# Patient Record
Sex: MALE | Race: BLACK OR AFRICAN AMERICAN | NOT HISPANIC OR LATINO | Employment: UNEMPLOYED | URBAN - METROPOLITAN AREA
[De-identification: names, ages, dates, MRNs, and addresses within clinical notes are randomized per-mention and may not be internally consistent; named-entity substitution may affect disease eponyms.]

---

## 2020-01-01 ENCOUNTER — OFFICE VISIT (OUTPATIENT)
Dept: WOUND CARE | Facility: HOSPITAL | Age: 65
End: 2020-01-01

## 2020-01-01 ENCOUNTER — APPOINTMENT (OUTPATIENT)
Dept: GENERAL RADIOLOGY | Facility: HOSPITAL | Age: 65
End: 2020-01-01

## 2020-01-01 ENCOUNTER — APPOINTMENT (OUTPATIENT)
Dept: WOUND CARE | Facility: HOSPITAL | Age: 65
End: 2020-01-01

## 2020-01-01 ENCOUNTER — HOSPITAL ENCOUNTER (INPATIENT)
Facility: HOSPITAL | Age: 65
LOS: 5 days | Discharge: HOME HEALTH | End: 2020-12-23
Attending: EMERGENCY MEDICINE | Admitting: HOSPITALIST

## 2020-01-01 ENCOUNTER — APPOINTMENT (OUTPATIENT)
Dept: CT IMAGING | Facility: HOSPITAL | Age: 65
End: 2020-01-01

## 2020-01-01 ENCOUNTER — READMISSION MANAGEMENT (OUTPATIENT)
Dept: CALL CENTER | Facility: HOSPITAL | Age: 65
End: 2020-01-01

## 2020-01-01 ENCOUNTER — APPOINTMENT (OUTPATIENT)
Dept: CARDIOLOGY | Facility: HOSPITAL | Age: 65
End: 2020-01-01

## 2020-01-01 ENCOUNTER — LAB REQUISITION (OUTPATIENT)
Dept: LAB | Facility: HOSPITAL | Age: 65
End: 2020-01-01

## 2020-01-01 VITALS
SYSTOLIC BLOOD PRESSURE: 141 MMHG | DIASTOLIC BLOOD PRESSURE: 89 MMHG | TEMPERATURE: 97.3 F | HEART RATE: 75 BPM | OXYGEN SATURATION: 100 % | BODY MASS INDEX: 31.27 KG/M2 | WEIGHT: 251.5 LBS | HEIGHT: 75 IN | RESPIRATION RATE: 18 BRPM

## 2020-01-01 DIAGNOSIS — A41.9 SEPSIS SECONDARY TO UTI (HCC): ICD-10-CM

## 2020-01-01 DIAGNOSIS — Z00.00 ENCOUNTER FOR GENERAL ADULT MEDICAL EXAMINATION WITHOUT ABNORMAL FINDINGS: ICD-10-CM

## 2020-01-01 DIAGNOSIS — N39.0 COMPLICATED UTI (URINARY TRACT INFECTION): Primary | ICD-10-CM

## 2020-01-01 DIAGNOSIS — N39.0 SEPSIS SECONDARY TO UTI (HCC): ICD-10-CM

## 2020-01-01 DIAGNOSIS — R41.0 CONFUSION: ICD-10-CM

## 2020-01-01 DIAGNOSIS — E87.20 NORMAL ANION GAP METABOLIC ACIDOSIS: ICD-10-CM

## 2020-01-01 LAB
ALBUMIN SERPL-MCNC: 2.1 G/DL (ref 3.5–5.2)
ALBUMIN SERPL-MCNC: 2.2 G/DL (ref 3.5–5.2)
ALBUMIN SERPL-MCNC: 2.2 G/DL (ref 3.5–5.2)
ALBUMIN SERPL-MCNC: 2.4 G/DL (ref 3.5–5.2)
ALBUMIN/GLOB SERPL: 0.6 G/DL
ALP SERPL-CCNC: 94 U/L (ref 39–117)
ALT SERPL W P-5'-P-CCNC: 11 U/L (ref 1–41)
ANION GAP SERPL CALCULATED.3IONS-SCNC: 10.3 MMOL/L (ref 5–15)
ANION GAP SERPL CALCULATED.3IONS-SCNC: 10.3 MMOL/L (ref 5–15)
ANION GAP SERPL CALCULATED.3IONS-SCNC: 10.4 MMOL/L (ref 5–15)
ANION GAP SERPL CALCULATED.3IONS-SCNC: 7.4 MMOL/L (ref 5–15)
ANION GAP SERPL CALCULATED.3IONS-SCNC: 7.9 MMOL/L (ref 5–15)
ANION GAP SERPL CALCULATED.3IONS-SCNC: 8.7 MMOL/L (ref 5–15)
ANISOCYTOSIS BLD QL: NORMAL
AORTIC DIMENSIONLESS INDEX: 0.8 (DI)
APTT PPP: 42.5 SECONDS (ref 22.7–35.4)
APTT PPP: 47.1 SECONDS (ref 22.7–35.4)
APTT PPP: 50.7 SECONDS (ref 22.7–35.4)
AST SERPL-CCNC: 15 U/L (ref 1–40)
B PARAPERT DNA SPEC QL NAA+PROBE: NOT DETECTED
B PERT DNA SPEC QL NAA+PROBE: NOT DETECTED
BACTERIA SPEC AEROBE CULT: ABNORMAL
BACTERIA SPEC AEROBE CULT: NORMAL
BACTERIA SPEC ANAEROBE CULT: NORMAL
BACTERIA UR QL AUTO: ABNORMAL /HPF
BASOPHILS # BLD AUTO: 0.05 10*3/MM3 (ref 0–0.2)
BASOPHILS # BLD AUTO: 0.08 10*3/MM3 (ref 0–0.2)
BASOPHILS NFR BLD AUTO: 0.5 % (ref 0–1.5)
BASOPHILS NFR BLD AUTO: 1.1 % (ref 0–1.5)
BH CV ECHO MEAS - ACS: 2.6 CM
BH CV ECHO MEAS - AO MAX PG: 3 MMHG
BH CV ECHO MEAS - AO MEAN PG (FULL): 0 MMHG
BH CV ECHO MEAS - AO MEAN PG: 1 MMHG
BH CV ECHO MEAS - AO ROOT AREA (BSA CORRECTED): 1.6
BH CV ECHO MEAS - AO ROOT AREA: 10.8 CM^2
BH CV ECHO MEAS - AO ROOT DIAM: 3.7 CM
BH CV ECHO MEAS - AO V2 MAX: 82 CM/SEC
BH CV ECHO MEAS - AO V2 MEAN: 56.8 CM/SEC
BH CV ECHO MEAS - AO V2 VTI: 14.1 CM
BH CV ECHO MEAS - ASC AORTA: 3.2 CM
BH CV ECHO MEAS - AVA(I,A): 2.8 CM^2
BH CV ECHO MEAS - AVA(I,D): 2.8 CM^2
BH CV ECHO MEAS - BSA(HAYCOCK): 2.4 M^2
BH CV ECHO MEAS - BSA: 2.4 M^2
BH CV ECHO MEAS - BZI_BMI: 30 KILOGRAMS/M^2
BH CV ECHO MEAS - BZI_METRIC_HEIGHT: 190.5 CM
BH CV ECHO MEAS - BZI_METRIC_WEIGHT: 108.9 KG
BH CV ECHO MEAS - EDV(CUBED): 74.1 ML
BH CV ECHO MEAS - EDV(MOD-SP2): 48 ML
BH CV ECHO MEAS - EDV(MOD-SP4): 57 ML
BH CV ECHO MEAS - EDV(TEICH): 78.6 ML
BH CV ECHO MEAS - EF(CUBED): 63.6 %
BH CV ECHO MEAS - EF(MOD-BP): 59 %
BH CV ECHO MEAS - EF(MOD-SP2): 58.3 %
BH CV ECHO MEAS - EF(MOD-SP4): 59.6 %
BH CV ECHO MEAS - EF(TEICH): 55.5 %
BH CV ECHO MEAS - ESV(CUBED): 27 ML
BH CV ECHO MEAS - ESV(MOD-SP2): 20 ML
BH CV ECHO MEAS - ESV(MOD-SP4): 23 ML
BH CV ECHO MEAS - ESV(TEICH): 35 ML
BH CV ECHO MEAS - FS: 28.6 %
BH CV ECHO MEAS - IVS/LVPW: 1.6
BH CV ECHO MEAS - IVSD: 2.3 CM
BH CV ECHO MEAS - LV DIASTOLIC VOL/BSA (35-75): 24 ML/M^2
BH CV ECHO MEAS - LV MASS(C)D: 349.2 GRAMS
BH CV ECHO MEAS - LV MASS(C)DI: 147.3 GRAMS/M^2
BH CV ECHO MEAS - LV MAX PG: 1.4 MMHG
BH CV ECHO MEAS - LV MEAN PG: 1 MMHG
BH CV ECHO MEAS - LV SYSTOLIC VOL/BSA (12-30): 9.7 ML/M^2
BH CV ECHO MEAS - LV V1 MAX: 60 CM/SEC
BH CV ECHO MEAS - LV V1 MEAN: 38.2 CM/SEC
BH CV ECHO MEAS - LV V1 VTI: 11.3 CM
BH CV ECHO MEAS - LVIDD: 4.2 CM
BH CV ECHO MEAS - LVIDS: 3 CM
BH CV ECHO MEAS - LVLD AP2: 6 CM
BH CV ECHO MEAS - LVLD AP4: 6.3 CM
BH CV ECHO MEAS - LVLS AP2: 5.2 CM
BH CV ECHO MEAS - LVLS AP4: 4.6 CM
BH CV ECHO MEAS - LVOT AREA (M): 3.5 CM^2
BH CV ECHO MEAS - LVOT AREA: 3.5 CM^2
BH CV ECHO MEAS - LVOT DIAM: 2.1 CM
BH CV ECHO MEAS - LVPWD: 1.4 CM
BH CV ECHO MEAS - MV A MAX VEL: 91.3 CM/SEC
BH CV ECHO MEAS - MV DEC SLOPE: 384 CM/SEC^2
BH CV ECHO MEAS - MV DEC TIME: 137 SEC
BH CV ECHO MEAS - MV E MAX VEL: 53.5 CM/SEC
BH CV ECHO MEAS - MV E/A: 0.59
BH CV ECHO MEAS - MV MEAN PG: 2 MMHG
BH CV ECHO MEAS - MV P1/2T MAX VEL: 62.5 CM/SEC
BH CV ECHO MEAS - MV P1/2T: 47.7 MSEC
BH CV ECHO MEAS - MV V2 MEAN: 67.2 CM/SEC
BH CV ECHO MEAS - MV V2 VTI: 22.1 CM
BH CV ECHO MEAS - MVA P1/2T LCG: 3.5 CM^2
BH CV ECHO MEAS - MVA(P1/2T): 4.6 CM^2
BH CV ECHO MEAS - MVA(VTI): 1.8 CM^2
BH CV ECHO MEAS - PA MAX PG: 2.2 MMHG
BH CV ECHO MEAS - PA V2 MAX: 74.7 CM/SEC
BH CV ECHO MEAS - QP/QS: 0.68
BH CV ECHO MEAS - RAP SYSTOLE: 8 MMHG
BH CV ECHO MEAS - RV MEAN PG: 0 MMHG
BH CV ECHO MEAS - RV V1 MEAN: 18.6 CM/SEC
BH CV ECHO MEAS - RV V1 VTI: 5 CM
BH CV ECHO MEAS - RVOT AREA: 5.3 CM^2
BH CV ECHO MEAS - RVOT DIAM: 2.6 CM
BH CV ECHO MEAS - SI(AO): 63.9 ML/M^2
BH CV ECHO MEAS - SI(CUBED): 19.9 ML/M^2
BH CV ECHO MEAS - SI(LVOT): 16.5 ML/M^2
BH CV ECHO MEAS - SI(MOD-SP2): 11.8 ML/M^2
BH CV ECHO MEAS - SI(MOD-SP4): 14.3 ML/M^2
BH CV ECHO MEAS - SI(TEICH): 18.4 ML/M^2
BH CV ECHO MEAS - SV(AO): 151.6 ML
BH CV ECHO MEAS - SV(CUBED): 47.1 ML
BH CV ECHO MEAS - SV(LVOT): 39.1 ML
BH CV ECHO MEAS - SV(MOD-SP2): 28 ML
BH CV ECHO MEAS - SV(MOD-SP4): 34 ML
BH CV ECHO MEAS - SV(RVOT): 26.6 ML
BH CV ECHO MEAS - SV(TEICH): 43.6 ML
BH CV ECHO MEAS - TAPSE (>1.6): 2.2 CM
BH CV VAS BP LEFT ARM: NORMAL MMHG
BH CV XLRA - RV BASE: 3.1 CM
BH CV XLRA - RV LENGTH: 7.4 CM
BH CV XLRA - RV MID: 2.1 CM
BH CV XLRA - TDI S': 9.7 CM/SEC
BILIRUB SERPL-MCNC: 0.6 MG/DL (ref 0–1.2)
BILIRUB UR QL STRIP: NEGATIVE
BUN SERPL-MCNC: 41 MG/DL (ref 8–23)
BUN SERPL-MCNC: 43 MG/DL (ref 8–23)
BUN SERPL-MCNC: 47 MG/DL (ref 8–23)
BUN SERPL-MCNC: 48 MG/DL (ref 8–23)
BUN SERPL-MCNC: 53 MG/DL (ref 8–23)
BUN SERPL-MCNC: 55 MG/DL (ref 8–23)
BUN/CREAT SERPL: 11.8 (ref 7–25)
BUN/CREAT SERPL: 12.6 (ref 7–25)
BUN/CREAT SERPL: 13.9 (ref 7–25)
BUN/CREAT SERPL: 14.4 (ref 7–25)
BUN/CREAT SERPL: 14.4 (ref 7–25)
BUN/CREAT SERPL: 14.6 (ref 7–25)
BURR CELLS BLD QL SMEAR: ABNORMAL
C PNEUM DNA NPH QL NAA+NON-PROBE: NOT DETECTED
CALCIUM SPEC-SCNC: 8 MG/DL (ref 8.6–10.5)
CALCIUM SPEC-SCNC: 8.6 MG/DL (ref 8.6–10.5)
CALCIUM SPEC-SCNC: 8.6 MG/DL (ref 8.6–10.5)
CALCIUM SPEC-SCNC: 8.7 MG/DL (ref 8.6–10.5)
CALCIUM SPEC-SCNC: 8.7 MG/DL (ref 8.6–10.5)
CALCIUM SPEC-SCNC: 8.9 MG/DL (ref 8.6–10.5)
CHLORIDE SERPL-SCNC: 106 MMOL/L (ref 98–107)
CHLORIDE SERPL-SCNC: 107 MMOL/L (ref 98–107)
CHLORIDE SERPL-SCNC: 109 MMOL/L (ref 98–107)
CHLORIDE SERPL-SCNC: 109 MMOL/L (ref 98–107)
CHLORIDE SERPL-SCNC: 110 MMOL/L (ref 98–107)
CHLORIDE SERPL-SCNC: 110 MMOL/L (ref 98–107)
CHOLEST SERPL-MCNC: 108 MG/DL (ref 0–200)
CLARITY UR: CLEAR
CO2 SERPL-SCNC: 16.6 MMOL/L (ref 22–29)
CO2 SERPL-SCNC: 16.7 MMOL/L (ref 22–29)
CO2 SERPL-SCNC: 18.7 MMOL/L (ref 22–29)
CO2 SERPL-SCNC: 19.3 MMOL/L (ref 22–29)
CO2 SERPL-SCNC: 20.6 MMOL/L (ref 22–29)
CO2 SERPL-SCNC: 21.1 MMOL/L (ref 22–29)
COLOR UR: YELLOW
CREAT SERPL-MCNC: 3.26 MG/DL (ref 0.76–1.27)
CREAT SERPL-MCNC: 3.34 MG/DL (ref 0.76–1.27)
CREAT SERPL-MCNC: 3.37 MG/DL (ref 0.76–1.27)
CREAT SERPL-MCNC: 3.64 MG/DL (ref 0.76–1.27)
CREAT SERPL-MCNC: 3.65 MG/DL (ref 0.76–1.27)
CREAT SERPL-MCNC: 3.82 MG/DL (ref 0.76–1.27)
D-LACTATE SERPL-SCNC: 1.3 MMOL/L (ref 0.5–2)
DEPRECATED RDW RBC AUTO: 48.5 FL (ref 37–54)
DEPRECATED RDW RBC AUTO: 48.5 FL (ref 37–54)
DEPRECATED RDW RBC AUTO: 49.3 FL (ref 37–54)
DEPRECATED RDW RBC AUTO: 50.6 FL (ref 37–54)
DEPRECATED RDW RBC AUTO: 51.8 FL (ref 37–54)
DEPRECATED RDW RBC AUTO: 53.7 FL (ref 37–54)
EOSINOPHIL # BLD AUTO: 0.33 10*3/MM3 (ref 0–0.4)
EOSINOPHIL # BLD AUTO: 0.36 10*3/MM3 (ref 0–0.4)
EOSINOPHIL # BLD MANUAL: 0.16 10*3/MM3 (ref 0–0.4)
EOSINOPHIL NFR BLD AUTO: 3.2 % (ref 0.3–6.2)
EOSINOPHIL NFR BLD AUTO: 4.9 % (ref 0.3–6.2)
EOSINOPHIL NFR BLD MANUAL: 1.1 % (ref 0.3–6.2)
ERYTHROCYTE [DISTWIDTH] IN BLOOD BY AUTOMATED COUNT: 20 % (ref 12.3–15.4)
ERYTHROCYTE [DISTWIDTH] IN BLOOD BY AUTOMATED COUNT: 20.3 % (ref 12.3–15.4)
ERYTHROCYTE [DISTWIDTH] IN BLOOD BY AUTOMATED COUNT: 20.4 % (ref 12.3–15.4)
ERYTHROCYTE [DISTWIDTH] IN BLOOD BY AUTOMATED COUNT: 20.6 % (ref 12.3–15.4)
ERYTHROCYTE [DISTWIDTH] IN BLOOD BY AUTOMATED COUNT: 20.6 % (ref 12.3–15.4)
ERYTHROCYTE [DISTWIDTH] IN BLOOD BY AUTOMATED COUNT: 21.4 % (ref 12.3–15.4)
FERRITIN SERPL-MCNC: 515 NG/ML (ref 30–400)
FLUAV SUBTYP SPEC NAA+PROBE: NOT DETECTED
FLUBV RNA ISLT QL NAA+PROBE: NOT DETECTED
FOLATE SERPL-MCNC: 5.22 NG/ML (ref 4.78–24.2)
GFR SERPL CREATININE-BSD FRML MDRD: 19 ML/MIN/1.73
GFR SERPL CREATININE-BSD FRML MDRD: 20 ML/MIN/1.73
GFR SERPL CREATININE-BSD FRML MDRD: 20 ML/MIN/1.73
GFR SERPL CREATININE-BSD FRML MDRD: 22 ML/MIN/1.73
GFR SERPL CREATININE-BSD FRML MDRD: 22 ML/MIN/1.73
GFR SERPL CREATININE-BSD FRML MDRD: 23 ML/MIN/1.73
GLOBULIN UR ELPH-MCNC: 3.7 GM/DL
GLUCOSE BLDC GLUCOMTR-MCNC: 106 MG/DL (ref 70–130)
GLUCOSE BLDC GLUCOMTR-MCNC: 109 MG/DL (ref 70–130)
GLUCOSE BLDC GLUCOMTR-MCNC: 110 MG/DL (ref 70–130)
GLUCOSE BLDC GLUCOMTR-MCNC: 111 MG/DL (ref 70–130)
GLUCOSE BLDC GLUCOMTR-MCNC: 120 MG/DL (ref 70–130)
GLUCOSE BLDC GLUCOMTR-MCNC: 121 MG/DL (ref 70–130)
GLUCOSE BLDC GLUCOMTR-MCNC: 126 MG/DL (ref 70–130)
GLUCOSE BLDC GLUCOMTR-MCNC: 131 MG/DL (ref 70–130)
GLUCOSE BLDC GLUCOMTR-MCNC: 134 MG/DL (ref 70–130)
GLUCOSE BLDC GLUCOMTR-MCNC: 138 MG/DL (ref 70–130)
GLUCOSE BLDC GLUCOMTR-MCNC: 139 MG/DL (ref 70–130)
GLUCOSE BLDC GLUCOMTR-MCNC: 142 MG/DL (ref 70–130)
GLUCOSE BLDC GLUCOMTR-MCNC: 146 MG/DL (ref 70–130)
GLUCOSE BLDC GLUCOMTR-MCNC: 148 MG/DL (ref 70–130)
GLUCOSE BLDC GLUCOMTR-MCNC: 150 MG/DL (ref 70–130)
GLUCOSE BLDC GLUCOMTR-MCNC: 157 MG/DL (ref 70–130)
GLUCOSE BLDC GLUCOMTR-MCNC: 158 MG/DL (ref 70–130)
GLUCOSE BLDC GLUCOMTR-MCNC: 160 MG/DL (ref 70–130)
GLUCOSE BLDC GLUCOMTR-MCNC: 164 MG/DL (ref 70–130)
GLUCOSE SERPL-MCNC: 109 MG/DL (ref 65–99)
GLUCOSE SERPL-MCNC: 114 MG/DL (ref 65–99)
GLUCOSE SERPL-MCNC: 116 MG/DL (ref 65–99)
GLUCOSE SERPL-MCNC: 129 MG/DL (ref 65–99)
GLUCOSE SERPL-MCNC: 132 MG/DL (ref 65–99)
GLUCOSE SERPL-MCNC: 140 MG/DL (ref 65–99)
GLUCOSE UR STRIP-MCNC: NEGATIVE MG/DL
GRAM STN SPEC: ABNORMAL
GRAM STN SPEC: ABNORMAL
HADV DNA SPEC NAA+PROBE: NOT DETECTED
HCOV 229E RNA SPEC QL NAA+PROBE: NOT DETECTED
HCOV HKU1 RNA SPEC QL NAA+PROBE: NOT DETECTED
HCOV NL63 RNA SPEC QL NAA+PROBE: NOT DETECTED
HCOV OC43 RNA SPEC QL NAA+PROBE: NOT DETECTED
HCT VFR BLD AUTO: 26.4 % (ref 37.5–51)
HCT VFR BLD AUTO: 26.6 % (ref 37.5–51)
HCT VFR BLD AUTO: 26.8 % (ref 37.5–51)
HCT VFR BLD AUTO: 27.6 % (ref 37.5–51)
HCT VFR BLD AUTO: 28.4 % (ref 37.5–51)
HCT VFR BLD AUTO: 29.1 % (ref 37.5–51)
HDLC SERPL-MCNC: 49 MG/DL (ref 40–60)
HGB BLD-MCNC: 7.9 G/DL (ref 13–17.7)
HGB BLD-MCNC: 8.1 G/DL (ref 13–17.7)
HGB BLD-MCNC: 8.1 G/DL (ref 13–17.7)
HGB BLD-MCNC: 8.2 G/DL (ref 13–17.7)
HGB BLD-MCNC: 8.6 G/DL (ref 13–17.7)
HGB BLD-MCNC: 8.6 G/DL (ref 13–17.7)
HGB UR QL STRIP.AUTO: ABNORMAL
HMPV RNA NPH QL NAA+NON-PROBE: NOT DETECTED
HPIV1 RNA SPEC QL NAA+PROBE: NOT DETECTED
HPIV2 RNA SPEC QL NAA+PROBE: NOT DETECTED
HPIV3 RNA NPH QL NAA+PROBE: NOT DETECTED
HPIV4 P GENE NPH QL NAA+PROBE: NOT DETECTED
HYALINE CASTS UR QL AUTO: ABNORMAL /LPF
HYPOCHROMIA BLD QL: ABNORMAL
HYPOCHROMIA BLD QL: ABNORMAL
HYPOCHROMIA BLD QL: NORMAL
IMM GRANULOCYTES # BLD AUTO: 0.06 10*3/MM3 (ref 0–0.05)
IMM GRANULOCYTES NFR BLD AUTO: 0.6 % (ref 0–0.5)
INR PPP: 1.3 (ref 0.9–1.1)
INR PPP: 1.37 (ref 0.9–1.1)
IRON 24H UR-MRATE: 26 MCG/DL (ref 59–158)
IRON SATN MFR SERPL: 23 % (ref 20–50)
KETONES UR QL STRIP: NEGATIVE
LDLC SERPL CALC-MCNC: 42 MG/DL (ref 0–100)
LDLC/HDLC SERPL: 0.85 {RATIO}
LEFT ATRIUM VOLUME INDEX: 30.4 ML/M2
LEUKOCYTE ESTERASE UR QL STRIP.AUTO: ABNORMAL
LIPASE SERPL-CCNC: 17 U/L (ref 13–60)
LYMPHOCYTES # BLD AUTO: 1.39 10*3/MM3 (ref 0.7–3.1)
LYMPHOCYTES # BLD AUTO: 1.78 10*3/MM3 (ref 0.7–3.1)
LYMPHOCYTES # BLD MANUAL: 0 10*3/MM3 (ref 0.7–3.1)
LYMPHOCYTES # BLD MANUAL: 0.16 10*3/MM3 (ref 0.7–3.1)
LYMPHOCYTES NFR BLD AUTO: 13.3 % (ref 19.6–45.3)
LYMPHOCYTES NFR BLD AUTO: 24.4 % (ref 19.6–45.3)
LYMPHOCYTES NFR BLD MANUAL: 0 % (ref 19.6–45.3)
LYMPHOCYTES NFR BLD MANUAL: 1 % (ref 19.6–45.3)
LYMPHOCYTES NFR BLD MANUAL: 2.1 % (ref 5–12)
M PNEUMO IGG SER IA-ACNC: NOT DETECTED
MAGNESIUM SERPL-MCNC: 1.8 MG/DL (ref 1.6–2.4)
MAGNESIUM SERPL-MCNC: 1.9 MG/DL (ref 1.6–2.4)
MAGNESIUM SERPL-MCNC: 2 MG/DL (ref 1.6–2.4)
MAXIMAL PREDICTED HEART RATE: 153 BPM
MCH RBC QN AUTO: 21.5 PG (ref 26.6–33)
MCH RBC QN AUTO: 21.5 PG (ref 26.6–33)
MCH RBC QN AUTO: 21.7 PG (ref 26.6–33)
MCH RBC QN AUTO: 21.8 PG (ref 26.6–33)
MCHC RBC AUTO-ENTMCNC: 29.6 G/DL (ref 31.5–35.7)
MCHC RBC AUTO-ENTMCNC: 29.7 G/DL (ref 31.5–35.7)
MCHC RBC AUTO-ENTMCNC: 29.7 G/DL (ref 31.5–35.7)
MCHC RBC AUTO-ENTMCNC: 30.2 G/DL (ref 31.5–35.7)
MCHC RBC AUTO-ENTMCNC: 30.3 G/DL (ref 31.5–35.7)
MCHC RBC AUTO-ENTMCNC: 30.7 G/DL (ref 31.5–35.7)
MCV RBC AUTO: 70.8 FL (ref 79–97)
MCV RBC AUTO: 71.7 FL (ref 79–97)
MCV RBC AUTO: 71.8 FL (ref 79–97)
MCV RBC AUTO: 72.3 FL (ref 79–97)
MCV RBC AUTO: 72.3 FL (ref 79–97)
MCV RBC AUTO: 73.7 FL (ref 79–97)
MICROCYTES BLD QL: ABNORMAL
MICROCYTES BLD QL: NORMAL
MONOCYTES # BLD AUTO: 0.3 10*3/MM3 (ref 0.1–0.9)
MONOCYTES # BLD AUTO: 0.57 10*3/MM3 (ref 0.1–0.9)
MONOCYTES # BLD AUTO: 0.59 10*3/MM3 (ref 0.1–0.9)
MONOCYTES NFR BLD AUTO: 5.5 % (ref 5–12)
MONOCYTES NFR BLD AUTO: 8.1 % (ref 5–12)
NEUTROPHILS # BLD AUTO: 13.71 10*3/MM3 (ref 1.7–7)
NEUTROPHILS # BLD AUTO: 15.72 10*3/MM3 (ref 1.7–7)
NEUTROPHILS NFR BLD AUTO: 4.44 10*3/MM3 (ref 1.7–7)
NEUTROPHILS NFR BLD AUTO: 61 % (ref 42.7–76)
NEUTROPHILS NFR BLD AUTO: 76.9 % (ref 42.7–76)
NEUTROPHILS NFR BLD AUTO: 8.02 10*3/MM3 (ref 1.7–7)
NEUTROPHILS NFR BLD MANUAL: 96.8 % (ref 42.7–76)
NEUTROPHILS NFR BLD MANUAL: 99 % (ref 42.7–76)
NITRITE UR QL STRIP: NEGATIVE
NRBC BLD AUTO-RTO: 0 /100 WBC (ref 0–0.2)
OVALOCYTES BLD QL SMEAR: ABNORMAL
PH UR STRIP.AUTO: 8 [PH] (ref 5–8)
PHOSPHATE SERPL-MCNC: 2.6 MG/DL (ref 2.5–4.5)
PHOSPHATE SERPL-MCNC: 3.2 MG/DL (ref 2.5–4.5)
PHOSPHATE SERPL-MCNC: 3.9 MG/DL (ref 2.5–4.5)
PLAT MORPH BLD: NORMAL
PLATELET # BLD AUTO: 196 10*3/MM3 (ref 140–450)
PLATELET # BLD AUTO: 197 10*3/MM3 (ref 140–450)
PLATELET # BLD AUTO: 223 10*3/MM3 (ref 140–450)
PLATELET # BLD AUTO: 233 10*3/MM3 (ref 140–450)
PLATELET # BLD AUTO: 237 10*3/MM3 (ref 140–450)
PLATELET # BLD AUTO: 242 10*3/MM3 (ref 140–450)
PMV BLD AUTO: ABNORMAL FL
POIKILOCYTOSIS BLD QL SMEAR: ABNORMAL
POTASSIUM SERPL-SCNC: 3.3 MMOL/L (ref 3.5–5.2)
POTASSIUM SERPL-SCNC: 3.4 MMOL/L (ref 3.5–5.2)
POTASSIUM SERPL-SCNC: 3.6 MMOL/L (ref 3.5–5.2)
POTASSIUM SERPL-SCNC: 3.7 MMOL/L (ref 3.5–5.2)
POTASSIUM SERPL-SCNC: 3.8 MMOL/L (ref 3.5–5.2)
POTASSIUM SERPL-SCNC: 4.2 MMOL/L (ref 3.5–5.2)
PROT SERPL-MCNC: 6.1 G/DL (ref 6–8.5)
PROT UR QL STRIP: ABNORMAL
PROTHROMBIN TIME: 16 SECONDS (ref 11.7–14.2)
PROTHROMBIN TIME: 16.7 SECONDS (ref 11.7–14.2)
QT INTERVAL: 389 MS
QT INTERVAL: 480 MS
QT INTERVAL: 484 MS
QT INTERVAL: 536 MS
RBC # BLD AUTO: 3.68 10*6/MM3 (ref 4.14–5.8)
RBC # BLD AUTO: 3.73 10*6/MM3 (ref 4.14–5.8)
RBC # BLD AUTO: 3.73 10*6/MM3 (ref 4.14–5.8)
RBC # BLD AUTO: 3.82 10*6/MM3 (ref 4.14–5.8)
RBC # BLD AUTO: 3.95 10*6/MM3 (ref 4.14–5.8)
RBC # BLD AUTO: 3.96 10*6/MM3 (ref 4.14–5.8)
RBC # UR: ABNORMAL /HPF
REF LAB TEST METHOD: ABNORMAL
RHINOVIRUS RNA SPEC NAA+PROBE: NOT DETECTED
RSV RNA NPH QL NAA+NON-PROBE: NOT DETECTED
SARS-COV-2 RNA NPH QL NAA+NON-PROBE: NOT DETECTED
SCHISTOCYTES BLD QL SMEAR: ABNORMAL
SINUS: 3.4 CM
SMALL PLATELETS BLD QL SMEAR: ABNORMAL
SMUDGE CELLS BLD QL SMEAR: ABNORMAL
SODIUM SERPL-SCNC: 135 MMOL/L (ref 136–145)
SODIUM SERPL-SCNC: 136 MMOL/L (ref 136–145)
SODIUM SERPL-SCNC: 136 MMOL/L (ref 136–145)
SODIUM SERPL-SCNC: 137 MMOL/L (ref 136–145)
SODIUM SERPL-SCNC: 137 MMOL/L (ref 136–145)
SODIUM SERPL-SCNC: 138 MMOL/L (ref 136–145)
SP GR UR STRIP: 1.01 (ref 1–1.03)
SQUAMOUS #/AREA URNS HPF: ABNORMAL /HPF
STJ: 2.7 CM
STRESS TARGET HR: 130 BPM
TIBC SERPL-MCNC: 112 MCG/DL (ref 298–536)
TRANSFERRIN SERPL-MCNC: 75 MG/DL (ref 200–360)
TRIGL SERPL-MCNC: 86 MG/DL (ref 0–150)
TROPONIN T SERPL-MCNC: 0.13 NG/ML (ref 0–0.03)
TROPONIN T SERPL-MCNC: 0.14 NG/ML (ref 0–0.03)
TROPONIN T SERPL-MCNC: 0.15 NG/ML (ref 0–0.03)
TROPONIN T SERPL-MCNC: 0.17 NG/ML (ref 0–0.03)
UROBILINOGEN UR QL STRIP: ABNORMAL
VIT B12 BLD-MCNC: 921 PG/ML (ref 211–946)
VLDLC SERPL-MCNC: 17 MG/DL (ref 5–40)
WBC # BLD AUTO: 10.42 10*3/MM3 (ref 3.4–10.8)
WBC # BLD AUTO: 11.7 10*3/MM3 (ref 3.4–10.8)
WBC # BLD AUTO: 14.16 10*3/MM3 (ref 3.4–10.8)
WBC # BLD AUTO: 15.88 10*3/MM3 (ref 3.4–10.8)
WBC # BLD AUTO: 7.29 10*3/MM3 (ref 3.4–10.8)
WBC # BLD AUTO: 8.9 10*3/MM3 (ref 3.4–10.8)
WBC MORPH BLD: NORMAL
WBC UR QL AUTO: ABNORMAL /HPF

## 2020-01-01 PROCEDURE — 80048 BASIC METABOLIC PNL TOTAL CA: CPT | Performed by: NURSE PRACTITIONER

## 2020-01-01 PROCEDURE — 25010000002 PIPERACILLIN SOD-TAZOBACTAM PER 1 G: Performed by: INTERNAL MEDICINE

## 2020-01-01 PROCEDURE — 84484 ASSAY OF TROPONIN QUANT: CPT | Performed by: NURSE PRACTITIONER

## 2020-01-01 PROCEDURE — 87070 CULTURE OTHR SPECIMN AEROBIC: CPT | Performed by: NURSE PRACTITIONER

## 2020-01-01 PROCEDURE — 80048 BASIC METABOLIC PNL TOTAL CA: CPT | Performed by: HOSPITALIST

## 2020-01-01 PROCEDURE — G0463 HOSPITAL OUTPT CLINIC VISIT: HCPCS

## 2020-01-01 PROCEDURE — 82607 VITAMIN B-12: CPT | Performed by: INTERNAL MEDICINE

## 2020-01-01 PROCEDURE — 99232 SBSQ HOSP IP/OBS MODERATE 35: CPT | Performed by: INTERNAL MEDICINE

## 2020-01-01 PROCEDURE — 83735 ASSAY OF MAGNESIUM: CPT | Performed by: INTERNAL MEDICINE

## 2020-01-01 PROCEDURE — 85025 COMPLETE CBC W/AUTO DIFF WBC: CPT | Performed by: NURSE PRACTITIONER

## 2020-01-01 PROCEDURE — 97530 THERAPEUTIC ACTIVITIES: CPT

## 2020-01-01 PROCEDURE — 97110 THERAPEUTIC EXERCISES: CPT | Performed by: PHYSICAL THERAPIST

## 2020-01-01 PROCEDURE — 85610 PROTHROMBIN TIME: CPT | Performed by: NURSE PRACTITIONER

## 2020-01-01 PROCEDURE — 82962 GLUCOSE BLOOD TEST: CPT

## 2020-01-01 PROCEDURE — 93005 ELECTROCARDIOGRAM TRACING: CPT | Performed by: NURSE PRACTITIONER

## 2020-01-01 PROCEDURE — 63710000001 INSULIN LISPRO (HUMAN) PER 5 UNITS: Performed by: NURSE PRACTITIONER

## 2020-01-01 PROCEDURE — 87086 URINE CULTURE/COLONY COUNT: CPT | Performed by: NURSE PRACTITIONER

## 2020-01-01 PROCEDURE — 85007 BL SMEAR W/DIFF WBC COUNT: CPT | Performed by: EMERGENCY MEDICINE

## 2020-01-01 PROCEDURE — 99231 SBSQ HOSP IP/OBS SF/LOW 25: CPT | Performed by: INTERNAL MEDICINE

## 2020-01-01 PROCEDURE — 97162 PT EVAL MOD COMPLEX 30 MIN: CPT | Performed by: PHYSICAL THERAPIST

## 2020-01-01 PROCEDURE — 83540 ASSAY OF IRON: CPT | Performed by: INTERNAL MEDICINE

## 2020-01-01 PROCEDURE — 0202U NFCT DS 22 TRGT SARS-COV-2: CPT | Performed by: EMERGENCY MEDICINE

## 2020-01-01 PROCEDURE — 82728 ASSAY OF FERRITIN: CPT | Performed by: INTERNAL MEDICINE

## 2020-01-01 PROCEDURE — 36415 COLL VENOUS BLD VENIPUNCTURE: CPT | Performed by: NURSE PRACTITIONER

## 2020-01-01 PROCEDURE — 93010 ELECTROCARDIOGRAM REPORT: CPT | Performed by: INTERNAL MEDICINE

## 2020-01-01 PROCEDURE — 25010000002 HEPARIN (PORCINE) PER 1000 UNITS: Performed by: NURSE PRACTITIONER

## 2020-01-01 PROCEDURE — 25010000002 PERFLUTREN (DEFINITY) 8.476 MG IN SODIUM CHLORIDE 0.9 % 10 ML INJECTION: Performed by: HOSPITALIST

## 2020-01-01 PROCEDURE — 85027 COMPLETE CBC AUTOMATED: CPT | Performed by: HOSPITALIST

## 2020-01-01 PROCEDURE — 85730 THROMBOPLASTIN TIME PARTIAL: CPT | Performed by: HOSPITALIST

## 2020-01-01 PROCEDURE — 80061 LIPID PANEL: CPT | Performed by: NURSE PRACTITIONER

## 2020-01-01 PROCEDURE — 87015 SPECIMEN INFECT AGNT CONCNTJ: CPT | Performed by: NURSE PRACTITIONER

## 2020-01-01 PROCEDURE — 80053 COMPREHEN METABOLIC PANEL: CPT | Performed by: EMERGENCY MEDICINE

## 2020-01-01 PROCEDURE — 83735 ASSAY OF MAGNESIUM: CPT | Performed by: NURSE PRACTITIONER

## 2020-01-01 PROCEDURE — 99222 1ST HOSP IP/OBS MODERATE 55: CPT | Performed by: INTERNAL MEDICINE

## 2020-01-01 PROCEDURE — 25010000002 CEFTRIAXONE PER 250 MG: Performed by: EMERGENCY MEDICINE

## 2020-01-01 PROCEDURE — 80069 RENAL FUNCTION PANEL: CPT | Performed by: INTERNAL MEDICINE

## 2020-01-01 PROCEDURE — 84466 ASSAY OF TRANSFERRIN: CPT | Performed by: INTERNAL MEDICINE

## 2020-01-01 PROCEDURE — 87077 CULTURE AEROBIC IDENTIFY: CPT | Performed by: NURSE PRACTITIONER

## 2020-01-01 PROCEDURE — 85007 BL SMEAR W/DIFF WBC COUNT: CPT | Performed by: NURSE PRACTITIONER

## 2020-01-01 PROCEDURE — 87040 BLOOD CULTURE FOR BACTERIA: CPT | Performed by: EMERGENCY MEDICINE

## 2020-01-01 PROCEDURE — 74176 CT ABD & PELVIS W/O CONTRAST: CPT

## 2020-01-01 PROCEDURE — 71045 X-RAY EXAM CHEST 1 VIEW: CPT

## 2020-01-01 PROCEDURE — 83690 ASSAY OF LIPASE: CPT | Performed by: EMERGENCY MEDICINE

## 2020-01-01 PROCEDURE — 85025 COMPLETE CBC W/AUTO DIFF WBC: CPT | Performed by: EMERGENCY MEDICINE

## 2020-01-01 PROCEDURE — 99285 EMERGENCY DEPT VISIT HI MDM: CPT

## 2020-01-01 PROCEDURE — 85730 THROMBOPLASTIN TIME PARTIAL: CPT | Performed by: INTERNAL MEDICINE

## 2020-01-01 PROCEDURE — 87186 SC STD MICRODIL/AGAR DIL: CPT | Performed by: NURSE PRACTITIONER

## 2020-01-01 PROCEDURE — 81001 URINALYSIS AUTO W/SCOPE: CPT | Performed by: EMERGENCY MEDICINE

## 2020-01-01 PROCEDURE — 83605 ASSAY OF LACTIC ACID: CPT | Performed by: EMERGENCY MEDICINE

## 2020-01-01 PROCEDURE — 87205 SMEAR GRAM STAIN: CPT | Performed by: NURSE PRACTITIONER

## 2020-01-01 PROCEDURE — 85730 THROMBOPLASTIN TIME PARTIAL: CPT | Performed by: NURSE PRACTITIONER

## 2020-01-01 PROCEDURE — 93306 TTE W/DOPPLER COMPLETE: CPT

## 2020-01-01 PROCEDURE — 25010000002 CEFTRIAXONE PER 250 MG: Performed by: HOSPITALIST

## 2020-01-01 PROCEDURE — 82746 ASSAY OF FOLIC ACID SERUM: CPT | Performed by: INTERNAL MEDICINE

## 2020-01-01 PROCEDURE — 87075 CULTR BACTERIA EXCEPT BLOOD: CPT | Performed by: NURSE PRACTITIONER

## 2020-01-01 PROCEDURE — 70450 CT HEAD/BRAIN W/O DYE: CPT

## 2020-01-01 PROCEDURE — C1751 CATH, INF, PER/CENT/MIDLINE: HCPCS

## 2020-01-01 RX ORDER — SODIUM CHLORIDE 0.9 % (FLUSH) 0.9 %
10 SYRINGE (ML) INJECTION AS NEEDED
Status: DISCONTINUED | OUTPATIENT
Start: 2020-01-01 | End: 2020-01-01 | Stop reason: HOSPADM

## 2020-01-01 RX ORDER — ACETAMINOPHEN 325 MG/1
650 TABLET ORAL EVERY 4 HOURS PRN
Status: DISCONTINUED | OUTPATIENT
Start: 2020-01-01 | End: 2020-01-01 | Stop reason: HOSPADM

## 2020-01-01 RX ORDER — SODIUM BICARBONATE 650 MG/1
1300 TABLET ORAL 2 TIMES DAILY
Status: DISCONTINUED | OUTPATIENT
Start: 2020-01-01 | End: 2020-01-01

## 2020-01-01 RX ORDER — DORZOLAMIDE HCL 20 MG/ML
1 SOLUTION/ DROPS OPHTHALMIC 2 TIMES DAILY
COMMUNITY

## 2020-01-01 RX ORDER — HEPARIN SODIUM 10000 [USP'U]/100ML
9.17 INJECTION, SOLUTION INTRAVENOUS
Status: DISCONTINUED | OUTPATIENT
Start: 2020-01-01 | End: 2020-01-01

## 2020-01-01 RX ORDER — SODIUM CHLORIDE 0.9 % (FLUSH) 0.9 %
10 SYRINGE (ML) INJECTION EVERY 12 HOURS SCHEDULED
Status: DISCONTINUED | OUTPATIENT
Start: 2020-01-01 | End: 2020-01-01 | Stop reason: HOSPADM

## 2020-01-01 RX ORDER — ACETAMINOPHEN 160 MG/5ML
650 SOLUTION ORAL EVERY 4 HOURS PRN
Status: DISCONTINUED | OUTPATIENT
Start: 2020-01-01 | End: 2020-01-01 | Stop reason: HOSPADM

## 2020-01-01 RX ORDER — SODIUM BICARBONATE 650 MG/1
650 TABLET ORAL 2 TIMES DAILY
Qty: 60 TABLET | Refills: 0 | Status: SHIPPED | OUTPATIENT
Start: 2020-01-01 | End: 2021-01-01 | Stop reason: HOSPADM

## 2020-01-01 RX ORDER — ONDANSETRON 2 MG/ML
4 INJECTION INTRAMUSCULAR; INTRAVENOUS EVERY 6 HOURS PRN
Status: DISCONTINUED | OUTPATIENT
Start: 2020-01-01 | End: 2020-01-01 | Stop reason: HOSPADM

## 2020-01-01 RX ORDER — SODIUM BICARBONATE 650 MG/1
650 TABLET ORAL 2 TIMES DAILY
Status: DISCONTINUED | OUTPATIENT
Start: 2020-01-01 | End: 2020-01-01 | Stop reason: HOSPADM

## 2020-01-01 RX ORDER — FUROSEMIDE 80 MG
80 TABLET ORAL DAILY
Status: DISCONTINUED | OUTPATIENT
Start: 2020-01-01 | End: 2020-01-01 | Stop reason: HOSPADM

## 2020-01-01 RX ORDER — CEFTRIAXONE SODIUM 1 G/50ML
1 INJECTION, SOLUTION INTRAVENOUS ONCE
Status: COMPLETED | OUTPATIENT
Start: 2020-01-01 | End: 2020-01-01

## 2020-01-01 RX ORDER — POTASSIUM CHLORIDE 1.5 G/1.77G
20 POWDER, FOR SOLUTION ORAL ONCE
Status: COMPLETED | OUTPATIENT
Start: 2020-01-01 | End: 2020-01-01

## 2020-01-01 RX ORDER — AMLODIPINE BESYLATE 5 MG/1
5 TABLET ORAL
Status: DISCONTINUED | OUTPATIENT
Start: 2020-01-01 | End: 2020-01-01 | Stop reason: HOSPADM

## 2020-01-01 RX ORDER — ASPIRIN 81 MG/1
81 TABLET ORAL DAILY
Status: DISCONTINUED | OUTPATIENT
Start: 2020-01-01 | End: 2020-01-01 | Stop reason: HOSPADM

## 2020-01-01 RX ORDER — ATORVASTATIN CALCIUM 20 MG/1
20 TABLET, FILM COATED ORAL DAILY
Status: DISCONTINUED | OUTPATIENT
Start: 2020-01-01 | End: 2020-01-01 | Stop reason: HOSPADM

## 2020-01-01 RX ORDER — CEFTRIAXONE SODIUM 1 G/50ML
1 INJECTION, SOLUTION INTRAVENOUS EVERY 24 HOURS
Status: DISCONTINUED | OUTPATIENT
Start: 2020-01-01 | End: 2020-01-01

## 2020-01-01 RX ORDER — DIPHENHYDRAMINE HCL 25 MG
25 CAPSULE ORAL EVERY 6 HOURS PRN
COMMUNITY
End: 2021-01-01 | Stop reason: HOSPADM

## 2020-01-01 RX ORDER — FUROSEMIDE 20 MG/1
20 TABLET ORAL 2 TIMES DAILY
COMMUNITY
End: 2020-01-01 | Stop reason: HOSPADM

## 2020-01-01 RX ORDER — DORZOLAMIDE HCL 20 MG/ML
1 SOLUTION/ DROPS OPHTHALMIC 2 TIMES DAILY
Status: DISCONTINUED | OUTPATIENT
Start: 2020-01-01 | End: 2020-01-01 | Stop reason: HOSPADM

## 2020-01-01 RX ORDER — POTASSIUM CHLORIDE 1.5 G/1.77G
40 POWDER, FOR SOLUTION ORAL ONCE
Status: COMPLETED | OUTPATIENT
Start: 2020-01-01 | End: 2020-01-01

## 2020-01-01 RX ORDER — DEXTROSE MONOHYDRATE 25 G/50ML
25 INJECTION, SOLUTION INTRAVENOUS
Status: DISCONTINUED | OUTPATIENT
Start: 2020-01-01 | End: 2020-01-01 | Stop reason: HOSPADM

## 2020-01-01 RX ORDER — NICOTINE POLACRILEX 4 MG
15 LOZENGE BUCCAL
Status: DISCONTINUED | OUTPATIENT
Start: 2020-01-01 | End: 2020-01-01 | Stop reason: HOSPADM

## 2020-01-01 RX ORDER — INSULIN LISPRO 100 [IU]/ML
0-7 INJECTION, SOLUTION INTRAVENOUS; SUBCUTANEOUS
Status: DISCONTINUED | OUTPATIENT
Start: 2020-01-01 | End: 2020-01-01 | Stop reason: HOSPADM

## 2020-01-01 RX ORDER — SODIUM CHLORIDE 9 MG/ML
50 INJECTION, SOLUTION INTRAVENOUS CONTINUOUS
Status: DISCONTINUED | OUTPATIENT
Start: 2020-01-01 | End: 2020-01-01

## 2020-01-01 RX ORDER — FUROSEMIDE 80 MG
80 TABLET ORAL DAILY
Qty: 30 TABLET | Refills: 0 | Status: SHIPPED | OUTPATIENT
Start: 2020-01-01 | End: 2021-01-01 | Stop reason: HOSPADM

## 2020-01-01 RX ORDER — NITROGLYCERIN 0.4 MG/1
0.4 TABLET SUBLINGUAL
Status: DISCONTINUED | OUTPATIENT
Start: 2020-01-01 | End: 2020-01-01 | Stop reason: HOSPADM

## 2020-01-01 RX ORDER — ATORVASTATIN CALCIUM 20 MG/1
20 TABLET, FILM COATED ORAL DAILY
COMMUNITY

## 2020-01-01 RX ORDER — ACETAMINOPHEN 650 MG/1
650 SUPPOSITORY RECTAL EVERY 4 HOURS PRN
Status: DISCONTINUED | OUTPATIENT
Start: 2020-01-01 | End: 2020-01-01 | Stop reason: HOSPADM

## 2020-01-01 RX ORDER — ASPIRIN 81 MG/1
81 TABLET ORAL DAILY
Qty: 30 TABLET | Refills: 0 | Status: SHIPPED | OUTPATIENT
Start: 2020-01-01 | End: 2021-01-01 | Stop reason: HOSPADM

## 2020-01-01 RX ORDER — AMLODIPINE BESYLATE 5 MG/1
5 TABLET ORAL
Qty: 30 TABLET | Refills: 0 | Status: SHIPPED | OUTPATIENT
Start: 2020-01-01 | End: 2021-01-01 | Stop reason: HOSPADM

## 2020-01-01 RX ADMIN — SODIUM CHLORIDE, PRESERVATIVE FREE 10 ML: 5 INJECTION INTRAVENOUS at 08:54

## 2020-01-01 RX ADMIN — BRIMONIDINE TARTRATE 1 DROP: 1 SOLUTION/ DROPS OPHTHALMIC at 20:03

## 2020-01-01 RX ADMIN — AMLODIPINE BESYLATE 5 MG: 5 TABLET ORAL at 08:12

## 2020-01-01 RX ADMIN — SODIUM CHLORIDE, PRESERVATIVE FREE 10 ML: 5 INJECTION INTRAVENOUS at 01:55

## 2020-01-01 RX ADMIN — TAZOBACTAM SODIUM AND PIPERACILLIN SODIUM 3.38 G: 375; 3 INJECTION, SOLUTION INTRAVENOUS at 17:31

## 2020-01-01 RX ADMIN — ASPIRIN 81 MG: 81 TABLET, COATED ORAL at 08:12

## 2020-01-01 RX ADMIN — AMLODIPINE BESYLATE 5 MG: 5 TABLET ORAL at 17:30

## 2020-01-01 RX ADMIN — SODIUM CHLORIDE, PRESERVATIVE FREE 10 ML: 5 INJECTION INTRAVENOUS at 20:13

## 2020-01-01 RX ADMIN — INSULIN LISPRO 2 UNITS: 100 INJECTION, SOLUTION INTRAVENOUS; SUBCUTANEOUS at 12:59

## 2020-01-01 RX ADMIN — ASPIRIN 81 MG: 81 TABLET, COATED ORAL at 08:31

## 2020-01-01 RX ADMIN — PERFLUTREN 3 ML: 6.52 INJECTION, SUSPENSION INTRAVENOUS at 08:00

## 2020-01-01 RX ADMIN — DORZOLAMIDE HYDROCHLORIDE 1 DROP: 20 SOLUTION/ DROPS OPHTHALMIC at 13:41

## 2020-01-01 RX ADMIN — ASPIRIN 81 MG: 81 TABLET, COATED ORAL at 18:00

## 2020-01-01 RX ADMIN — POTASSIUM CHLORIDE 40 MEQ: 1.5 POWDER, FOR SOLUTION ORAL at 18:13

## 2020-01-01 RX ADMIN — DORZOLAMIDE HYDROCHLORIDE 1 DROP: 20 SOLUTION/ DROPS OPHTHALMIC at 08:12

## 2020-01-01 RX ADMIN — DORZOLAMIDE HYDROCHLORIDE 1 DROP: 20 SOLUTION/ DROPS OPHTHALMIC at 20:03

## 2020-01-01 RX ADMIN — SODIUM BICARBONATE 1300 MG: 650 TABLET ORAL at 09:54

## 2020-01-01 RX ADMIN — INSULIN LISPRO 2 UNITS: 100 INJECTION, SOLUTION INTRAVENOUS; SUBCUTANEOUS at 08:46

## 2020-01-01 RX ADMIN — TAZOBACTAM SODIUM AND PIPERACILLIN SODIUM 3.38 G: 375; 3 INJECTION, SOLUTION INTRAVENOUS at 17:58

## 2020-01-01 RX ADMIN — SODIUM BICARBONATE 650 MG: 650 TABLET ORAL at 20:03

## 2020-01-01 RX ADMIN — SODIUM CHLORIDE, PRESERVATIVE FREE 10 ML: 5 INJECTION INTRAVENOUS at 08:31

## 2020-01-01 RX ADMIN — TAZOBACTAM SODIUM AND PIPERACILLIN SODIUM 3.38 G: 375; 3 INJECTION, SOLUTION INTRAVENOUS at 05:52

## 2020-01-01 RX ADMIN — ATORVASTATIN CALCIUM 20 MG: 20 TABLET, FILM COATED ORAL at 13:56

## 2020-01-01 RX ADMIN — SODIUM CHLORIDE 50 ML/HR: 9 INJECTION, SOLUTION INTRAVENOUS at 00:09

## 2020-01-01 RX ADMIN — BRIMONIDINE TARTRATE 1 DROP: 1 SOLUTION/ DROPS OPHTHALMIC at 08:12

## 2020-01-01 RX ADMIN — CEFTRIAXONE SODIUM 1 G: 1 INJECTION, SOLUTION INTRAVENOUS at 10:59

## 2020-01-01 RX ADMIN — METOPROLOL TARTRATE 12.5 MG: 25 TABLET, FILM COATED ORAL at 20:33

## 2020-01-01 RX ADMIN — SODIUM BICARBONATE 1300 MG: 650 TABLET ORAL at 20:13

## 2020-01-01 RX ADMIN — TAZOBACTAM SODIUM AND PIPERACILLIN SODIUM 3.38 G: 375; 3 INJECTION, SOLUTION INTRAVENOUS at 06:07

## 2020-01-01 RX ADMIN — SODIUM BICARBONATE 1300 MG: 650 TABLET ORAL at 08:31

## 2020-01-01 RX ADMIN — TAZOBACTAM SODIUM AND PIPERACILLIN SODIUM 3.38 G: 375; 3 INJECTION, SOLUTION INTRAVENOUS at 16:01

## 2020-01-01 RX ADMIN — ATORVASTATIN CALCIUM 20 MG: 20 TABLET, FILM COATED ORAL at 08:12

## 2020-01-01 RX ADMIN — BRIMONIDINE TARTRATE 1 DROP: 1 SOLUTION/ DROPS OPHTHALMIC at 13:41

## 2020-01-01 RX ADMIN — TAZOBACTAM SODIUM AND PIPERACILLIN SODIUM 3.38 G: 375; 3 INJECTION, SOLUTION INTRAVENOUS at 12:16

## 2020-01-01 RX ADMIN — TAZOBACTAM SODIUM AND PIPERACILLIN SODIUM 3.38 G: 375; 3 INJECTION, SOLUTION INTRAVENOUS at 05:49

## 2020-01-01 RX ADMIN — POTASSIUM CHLORIDE 20 MEQ: 1.5 POWDER, FOR SOLUTION ORAL at 10:59

## 2020-01-01 RX ADMIN — HEPARIN SODIUM 9.17 UNITS/KG/HR: 10000 INJECTION, SOLUTION INTRAVENOUS at 16:33

## 2020-01-01 RX ADMIN — METOPROLOL TARTRATE 12.5 MG: 25 TABLET, FILM COATED ORAL at 09:54

## 2020-01-01 RX ADMIN — FUROSEMIDE 80 MG: 80 TABLET ORAL at 08:13

## 2020-01-01 RX ADMIN — SODIUM BICARBONATE 650 MG: 650 TABLET ORAL at 08:12

## 2020-01-01 RX ADMIN — SODIUM BICARBONATE 1300 MG: 650 TABLET ORAL at 20:33

## 2020-01-01 RX ADMIN — CEFTRIAXONE SODIUM 1 G: 1 INJECTION, SOLUTION INTRAVENOUS at 18:51

## 2020-01-01 RX ADMIN — FUROSEMIDE 80 MG: 80 TABLET ORAL at 17:30

## 2020-01-01 RX ADMIN — SODIUM CHLORIDE, PRESERVATIVE FREE 10 ML: 5 INJECTION INTRAVENOUS at 09:54

## 2020-01-01 RX ADMIN — SODIUM CHLORIDE, PRESERVATIVE FREE 10 ML: 5 INJECTION INTRAVENOUS at 20:05

## 2020-01-01 RX ADMIN — SODIUM CHLORIDE 50 ML/HR: 9 INJECTION, SOLUTION INTRAVENOUS at 01:59

## 2020-01-01 RX ADMIN — SODIUM CHLORIDE, PRESERVATIVE FREE 10 ML: 5 INJECTION INTRAVENOUS at 08:47

## 2020-01-01 RX ADMIN — ACETAMINOPHEN 650 MG: 325 TABLET, FILM COATED ORAL at 20:13

## 2020-01-01 RX ADMIN — TAZOBACTAM SODIUM AND PIPERACILLIN SODIUM 3.38 G: 375; 3 INJECTION, SOLUTION INTRAVENOUS at 18:59

## 2020-01-01 RX ADMIN — SODIUM CHLORIDE, PRESERVATIVE FREE 10 ML: 5 INJECTION INTRAVENOUS at 00:08

## 2020-08-10 ENCOUNTER — APPOINTMENT (OUTPATIENT)
Dept: WOUND CARE | Facility: HOSPITAL | Age: 65
End: 2020-08-10

## 2020-08-13 ENCOUNTER — LAB REQUISITION (OUTPATIENT)
Dept: LAB | Facility: HOSPITAL | Age: 65
End: 2020-08-13

## 2020-08-13 ENCOUNTER — OFFICE VISIT (OUTPATIENT)
Dept: WOUND CARE | Facility: HOSPITAL | Age: 65
End: 2020-08-13

## 2020-08-13 DIAGNOSIS — Z00.00 ENCOUNTER FOR GENERAL ADULT MEDICAL EXAMINATION WITHOUT ABNORMAL FINDINGS: ICD-10-CM

## 2020-08-13 PROCEDURE — G0463 HOSPITAL OUTPT CLINIC VISIT: HCPCS

## 2020-08-13 PROCEDURE — 87070 CULTURE OTHR SPECIMN AEROBIC: CPT | Performed by: SURGERY

## 2020-08-13 PROCEDURE — 87186 SC STD MICRODIL/AGAR DIL: CPT | Performed by: SURGERY

## 2020-08-13 PROCEDURE — 87205 SMEAR GRAM STAIN: CPT | Performed by: SURGERY

## 2020-08-13 PROCEDURE — 87075 CULTR BACTERIA EXCEPT BLOOD: CPT | Performed by: SURGERY

## 2020-08-16 LAB
BACTERIA SPEC AEROBE CULT: ABNORMAL
BACTERIA SPEC AEROBE CULT: ABNORMAL
GRAM STN SPEC: ABNORMAL

## 2020-08-18 LAB — BACTERIA SPEC ANAEROBE CULT: NORMAL

## 2020-08-20 ENCOUNTER — OFFICE VISIT (OUTPATIENT)
Dept: WOUND CARE | Facility: HOSPITAL | Age: 65
End: 2020-08-20

## 2020-12-18 PROBLEM — N39.0 COMPLICATED UTI (URINARY TRACT INFECTION): Status: ACTIVE | Noted: 2020-01-01

## 2020-12-18 NOTE — ED TRIAGE NOTES
Pt arrives from Wound Care. Pt has bilateral compression wounds and has been getting compression wraps. Wound care nurses states that the pt is normally A/Ox4. States that today he has been restless and confused. Reports that he was trying to roll away in his wheelchair while they were wrapping his legs. Patient masked at arrival and triage staff wore all appropriate PPE during entire encounter with patient.

## 2020-12-19 PROBLEM — N39.0 SEPSIS SECONDARY TO UTI (HCC): Status: ACTIVE | Noted: 2020-01-01

## 2020-12-19 PROBLEM — A41.9 SEPSIS SECONDARY TO UTI (HCC): Status: ACTIVE | Noted: 2020-01-01

## 2020-12-19 PROBLEM — G93.41 METABOLIC ENCEPHALOPATHY: Status: ACTIVE | Noted: 2020-01-01

## 2020-12-19 PROBLEM — D63.8 ANEMIA, CHRONIC DISEASE: Status: ACTIVE | Noted: 2020-01-01

## 2020-12-19 PROBLEM — D75.89 MACROCYTOSIS: Status: ACTIVE | Noted: 2020-01-01

## 2020-12-19 NOTE — ED PROVIDER NOTES
EMERGENCY DEPARTMENT ENCOUNTER    Room Number:  24/24  Date of encounter:  12/18/2020  PCP: Maya Ribeiro APRN  Historian: Patient, facility      HPI:  Chief Complaint: Confusion  A complete HPI/ROS/PMH/PSH/SH/FH are unobtainable due to: Confusion    Context: Gregorio Alejandro is a 65 y.o. male who presents to the ED c/o confusion. Patient comes from wound care.  He has bilateral compression wounds of the been treated.  He is normally and O x4.  They state that he has been restless and confused.  Patient does not know why he is here.  He states that he is wanting to leave to go visit his doctor.      PAST MEDICAL HISTORY  Active Ambulatory Problems     Diagnosis Date Noted   • No Active Ambulatory Problems     Resolved Ambulatory Problems     Diagnosis Date Noted   • No Resolved Ambulatory Problems     Past Medical History:   Diagnosis Date   • Back pain    • CKD (chronic kidney disease)    • DM type 2 (diabetes mellitus, type 2) (CMS/[facility])    • ED (erectile dysfunction)    • Hyperlipidemia    • Hypertension    • SCOTTY (iron deficiency anemia)    • Monoclonal gammopathy    • Osteoarthritis          PAST SURGICAL HISTORY  No past surgical history on file.      FAMILY HISTORY  No family history on file.      SOCIAL HISTORY  Social History     Socioeconomic History   • Marital status:      Spouse name: Not on file   • Number of children: Not on file   • Years of education: Not on file   • Highest education level: Not on file         ALLERGIES  Patient has no known allergies.        REVIEW OF SYSTEMS  Review of Systems     All systems reviewed and negative except for those discussed in HPI.       PHYSICAL EXAM    I have reviewed the triage vital signs and nursing notes.    ED Triage Vitals   Temp Heart Rate Resp BP SpO2   12/18/20 1628 12/18/20 1628 12/18/20 1628 12/18/20 1642 12/18/20 1628   (!) 100.7 °F (38.2 °C) 106 18 113/69 99 %      Temp src Heart Rate Source Patient Position BP Location FiO2 (%)   12/18/20  1628 12/18/20 1628 -- -- --   Tympanic Monitor          Physical Exam  GENERAL: not distressed  HENT: nares patent  EYES: no scleral icterus  CV: regular rhythm, regular rate  RESPIRATORY: normal effort, clear to auscultation bilaterally  ABDOMEN: soft, nontender  MUSCULOSKELETAL: no deformity  NEURO: alert, oriented to name.  States that the year is 1920.  He is unsure who the president is.  He states that he is going to his doctor's appointment but does ultimately state that he is at [facility].  Symmetric smile, EOMI, PERRLA, moves all extremities with 5/5 strength, follows simple commands  SKIN: warm, dry        LAB RESULTS  Recent Results (from the past 24 hour(s))   Comprehensive Metabolic Panel    Collection Time: 12/18/20  4:53 PM    Specimen: Blood   Result Value Ref Range    Glucose 129 (H) 65 - 99 mg/dL    BUN 48 (H) 8 - 23 mg/dL    Creatinine 3.34 (H) 0.76 - 1.27 mg/dL    Sodium 136 136 - 145 mmol/L    Potassium 3.3 (L) 3.5 - 5.2 mmol/L    Chloride 109 (H) 98 - 107 mmol/L    CO2 16.7 (L) 22.0 - 29.0 mmol/L    Calcium 8.0 (L) 8.6 - 10.5 mg/dL    Total Protein 6.1 6.0 - 8.5 g/dL    Albumin 2.40 (L) 3.50 - 5.20 g/dL    ALT (SGPT) 11 1 - 41 U/L    AST (SGOT) 15 1 - 40 U/L    Alkaline Phosphatase 94 39 - 117 U/L    Total Bilirubin 0.6 0.0 - 1.2 mg/dL    eGFR  African Amer 22 (L) >60 mL/min/1.73    Globulin 3.7 gm/dL    A/G Ratio 0.6 g/dL    BUN/Creatinine Ratio 14.4 7.0 - 25.0    Anion Gap 10.3 5.0 - 15.0 mmol/L   Lipase    Collection Time: 12/18/20  4:53 PM    Specimen: Blood   Result Value Ref Range    Lipase 17 13 - 60 U/L   Respiratory Panel PCR w/COVID-19(SARS-CoV-2) CRISTÓBAL/RACHAEL/YAAKOV/PAD/COR/MAD/KANU In-House, NP Swab in UTM/VTM, 3-4 HR TAT - Swab, Nasopharynx    Collection Time: 12/18/20  4:55 PM    Specimen: Nasopharynx; Swab   Result Value Ref Range    ADENOVIRUS, PCR Not Detected Not Detected    Coronavirus 229E Not Detected Not Detected    Coronavirus HKU1 Not Detected Not Detected    Coronavirus  NL63 Not Detected Not Detected    Coronavirus OC43 Not Detected Not Detected    COVID19 Not Detected Not Detected - Ref. Range    Human Metapneumovirus Not Detected Not Detected    Human Rhinovirus/Enterovirus Not Detected Not Detected    Influenza A PCR Not Detected Not Detected    Influenza B PCR Not Detected Not Detected    Parainfluenza Virus 1 Not Detected Not Detected    Parainfluenza Virus 2 Not Detected Not Detected    Parainfluenza Virus 3 Not Detected Not Detected    Parainfluenza Virus 4 Not Detected Not Detected    RSV, PCR Not Detected Not Detected    Bordetella pertussis pcr Not Detected Not Detected    Bordetella parapertussis PCR Not Detected Not Detected    Chlamydophila pneumoniae PCR Not Detected Not Detected    Mycoplasma pneumo by PCR Not Detected Not Detected   Urinalysis With Microscopic If Indicated (No Culture) - Urine, Clean Catch    Collection Time: 12/18/20  5:07 PM    Specimen: Urine, Clean Catch   Result Value Ref Range    Color, UA Yellow Yellow, Straw    Appearance, UA Clear Clear    pH, UA 8.0 5.0 - 8.0    Specific Gravity, UA 1.011 1.005 - 1.030    Glucose, UA Negative Negative    Ketones, UA Negative Negative    Bilirubin, UA Negative Negative    Blood, UA Moderate (2+) (A) Negative    Protein, UA 30 mg/dL (1+) (A) Negative    Leuk Esterase, UA Large (3+) (A) Negative    Nitrite, UA Negative Negative    Urobilinogen, UA 0.2 E.U./dL 0.2 - 1.0 E.U./dL   Urinalysis, Microscopic Only - Urine, Clean Catch    Collection Time: 12/18/20  5:07 PM    Specimen: Urine, Clean Catch   Result Value Ref Range    RBC, UA Too Numerous to Count (A) None Seen, 0-2 /HPF    WBC, UA 21-30 (A) None Seen, 0-2 /HPF    Bacteria, UA None Seen None Seen /HPF    Squamous Epithelial Cells, UA 0-2 None Seen, 0-2 /HPF    Hyaline Casts, UA 0-2 None Seen /LPF    Methodology Automated Microscopy        Ordered the above labs and independently reviewed the results.        RADIOLOGY  Xr Chest 1 View    Result Date:  12/18/2020  XR CHEST 1 VW-  HISTORY:  Fever, altered mental status.  COMPARISON:  None.  FINDINGS:  A single portable view of the chest was obtained. There is an implanted cardiac loop recording device. The cardiac silhouette is mildly enlarged. Mediastinal contours are within normal limits. There is calcific aortic atherosclerosis. There is central pulmonary venous congestion. There are low lung volumes. There is mild hazy opacity in both lungs, which could be due to atelectasis in the setting of low lung volumes or edema, however, multifocal pneumonia is not excluded. Recommend follow-up PA and lateral chest radiographs. Pleural spaces are clear. There is multilevel degenerative disc disease.  This report was finalized on 12/18/2020 5:38 PM by Dr. Lindsey Medel M.D.        I ordered the above noted radiological studies. Reviewed by me and discussed with radiologist.  See dictation for official radiology interpretation.      PROCEDURES    Procedures      MEDICATIONS GIVEN IN ER    Medications   sodium chloride 0.9 % flush 10 mL (has no administration in time range)   cefTRIAXone (ROCEPHIN) IVPB 1 g (1 g Intravenous New Bag 12/18/20 7499)         PROGRESS, DATA ANALYSIS, CONSULTS, AND MEDICAL DECISION MAKING    All labs have been independently reviewed by me.  All radiology studies have been reviewed by me and discussed with radiologist dictating the report.   EKG's independently viewed and interpreted by me.  Discussion below represents my analysis of pertinent findings related to patient's condition, differential diagnosis, treatment plan and final disposition.    Differential diagnosis for altered mental status includes:  - vital sign abnormalities such as HTN encephalopathy, hypotension, hypoxemia, hypercarbia, heat stroke  - toxic/metabolic pathology such as hypoglycemia, DKA, hypo/hyper-natremia, thyroid storm, myxedema coma, medication side effect (either intentional or accidental)  - infectious etiology  -  intracranial pathology such as stroke, seizure, intracranial mass, intracranial hemorrhage  - psychiatric pathology    ED Course as of Dec 18 1911   Fri Dec 18, 2020   1739 Leukocytes, UA(!): Large (3+) [TD]   1739 WBC, UA(!): 21-30 [TD]   1739 RBC, UA(!): Too Numerous to Count [TD]   1756 Potassium(!): 3.3 [TD]   1905 COVID19: Not Detected [TD]      ED Course User Index  [TD] Terrance Mast II, MD       Patient presents with confusion.  At this point, seems most likely due to urinary infection.  He is febrile and warm to touch.  He has no meningismus on exam.  Covid negative.  Given his numerous red blood cells, many get a CT scan of his abdomen to evaluate for any renal pathology.  Additionally, getting a CT scan of his head today for intracranial pathology.  Patient given empiric Rocephin.    PPE: Both the patient and I wore a surgical mask throughout the entire patient encounter. I wore protective goggles.     AS OF 19:11 EST VITALS:    BP - 113/69  HR - 106  TEMP - (!) 100.7 °F (38.2 °C) (Tympanic)  O2 SATS - 99%        DIAGNOSIS  Final diagnoses:   Complicated UTI (urinary tract infection)   Confusion   Normal anion gap metabolic acidosis         DISPOSITION  Admit           Terrance Mast II, MD  12/18/20 1911

## 2020-12-19 NOTE — CONSULTS
[facility]  Initial Inpatient Consult Note    Referring Provider: Derek    Reason for Consultation: Abnormal imaging    Subjective     History of present illness:    65 y.o. male who is confused, cannot give me adequate history, evidently transfer from wound clinic with new onset delirium/confusion.  Today he denies abdominal pain, nausea, vomiting, change in bowel habits.  Denies bright red blood per rectum, melena or hematemesis.  He tells me he is never had an EGD or colonoscopy in his lifetime.  Records from [place] 2017 shows a preprocedure evaluation by Dr. Poe, for anemia, EGD and colonoscopy were scheduled but I do not see that they were performed.  Of course they may have been done at their outpatient center on [address] I do not have access to those records.    Patient underwent CAT scan of the abdomen results below.    Past Medical History:  Past Medical History:   Diagnosis Date   • Back pain    • CKD (chronic kidney disease)    • DM type 2 (diabetes mellitus, type 2) (CMS/[facility])    • ED (erectile dysfunction)    • Hyperlipidemia    • Hypertension    • SCOTTY (iron deficiency anemia)    • Monoclonal gammopathy    • Osteoarthritis      Past Surgical History:  No past surgical history on file.   Social History:   Social History     Tobacco Use   • Smoking status: Not on file   Substance Use Topics   • Alcohol use: Not on file      Family History:  No family history on file.    Home Meds:  Medications Prior to Admission   Medication Sig Dispense Refill Last Dose   • atorvastatin (LIPITOR) 20 MG tablet Take 20 mg by mouth Daily.   12/17/2020 at Unknown time   • brimonidine (ALPHAGAN P) 0.1 % solution ophthalmic solution 1 drop 2 (two) times a day.   12/17/2020 at Unknown time   • diphenhydrAMINE (BENADRYL) 25 mg capsule Take 25 mg by mouth Every 6 (Six) Hours As Needed for Itching or Allergies.   12/17/2020 at Unknown time   • dorzolamide (TRUSOPT) 2 % ophthalmic solution  Administer 1 drop to the right eye 2 (two) times a day.   12/17/2020 at Unknown time   • furosemide (LASIX) 20 MG tablet Take 20 mg by mouth 2 (Two) Times a Day.   12/17/2020 at Unknown time   • gabapentin (NEURONTIN) 300 MG capsule TAKE 1 CAPSULE BY MOUTH TWICE DAILY (Patient taking differently: 3 (Three) Times a Day.) 60 capsule 0 12/17/2020 at Unknown time   • HUMALOG KWIKPEN 100 UNIT/ML solution pen-injector INJECT 20 UNITS INTO THE SKIN TWICE DAILY 15 mL 0 12/17/2020 at Unknown time   • HYDROcodone-acetaminophen (NORCO)  MG per tablet Take 1 tablet po every 4 hours PRN for moderate pain, take two tablets po every 4 hours PRN for severe pain, valid if faxed to [facility]   tablet 0 12/17/2020 at Unknown time   • WAL-DRYL ALLERGY 25 MG Take 1 capsule by mouth Every 6 (Six) Hours As Needed for itching. 30 capsule 0      Current Meds:   insulin lispro, 0-7 Units, Subcutaneous, TID AC  sodium chloride, 10 mL, Intravenous, Q12H      Allergies:  No Known Allergies  Review of Systems  Review of systems could not be obtained due to   patient confusion.     Objective     Vital Signs  Temp:  [98.1 °F (36.7 °C)-100.7 °F (38.2 °C)] 98.1 °F (36.7 °C)  Heart Rate:  [] 69  Resp:  [18] 18  BP: (112-142)/(63-88) 112/63  Physical Exam:  General Appearance:    Alert, cooperative, in no acute distress   Head:    Normocephalic, without obvious abnormality, atraumatic   Eyes:          conjunctivae and sclerae normal, no   icterus   Throat:   no thrush, oral mucosa moist   Neck:   Supple, no adenopathy   Lungs:     Clear to auscultation bilaterally    Heart:    Regular rhythm and normal rate    Chest Wall:    No abnormalities observed   Abdomen:     Soft, nondistended, nontender; normal bowel sounds   Extremities:   no edema, no redness   Skin:   No bruising or rash       Results Review:   I reviewed the patient's new clinical results.    Results from last 7 days   Lab Units 12/19/20  0439 12/18/20 2024   WBC 10*3/mm3  14.16* 15.88*   HEMOGLOBIN g/dL 8.1* 7.9*   HEMATOCRIT % 26.8* 26.6*   PLATELETS 10*3/mm3 233 197     Results from last 7 days   Lab Units 12/19/20  0439 12/18/20  1653   SODIUM mmol/L 135* 136   POTASSIUM mmol/L 3.4* 3.3*   CHLORIDE mmol/L 106 109*   CO2 mmol/L 18.7* 16.7*   BUN mg/dL 55* 48*   CREATININE mg/dL 3.82* 3.34*   CALCIUM mg/dL 8.6 8.0*   BILIRUBIN mg/dL  --  0.6   ALK PHOS U/L  --  94   ALT (SGPT) U/L  --  11   AST (SGOT) U/L  --  15   GLUCOSE mg/dL 140* 129*     Results from last 7 days   Lab Units 12/19/20  0439   INR  1.37*     Lab Results   Lab Value Date/Time    LIPASE 17 12/18/2020 1653    LIPASE 914 (H) 10/27/2020 0330    LIPASE 1,087 (H) 10/26/2020 0410    LIPASE 1,653 (H) 10/25/2020 0323       Radiology:  CT Head Without Contrast   Final Result       No acute intracranial hemorrhage or hydrocephalus. If there is further   clinical concern, MRI could be considered for further evaluation.       This report was finalized on 12/18/2020 7:22 PM by Dr. Jluis Meza M.D.          CT Abdomen Pelvis Without Contrast   Final Result           1. Diffuse wall thickening the urinary bladder with small surrounding   fat stranding suggests cystitis. Bilateral perinephric fat stranding is   also present. Nonobstructive right nephrolithiasis.   2. Conspicuous abnormal wall thickening is apparent in the rectum,   circumferentially, that may reflect rectal wall lesion/neoplasm or   proctitis, direct visualization advised.   3. Borderline, nonspecific lymph nodes, as described.       Discussed by telephone with Dr. Wang for Dr. Mast at 1930, 12/18/2020.       This report was finalized on 12/18/2020 7:34 PM by Dr. Jluis Meza M.D.          XR Chest 1 View   Final Result          Assessment/Plan   Patient Active Problem List   Diagnosis   • Complicated UTI (urinary tract infection)       Assessment:  1. Abnormal imaging with cystitis/urinary tract infection  2. Confusion/delirium likely secondary  to urosepsis  3. Abnormal imaging of the rectum  4. Microcytic anemia    Plan:  · He will need EGD and colonoscopy for microcytic anemia and abnormal findings of the rectum when his confusion clears, when he is adequately been treated for cystitis/urinary tract infection when he has been cleared by the primary team  · Follow hemoglobin  · Check iron indices  · We will follow       I discussed the patients findings and my recommendations with patient and nursing staff.    Kip Wiley MD

## 2020-12-19 NOTE — NURSING NOTE
Pt came to the floor w/ A-fib on monitor which is not what ER nurse gave in report (SR). Pt denied having hx of a-fib. Kaylynn Bennett from [facility] was notified. Will monitor for now and check EKG in the morning.

## 2020-12-19 NOTE — H&P
Patient Name:  Gregorio Alejandro  YOB: 1955  MRN:  [id number]  Admit Date:  12/18/2020  Patient Care Team:  Maya Ribeiro APRN as PCP - General (Family Medicine)      Subjective   History Present Illness     Chief Complaint   Patient presents with   • Altered Mental Status   • Fever       Mr. Alejandro is a 65 y.o. with a history of ESRD that presented from [facility] for fever and confusion. His temperature upon arrival to the ER was 100.7 Fahrenheit.  He is wheelchair bound, with history of DM2, anemia, CKDIV, OFELIA, HLD. He is  oriented to person, place, and year but a very poor historian.  I have attempted to call both numbers in EMR for family but no answer.  He denies any complaints at this time.  He denies shortness of breath, cough, fever, chills, myalgias, LUTS, change in bowel habits, weight loss, chest pain, or palpitations.    However, there is a note [facility] vascular surgery May 2020 that notes ESRD on HD that presented for malfunctioning AVF.  Per EMR 5/2020 he had a fistulogram of left AV the fistula demonstrating stenosis of the fistula and distal cephalic vein treated successfully with balloon angioplasty.  He confirms that he is wheelchair-bound and lives with his wife who is also in a wheelchair according to the nurse. Patient states that he does not have a nephrologist and is not on dialysis. He does not know if he has a fistula.     History of Present Illness  Review of Systems   Unable to perform ROS: Mental status change   Constitutional: Positive for fatigue and fever. Negative for appetite change.   HENT: Negative for trouble swallowing.    Respiratory: Negative for choking.    Cardiovascular: Negative for chest pain.   Gastrointestinal: Negative for abdominal distention, abdominal pain, blood in stool, constipation, diarrhea, nausea and vomiting.   Genitourinary: Negative for dysuria.   Musculoskeletal: Negative for back pain.   Skin: Negative for pallor.    Neurological: Negative for dizziness.   Psychiatric/Behavioral: Positive for confusion.        Personal History     Past Medical History:   Diagnosis Date   • Back pain    • CKD (chronic kidney disease)    • DM type 2 (diabetes mellitus, type 2) (CMS/[facility])    • ED (erectile dysfunction)    • Hyperlipidemia    • Hypertension    • SCOTTY (iron deficiency anemia)    • Monoclonal gammopathy    • Osteoarthritis      No past surgical history on file.  No family history on file.  Social History     Tobacco Use   • Smoking status: Not on file   Substance Use Topics   • Alcohol use: Not on file   • Drug use: Not on file     No current facility-administered medications on file prior to encounter.      Current Outpatient Medications on File Prior to Encounter   Medication Sig Dispense Refill   • atorvastatin (LIPITOR) 20 MG tablet Take 20 mg by mouth Daily.     • brimonidine (ALPHAGAN P) 0.1 % solution ophthalmic solution 1 drop 2 (two) times a day.     • diphenhydrAMINE (BENADRYL) 25 mg capsule Take 25 mg by mouth Every 6 (Six) Hours As Needed for Itching or Allergies.     • dorzolamide (TRUSOPT) 2 % ophthalmic solution Administer 1 drop to the right eye 2 (two) times a day.     • furosemide (LASIX) 20 MG tablet Take 20 mg by mouth 2 (Two) Times a Day.     • gabapentin (NEURONTIN) 300 MG capsule TAKE 1 CAPSULE BY MOUTH TWICE DAILY (Patient taking differently: 3 (Three) Times a Day.) 60 capsule 0   • HUMALOG KWIKPEN 100 UNIT/ML solution pen-injector INJECT 20 UNITS INTO THE SKIN TWICE DAILY 15 mL 0   • HYDROcodone-acetaminophen (NORCO)  MG per tablet Take 1 tablet po every 4 hours PRN for moderate pain, take two tablets po every 4 hours PRN for severe pain, valid if faxed to [facility]   tablet 0   • WAL-DRYL ALLERGY 25 MG Take 1 capsule by mouth Every 6 (Six) Hours As Needed for itching. 30 capsule 0     No Known Allergies    Objective    Objective     Vital Signs  Temp:  [98.1 °F (36.7 °C)-100.7 °F (38.2 °C)] 98.1 °F  (36.7 °C)  Heart Rate:  [] 69  Resp:  [18] 18  BP: (112-142)/(63-88) 112/63  SpO2:  [92 %-99 %] 95 %  on  Flow (L/min):  [2] 2;   Device (Oxygen Therapy): room air  Body mass index is 30 kg/m².    Physical Exam  Vitals signs and nursing note reviewed.   Constitutional:       General: He is not in acute distress.     Appearance: He is well-developed. He is obese. He is ill-appearing. He is not toxic-appearing or diaphoretic.      Comments: Obese, chronically ill-appearing male.  He appears much older than his stated age.   HENT:      Head: Normocephalic and atraumatic.   Eyes:      Conjunctiva/sclera: Conjunctivae normal.   Neck:      Musculoskeletal: Normal range of motion.      Trachea: No tracheal deviation.   Cardiovascular:      Rate and Rhythm: Normal rate and regular rhythm.   Pulmonary:      Effort: Pulmonary effort is normal. No respiratory distress.      Breath sounds: Normal breath sounds.   Abdominal:      General: Bowel sounds are normal. There is no distension.      Palpations: Abdomen is soft. There is no mass.      Tenderness: There is no abdominal tenderness. There is no guarding or rebound.   Musculoskeletal: Normal range of motion.      Right lower leg: Edema present.      Left lower leg: Edema present.      Comments: No AV fistula present in the left upper extremity.  Bilateral lower extremity edema with Ace wraps in place   Skin:     General: Skin is warm and dry.   Neurological:      Mental Status: He is alert.      Comments: Oriented to person and place and year but seems confused to situation and history   Psychiatric:         Judgment: Judgment normal.         Results Review:  I reviewed the patient's new clinical results.  I reviewed the patient's new imaging results and agree with the interpretation.  I reviewed the patient's other test results and agree with the interpretation  I personally viewed and interpreted the patient's EKG/Telemetry data  Discussed with ED provider.    Lab  Results (last 24 hours)     Procedure Component Value Units Date/Time    Comprehensive Metabolic Panel [[id number]]  (Abnormal) Collected: 12/18/20 1653    Specimen: Blood Updated: 12/18/20 1747     Glucose 129 mg/dL      BUN 48 mg/dL      Creatinine 3.34 mg/dL      Sodium 136 mmol/L      Potassium 3.3 mmol/L      Chloride 109 mmol/L      CO2 16.7 mmol/L      Calcium 8.0 mg/dL      Total Protein 6.1 g/dL      Albumin 2.40 g/dL      ALT (SGPT) 11 U/L      AST (SGOT) 15 U/L      Alkaline Phosphatase 94 U/L      Total Bilirubin 0.6 mg/dL      eGFR  African Amer 22 mL/min/1.73      Globulin 3.7 gm/dL      A/G Ratio 0.6 g/dL      BUN/Creatinine Ratio 14.4     Anion Gap 10.3 mmol/L     Narrative:      GFR Normal >60  Chronic Kidney Disease <60  Kidney Failure <15      Lipase [[id number]]  (Normal) Collected: 12/18/20 1653    Specimen: Blood Updated: 12/18/20 1747     Lipase 17 U/L     Respiratory Panel PCR w/COVID-19(SARS-CoV-2) CRISTÓBAL/RACHAEL/YAAKOV/PAD/COR/MAD/KANU In-House, NP Swab in UTM/VTM, 3-4 HR TAT - Swab, Nasopharynx [[id number]]  (Normal) Collected: 12/18/20 1655    Specimen: Swab from Nasopharynx Updated: 12/18/20 1819     ADENOVIRUS, PCR Not Detected     Coronavirus 229E Not Detected     Coronavirus HKU1 Not Detected     Coronavirus NL63 Not Detected     Coronavirus OC43 Not Detected     COVID19 Not Detected     Human Metapneumovirus Not Detected     Human Rhinovirus/Enterovirus Not Detected     Influenza A PCR Not Detected     Influenza B PCR Not Detected     Parainfluenza Virus 1 Not Detected     Parainfluenza Virus 2 Not Detected     Parainfluenza Virus 3 Not Detected     Parainfluenza Virus 4 Not Detected     RSV, PCR Not Detected     Bordetella pertussis pcr Not Detected     Bordetella parapertussis PCR Not Detected     Chlamydophila pneumoniae PCR Not Detected     Mycoplasma pneumo by PCR Not Detected    Narrative:      Fact sheet for providers:  https://docs.[employer]/wp-content/uploads/SIQ7381-0710-QA4.1-EUA-Provider-Fact-Sheet-3.pdf    Fact sheet for patients: https://docs.[employer]/wp-content/uploads/CMM5244-8817-AV4.1-EUA-Patient-Fact-Sheet-1.pdf    Test performed by PCR.    Urinalysis With Microscopic If Indicated (No Culture) - Urine, Clean Catch [[id number]]  (Abnormal) Collected: 12/18/20 1707    Specimen: Urine, Clean Catch Updated: 12/18/20 1728     Color, UA Yellow     Appearance, UA Clear     pH, UA 8.0     Specific Gravity, UA 1.011     Glucose, UA Negative     Ketones, UA Negative     Bilirubin, UA Negative     Blood, UA Moderate (2+)     Protein, UA 30 mg/dL (1+)     Leuk Esterase, UA Large (3+)     Nitrite, UA Negative     Urobilinogen, UA 0.2 E.U./dL    Urinalysis, Microscopic Only - Urine, Clean Catch [[id number]]  (Abnormal) Collected: 12/18/20 1707    Specimen: Urine, Clean Catch Updated: 12/18/20 1728     RBC, UA Too Numerous to Count /HPF      WBC, UA 21-30 /HPF      Bacteria, UA None Seen /HPF      Squamous Epithelial Cells, UA 0-2 /HPF      Hyaline Casts, UA 0-2 /LPF      Methodology Automated Microscopy    Urine Culture - Urine, Urine, Clean Catch [[id number]] Collected: 12/18/20 1707    Specimen: Urine, Clean Catch Updated: 12/19/20 0901    Blood Culture - Blood, Arm, Left [[id number]] Collected: 12/18/20 1814    Specimen: Blood from Arm, Left Updated: 12/18/20 1817    Blood Culture - Blood, Arm, Left [[id number]] Collected: 12/18/20 1851    Specimen: Blood from Arm, Left Updated: 12/18/20 1855    CBC & Differential [[id number]]  (Abnormal) Collected: 12/18/20 2024    Specimen: Blood Updated: 12/18/20 2045    Narrative:      The following orders were created for panel order CBC & Differential.  Procedure                               Abnormality         Status                     ---------                               -----------         ------                     CBC Auto Differential[[id number]]        Abnormal            Final  result                 Please view results for these tests on the individual orders.    CBC Auto Differential [[id number]]  (Abnormal) Collected: 12/18/20 2024    Specimen: Blood Updated: 12/18/20 2045     WBC 15.88 10*3/mm3      RBC 3.68 10*6/mm3      Hemoglobin 7.9 g/dL      Hematocrit 26.6 %      MCV 72.3 fL      MCH 21.5 pg      MCHC 29.7 g/dL      RDW 20.0 %      RDW-SD 48.5 fl      Platelets 197 10*3/mm3     Manual Differential [[id number]]  (Abnormal) Collected: 12/18/20 2024    Specimen: Blood Updated: 12/18/20 2143     Neutrophil % 99.0 %      Lymphocyte % 1.0 %      Neutrophils Absolute 15.72 10*3/mm3      Lymphocytes Absolute 0.16 10*3/mm3      Hypochromia Slight/1+     Microcytes Slight/1+     WBC Morphology Normal     Platelet Estimate Decreased    Lactic Acid, Plasma [[id number]]  (Normal) Collected: 12/18/20 2039    Specimen: Blood Updated: 12/18/20 2103     Lactate 1.3 mmol/L     POC Glucose Once [[id number]]  (Abnormal) Collected: 12/19/20 0011    Specimen: Blood Updated: 12/19/20 0011     Glucose 158 mg/dL     Basic Metabolic Panel [[id number]]  (Abnormal) Collected: 12/19/20 0439    Specimen: Blood Updated: 12/19/20 0629     Glucose 140 mg/dL      BUN 55 mg/dL      Creatinine 3.82 mg/dL      Sodium 135 mmol/L      Potassium 3.4 mmol/L      Chloride 106 mmol/L      CO2 18.7 mmol/L      Calcium 8.6 mg/dL      eGFR  African Amer 19 mL/min/1.73      BUN/Creatinine Ratio 14.4     Anion Gap 10.3 mmol/L     Narrative:      GFR Normal >60  Chronic Kidney Disease <60  Kidney Failure <15      CBC Auto Differential [[id number]]  (Abnormal) Collected: 12/19/20 0439    Specimen: Blood Updated: 12/19/20 0606     WBC 14.16 10*3/mm3      RBC 3.73 10*6/mm3      Hemoglobin 8.1 g/dL      Hematocrit 26.8 %      MCV 71.8 fL      MCH 21.7 pg      MCHC 30.2 g/dL      RDW 20.6 %      RDW-SD 50.6 fl      Platelets 233 10*3/mm3     Protime-INR [[id number]]  (Abnormal) Collected: 12/19/20 0439    Specimen: Blood Updated: 12/19/20  0617     Protime 16.7 Seconds      INR 1.37    Magnesium [[id number]]  (Normal) Collected: 12/19/20 0439    Specimen: Blood Updated: 12/19/20 0629     Magnesium 1.8 mg/dL     Manual Differential [[id number]]  (Abnormal) Collected: 12/19/20 0439    Specimen: Blood Updated: 12/19/20 0824     Neutrophil % 96.8 %      Lymphocyte % 0.0 %      Monocyte % 2.1 %      Eosinophil % 1.1 %      Neutrophils Absolute 13.71 10*3/mm3      Lymphocytes Absolute 0.00 10*3/mm3      Monocytes Absolute 0.30 10*3/mm3      Eosinophils Absolute 0.16 10*3/mm3      Liz Cells Mod/2+     Hypochromia Mod/2+     Ovalocytes Slight/1+     Poikilocytes Large/3+     Schistocytes Mod/2+     Smudge Cells Slight/1+     Platelet Morphology Normal    POC Glucose Once [[id number]]  (Abnormal) Collected: 12/19/20 0617    Specimen: Blood Updated: 12/19/20 0618     Glucose 150 mg/dL           Imaging Results (Last 24 Hours)     Procedure Component Value Units Date/Time    CT Abdomen Pelvis Without Contrast [[id number]] Collected: 12/18/20 1923     Updated: 12/18/20 1937    Narrative:      CT ABDOMEN PELVIS WO CONTRAST-     INDICATIONS: Fever, hematuria     TECHNIQUE: Radiation dose reduction techniques were utilized, including  automated exposure control and exposure modulation based on body size.  Unenhanced ABDOMEN AND PELVIS CT     COMPARISON: None available     FINDINGS:     Diffuse wall thickening the urinary bladder with small surrounding fat  stranding suggests cystitis. Bilateral perinephric stranding may be  chronic in nature or may be evidence of infection. A couple 3 mm  nonobstructive stones are seen in the right kidney. Assessment of the  distal ureters and urinary bladder is limited by attenuation artifact  from bilateral hip arthroplasty hardware.     Assessment for liver lesions is significantly limited without  intravenous contrast material.     Otherwise unremarkable unenhanced appearance of the liver, gallbladder,  spleen, adrenal glands,  pancreas, kidneys, bladder.     No bowel obstruction. Conspicuous abnormal wall thickening is apparent  in the rectum, circumferentially, for example 2 cm, axial image 163,  that may reflect rectal wall lesion proctitis, direct visualization  advised.     No free intraperitoneal gas or free fluid.     Left para-aortic lymph node measuring 8 mm short axis, axial image 86 is  borderline prominent, nonspecific, could be reactive in nature or  potentially evidence of neoplasm. Borderline prominent subcutaneous  lymph nodes are seen in the bilateral groin, for example on the right,  1.2 cm short axis, axial image 188. Clinical correlation and follow-up  recommended; if further imaging evaluation is indicated, positron  emission tomography could be considered.     Abdominal aorta is not aneurysmal. Aortic and other arterial  calcifications are present.     The lung bases show mild atelectasis. Minimal bilateral pleural  effusions are seen. The heart is enlarged.     Degenerative changes are seen in the spine. No acute fracture is  identified.             Impression:            1. Diffuse wall thickening the urinary bladder with small surrounding  fat stranding suggests cystitis. Bilateral perinephric fat stranding is  also present. Nonobstructive right nephrolithiasis.  2. Conspicuous abnormal wall thickening is apparent in the rectum,  circumferentially, that may reflect rectal wall lesion/neoplasm or  proctitis, direct visualization advised.  3. Borderline, nonspecific lymph nodes, as described.     Discussed by telephone with Dr. Wang for Dr. Mast at 1930, 12/18/2020.     This report was finalized on 12/18/2020 7:34 PM by Dr. Jluis Meza M.D.       CT Head Without Contrast [[id number]] Collected: 12/18/20 1918     Updated: 12/18/20 1925    Narrative:      CT HEAD WO CONTRAST-     INDICATIONS: Altered mental status     TECHNIQUE: Radiation dose reduction techniques were utilized, including  automated exposure  control and exposure modulation based on body size.  Noncontrast head CT     COMPARISON: None available     FINDINGS:           No acute intracranial hemorrhage, midline shift or mass effect. No acute  territorial infarct is identified.     Mild periventricular hypodensities suggest chronic small vessel ischemic  change in a patient this age.     Arterial calcifications are seen at the base of the brain.     Ventricles, cisterns, cerebral sulci are unremarkable for patient age.     Procedural change of the left globe is apparent. The visualized  paranasal sinuses, orbits, mastoid air cells are otherwise unremarkable.                   Impression:         No acute intracranial hemorrhage or hydrocephalus. If there is further  clinical concern, MRI could be considered for further evaluation.     This report was finalized on 12/18/2020 7:22 PM by Dr. Jluis Meza M.D.       XR Chest 1 View [[id number]] Collected: 12/18/20 1736     Updated: 12/18/20 1741    Narrative:      XR CHEST 1 VW-     HISTORY:  Fever, altered mental status.     COMPARISON:  None.     FINDINGS:    A single portable view of the chest was obtained. There is an implanted  cardiac loop recording device. The cardiac silhouette is mildly  enlarged. Mediastinal contours are within normal limits. There is  calcific aortic atherosclerosis. There is central pulmonary venous  congestion. There are low lung volumes. There is mild hazy opacity in  both lungs, which could be due to atelectasis in the setting of low lung  volumes or edema, however, multifocal pneumonia is not excluded.  Recommend follow-up PA and lateral chest radiographs. Pleural spaces are  clear. There is multilevel degenerative disc disease.     This report was finalized on 12/18/2020 5:38 PM by Dr. Lindsey Medel M.D.                  ECG 12 Lead   Preliminary Result   HEART RATE= 81  bpm   RR Interval= 716  ms   AK Interval=   ms   P Horizontal Axis=   deg   P Front Axis=   deg    QRSD Interval= 112  ms   QT Interval= 389  ms   QRS Axis= -11  deg   T Wave Axis= 157  deg   - ABNORMAL ECG -   Accelerated junctional rhythm   Multiple ventricular premature complexes   Abnormal T, consider ischemia, lateral leads   Electronically Signed By:    Date and Time of Study: 2020-12-19 06:12:07           Assessment/Plan     Active Hospital Problems    Diagnosis  POA   • **Sepsis secondary to UTI (CMS/[facility]) [A41.9, N39.0]  Yes   • Macrocytosis [D75.89]  Yes   • Metabolic encephalopathy [G93.41]  Yes   • Anemia, chronic disease [D63.8]  Unknown   • Hyperlipidemia [E78.5]  Yes   • Hypertension [I10]  Yes   • Monoclonal gammopathy [D47.2]  Yes   • ESRD (end stage renal disease) (CMS/[facility]) [N18.6]  Yes   • DM2 (diabetes mellitus, type 2) (CMS/[facility]) [E11.9]  Yes   • Abnormal CT of the abdomen [R93.5]  Yes   • Normal anion gap metabolic acidosis [E87.2]  Yes   • Complicated UTI (urinary tract infection) [N39.0]  Yes      Resolved Hospital Problems   No resolved problems to display.       Mr. Alejandro is a 65 y.o. with ESRD that presents with sepsis secondary to UTI, normal anion gap metabolic acidosis, and metabolic encephalopathy.  I am unable to reach family.  He is oriented to some degree although a poor historian.  However, claims he does not have dialysis or renal disease but present in EMR documentation.  CT abdomen pelvis demonstrates cystitis as well as thickening in the rectum.  CT head with out acute intracranial findings.  X-ray suggestive of pneumonia but he is asymptomatic and stable on room air.  Patient given Rocephin in the ER.  Consult nephrology for assistance   Continue Rocephin IV.  Await urine culture  Gentle IV fluids given volume overload    DM2  Continue correctional lispro.   Insulin pump noted in EMR but patient states he does not have one.    Rectal thickening on CT/anemia (acute on chronic)  Anemia of chronic disease-anemia studies pending.  Hemoglobin 7.9 yesterday compared to  10.31-month ago.  GI following and he will he will need EGD and colonoscopy to evaluate anemia with rectal thickening.      I discussed the patient's findings and my recommendations with patient and nursing staff.  We will continue to try to contact family but have now called daughter's number which is wrong and called wife's 3 times with no answer and no ability to leave a message    VTE Prophylaxis - SCDs.  Code Status - Full code.       ANTWAN Diaz  [facility]  12/19/20  11:47 EST

## 2020-12-19 NOTE — ED PROVIDER NOTES
Brief history of present illness: 65-year-old male who presents in referral from wound clinic for increased confusion with fever noted at triage.    Physical exam:     ED Triage Vitals   Temp Heart Rate Resp BP SpO2   12/18/20 1628 12/18/20 1628 12/18/20 1628 12/18/20 1642 12/18/20 1628   (!) 100.7 °F (38.2 °C) 106 18 113/69 99 %      Temp src Heart Rate Source Patient Position BP Location FiO2 (%)   12/18/20 1628 12/18/20 1628 -- -- --   Tympanic Monitor        Alert and talkative.  Somewhat confused.  No acute distress.  Not overtly toxic.  No respiratory distress.  Pink warm well perfused.    MDM:    Results for orders placed or performed during the hospital encounter of 12/18/20   Respiratory Panel PCR w/COVID-19(SARS-CoV-2) CRISTÓBAL/RACHAEL/YAAKOV/PAD/COR/MAD/KANU In-House, NP Swab in UTM/VTM, 3-4 HR TAT - Swab, Nasopharynx    Specimen: Nasopharynx; Swab   Result Value Ref Range    ADENOVIRUS, PCR Not Detected Not Detected    Coronavirus 229E Not Detected Not Detected    Coronavirus HKU1 Not Detected Not Detected    Coronavirus NL63 Not Detected Not Detected    Coronavirus OC43 Not Detected Not Detected    COVID19 Not Detected Not Detected - Ref. Range    Human Metapneumovirus Not Detected Not Detected    Human Rhinovirus/Enterovirus Not Detected Not Detected    Influenza A PCR Not Detected Not Detected    Influenza B PCR Not Detected Not Detected    Parainfluenza Virus 1 Not Detected Not Detected    Parainfluenza Virus 2 Not Detected Not Detected    Parainfluenza Virus 3 Not Detected Not Detected    Parainfluenza Virus 4 Not Detected Not Detected    RSV, PCR Not Detected Not Detected    Bordetella pertussis pcr Not Detected Not Detected    Bordetella parapertussis PCR Not Detected Not Detected    Chlamydophila pneumoniae PCR Not Detected Not Detected    Mycoplasma pneumo by PCR Not Detected Not Detected   Comprehensive Metabolic Panel    Specimen: Blood   Result Value Ref Range    Glucose 129 (H) 65 - 99 mg/dL    BUN 48  (H) 8 - 23 mg/dL    Creatinine 3.34 (H) 0.76 - 1.27 mg/dL    Sodium 136 136 - 145 mmol/L    Potassium 3.3 (L) 3.5 - 5.2 mmol/L    Chloride 109 (H) 98 - 107 mmol/L    CO2 16.7 (L) 22.0 - 29.0 mmol/L    Calcium 8.0 (L) 8.6 - 10.5 mg/dL    Total Protein 6.1 6.0 - 8.5 g/dL    Albumin 2.40 (L) 3.50 - 5.20 g/dL    ALT (SGPT) 11 1 - 41 U/L    AST (SGOT) 15 1 - 40 U/L    Alkaline Phosphatase 94 39 - 117 U/L    Total Bilirubin 0.6 0.0 - 1.2 mg/dL    eGFR  African Amer 22 (L) >60 mL/min/1.73    Globulin 3.7 gm/dL    A/G Ratio 0.6 g/dL    BUN/Creatinine Ratio 14.4 7.0 - 25.0    Anion Gap 10.3 5.0 - 15.0 mmol/L   Lipase    Specimen: Blood   Result Value Ref Range    Lipase 17 13 - 60 U/L   Urinalysis With Microscopic If Indicated (No Culture) - Urine, Clean Catch    Specimen: Urine, Clean Catch   Result Value Ref Range    Color, UA Yellow Yellow, Straw    Appearance, UA Clear Clear    pH, UA 8.0 5.0 - 8.0    Specific Gravity, UA 1.011 1.005 - 1.030    Glucose, UA Negative Negative    Ketones, UA Negative Negative    Bilirubin, UA Negative Negative    Blood, UA Moderate (2+) (A) Negative    Protein, UA 30 mg/dL (1+) (A) Negative    Leuk Esterase, UA Large (3+) (A) Negative    Nitrite, UA Negative Negative    Urobilinogen, UA 0.2 E.U./dL 0.2 - 1.0 E.U./dL   CBC Auto Differential    Specimen: Blood   Result Value Ref Range    WBC 15.88 (H) 3.40 - 10.80 10*3/mm3    RBC 3.68 (L) 4.14 - 5.80 10*6/mm3    Hemoglobin 7.9 (L) 13.0 - 17.7 g/dL    Hematocrit 26.6 (L) 37.5 - 51.0 %    MCV 72.3 (L) 79.0 - 97.0 fL    MCH 21.5 (L) 26.6 - 33.0 pg    MCHC 29.7 (L) 31.5 - 35.7 g/dL    RDW 20.0 (H) 12.3 - 15.4 %    RDW-SD 48.5 37.0 - 54.0 fl    Platelets 197 140 - 450 10*3/mm3   Urinalysis, Microscopic Only - Urine, Clean Catch    Specimen: Urine, Clean Catch   Result Value Ref Range    RBC, UA Too Numerous to Count (A) None Seen, 0-2 /HPF    WBC, UA 21-30 (A) None Seen, 0-2 /HPF    Bacteria, UA None Seen None Seen /HPF    Squamous Epithelial  Cells, UA 0-2 None Seen, 0-2 /HPF    Hyaline Casts, UA 0-2 None Seen /LPF    Methodology Automated Microscopy      Ct Abdomen Pelvis Without Contrast    Result Date: 12/18/2020  Narrative: CT ABDOMEN PELVIS WO CONTRAST-  INDICATIONS: Fever, hematuria  TECHNIQUE: Radiation dose reduction techniques were utilized, including automated exposure control and exposure modulation based on body size. Unenhanced ABDOMEN AND PELVIS CT  COMPARISON: None available  FINDINGS:  Diffuse wall thickening the urinary bladder with small surrounding fat stranding suggests cystitis. Bilateral perinephric stranding may be chronic in nature or may be evidence of infection. A couple 3 mm nonobstructive stones are seen in the right kidney. Assessment of the distal ureters and urinary bladder is limited by attenuation artifact from bilateral hip arthroplasty hardware.  Assessment for liver lesions is significantly limited without intravenous contrast material.  Otherwise unremarkable unenhanced appearance of the liver, gallbladder, spleen, adrenal glands, pancreas, kidneys, bladder.  No bowel obstruction. Conspicuous abnormal wall thickening is apparent in the rectum, circumferentially, for example 2 cm, axial image 163, that may reflect rectal wall lesion proctitis, direct visualization advised.  No free intraperitoneal gas or free fluid.  Left para-aortic lymph node measuring 8 mm short axis, axial image 86 is borderline prominent, nonspecific, could be reactive in nature or potentially evidence of neoplasm. Borderline prominent subcutaneous lymph nodes are seen in the bilateral groin, for example on the right, 1.2 cm short axis, axial image 188. Clinical correlation and follow-up recommended; if further imaging evaluation is indicated, positron emission tomography could be considered.  Abdominal aorta is not aneurysmal. Aortic and other arterial calcifications are present.  The lung bases show mild atelectasis. Minimal bilateral pleural  effusions are seen. The heart is enlarged.  Degenerative changes are seen in the spine. No acute fracture is identified.        Impression:   1. Diffuse wall thickening the urinary bladder with small surrounding fat stranding suggests cystitis. Bilateral perinephric fat stranding is also present. Nonobstructive right nephrolithiasis. 2. Conspicuous abnormal wall thickening is apparent in the rectum, circumferentially, that may reflect rectal wall lesion/neoplasm or proctitis, direct visualization advised. 3. Borderline, nonspecific lymph nodes, as described.  Discussed by telephone with Dr. Wang for Dr. Mast at 1930, 12/18/2020.  This report was finalized on 12/18/2020 7:34 PM by Dr. Jluis Meza M.D.      Ct Head Without Contrast    Result Date: 12/18/2020  Narrative: CT HEAD WO CONTRAST-  INDICATIONS: Altered mental status  TECHNIQUE: Radiation dose reduction techniques were utilized, including automated exposure control and exposure modulation based on body size. Noncontrast head CT  COMPARISON: None available  FINDINGS:    No acute intracranial hemorrhage, midline shift or mass effect. No acute territorial infarct is identified.  Mild periventricular hypodensities suggest chronic small vessel ischemic change in a patient this age.  Arterial calcifications are seen at the base of the brain.  Ventricles, cisterns, cerebral sulci are unremarkable for patient age.  Procedural change of the left globe is apparent. The visualized paranasal sinuses, orbits, mastoid air cells are otherwise unremarkable.          Impression:  No acute intracranial hemorrhage or hydrocephalus. If there is further clinical concern, MRI could be considered for further evaluation.  This report was finalized on 12/18/2020 7:22 PM by Dr. Jluis Meza M.D.      Xr Chest 1 View    Result Date: 12/18/2020  Narrative: XR CHEST 1 VW-  HISTORY:  Fever, altered mental status.  COMPARISON:  None.  FINDINGS:  A single portable view of the  chest was obtained. There is an implanted cardiac loop recording device. The cardiac silhouette is mildly enlarged. Mediastinal contours are within normal limits. There is calcific aortic atherosclerosis. There is central pulmonary venous congestion. There are low lung volumes. There is mild hazy opacity in both lungs, which could be due to atelectasis in the setting of low lung volumes or edema, however, multifocal pneumonia is not excluded. Recommend follow-up PA and lateral chest radiographs. Pleural spaces are clear. There is multilevel degenerative disc disease.  This report was finalized on 12/18/2020 5:38 PM by Dr. Lindsey Medel M.D.          I have seen and personally evaluated this patient, discussed the case with the treating advanced practice provider, and reviewed their note. I was involved in the medical decision making during the evaluation, testing and disposition planning for this patient.    Progress:  1915: Received transfer of care from Dr. Mast.  Pending CBC redraw, lactic acid, head CT and CT abdomen pelvis.    1930: See if call from radiologist, Dr. Meza about CT abdomen pelvis.  Thickening of the bladder wall could be consistent with cystitis with minimal perinephric stranding bilaterally which could be chronic; also concerned about thickening of the rectal wall which may represent mass versus proctitis.  Head CT no acute.  Continue to await CBC and lactic acid.    2050: Case reviewed with JESS Duggan who is agreeable to accept the patient for full admission with telemetry on behalf of Dr. Jackson.    Final diagnoses:   Complicated UTI (urinary tract infection)   Confusion   Normal anion gap metabolic acidosis       Disposition:  ADMISSION    Discussed treatment plan and reason for admission with pt/family and admitting physician.  Pt/family voiced understanding of the plan for admission for further testing/treatment as needed.          Garett Wang MD  12/18/20 3855

## 2020-12-19 NOTE — PLAN OF CARE
Goal Outcome Evaluation:  Plan of Care Reviewed With: patient  Progress: no change  Outcome Summary: Pt came to the floor from ER; no c/o pain; IV fluid started; GI and nephro consulted; continue to monitor.

## 2020-12-19 NOTE — CONSULTS
"  Referring Provider: ANTWAN Sierra  Reason for Consultation: CKD4    Subjective     Chief complaint   Chief Complaint   Patient presents with   • Altered Mental Status   • Fever       History of present illness:  64 yo AAM with CKD4 (baseline SCR had been in 5-range, tho here in 3-4 range), followed by Dr. Coleen Moreno with [facility], admitted yesterday for further evaluation of lethargy, confusion, and fever.  He reports ongoing problems with bilateral lower extremity wounds followed by Home Health, with inability to walk for the past 6 months and increasing pain in both lower legs for the last few days.  Imaging in the ER included abdomen/pelvis CT revealing bladder wall thickening concerning for cystitis as well as thickening of the rectum concerning for proctitis.  Covid-19 testing is negative.  SCR 3.3 on arrival and 3.8 today.  Full PMH outlined below; pertinent is longstanding DM2, obesity, and hypertension.  · Though he denies ever having had any surgery on either arm, \"Care Everywhere\" Epic records indicate creation of AV fistula in the left arm earlier this year.  No obvious surgical incision in either arm, nor is any bruit or thrill appreciated   · Has been unable to walk for easily 6 months  · Denies any urinary symptoms; denies diarrhea  · Breathing is comfortable; no chest pain; reports stable leg swelling.  Home health nurse wraps his leg regularly and attends to his leg wounds    Past Medical History:   Diagnosis Date   • Back pain    • CKD (chronic kidney disease)    • DM type 2 (diabetes mellitus, type 2) (CMS/[facility])    • ED (erectile dysfunction)    • Hyperlipidemia    • Hypertension    • SCOTTY (iron deficiency anemia)    • Monoclonal gammopathy    • Osteoarthritis      No past surgical history on file.  No family history on file.  Social History     Tobacco Use   • Smoking status: Not on file   Substance Use Topics   • Alcohol use: Not on file   • Drug use: Not on file     Medications " "Prior to Admission   Medication Sig Dispense Refill Last Dose   • atorvastatin (LIPITOR) 20 MG tablet Take 20 mg by mouth Daily.   12/17/2020 at Unknown time   • brimonidine (ALPHAGAN P) 0.1 % solution ophthalmic solution 1 drop 2 (two) times a day.   12/17/2020 at Unknown time   • diphenhydrAMINE (BENADRYL) 25 mg capsule Take 25 mg by mouth Every 6 (Six) Hours As Needed for Itching or Allergies.   12/17/2020 at Unknown time   • dorzolamide (TRUSOPT) 2 % ophthalmic solution Administer 1 drop to the right eye 2 (two) times a day.   12/17/2020 at Unknown time   • furosemide (LASIX) 20 MG tablet Take 20 mg by mouth 2 (Two) Times a Day.   12/17/2020 at Unknown time   • gabapentin (NEURONTIN) 300 MG capsule TAKE 1 CAPSULE BY MOUTH TWICE DAILY (Patient taking differently: 3 (Three) Times a Day.) 60 capsule 0 12/17/2020 at Unknown time   • HUMALOG KWIKPEN 100 UNIT/ML solution pen-injector INJECT 20 UNITS INTO THE SKIN TWICE DAILY 15 mL 0 12/17/2020 at Unknown time   • HYDROcodone-acetaminophen (NORCO)  MG per tablet Take 1 tablet po every 4 hours PRN for moderate pain, take two tablets po every 4 hours PRN for severe pain, valid if faxed to [facility]   tablet 0 12/17/2020 at Unknown time   • WAL-DRYL ALLERGY 25 MG Take 1 capsule by mouth Every 6 (Six) Hours As Needed for itching. 30 capsule 0      Allergies:  Patient has no known allergies.    Review of Systems  14-point ROS performed and all negative except for pertinent +/-'s detailed in HPI.     Objective     Vital Signs  Temp:  [97.8 °F (36.6 °C)-100.7 °F (38.2 °C)] 97.8 °F (36.6 °C)  Heart Rate:  [] 86  Resp:  [18] 18  BP: (105-142)/(63-88) 105/67    Flowsheet Rows      First Filed Value   Admission Height  190.5 cm (75\") Documented at 12/18/2020 1642   Admission Weight  109 kg (240 lb) Documented at 12/18/2020 1642           No intake/output data recorded.  I/O last 3 completed shifts:  In: 50 [IV Piggyback:50]  Out: -     Intake/Output Summary (Last " 24 hours) at 12/19/2020 1624  Last data filed at 12/19/2020 0300  Gross per 24 hour   Intake 50 ml   Output --   Net 50 ml       Physical Exam:  NAD; pleasant; oriented fully; looks older than stated age  Obese; chronically ill-appearing; blunted sensorium; slightly hard of hearing  MMM; AT/NC   No eye discharge; no scleral icterus  No JVD; no carotid bruits  Decreased breath sounds in bases bilat; no wheezes; not labored  RRR, no rub  Soft, NT, +D, BS+  Woody edema both lower legs and feet; legs are wrapped  +clubbing  +asterixis  Moves all extremities   Speech is a bit halting  Flat affect and depressed mood    Results Review:  Results from last 7 days   Lab Units 12/19/20 0439 12/18/20  1653   SODIUM mmol/L 135* 136   POTASSIUM mmol/L 3.4* 3.3*   CHLORIDE mmol/L 106 109*   CO2 mmol/L 18.7* 16.7*   BUN mg/dL 55* 48*   CREATININE mg/dL 3.82* 3.34*   CALCIUM mg/dL 8.6 8.0*   BILIRUBIN mg/dL  --  0.6   ALK PHOS U/L  --  94   ALT (SGPT) U/L  --  11   AST (SGOT) U/L  --  15   GLUCOSE mg/dL 140* 129*       Estimated Creatinine Clearance: 25 mL/min (A) (by C-G formula based on SCr of 3.82 mg/dL (H)).    Results from last 7 days   Lab Units 12/19/20 0439   MAGNESIUM mg/dL 1.8       Results from last 7 days   Lab Units 12/19/20 0439 12/18/20 2024   WBC 10*3/mm3 14.16* 15.88*   HEMOGLOBIN g/dL 8.1* 7.9*   PLATELETS 10*3/mm3 233 197       Results from last 7 days   Lab Units 12/19/20 0439   INR  1.37*       Active Medications  cefTRIAXone, 1 g, Intravenous, Q24H  insulin lispro, 0-7 Units, Subcutaneous, TID AC  sodium chloride, 10 mL, Intravenous, Q12H      sodium chloride, 50 mL/hr, Last Rate: 50 mL/hr (12/19/20 0159)        Assessment/Plan   Assessment  1.  CKD4: stable though poor function.  Has biopsy-proven diabetic nephropathy.  Central volume stable by exam, tho chest x-ray does suggest some central pulmonary congestion. Hypervolemic hyponatremia; hypokalemia and likely NAGMA.  Urinalysis with only 30 mg/dL  protein, 21-30 WBCs/hpf, and TNTC RBCs.  Reportedly has seen a vascular surgeon and had an AV fistula created in the left arm, although patient denies having had this done.  No obvious surgical scars left arm, tho I think I can trace out small AVG just prox and medial to elbow, tho might just be knotty vein with calcification as other areas of arm with similar findings  2.  Fever and confusion, with the latter clearing  3.  Bilateral lower extremity wounds  4.  Cystitis and proctitis by CT  5.  Anemia, profound, with history of MGUS  6.  Immobility syndrome: Has not walked for 6 months  7.  Longstanding DM2  8.  Hypertension: hypotensive now      Sepsis secondary to UTI (CMS/HCC)    Complicated UTI (urinary tract infection)    Macrocytosis    Metabolic encephalopathy    Hyperlipidemia    Hypertension    Monoclonal gammopathy    ESRD (end stage renal disease) (CMS/HCC)    DM2 (diabetes mellitus, type 2) (CMS/HCC)    Abnormal CT of the abdomen    Normal anion gap metabolic acidosis    Anemia, chronic disease      Plan  1.  Continue IVF while room air saturations fine, blood pressure low, and oral intake mediocre  2.  Replace potassium  3.  Empiric antibiotics; follow-up cultures  4.  Check iron stores  5.  Bladder scan daily    I discussed the patient's findings and my recommendations with the patient and with Dr. Coleen Moreno ([facility])    Nixon Robertson MD  12/19/20  16:24 EST

## 2020-12-20 NOTE — PROGRESS NOTES
[facility]  Inpatient Progress Note    Reason for Follow Up: Abnormal imaging and iron deficiency anemia    Subjective     Interval History:   He tells me he slept well, no complaints this morning    Current Facility-Administered Medications:   •  acetaminophen (TYLENOL) tablet 650 mg, 650 mg, Oral, Q4H PRN **OR** acetaminophen (TYLENOL) 160 MG/5ML solution 650 mg, 650 mg, Oral, Q4H PRN **OR** acetaminophen (TYLENOL) suppository 650 mg, 650 mg, Rectal, Q4H PRN, Olga Lidia Reed APRN  •  cefTRIAXone (ROCEPHIN) IVPB 1 g, 1 g, Intravenous, Q24H, Howie Anrdade MD, Last Rate: 100 mL/hr at 12/19/20 1059, 1 g at 12/19/20 1059  •  dextrose (D50W) 25 g/ 50mL Intravenous Solution 25 g, 25 g, Intravenous, Q15 Min PRN, Olga Lidia Reed APRN  •  dextrose (GLUTOSE) oral gel 15 g, 15 g, Oral, Q15 Min PRN, Olga Lidia Reed APRN  •  glucagon (human recombinant) (GLUCAGEN DIAGNOSTIC) injection 1 mg, 1 mg, Subcutaneous, Q15 Min PRN, Olga Lidia Reed APRN  •  insulin lispro (ADMELOG) injection 0-7 Units, 0-7 Units, Subcutaneous, TID AC, Olga Lidia Reed APRN, Stopped at 12/19/20 1749  •  nitroglycerin (NITROSTAT) SL tablet 0.4 mg, 0.4 mg, Sublingual, Q5 Min PRN, Olga Lidia Reed APRN  •  ondansetron (ZOFRAN) injection 4 mg, 4 mg, Intravenous, Q6H PRN, Olga Lidia Reed APRN  •  [COMPLETED] Insert peripheral IV, , , Once **AND** sodium chloride 0.9 % flush 10 mL, 10 mL, Intravenous, PRN, Terrance Mast II, MD  •  sodium chloride 0.9 % flush 10 mL, 10 mL, Intravenous, Q12H, Olga Lidia Reed APRN, 10 mL at 12/20/20 0008  •  sodium chloride 0.9 % flush 10 mL, 10 mL, Intravenous, PRN, Olga Lidia Reed, APRN  •  sodium chloride 0.9 % infusion, 50 mL/hr, Intravenous, Continuous, Olga Lidia Reed APRN, Last Rate: 50 mL/hr at 12/20/20 0009, 50 mL/hr at 12/20/20 0009  Review of Systems:    A bit of weakness all other systems reviewed and negative    Objective      Vital Signs  Temp:  [97.8 °F (36.6 °C)-99.3 °F (37.4 °C)] 99.3 °F (37.4 °C)  Heart Rate:  [76-88] 78  Resp:  [18] 18  BP: (105-161)/(67-97) 131/86  Body mass index is 30 kg/m².    Intake/Output Summary (Last 24 hours) at 12/20/2020 0805  Last data filed at 12/20/2020 0648  Gross per 24 hour   Intake 600 ml   Output --   Net 600 ml     No intake/output data recorded.     Physical Exam:   General: patient awake, alert and cooperative   Eyes: Normal lids and lashes, no scleral icterus   Neck: supple, normal ROM   Skin: warm and dry, not jaundiced   Cardiovascular: regular rhythm and rate, no murmurs auscultated   Pulm: clear to auscultation bilaterally, regular and unlabored   Abdomen: soft, nontender, nondistended; normal bowel sounds   Extremities: no rash or edema   Psychiatric: Normal mood and behavior; memory intact     Results Review:     I reviewed the patient's new clinical results.    Results from last 7 days   Lab Units 12/20/20  0631 12/19/20 0439 12/18/20 2024   WBC 10*3/mm3 11.70* 14.16* 15.88*   HEMOGLOBIN g/dL 8.6* 8.1* 7.9*   HEMATOCRIT % 28.4* 26.8* 26.6*   PLATELETS 10*3/mm3 196 233 197     Results from last 7 days   Lab Units 12/19/20  0439 12/18/20  1653   SODIUM mmol/L 135* 136   POTASSIUM mmol/L 3.4* 3.3*   CHLORIDE mmol/L 106 109*   CO2 mmol/L 18.7* 16.7*   BUN mg/dL 55* 48*   CREATININE mg/dL 3.82* 3.34*   CALCIUM mg/dL 8.6 8.0*   BILIRUBIN mg/dL  --  0.6   ALK PHOS U/L  --  94   ALT (SGPT) U/L  --  11   AST (SGOT) U/L  --  15   GLUCOSE mg/dL 140* 129*     Results from last 7 days   Lab Units 12/19/20  0439   INR  1.37*     Lab Results   Lab Value Date/Time    LIPASE 17 12/18/2020 1653    LIPASE 914 (H) 10/27/2020 0330    LIPASE 1,087 (H) 10/26/2020 0410    LIPASE 1,653 (H) 10/25/2020 0323       Radiology:  CT Head Without Contrast   Final Result       No acute intracranial hemorrhage or hydrocephalus. If there is further   clinical concern, MRI could be considered for further evaluation.        This report was finalized on 12/18/2020 7:22 PM by Dr. Jluis Meza M.D.          CT Abdomen Pelvis Without Contrast   Final Result           1. Diffuse wall thickening the urinary bladder with small surrounding   fat stranding suggests cystitis. Bilateral perinephric fat stranding is   also present. Nonobstructive right nephrolithiasis.   2. Conspicuous abnormal wall thickening is apparent in the rectum,   circumferentially, that may reflect rectal wall lesion/neoplasm or   proctitis, direct visualization advised.   3. Borderline, nonspecific lymph nodes, as described.       Discussed by telephone with Dr. Wang for Dr. Mast at 1930, 12/18/2020.       This report was finalized on 12/18/2020 7:34 PM by Dr. Jluis Meza M.D.          XR Chest 1 View   Final Result          Assessment/Plan     Patient Active Problem List   Diagnosis   • Complicated UTI (urinary tract infection)   • Macrocytosis   • Sepsis secondary to UTI (CMS/HCC)   • Metabolic encephalopathy   • Hyperlipidemia   • Hypertension   • Monoclonal gammopathy   • ESRD (end stage renal disease) (CMS/HCC)   • DM2 (diabetes mellitus, type 2) (CMS/HCC)   • Abnormal CT of the abdomen   • Normal anion gap metabolic acidosis   • Anemia, chronic disease       Assessment:  1. Abnormal imaging with cystitis/urinary tract infection  2. Confusion/delirium likely secondary to urosepsis  3. Abnormal imaging of the rectum  4. Microcytic anemia     Plan:  · He will need EGD and colonoscopy for microcytic anemia and abnormal findings of the rectum when his confusion clears, when he is adequately been treated for cystitis/urinary tract infection when he has been cleared by the primary team  · Follow hemoglobin  · Check iron indices, drawn and pending this morning  · We will follow   I discussed the patients findings and my recommendations with patient and nursing staff.    Kip Wiley MD

## 2020-12-20 NOTE — PROGRESS NOTES
NEPHROLOGY PROGRESS NOTE    PATIENT IDENTIFICATION:   Name:  Gregorio Alejandro      MRN:  [id number]     65 y.o.  male             Reason for visit: CKD4    SUBJECTIVE:   Feels fine; no SOB on RA; appetite fine; he has no recollection of left arm AVF creation earlier this year    OBJECTIVE:  Vitals:    12/19/20 1931 12/19/20 2332 12/20/20 0746 12/20/20 1331   BP: 133/79 161/97 131/86 142/94   BP Location: Right arm Right arm Right arm Right arm   Patient Position: Lying Lying Lying Lying   Pulse: 76 88 78 77   Resp: 18 18 18 18   Temp: 98.7 °F (37.1 °C) 98.2 °F (36.8 °C) 99.3 °F (37.4 °C) 98.2 °F (36.8 °C)   TempSrc: Oral Oral Oral Oral   SpO2: 99% 94% 98% 96%   Weight:       Height:               Body mass index is 30 kg/m².    Intake/Output Summary (Last 24 hours) at 12/20/2020 1420  Last data filed at 12/20/2020 0648  Gross per 24 hour   Intake 600 ml   Output --   Net 600 ml     Wt Readings from Last 1 Encounters:   12/18/20 1642 109 kg (240 lb)     Wt Readings from Last 3 Encounters:   12/18/20 109 kg (240 lb)         Exam:  NAD; pleasant; oriented fully; looks older than stated age  Obese; chronically ill-appearing  MMM; AT/NC   No eye discharge; no scleral icterus  No JVD; no carotid bruits  Decreased breath sounds in bases bilat; no wheezes; not labored on RA  RRR, no rub  Soft, NT, +D, BS+  Woody edema both lower legs and feet; legs are wrapped  No bruit or thrill left arm; no obvious incision from prior vascular surgery that I can see  +clubbing  +asterixis  Moves all extremities   Speech is fluent  Flat affect; mood is normal    Scheduled meds:    insulin lispro, 0-7 Units, Subcutaneous, TID AC  piperacillin-tazobactam, 3.375 g, Intravenous, Q12H  sodium chloride, 10 mL, Intravenous, Q12H      IV meds:                        sodium chloride, 50 mL/hr, Last Rate: 50 mL/hr (12/20/20 0009)        Data Review:    Results from last 7 days   Lab Units 12/20/20  0631 12/19/20  0439 12/18/20  1653   SODIUM  mmol/L 137 135* 136   POTASSIUM mmol/L 4.2 3.4* 3.3*   CHLORIDE mmol/L 110* 106 109*   CO2 mmol/L 16.6* 18.7* 16.7*   BUN mg/dL 53* 55* 48*   CREATININE mg/dL 3.64* 3.82* 3.34*   CALCIUM mg/dL 8.9 8.6 8.0*   BILIRUBIN mg/dL  --   --  0.6   ALK PHOS U/L  --   --  94   ALT (SGPT) U/L  --   --  11   AST (SGOT) U/L  --   --  15   GLUCOSE mg/dL 132* 140* 129*     Estimated Creatinine Clearance: 26.3 mL/min (A) (by C-G formula based on SCr of 3.64 mg/dL (H)).      Results from last 7 days   Lab Units 12/20/20  0631 12/19/20  0439   MAGNESIUM mg/dL 1.9 1.8   PHOSPHORUS mg/dL 3.9  --        Results from last 7 days   Lab Units 12/20/20  0631 12/19/20  0439 12/18/20  2024   WBC 10*3/mm3 11.70* 14.16* 15.88*   HEMOGLOBIN g/dL 8.6* 8.1* 7.9*   PLATELETS 10*3/mm3 196 233 197       Results from last 7 days   Lab Units 12/19/20  0439   INR  1.37*             ASSESSMENT:     Sepsis secondary to UTI (CMS/[facility])    Complicated UTI (urinary tract infection)    Macrocytosis    Metabolic encephalopathy    Hyperlipidemia    Hypertension    Monoclonal gammopathy    ESRD (end stage renal disease) (CMS/[facility])    DM2 (diabetes mellitus, type 2) (CMS/[facility])    Abnormal CT of the abdomen    Normal anion gap metabolic acidosis    Anemia, chronic disease    1.  CKD4: stable though poor function.  Has biopsy-proven diabetic nephropathy.  Central volume stable by exam, tho chest x-ray does suggest some central pulmonary congestion. Hypervolemic hyponatremia, resolved; likely NAGMA.  Urinalysis with only 30 mg/dL protein, 21-30 WBCs/hpf, and TNTC RBCs.  Reportedly has seen a vascular surgeon and had an AV fistula created in the left arm, although patient denies having had this done.  No bruit or thrill left arm  2.  Fever and confusion, with the latter resolved  3.  Bilateral lower extremity wounds  4.  Cystitis and proctitis by CT  5.  Anemia, profound, with history of MGUS; iron stores are adequate  6.  Immobility syndrome: Has not walked for 6  "months  7.  Longstanding DM2  8.  Hypertension: hypotensive now        PLAN:  1.  Stop IVF  2.  Place \"warning\" band around left wrist to protect arm from future sticks  3.  I notified his primary nephrologist, Dr. Moreno at [facility], that the patient's vascular access has clotted  4.  Add Na bicarb 1300 mg BID  5.  Barring any surprises in exam or labs tomorrow, no objection to discharge from renal view    Nixon Robertson MD  12/20/2020    14:20 EST   "

## 2020-12-20 NOTE — PLAN OF CARE
Goal Outcome Evaluation:  Plan of Care Reviewed With: patient  Progress: no change   Awake a bit more today, ate lunch and dinner. Hopes to DC tomorrow. ID Consulted today.

## 2020-12-20 NOTE — CONSULTS
CONSULT NOTE    Infectious Diseases - Tonja Cruz MD  [facility]       Patient Identification:  Name: Gregorio Alejandro  Age: 65 y.o.  Sex: male  :  1955  MRN: [id number]             Date of Consultation: 2020      Primary Care Physician: Maya Ribeiro APRN                               Requesting Physician: Dr. Rashad Denny  Reason for Consultation: Antibiotic treatment recommendation      Impression: Patient is a 65-year-old male complicated past medical history admitted from [facility] wound care clinic with fever and confusion.  Patient has history of immobility and essentially wheelchair-bound for his day-to-day activity, type 2 diabetes, anemia, stage IV kidney disease, obstructive sleep apnea as well as hyperlipidemia.  Evaluation revealed ill-appearing male with WBC count of 15,000 and abnormal urinalysis.  Patient has history of bilateral lower extremity lymphedema with venous stasis and chronic wound for which he follows up at wound care clinic and currently has both legs are wrapped.  There is culture taken from his right foot from an area unclear to me and it is showing multiple pathogens with Gram stain revealing no white blood cells.  Patient claims that he is admitted to the hospital from wound care clinic because of issues with his legs and feet.  He denies any other symptoms of nausea vomiting diarrhea burning in urination frequency urgency but does admit to have fever.  He is overall improved.  Patient understand that his history of chronic kidney disease but he has never been told that he is in the process of being established for hemodialysis based on declining renal function and he does not recall having any AV fistula anywhere.  This presentation of febrile illness this complicated patient with objective findings revealing elevated white blood cell count and abnormal urinalysis with chronic dry noncellulitic changes in bilateral feet with bilateral  lower extremity lymphedema/venous stasis ulcers currently wrapped is concerning for:  1-toxic metabolic encephalopathy overall improved and likely because of it at the time of presentation  2-urinary tract infection  3-probably venous stasis ulcer and secondary cellulitis of the bilateral lower extremities currently wrapped  4-other diagnosis per internal medicine service      Recommendations/Discussions:  At this juncture I agree with your concerns regarding treatment need for abnormal urinalysis given his presentation with confusion abnormal urinalysis and leukocytosis.  Given the appearance of his lower extremities and the fact that his leg from knee to the ankle is wrapped in Unna boot type dressing his feet currently do not show any obvious wound cultures taken from it showing with polymicrobial jamil likely contaminant or colonizer of the skin is there is no associated inflammatory cells seen in the Gram stain.    His urine culture so far showing mixed jamil.  Patient is overall improved.  Adjust his antibiotic regimen to treat recently identified urinary tract infection with Zosyn which would also provide coverage for bilateral lower extremity wound and cellulitis in the setting of venous insufficiency and lymphedema.  Patient will require at least 10 to 14 days of antibiotic treatment.  Further management as his condition evolves.      History of Present Illness:         Past Medical History:  Past Medical History:   Diagnosis Date   • Back pain    • CKD (chronic kidney disease)    • DM type 2 (diabetes mellitus, type 2) (CMS/[facility])    • ED (erectile dysfunction)    • Hyperlipidemia    • Hypertension    • SCOTTY (iron deficiency anemia)    • Monoclonal gammopathy    • Osteoarthritis      Past Surgical History:  No past surgical history on file.   Home Meds:  Medications Prior to Admission   Medication Sig Dispense Refill Last Dose   • atorvastatin (LIPITOR) 20 MG tablet Take 20 mg by mouth Daily.   12/17/2020 at  Unknown time   • brimonidine (ALPHAGAN P) 0.1 % solution ophthalmic solution 1 drop 2 (two) times a day.   12/17/2020 at Unknown time   • diphenhydrAMINE (BENADRYL) 25 mg capsule Take 25 mg by mouth Every 6 (Six) Hours As Needed for Itching or Allergies.   12/17/2020 at Unknown time   • dorzolamide (TRUSOPT) 2 % ophthalmic solution Administer 1 drop to the right eye 2 (two) times a day.   12/17/2020 at Unknown time   • furosemide (LASIX) 20 MG tablet Take 20 mg by mouth 2 (Two) Times a Day.   12/17/2020 at Unknown time   • gabapentin (NEURONTIN) 300 MG capsule TAKE 1 CAPSULE BY MOUTH TWICE DAILY (Patient taking differently: 3 (Three) Times a Day.) 60 capsule 0 12/17/2020 at Unknown time   • HUMALOG KWIKPEN 100 UNIT/ML solution pen-injector INJECT 20 UNITS INTO THE SKIN TWICE DAILY 15 mL 0 12/17/2020 at Unknown time   • HYDROcodone-acetaminophen (NORCO)  MG per tablet Take 1 tablet po every 4 hours PRN for moderate pain, take two tablets po every 4 hours PRN for severe pain, valid if faxed to [facility]   tablet 0 12/17/2020 at Unknown time   • WAL-DRYL ALLERGY 25 MG Take 1 capsule by mouth Every 6 (Six) Hours As Needed for itching. 30 capsule 0      Current Meds:     Current Facility-Administered Medications:   •  acetaminophen (TYLENOL) tablet 650 mg, 650 mg, Oral, Q4H PRN **OR** acetaminophen (TYLENOL) 160 MG/5ML solution 650 mg, 650 mg, Oral, Q4H PRN **OR** acetaminophen (TYLENOL) suppository 650 mg, 650 mg, Rectal, Q4H PRN, Olga Lidia Reed APRN  •  cefTRIAXone (ROCEPHIN) IVPB 1 g, 1 g, Intravenous, Q24H, Howie Andrade MD, Last Rate: 100 mL/hr at 12/19/20 1059, 1 g at 12/19/20 1059  •  dextrose (D50W) 25 g/ 50mL Intravenous Solution 25 g, 25 g, Intravenous, Q15 Min PRN, Olga Lidia Reed APRN  •  dextrose (GLUTOSE) oral gel 15 g, 15 g, Oral, Q15 Min PRN, Olga Lidia Reed APRN  •  glucagon (human recombinant) (GLUCAGEN DIAGNOSTIC) injection 1 mg, 1 mg, Subcutaneous, Q15 Min PRN,  Olga Lidia Reed APRN  •  insulin lispro (ADMELOG) injection 0-7 Units, 0-7 Units, Subcutaneous, TID AC, Olga Lidia Reed APRN, Stopped at 12/19/20 1749  •  nitroglycerin (NITROSTAT) SL tablet 0.4 mg, 0.4 mg, Sublingual, Q5 Min PRN, Olga Lidia Reed APRNANCY  •  ondansetron (ZOFRAN) injection 4 mg, 4 mg, Intravenous, Q6H PRN, Olga Lidia Reed APRN  •  [COMPLETED] Insert peripheral IV, , , Once **AND** sodium chloride 0.9 % flush 10 mL, 10 mL, Intravenous, PRN, Terrance Mast II, MD  •  sodium chloride 0.9 % flush 10 mL, 10 mL, Intravenous, Q12H, Olga Lidia Reed APRN, 10 mL at 12/20/20 0854  •  sodium chloride 0.9 % flush 10 mL, 10 mL, Intravenous, PRN, Olga Lidia Reed APRN  •  sodium chloride 0.9 % infusion, 50 mL/hr, Intravenous, Continuous, Olga Lidia Reed APRN, Last Rate: 50 mL/hr at 12/20/20 0009, 50 mL/hr at 12/20/20 0009  Allergies:  No Known Allergies  Social History:   Social History     Tobacco Use   • Smoking status: Not on file   Substance Use Topics   • Alcohol use: Not on file      Family History:  No family history on file.       Review of Systems  See history of present illness and past medical history.  Patient denies headache, dizziness, syncope, falls, trauma, change in vision, change in hearing, change in taste, changes in weight, changes in appetite, focal weakness, numbness, or paresthesia.  Patient denies chest pain, palpitations, dyspnea, orthopnea, PND, cough, sinus pressure, rhinorrhea, epistaxis, hemoptysis, nausea, vomiting,hematemesis, diarrhea, constipation or hematchezia.  Denies cold or heat intolerance, polydipsia, polyuria, polyphagia. Denies hematuria, pyuria, dysuria, hesitancy, frequency or urgency. Denies consumption of raw and under cooked meats foods or change in water source.  Denies fever, chills, sweats, night sweats.  Denies missing any routine medications. Remainder of ROS is negative.      Vitals:   /86 (BP Location: Right arm,  "Patient Position: Lying)   Pulse 78   Temp 99.3 °F (37.4 °C) (Oral)   Resp 18   Ht 190.5 cm (75\")   Wt 109 kg (240 lb)   SpO2 98%   BMI 30.00 kg/m²   I/O:     Intake/Output Summary (Last 24 hours) at 12/20/2020 1100  Last data filed at 12/20/2020 0648  Gross per 24 hour   Intake 600 ml   Output --   Net 600 ml     Exam:  Patient is examined using the personal protective equipment as per guidelines from infection control for this particular patient as enacted.  Hand washing was performed before and after patient interaction.  General Appearance:    Alert, cooperative, no distress, appears stated age   Head:    Normocephalic, without obvious abnormality, atraumatic   Eyes:    PERRL, conjunctivae/corneas clear, EOM's intact, both eyes   Ears:    Normal external ear canals, both ears   Nose:   Nares normal, septum midline, mucosa normal, no drainage    or sinus tenderness   Throat:   Lips, tongue, gums normal; oral mucosa pink and moist   Neck:   Supple, symmetrical, trachea midline, no adenopathy;     thyroid:  no enlargement/tenderness/nodules; no carotid    bruit or JVD   Back:     Symmetric, no curvature, ROM normal, no CVA tenderness   Lungs:     Clear to auscultation bilaterally, respirations unlabored   Chest Wall:    No tenderness or deformity    Heart:    Regular rate and rhythm, S1 and S2 normal, no murmur, rub   or gallop   Abdomen:     Soft, non-tender, bowel sounds active all four quadrants,     no masses, no hepatomegaly, no splenomegaly   Extremities:  Bilateral lower extremities wrapped   Pulses:   Pulses palpable in all extremities; symmetric all extremities   Skin:  Chronic changes in the feet noted   Neurologic:  More awake and interactive and appropriate       Data Review:    I reviewed the patient's new clinical results.  Results from last 7 days   Lab Units 12/20/20  0631 12/19/20  0439 12/18/20 2024   WBC 10*3/mm3 11.70* 14.16* 15.88*   HEMOGLOBIN g/dL 8.6* 8.1* 7.9*   PLATELETS 10*3/mm3 " 196 233 197     Results from last 7 days   Lab Units 12/20/20  0631 12/19/20  0439 12/18/20  1653   SODIUM mmol/L 137 135* 136   POTASSIUM mmol/L 4.2 3.4* 3.3*   CHLORIDE mmol/L 110* 106 109*   CO2 mmol/L 16.6* 18.7* 16.7*   BUN mg/dL 53* 55* 48*   CREATININE mg/dL 3.64* 3.82* 3.34*   CALCIUM mg/dL 8.9 8.6 8.0*   GLUCOSE mg/dL 132* 140* 129*     Microbiology Results (last 10 days)     Procedure Component Value - Date/Time    Blood Culture - Blood, Arm, Left [[id number]] Collected: 12/18/20 1851    Lab Status: Preliminary result Specimen: Blood from Arm, Left Updated: 12/19/20 1900     Blood Culture No growth at 24 hours    Blood Culture - Blood, Arm, Left [[id number]] Collected: 12/18/20 1814    Lab Status: Preliminary result Specimen: Blood from Arm, Left Updated: 12/19/20 1830     Blood Culture No growth at 24 hours    Urine Culture - Urine, Urine, Clean Catch [[id number]] Collected: 12/18/20 1707    Lab Status: Final result Specimen: Urine, Clean Catch Updated: 12/20/20 1043     Urine Culture 50,000 CFU/mL Mixed Jamil Isolated    Narrative:      Specimen contains mixed organisms of questionable pathogenicity which indicates contamination with commensal jamil.  Further identification is unlikely to provide clinically useful information.  Suggest recollection.    Respiratory Panel PCR w/COVID-19(SARS-CoV-2) CRISTÓBAL/RACHAEL/YAAKOV/PAD/COR/MAD/KANU In-House, NP Swab in UTM/VTM, 3-4 HR TAT - Swab, Nasopharynx [[id number]]  (Normal) Collected: 12/18/20 1655    Lab Status: Final result Specimen: Swab from Nasopharynx Updated: 12/18/20 1819     ADENOVIRUS, PCR Not Detected     Coronavirus 229E Not Detected     Coronavirus HKU1 Not Detected     Coronavirus NL63 Not Detected     Coronavirus OC43 Not Detected     COVID19 Not Detected     Human Metapneumovirus Not Detected     Human Rhinovirus/Enterovirus Not Detected     Influenza A PCR Not Detected     Influenza B PCR Not Detected     Parainfluenza Virus 1 Not Detected     Parainfluenza  Virus 2 Not Detected     Parainfluenza Virus 3 Not Detected     Parainfluenza Virus 4 Not Detected     RSV, PCR Not Detected     Bordetella pertussis pcr Not Detected     Bordetella parapertussis PCR Not Detected     Chlamydophila pneumoniae PCR Not Detected     Mycoplasma pneumo by PCR Not Detected    Narrative:      Fact sheet for providers: https://docs.i.TV/wp-content/uploads/LAB3992-1067-PO5.1-EUA-Provider-Fact-Sheet-3.pdf    Fact sheet for patients: https://docs.i.TV/wp-content/uploads/ZPG9315-8379-JL6.1-EUA-Patient-Fact-Sheet-1.pdf    Test performed by PCR.    Anaerobic Culture - Swab, Foot, Right [[id number]] Collected: 12/10/20 1520    Lab Status: Final result Specimen: Swab from Foot, Right Updated: 12/15/20 0737     Anaerobic Culture No anaerobes isolated at 5 days    Wound Culture - Wound, Foot, Right [[id number]]  (Abnormal)  (Susceptibility) Collected: 12/10/20 1520    Lab Status: Final result Specimen: Wound from Foot, Right Updated: 12/15/20 0631     Wound Culture Scant growth (1+) Pseudomonas aeruginosa      Scant growth (1+) Providencia rettgeri      Scant growth (1+) Enterococcus faecalis     Gram Stain No organisms seen      No WBCs per low power field    Susceptibility      Pseudomonas aeruginosa     ISAC     Cefepime Susceptible     Ceftazidime Susceptible     Ciprofloxacin Susceptible     Gentamicin Susceptible     Levofloxacin Susceptible     Piperacillin + Tazobactam Susceptible                Susceptibility      Providencia rettgeri     ISAC     Ampicillin Resistant     Ampicillin + Sulbactam Intermediate     Cefepime Susceptible     Ceftazidime Susceptible     Ceftriaxone Susceptible     Gentamicin Susceptible     Levofloxacin Resistant     Piperacillin + Tazobactam Susceptible     Tetracycline Resistant     Trimethoprim + Sulfamethoxazole Susceptible                Susceptibility      Enterococcus faecalis     ISAC     Ampicillin Susceptible     Vancomycin Susceptible                 Susceptibility Comments     Providencia rettgeri    Cefazolin sensitivity will not be reported for Enterobacteriaceae in non-urine isolates. If cefazolin is preferred, please call the microbiology lab to request an E-test.  With the exception of urinary-sourced infections, aminoglycosides should not be used as monotherapy.                     Assessment:  Active Hospital Problems    Diagnosis  POA   • **Sepsis secondary to UTI (CMS/[facility]) [A41.9, N39.0]  Yes   • Macrocytosis [D75.89]  Yes   • Metabolic encephalopathy [G93.41]  Yes   • Anemia, chronic disease [D63.8]  Unknown   • Hyperlipidemia [E78.5]  Yes   • Hypertension [I10]  Yes   • Monoclonal gammopathy [D47.2]  Yes   • ESRD (end stage renal disease) (CMS/[facility]) [N18.6]  Yes   • DM2 (diabetes mellitus, type 2) (CMS/[facility]) [E11.9]  Yes   • Abnormal CT of the abdomen [R93.5]  Yes   • Normal anion gap metabolic acidosis [E87.2]  Yes   • Complicated UTI (urinary tract infection) [N39.0]  Yes      Resolved Hospital Problems   No resolved problems to display.     Ct Abdomen Pelvis Without Contrast    Result Date: 12/18/2020    1. Diffuse wall thickening the urinary bladder with small surrounding fat stranding suggests cystitis. Bilateral perinephric fat stranding is also present. Nonobstructive right nephrolithiasis. 2. Conspicuous abnormal wall thickening is apparent in the rectum, circumferentially, that may reflect rectal wall lesion/neoplasm or proctitis, direct visualization advised. 3. Borderline, nonspecific lymph nodes, as described.  Discussed by telephone with Dr. Wang for Dr. Mast at 1930, 12/18/2020.  This report was finalized on 12/18/2020 7:34 PM by Dr. Jluis Meza M.D.      Ct Head Without Contrast    Result Date: 12/18/2020   No acute intracranial hemorrhage or hydrocephalus. If there is further clinical concern, MRI could be considered for further evaluation.  This report was finalized on 12/18/2020 7:22 PM by Dr. Jluis Meza M.D.           Plan:  See above  Tonja Azevedo MD   12/20/2020  11:00 EST    Much of this encounter note is an electronic transcription/translation of spoken language to printed text. The electronic translation of spoken language may permit erroneous, or at times, nonsensical words or phrases to be inadvertently transcribed; Although I have reviewed the note for such errors, some may still exist

## 2020-12-20 NOTE — PROGRESS NOTES
[phone]   LOS: 2 days     Name: Gregorio Alejandro  Age/Sex: 65 y.o. male  :  1955        PCP: Maya Ribeiro APRN  Chief Complaint   Patient presents with   • Altered Mental Status   • Fever      Subjective   His only complaint today is being tired.  He is alert and oriented x3 denies any other new issues or complaints.  General: No Fever or Chills, Cardiac: No Chest Pain or Palpitations, Resp: No Cough or SOA, GI: No Nausea, Vomiting, or Diarrhea and Other: No bleeding    cefTRIAXone, 1 g, Intravenous, Q24H  insulin lispro, 0-7 Units, Subcutaneous, TID AC  sodium chloride, 10 mL, Intravenous, Q12H      sodium chloride, 50 mL/hr, Last Rate: 50 mL/hr (20 0009)        Objective   Vital Signs  Temp:  [97.8 °F (36.6 °C)-99.3 °F (37.4 °C)] 99.3 °F (37.4 °C)  Heart Rate:  [76-88] 78  Resp:  [18] 18  BP: (105-161)/(67-97) 131/86  Body mass index is 30 kg/m².    Intake/Output Summary (Last 24 hours) at 2020 0845  Last data filed at 2020 0648  Gross per 24 hour   Intake 600 ml   Output --   Net 600 ml       Physical Exam  Vitals signs and nursing note reviewed.   Constitutional:       Appearance: He is obese. He is ill-appearing.   HENT:      Head: Normocephalic.   Eyes:      Extraocular Movements: Extraocular movements intact.   Cardiovascular:      Rate and Rhythm: Normal rate and regular rhythm.   Pulmonary:      Effort: Pulmonary effort is normal. No respiratory distress.      Breath sounds: Normal breath sounds.   Abdominal:      General: Abdomen is flat.      Palpations: Abdomen is soft.   Skin:     General: Skin is warm and dry.      Capillary Refill: Capillary refill takes less than 2 seconds.   Neurological:      General: No focal deficit present.      Mental Status: He is alert and oriented to person, place, and time.   Psychiatric:      Comments: Flat affect mood is depressed           Results Review:       I reviewed the patient's new clinical results.  Results from last 7 days    Lab Units 12/20/20  0631 12/19/20  0439 12/18/20  2024   WBC 10*3/mm3 11.70* 14.16* 15.88*   HEMOGLOBIN g/dL 8.6* 8.1* 7.9*   PLATELETS 10*3/mm3 196 233 197     Results from last 7 days   Lab Units 12/20/20  0631 12/19/20  0439 12/18/20  1653   SODIUM mmol/L 137 135* 136   POTASSIUM mmol/L 4.2 3.4* 3.3*   CHLORIDE mmol/L 110* 106 109*   CO2 mmol/L 16.6* 18.7* 16.7*   BUN mg/dL 53* 55* 48*   CREATININE mg/dL 3.64* 3.82* 3.34*   CALCIUM mg/dL 8.9 8.6 8.0*   MAGNESIUM mg/dL 1.9 1.8  --    PHOSPHORUS mg/dL 3.9  --   --    Estimated Creatinine Clearance: 26.3 mL/min (A) (by C-G formula based on SCr of 3.64 mg/dL (H)).      Assessment/Plan   Active Hospital Problems    Diagnosis  POA   • **Sepsis secondary to UTI (CMS/[facility]) [A41.9, N39.0]  Yes   • Macrocytosis [D75.89]  Yes   • Metabolic encephalopathy [G93.41]  Yes   • Anemia, chronic disease [D63.8]  Unknown   • Hyperlipidemia [E78.5]  Yes   • Hypertension [I10]  Yes   • Monoclonal gammopathy [D47.2]  Yes   • ESRD (end stage renal disease) (CMS/[facility]) [N18.6]  Yes   • DM2 (diabetes mellitus, type 2) (CMS/[facility]) [E11.9]  Yes   • Abnormal CT of the abdomen [R93.5]  Yes   • Normal anion gap metabolic acidosis [E87.2]  Yes   • Complicated UTI (urinary tract infection) [N39.0]  Yes      Resolved Hospital Problems   No resolved problems to display.       PLAN  This is a 65-year-old gentleman with a history of chronic kidney disease stage IV chronic wounds hypertension diabetes and other medical comorbidities that presents to the hospital with altered mental status and metabolic encephalopathy  -It is possible that this metabolic encephalopathy is related to the underlying infectious process.  His urine cultures pending he seems to be responding to the IV Rocephin.  Complicating the factor is his polymicrobial wound culture.  I have asked infectious disease to weigh in regarding antibiotic input.  -Mental status is improving.  He is alert and oriented x3 his affect and mood are a  little off.  I touch base with his wife later today to see what his baseline normally is.  -Renal function remained stable no emergent indications for dialysis at this time.  -Potassium is normalized  -Continue local wound care and supportive care      Disposition  Potentially home Monday or Tuesday depending on progress      Rashad Frank MD  [facility]  12/20/20  08:45 EST

## 2020-12-20 NOTE — PLAN OF CARE
Goal Outcome Evaluation:  Plan of Care Reviewed With: patient  Progress: no change  Outcome Summary: VSS; no c/o pain; IV fluid ongoing; pt's wife called and said pt had a shunt placed in this year for dialysis, but has not used it yet; continue to monitor.

## 2020-12-21 PROBLEM — I47.29 VENTRICULAR TACHYCARDIA (PAROXYSMAL) (HCC): Status: ACTIVE | Noted: 2020-01-01

## 2020-12-21 NOTE — PLAN OF CARE
Goal Outcome Evaluation:  Plan of Care Reviewed With: patient  Progress: no change    Patient in stable condition, RN will continue to monitor patient.

## 2020-12-21 NOTE — CONSULTS
Gregorio Alejandro   65 y.o.  male    LOS: 3 days   Patient Care Team:  Maya Ribeiro APRN as PCP - General (Family Medicine)      Subjective   currently getting leg dressings changed, c/o pain in legs    History of Present Illness:   This is a 65-year-old male who follows Dr. Low in our office.  He was admitted from wound care due to confusion and fever.  He is a very poor historian and I attempted to call phone number listed for home phone in his chart with no answer.  He has since been diagnosed with a UTI.  He has known end-stage renal disease and nephrology is following.  GI is also following for abnormal imaging of rectum and anemia.  Cardiology was asked to see today for nonsustained ventricular tachycardia overnight.  He had a 8 beat run of nonsustained ventricular tachycardia.  His EKG on admission showed questionable junctional rhythm.  He also had what appeared to be an aberrancy last night.  Denies any chest pain chest pressure palpitations or shortness of breath.  Denies any dizziness or syncope.    PMHx:  Hypertension  Diastolic dysfunction  ESRD: Was declined for transplant due to BMI  Nonsustained ventricular tachycardia during dobutamine stress echo  Bradycardia with syncope status post LINQ loop recorder 4/23/2019  AV block 1st degree  Diabetes  Obesity  OFELIA, insurance denied CPAP  Right hip replacement  Lifelong non-smoker     2D echo 2/28/2020: TDS.  Moderate to severe concentric LVH.  EF 55 to 60%.  Restrictive physiology (grade 3 diastolic dysfunction).  Normal function of all valves.    Dobutamine stress echo 3/26/2019: Prolonged CO at low-dose dobutamine.  Technically difficult examination with suboptimal epicardial and valvular definition from parasternal and apical views.  Evenity given during procedure.  Hypertrophic physiology with gradient 54 mmHg at peak stress.  Small ventricle.  2 nonsustained ventricular tachycardia episodes during study, 8 and 12 beat.  No symptoms or change in  blood pressure.  Clinically not significant.  No change in therapy based on the study.  No clear new abnormalities in the study.  Recommend adequate hydration at all times.    Review of Systems:   All systems were reviewed and negative except for:  Musculoskeletal: positive for  BL leg pain     Medication Review:   Prior to Admission medications    Medication Sig Start Date End Date Taking? Authorizing Provider   atorvastatin (LIPITOR) 20 MG tablet Take 20 mg by mouth Daily.   Yes Richi Rodriguez MD   brimonidine (ALPHAGAN P) 0.1 % solution ophthalmic solution 1 drop 2 (two) times a day.   Yes Richi Rodriguez MD   diphenhydrAMINE (BENADRYL) 25 mg capsule Take 25 mg by mouth Every 6 (Six) Hours As Needed for Itching or Allergies.   Yes Richi Rodriguez MD   dorzolamide (TRUSOPT) 2 % ophthalmic solution Administer 1 drop to the right eye 2 (two) times a day.   Yes Richi Rodriguez MD   furosemide (LASIX) 20 MG tablet Take 20 mg by mouth 2 (Two) Times a Day.   Yes Richi Rodriguez MD   gabapentin (NEURONTIN) 300 MG capsule TAKE 1 CAPSULE BY MOUTH TWICE DAILY  Patient taking differently: 3 (Three) Times a Day. 10/31/16  Yes Kevan Toledo MD   HUMALOG KWIKPEN 100 UNIT/ML solution pen-injector INJECT 20 UNITS INTO THE SKIN TWICE DAILY 10/26/16  Yes Kevan Toledo MD   HYDROcodone-acetaminophen (NORCO)  MG per tablet Take 1 tablet po every 4 hours PRN for moderate pain, take two tablets po every 4 hours PRN for severe pain, valid if faxed to [facility] 7/20/16  Yes Kevan Toledo MD   WAL-DRYL ALLERGY 25 MG Take 1 capsule by mouth Every 6 (Six) Hours As Needed for itching. 10/14/16   Kevan Toledo MD         No family history on file.  Social History     Socioeconomic History   • Marital status:      Spouse name: Not on file   • Number of children: Not on file   • Years of education: Not on file   • Highest education level: Not on file     Objective   No past  surgical history on file.  Past Medical History:   Diagnosis Date   • Back pain    • CKD (chronic kidney disease)    • DM type 2 (diabetes mellitus, type 2) (CMS/HCC)    • ED (erectile dysfunction)    • Hyperlipidemia    • Hypertension    • SCOTTY (iron deficiency anemia)    • Monoclonal gammopathy    • Osteoarthritis        Vital Sign Min/Max for last 24 hours  Temp  Min: 97.9 °F (36.6 °C)  Max: 98.2 °F (36.8 °C)   BP  Min: 131/80  Max: 142/94    Pulse  Min: 70  Max: 81         12/18/20  1642 12/21/20  0819   Weight: 109 kg (240 lb) 109 kg (240 lb)        Physical Exam:      General Appearance:    Alert, cooperative, in no acute distress   Head:    Normocephalic, without obvious abnormality, atraumatic   Eyes:            Conjunctivae normal, no   icterus   Neck:   Supple, no carotid bruit, no JVD   Lungs:     Clear to auscultation,respirations regular, even and         unlabored    Heart:    Irregular rhythm and normal rate, normal S1 and S2,         No murmur, no gallop, no rub, no click   Chest Wall:    No abnormalities observed   Abdomen:     Normal bowel sounds,  soft non-tender, non-distended   Extremities:   No edema. Moves all extremities well, no cyanosis, no erythema. BLE leg wounds   Pulses:   Pulses palpable and equal bilaterally   Skin:   No bleeding, bruising or rash   Neurologic:   Alert and oriented x 3 with no acute deficits noted         Results Review:     I reviewed the patient's new clinical results.  Potassium   Date Value Ref Range Status   12/21/2020 3.6 3.5 - 5.2 mmol/L Final     Creatinine   Date Value Ref Range Status   12/21/2020 3.37 (H) 0.76 - 1.27 mg/dL Final        Assessment/Plan   1. Sepsis secondary to UTI  2. NSVT and junctional rhythm         -History of nonsustained V. tach during dobutamine stress  3. Metabolic encephalopathy  4. ESRD   5. Microcytic anemia  6. Bradycardia with syncope status post LINQ loop recorder 4/23/2019  7. 2D echo 2/28/2020: TDS.  Moderate to severe  concentric LVH.  EF 55 to 60%.  Restrictive physiology (grade 3 diastolic dysfunction).  Normal function of all valves.  8. OFELIA, insurance denied CPAP  9. Immobility   10. Monoclonal gammopathy     Plan:  He had a 8 beat run of nonsustained ventricular tachycardia around 12:17 am 12/21/20.  His EKG on admission showed quesitonable junctional rhythm although he has know 1st degree AV block. He also had what appeared to be an aberrancy last night.  Repeat EKG this morning showed SB with 1st degree AVB with T wave inversion in anterolateral leads and nonspecific T wave changes in the inferior leads.  He was started on a low-dose beta-blocker, will stop.  Ask [employer] to interrogate weeks device.  Check troponin.  2D echo is pending.    Discussed with Dr. Low: the patient has seen Dr. John Edmondson for Monoclonal gammopathy he misses a lot of his appointments with hematology, patient would benefit from further work up for possible cardiac amyloid.       Mame Connor, ANTWAN  12/21/20  08:48 EST    Dictated portions using Dragon dictation software.     During the entire encounter, I was wearing recommended PPE including face mask and eye protection. Hand sanitization was performed prior to entering room and upon exit.    Addendum: Troponin 0.128. Discussed with Dr. Low, trend trop, start heparin drip, check lipid panel, start ASA, no BB due to alaina and keep NPO in am. Of note patient was at [facility] in Oct  with c/o chest pain and he refused stress test 3 times.     Paddy Low M.D.   Reviewed our cardiology group Nurse Practitioner's note.    Pt interviewed and examined.   Findings verified.   Reviewed and agree with corrections or modifications of history, physical, and plans as indicated:     Echo showed: Biatrial enlargement, with asymmetric LVH.  Speckled type pattern.  Consider amyloid.  No significant valve disease. EF 60%.     With recent lower extremity skin breakdown, and LVH, with grade 1  diastolic dysfunction, consider acute on chronic diastolic congestive heart failure.    Likely the troponin is related to the renal failure.  We will trend the troponin, and start a heparin just to be sure.  ECG not acutel  y different, but T wave inversion.  Check ECG in the morning.    Reviewed prior protein electrophoresis.  IgG spike, with lower IgM.  Consider hematology review regarding possible amyloid.    Regarding sleep apnea, hypertension, and LVH, would likely benefit from treatment of sleep apnea, to assist in blood pressure control.  Consider getting care manager involved to obtain CPAP.    Thank you for the courtesy of the consult and for allowing us to participate in this patient's care.     EMR Dragon/Transcription disclaimer:   Much of this encounter note is an electronic transcription/translation of spoken language to printed text. The electronic translation of spoken language may permit erroneous, or at times, nonsensical words or phrases to be inadvertently transcribed.  Although I have reviewed the note for such errors, some may still exist. Please contact me if needed for clarification or correction.  Paddy Low M.D.

## 2020-12-21 NOTE — PROGRESS NOTES
Discharge Planning Assessment  [facility]     Patient Name: Gregorio Alejandro  MRN: [id number]  Today's Date: 12/21/2020    Admit Date: 12/18/2020    Discharge Needs Assessment     Row Name 12/21/20 1327       Living Environment    Lives With  spouse    Name(s) of Who Lives With Patient  Patricia Alonzo, spouse, [phone]    Current Living Arrangements  home/apartment/condo    Primary Care Provided by  spouse/significant other    Provides Primary Care For  no one, unable/limited ability to care for self    Family Caregiver if Needed  spouse    Family Caregiver Names  Patricia Alonzo, spouse, [phone]    Quality of Family Relationships  unable to assess    Able to Return to Prior Arrangements  yes       Resource/Environmental Concerns    Resource/Environmental Concerns  none       Transition Planning    Patient/Family Anticipates Transition to  home with family    Patient/Family Anticipated Services at Transition  home health care    Transportation Anticipated  other (see comments)       Discharge Needs Assessment    Readmission Within the Last 30 Days  no previous admission in last 30 days    Current Outpatient/Agency/Support Group  other (see comments) [facility]    Equipment Currently Used at Home  walker, standard;wheelchair, motorized;wheelchair    Concerns to be Addressed  care coordination/care conferences;decision-making;discharge planning    Anticipated Changes Related to Illness  none    Equipment Needed After Discharge  none    Discharge Facility/Level of Care Needs  home with home health    Provided Post Acute Provider List?  Refused    Current Discharge Risk  dependent with mobility/activities of daily living        Discharge Plan     Row Name 12/21/20 9397       Plan    Plan  Home with spouse and [facility]. Continue services with [facility] [place].  Will need to follow for possible Home O2 need at D/C.    Patient/Family in Agreement with Plan  yes    Plan Comments   Met with pt. at bedside. Explained roll of . Face sheet and pharmacy verified. Pt lives with Patricia Alonzo, spouse, [phone]. There are no steps to enter home.  DME equipment includes a motorized WC and walker. Pt states he has not walked for the past 6 months. Pt is requires assist with ADLs. Pt has never been to Rehab.  He is current with [facility] [place] and requests referral for them to see at D/C. Pt is current with [facility]. Pt’s PCP is Maya MONCADA. Uses [facility] on [address]. Pt does not drive. States he uses [employer] for transportation to appointments. Pt currently on O2, denies any history of having home O2. Will need to follow for possible home O2 need. Explained that CCP would follow to assess for discharge needs.  Jero Abarca RN-BC        Continued Care and Services - Admitted Since 12/18/2020     Home Medical Care Coordination complete    Service Provider Request Status Selected Services Address Phone Fax Patient Preferred    [facility] [address] [address] [phone] --          Therapy     Service Provider Request Status Selected Services Address Phone Fax Patient Preferred    [facility]  Pending - No Request Sent N/A [address] [id number] [phone] -- --       Internal Comment last updated by Jero Abarca RN 12/21/2020 1339    Current pt                          Expected Discharge Date and Time     Expected Discharge Date Expected Discharge Time    Dec 22, 2020         Demographic Summary     Row Name 12/21/20 1554       General Information    Admission Type  inpatient    Row Name 12/21/20 1326       General Information    Admission Type  inpatient    Arrived From  emergency department    Required Notices Provided  Important Message from Medicare    Reason for Consult  discharge planning;care  coordination/care conference    Preferred Language  English     Used During This Interaction  no        Functional Status     Row Name 12/21/20 1326       Functional Status    Usual Activity Tolerance  fair    Current Activity Tolerance  fair    Functional Status Comments  Pt has not walked for 6 months. Uses a WC for mobilioty       Functional Status, IADL    Medications  assistive equipment and person    Meal Preparation  assistive equipment and person    Housekeeping  assistive equipment and person    Laundry  assistive equipment and person    Shopping  assistive equipment and person       Mental Status    General Appearance WDL  WDL       Mental Status Summary    Recent Changes in Mental Status/Cognitive Functioning  no changes                Jero Abarca, RN

## 2020-12-21 NOTE — PROGRESS NOTES
[facility]  Inpatient Progress Note    Reason for Follow Up: Abnormal imaging and iron deficiency anemia    Subjective     Interval History:   No GI complaints.  Is being evaluated by cardiology for elevated troponin    Current Facility-Administered Medications:   •  acetaminophen (TYLENOL) tablet 650 mg, 650 mg, Oral, Q4H PRN **OR** acetaminophen (TYLENOL) 160 MG/5ML solution 650 mg, 650 mg, Oral, Q4H PRN **OR** acetaminophen (TYLENOL) suppository 650 mg, 650 mg, Rectal, Q4H PRN, Olga Lidia Reed APRN  •  aspirin EC tablet 81 mg, 81 mg, Oral, Daily, Mame Connor APRN  •  dextrose (D50W) 25 g/ 50mL Intravenous Solution 25 g, 25 g, Intravenous, Q15 Min PRN, Olga Lidia Reed APRN  •  dextrose (GLUTOSE) oral gel 15 g, 15 g, Oral, Q15 Min PRN, Olga Lidia Reed APRN  •  glucagon (human recombinant) (GLUCAGEN DIAGNOSTIC) injection 1 mg, 1 mg, Subcutaneous, Q15 Min PRN, Olga Lidia Reed APRN  •  heparin 51340 units/250 mL (100 units/mL) in 0.45 % NaCl infusion, 9.17 Units/kg/hr, Intravenous, Titrated, Mame Connor APRN  •  [START ON 12/22/2020] influenza vac split quad (FLUZONE,FLUARIX,AFLURIA,FLULAVAL) injection 0.5 mL, 0.5 mL, Intramuscular, Once, Rashad Frank MD  •  insulin lispro (ADMELOG) injection 0-7 Units, 0-7 Units, Subcutaneous, TID AC, Olga Lidia Reed APRN, Stopped at 12/19/20 5435  •  nitroglycerin (NITROSTAT) SL tablet 0.4 mg, 0.4 mg, Sublingual, Q5 Min PRN, Olga Lidia Reed APRN  •  ondansetron (ZOFRAN) injection 4 mg, 4 mg, Intravenous, Q6H PRN, Olga Lidia Reed APRN  •  piperacillin-tazobactam (ZOSYN) 3.375 g in iso-osmotic dextrose 50 ml (premix), 3.375 g, Intravenous, Q12H, Tonja Azevedo MD, 3.375 g at 12/21/20 0549  •  sodium bicarbonate tablet 1,300 mg, 1,300 mg, Oral, BID, Nixon Robertson MD, 1,300 mg at 12/21/20 0954  •  [COMPLETED] Insert peripheral IV, , , Once **AND** sodium chloride 0.9 % flush 10 mL, 10 mL, Intravenous,  Pro SOLOMON Thomas Edward II, MD  •  sodium chloride 0.9 % flush 10 mL, 10 mL, Intravenous, Q12H, Olga Lidia Reed APRN, 10 mL at 12/21/20 0954  •  sodium chloride 0.9 % flush 10 mL, 10 mL, Intravenous, PRN, Olga Lidia Reed APRN  Review of Systems:   GI: negative    Objective     Vital Signs  Temp:  [97.4 °F (36.3 °C)-98.1 °F (36.7 °C)] 97.6 °F (36.4 °C)  Heart Rate:  [70-81] 79  Resp:  [18] 18  BP: (131-151)/() 142/85  Body mass index is 30 kg/m².    Intake/Output Summary (Last 24 hours) at 12/21/2020 1338  Last data filed at 12/21/2020 0954  Gross per 24 hour   Intake 360 ml   Output --   Net 360 ml     I/O this shift:  In: 240 [P.O.:240]  Out: -      Physical Exam:   General: patient awake, alert and cooperative   Abdomen: soft, nontender, nondistended; normal bowel sounds   Extremities: wrapped with kerlex   Psychiatric: Normal mood and behavior; memory intact     Results Review:     I reviewed the patient's new clinical results.    Results from last 7 days   Lab Units 12/21/20 0447 12/20/20  0631 12/19/20  0439   WBC 10*3/mm3 8.90 11.70* 14.16*   HEMOGLOBIN g/dL 8.1* 8.6* 8.1*   HEMATOCRIT % 26.4* 28.4* 26.8*   PLATELETS 10*3/mm3 223 196 233     Results from last 7 days   Lab Units 12/21/20  0447 12/20/20  0631 12/19/20  0439 12/18/20  1653   SODIUM mmol/L 137 137 135* 136   POTASSIUM mmol/L 3.6 4.2 3.4* 3.3*   CHLORIDE mmol/L 109* 110* 106 109*   CO2 mmol/L 19.3* 16.6* 18.7* 16.7*   BUN mg/dL 47* 53* 55* 48*   CREATININE mg/dL 3.37* 3.64* 3.82* 3.34*   CALCIUM mg/dL 8.6 8.9 8.6 8.0*   BILIRUBIN mg/dL  --   --   --  0.6   ALK PHOS U/L  --   --   --  94   ALT (SGPT) U/L  --   --   --  11   AST (SGOT) U/L  --   --   --  15   GLUCOSE mg/dL 116* 132* 140* 129*     Results from last 7 days   Lab Units 12/19/20  0439   INR  1.37*     Lab Results   Lab Value Date/Time    LIPASE 17 12/18/2020 1653    LIPASE 914 (H) 10/27/2020 0330    LIPASE 1,087 (H) 10/26/2020 0410    LIPASE 1,653 (H) 10/25/2020 0323        Radiology:  CT Head Without Contrast   Final Result       No acute intracranial hemorrhage or hydrocephalus. If there is further   clinical concern, MRI could be considered for further evaluation.       This report was finalized on 12/18/2020 7:22 PM by Dr. Jluis Meza M.D.          CT Abdomen Pelvis Without Contrast   Final Result           1. Diffuse wall thickening the urinary bladder with small surrounding   fat stranding suggests cystitis. Bilateral perinephric fat stranding is   also present. Nonobstructive right nephrolithiasis.   2. Conspicuous abnormal wall thickening is apparent in the rectum,   circumferentially, that may reflect rectal wall lesion/neoplasm or   proctitis, direct visualization advised.   3. Borderline, nonspecific lymph nodes, as described.       Discussed by telephone with Dr. Wang for Dr. Mast at 1930, 12/18/2020.       This report was finalized on 12/18/2020 7:34 PM by Dr. Jluis Meza M.D.          XR Chest 1 View   Final Result          Assessment/Plan     Patient Active Problem List   Diagnosis   • Complicated UTI (urinary tract infection)   • Macrocytosis   • Sepsis secondary to UTI (CMS/HCC)   • Metabolic encephalopathy   • Hyperlipidemia   • Hypertension   • Monoclonal gammopathy   • ESRD (end stage renal disease) (CMS/HCC)   • DM2 (diabetes mellitus, type 2) (CMS/HCC)   • Abnormal CT of the abdomen   • Normal anion gap metabolic acidosis   • Anemia, chronic disease   • Ventricular tachycardia (paroxysmal) (CMS/HCC)       Assessment:  1. Abnormal imaging with cystitis/urinary tract infection  2. Confusion/delirium likely secondary to urosepsis  3. Abnormal imaging of the rectum  4. Microcytic anemia  5. Elevated troponin     Plan:  · He will eventually benefit from EGD and colonoscopy for microcytic anemia and abnormal findings of the rectum when his confusion clears, when he is adequately been treated for cystitis/urinary tract infection, and when his  elevated troponin has been worked up and he has been cleared by cardiology.  · Follow hemoglobin    · We will follow   I discussed the patients findings and my recommendations with patient and nursing staff.          Kip Dumas M.D.  [facility]  [address] [place] [address]  Office: [phone]

## 2020-12-21 NOTE — SIGNIFICANT NOTE
Pt declined any part of PT evaluation including bed activity due to fatigue and lack of sleep. He requested PT return tomorrow. Pt reported that he transfers bed to w/c and does not ambulate at home.

## 2020-12-21 NOTE — PROGRESS NOTES
[phone]   LOS: 3 days     Name: Gregorio Alejandro  Age/Sex: 65 y.o. male  :  1955        PCP: Maya Ribeiro APRN  Chief Complaint   Patient presents with   • Altered Mental Status   • Fever      Subjective   His only complaint today is being tired.  He is alert and oriented x3 denies any other new issues or complaints.  General: No Fever or Chills, Cardiac: No Chest Pain or Palpitations, Resp: No Cough or SOA, GI: No Nausea, Vomiting, or Diarrhea and Other: No bleeding    insulin lispro, 0-7 Units, Subcutaneous, TID AC  metoprolol tartrate, 12.5 mg, Oral, Q12H  piperacillin-tazobactam, 3.375 g, Intravenous, Q12H  sodium bicarbonate, 1,300 mg, Oral, BID  sodium chloride, 10 mL, Intravenous, Q12H           Objective   Vital Signs  Temp:  [97.9 °F (36.6 °C)-99.3 °F (37.4 °C)] 98.1 °F (36.7 °C)  Heart Rate:  [70-81] 81  Resp:  [18] 18  BP: (131-142)/(80-94) 131/80  Body mass index is 30 kg/m².    Intake/Output Summary (Last 24 hours) at 2020 0615  Last data filed at 2020 0054  Gross per 24 hour   Intake 720 ml   Output --   Net 720 ml       Physical Exam  Vitals signs and nursing note reviewed.   Constitutional:       Appearance: He is obese. He is ill-appearing.   HENT:      Head: Normocephalic.   Eyes:      Extraocular Movements: Extraocular movements intact.   Cardiovascular:      Rate and Rhythm: Normal rate and regular rhythm.   Pulmonary:      Effort: Pulmonary effort is normal. No respiratory distress.      Breath sounds: Normal breath sounds.   Abdominal:      General: Abdomen is flat.      Palpations: Abdomen is soft.   Skin:     General: Skin is warm and dry.      Capillary Refill: Capillary refill takes less than 2 seconds.   Neurological:      General: No focal deficit present.      Mental Status: He is alert and oriented to person, place, and time.   Psychiatric:      Comments: Flat affect mood is depressed           Results Review:       I reviewed the patient's new clinical  results.  Results from last 7 days   Lab Units 12/21/20  0447 12/20/20  0631 12/19/20  0439 12/18/20  2024   WBC 10*3/mm3 8.90 11.70* 14.16* 15.88*   HEMOGLOBIN g/dL 8.1* 8.6* 8.1* 7.9*   PLATELETS 10*3/mm3 223 196 233 197     Results from last 7 days   Lab Units 12/21/20 0447 12/20/20  0631 12/19/20  0439 12/18/20  1653   SODIUM mmol/L 137 137 135* 136   POTASSIUM mmol/L 3.6 4.2 3.4* 3.3*   CHLORIDE mmol/L 109* 110* 106 109*   CO2 mmol/L 19.3* 16.6* 18.7* 16.7*   BUN mg/dL 47* 53* 55* 48*   CREATININE mg/dL 3.37* 3.64* 3.82* 3.34*   CALCIUM mg/dL 8.6 8.9 8.6 8.0*   MAGNESIUM mg/dL  --  1.9 1.8  --    PHOSPHORUS mg/dL 3.2 3.9  --   --    Estimated Creatinine Clearance: 28.4 mL/min (A) (by C-G formula based on SCr of 3.37 mg/dL (H)).      Assessment/Plan   Active Hospital Problems    Diagnosis  POA   • **Sepsis secondary to UTI (CMS/HCC) [A41.9, N39.0]  Yes   • Ventricular tachycardia (paroxysmal) (CMS/HCC) [I47.2]  Unknown   • Macrocytosis [D75.89]  Yes   • Metabolic encephalopathy [G93.41]  Yes   • Anemia, chronic disease [D63.8]  Unknown   • Hyperlipidemia [E78.5]  Yes   • Hypertension [I10]  Yes   • Monoclonal gammopathy [D47.2]  Yes   • ESRD (end stage renal disease) (CMS/HCC) [N18.6]  Yes   • DM2 (diabetes mellitus, type 2) (CMS/HCC) [E11.9]  Yes   • Abnormal CT of the abdomen [R93.5]  Yes   • Normal anion gap metabolic acidosis [E87.2]  Yes   • Complicated UTI (urinary tract infection) [N39.0]  Yes      Resolved Hospital Problems   No resolved problems to display.       PLAN  This is a 65-year-old gentleman with a history of chronic kidney disease stage IV chronic wounds hypertension diabetes and other medical comorbidities that presents to the hospital with altered mental status and metabolic encephalopathy  -It is possible that this metabolic encephalopathy is related to the underlying infectious process.  His urine culture is poly microbial.  Complicating the factor is his polymicrobial wound culture.    -Appreciate infectious disease input and now on Zosyn with plans for 10-14 days of abx therapy  -Mental status is improving.  He is alert and oriented x3 his affect and mood are a little off.  I touch base with his wife later today to see what his baseline normally is.  -Renal function remained stable no emergent indications for dialysis at this time.  -Potassium is normalized, Mag ok  -paroxysmal run of VT, asymptomatic, added BB last night and asked cardiology to weigh in.  Echocardiogram also ordered  -Continue local wound care and supportive care      Disposition  Potentially home Monday or Tuesday depending on progress      Rashad Frank MD  [facility]  12/21/20  06:15 EST

## 2020-12-21 NOTE — PLAN OF CARE
Goal Outcome Evaluation:  Plan of Care Reviewed With: patient  Progress: no change   Vss.had 2D echo this am. To start on heparin gtt, awaiting lab results.Wound RN wrapped pt's legs with new dsgs. No c/o pain or n/v.

## 2020-12-21 NOTE — PROGRESS NOTES
Patient is Current with [facility]. We will follow for DC plans and HH needs.

## 2020-12-21 NOTE — PROGRESS NOTES
Continued Stay Note  [facility]     Patient Name: Gregorio Alejandro  MRN: [id number]  Today's Date: 12/21/2020    Admit Date: 12/18/2020    Discharge Plan     Row Name 12/21/20 1556       Plan    Plan  Home with spouse and [facility]. Continue services with [facility].  Will need to follow for possible Home O2 need at D/C.    Patient/Family in Agreement with Plan  yes    Plan Comments  Met with pt. at bedside. Explained roll of . Face sheet and pharmacy verified. Pt lives with Patricia Alonzo, spouse, [phone]. There are no steps to enter home.  DME equipment includes a motorized WC and walker. Pt states he has not walked for the past 6 months. Pt is requires assist with ADLs. Pt has never been to Rehab.  He is current with [facility] and requests referral for them to see at D/C. Pt is current with [facility]. Pt’s PCP is Maya MONCADA. Uses [facility] on [address]. Pt does not drive. States he uses [employer] for transportation to appointments. Pt currently on O2, denies any history of having home O2. Will need to follow for possible home O2 need. Explained that CCP would follow to assess for discharge needs.  Jero Abarca RN-BC        Discharge Codes    No documentation.       Expected Discharge Date and Time     Expected Discharge Date Expected Discharge Time    Dec 22, 2020             Jero Abarca RN

## 2020-12-21 NOTE — PROGRESS NOTES
"    NEPHROLOGY PROGRESS NOTE    PATIENT IDENTIFICATION:   Name:  Gregorio Alejandro      MRN:  [id number]     65 y.o.  male             Reason for visit: CKD4    SUBJECTIVE:   Breathing is comfortable on 2 L/min; appetite is fine; NSVT noted late last night, though patient was asymptomatic    OBJECTIVE:  Vitals:    12/21/20 0819 12/21/20 0937 12/21/20 0954 12/21/20 1317   BP: 134/86 (!) 151/104 148/98 142/85   BP Location:  Left arm Right arm Right arm   Patient Position:  Lying Lying Lying   Pulse:  81 80 79   Resp:  18  18   Temp:  97.4 °F (36.3 °C)  97.6 °F (36.4 °C)   TempSrc:  Oral  Oral   SpO2:    99%   Weight: 109 kg (240 lb)      Height: 190.5 cm (75\")              Body mass index is 30 kg/m².    Intake/Output Summary (Last 24 hours) at 12/21/2020 1644  Last data filed at 12/21/2020 0954  Gross per 24 hour   Intake 360 ml   Output --   Net 360 ml     Wt Readings from Last 1 Encounters:   12/21/20 0819 109 kg (240 lb)   12/18/20 1642 109 kg (240 lb)     Wt Readings from Last 3 Encounters:   12/21/20 109 kg (240 lb)         Exam:  NAD; pleasant; oriented fully; looks older than stated age  Obese; chronically ill-appearing  MMM; AT/NC   No eye discharge; no scleral icterus  No JVD; no carotid bruits  Decreased breath sounds in bases bilat; no wheezes; not labored on RA  RRR, no rub  Soft, NT, +D, BS+  Woody edema both lower legs and feet; legs are wrapped  No bruit or thrill left arm; no obvious incision from prior vascular surgery that I can see  +clubbing  No asterixis  Moves all extremities   Speech is fluent  Flat affect; mood is normal    Scheduled meds:    aspirin, 81 mg, Oral, Daily  [START ON 12/22/2020] influenza vaccine, 0.5 mL, Intramuscular, Once  insulin lispro, 0-7 Units, Subcutaneous, TID AC  piperacillin-tazobactam, 3.375 g, Intravenous, Q12H  sodium bicarbonate, 1,300 mg, Oral, BID  sodium chloride, 10 mL, Intravenous, Q12H      IV meds:                        heparin (porcine), 9.17 Units/kg/hr, " Last Rate: 9.17 Units/kg/hr (12/21/20 1633)        Data Review:    Results from last 7 days   Lab Units 12/21/20  0447 12/20/20  0631 12/19/20  0439 12/18/20  1653   SODIUM mmol/L 137 137 135* 136   POTASSIUM mmol/L 3.6 4.2 3.4* 3.3*   CHLORIDE mmol/L 109* 110* 106 109*   CO2 mmol/L 19.3* 16.6* 18.7* 16.7*   BUN mg/dL 47* 53* 55* 48*   CREATININE mg/dL 3.37* 3.64* 3.82* 3.34*   CALCIUM mg/dL 8.6 8.9 8.6 8.0*   BILIRUBIN mg/dL  --   --   --  0.6   ALK PHOS U/L  --   --   --  94   ALT (SGPT) U/L  --   --   --  11   AST (SGOT) U/L  --   --   --  15   GLUCOSE mg/dL 116* 132* 140* 129*     Estimated Creatinine Clearance: 28.4 mL/min (A) (by C-G formula based on SCr of 3.37 mg/dL (H)).      Results from last 7 days   Lab Units 12/21/20 0447 12/20/20  0631 12/19/20 0439   MAGNESIUM mg/dL 2.0 1.9 1.8   PHOSPHORUS mg/dL 3.2 3.9  --        Results from last 7 days   Lab Units 12/21/20  1450 12/21/20  0447 12/20/20  0631 12/19/20 0439 12/18/20 2024   WBC 10*3/mm3 10.42 8.90 11.70* 14.16* 15.88*   HEMOGLOBIN g/dL 8.6* 8.1* 8.6* 8.1* 7.9*   PLATELETS 10*3/mm3 242 223 196 233 197       Results from last 7 days   Lab Units 12/21/20  1450 12/19/20  0439   INR  1.30* 1.37*             ASSESSMENT:     Sepsis secondary to UTI (CMS/[facility])    Complicated UTI (urinary tract infection)    Macrocytosis    Metabolic encephalopathy    Hyperlipidemia    Hypertension    Monoclonal gammopathy    ESRD (end stage renal disease) (CMS/[facility])    DM2 (diabetes mellitus, type 2) (CMS/HCC)    Abnormal CT of the abdomen    Normal anion gap metabolic acidosis    Anemia, chronic disease    Ventricular tachycardia (paroxysmal) (CMS/HCC)    1.  CKD4: stable though poor function.  Has biopsy-proven diabetic nephropathy.  Central volume stable by exam, tho chest x-ray on arrival did suggest some central pulmonary congestion. Hypervolemic hyponatremia, resolved; likely NAGMA.  Urinalysis with only 30 mg/dL protein, 21-30 WBCs/hpf, and TNTC RBCs.  Reportedly  has seen a vascular surgeon and had an AV fistula created in the left arm, although patient denies having had this done.  No bruit or thrill left arm  2.  Fever and confusion, with the latter resolved  3.  Bilateral lower extremity wounds  4.  Cystitis and proctitis by CT  5.  Anemia, profound, with history of MGUS; iron stores are adequate  6.  Immobility syndrome: Has not walked for 6 months  7.  Longstanding DM2  8.  Hypertension: hypotensive now  9.  NSVT early AM 12/21      PLAN:  1.  Lack of significant proteinuria argues against--but obviously does not rule out--amyloidosis.  Renal biopsy did not demonstrate amyloidosis, tho this biopsy was done in 2012  2.  Cards eval ongoing  3.  Will have pt's Vasc Surg address his clotted access as an outpatient  4.  Continue Na bicarb 1300 mg BID      Nixon Robertson MD  12/21/2020    16:44 EST

## 2020-12-21 NOTE — NURSING NOTE
12/21/20 1138   Wound 12/21/20 1135 Right circumferential leg Venous Ulcer   Placement Date/Time: 12/21/20 1135   Present on Hospital Admission: Yes  Side: Right  Orientation: circumferential  Location: leg  Primary Wound Type: Venous Ulcer   Dressing Appearance intact   Base clean;moist;red   Periwound dry   Edges irregular;open   Drainage Characteristics/Odor bleeding controlled   Drainage Amount small   Care, Wound cleansed with;sterile normal saline   Dressing Care dressing applied;other (see comments)  (versatel, hydrofera blue, optilock, 2 layer wrap)   Wound 12/21/20 1135 Left lower leg Venous Ulcer   Placement Date/Time: 12/21/20 1135   Side: Left  Orientation: lower  Location: leg  Primary Wound Type: Venous Ulcer  Additional Comments: anterior and posterior leg wounds   Dressing Appearance intact   Base clean;moist;red   Periwound dry   Edges irregular;open   Drainage Characteristics/Odor bleeding controlled   Drainage Amount scant   Care, Wound cleansed with;sterile normal saline   Dressing Care dressing applied;other (see comments)  (versatel, hydrofera blue, optilock, 2 layer wrap)   CWOCN consult for compression wraps.  Patient is seen at wound care center and orders reviewed.  States wounds present for several months.  Right leg with circumferential wound to calf with red moist wound bed.  Left leg with anterior and posterior wounds with red moist wound beds. Bleeding caused with dressing removal d/t adherence of old dressings.  Wounds cleaned with saline; intact skin cleaned with soap and water.  Versatel silicone layer applied to wound beds to reduce adhesion.  Hydrofera blue layers applied, then optilock layer.  Legs then wrapped with 2 layer compression system.  Will plan to change bi-weekly while in hospital.

## 2020-12-21 NOTE — DISCHARGE PLACEMENT REQUEST
"Javon Mor SHIPLEY (65 y.o. Male)     Date of Birth Social Security Number Address Home Phone MRN    1955  [id number] [facility]  [address] [place] [address] [phone] [id number]    [employer] Marital Status          Alevism        Admission Date Admission Type Admitting Provider Attending Provider Department, Room/Bed    12/18/20 Emergency Howie Andrade MD Richards, Stephen J, MD [address], [address]/1    Discharge Date Discharge Disposition Discharge Destination                       Attending Provider: Rashad Frank MD    Allergies: No Known Allergies    Isolation: None   Infection: None   Code Status: CPR    Ht: 190.5 cm (75\")   Wt: 109 kg (240 lb)    Admission Cmt: None   Principal Problem: Sepsis secondary to UTI (CMS/[facility]) [A41.9,N39.0]                 Active Insurance as of 12/18/2020     Primary Coverage     Payor Plan Insurance Group Employer/Plan Group    [employer] [place] MEDICARE REPLACEMENT WELL[employer]ARE MEDICARE REPLACEMENT [id number]     Payor Plan Address Payor Plan Phone Number Payor Plan Fax Number Effective Dates    [address] [address] [phone]  8/13/2020 - None Entered    [facility] [id number]       Subscriber Name Subscriber Birth Date Member ID       Mor Alejandro 1955 [id number]           Secondary Coverage     Payor Plan Insurance Group Employer/Plan Group    [place]      Payor Plan Address Payor Plan Phone Number Payor Plan Fax Number Effective Dates    [address] [phone]  8/13/2020 - None Entered    [facility] [id number]       Subscriber Name Subscriber Birth Date Member ID       MOR ALEJANDRO 1955 [id number]                 Emergency Contacts      (Rel.) Home Phone Work Phone Mobile Phone    CheriPatricia (Spouse) [phone] -- --    Celia Eastman (Daughter) -- -- --              "

## 2020-12-21 NOTE — PROGRESS NOTES
"  Infectious Diseases Progress Note    Tonja Azevedo MD     [facility]  [place]: 3 days  Patient Identification:  Name: Gregorio Alejandro  Age: 65 y.o.  Sex: male  :  1955  MRN: [id number]         Primary Care Physician: Maya Ribeiro APRN            Subjective: Feeling better denies any specific complaints.  More with it.  Interval History: See consultation    Objective:    Scheduled Meds:[START ON 2020] influenza vaccine, 0.5 mL, Intramuscular, Once  insulin lispro, 0-7 Units, Subcutaneous, TID AC  piperacillin-tazobactam, 3.375 g, Intravenous, Q12H  sodium bicarbonate, 1,300 mg, Oral, BID  sodium chloride, 10 mL, Intravenous, Q12H      Continuous Infusions:     Vital signs in last 24 hours:  Temp:  [97.4 °F (36.3 °C)-98.2 °F (36.8 °C)] 97.4 °F (36.3 °C)  Heart Rate:  [70-81] 80  Resp:  [18] 18  BP: (131-151)/() 148/98    Intake/Output:    Intake/Output Summary (Last 24 hours) at 2020 1155  Last data filed at 2020 0954  Gross per 24 hour   Intake 360 ml   Output --   Net 360 ml       Exam:  /98 (BP Location: Right arm, Patient Position: Lying)   Pulse 80   Temp 97.4 °F (36.3 °C) (Oral)   Resp 18   Ht 190.5 cm (75\")   Wt 109 kg (240 lb)   SpO2 91%   BMI 30.00 kg/m²   Patient is examined using the personal protective equipment as per guidelines from infection control for this particular patient as enacted.  Hand washing was performed before and after patient interaction.  General Appearance:    Alert, cooperative, no distress, AAOx3                          Head:    Normocephalic, without obvious abnormality, atraumatic                           Eyes:    PERRL, conjunctivae/corneas clear, EOM's intact, both eyes                         Throat:   Lips, tongue, gums normal; oral mucosa pink and moist                           Neck:   Supple, symmetrical, trachea midline, no JVD                         Lungs:    Clear to auscultation bilaterally, respirations " unlabored                 Chest Wall:    No tenderness or deformity                          Heart:    Regular rate and rhythm, S1 and S2 normal, no murmur, no rub                                         or gallop                  Abdomen:   Soft nontender                 extremities: Lateral lower extremity wrapped, no obvious wound on the feet noted                        Pulses:   Pulses palpable in all extremities                            Skin:   Skin is warm and dry,  no rashes or palpable lesions                  Neurologic: Grossly nonfocal       Data Review:    I reviewed the patient's new clinical results.  Results from last 7 days   Lab Units 12/21/20 0447 12/20/20 0631 12/19/20 0439 12/18/20 2024   WBC 10*3/mm3 8.90 11.70* 14.16* 15.88*   HEMOGLOBIN g/dL 8.1* 8.6* 8.1* 7.9*   PLATELETS 10*3/mm3 223 196 233 197     Results from last 7 days   Lab Units 12/21/20 0447 12/20/20 0631 12/19/20 0439 12/18/20  1653   SODIUM mmol/L 137 137 135* 136   POTASSIUM mmol/L 3.6 4.2 3.4* 3.3*   CHLORIDE mmol/L 109* 110* 106 109*   CO2 mmol/L 19.3* 16.6* 18.7* 16.7*   BUN mg/dL 47* 53* 55* 48*   CREATININE mg/dL 3.37* 3.64* 3.82* 3.34*   CALCIUM mg/dL 8.6 8.9 8.6 8.0*   GLUCOSE mg/dL 116* 132* 140* 129*       Microbiology Results (last 10 days)     Procedure Component Value - Date/Time    Blood Culture - Blood, Arm, Left [[id number]] Collected: 12/18/20 1851    Lab Status: Preliminary result Specimen: Blood from Arm, Left Updated: 12/21/20 1900     Blood Culture No growth at 3 days    Blood Culture - Blood, Arm, Left [[id number]] Collected: 12/18/20 1814    Lab Status: Preliminary result Specimen: Blood from Arm, Left Updated: 12/21/20 1830     Blood Culture No growth at 3 days    Urine Culture - Urine, Urine, Clean Catch [[id number]] Collected: 12/18/20 1707    Lab Status: Final result Specimen: Urine, Clean Catch Updated: 12/20/20 1043     Urine Culture 50,000 CFU/mL Mixed Abbi Isolated    Narrative:      Specimen  contains mixed organisms of questionable pathogenicity which indicates contamination with commensal jamil.  Further identification is unlikely to provide clinically useful information.  Suggest recollection.    Respiratory Panel PCR w/COVID-19(SARS-CoV-2) [facility]/RACHAEL/YAAKOV/PAD/COR/MAD/KANU In-House, NP Swab in UTM/VTM, 3-4 HR TAT - Swab, Nasopharynx [[id number]]  (Normal) Collected: 12/18/20 165    Lab Status: Final result Specimen: Swab from Nasopharynx Updated: 12/18/20 8364     ADENOVIRUS, PCR Not Detected     Coronavirus 229E Not Detected     Coronavirus HKU1 Not Detected     Coronavirus NL63 Not Detected     Coronavirus OC43 Not Detected     COVID19 Not Detected     Human Metapneumovirus Not Detected     Human Rhinovirus/Enterovirus Not Detected     Influenza A PCR Not Detected     Influenza B PCR Not Detected     Parainfluenza Virus 1 Not Detected     Parainfluenza Virus 2 Not Detected     Parainfluenza Virus 3 Not Detected     Parainfluenza Virus 4 Not Detected     RSV, PCR Not Detected     Bordetella pertussis pcr Not Detected     Bordetella parapertussis PCR Not Detected     Chlamydophila pneumoniae PCR Not Detected     Mycoplasma pneumo by PCR Not Detected    Narrative:      Fact sheet for providers: https://docs.[employer]/wp-content/uploads/CTT9672-1229-KU3.1-EUA-Provider-Fact-Sheet-3.pdf    Fact sheet for patients: https://docs.[employer]/wp-content/uploads/XMR1401-7601-HF4.1-EUA-Patient-Fact-Sheet-1.pdf    Test performed by PCR.          Assessment:    Sepsis secondary to UTI (CMS/HCC)    Complicated UTI (urinary tract infection)    Macrocytosis    Metabolic encephalopathy    Hyperlipidemia    Hypertension    Monoclonal gammopathy    ESRD (end stage renal disease) (CMS/HCC)    DM2 (diabetes mellitus, type 2) (CMS/HCC)    Abnormal CT of the abdomen    Normal anion gap metabolic acidosis    Anemia, chronic disease    Ventricular tachycardia (paroxysmal) (CMS/HCC)  1-toxic metabolic encephalopathy overall  improved and likely because of it at the time of presentation  2-urinary tract infection  3-probably venous stasis ulcer and secondary cellulitis of the bilateral lower extremities currently wrapped  4-other diagnosis per internal medicine service        Recommendations/Discussions:  At this juncture I agree with your concerns regarding treatment need for abnormal urinalysis given his presentation with confusion abnormal urinalysis and leukocytosis.  Given the appearance of his lower extremities and the fact that his leg from knee to the ankle is wrapped in Unna boot type dressing his feet currently do not show any obvious wound cultures taken from it showing with polymicrobial jamil likely contaminant or colonizer of the skin is there is no associated inflammatory cells seen in the Gram stain.     His urine culture so far showing mixed jamil.  Patient is overall improved.  Adjust his antibiotic regimen to treat recently identified urinary tract infection with Zosyn which would also provide coverage for bilateral lower extremity wound and cellulitis in the setting of venous insufficiency and lymphedema.  Patient will require at least 10 to 14 days of antibiotic treatment.    Tonja Azevedo MD  12/21/2020  11:55 EST    Much of this encounter note is an electronic transcription/translation of spoken language to printed text. The electronic translation of spoken language may permit erroneous, or at times, nonsensical words or phrases to be inadvertently transcribed; Although I have reviewed the note for such errors, some may still exist

## 2020-12-21 NOTE — PAYOR COMM NOTE
"Mor Alejandro (65 y.o. Male)     IP REQUEST FOR MEMBER [id number]    CONTACT SAMEERA WHATLEY LPN  P# [phone]  F# [phone]        Date of Birth Social Security Number Address Home Phone MRN    1955  [address] [address] [phone] [id number]    Alevism Marital Status          Oriental orthodox        Admission Date Admission Type Admitting Provider Attending Provider Department, Room/Bed    12/18/20 Emergency Howie Andrade MD Richards, Stephen J, MD [address], E451/1    Discharge Date Discharge Disposition Discharge Destination                       Attending Provider: Rashad Frank MD    Allergies: No Known Allergies    Isolation: None   Infection: None   Code Status: CPR    Ht: 190.5 cm (75\")   Wt: 109 kg (240 lb)    Admission Cmt: None   Principal Problem: Sepsis secondary to UTI (CMS/[facility]) [A41.9,N39.0]                 Active Insurance as of 12/18/2020     Primary Coverage     Payor Plan Insurance Group Employer/Plan Group    [facility] WELL[employer]ARE MEDICARE REPLACEMENT [id number]     Payor Plan Address Payor Plan Phone Number Payor Plan Fax Number Effective Dates    [address] [address] [phone]  8/13/2020 - None Entered    [facility] [id number]       Subscriber Name Subscriber Birth Date Member ID       Mor Alejandro 1955 [id number]           Secondary Coverage     Payor Plan Insurance Group Employer/Plan Group    [place]      Payor Plan Address Payor Plan Phone Number Payor Plan Fax Number Effective Dates    [address] [phone]  8/13/2020 - None Entered    [facility] [id number]       Subscriber Name Subscriber Birth Date Member ID       MOR ALEJANDRO 1955 [id number]                 Emergency Contacts      (Rel.) Home Phone Work Phone Mobile Phone    Patricia Alonzo (Spouse) [phone] -- --    Celia Eastman (Daughter) -- -- --             "   History & Physical      Randee Gasca APRN at 20 [phone]     Attestation signed by Howie Andrade MD at 20 [phone]    I have reviewed the history and plan as obtained by ANTWAN Dominguez. I have independently examined the patient. My exam confirms her physical findings and I agree with the plan as listed, with the addition to the followin-year-old gentleman who presented to ER overnight complaining of fevers and confusion.  Apparently he was sent in from the [facility].  He is a very poor historian and really does not know why he was brought in.  He has a history of diabetes, CKD, OFELIA among other issues.  We have been trying to reach family to get additional information but have been unsuccessful so far.  It looks like he has an AV fistula which has been worked on in the past per the [facility] system we cannot confirm if he is on dialysis now or not.  He does not really have any records in our system.  His work-up in ER included CT of the abdomen pelvis which showed thickening of the bladder wall consistent with cystitis as well as some thickening in the rectal wall concerning for mass versus proctitis.  He was started on antibiotics and admitted.  On exam now, he is awake but a little drowsy, very poor historian in no apparent distress.  His oral mucosa appears little dry.  Lungs are clear bilaterally and unlabored though with decreased breath sounds.  Heart is regular in rate and rhythm.  Abdomen soft and nontender with positive bowel sounds.  Extremities are wrapped bilaterally so could not be examined.  He does not have much of edema above the wraps.    Plan:  Continue empiric antibiotics with Rocephin, follow-up cultures.  Nephrology to see in consultation.  Again we need more information in our database to know about his history and what to do in regards to his renal function.  We will go ahead and replace his potassium gently.  His white blood cell count has come  down somewhat we will follow that and his hemoglobin is low which will also need to be followed.  We will add some anemia studies.  GI has been consulted and will be following in regards to the rectal thickening.  Check Hemoccults.                        Patient Name:  Gregorio Alejandro  YOB: 1955  MRN:  [id number]  Admit Date:  12/18/2020  Patient Care Team:  Maya Ribeiro APRN as PCP - General (Family Medicine)      Subjective   History Present Illness     Chief Complaint   Patient presents with   • Altered Mental Status   • Fever       Mr. Alejandro is a 65 y.o. with a history of ESRD that presented from [facility] for fever and confusion. His temperature upon arrival to the ER was 100.7 Fahrenheit.  He is wheelchair bound, with history of DM2, anemia, CKDIV, OFELIA, HLD. He is  oriented to person, place, and year but a very poor historian.  I have attempted to call both numbers in EMR for family but no answer.  He denies any complaints at this time.  He denies shortness of breath, cough, fever, chills, myalgias, LUTS, change in bowel habits, weight loss, chest pain, or palpitations.    However, there is a note [place] vascular surgery May 2020 that notes ESRD on HD that presented for malfunctioning AVF.  Per EMR 5/2020 he had a fistulogram of left AV the fistula demonstrating stenosis of the fistula and distal cephalic vein treated successfully with balloon angioplasty.  He confirms that he is wheelchair-bound and lives with his wife who is also in a wheelchair according to the nurse. Patient states that he does not have a nephrologist and is not on dialysis. He does not know if he has a fistula.     History of Present Illness  Review of Systems   Unable to perform ROS: Mental status change   Constitutional: Positive for fatigue and fever. Negative for appetite change.   HENT: Negative for trouble swallowing.    Respiratory: Negative for choking.    Cardiovascular: Negative for chest  pain.   Gastrointestinal: Negative for abdominal distention, abdominal pain, blood in stool, constipation, diarrhea, nausea and vomiting.   Genitourinary: Negative for dysuria.   Musculoskeletal: Negative for back pain.   Skin: Negative for pallor.   Neurological: Negative for dizziness.   Psychiatric/Behavioral: Positive for confusion.        Personal History     Past Medical History:   Diagnosis Date   • Back pain    • CKD (chronic kidney disease)    • DM type 2 (diabetes mellitus, type 2) (CMS/[facility])    • ED (erectile dysfunction)    • Hyperlipidemia    • Hypertension    • SCOTTY (iron deficiency anemia)    • Monoclonal gammopathy    • Osteoarthritis      No past surgical history on file.  No family history on file.  Social History     Tobacco Use   • Smoking status: Not on file   Substance Use Topics   • Alcohol use: Not on file   • Drug use: Not on file     No current facility-administered medications on file prior to encounter.      Current Outpatient Medications on File Prior to Encounter   Medication Sig Dispense Refill   • atorvastatin (LIPITOR) 20 MG tablet Take 20 mg by mouth Daily.     • brimonidine (ALPHAGAN P) 0.1 % solution ophthalmic solution 1 drop 2 (two) times a day.     • diphenhydrAMINE (BENADRYL) 25 mg capsule Take 25 mg by mouth Every 6 (Six) Hours As Needed for Itching or Allergies.     • dorzolamide (TRUSOPT) 2 % ophthalmic solution Administer 1 drop to the right eye 2 (two) times a day.     • furosemide (LASIX) 20 MG tablet Take 20 mg by mouth 2 (Two) Times a Day.     • gabapentin (NEURONTIN) 300 MG capsule TAKE 1 CAPSULE BY MOUTH TWICE DAILY (Patient taking differently: 3 (Three) Times a Day.) 60 capsule 0   • HUMALOG KWIKPEN 100 UNIT/ML solution pen-injector INJECT 20 UNITS INTO THE SKIN TWICE DAILY 15 mL 0   • HYDROcodone-acetaminophen (NORCO)  MG per tablet Take 1 tablet po every 4 hours PRN for moderate pain, take two tablets po every 4 hours PRN for severe pain, valid if faxed to  [facility]   tablet 0   • WAL-DRYL ALLERGY 25 MG Take 1 capsule by mouth Every 6 (Six) Hours As Needed for itching. 30 capsule 0     No Known Allergies    Objective    Objective     Vital Signs  Temp:  [98.1 °F (36.7 °C)-100.7 °F (38.2 °C)] 98.1 °F (36.7 °C)  Heart Rate:  [] 69  Resp:  [18] 18  BP: (112-142)/(63-88) 112/63  SpO2:  [92 %-99 %] 95 %  on  Flow (L/min):  [2] 2;   Device (Oxygen Therapy): room air  Body mass index is 30 kg/m².    Physical Exam  Vitals signs and nursing note reviewed.   Constitutional:       General: He is not in acute distress.     Appearance: He is well-developed. He is obese. He is ill-appearing. He is not toxic-appearing or diaphoretic.      Comments: Obese, chronically ill-appearing male.  He appears much older than his stated age.   HENT:      Head: Normocephalic and atraumatic.   Eyes:      Conjunctiva/sclera: Conjunctivae normal.   Neck:      Musculoskeletal: Normal range of motion.      Trachea: No tracheal deviation.   Cardiovascular:      Rate and Rhythm: Normal rate and regular rhythm.   Pulmonary:      Effort: Pulmonary effort is normal. No respiratory distress.      Breath sounds: Normal breath sounds.   Abdominal:      General: Bowel sounds are normal. There is no distension.      Palpations: Abdomen is soft. There is no mass.      Tenderness: There is no abdominal tenderness. There is no guarding or rebound.   Musculoskeletal: Normal range of motion.      Right lower leg: Edema present.      Left lower leg: Edema present.      Comments: No AV fistula present in the left upper extremity.  Bilateral lower extremity edema with Ace wraps in place   Skin:     General: Skin is warm and dry.   Neurological:      Mental Status: He is alert.      Comments: Oriented to person and place and year but seems confused to situation and history   Psychiatric:         Judgment: Judgment normal.         Results Review:  I reviewed the patient's new clinical results.  I reviewed the  patient's new imaging results and agree with the interpretation.  I reviewed the patient's other test results and agree with the interpretation  I personally viewed and interpreted the patient's EKG/Telemetry data  Discussed with ED provider.    Lab Results (last 24 hours)     Procedure Component Value Units Date/Time    Comprehensive Metabolic Panel [[id number]]  (Abnormal) Collected: 12/18/20 1653    Specimen: Blood Updated: 12/18/20 1747     Glucose 129 mg/dL      BUN 48 mg/dL      Creatinine 3.34 mg/dL      Sodium 136 mmol/L      Potassium 3.3 mmol/L      Chloride 109 mmol/L      CO2 16.7 mmol/L      Calcium 8.0 mg/dL      Total Protein 6.1 g/dL      Albumin 2.40 g/dL      ALT (SGPT) 11 U/L      AST (SGOT) 15 U/L      Alkaline Phosphatase 94 U/L      Total Bilirubin 0.6 mg/dL      eGFR  African Amer 22 mL/min/1.73      Globulin 3.7 gm/dL      A/G Ratio 0.6 g/dL      BUN/Creatinine Ratio 14.4     Anion Gap 10.3 mmol/L     Narrative:      GFR Normal >60  Chronic Kidney Disease <60  Kidney Failure <15      Lipase [[id number]]  (Normal) Collected: 12/18/20 1653    Specimen: Blood Updated: 12/18/20 1747     Lipase 17 U/L     Respiratory Panel PCR w/COVID-19(SARS-CoV-2) CRISTÓBAL/RACHAEL/YAAKOV/PAD/COR/MAD/KANU In-House, NP Swab in UTM/VTM, 3-4 HR TAT - Swab, Nasopharynx [[id number]]  (Normal) Collected: 12/18/20 1655    Specimen: Swab from Nasopharynx Updated: 12/18/20 1819     ADENOVIRUS, PCR Not Detected     Coronavirus 229E Not Detected     Coronavirus HKU1 Not Detected     Coronavirus NL63 Not Detected     Coronavirus OC43 Not Detected     COVID19 Not Detected     Human Metapneumovirus Not Detected     Human Rhinovirus/Enterovirus Not Detected     Influenza A PCR Not Detected     Influenza B PCR Not Detected     Parainfluenza Virus 1 Not Detected     Parainfluenza Virus 2 Not Detected     Parainfluenza Virus 3 Not Detected     Parainfluenza Virus 4 Not Detected     RSV, PCR Not Detected     Bordetella pertussis pcr Not Detected      Bordetella parapertussis PCR Not Detected     Chlamydophila pneumoniae PCR Not Detected     Mycoplasma pneumo by PCR Not Detected    Narrative:      Fact sheet for providers: https://docs.[employer]/wp-content/uploads/KCB6225-0336-AQ3.1-EUA-Provider-Fact-Sheet-3.pdf    Fact sheet for patients: https://docs.[employer]/wp-content/uploads/THM0610-9602-LM4.1-EUA-Patient-Fact-Sheet-1.pdf    Test performed by PCR.    Urinalysis With Microscopic If Indicated (No Culture) - Urine, Clean Catch [[id number]]  (Abnormal) Collected: 12/18/20 1707    Specimen: Urine, Clean Catch Updated: 12/18/20 1728     Color, UA Yellow     Appearance, UA Clear     pH, UA 8.0     Specific Gravity, UA 1.011     Glucose, UA Negative     Ketones, UA Negative     Bilirubin, UA Negative     Blood, UA Moderate (2+)     Protein, UA 30 mg/dL (1+)     Leuk Esterase, UA Large (3+)     Nitrite, UA Negative     Urobilinogen, UA 0.2 E.U./dL    Urinalysis, Microscopic Only - Urine, Clean Catch [[id number]]  (Abnormal) Collected: 12/18/20 1707    Specimen: Urine, Clean Catch Updated: 12/18/20 1728     RBC, UA Too Numerous to Count /HPF      WBC, UA 21-30 /HPF      Bacteria, UA None Seen /HPF      Squamous Epithelial Cells, UA 0-2 /HPF      Hyaline Casts, UA 0-2 /LPF      Methodology Automated Microscopy    Urine Culture - Urine, Urine, Clean Catch [[id number]] Collected: 12/18/20 1707    Specimen: Urine, Clean Catch Updated: 12/19/20 0901    Blood Culture - Blood, Arm, Left [[id number]] Collected: 12/18/20 1814    Specimen: Blood from Arm, Left Updated: 12/18/20 1817    Blood Culture - Blood, Arm, Left [[id number]] Collected: 12/18/20 1851    Specimen: Blood from Arm, Left Updated: 12/18/20 1855    CBC & Differential [[id number]]  (Abnormal) Collected: 12/18/20 2024    Specimen: Blood Updated: 12/18/20 2045    Narrative:      The following orders were created for panel order CBC & Differential.  Procedure                               Abnormality          Status                     ---------                               -----------         ------                     CBC Auto Differential[[id number]]        Abnormal            Final result                 Please view results for these tests on the individual orders.    CBC Auto Differential [[id number]]  (Abnormal) Collected: 12/18/20 2024    Specimen: Blood Updated: 12/18/20 2045     WBC 15.88 10*3/mm3      RBC 3.68 10*6/mm3      Hemoglobin 7.9 g/dL      Hematocrit 26.6 %      MCV 72.3 fL      MCH 21.5 pg      MCHC 29.7 g/dL      RDW 20.0 %      RDW-SD 48.5 fl      Platelets 197 10*3/mm3     Manual Differential [[id number]]  (Abnormal) Collected: 12/18/20 2024    Specimen: Blood Updated: 12/18/20 2143     Neutrophil % 99.0 %      Lymphocyte % 1.0 %      Neutrophils Absolute 15.72 10*3/mm3      Lymphocytes Absolute 0.16 10*3/mm3      Hypochromia Slight/1+     Microcytes Slight/1+     WBC Morphology Normal     Platelet Estimate Decreased    Lactic Acid, Plasma [[id number]]  (Normal) Collected: 12/18/20 2039    Specimen: Blood Updated: 12/18/20 2103     Lactate 1.3 mmol/L     POC Glucose Once [[id number]]  (Abnormal) Collected: 12/19/20 0011    Specimen: Blood Updated: 12/19/20 0011     Glucose 158 mg/dL     Basic Metabolic Panel [[id number]]  (Abnormal) Collected: 12/19/20 0439    Specimen: Blood Updated: 12/19/20 0629     Glucose 140 mg/dL      BUN 55 mg/dL      Creatinine 3.82 mg/dL      Sodium 135 mmol/L      Potassium 3.4 mmol/L      Chloride 106 mmol/L      CO2 18.7 mmol/L      Calcium 8.6 mg/dL      eGFR  African Amer 19 mL/min/1.73      BUN/Creatinine Ratio 14.4     Anion Gap 10.3 mmol/L     Narrative:      GFR Normal >60  Chronic Kidney Disease <60  Kidney Failure <15      CBC Auto Differential [[id number]]  (Abnormal) Collected: 12/19/20 0439    Specimen: Blood Updated: 12/19/20 0606     WBC 14.16 10*3/mm3      RBC 3.73 10*6/mm3      Hemoglobin 8.1 g/dL      Hematocrit 26.8 %      MCV 71.8 fL      MCH 21.7 pg       MCHC 30.2 g/dL      RDW 20.6 %      RDW-SD 50.6 fl      Platelets 233 10*3/mm3     Protime-INR [[id number]]  (Abnormal) Collected: 12/19/20 0439    Specimen: Blood Updated: 12/19/20 0617     Protime 16.7 Seconds      INR 1.37    Magnesium [[id number]]  (Normal) Collected: 12/19/20 0439    Specimen: Blood Updated: 12/19/20 0629     Magnesium 1.8 mg/dL     Manual Differential [[id number]]  (Abnormal) Collected: 12/19/20 0439    Specimen: Blood Updated: 12/19/20 0824     Neutrophil % 96.8 %      Lymphocyte % 0.0 %      Monocyte % 2.1 %      Eosinophil % 1.1 %      Neutrophils Absolute 13.71 10*3/mm3      Lymphocytes Absolute 0.00 10*3/mm3      Monocytes Absolute 0.30 10*3/mm3      Eosinophils Absolute 0.16 10*3/mm3      [place] Cells Mod/2+     Hypochromia Mod/2+     Ovalocytes Slight/1+     Poikilocytes Large/3+     Schistocytes Mod/2+     [place] Cells Slight/1+     Platelet Morphology Normal    POC Glucose Once [[id number]]  (Abnormal) Collected: 12/19/20 0617    Specimen: Blood Updated: 12/19/20 0618     Glucose 150 mg/dL           Imaging Results (Last 24 Hours)     Procedure Component Value Units Date/Time    CT Abdomen Pelvis Without Contrast [[id number]] Collected: 12/18/20 1923     Updated: 12/18/20 1937    Narrative:      CT ABDOMEN PELVIS WO CONTRAST-     INDICATIONS: Fever, hematuria     TECHNIQUE: Radiation dose reduction techniques were utilized, including  automated exposure control and exposure modulation based on body size.  Unenhanced ABDOMEN AND PELVIS CT     COMPARISON: None available     FINDINGS:     Diffuse wall thickening the urinary bladder with small surrounding fat  stranding suggests cystitis. Bilateral perinephric stranding may be  chronic in nature or may be evidence of infection. A couple 3 mm  nonobstructive stones are seen in the right kidney. Assessment of the  distal ureters and urinary bladder is limited by attenuation artifact  from bilateral hip arthroplasty hardware.     Assessment for  liver lesions is significantly limited without  intravenous contrast material.     Otherwise unremarkable unenhanced appearance of the liver, gallbladder,  spleen, adrenal glands, pancreas, kidneys, bladder.     No bowel obstruction. Conspicuous abnormal wall thickening is apparent  in the rectum, circumferentially, for example 2 cm, axial image 163,  that may reflect rectal wall lesion proctitis, direct visualization  advised.     No free intraperitoneal gas or free fluid.     Left para-aortic lymph node measuring 8 mm short axis, axial image 86 is  borderline prominent, nonspecific, could be reactive in nature or  potentially evidence of neoplasm. Borderline prominent subcutaneous  lymph nodes are seen in the bilateral groin, for example on the right,  1.2 cm short axis, axial image 188. Clinical correlation and follow-up  recommended; if further imaging evaluation is indicated, positron  emission tomography could be considered.     Abdominal aorta is not aneurysmal. Aortic and other arterial  calcifications are present.     The lung bases show mild atelectasis. Minimal bilateral pleural  effusions are seen. The heart is enlarged.     Degenerative changes are seen in the spine. No acute fracture is  identified.             Impression:            1. Diffuse wall thickening the urinary bladder with small surrounding  fat stranding suggests cystitis. Bilateral perinephric fat stranding is  also present. Nonobstructive right nephrolithiasis.  2. Conspicuous abnormal wall thickening is apparent in the rectum,  circumferentially, that may reflect rectal wall lesion/neoplasm or  proctitis, direct visualization advised.  3. Borderline, nonspecific lymph nodes, as described.     Discussed by telephone with Dr. Wang for Dr. Mast at 1930, 12/18/2020.     This report was finalized on 12/18/2020 7:34 PM by Dr. Jluis Meza M.D.       CT Head Without Contrast [[id number]] Collected: 12/18/20 1918     Updated: 12/18/20  1925    Narrative:      CT HEAD WO CONTRAST-     INDICATIONS: Altered mental status     TECHNIQUE: Radiation dose reduction techniques were utilized, including  automated exposure control and exposure modulation based on body size.  Noncontrast head CT     COMPARISON: None available     FINDINGS:           No acute intracranial hemorrhage, midline shift or mass effect. No acute  territorial infarct is identified.     Mild periventricular hypodensities suggest chronic small vessel ischemic  change in a patient this age.     Arterial calcifications are seen at the base of the brain.     Ventricles, cisterns, cerebral sulci are unremarkable for patient age.     Procedural change of the left globe is apparent. The visualized  paranasal sinuses, orbits, mastoid air cells are otherwise unremarkable.                   Impression:         No acute intracranial hemorrhage or hydrocephalus. If there is further  clinical concern, MRI could be considered for further evaluation.     This report was finalized on 12/18/2020 7:22 PM by Dr. Jluis Meza M.D.       XR Chest 1 View [[id number]] Collected: 12/18/20 1736     Updated: 12/18/20 1741    Narrative:      XR CHEST 1 VW-     HISTORY:  Fever, altered mental status.     COMPARISON:  None.     FINDINGS:    A single portable view of the chest was obtained. There is an implanted  cardiac loop recording device. The cardiac silhouette is mildly  enlarged. Mediastinal contours are within normal limits. There is  calcific aortic atherosclerosis. There is central pulmonary venous  congestion. There are low lung volumes. There is mild hazy opacity in  both lungs, which could be due to atelectasis in the setting of low lung  volumes or edema, however, multifocal pneumonia is not excluded.  Recommend follow-up PA and lateral chest radiographs. Pleural spaces are  clear. There is multilevel degenerative disc disease.     This report was finalized on 12/18/2020 5:38 PM by Dr. Portillo  Gianfranco MONAE                  ECG 12 Lead   Preliminary Result   HEART RATE= 81  bpm   RR Interval= 716  ms   MS Interval=   ms   P Horizontal Axis=   deg   P Front Axis=   deg   QRSD Interval= 112  ms   QT Interval= 389  ms   QRS Axis= -11  deg   T Wave Axis= 157  deg   - ABNORMAL ECG -   Accelerated junctional rhythm   Multiple ventricular premature complexes   Abnormal T, consider ischemia, lateral leads   Electronically Signed By:    Date and Time of Study: 2020-12-19 06:12:07           Assessment/Plan     Active Hospital Problems    Diagnosis  POA   • **Sepsis secondary to UTI (CMS/HCC) [A41.9, N39.0]  Yes   • Macrocytosis [D75.89]  Yes   • Metabolic encephalopathy [G93.41]  Yes   • Anemia, chronic disease [D63.8]  Unknown   • Hyperlipidemia [E78.5]  Yes   • Hypertension [I10]  Yes   • Monoclonal gammopathy [D47.2]  Yes   • ESRD (end stage renal disease) (CMS/[facility]) [N18.6]  Yes   • DM2 (diabetes mellitus, type 2) (CMS/[facility]) [E11.9]  Yes   • Abnormal CT of the abdomen [R93.5]  Yes   • Normal anion gap metabolic acidosis [E87.2]  Yes   • Complicated UTI (urinary tract infection) [N39.0]  Yes      Resolved Hospital Problems   No resolved problems to display.       Mr. Alejandro is a 65 y.o. with ESRD that presents with sepsis secondary to UTI, normal anion gap metabolic acidosis, and metabolic encephalopathy.  I am unable to reach family.  He is oriented to some degree although a poor historian.  However, claims he does not have dialysis or renal disease but present in EMR documentation.  CT abdomen pelvis demonstrates cystitis as well as thickening in the rectum.  CT head with out acute intracranial findings.  X-ray suggestive of pneumonia but he is asymptomatic and stable on room air.  Patient given Rocephin in the ER.  · Consult nephrology for assistance  ·  Continue Rocephin IV.  Await urine culture  · Gentle IV fluids given volume overload    DM2  · Continue correctional lispro.   · Insulin pump noted in EMR but  patient states he does not have one.    Rectal thickening on CT/anemia (acute on chronic)  · Anemia of chronic disease-anemia studies pending.  Hemoglobin 7.9 yesterday compared to 10.31-month ago.  GI following and he will he will need EGD and colonoscopy to evaluate anemia with rectal thickening.      · I discussed the patient's findings and my recommendations with patient and nursing staff.  We will continue to try to contact family but have now called daughter's number which is wrong and called wife's 3 times with no answer and no ability to leave a message    VTE Prophylaxis - SCDs.  Code Status - Full code.       ANTWAN Diaz  [facility]  12/19/20  11:47 EST    Electronically signed by Howie Andrade MD at 12/19/20 1423          Emergency Department Notes      Olga Lidia Sanches, RN at 12/18/20 1627        Pt arrives from Wound Care. Pt has bilateral compression wounds and has been getting compression wraps. Wound care nurses states that the pt is normally A/Ox4. States that today he has been restless and confused. Reports that he was trying to roll away in his wheelchair while they were wrapping his legs. Patient masked at arrival and triage staff wore all appropriate PPE during entire encounter with patient.       Electronically signed by Olga Lidia Sanches RN at 12/18/20 1628     Farida Caraballo, RN at 12/18/20 1639        Rn in all appropriate ppe      Farida Caraballo RN  12/18/20 1639      Electronically signed by Farida Caraballo RN at 12/18/20 1639     Terrance Mast II, MD at 12/18/20 1904           EMERGENCY DEPARTMENT ENCOUNTER    Room Number:  24/24  Date of encounter:  12/18/2020  PCP: Maya Ribeiro APRN  Historian: Patient, facility      HPI:  Chief Complaint: Confusion  A complete HPI/ROS/PMH/PSH/SH/FH are unobtainable due to: Confusion    Context: Gregorio Alejandro is a 65 y.o. male who presents to the ED c/o confusion. Patient comes  from wound care.  He has bilateral compression wounds of the been treated.  He is normally and O x4.  They state that he has been restless and confused.  Patient does not know why he is here.  He states that he is wanting to leave to go visit his doctor.      PAST MEDICAL HISTORY  Active Ambulatory Problems     Diagnosis Date Noted   • No Active Ambulatory Problems     Resolved Ambulatory Problems     Diagnosis Date Noted   • No Resolved Ambulatory Problems     Past Medical History:   Diagnosis Date   • Back pain    • CKD (chronic kidney disease)    • DM type 2 (diabetes mellitus, type 2) (CMS/[facility])    • ED (erectile dysfunction)    • Hyperlipidemia    • Hypertension    • SCOTTY (iron deficiency anemia)    • Monoclonal gammopathy    • Osteoarthritis          PAST SURGICAL HISTORY  No past surgical history on file.      FAMILY HISTORY  No family history on file.      SOCIAL HISTORY  Social History     Socioeconomic History   • Marital status:      Spouse name: Not on file   • Number of children: Not on file   • Years of education: Not on file   • Highest education level: Not on file         ALLERGIES  Patient has no known allergies.        REVIEW OF SYSTEMS  Review of Systems     All systems reviewed and negative except for those discussed in HPI.       PHYSICAL EXAM    I have reviewed the triage vital signs and nursing notes.    ED Triage Vitals   Temp Heart Rate Resp BP SpO2   12/18/20 1628 12/18/20 1628 12/18/20 1628 12/18/20 1642 12/18/20 1628   (!) 100.7 °F (38.2 °C) 106 18 113/69 99 %      Temp src Heart Rate Source Patient Position BP Location FiO2 (%)   12/18/20 1628 12/18/20 1628 -- -- --   Tympanic Monitor          Physical Exam  GENERAL: not distressed  HENT: nares patent  EYES: no scleral icterus  CV: regular rhythm, regular rate  RESPIRATORY: normal effort, clear to auscultation bilaterally  ABDOMEN: soft, nontender  MUSCULOSKELETAL: no deformity  NEURO: alert, oriented to name.  States that the year  is 1920.  He is unsure who the president is.  He states that he is going to his doctor's appointment but does ultimately state that he is at [facility].  Symmetric smile, EOMI, PERRLA, moves all extremities with 5/5 strength, follows simple commands  SKIN: warm, dry        LAB RESULTS  Recent Results (from the past 24 hour(s))   Comprehensive Metabolic Panel    Collection Time: 12/18/20  4:53 PM    Specimen: Blood   Result Value Ref Range    Glucose 129 (H) 65 - 99 mg/dL    BUN 48 (H) 8 - 23 mg/dL    Creatinine 3.34 (H) 0.76 - 1.27 mg/dL    Sodium 136 136 - 145 mmol/L    Potassium 3.3 (L) 3.5 - 5.2 mmol/L    Chloride 109 (H) 98 - 107 mmol/L    CO2 16.7 (L) 22.0 - 29.0 mmol/L    Calcium 8.0 (L) 8.6 - 10.5 mg/dL    Total Protein 6.1 6.0 - 8.5 g/dL    Albumin 2.40 (L) 3.50 - 5.20 g/dL    ALT (SGPT) 11 1 - 41 U/L    AST (SGOT) 15 1 - 40 U/L    Alkaline Phosphatase 94 39 - 117 U/L    Total Bilirubin 0.6 0.0 - 1.2 mg/dL    eGFR  African Amer 22 (L) >60 mL/min/1.73    Globulin 3.7 gm/dL    A/G Ratio 0.6 g/dL    BUN/Creatinine Ratio 14.4 7.0 - 25.0    Anion Gap 10.3 5.0 - 15.0 mmol/L   Lipase    Collection Time: 12/18/20  4:53 PM    Specimen: Blood   Result Value Ref Range    Lipase 17 13 - 60 U/L   Respiratory Panel PCR w/COVID-19(SARS-CoV-2) CRISTÓBAL/RACHAEL/YAAKOV/PAD/COR/MAD/KANU In-House, NP Swab in UTM/[facility], 3-4 HR TAT - Swab, Nasopharynx    Collection Time: 12/18/20  4:55 PM    Specimen: Nasopharynx; Swab   Result Value Ref Range    ADENOVIRUS, PCR Not Detected Not Detected    Coronavirus 229E Not Detected Not Detected    Coronavirus HKU1 Not Detected Not Detected    Coronavirus NL63 Not Detected Not Detected    Coronavirus OC43 Not Detected Not Detected    COVID19 Not Detected Not Detected - Ref. Range    Human Metapneumovirus Not Detected Not Detected    Human Rhinovirus/Enterovirus Not Detected Not Detected    Influenza A PCR Not Detected Not Detected    Influenza B PCR Not Detected Not Detected    Parainfluenza Virus 1  Not Detected Not Detected    Parainfluenza Virus 2 Not Detected Not Detected    Parainfluenza Virus 3 Not Detected Not Detected    Parainfluenza Virus 4 Not Detected Not Detected    RSV, PCR Not Detected Not Detected    Bordetella pertussis pcr Not Detected Not Detected    Bordetella parapertussis PCR Not Detected Not Detected    Chlamydophila pneumoniae PCR Not Detected Not Detected    Mycoplasma pneumo by PCR Not Detected Not Detected   Urinalysis With Microscopic If Indicated (No Culture) - Urine, Clean Catch    Collection Time: 12/18/20  5:07 PM    Specimen: Urine, Clean Catch   Result Value Ref Range    Color, UA Yellow Yellow, Straw    Appearance, UA Clear Clear    pH, UA 8.0 5.0 - 8.0    Specific Gravity, UA 1.011 1.005 - 1.030    Glucose, UA Negative Negative    Ketones, UA Negative Negative    Bilirubin, UA Negative Negative    Blood, UA Moderate (2+) (A) Negative    Protein, UA 30 mg/dL (1+) (A) Negative    Leuk Esterase, UA Large (3+) (A) Negative    Nitrite, UA Negative Negative    Urobilinogen, UA 0.2 E.U./dL 0.2 - 1.0 E.U./dL   Urinalysis, Microscopic Only - Urine, Clean Catch    Collection Time: 12/18/20  5:07 PM    Specimen: Urine, Clean Catch   Result Value Ref Range    RBC, UA Too Numerous to Count (A) None Seen, 0-2 /HPF    WBC, UA 21-30 (A) None Seen, 0-2 /HPF    Bacteria, UA None Seen None Seen /HPF    Squamous Epithelial Cells, UA 0-2 None Seen, 0-2 /HPF    Hyaline Casts, UA 0-2 None Seen /LPF    Methodology Automated Microscopy        Ordered the above labs and independently reviewed the results.        RADIOLOGY  Xr Chest 1 View    Result Date: 12/18/2020  XR CHEST 1 VW-  HISTORY:  Fever, altered mental status.  COMPARISON:  None.  FINDINGS:  A single portable view of the chest was obtained. There is an implanted cardiac loop recording device. The cardiac silhouette is mildly enlarged. Mediastinal contours are within normal limits. There is calcific aortic atherosclerosis. There is central  pulmonary venous congestion. There are low lung volumes. There is mild hazy opacity in both lungs, which could be due to atelectasis in the setting of low lung volumes or edema, however, multifocal pneumonia is not excluded. Recommend follow-up PA and lateral chest radiographs. Pleural spaces are clear. There is multilevel degenerative disc disease.  This report was finalized on 12/18/2020 5:38 PM by Dr. Lindsey Medel M.D.        I ordered the above noted radiological studies. Reviewed by me and discussed with radiologist.  See dictation for official radiology interpretation.      PROCEDURES    Procedures      MEDICATIONS GIVEN IN ER    Medications   sodium chloride 0.9 % flush 10 mL (has no administration in time range)   cefTRIAXone (ROCEPHIN) IVPB 1 g (1 g Intravenous New Bag 12/18/20 7611)         PROGRESS, DATA ANALYSIS, CONSULTS, AND MEDICAL DECISION MAKING    All labs have been independently reviewed by me.  All radiology studies have been reviewed by me and discussed with radiologist dictating the report.   EKG's independently viewed and interpreted by me.  Discussion below represents my analysis of pertinent findings related to patient's condition, differential diagnosis, treatment plan and final disposition.    Differential diagnosis for altered mental status includes:  - vital sign abnormalities such as HTN encephalopathy, hypotension, hypoxemia, hypercarbia, heat stroke  - toxic/metabolic pathology such as hypoglycemia, DKA, hypo/hyper-natremia, thyroid storm, myxedema coma, medication side effect (either intentional or accidental)  - infectious etiology  - intracranial pathology such as stroke, seizure, intracranial mass, intracranial hemorrhage  - psychiatric pathology    ED Course as of Dec 18 1911   Fri Dec 18, 2020   1739 Leukocytes, UA(!): Large (3+) [TD]   1739 WBC, UA(!): 21-30 [TD]   1739 RBC, UA(!): Too Numerous to Count [TD]   1756 Potassium(!): 3.3 [TD]   1905 COVID19: Not Detected [TD]       ED Course User Index  [TD] Terrance Mast II, MD       Patient presents with confusion.  At this point, seems most likely due to urinary infection.  He is febrile and warm to touch.  He has no meningismus on exam.  Covid negative.  Given his numerous red blood cells, many get a CT scan of his abdomen to evaluate for any renal pathology.  Additionally, getting a CT scan of his head today for intracranial pathology.  Patient given empiric Rocephin.    PPE: Both the patient and I wore a surgical mask throughout the entire patient encounter. I wore protective goggles.     AS OF 19:11 EST VITALS:    BP - 113/69  HR - 106  TEMP - (!) 100.7 °F (38.2 °C) (Tympanic)  O2 SATS - 99%        DIAGNOSIS  Final diagnoses:   Complicated UTI (urinary tract infection)   Confusion   Normal anion gap metabolic acidosis         DISPOSITION  Admit           Terrance Mast II, MD  12/18/20 1911      Electronically signed by Terrance Mast II, MD at 12/18/20 1911     Garett Wang MD at 12/18/20 1931            Brief history of present illness: 65-year-old male who presents in referral from wound clinic for increased confusion with fever noted at triage.    Physical exam:     ED Triage Vitals   Temp Heart Rate Resp BP SpO2   12/18/20 1628 12/18/20 1628 12/18/20 1628 12/18/20 1642 12/18/20 1628   (!) 100.7 °F (38.2 °C) 106 18 113/69 99 %      Temp src Heart Rate Source Patient Position BP Location FiO2 (%)   12/18/20 1628 12/18/20 1628 -- -- --   Tympanic Monitor        Alert and talkative.  Somewhat confused.  No acute distress.  Not overtly toxic.  No respiratory distress.  Pink warm well perfused.    MDM:    Results for orders placed or performed during the hospital encounter of 12/18/20   Respiratory Panel PCR w/COVID-19(SARS-CoV-2) RCISTÓBAL/RACHAEL/YAAKOV/PAD/COR/MAD/KANU In-House, NP Swab in UTM/VTM, 3-4 HR TAT - Swab, Nasopharynx    Specimen: Nasopharynx; Swab   Result Value Ref Range    ADENOVIRUS, PCR Not Detected Not  Detected    Coronavirus 229E Not Detected Not Detected    Coronavirus HKU1 Not Detected Not Detected    Coronavirus NL63 Not Detected Not Detected    Coronavirus OC43 Not Detected Not Detected    COVID19 Not Detected Not Detected - Ref. Range    Human Metapneumovirus Not Detected Not Detected    Human Rhinovirus/Enterovirus Not Detected Not Detected    Influenza A PCR Not Detected Not Detected    Influenza B PCR Not Detected Not Detected    Parainfluenza Virus 1 Not Detected Not Detected    Parainfluenza Virus 2 Not Detected Not Detected    Parainfluenza Virus 3 Not Detected Not Detected    Parainfluenza Virus 4 Not Detected Not Detected    RSV, PCR Not Detected Not Detected    Bordetella pertussis pcr Not Detected Not Detected    Bordetella parapertussis PCR Not Detected Not Detected    Chlamydophila pneumoniae PCR Not Detected Not Detected    Mycoplasma pneumo by PCR Not Detected Not Detected   Comprehensive Metabolic Panel    Specimen: Blood   Result Value Ref Range    Glucose 129 (H) 65 - 99 mg/dL    BUN 48 (H) 8 - 23 mg/dL    Creatinine 3.34 (H) 0.76 - 1.27 mg/dL    Sodium 136 136 - 145 mmol/L    Potassium 3.3 (L) 3.5 - 5.2 mmol/L    Chloride 109 (H) 98 - 107 mmol/L    CO2 16.7 (L) 22.0 - 29.0 mmol/L    Calcium 8.0 (L) 8.6 - 10.5 mg/dL    Total Protein 6.1 6.0 - 8.5 g/dL    Albumin 2.40 (L) 3.50 - 5.20 g/dL    ALT (SGPT) 11 1 - 41 U/L    AST (SGOT) 15 1 - 40 U/L    Alkaline Phosphatase 94 39 - 117 U/L    Total Bilirubin 0.6 0.0 - 1.2 mg/dL    eGFR  African Amer 22 (L) >60 mL/min/1.73    Globulin 3.7 gm/dL    A/G Ratio 0.6 g/dL    BUN/Creatinine Ratio 14.4 7.0 - 25.0    Anion Gap 10.3 5.0 - 15.0 mmol/L   Lipase    Specimen: Blood   Result Value Ref Range    Lipase 17 13 - 60 U/L   Urinalysis With Microscopic If Indicated (No Culture) - Urine, Clean Catch    Specimen: Urine, Clean Catch   Result Value Ref Range    Color, UA Yellow Yellow, Straw    Appearance, UA Clear Clear    pH, UA 8.0 5.0 - 8.0    Specific  Gravity, UA 1.011 1.005 - 1.030    Glucose, UA Negative Negative    Ketones, UA Negative Negative    Bilirubin, UA Negative Negative    Blood, UA Moderate (2+) (A) Negative    Protein, UA 30 mg/dL (1+) (A) Negative    Leuk Esterase, UA Large (3+) (A) Negative    Nitrite, UA Negative Negative    Urobilinogen, UA 0.2 E.U./dL 0.2 - 1.0 E.U./dL   CBC Auto Differential    Specimen: Blood   Result Value Ref Range    WBC 15.88 (H) 3.40 - 10.80 10*3/mm3    RBC 3.68 (L) 4.14 - 5.80 10*6/mm3    Hemoglobin 7.9 (L) 13.0 - 17.7 g/dL    Hematocrit 26.6 (L) 37.5 - 51.0 %    MCV 72.3 (L) 79.0 - 97.0 fL    MCH 21.5 (L) 26.6 - 33.0 pg    MCHC 29.7 (L) 31.5 - 35.7 g/dL    RDW 20.0 (H) 12.3 - 15.4 %    RDW-SD 48.5 37.0 - 54.0 fl    Platelets 197 140 - 450 10*3/mm3   Urinalysis, Microscopic Only - Urine, Clean Catch    Specimen: Urine, Clean Catch   Result Value Ref Range    RBC, UA Too Numerous to Count (A) None Seen, 0-2 /HPF    WBC, UA 21-30 (A) None Seen, 0-2 /HPF    Bacteria, UA None Seen None Seen /HPF    Squamous Epithelial Cells, UA 0-2 None Seen, 0-2 /HPF    Hyaline Casts, UA 0-2 None Seen /LPF    Methodology Automated Microscopy      Ct Abdomen Pelvis Without Contrast    Result Date: 12/18/2020  Narrative: CT ABDOMEN PELVIS WO CONTRAST-  INDICATIONS: Fever, hematuria  TECHNIQUE: Radiation dose reduction techniques were utilized, including automated exposure control and exposure modulation based on body size. Unenhanced ABDOMEN AND PELVIS CT  COMPARISON: None available  FINDINGS:  Diffuse wall thickening the urinary bladder with small surrounding fat stranding suggests cystitis. Bilateral perinephric stranding may be chronic in nature or may be evidence of infection. A couple 3 mm nonobstructive stones are seen in the right kidney. Assessment of the distal ureters and urinary bladder is limited by attenuation artifact from bilateral hip arthroplasty hardware.  Assessment for liver lesions is significantly limited without  intravenous contrast material.  Otherwise unremarkable unenhanced appearance of the liver, gallbladder, spleen, adrenal glands, pancreas, kidneys, bladder.  No bowel obstruction. Conspicuous abnormal wall thickening is apparent in the rectum, circumferentially, for example 2 cm, axial image 163, that may reflect rectal wall lesion proctitis, direct visualization advised.  No free intraperitoneal gas or free fluid.  Left para-aortic lymph node measuring 8 mm short axis, axial image 86 is borderline prominent, nonspecific, could be reactive in nature or potentially evidence of neoplasm. Borderline prominent subcutaneous lymph nodes are seen in the bilateral groin, for example on the right, 1.2 cm short axis, axial image 188. Clinical correlation and follow-up recommended; if further imaging evaluation is indicated, positron emission tomography could be considered.  Abdominal aorta is not aneurysmal. Aortic and other arterial calcifications are present.  The lung bases show mild atelectasis. Minimal bilateral pleural effusions are seen. The heart is enlarged.  Degenerative changes are seen in the spine. No acute fracture is identified.        Impression:   1. Diffuse wall thickening the urinary bladder with small surrounding fat stranding suggests cystitis. Bilateral perinephric fat stranding is also present. Nonobstructive right nephrolithiasis. 2. Conspicuous abnormal wall thickening is apparent in the rectum, circumferentially, that may reflect rectal wall lesion/neoplasm or proctitis, direct visualization advised. 3. Borderline, nonspecific lymph nodes, as described.  Discussed by telephone with Dr. Wang for Dr. Mast at 1930, 12/18/2020.  This report was finalized on 12/18/2020 7:34 PM by Dr. Jluis Meza M.D.      Ct Head Without Contrast    Result Date: 12/18/2020  Narrative: CT HEAD WO CONTRAST-  INDICATIONS: Altered mental status  TECHNIQUE: Radiation dose reduction techniques were utilized, including  automated exposure control and exposure modulation based on body size. Noncontrast head CT  COMPARISON: None available  FINDINGS:    No acute intracranial hemorrhage, midline shift or mass effect. No acute territorial infarct is identified.  Mild periventricular hypodensities suggest chronic small vessel ischemic change in a patient this age.  Arterial calcifications are seen at the base of the brain.  Ventricles, cisterns, cerebral sulci are unremarkable for patient age.  Procedural change of the left globe is apparent. The visualized paranasal sinuses, orbits, mastoid air cells are otherwise unremarkable.          Impression:  No acute intracranial hemorrhage or hydrocephalus. If there is further clinical concern, MRI could be considered for further evaluation.  This report was finalized on 12/18/2020 7:22 PM by Dr. Jluis Meza M.D.      Xr Chest 1 View    Result Date: 12/18/2020  Narrative: XR CHEST 1 VW-  HISTORY:  Fever, altered mental status.  COMPARISON:  None.  FINDINGS:  A single portable view of the chest was obtained. There is an implanted cardiac loop recording device. The cardiac silhouette is mildly enlarged. Mediastinal contours are within normal limits. There is calcific aortic atherosclerosis. There is central pulmonary venous congestion. There are low lung volumes. There is mild hazy opacity in both lungs, which could be due to atelectasis in the setting of low lung volumes or edema, however, multifocal pneumonia is not excluded. Recommend follow-up PA and lateral chest radiographs. Pleural spaces are clear. There is multilevel degenerative disc disease.  This report was finalized on 12/18/2020 5:38 PM by Dr. Lindsey Medel M.D.          I have seen and personally evaluated this patient, discussed the case with the treating advanced practice provider, and reviewed their note. I was involved in the medical decision making during the evaluation, testing and disposition planning for this  patient.    Progress:  1915: Received transfer of care from Dr. Mast.  Pending CBC redraw, lactic acid, head CT and CT abdomen pelvis.    1930: See if call from radiologist, Dr. Meza about CT abdomen pelvis.  Thickening of the bladder wall could be consistent with cystitis with minimal perinephric stranding bilaterally which could be chronic; also concerned about thickening of the rectal wall which may represent mass versus proctitis.  Head CT no acute.  Continue to await CBC and lactic acid.    2050: Case reviewed with JESS Duggna who is agreeable to accept the patient for full admission with telemetry on behalf of Dr. Jackson.    Final diagnoses:   Complicated UTI (urinary tract infection)   Confusion   Normal anion gap metabolic acidosis       Disposition:  ADMISSION    Discussed treatment plan and reason for admission with pt/family and admitting physician.  Pt/family voiced understanding of the plan for admission for further testing/treatment as needed.          Garett Wang MD  12/18/20 2102      Electronically signed by Garett Wang MD at 12/18/20 2102          Physician Progress Notes (last 72 hours) (Notes from 12/18/20 0708 through 12/21/20 0708)      Nixon Robertson MD at 12/20/20 1420              NEPHROLOGY PROGRESS NOTE    PATIENT IDENTIFICATION:   Name:  Gregorio Alejandro      MRN:  [id number]     65 y.o.  male             Reason for visit: CKD4    SUBJECTIVE:   Feels fine; no SOB on RA; appetite fine; he has no recollection of left arm AVF creation earlier this year    OBJECTIVE:  Vitals:    12/19/20 1931 12/19/20 2332 12/20/20 0746 12/20/20 1331   BP: 133/79 161/97 131/86 142/94   BP Location: Right arm Right arm Right arm Right arm   Patient Position: Lying Lying Lying Lying   Pulse: 76 88 78 77   Resp: 18 18 18 18   Temp: 98.7 °F (37.1 °C) 98.2 °F (36.8 °C) 99.3 °F (37.4 °C) 98.2 °F (36.8 °C)   TempSrc: Oral Oral Oral Oral   SpO2: 99% 94% 98% 96%   Weight:       Height:                Body mass index is 30 kg/m².    Intake/Output Summary (Last 24 hours) at 12/20/2020 1420  Last data filed at 12/20/2020 0648  Gross per 24 hour   Intake 600 ml   Output --   Net 600 ml     Wt Readings from Last 1 Encounters:   12/18/20 1642 109 kg (240 lb)     Wt Readings from Last 3 Encounters:   12/18/20 109 kg (240 lb)         Exam:  NAD; pleasant; oriented fully; looks older than stated age  Obese; chronically ill-appearing  MMM; AT/NC   No eye discharge; no scleral icterus  No JVD; no carotid bruits  Decreased breath sounds in bases bilat; no wheezes; not labored on RA  RRR, no rub  Soft, NT, +D, BS+  Woody edema both lower legs and feet; legs are wrapped  No bruit or thrill left arm; no obvious incision from prior vascular surgery that I can see  +clubbing  +asterixis  Moves all extremities   Speech is fluent  Flat affect; mood is normal    Scheduled meds:    insulin lispro, 0-7 Units, Subcutaneous, TID AC  piperacillin-tazobactam, 3.375 g, Intravenous, Q12H  sodium chloride, 10 mL, Intravenous, Q12H      IV meds:                        sodium chloride, 50 mL/hr, Last Rate: 50 mL/hr (12/20/20 0009)        Data Review:    Results from last 7 days   Lab Units 12/20/20  0631 12/19/20  0439 12/18/20  1653   SODIUM mmol/L 137 135* 136   POTASSIUM mmol/L 4.2 3.4* 3.3*   CHLORIDE mmol/L 110* 106 109*   CO2 mmol/L 16.6* 18.7* 16.7*   BUN mg/dL 53* 55* 48*   CREATININE mg/dL 3.64* 3.82* 3.34*   CALCIUM mg/dL 8.9 8.6 8.0*   BILIRUBIN mg/dL  --   --  0.6   ALK PHOS U/L  --   --  94   ALT (SGPT) U/L  --   --  11   AST (SGOT) U/L  --   --  15   GLUCOSE mg/dL 132* 140* 129*     Estimated Creatinine Clearance: 26.3 mL/min (A) (by C-G formula based on SCr of 3.64 mg/dL (H)).      Results from last 7 days   Lab Units 12/20/20  0631 12/19/20  0439   MAGNESIUM mg/dL 1.9 1.8   PHOSPHORUS mg/dL 3.9  --        Results from last 7 days   Lab Units 12/20/20  0631 12/19/20  0439 12/18/20 2024   WBC 10*3/mm3 11.70* 14.16* 15.88*  "  HEMOGLOBIN g/dL 8.6* 8.1* 7.9*   PLATELETS 10*3/mm3 196 233 197       Results from last 7 days   Lab Units 20  0439   INR  1.37*             ASSESSMENT:     Sepsis secondary to UTI (CMS/[facility])    Complicated UTI (urinary tract infection)    Macrocytosis    Metabolic encephalopathy    Hyperlipidemia    Hypertension    Monoclonal gammopathy    ESRD (end stage renal disease) (CMS/[facility])    DM2 (diabetes mellitus, type 2) (CMS/[facility])    Abnormal CT of the abdomen    Normal anion gap metabolic acidosis    Anemia, chronic disease    1.  CKD4: stable though poor function.  Has biopsy-proven diabetic nephropathy.  Central volume stable by exam, tho chest x-ray does suggest some central pulmonary congestion. Hypervolemic hyponatremia, resolved; likely NAGMA.  Urinalysis with only 30 mg/dL protein, 21-30 WBCs/hpf, and TNTC RBCs.  Reportedly has seen a vascular surgeon and had an AV fistula created in the left arm, although patient denies having had this done.  No bruit or thrill left arm  2.  Fever and confusion, with the latter resolved  3.  Bilateral lower extremity wounds  4.  Cystitis and proctitis by CT  5.  Anemia, profound, with history of MGUS; iron stores are adequate  6.  Immobility syndrome: Has not walked for 6 months  7.  Longstanding DM2  8.  Hypertension: hypotensive now        PLAN:  1.  Stop IVF  2.  Place \"warning\" band around left wrist to protect arm from future sticks  3.  I notified his primary nephrologist, Dr. Moreno at [facility], that the patient's vascular access has clotted  4.  Add Na bicarb 1300 mg BID  5.  Barring any surprises in exam or labs tomorrow, no objection to discharge from renal view    Nixon Robertson MD  2020    14:20 EST     Electronically signed by Nixon Robertson MD at 20 3950     Rashad Frank MD at 20 0845            [phone]   LOS: 2 days     Name: Gregorio Alejandro  Age/Sex: 65 y.o. male  :  1955        PCP: Maya Ribeiro " C, APRN  Chief Complaint   Patient presents with   • Altered Mental Status   • Fever      Subjective   His only complaint today is being tired.  He is alert and oriented x3 denies any other new issues or complaints.  General: No Fever or Chills, Cardiac: No Chest Pain or Palpitations, Resp: No Cough or SOA, GI: No Nausea, Vomiting, or Diarrhea and Other: No bleeding    cefTRIAXone, 1 g, Intravenous, Q24H  insulin lispro, 0-7 Units, Subcutaneous, TID AC  sodium chloride, 10 mL, Intravenous, Q12H      sodium chloride, 50 mL/hr, Last Rate: 50 mL/hr (12/20/20 0009)        Objective   Vital Signs  Temp:  [97.8 °F (36.6 °C)-99.3 °F (37.4 °C)] 99.3 °F (37.4 °C)  Heart Rate:  [76-88] 78  Resp:  [18] 18  BP: (105-161)/(67-97) 131/86  Body mass index is 30 kg/m².    Intake/Output Summary (Last 24 hours) at 12/20/2020 0845  Last data filed at 12/20/2020 0648  Gross per 24 hour   Intake 600 ml   Output --   Net 600 ml       Physical Exam  Vitals signs and nursing note reviewed.   Constitutional:       Appearance: He is obese. He is ill-appearing.   HENT:      Head: Normocephalic.   Eyes:      Extraocular Movements: Extraocular movements intact.   Cardiovascular:      Rate and Rhythm: Normal rate and regular rhythm.   Pulmonary:      Effort: Pulmonary effort is normal. No respiratory distress.      Breath sounds: Normal breath sounds.   Abdominal:      General: Abdomen is flat.      Palpations: Abdomen is soft.   Skin:     General: Skin is warm and dry.      Capillary Refill: Capillary refill takes less than 2 seconds.   Neurological:      General: No focal deficit present.      Mental Status: He is alert and oriented to person, place, and time.   Psychiatric:      Comments: Flat affect mood is depressed           Results Review:       I reviewed the patient's new clinical results.  Results from last 7 days   Lab Units 12/20/20  0631 12/19/20  0439 12/18/20 2024   WBC 10*3/mm3 11.70* 14.16* 15.88*   HEMOGLOBIN g/dL 8.6* 8.1*  7.9*   PLATELETS 10*3/mm3 196 233 197     Results from last 7 days   Lab Units 12/20/20  0631 12/19/20  0439 12/18/20  1653   SODIUM mmol/L 137 135* 136   POTASSIUM mmol/L 4.2 3.4* 3.3*   CHLORIDE mmol/L 110* 106 109*   CO2 mmol/L 16.6* 18.7* 16.7*   BUN mg/dL 53* 55* 48*   CREATININE mg/dL 3.64* 3.82* 3.34*   CALCIUM mg/dL 8.9 8.6 8.0*   MAGNESIUM mg/dL 1.9 1.8  --    PHOSPHORUS mg/dL 3.9  --   --    Estimated Creatinine Clearance: 26.3 mL/min (A) (by C-G formula based on SCr of 3.64 mg/dL (H)).      Assessment/Plan   Active Hospital Problems    Diagnosis  POA   • **Sepsis secondary to UTI (CMS/[facility]) [A41.9, N39.0]  Yes   • Macrocytosis [D75.89]  Yes   • Metabolic encephalopathy [G93.41]  Yes   • Anemia, chronic disease [D63.8]  Unknown   • Hyperlipidemia [E78.5]  Yes   • Hypertension [I10]  Yes   • Monoclonal gammopathy [D47.2]  Yes   • ESRD (end stage renal disease) (CMS/[facility]) [N18.6]  Yes   • DM2 (diabetes mellitus, type 2) (CMS/[facility]) [E11.9]  Yes   • Abnormal CT of the abdomen [R93.5]  Yes   • Normal anion gap metabolic acidosis [E87.2]  Yes   • Complicated UTI (urinary tract infection) [N39.0]  Yes      Resolved Hospital Problems   No resolved problems to display.       PLAN  This is a 65-year-old gentleman with a history of chronic kidney disease stage IV chronic wounds hypertension diabetes and other medical comorbidities that presents to the hospital with altered mental status and metabolic encephalopathy  -It is possible that this metabolic encephalopathy is related to the underlying infectious process.  His urine cultures pending he seems to be responding to the IV Rocephin.  Complicating the factor is his polymicrobial wound culture.  I have asked infectious disease to weigh in regarding antibiotic input.  -Mental status is improving.  He is alert and oriented x3 his affect and mood are a little off.  I touch base with his wife later today to see what his baseline normally is.  -Renal function remained  stable no emergent indications for dialysis at this time.  -Potassium is normalized  -Continue local wound care and supportive care      Disposition  Potentially home Monday or Tuesday depending on progress      Rashad Frank MD  [facility]  12/20/20  08:45 EST            Electronically signed by Rashad Frank MD at 12/20/20 1232     Kip Wiley MD at 12/20/20 0805          [facility]  Inpatient Progress Note    Reason for Follow Up: Abnormal imaging and iron deficiency anemia    Subjective     Interval History:   He tells me he slept well, no complaints this morning    Current Facility-Administered Medications:   •  acetaminophen (TYLENOL) tablet 650 mg, 650 mg, Oral, Q4H PRN **OR** acetaminophen (TYLENOL) 160 MG/5ML solution 650 mg, 650 mg, Oral, Q4H PRN **OR** acetaminophen (TYLENOL) suppository 650 mg, 650 mg, Rectal, Q4H PRN, Olga Lidia Reed APRN  •  cefTRIAXone (ROCEPHIN) IVPB 1 g, 1 g, Intravenous, Q24H, Howie Andrade MD, Last Rate: 100 mL/hr at 12/19/20 1059, 1 g at 12/19/20 1059  •  dextrose (D50W) 25 g/ 50mL Intravenous Solution 25 g, 25 g, Intravenous, Q15 Min PRN, Olga Lidia Reed APRN  •  dextrose (GLUTOSE) oral gel 15 g, 15 g, Oral, Q15 Min PRN, Olga Lidia Reed APRN  •  glucagon (human recombinant) (GLUCAGEN DIAGNOSTIC) injection 1 mg, 1 mg, Subcutaneous, Q15 Min PRN, Olga Lidia Reed APRNANCY  •  insulin lispro (ADMELOG) injection 0-7 Units, 0-7 Units, Subcutaneous, TID AC, Olga Lidia Reed APRN, Stopped at 12/19/20 1749  •  nitroglycerin (NITROSTAT) SL tablet 0.4 mg, 0.4 mg, Sublingual, Q5 Min PRN, Olga Lidia Reed APRNANCY  •  ondansetron (ZOFRAN) injection 4 mg, 4 mg, Intravenous, Q6H PRN, Olga Lidia Reed APRN  •  [COMPLETED] Insert peripheral IV, , , Once **AND** sodium chloride 0.9 % flush 10 mL, 10 mL, Intravenous, PRN, Terrance Mast II, MD  •  sodium chloride 0.9 % flush 10 mL, 10 mL,  Intravenous, Q12H, Olga Lidia Reed APRN, 10 mL at 12/20/20 0008  •  sodium chloride 0.9 % flush 10 mL, 10 mL, Intravenous, PRN, Olga Lidia Reed APRN  •  sodium chloride 0.9 % infusion, 50 mL/hr, Intravenous, Continuous, Olga Lidia Reed APRN, Last Rate: 50 mL/hr at 12/20/20 0009, 50 mL/hr at 12/20/20 0009  Review of Systems:    A bit of weakness all other systems reviewed and negative    Objective     Vital Signs  Temp:  [97.8 °F (36.6 °C)-99.3 °F (37.4 °C)] 99.3 °F (37.4 °C)  Heart Rate:  [76-88] 78  Resp:  [18] 18  BP: (105-161)/(67-97) 131/86  Body mass index is 30 kg/m².    Intake/Output Summary (Last 24 hours) at 12/20/2020 0805  Last data filed at 12/20/2020 0648  Gross per 24 hour   Intake 600 ml   Output --   Net 600 ml     No intake/output data recorded.     Physical Exam:   General: patient awake, alert and cooperative   Eyes: Normal lids and lashes, no scleral icterus   Neck: supple, normal ROM   Skin: warm and dry, not jaundiced   Cardiovascular: regular rhythm and rate, no murmurs auscultated   Pulm: clear to auscultation bilaterally, regular and unlabored   Abdomen: soft, nontender, nondistended; normal bowel sounds   Extremities: no rash or edema   Psychiatric: Normal mood and behavior; memory intact     Results Review:     I reviewed the patient's new clinical results.    Results from last 7 days   Lab Units 12/20/20  0631 12/19/20 0439 12/18/20 2024   WBC 10*3/mm3 11.70* 14.16* 15.88*   HEMOGLOBIN g/dL 8.6* 8.1* 7.9*   HEMATOCRIT % 28.4* 26.8* 26.6*   PLATELETS 10*3/mm3 196 233 197     Results from last 7 days   Lab Units 12/19/20  0439 12/18/20  1653   SODIUM mmol/L 135* 136   POTASSIUM mmol/L 3.4* 3.3*   CHLORIDE mmol/L 106 109*   CO2 mmol/L 18.7* 16.7*   BUN mg/dL 55* 48*   CREATININE mg/dL 3.82* 3.34*   CALCIUM mg/dL 8.6 8.0*   BILIRUBIN mg/dL  --  0.6   ALK PHOS U/L  --  94   ALT (SGPT) U/L  --  11   AST (SGOT) U/L  --  15   GLUCOSE mg/dL 140* 129*     Results from last 7 days    Lab Units 12/19/20  0439   INR  1.37*     Lab Results   Lab Value Date/Time    LIPASE 17 12/18/2020 1653    LIPASE 914 (H) 10/27/2020 0330    LIPASE 1,087 (H) 10/26/2020 0410    LIPASE 1,653 (H) 10/25/2020 0323       Radiology:  CT Head Without Contrast   Final Result       No acute intracranial hemorrhage or hydrocephalus. If there is further   clinical concern, MRI could be considered for further evaluation.       This report was finalized on 12/18/2020 7:22 PM by Dr. Jluis Meza M.D.          CT Abdomen Pelvis Without Contrast   Final Result           1. Diffuse wall thickening the urinary bladder with small surrounding   fat stranding suggests cystitis. Bilateral perinephric fat stranding is   also present. Nonobstructive right nephrolithiasis.   2. Conspicuous abnormal wall thickening is apparent in the rectum,   circumferentially, that may reflect rectal wall lesion/neoplasm or   proctitis, direct visualization advised.   3. Borderline, nonspecific lymph nodes, as described.       Discussed by telephone with Dr. Wang for Dr. Mast at 1930, 12/18/2020.       This report was finalized on 12/18/2020 7:34 PM by Dr. Jluis Meza M.D.          XR Chest 1 View   Final Result          Assessment/Plan     Patient Active Problem List   Diagnosis   • Complicated UTI (urinary tract infection)   • Macrocytosis   • Sepsis secondary to UTI (CMS/HCC)   • Metabolic encephalopathy   • Hyperlipidemia   • Hypertension   • Monoclonal gammopathy   • ESRD (end stage renal disease) (CMS/HCC)   • DM2 (diabetes mellitus, type 2) (CMS/HCC)   • Abnormal CT of the abdomen   • Normal anion gap metabolic acidosis   • Anemia, chronic disease       Assessment:  1. Abnormal imaging with cystitis/urinary tract infection  2. Confusion/delirium likely secondary to urosepsis  3. Abnormal imaging of the rectum  4. Microcytic anemia     Plan:  · He will need EGD and colonoscopy for microcytic anemia and abnormal findings of the  rectum when his confusion clears, when he is adequately been treated for cystitis/urinary tract infection when he has been cleared by the primary team  · Follow hemoglobin  · Check iron indices, drawn and pending this morning  · We will follow   I discussed the patients findings and my recommendations with patient and nursing staff.    Kip Wiley MD                Electronically signed by Kip Wiley MD at 20 0806          Consult Notes (last 72 hours) (Notes from 20 0708 through 20 0708)      Tonja Azevedo MD at 20 1100      Consult Orders    1. Inpatient Infectious Diseases Consult [[id number]] ordered by Rashad Frank MD at 20 0843               CONSULT NOTE    Infectious Diseases - Tonja Azevedo. MD  [facility]       Patient Identification:  Name: Gregorio Alejandro  Age: 65 y.o.  Sex: male  :  1955  MRN: [id number]             Date of Consultation: 2020      Primary Care Physician: Maya Ribeiro APRN                               Requesting Physician: Dr. Rashad Denny  Reason for Consultation: Antibiotic treatment recommendation      Impression: Patient is a 65-year-old male complicated past medical history admitted from [facility] wound care clinic with fever and confusion.  Patient has history of immobility and essentially wheelchair-bound for his day-to-day activity, type 2 diabetes, anemia, stage IV kidney disease, obstructive sleep apnea as well as hyperlipidemia.  Evaluation revealed ill-appearing male with WBC count of 15,000 and abnormal urinalysis.  Patient has history of bilateral lower extremity lymphedema with venous stasis and chronic wound for which he follows up at wound care clinic and currently has both legs are wrapped.  There is culture taken from his right foot from an area unclear to me and it is showing multiple pathogens with Gram stain revealing no white blood cells.  Patient claims that he is admitted  to the hospital from wound care clinic because of issues with his legs and feet.  He denies any other symptoms of nausea vomiting diarrhea burning in urination frequency urgency but does admit to have fever.  He is overall improved.  Patient understand that his history of chronic kidney disease but he has never been told that he is in the process of being established for hemodialysis based on declining renal function and he does not recall having any AV fistula anywhere.  This presentation of febrile illness this complicated patient with objective findings revealing elevated white blood cell count and abnormal urinalysis with chronic dry noncellulitic changes in bilateral feet with bilateral lower extremity lymphedema/venous stasis ulcers currently wrapped is concerning for:  1-toxic metabolic encephalopathy overall improved and likely because of it at the time of presentation  2-urinary tract infection  3-probably venous stasis ulcer and secondary cellulitis of the bilateral lower extremities currently wrapped  4-other diagnosis per internal medicine service      Recommendations/Discussions:  At this juncture I agree with your concerns regarding treatment need for abnormal urinalysis given his presentation with confusion abnormal urinalysis and leukocytosis.  Given the appearance of his lower extremities and the fact that his leg from knee to the ankle is wrapped in Unna boot type dressing his feet currently do not show any obvious wound cultures taken from it showing with polymicrobial jamil likely contaminant or colonizer of the skin is there is no associated inflammatory cells seen in the Gram stain.    His urine culture so far showing mixed jamil.  Patient is overall improved.  Adjust his antibiotic regimen to treat recently identified urinary tract infection with Zosyn which would also provide coverage for bilateral lower extremity wound and cellulitis in the setting of venous insufficiency and  lymphedema.  Patient will require at least 10 to 14 days of antibiotic treatment.  Further management as his condition evolves.      History of Present Illness:         Past Medical History:  Past Medical History:   Diagnosis Date   • Back pain    • CKD (chronic kidney disease)    • DM type 2 (diabetes mellitus, type 2) (CMS/[facility])    • ED (erectile dysfunction)    • Hyperlipidemia    • Hypertension    • SCOTTY (iron deficiency anemia)    • Monoclonal gammopathy    • Osteoarthritis      Past Surgical History:  No past surgical history on file.   Home Meds:  Medications Prior to Admission   Medication Sig Dispense Refill Last Dose   • atorvastatin (LIPITOR) 20 MG tablet Take 20 mg by mouth Daily.   12/17/2020 at Unknown time   • brimonidine (ALPHAGAN P) 0.1 % solution ophthalmic solution 1 drop 2 (two) times a day.   12/17/2020 at Unknown time   • diphenhydrAMINE (BENADRYL) 25 mg capsule Take 25 mg by mouth Every 6 (Six) Hours As Needed for Itching or Allergies.   12/17/2020 at Unknown time   • dorzolamide (TRUSOPT) 2 % ophthalmic solution Administer 1 drop to the right eye 2 (two) times a day.   12/17/2020 at Unknown time   • furosemide (LASIX) 20 MG tablet Take 20 mg by mouth 2 (Two) Times a Day.   12/17/2020 at Unknown time   • gabapentin (NEURONTIN) 300 MG capsule TAKE 1 CAPSULE BY MOUTH TWICE DAILY (Patient taking differently: 3 (Three) Times a Day.) 60 capsule 0 12/17/2020 at Unknown time   • HUMALOG KWIKPEN 100 UNIT/ML solution pen-injector INJECT 20 UNITS INTO THE SKIN TWICE DAILY 15 mL 0 12/17/2020 at Unknown time   • HYDROcodone-acetaminophen (NORCO)  MG per tablet Take 1 tablet po every 4 hours PRN for moderate pain, take two tablets po every 4 hours PRN for severe pain, valid if faxed to [facility]   tablet 0 12/17/2020 at Unknown time   • WAL-DRYL ALLERGY 25 MG Take 1 capsule by mouth Every 6 (Six) Hours As Needed for itching. 30 capsule 0      Current Meds:     Current Facility-Administered  Medications:   •  acetaminophen (TYLENOL) tablet 650 mg, 650 mg, Oral, Q4H PRN **OR** acetaminophen (TYLENOL) 160 MG/5ML solution 650 mg, 650 mg, Oral, Q4H PRN **OR** acetaminophen (TYLENOL) suppository 650 mg, 650 mg, Rectal, Q4H PRN, Olga Lidia Reed APRN  •  cefTRIAXone (ROCEPHIN) IVPB 1 g, 1 g, Intravenous, Q24H, Howie Andrade MD, Last Rate: 100 mL/hr at 12/19/20 1059, 1 g at 12/19/20 1059  •  dextrose (D50W) 25 g/ 50mL Intravenous Solution 25 g, 25 g, Intravenous, Q15 Min PRN, Olga Lidia Reed APRN  •  dextrose (GLUTOSE) oral gel 15 g, 15 g, Oral, Q15 Min PRN, Olga Lidia Reed APRNANCY  •  glucagon (human recombinant) (GLUCAGEN DIAGNOSTIC) injection 1 mg, 1 mg, Subcutaneous, Q15 Min PRN, Olga Lidia Reed APRN  •  insulin lispro (ADMELOG) injection 0-7 Units, 0-7 Units, Subcutaneous, TID AC, Olga Lidia Reed APRN, Stopped at 12/19/20 1749  •  nitroglycerin (NITROSTAT) SL tablet 0.4 mg, 0.4 mg, Sublingual, Q5 Min PRN, Olga Lidia Reed APRNANCY  •  ondansetron (ZOFRAN) injection 4 mg, 4 mg, Intravenous, Q6H PRN, Olga Lidia Reed APRN  •  [COMPLETED] Insert peripheral IV, , , Once **AND** sodium chloride 0.9 % flush 10 mL, 10 mL, Intravenous, PRN, Terrance Mast II, MD  •  sodium chloride 0.9 % flush 10 mL, 10 mL, Intravenous, Q12H, Olga Lidia Reed APRN, 10 mL at 12/20/20 0854  •  sodium chloride 0.9 % flush 10 mL, 10 mL, Intravenous, PRN, Olga Lidia Reed APRN  •  sodium chloride 0.9 % infusion, 50 mL/hr, Intravenous, Continuous, Olga Lidia Reed, APRN, Last Rate: 50 mL/hr at 12/20/20 0009, 50 mL/hr at 12/20/20 0009  Allergies:  No Known Allergies  Social History:   Social History     Tobacco Use   • Smoking status: Not on file   Substance Use Topics   • Alcohol use: Not on file      Family History:  No family history on file.       Review of Systems  See history of present illness and past medical history.  Patient denies headache, dizziness, syncope, falls,  "trauma, change in vision, change in hearing, change in taste, changes in weight, changes in appetite, focal weakness, numbness, or paresthesia.  Patient denies chest pain, palpitations, dyspnea, orthopnea, PND, cough, sinus pressure, rhinorrhea, epistaxis, hemoptysis, nausea, vomiting,hematemesis, diarrhea, constipation or hematchezia.  Denies cold or heat intolerance, polydipsia, polyuria, polyphagia. Denies hematuria, pyuria, dysuria, hesitancy, frequency or urgency. Denies consumption of raw and under cooked meats foods or change in water source.  Denies fever, chills, sweats, night sweats.  Denies missing any routine medications. Remainder of ROS is negative.      Vitals:   /86 (BP Location: Right arm, Patient Position: Lying)   Pulse 78   Temp 99.3 °F (37.4 °C) (Oral)   Resp 18   Ht 190.5 cm (75\")   Wt 109 kg (240 lb)   SpO2 98%   BMI 30.00 kg/m²   I/O:     Intake/Output Summary (Last 24 hours) at 12/20/2020 1100  Last data filed at 12/20/2020 0648  Gross per 24 hour   Intake 600 ml   Output --   Net 600 ml     Exam:  Patient is examined using the personal protective equipment as per guidelines from infection control for this particular patient as enacted.  Hand washing was performed before and after patient interaction.  General Appearance:    Alert, cooperative, no distress, appears stated age   Head:    Normocephalic, without obvious abnormality, atraumatic   Eyes:    PERRL, conjunctivae/corneas clear, EOM's intact, both eyes   Ears:    Normal external ear canals, both ears   Nose:   Nares normal, septum midline, mucosa normal, no drainage    or sinus tenderness   Throat:   Lips, tongue, gums normal; oral mucosa pink and moist   Neck:   Supple, symmetrical, trachea midline, no adenopathy;     thyroid:  no enlargement/tenderness/nodules; no carotid    bruit or JVD   Back:     Symmetric, no curvature, ROM normal, no CVA tenderness   Lungs:     Clear to auscultation bilaterally, respirations " unlabored   Chest Wall:    No tenderness or deformity    Heart:    Regular rate and rhythm, S1 and S2 normal, no murmur, rub   or gallop   Abdomen:     Soft, non-tender, bowel sounds active all four quadrants,     no masses, no hepatomegaly, no splenomegaly   Extremities:   Extremities normal, atraumatic, no cyanosis or edema   Pulses:   Pulses palpable in all extremities; symmetric all extremities   Skin:   Skin color normal, Skin is warm and dry,  no rashes or palpable lesions   Neurologic:   CNII-XII intact, motor strength grossly intact, sensation grossly intact to light touch, no focal deficits noted       Data Review:    I reviewed the patient's new clinical results.  Results from last 7 days   Lab Units 12/20/20  0631 12/19/20  0439 12/18/20 2024   WBC 10*3/mm3 11.70* 14.16* 15.88*   HEMOGLOBIN g/dL 8.6* 8.1* 7.9*   PLATELETS 10*3/mm3 196 233 197     Results from last 7 days   Lab Units 12/20/20  0631 12/19/20  0439 12/18/20  1653   SODIUM mmol/L 137 135* 136   POTASSIUM mmol/L 4.2 3.4* 3.3*   CHLORIDE mmol/L 110* 106 109*   CO2 mmol/L 16.6* 18.7* 16.7*   BUN mg/dL 53* 55* 48*   CREATININE mg/dL 3.64* 3.82* 3.34*   CALCIUM mg/dL 8.9 8.6 8.0*   GLUCOSE mg/dL 132* 140* 129*     Microbiology Results (last 10 days)     Procedure Component Value - Date/Time    Blood Culture - Blood, Arm, Left [[id number]] Collected: 12/18/20 1851    Lab Status: Preliminary result Specimen: Blood from Arm, Left Updated: 12/19/20 1900     Blood Culture No growth at 24 hours    Blood Culture - Blood, Arm, Left [[id number]] Collected: 12/18/20 1814    Lab Status: Preliminary result Specimen: Blood from Arm, Left Updated: 12/19/20 1830     Blood Culture No growth at 24 hours    Urine Culture - Urine, Urine, Clean Catch [[id number]] Collected: 12/18/20 1707    Lab Status: Final result Specimen: Urine, Clean Catch Updated: 12/20/20 1043     Urine Culture 50,000 CFU/mL Mixed Abbi Isolated    Narrative:      Specimen contains mixed  organisms of questionable pathogenicity which indicates contamination with commensal jamil.  Further identification is unlikely to provide clinically useful information.  Suggest recollection.    Respiratory Panel PCR w/COVID-19(SARS-CoV-2) CRISTÓBAL/RACHAEL/YAAKOV/PAD/COR/MAD/KANU In-House, NP Swab in UTM/VTM, 3-4 HR TAT - Swab, Nasopharynx [[id number]]  (Normal) Collected: 12/18/20 1655    Lab Status: Final result Specimen: Swab from Nasopharynx Updated: 12/18/20 1819     ADENOVIRUS, PCR Not Detected     Coronavirus 229E Not Detected     Coronavirus HKU1 Not Detected     Coronavirus NL63 Not Detected     Coronavirus OC43 Not Detected     COVID19 Not Detected     Human Metapneumovirus Not Detected     Human Rhinovirus/Enterovirus Not Detected     Influenza A PCR Not Detected     Influenza B PCR Not Detected     Parainfluenza Virus 1 Not Detected     Parainfluenza Virus 2 Not Detected     Parainfluenza Virus 3 Not Detected     Parainfluenza Virus 4 Not Detected     RSV, PCR Not Detected     Bordetella pertussis pcr Not Detected     Bordetella parapertussis PCR Not Detected     Chlamydophila pneumoniae PCR Not Detected     Mycoplasma pneumo by PCR Not Detected    Narrative:      Fact sheet for providers: https://docs.[employer]/wp-content/uploads/VUN9186-8722-YQ3.1-EUA-Provider-Fact-Sheet-3.pdf    Fact sheet for patients: https://docs.[employer]/wp-content/uploads/LRO3521-9395-YS8.1-EUA-Patient-Fact-Sheet-1.pdf    Test performed by PCR.    Anaerobic Culture - Swab, Foot, Right [[id number]] Collected: 12/10/20 1520    Lab Status: Final result Specimen: Swab from Foot, Right Updated: 12/15/20 0737     Anaerobic Culture No anaerobes isolated at 5 days    Wound Culture - Wound, Foot, Right [[id number]]  (Abnormal)  (Susceptibility) Collected: 12/10/20 1520    Lab Status: Final result Specimen: Wound from Foot, Right Updated: 12/15/20 0631     Wound Culture Scant growth (1+) Pseudomonas aeruginosa      Scant growth (1+) Providencia  rettgeri      Scant growth (1+) Enterococcus faecalis     Gram Stain No organisms seen      No WBCs per low power field    Susceptibility      Pseudomonas aeruginosa     ISAC     Cefepime Susceptible     Ceftazidime Susceptible     Ciprofloxacin Susceptible     Gentamicin Susceptible     Levofloxacin Susceptible     Piperacillin + Tazobactam Susceptible                Susceptibility      Providencia rettgeri     ISAC     Ampicillin Resistant     Ampicillin + Sulbactam Intermediate     Cefepime Susceptible     Ceftazidime Susceptible     Ceftriaxone Susceptible     Gentamicin Susceptible     Levofloxacin Resistant     Piperacillin + Tazobactam Susceptible     Tetracycline Resistant     Trimethoprim + Sulfamethoxazole Susceptible                Susceptibility      Enterococcus faecalis     ISAC     Ampicillin Susceptible     Vancomycin Susceptible                Susceptibility Comments     Providencia rettgeri    Cefazolin sensitivity will not be reported for Enterobacteriaceae in non-urine isolates. If cefazolin is preferred, please call the microbiology lab to request an E-test.  With the exception of urinary-sourced infections, aminoglycosides should not be used as monotherapy.                     Assessment:  Active Hospital Problems    Diagnosis  POA   • **Sepsis secondary to UTI (CMS/HCC) [A41.9, N39.0]  Yes   • Macrocytosis [D75.89]  Yes   • Metabolic encephalopathy [G93.41]  Yes   • Anemia, chronic disease [D63.8]  Unknown   • Hyperlipidemia [E78.5]  Yes   • Hypertension [I10]  Yes   • Monoclonal gammopathy [D47.2]  Yes   • ESRD (end stage renal disease) (CMS/[facility]) [N18.6]  Yes   • DM2 (diabetes mellitus, type 2) (CMS/[facility]) [E11.9]  Yes   • Abnormal CT of the abdomen [R93.5]  Yes   • Normal anion gap metabolic acidosis [E87.2]  Yes   • Complicated UTI (urinary tract infection) [N39.0]  Yes      Resolved Hospital Problems   No resolved problems to display.     Ct Abdomen Pelvis Without Contrast    Result Date:  12/18/2020    1. Diffuse wall thickening the urinary bladder with small surrounding fat stranding suggests cystitis. Bilateral perinephric fat stranding is also present. Nonobstructive right nephrolithiasis. 2. Conspicuous abnormal wall thickening is apparent in the rectum, circumferentially, that may reflect rectal wall lesion/neoplasm or proctitis, direct visualization advised. 3. Borderline, nonspecific lymph nodes, as described.  Discussed by telephone with Dr. Wang for Dr. Mast at 1930, 12/18/2020.  This report was finalized on 12/18/2020 7:34 PM by Dr. Jluis Meza M.D.      Ct Head Without Contrast    Result Date: 12/18/2020   No acute intracranial hemorrhage or hydrocephalus. If there is further clinical concern, MRI could be considered for further evaluation.  This report was finalized on 12/18/2020 7:22 PM by Dr. Jluis Meza M.D.          Plan:  See above  Tonja Azevedo MD   12/20/2020  11:00 EST    Much of this encounter note is an electronic transcription/translation of spoken language to printed text. The electronic translation of spoken language may permit erroneous, or at times, nonsensical words or phrases to be inadvertently transcribed; Although I have reviewed the note for such errors, some may still exist      Electronically signed by Tonja Azevedo MD at 12/20/20 7440     Nixon oRbertson MD at 12/19/20 1624      Consult Orders    1. Inpatient Nephrology Consult [[id number]] ordered by Olga Lidia Reed APRN at 12/18/20 2234                 Referring Provider: ANTWAN Sierra  Reason for Consultation: CKD4    Subjective     Chief complaint   Chief Complaint   Patient presents with   • Altered Mental Status   • Fever       History of present illness:  66 yo AAM with CKD4 (baseline SCR had been in 5-range, tho here in 3-4 range), followed by Dr. Coleen Moreno with [facility], admitted yesterday for further evaluation of lethargy, confusion, and fever.  He reports  "ongoing problems with bilateral lower extremity wounds followed by Home Health, with inability to walk for the past 6 months and increasing pain in both lower legs for the last few days.  Imaging in the ER included abdomen/pelvis CT revealing bladder wall thickening concerning for cystitis as well as thickening of the rectum concerning for proctitis.  Covid-19 testing is negative.  SCR 3.3 on arrival and 3.8 today.  Full PMH outlined below; pertinent is longstanding DM2, obesity, and hypertension.  · Though he denies ever having had any surgery on either arm, \"Care Everywhere\" Epic records indicate creation of AV fistula in the left arm earlier this year.  No obvious surgical incision in either arm, nor is any bruit or thrill appreciated   · Has been unable to walk for easily 6 months  · Denies any urinary symptoms; denies diarrhea  · Breathing is comfortable; no chest pain; reports stable leg swelling.  Home health nurse wraps his leg regularly and attends to his leg wounds    Past Medical History:   Diagnosis Date   • Back pain    • CKD (chronic kidney disease)    • DM type 2 (diabetes mellitus, type 2) (CMS/[facility])    • ED (erectile dysfunction)    • Hyperlipidemia    • Hypertension    • SCOTTY (iron deficiency anemia)    • Monoclonal gammopathy    • Osteoarthritis      No past surgical history on file.  No family history on file.  Social History     Tobacco Use   • Smoking status: Not on file   Substance Use Topics   • Alcohol use: Not on file   • Drug use: Not on file     Medications Prior to Admission   Medication Sig Dispense Refill Last Dose   • atorvastatin (LIPITOR) 20 MG tablet Take 20 mg by mouth Daily.   12/17/2020 at Unknown time   • brimonidine (ALPHAGAN P) 0.1 % solution ophthalmic solution 1 drop 2 (two) times a day.   12/17/2020 at Unknown time   • diphenhydrAMINE (BENADRYL) 25 mg capsule Take 25 mg by mouth Every 6 (Six) Hours As Needed for Itching or Allergies.   12/17/2020 at Unknown time   • " "dorzolamide (TRUSOPT) 2 % ophthalmic solution Administer 1 drop to the right eye 2 (two) times a day.   12/17/2020 at Unknown time   • furosemide (LASIX) 20 MG tablet Take 20 mg by mouth 2 (Two) Times a Day.   12/17/2020 at Unknown time   • gabapentin (NEURONTIN) 300 MG capsule TAKE 1 CAPSULE BY MOUTH TWICE DAILY (Patient taking differently: 3 (Three) Times a Day.) 60 capsule 0 12/17/2020 at Unknown time   • HUMALOG KWIKPEN 100 UNIT/ML solution pen-injector INJECT 20 UNITS INTO THE SKIN TWICE DAILY 15 mL 0 12/17/2020 at Unknown time   • HYDROcodone-acetaminophen (NORCO)  MG per tablet Take 1 tablet po every 4 hours PRN for moderate pain, take two tablets po every 4 hours PRN for severe pain, valid if faxed to [facility]   tablet 0 12/17/2020 at Unknown time   • WAL-DRYL ALLERGY 25 MG Take 1 capsule by mouth Every 6 (Six) Hours As Needed for itching. 30 capsule 0      Allergies:  Patient has no known allergies.    Review of Systems  14-point ROS performed and all negative except for pertinent +/-'s detailed in HPI.     Objective     Vital Signs  Temp:  [97.8 °F (36.6 °C)-100.7 °F (38.2 °C)] 97.8 °F (36.6 °C)  Heart Rate:  [] 86  Resp:  [18] 18  BP: (105-142)/(63-88) 105/67    Flowsheet Rows      First Filed Value   Admission Height  190.5 cm (75\") Documented at 12/18/2020 1642   Admission Weight  109 kg (240 lb) Documented at 12/18/2020 1642           No intake/output data recorded.  I/O last 3 completed shifts:  In: 50 [IV Piggyback:50]  Out: -     Intake/Output Summary (Last 24 hours) at 12/19/2020 1624  Last data filed at 12/19/2020 0300  Gross per 24 hour   Intake 50 ml   Output --   Net 50 ml       Physical Exam:  NAD; pleasant; oriented fully; looks older than stated age  Obese; chronically ill-appearing; blunted sensorium; slightly hard of hearing  MMM; AT/NC   No eye discharge; no scleral icterus  No JVD; no carotid bruits  Decreased breath sounds in bases bilat; no wheezes; not labored  RRR, " no rub  Soft, NT, +D, BS+  Woody edema both lower legs and feet; legs are wrapped  +clubbing  +asterixis  Moves all extremities   Speech is a bit halting  Flat affect and depressed mood    Results Review:  Results from last 7 days   Lab Units 12/19/20 0439 12/18/20  1653   SODIUM mmol/L 135* 136   POTASSIUM mmol/L 3.4* 3.3*   CHLORIDE mmol/L 106 109*   CO2 mmol/L 18.7* 16.7*   BUN mg/dL 55* 48*   CREATININE mg/dL 3.82* 3.34*   CALCIUM mg/dL 8.6 8.0*   BILIRUBIN mg/dL  --  0.6   ALK PHOS U/L  --  94   ALT (SGPT) U/L  --  11   AST (SGOT) U/L  --  15   GLUCOSE mg/dL 140* 129*       Estimated Creatinine Clearance: 25 mL/min (A) (by C-G formula based on SCr of 3.82 mg/dL (H)).    Results from last 7 days   Lab Units 12/19/20  0439   MAGNESIUM mg/dL 1.8       Results from last 7 days   Lab Units 12/19/20  0439 12/18/20  2024   WBC 10*3/mm3 14.16* 15.88*   HEMOGLOBIN g/dL 8.1* 7.9*   PLATELETS 10*3/mm3 233 197       Results from last 7 days   Lab Units 12/19/20 0439   INR  1.37*       Active Medications  cefTRIAXone, 1 g, Intravenous, Q24H  insulin lispro, 0-7 Units, Subcutaneous, TID AC  sodium chloride, 10 mL, Intravenous, Q12H      sodium chloride, 50 mL/hr, Last Rate: 50 mL/hr (12/19/20 0159)        Assessment/Plan   Assessment  1.  CKD4: stable though poor function.  Has biopsy-proven diabetic nephropathy.  Central volume stable by exam, tho chest x-ray does suggest some central pulmonary congestion. Hypervolemic hyponatremia; hypokalemia and likely NAGMA.  Urinalysis with only 30 mg/dL protein, 21-30 WBCs/hpf, and TNTC RBCs.  Reportedly has seen a vascular surgeon and had an AV fistula created in the left arm, although patient denies having had this done.  No obvious surgical scars left arm, tho I think I can trace out small AVG just prox and medial to elbow, tho might just be knotty vein with calcification as other areas of arm with similar findings  2.  Fever and confusion, with the latter clearing  3.  Bilateral  lower extremity wounds  4.  Cystitis and proctitis by CT  5.  Anemia, profound, with history of MGUS  6.  Immobility syndrome: Has not walked for 6 months  7.  Longstanding DM2  8.  Hypertension: hypotensive now      Sepsis secondary to UTI (CMS/HCC)    Complicated UTI (urinary tract infection)    Macrocytosis    Metabolic encephalopathy    Hyperlipidemia    Hypertension    Monoclonal gammopathy    ESRD (end stage renal disease) (CMS/HCC)    DM2 (diabetes mellitus, type 2) (CMS/[facility])    Abnormal CT of the abdomen    Normal anion gap metabolic acidosis    Anemia, chronic disease      Plan  1.  Continue IVF while room air saturations fine, blood pressure low, and oral intake mediocre  2.  Replace potassium  3.  Empiric antibiotics; follow-up cultures  4.  Check iron stores  5.  Bladder scan daily    I discussed the patient's findings and my recommendations with the patient and with Dr. Coleen Moreno ([facility])    Nixon Robertson MD  12/19/20  16:24 EST              Electronically signed by Nixon Robertson MD at 12/19/20 7648     Kip Wiley MD at 12/19/20 0812      Consult Orders    1. Inpatient Gastroenterology Consult [[id number]] ordered by Olga Lidia Reed APRN at 12/18/20 7174               [facility]  Initial Inpatient Consult Note    Referring Provider: Derek    Reason for Consultation: Abnormal imaging    Subjective     History of present illness:    65 y.o. male who is confused, cannot give me adequate history, evidently transfer from wound clinic with new onset delirium/confusion.  Today he denies abdominal pain, nausea, vomiting, change in bowel habits.  Denies bright red blood per rectum, melena or hematemesis.  He tells me he is never had an EGD or colonoscopy in his lifetime.  Records from [place] 2017 shows a preprocedure evaluation by Dr. Poe, for anemia, EGD and colonoscopy were scheduled but I do not see that they were performed.  Of course they may  have been done at their outpatient center on [address] I do not have access to those records.    Patient underwent CAT scan of the abdomen results below.    Past Medical History:  Past Medical History:   Diagnosis Date   • Back pain    • CKD (chronic kidney disease)    • DM type 2 (diabetes mellitus, type 2) (CMS/HCC)    • ED (erectile dysfunction)    • Hyperlipidemia    • Hypertension    • SCOTTY (iron deficiency anemia)    • Monoclonal gammopathy    • Osteoarthritis      Past Surgical History:  No past surgical history on file.   Social History:   Social History     Tobacco Use   • Smoking status: Not on file   Substance Use Topics   • Alcohol use: Not on file      Family History:  No family history on file.    Home Meds:  Medications Prior to Admission   Medication Sig Dispense Refill Last Dose   • atorvastatin (LIPITOR) 20 MG tablet Take 20 mg by mouth Daily.   12/17/2020 at Unknown time   • brimonidine (ALPHAGAN P) 0.1 % solution ophthalmic solution 1 drop 2 (two) times a day.   12/17/2020 at Unknown time   • diphenhydrAMINE (BENADRYL) 25 mg capsule Take 25 mg by mouth Every 6 (Six) Hours As Needed for Itching or Allergies.   12/17/2020 at Unknown time   • dorzolamide (TRUSOPT) 2 % ophthalmic solution Administer 1 drop to the right eye 2 (two) times a day.   12/17/2020 at Unknown time   • furosemide (LASIX) 20 MG tablet Take 20 mg by mouth 2 (Two) Times a Day.   12/17/2020 at Unknown time   • gabapentin (NEURONTIN) 300 MG capsule TAKE 1 CAPSULE BY MOUTH TWICE DAILY (Patient taking differently: 3 (Three) Times a Day.) 60 capsule 0 12/17/2020 at Unknown time   • HUMALOG KWIKPEN 100 UNIT/ML solution pen-injector INJECT 20 UNITS INTO THE SKIN TWICE DAILY 15 mL 0 12/17/2020 at Unknown time   • HYDROcodone-acetaminophen (NORCO)  MG per tablet Take 1 tablet po every 4 hours PRN for moderate pain, take two tablets po every 4 hours PRN for severe pain, valid if faxed to [facility]   tablet 0 12/17/2020 at  Unknown time   • WAL-DRYL ALLERGY 25 MG Take 1 capsule by mouth Every 6 (Six) Hours As Needed for itching. 30 capsule 0      Current Meds:   insulin lispro, 0-7 Units, Subcutaneous, TID AC  sodium chloride, 10 mL, Intravenous, Q12H      Allergies:  No Known Allergies  Review of Systems  Review of systems could not be obtained due to   patient confusion.     Objective     Vital Signs  Temp:  [98.1 °F (36.7 °C)-100.7 °F (38.2 °C)] 98.1 °F (36.7 °C)  Heart Rate:  [] 69  Resp:  [18] 18  BP: (112-142)/(63-88) 112/63  Physical Exam:  General Appearance:    Alert, cooperative, in no acute distress   Head:    Normocephalic, without obvious abnormality, atraumatic   Eyes:          conjunctivae and sclerae normal, no   icterus   Throat:   no thrush, oral mucosa moist   Neck:   Supple, no adenopathy   Lungs:     Clear to auscultation bilaterally    Heart:    Regular rhythm and normal rate    Chest Wall:    No abnormalities observed   Abdomen:     Soft, nondistended, nontender; normal bowel sounds   Extremities:   no edema, no redness   Skin:   No bruising or rash       Results Review:   I reviewed the patient's new clinical results.    Results from last 7 days   Lab Units 12/19/20  0439 12/18/20 2024   WBC 10*3/mm3 14.16* 15.88*   HEMOGLOBIN g/dL 8.1* 7.9*   HEMATOCRIT % 26.8* 26.6*   PLATELETS 10*3/mm3 233 197     Results from last 7 days   Lab Units 12/19/20  0439 12/18/20  1653   SODIUM mmol/L 135* 136   POTASSIUM mmol/L 3.4* 3.3*   CHLORIDE mmol/L 106 109*   CO2 mmol/L 18.7* 16.7*   BUN mg/dL 55* 48*   CREATININE mg/dL 3.82* 3.34*   CALCIUM mg/dL 8.6 8.0*   BILIRUBIN mg/dL  --  0.6   ALK PHOS U/L  --  94   ALT (SGPT) U/L  --  11   AST (SGOT) U/L  --  15   GLUCOSE mg/dL 140* 129*     Results from last 7 days   Lab Units 12/19/20  0439   INR  1.37*     Lab Results   Lab Value Date/Time    LIPASE 17 12/18/2020 1651    LIPASE 914 (H) 10/27/2020 0330    LIPASE 1,087 (H) 10/26/2020 0410    LIPASE 1,653 (H) 10/25/2020 0323        Radiology:  CT Head Without Contrast   Final Result       No acute intracranial hemorrhage or hydrocephalus. If there is further   clinical concern, MRI could be considered for further evaluation.       This report was finalized on 12/18/2020 7:22 PM by Dr. Jluis Meza M.D.          CT Abdomen Pelvis Without Contrast   Final Result           1. Diffuse wall thickening the urinary bladder with small surrounding   fat stranding suggests cystitis. Bilateral perinephric fat stranding is   also present. Nonobstructive right nephrolithiasis.   2. Conspicuous abnormal wall thickening is apparent in the rectum,   circumferentially, that may reflect rectal wall lesion/neoplasm or   proctitis, direct visualization advised.   3. Borderline, nonspecific lymph nodes, as described.       Discussed by telephone with Dr. Wang for Dr. Mast at 1930, 12/18/2020.       This report was finalized on 12/18/2020 7:34 PM by Dr. Jluis Meza M.D.          XR Chest 1 View   Final Result          Assessment/Plan   Patient Active Problem List   Diagnosis   • Complicated UTI (urinary tract infection)       Assessment:  6. Abnormal imaging with cystitis/urinary tract infection  7. Confusion/delirium likely secondary to urosepsis  8. Abnormal imaging of the rectum  9. Microcytic anemia    Plan:  · He will need EGD and colonoscopy for microcytic anemia and abnormal findings of the rectum when his confusion clears, when he is adequately been treated for cystitis/urinary tract infection when he has been cleared by the primary team  · Follow hemoglobin  · Check iron indices  · We will follow       I discussed the patients findings and my recommendations with patient and nursing staff.    Kip Wiley MD            Electronically signed by Kip Wiley MD at 12/19/20 0816       All medication doses during the admission are shown, including meds that are no longer on order.   Scheduled Meds Sorted by Name  for Javon  Gregorio SHIPLEY as of 12/19/20 through 12/21/20    1 Day 3 Days 7 Days 10 Days <  Today >     Legend:                          Inactive     Active     Other Encounter     Linked                 Medications 12/19/20 12/20/20 12/21/20   cefTRIAXone (ROCEPHIN) IVPB 1 g   Dose: 1 g  Freq: Every 24 Hours Route: IV  Indications of Use: URINARY TRACT INFECTION  Last Dose: 1 g (12/19/20 1059)  Start: 12/19/20 1030 End: 12/20/20 1101    Admin Instructions:   Caution: Look alike/sound alike drug alert. Refrigerate    1059          1101-D/C'd  (1114)             cefTRIAXone (ROCEPHIN) IVPB 1 g   Dose: 1 g  Freq: Once Route: IV  Indications of Use: URINARY TRACT INFECTION  Last Dose: Stopped (12/18/20 1921)  Start: 12/18/20 1744 End: 12/18/20 1921    Admin Instructions:   Caution: Look alike/sound alike drug alert. Refrigerate         insulin lispro (ADMELOG) injection 0-7 Units   Dose: 0-7 Units  Freq: 3 Times Daily Before Meals Route: SC  Start: 12/19/20 0730    Admin Instructions:   Correction - Low Dose.  Less than 40 units/day total insulin dose or lean, elderly, renal patients    Blood glucose 150-199 mg/dL - 2 units  Blood glucose 200-249 mg/dL - 3 units  Blood glucose 250-299 mg/dL - 4 units  Blood glucose 300-349 mg/dL - 5 units  Blood glucose 350-400 mg/dL - 6 units  Blood glucose greater than 400 mg/dL - 7 units and call provider   Caution: Look alike/sound alike drug alert    0846   1259   1749      0719   1149   1700      0730   1130   1730        metoprolol tartrate (LOPRESSOR) tablet 12.5 mg   Dose: 12.5 mg  Freq: Every 12 Hours Scheduled Route: PO  Start: 12/20/20 2100     2033          0900   2100          piperacillin-tazobactam (ZOSYN) 3.375 g in iso-osmotic dextrose 50 ml (premix)   Dose: 3.375 g  Freq: Every 12 Hours Route: IV  Indications of Use: SKIN AND SOFT TISSUE INFECTION  Start: 12/20/20 1800 End: 12/27/20 1759    Admin Instructions:   Refrigerate     [id number]           piperacillin-tazobactam (ZOSYN) 3.375 g in iso-osmotic dextrose 50 ml (premix)   Dose: 3.375 g  Freq: Once Route: IV  Start: 12/20/20 1200 End: 12/20/20 1246    Admin Instructions:   Refrigerate     1216             potassium chloride (KLOR-CON) packet 20 mEq   Dose: 20 mEq  Freq: Once Route: PO  Start: 12/19/20 1030 End: 12/19/20 1059    Admin Instructions:   For use with a feeding tube. Mix in at least 4 oz. of liquid.    1059              potassium chloride (KLOR-CON) packet 40 mEq   Dose: 40 mEq  Freq: Once Route: PO  Start: 12/19/20 1800 End: 12/19/20 1813    Admin Instructions:   For use with a feeding tube. Mix in at least 4 oz. of liquid.    1813              sodium bicarbonate tablet 1,300 mg   Dose: 1,300 mg  Freq: 2 Times Daily Route: PO  Start: 12/20/20 2100     2033          0900   2100          sodium chloride 0.9 % flush 10 mL   Dose: 10 mL  Freq: Every 12 Hours Scheduled Route: IV  Start: 12/18/20 2236    0155   0847        0008   0854   2100      0900   2100          Medications 12/19/20 12/20/20 12/21/20       Continuous Meds Sorted by Name  for Gregorio Alejandro as of 12/19/20 through 12/21/20   Legend:                          Inactive     Active     Other Encounter     Linked                 Medications 12/19/20 12/20/20 12/21/20   sodium chloride 0.9 % infusion   Rate: 50 mL/hr Dose: 50 mL/hr  Freq: Continuous Route: IV  Last Dose: Stopped (12/20/20 1320)  Start: 12/18/20 2236 End: 12/20/20 1439    0159          0009   1320   1439-D/C'd           PRN Meds Sorted by Name  for Gregorio Alejandro as of 12/19/20 through 12/21/20   Legend:                          Inactive     Active     Other Encounter     Linked                 Medications 12/19/20 12/20/20 12/21/20   acetaminophen (TYLENOL) tablet 650 mg   Dose: 650 mg  Freq: Every 4 Hours PRN Route: PO  PRN Reason: Mild Pain   Start: 12/18/20 2233    Admin Instructions:   Do not exceed 4 grams of acetaminophen in a 24 hr period. Max dose of 2gm for  AST/ALT greater than 120 units/L      If given for pain, use the following pain scale:   Mild Pain = Pain Score of 1-3, CPOT 1-2  Moderate Pain = Pain Score of 4-6, CPOT 3-4  Severe Pain = Pain Score of 7-10, CPOT 5-8         Or  acetaminophen (TYLENOL) 160 MG/5ML solution 650 mg   Dose: 650 mg  Freq: Every 4 Hours PRN Route: PO  PRN Reason: Mild Pain   Start: 12/18/20 2233    Admin Instructions:   Do not exceed 4 grams of acetaminophen in a 24 hr period. Max dose of 2gm for AST/ALT greater than 120 units/L      If given for pain, use the following pain scale:   Mild Pain = Pain Score of 1-3, CPOT 1-2  Moderate Pain = Pain Score of 4-6, CPOT 3-4  Severe Pain = Pain Score of 7-10, CPOT 5-8         Or  acetaminophen (TYLENOL) suppository 650 mg   Dose: 650 mg  Freq: Every 4 Hours PRN Route: RE  PRN Reason: Mild Pain   Start: 12/18/20 2233    Admin Instructions:   Do not exceed 4 grams of acetaminophen in a 24 hr period. Max dose of 2gm for AST/ALT greater than 120 units/L      If given for pain, use the following pain scale:   Mild Pain = Pain Score of 1-3, CPOT 1-2  Moderate Pain = Pain Score of 4-6, CPOT 3-4  Severe Pain = Pain Score of 7-10, CPOT 5-8         dextrose (D50W) 25 g/ 50mL Intravenous Solution 25 g   Dose: 25 g  Freq: Every 15 Minutes PRN Route: IV  PRN Reason: Low Blood Sugar  PRN Comment: Blood Sugar Less Than 70  Start: 12/18/20 2228    Admin Instructions:   Blood sugar less than 70; patient has IV access - Unresponsive, NPO or Unable To Safely Swallow         dextrose (GLUTOSE) oral gel 15 g   Dose: 15 g  Freq: Every 15 Minutes PRN Route: PO  PRN Reason: Low Blood Sugar  PRN Comment: Blood sugar less than 70  Start: 12/18/20 2228    Admin Instructions:   BS<70, Patient Alert, Is not NPO, Can safely swallow.         glucagon (human recombinant) (GLUCAGEN DIAGNOSTIC) injection 1 mg   Dose: 1 mg  Freq: Every 15 Minutes PRN Route: SC  PRN Reason: Low Blood Sugar  PRN Comment: Blood Glucose Less Than  70  Start: 12/18/20 2228    Admin Instructions:   Blood Glucose Less Than 70 - Patient Without IV Access - Unresponsive, NPO or Unable To Safely Swallow         nitroglycerin (NITROSTAT) SL tablet 0.4 mg   Dose: 0.4 mg  Freq: Every 5 Minutes PRN Route: SL  PRN Reason: Chest Pain  PRN Comment: Only if SBP Greater Than 100  Start: 12/18/20 2230    Admin Instructions:   If Pain Unrelieved After 3 Doses Notify MD         ondansetron (ZOFRAN) injection 4 mg   Dose: 4 mg  Freq: Every 6 Hours PRN Route: IV  PRN Reasons: Nausea,Vomiting  Start: 12/18/20 2233    Admin Instructions:   If BOTH ondansetron (ZOFRAN) and promethazine (PHENERGAN) are ordered use ondansetron first and THEN promethazine IF ondansetron is ineffective.         sodium chloride 0.9 % flush 10 mL   Dose: 10 mL  Freq: As Needed Route: IV  PRN Reason: Line Care  Start: 12/18/20 2228         sodium chloride 0.9 % flush 10 mL   Dose: 10 mL  Freq: As Needed Route: IV  PRN Reason: Line Care  Start: 12/18/20 1637         Medications 12/19/20 12/20/20 12/21/20

## 2020-12-22 NOTE — NURSING NOTE
Dr. Frank here, aware pt states is blind in left eye but now can't see out of his right eye. Aware pt states he normally can't see much out of the right eye except figures/shadows and as I was talking to pt he stated it's getting better.

## 2020-12-22 NOTE — PROGRESS NOTES
"  Infectious Diseases Progress Note    Tonja Azevedo MD     [facility]  [place]: 4 days  Patient Identification:  Name: Gregorio Alejandro  Age: 65 y.o.  Sex: male  :  1955  MRN: [id number]         Primary Care Physician: Maya Ribeiro APRN            Subjective: Feeling better denies any fever or chills.  Concerned about decreased vision in his right eye which was his better eye.  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  Interval History: See consultation    Objective:    Scheduled Meds:aspirin, 81 mg, Oral, Daily  influenza vaccine, 0.5 mL, Intramuscular, Once  insulin lispro, 0-7 Units, Subcutaneous, TID AC  piperacillin-tazobactam, 3.375 g, Intravenous, Q12H  sodium bicarbonate, 1,300 mg, Oral, BID  sodium chloride, 10 mL, Intravenous, Q12H      Continuous Infusions:     Vital signs in last 24 hours:  Temp:  [97.6 °F (36.4 °C)-98.1 °F (36.7 °C)] 97.7 °F (36.5 °C)  Heart Rate:  [67-81] 81  Resp:  [18] 18  BP: (130-165)/() 138/90    Intake/Output:    Intake/Output Summary (Last 24 hours) at 2020 1111  Last data filed at 2020 0656  Gross per 24 hour   Intake 1260 ml   Output --   Net 1260 ml       Exam:  /90 (BP Location: Right arm, Patient Position: Lying)   Pulse 81   Temp 97.7 °F (36.5 °C) (Oral)   Resp 18   Ht 190.5 cm (75\")   Wt 114 kg (251 lb 8 oz)   SpO2 95%   BMI 31.44 kg/m²   Patient is examined using the personal protective equipment as per guidelines from infection control for this particular patient as enacted.  Hand washing was performed before and after patient interaction.  General Appearance:    Alert, cooperative, no distress, AAOx3                          Head:    Normocephalic, without obvious abnormality, atraumatic                           Eyes:    PERRL, conjunctivae/corneas clear, EOM's intact, both eyes                         Throat:   Lips, tongue, gums normal; oral mucosa pink and moist                           Neck:   Supple, " symmetrical, trachea midline, no JVD                         Lungs:    Clear to auscultation bilaterally, respirations unlabored                 Chest Wall:    No tenderness or deformity                          Heart:    Regular rate and rhythm, S1 and S2 normal, no murmur, no rub                                         or gallop                  Abdomen:   Soft nontender                 extremities: Lateral lower extremity wrapped, no obvious wound on the feet noted                        Pulses:   Pulses palpable in all extremities                            Skin:   Skin is warm and dry,  no rashes or palpable lesions                  Neurologic: Grossly nonfocal       Data Review:    I reviewed the patient's new clinical results.  Results from last 7 days   Lab Units 12/22/20  0549 12/21/20  1450 12/21/20  0447 12/20/20  0631 12/19/20  0439 12/18/20 2024   WBC 10*3/mm3 7.29 10.42 8.90 11.70* 14.16* 15.88*   HEMOGLOBIN g/dL 8.2* 8.6* 8.1* 8.6* 8.1* 7.9*   PLATELETS 10*3/mm3 237 242 223 196 233 197     Results from last 7 days   Lab Units 12/22/20  0549 12/21/20  0447 12/20/20  0631 12/19/20  0439 12/18/20  1653   SODIUM mmol/L 138 137 137 135* 136   POTASSIUM mmol/L 3.7 3.6 4.2 3.4* 3.3*   CHLORIDE mmol/L 110* 109* 110* 106 109*   CO2 mmol/L 20.6* 19.3* 16.6* 18.7* 16.7*   BUN mg/dL 43* 47* 53* 55* 48*   CREATININE mg/dL 3.65* 3.37* 3.64* 3.82* 3.34*   CALCIUM mg/dL 8.7 8.6 8.9 8.6 8.0*   GLUCOSE mg/dL 109* 116* 132* 140* 129*       Microbiology Results (last 10 days)     Procedure Component Value - Date/Time    Blood Culture - Blood, Arm, Left [[id number]] Collected: 12/18/20 1851    Lab Status: Preliminary result Specimen: Blood from Arm, Left Updated: 12/21/20 1900     Blood Culture No growth at 3 days    Blood Culture - Blood, Arm, Left [[id number]] Collected: 12/18/20 1814    Lab Status: Preliminary result Specimen: Blood from Arm, Left Updated: 12/21/20 1780     Blood Culture No growth at 3 days    Urine  Culture - Urine, Urine, Clean Catch [[id number]] Collected: 12/18/20 1707    Lab Status: Final result Specimen: Urine, Clean Catch Updated: 12/20/20 1043     Urine Culture 50,000 CFU/mL Mixed Jamil Isolated    Narrative:      Specimen contains mixed organisms of questionable pathogenicity which indicates contamination with commensal jamil.  Further identification is unlikely to provide clinically useful information.  Suggest recollection.    Respiratory Panel PCR w/COVID-19(SARS-CoV-2) [facility]/RACHAEL/YAAKOV/PAD/COR/MAD/KANU In-House, NP Swab in UTM/VTM, 3-4 HR TAT - Swab, Nasopharynx [[id number]]  (Normal) Collected: 12/18/20 1655    Lab Status: Final result Specimen: Swab from Nasopharynx Updated: 12/18/20 1819     ADENOVIRUS, PCR Not Detected     Coronavirus 229E Not Detected     Coronavirus HKU1 Not Detected     Coronavirus NL63 Not Detected     Coronavirus OC43 Not Detected     COVID19 Not Detected     Human Metapneumovirus Not Detected     Human Rhinovirus/Enterovirus Not Detected     Influenza A PCR Not Detected     Influenza B PCR Not Detected     Parainfluenza Virus 1 Not Detected     Parainfluenza Virus 2 Not Detected     Parainfluenza Virus 3 Not Detected     Parainfluenza Virus 4 Not Detected     RSV, PCR Not Detected     Bordetella pertussis pcr Not Detected     Bordetella parapertussis PCR Not Detected     Chlamydophila pneumoniae PCR Not Detected     Mycoplasma pneumo by PCR Not Detected    Narrative:      Fact sheet for providers: https://docs.[employer]/wp-content/uploads/KBO7482-8310-VT9.1-EUA-Provider-Fact-Sheet-3.pdf    Fact sheet for patients: https://docs.[employer]/wp-content/uploads/HKJ3575-3415-VK2.1-EUA-Patient-Fact-Sheet-1.pdf    Test performed by PCR.          Assessment:    Sepsis secondary to UTI (CMS/HCC)    Complicated UTI (urinary tract infection)    Macrocytosis    Metabolic encephalopathy    Hyperlipidemia    Hypertension    Monoclonal gammopathy    ESRD (end stage renal disease)  (CMS/HCC)    DM2 (diabetes mellitus, type 2) (CMS/HCC)    Abnormal CT of the abdomen    Normal anion gap metabolic acidosis    Anemia, chronic disease    Ventricular tachycardia (paroxysmal) (CMS/HCC)  1-toxic metabolic encephalopathy overall improved and likely because of it at the time of presentation  2-urinary tract infection  3-probably venous stasis ulcer and secondary cellulitis of the bilateral lower extremities currently wrapped  4-other diagnosis per internal medicine service        Recommendations/Discussions:  See my discussion on 12/21/2020  Given the information we have in the response to treatment on the current antibiotics it is very difficult to come up with the oral antibiotic regimen that would address his polymicrobial urinary tract infection along with was going on in his lower extremities.  Ideal treatment would be Zosyn for total of 10 to 14 days.  But if above treatment is not practical because of issues with IV access and logistics involved in delivering this medication out of the hospital then based on the culture data less than optimal but reasonable alternative would be combination of Levaquin plus Augmentin for similar duration with understanding that if patient shows worsening of sepsis in terms of increasing leukocytosis or development of fever and chills that he needs to be reevaluated with understanding of restarting the treatment with Zosyn.  Discussed with Dr. Frank.    Tonja Azevedo MD  12/22/2020  11:11 EST    Much of this encounter note is an electronic transcription/translation of spoken language to printed text. The electronic translation of spoken language may permit erroneous, or at times, nonsensical words or phrases to be inadvertently transcribed; Although I have reviewed the note for such errors, some may still exist

## 2020-12-22 NOTE — PLAN OF CARE
Goal Outcome Evaluation:  Plan of Care Reviewed With: patient  Progress: no change   Vss. Pt up in locked w/c throughout the afternoon.home eye gtts restarted.Heparin gtt d/c'd. No c/o pain or n/v.

## 2020-12-22 NOTE — PROGRESS NOTES
[phone]   LOS: 4 days     Name: Gregorio Alejandro  Age/Sex: 65 y.o. male  :  1955        PCP: Maya Ribeiro APRN  Chief Complaint   Patient presents with   • Altered Mental Status   • Fever      Subjective   He is complaining of vision issues today.  Describes receiving avastin injections and also endorses diabetic retinopathy.  Sympotms are simplar.  He is unable to read at baseline so difficutl to assess for fine vision.  He is tracking me around the room and follows fingers.  Describes it as hazy  General: No Fever or Chills, Cardiac: No Chest Pain or Palpitations, Resp: No Cough or SOA, GI: No Nausea, Vomiting, or Diarrhea and Other: No bleeding    aspirin, 81 mg, Oral, Daily  influenza vaccine, 0.5 mL, Intramuscular, Once  insulin lispro, 0-7 Units, Subcutaneous, TID AC  piperacillin-tazobactam, 3.375 g, Intravenous, Q12H  sodium bicarbonate, 1,300 mg, Oral, BID  sodium chloride, 10 mL, Intravenous, Q12H      heparin (porcine), 9.17 Units/kg/hr, Last Rate: 15.17 Units/kg/hr (20 0711)        Objective   Vital Signs  Temp:  [97.4 °F (36.3 °C)-98.1 °F (36.7 °C)] 98.1 °F (36.7 °C)  Heart Rate:  [67-81] 67  Resp:  [18] 18  BP: (130-151)/() 130/83  Body mass index is 30 kg/m².    Intake/Output Summary (Last 24 hours) at 2020 0729  Last data filed at 2020 0656  Gross per 24 hour   Intake 1500 ml   Output --   Net 1500 ml       Physical Exam  Vitals signs and nursing note reviewed.   Constitutional:       Appearance: He is obese. He is ill-appearing.   HENT:      Head: Normocephalic.   Eyes:      Extraocular Movements: Extraocular movements intact.   Cardiovascular:      Rate and Rhythm: Normal rate and regular rhythm.   Pulmonary:      Effort: Pulmonary effort is normal. No respiratory distress.      Breath sounds: Normal breath sounds.   Abdominal:      General: Abdomen is flat.      Palpations: Abdomen is soft.   Skin:     General: Skin is warm and dry.      Capillary Refill:  Capillary refill takes less than 2 seconds.   Neurological:      General: No focal deficit present.      Mental Status: He is alert and oriented to person, place, and time.   Psychiatric:      Comments: Flat affect mood is depressed           Results Review:       I reviewed the patient's new clinical results.  Results from last 7 days   Lab Units 12/21/20  1450 12/21/20  0447 12/20/20  0631 12/19/20  0439 12/18/20  2024   WBC 10*3/mm3 10.42 8.90 11.70* 14.16* 15.88*   HEMOGLOBIN g/dL 8.6* 8.1* 8.6* 8.1* 7.9*   PLATELETS 10*3/mm3 242 223 196 233 197     Results from last 7 days   Lab Units 12/22/20  0549 12/21/20  0447 12/20/20  0631 12/19/20  0439 12/18/20  1653   SODIUM mmol/L 138 137 137 135* 136   POTASSIUM mmol/L 3.7 3.6 4.2 3.4* 3.3*   CHLORIDE mmol/L 110* 109* 110* 106 109*   CO2 mmol/L 20.6* 19.3* 16.6* 18.7* 16.7*   BUN mg/dL 43* 47* 53* 55* 48*   CREATININE mg/dL 3.65* 3.37* 3.64* 3.82* 3.34*   CALCIUM mg/dL 8.7 8.6 8.9 8.6 8.0*   MAGNESIUM mg/dL  --  2.0 1.9 1.8  --    PHOSPHORUS mg/dL  --  3.2 3.9  --   --    Estimated Creatinine Clearance: 26.2 mL/min (A) (by C-G formula based on SCr of 3.65 mg/dL (H)).      Assessment/Plan   Active Hospital Problems    Diagnosis  POA   • **Sepsis secondary to UTI (CMS/HCC) [A41.9, N39.0]  Yes   • Ventricular tachycardia (paroxysmal) (CMS/HCC) [I47.2]  Unknown   • Macrocytosis [D75.89]  Yes   • Metabolic encephalopathy [G93.41]  Yes   • Anemia, chronic disease [D63.8]  Unknown   • Hyperlipidemia [E78.5]  Yes   • Hypertension [I10]  Yes   • Monoclonal gammopathy [D47.2]  Yes   • ESRD (end stage renal disease) (CMS/HCC) [N18.6]  Yes   • DM2 (diabetes mellitus, type 2) (CMS/HCC) [E11.9]  Yes   • Abnormal CT of the abdomen [R93.5]  Yes   • Normal anion gap metabolic acidosis [E87.2]  Yes   • Complicated UTI (urinary tract infection) [N39.0]  Yes      Resolved Hospital Problems   No resolved problems to display.       PLAN  This is a 65-year-old gentleman with a history of  chronic kidney disease stage IV chronic wounds hypertension diabetes and other medical comorbidities that presents to the hospital with altered mental status and metabolic encephalopathy  -It is possible that this metabolic encephalopathy is related to the underlying infectious process.  His urine culture is poly microbial.  Complicating the factor is his polymicrobial wound culture.   -Appreciate infectious disease input and now on Zosyn with plans for 10-14 days of abx therapy, likely will need to choose oral antibiotics at DC given CKD4 and difficulty with IV access  -Mental status is improving.  He is alert and oriented x3 his affect and mood are a little off.  likely near his baseline  -Renal function remained stable no emergent indications for dialysis at this time.  Nephrology following  -Potassium is normalized, Mag ok  -paroxysmal run of VT, asymptomatic, added BB last night and appeciate cardiology input dicussed with APRN.  Echocardiogram reviewed.  Recommendation for possible stress and cath but patient not interested in this at this time.    -Continue local wound care and supportive care  -eye issues dont sound like a cva, he has diabetic retinopathy and may be due for his avastic injections.  Will also restart his drops    Disposition  Potentially home tomorrow      Rashad Frank MD  [facility]  12/22/20  07:29 EST

## 2020-12-22 NOTE — PROGRESS NOTES
Gregorio Alejandro   65 y.o.  male    LOS: 4 days   Patient Care Team:  Maya Ribeiro APRN as PCP - General (Family Medicine)      Subjective   C/o not getting to eat this AM   Review of Systems:   NO SOA or CP    Medication Review:   Current Facility-Administered Medications:   •  acetaminophen (TYLENOL) tablet 650 mg, 650 mg, Oral, Q4H PRN, 650 mg at 12/21/20 2013 **OR** acetaminophen (TYLENOL) 160 MG/5ML solution 650 mg, 650 mg, Oral, Q4H PRN **OR** acetaminophen (TYLENOL) suppository 650 mg, 650 mg, Rectal, Q4H PRN, Olga Lidia Reed APRN  •  aspirin EC tablet 81 mg, 81 mg, Oral, Daily, Mame Connor APRN, 81 mg at 12/22/20 0831  •  dextrose (D50W) 25 g/ 50mL Intravenous Solution 25 g, 25 g, Intravenous, Q15 Min PRN, Olga Lidia Reed APRN  •  dextrose (GLUTOSE) oral gel 15 g, 15 g, Oral, Q15 Min PRN, Olga Lidia Reed APRN  •  glucagon (human recombinant) (GLUCAGEN DIAGNOSTIC) injection 1 mg, 1 mg, Subcutaneous, Q15 Min PRN, Olga Lidia Reed APRN  •  heparin 90712 units/250 mL (100 units/mL) in 0.45 % NaCl infusion, 9.17 Units/kg/hr, Intravenous, Titrated, Mame Connor APRN, Last Rate: 16.53 mL/hr at 12/22/20 0711, 15.17 Units/kg/hr at 12/22/20 0711  •  influenza vac split quad (FLUZONE,FLUARIX,AFLURIA,FLULAVAL) injection 0.5 mL, 0.5 mL, Intramuscular, Once, Rashad Frank MD  •  insulin lispro (ADMELOG) injection 0-7 Units, 0-7 Units, Subcutaneous, TID AC, Olga Lidia Reed APRN, Stopped at 12/19/20 5351  •  nitroglycerin (NITROSTAT) SL tablet 0.4 mg, 0.4 mg, Sublingual, Q5 Min PRN, Olga Lidia Reed APRN  •  ondansetron (ZOFRAN) injection 4 mg, 4 mg, Intravenous, Q6H PRN, Olga Lidia Reed APRN  •  piperacillin-tazobactam (ZOSYN) 3.375 g in iso-osmotic dextrose 50 ml (premix), 3.375 g, Intravenous, Q12H, Tonja Azevedo MD, 3.375 g at 12/22/20 0592  •  sodium bicarbonate tablet 1,300 mg, 1,300 mg, Oral, BID, Nixon Robertson MD, 1,300 mg at 12/22/20 0831  •  [COMPLETED]  Insert peripheral IV, , , Once **AND** sodium chloride 0.9 % flush 10 mL, 10 mL, Intravenous, PRN, Terrance Mast II, MD  •  sodium chloride 0.9 % flush 10 mL, 10 mL, Intravenous, Q12H, Olga Lidia Reed APRN, 10 mL at 12/22/20 0831  •  sodium chloride 0.9 % flush 10 mL, 10 mL, Intravenous, PRN, Olga Lidia Reed APRN      Objective     Vital Sign Min/Max for last 24 hours  Temp  Min: 97.6 °F (36.4 °C)  Max: 98.1 °F (36.7 °C)   BP  Min: 130/83  Max: 165/103    Pulse  Min: 67  Max: 81         12/18/20  1642 12/21/20  0819 12/22/20  0825   Weight: 109 kg (240 lb) 109 kg (240 lb) 114 kg (251 lb 8 oz)        Intake/Output Summary (Last 24 hours) at 12/22/2020 1005  Last data filed at 12/22/2020 0656  Gross per 24 hour   Intake 1260 ml   Output --   Net 1260 ml     Physical Exam:      General Appearance:    Well developed and well nourished in no acute distress   Neck:    no JVD   Lungs:     Clear to auscultation bilaterally. No wheezes, rhonchi or rales. No accessory muscle use.     Heart:    Regular rate and rhythm.  Normal S1 and S2. No murmur, no gallop, rub or lift.    Abdomen:     Normal bowel sounds, soft non-tender, non-distended   Extremities:  BLE wrapped in bandage    Skin:   Warm and dry   Neurologic:   awake alert and oriented x3, speech clear and approp      Monitor:  SR  Results Review:     I reviewed the patient's new clinical results.    Sodium Sodium   Date Value Ref Range Status   12/22/2020 138 136 - 145 mmol/L Final   12/21/2020 137 136 - 145 mmol/L Final   12/20/2020 137 136 - 145 mmol/L Final      Potassium Potassium   Date Value Ref Range Status   12/22/2020 3.7 3.5 - 5.2 mmol/L Final   12/21/2020 3.6 3.5 - 5.2 mmol/L Final   12/20/2020 4.2 3.5 - 5.2 mmol/L Final     Comment:     Slight hemolysis detected by analyzer. Results may be affected.      Chloride Chloride   Date Value Ref Range Status   12/22/2020 110 (H) 98 - 107 mmol/L Final   12/21/2020 109 (H) 98 - 107 mmol/L Final    12/20/2020 110 (H) 98 - 107 mmol/L Final      Bicarbonate No results found for: PLASMABICARB   BUN BUN   Date Value Ref Range Status   12/22/2020 43 (H) 8 - 23 mg/dL Final   12/21/2020 47 (H) 8 - 23 mg/dL Final   12/20/2020 53 (H) 8 - 23 mg/dL Final      Creatinine Creatinine   Date Value Ref Range Status   12/22/2020 3.65 (H) 0.76 - 1.27 mg/dL Final   12/21/2020 3.37 (H) 0.76 - 1.27 mg/dL Final   12/20/2020 3.64 (H) 0.76 - 1.27 mg/dL Final      Calcium Calcium   Date Value Ref Range Status   12/22/2020 8.7 8.6 - 10.5 mg/dL Final   12/21/2020 8.6 8.6 - 10.5 mg/dL Final   12/20/2020 8.9 8.6 - 10.5 mg/dL Final      Magnesium Magnesium   Date Value Ref Range Status   12/21/2020 2.0 1.6 - 2.4 mg/dL Final   12/20/2020 1.9 1.6 - 2.4 mg/dL Final        Results from last 7 days   Lab Units 12/22/20  0549   WBC 10*3/mm3 7.29   HEMOGLOBIN g/dL 8.2*   HEMATOCRIT % 27.6*   PLATELETS 10*3/mm3 237     Results from last 7 days   Lab Units 12/22/20  0549 12/21/20  1450 12/21/20  1223   TROPONIN T ng/mL 0.141* 0.147* 0.128*     Lab Results   Component Value Date    CHOL 108 12/22/2020     Lab Results   Component Value Date    HDL 49 12/22/2020     Lab Results   Component Value Date    LDL 42 12/22/2020     Lab Results   Component Value Date    TRIG 86 12/22/2020     Results from last 7 days   Lab Units 12/21/20  1450 12/19/20  0439   INR  1.30* 1.37*       Echo 12/21/20  Left Ventricle Calculated left ventricular EF = 59% Estimated left ventricular EF was in agreement with the calculated left ventricular EF.   Normal left ventricular cavity size noted. Left ventricular wall thickness is consistent with moderate septal asymmetric hypertrophy. Left ventricular diastolic function is consistent with (grade I) impaired relaxation. Normal left atrial pressure.   Right Ventricle Normal right ventricular cavity size, wall thickness, systolic function and septal motion noted. Electronic lead present in the ventricle.   Left Atrium The left  atrial cavity is moderately dilated.   Right Atrium The right atrial cavity is mild to moderately dilated. An electronic lead is present in the right atrium.   Aortic Valve No aortic valve regurgitation or stenosis is present. The aortic valve is abnormal in structure. The aortic valve exhibits sclerosis. The aortic valve appears trileaflet.   Mitral Valve Mild mitral annular calcification is present. No significant mitral valve regurgitation is present. No significant mitral valve stenosis is present.   Tricuspid Valve The tricuspid valve is structurally normal with no significant stenosis present. Trace tricuspid valve regurgitation is present. Estimated right ventricular systolic pressure from tricuspid regurgitation is normal (<35 mmHg).   Pulmonic Valve The pulmonic valve is not well visualized. The pulmonic valve is structurally normal with no regurgitation or significant stenosis present.   Greater Vessels No dilation of the aortic root is present. No dilation of the sinuses of Valsalva is present.   Pericardium There is a trivial pericardial effusion. The effusion is fluid filled. There is no evidence of cardiac tamponade.     Assessment/Plan   Assessment/ Plan  1. Sepsis secondary to UTI  2. NSVT and junctional rhythm         -History of nonsustained V. tach during dobutamine stress  3. Metabolic encephalopathy  4. ESRD   5. Microcytic anemia  6. Bradycardia with syncope status post LINQ loop recorder 4/23/2019          -NO BETA BLOCKER  7. Troponin elevation likely Type 2 in the setting of sepsis.         -Peak trop 0.147         -EKG T wave inversion in anterolateral leads and nonspecific T wave changes in the inferior              Leads         -No WMA on echo   8. 2D echo 12/21/20: EF 59% Mod septal asymmetric hypertrophy. Grade 1 DD. LA cavity mod dilated. RA ,ild-mod dilated   2D echo 2/28/2020: TDS.  Moderate to severe concentric LVH.  EF 55 to 60%.  Restrictive physiology (grade 3 diastolic  dysfunction).  Normal function of all valves.  9. OFELIA, insurance denied CPAP  10. Immobility   11. Monoclonal gammopathy     Plan:  Trop trend flat, did offer stress testing to patient given elevated troponin and abnormal EKG. He is refusing. Explained why we would be doing stress testing on his heart also explained the if test is abnormal would need cardiac cath and is at high risk for HD after cardiac cath, patient again refusd testing. Lipid panel WNL. Stop heparin drip.     LINQ interrogated and showed 50 mins of a fib on 11/11/20 at 2046, there are also 3 episodes qauestionsable of pauses recorded 8/11/20, 4/8/20 and 2/18/20, most episodes occurring at night, making the case more evident for insurance needing to approved CPAP. Consider getting care manager involved to obtain CPAP. Dr Low to review interrogation. Patient may need PPM.     Plans for GI workup as outpatient.    Primary aware of right eye issue     ANTWAN Bess  12/22/20  10:05 EST      Time: 20 mins    Dictated portions using Dragon dictation software.     During the entire encounter, I was wearing recommended PPE including face mask and eye protection. Hand sanitization was performed prior to entering room and upon exit.    Paddy Low M.D.   Reviewed our cardiology group Nurse Practitioner's note.    Pt interviewed and examined.   Findings verified.   Reviewed and agree with corrections or modifications of history, physical, and plans as indicated:   Profound neglect of self-care issues.  ECG reviewed.  Asked for care manager to review and help obtain CPAP when I spoke with the nurse and the patient this morning.  There is significant first-degree AV block.  Appears to be sinus rhythm with very small P waves.  ECG would be consistent with amyloid.  Appreciate Dr. Robertson's note yesterday.  I plan to obtain PYP scan as outpatient.    Many cardiac issues would be improved by use of CPAP.  Still would benefit from diuresis.       Reviewed loop recorder tracings stored under media.  Agree with my nurse's note above.    No further cardiac work-up per patient refusal.      EMR Dragon/Transcription disclaimer:   Much of this encounter note is an electronic transcription/translation of spoken language to printed text. The electronic translation of spoken language may permit erroneous, or at times, nonsensical words or phrases to be inadvertently transcribed.  Although I have reviewed the note for such errors, some may still exist. Please contact me if needed for clarification or correction.  Paddy Low M.D.

## 2020-12-22 NOTE — PLAN OF CARE
Goal Outcome Evaluation:  Plan of Care Reviewed With: patient  Progress: no change  Outcome Summary: Pt admitted with sepsis secondary to UTI. Pt reports he is nonambulatory, but could transfer to w/c independently PTA. Pt initially refused PT stating he couldn't see. Pt is blind in L eye and apparently had new foggy vision in R eye when he awoke this morning. Pt returned later in the morning and pt was agreeable to Eval. [place] were pulled back to reveal that an old IV sight was bleeding through the guaaze. RN was notified and came to hold pressure to stop the bleeding. PT returned a third time and was able to finally see the pt. Pt required modA to transfer to EOB. He coplaine dof dizziness upon sitting up, but recvered quickly. He was unable to come to a full standing position even with max A, but completed a squat pivot transfer to the w/c with mod A. Pt wants to discharge home with assist. This may be possible, but pt would benefit form PT to address strengthening and functional mobility training to return to baseline level.  Patient was intermittently wearing a face mask during this therapy encounter. Therapist used appropriate personal protective equipment including eye protection, mask, and gloves.  Mask used was standard procedure mask. Appropriate PPE was worn during the entire therapy session. Hand hygiene was completed before and after therapy session. Patient is not in enhanced droplet precautions.

## 2020-12-22 NOTE — THERAPY EVALUATION
Patient Name: Gregorio Alejandro  : 1955    MRN: [id number]                              Today's Date: 2020       Admit Date: 2020    Visit Dx:     ICD-10-CM ICD-9-CM   1. Complicated UTI (urinary tract infection)  N39.0 599.0   2. Confusion  R41.0 298.9   3. Normal anion gap metabolic acidosis  E87.2 276.2     Patient Active Problem List   Diagnosis   • Complicated UTI (urinary tract infection)   • Macrocytosis   • Sepsis secondary to UTI (CMS/[facility])   • Metabolic encephalopathy   • Hyperlipidemia   • Hypertension   • Monoclonal gammopathy   • ESRD (end stage renal disease) (CMS/[facility])   • DM2 (diabetes mellitus, type 2) (CMS/[facility])   • Abnormal CT of the abdomen   • Normal anion gap metabolic acidosis   • Anemia, chronic disease   • Ventricular tachycardia (paroxysmal) (CMS/[facility])     Past Medical History:   Diagnosis Date   • Back pain    • CKD (chronic kidney disease)    • DM type 2 (diabetes mellitus, type 2) (CMS/[facility])    • ED (erectile dysfunction)    • Hyperlipidemia    • Hypertension    • SCOTTY (iron deficiency anemia)    • Monoclonal gammopathy    • Osteoarthritis      No past surgical history on file.  General Information     Row Name 20 151          Physical Therapy Time and Intention    Document Type  evaluation  -     Mode of Treatment  individual therapy;physical therapy  -     Row Name 20 151          General Information    Prior Level of Function  independent:;w/c or scooter;transfer;bed mobility  -     Existing Precautions/Restrictions  fall  -     Barriers to Rehab  previous functional deficit  -     Row Name 20 151          Living Environment    Lives With  spouse  -     Row Name 20 151          Cognition    Orientation Status (Cognition)  oriented x 3  -       User Key  (r) = Recorded By, (t) = Taken By, (c) = Cosigned By    Initials Name Provider Type     Daniela Rich, PT Physical Therapist        Mobility     Row Name 20 151           Bed Mobility    Bed Mobility  supine-sit  -     Supine-Sit [place] (Bed Mobility)  moderate assist (50% patient effort)  -     Row Name 12/22/20 1512          Transfers    Comment (Transfers)  squat pivot to w/c  -[facility] Name 12/22/20 1512          Bed-Chair Transfer    Bed-Chair [place] (Transfers)  moderate assist (50% patient effort)  -     Row Name 12/22/20 1512          Sit-Stand Transfer    Sit-Stand [place] (Transfers)  maximum assist (25% patient effort) unable to stand full erect  -       User Key  (r) = Recorded By, (t) = Taken By, (c) = Cosigned By    Initials Name Provider Type    DORIS Daniela Rich, PT Physical Therapist        Obj/Interventions     [facility] Name 12/22/20 1513          Range of Motion Comprehensive    Comment, General Range of Motion  BLE limited 50%  -[facility] Name 12/22/20 1513          Strength Comprehensive (MMT)    Comment, General Manual Muscle Testing (MMT) Assessment  BLE 3-/5  -[facility] Name 12/22/20 1513          Motor Skills    Therapeutic Exercise  -- BLE AP, LAQ x 5 reps  -[facility] Name 12/22/20 1513          Balance    Balance Assessment  sitting static balance;sitting dynamic balance;standing static balance  -     Static Sitting Balance  WFL  -     Dynamic Sitting Balance  mild impairment  -     Static Standing Balance  severe impairment  -     Dynamic Standing Balance  not tested  -       User Key  (r) = Recorded By, (t) = Taken By, (c) = Cosigned By    Initials Name Provider Type    Daniela Arnold, PT Physical Therapist        Goals/Plan     [facility] Name 12/22/20 1529          Bed Mobility Goal 1 (PT)    Activity/Assistive Device (Bed Mobility Goal 1, PT)  bed mobility activities, all  -     [place] Level/Cues Needed (Bed Mobility Goal 1, PT)  minimum assist (75% or more patient effort)  -     Time Frame (Bed Mobility Goal 1, PT)  1 week  -[facility] Name 12/22/20 1529          Transfer Goal 1 (PT)     Activity/Assistive Device (Transfer Goal 1, PT)  bed-to-chair/chair-to-bed  -     [place] Level/Cues Needed (Transfer Goal 1, PT)  minimum assist (75% or more patient effort)  -     Time Frame (Transfer Goal 1, PT)  1 week  -     Row Name 12/22/20 1529          Patient Education Goal (PT)    Activity (Patient Education Goal, PT)  HEP  -     [place]/Cues/Accuracy (Memory Goal 2, PT)  demonstrates adequately  -     Time Frame (Patient Education Goal, PT)  1 week  -       User Key  (r) = Recorded By, (t) = Taken By, (c) = Cosigned By    Initials Name Provider Type    Daniela Arnold, PT Physical Therapist        Clinical Impression     Row Name 12/22/20 9311          Plan of Care Review    Plan of Care Reviewed With  patient  -     Outcome Summary  Pt admitted with sepsis secondary to UTI. Pt reports he is nonambulatory, but could transfer to w/c independently PTA. Pt initially refused PT stating he couldn't see. Pt is blind in L eye and apparently had new foggy vision in R eye when he awoke this morning. Pt returned later in the morning and pt was agreeable to Eval. [place] were pulled back to reveal that an old IV sight was bleeding through the guaaze. RN was notified and came to hold pressure to stop the bleeding. PT returned a third time and was able to finally see the pt. Pt required modA to transfer to EOB. He coplaine dof dizziness upon sitting up, but recvered quickly. He was unable to come to a full standing position even with max A, but completed a squat pivot transfer to the w/c with mod A. Pt wants to discharge home with assist. This may be possible, but pt would benefit form PT to address strengthening and functional mobility training to return to baseline level.  -     Row Name 12/22/20 5124          Therapy Assessment/Plan (PT)    Patient/Family Therapy Goals Statement (PT)  return home to PLOF  -     Rehab Potential (PT)  good, to achieve stated therapy goals   -     Criteria for Skilled Interventions Met (PT)  meets criteria  -     Row Name 12/22/20 1514          Positioning and Restraints    Pre-Treatment Position  in bed  -KH     Post Treatment Position  wheelchair  -KH     In Wheelchair  sitting;call light within reach;encouraged to call for assist;exit alarm on;notified nsg  -       User Key  (r) = Recorded By, (t) = Taken By, (c) = Cosigned By    Initials Name Provider Type    Daniela Arnold, PT Physical Therapist        Outcome Measures     Row Name 12/22/20 1530          How much help from another person do you currently need...    Turning from your back to your side while in flat bed without using bedrails?  2  -KH     Moving from lying on back to sitting on the side of a flat bed without bedrails?  2  -KH     Moving to and from a bed to a chair (including a wheelchair)?  2  -KH     Standing up from a chair using your arms (e.g., wheelchair, bedside chair)?  2  -KH     Climbing 3-5 steps with a railing?  1  -KH     To walk in hospital room?  1  -KH     AM-PAC 6 Clicks Score (PT)  10  -     Row Name 12/22/20 1530          Functional Assessment    Outcome Measure Options  AM-PAC 6 Clicks Basic Mobility (PT)  -       User Key  (r) = Recorded By, (t) = Taken By, (c) = Cosigned By    Initials Name Provider Type    Daniela Arnold, PT Physical Therapist        Physical Therapy Education                 Title: PT OT SLP Therapies (Done)     Topic: Physical Therapy (Done)     Point: Mobility training (Done)     Learning Progress Summary           Patient Acceptance, E,D, VU,NR by DORIS at 12/22/2020 1531                   Point: Home exercise program (Done)     Learning Progress Summary           Patient Acceptance, E,D, VU,NR by DORIS at 12/22/2020 1531                   Point: Body mechanics (Done)     Learning Progress Summary           Patient Acceptance, E,D, VU,NR by DORIS at 12/22/2020 1531                   Point: Precautions (Done)      Learning Progress Summary           Patient Acceptance, E,D, VU,NR by DORIS at 12/22/2020 1531                               User Key     Initials Effective Dates Name Provider Type Discipline     02/05/19 -  Daniela Rich PT Physical Therapist PT              PT Recommendation and Plan     Plan of Care Reviewed With: patient  Outcome Summary: Pt admitted with sepsis secondary to UTI. Pt reports he is nonambulatory, but could transfer to w/c independently PTA. Pt initially refused PT stating he couldn't see. Pt is blind in L eye and apparently had new foggy vision in R eye when he awoke this morning. Pt returned later in the morning and pt was agreeable to Eval. [place] were pulled back to reveal that an old IV sight was bleeding through the guaaze. RN was notified and came to hold pressure to stop the bleeding. PT returned a third time and was able to finally see the pt. Pt required modA to transfer to EOB. He coplaine dof dizziness upon sitting up, but recvered quickly. He was unable to come to a full standing position even with max A, but completed a squat pivot transfer to the w/c with mod A. Pt wants to discharge home with assist. This may be possible, but pt would benefit form PT to address strengthening and functional mobility training to return to baseline level.     Time Calculation:   PT Charges     Row Name 12/22/20 1200 12/22/20 1159 12/22/20 1158       Time Calculation    Start Time  1122  -  1051  -KH  --    Stop Time  1142  -KH  1109  -KH  --    Time Calculation (min)  20 min  -  18 min  -  --    PT Received On  --  --  12/22/20  -DORIS    PT - Next Appointment  --  --  12/23/20  -DORIS    PT Goal Re-Cert Due Date  --  --  12/29/20  -      User Key  (r) = Recorded By, (t) = Taken By, (c) = Cosigned By    Initials Name Provider Type    Daniela Arnold, PT Physical Therapist        Therapy Charges for Today     Code Description Service Date Service Provider Modifiers Qty     51200972940 HC PT EVAL MOD COMPLEXITY 2 12/22/2020 Daniela Rich, PT GP 1    52482124514 HC PT THER PROC EA 15 MIN 12/22/2020 Daniela Rich, PT GP 1          PT G-Codes  Outcome Measure Options: AM-PAC 6 Clicks Basic Mobility (PT)  AM-PAC 6 Clicks Score (PT): 10    Daniela Rich, PT  12/22/2020

## 2020-12-22 NOTE — PROGRESS NOTES
NEPHROLOGY PROGRESS NOTE    PATIENT IDENTIFICATION:   Name:  Gregorio Alejandro      MRN:  [id number]     65 y.o.  male             Reason for visit: CKD4    SUBJECTIVE:   Sitting in a chair; feels fine and eager to go home; no shortness of breath or chest pain; no palpitations    OBJECTIVE:  Vitals:    12/22/20 0729 12/22/20 0825 12/22/20 1346 12/22/20 1352   BP: (!) 165/103 138/90 130/90    BP Location: Right arm Right arm Right arm    Patient Position: Lying Lying Sitting    Pulse: 81  66    Resp: 18  18    Temp: 97.7 °F (36.5 °C)   97.3 °F (36.3 °C)   TempSrc: Oral   Oral   SpO2: 95%  100%    Weight:  114 kg (251 lb 8 oz)     Height:               Body mass index is 31.44 kg/m².    Intake/Output Summary (Last 24 hours) at 12/22/2020 1536  Last data filed at 12/22/2020 0656  Gross per 24 hour   Intake 1260 ml   Output --   Net 1260 ml     Wt Readings from Last 1 Encounters:   12/22/20 0825 114 kg (251 lb 8 oz)   12/21/20 0819 109 kg (240 lb)   12/18/20 1642 109 kg (240 lb)     Wt Readings from Last 3 Encounters:   12/22/20 114 kg (251 lb 8 oz)         Exam:  NAD; pleasant; oriented fully; looks older than stated age  Obese; chronically ill-appearing  MMM; AT/NC   No eye discharge; no scleral icterus  No JVD; no carotid bruits  Fairly clear; no wheezes; not labored on RA  RRR, no rub  Soft, NT, +D, BS+  Woody edema both lower legs and feet; legs are wrapped  No bruit or thrill left arm; no obvious incision from prior vascular surgery that I can see  +clubbing  No asterixis  Moves all extremities   Speech is fluent  Flat affect; mood is normal    Scheduled meds:    aspirin, 81 mg, Oral, Daily  atorvastatin, 20 mg, Oral, Daily  brimonidine, 1 drop, Both Eyes, BID  dorzolamide, 1 drop, Right Eye, BID  influenza vaccine, 0.5 mL, Intramuscular, Once  insulin lispro, 0-7 Units, Subcutaneous, TID AC  piperacillin-tazobactam, 3.375 g, Intravenous, Q12H  sodium bicarbonate, 1,300 mg, Oral, BID  sodium chloride, 10 mL,  Intravenous, Q12H      IV meds:                             Data Review:    Results from last 7 days   Lab Units 12/22/20  0549 12/21/20  0447 12/20/20  0631  12/18/20  1653   SODIUM mmol/L 138 137 137   < > 136   POTASSIUM mmol/L 3.7 3.6 4.2   < > 3.3*   CHLORIDE mmol/L 110* 109* 110*   < > 109*   CO2 mmol/L 20.6* 19.3* 16.6*   < > 16.7*   BUN mg/dL 43* 47* 53*   < > 48*   CREATININE mg/dL 3.65* 3.37* 3.64*   < > 3.34*   CALCIUM mg/dL 8.7 8.6 8.9   < > 8.0*   BILIRUBIN mg/dL  --   --   --   --  0.6   ALK PHOS U/L  --   --   --   --  94   ALT (SGPT) U/L  --   --   --   --  11   AST (SGOT) U/L  --   --   --   --  15   GLUCOSE mg/dL 109* 116* 132*   < > 129*    < > = values in this interval not displayed.     Estimated Creatinine Clearance: 26.8 mL/min (A) (by C-G formula based on SCr of 3.65 mg/dL (H)).      Results from last 7 days   Lab Units 12/21/20  0447 12/20/20  0631 12/19/20  0439   MAGNESIUM mg/dL 2.0 1.9 1.8   PHOSPHORUS mg/dL 3.2 3.9  --        Results from last 7 days   Lab Units 12/22/20  0549 12/21/20  1450 12/21/20  0447 12/20/20  0631 12/19/20  0439   WBC 10*3/mm3 7.29 10.42 8.90 11.70* 14.16*   HEMOGLOBIN g/dL 8.2* 8.6* 8.1* 8.6* 8.1*   PLATELETS 10*3/mm3 237 242 223 196 233       Results from last 7 days   Lab Units 12/21/20  1450 12/19/20  0439   INR  1.30* 1.37*             ASSESSMENT:     Sepsis secondary to UTI (CMS/[facility])    Complicated UTI (urinary tract infection)    Macrocytosis    Metabolic encephalopathy    Hyperlipidemia    Hypertension    Monoclonal gammopathy    ESRD (end stage renal disease) (CMS/HCC)    DM2 (diabetes mellitus, type 2) (CMS/HCC)    Abnormal CT of the abdomen    Normal anion gap metabolic acidosis    Anemia, chronic disease    Ventricular tachycardia (paroxysmal) (CMS/HCC)    1.  CKD4: all in all, stable though poor function.  Has biopsy-proven diabetic nephropathy. Central volume stable by exam, tho chest x-ray on arrival did suggest some central pulmonary congestion.  Hypervolemic hyponatremia, resolved; likely NAGMA, though improving on sodium bicarbonate.  Urinalysis with only 30 mg/dL protein, 21-30 WBCs/hpf, and TNTC RBCs.  Saw vascular surgeon earlier this year and had an AV fistula created in the left arm, although patient denies having had this done.  No bruit or thrill left arm  2.  Fever and confusion, with the latter resolved  3.  Bilateral lower extremity wounds  4.  Cystitis and proctitis by CT  5.  Anemia, profound, with history of MGUS; iron stores are adequate  6.  Immobility syndrome: Has not walked for 6 months  7.  Longstanding DM2  8.  Hypertension, not controlled  9.  NSVT early AM 12/21      PLAN:  1.  Lack of significant proteinuria argues against--but obviously does not rule out--amyloidosis.  Renal biopsy did not demonstrate amyloidosis, tho this biopsy was done in 2012  2.  Add amlodipine 5 mg daily, and resume furosemide at 80 mg once daily  3.  Will have pt's Vasc Surg address his clotted access as an outpatient  4.  Continue Na bicarb, tho at lower dose of 650 mg BID      Nixon Robertson MD  12/22/2020    15:36 EST

## 2020-12-22 NOTE — PROGRESS NOTES
[facility]  Inpatient Progress Note    Reason for Follow Up: Iron deficiency anemia, abnormal imaging     Subjective     Interval History:   Nonsustained V. tach yesterday, cardiology following, they have signed off and are okay with EGD and colonoscopy    Current Facility-Administered Medications:   •  acetaminophen (TYLENOL) tablet 650 mg, 650 mg, Oral, Q4H PRN, 650 mg at 12/21/20 2013 **OR** acetaminophen (TYLENOL) 160 MG/5ML solution 650 mg, 650 mg, Oral, Q4H PRN **OR** acetaminophen (TYLENOL) suppository 650 mg, 650 mg, Rectal, Q4H PRN, Olga Lidia Reed APRN  •  aspirin EC tablet 81 mg, 81 mg, Oral, Daily, Mame Connor APRN, 81 mg at 12/22/20 0831  •  dextrose (D50W) 25 g/ 50mL Intravenous Solution 25 g, 25 g, Intravenous, Q15 Min PRN, Olga Lidia Reed APRN  •  dextrose (GLUTOSE) oral gel 15 g, 15 g, Oral, Q15 Min PRN, Olga Lidia Reed APRN  •  glucagon (human recombinant) (GLUCAGEN DIAGNOSTIC) injection 1 mg, 1 mg, Subcutaneous, Q15 Min PRN, Olga Lidia Reed APRN  •  heparin 83967 units/250 mL (100 units/mL) in 0.45 % NaCl infusion, 9.17 Units/kg/hr, Intravenous, Titrated, Mame Connor APRN, Last Rate: 16.53 mL/hr at 12/22/20 0711, 15.17 Units/kg/hr at 12/22/20 0711  •  influenza vac split quad (FLUZONE,FLUARIX,AFLURIA,FLULAVAL) injection 0.5 mL, 0.5 mL, Intramuscular, Once, Rashad Frank MD  •  insulin lispro (ADMELOG) injection 0-7 Units, 0-7 Units, Subcutaneous, TID AC, Olga Lidia Reed APRN, Stopped at 12/19/20 1749  •  nitroglycerin (NITROSTAT) SL tablet 0.4 mg, 0.4 mg, Sublingual, Q5 Min PRN, Derek, Olga Lidia M, APRN  •  ondansetron (ZOFRAN) injection 4 mg, 4 mg, Intravenous, Q6H PRN, Olga Lidia Reed, APRN  •  piperacillin-tazobactam (ZOSYN) 3.375 g in iso-osmotic dextrose 50 ml (premix), 3.375 g, Intravenous, Q12H, Tonja Azevedo MD, 3.375 g at 12/22/20 0552  •  sodium bicarbonate tablet 1,300 mg, 1,300 mg, Oral, BID, Nixon Robertson  MD Reji, 1,300 mg at 12/22/20 0831  •  [COMPLETED] Insert peripheral IV, , , Once **AND** sodium chloride 0.9 % flush 10 mL, 10 mL, Intravenous, PRN, Terrance Mast II, MD  •  sodium chloride 0.9 % flush 10 mL, 10 mL, Intravenous, Q12H, Olga Lidia Reed APRN, 10 mL at 12/22/20 0831  •  sodium chloride 0.9 % flush 10 mL, 10 mL, Intravenous, PRN, Olga Lidia Reed APRN  Review of Systems:    He has weakness of fatigue all other systems reviewed and negative    Objective     Vital Signs  Temp:  [97.6 °F (36.4 °C)-98.1 °F (36.7 °C)] 97.7 °F (36.5 °C)  Heart Rate:  [67-81] 81  Resp:  [18] 18  BP: (130-165)/() 138/90  Body mass index is 31.44 kg/m².    Intake/Output Summary (Last 24 hours) at 12/22/2020 1015  Last data filed at 12/22/2020 0656  Gross per 24 hour   Intake 1260 ml   Output --   Net 1260 ml     No intake/output data recorded.     Physical Exam:   General: patient awake, alert and cooperative   Eyes: Normal lids and lashes, no scleral icterus   Neck: supple, normal ROM   Skin: warm and dry, not jaundiced   Cardiovascular: regular rhythm and rate, no murmurs auscultated   Pulm: clear to auscultation bilaterally, regular and unlabored   Abdomen: soft, nontender, nondistended; normal bowel sounds   Extremities: no rash or edema   Psychiatric: Normal mood and behavior; memory intact     Results Review:     I reviewed the patient's new clinical results.    Results from last 7 days   Lab Units 12/22/20  0549 12/21/20  1450 12/21/20  0447   WBC 10*3/mm3 7.29 10.42 8.90   HEMOGLOBIN g/dL 8.2* 8.6* 8.1*   HEMATOCRIT % 27.6* 29.1* 26.4*   PLATELETS 10*3/mm3 237 242 223     Results from last 7 days   Lab Units 12/22/20  0549 12/21/20  0447 12/20/20  0631  12/18/20  1653   SODIUM mmol/L 138 137 137   < > 136   POTASSIUM mmol/L 3.7 3.6 4.2   < > 3.3*   CHLORIDE mmol/L 110* 109* 110*   < > 109*   CO2 mmol/L 20.6* 19.3* 16.6*   < > 16.7*   BUN mg/dL 43* 47* 53*   < > 48*   CREATININE mg/dL 3.65* 3.37*  3.64*   < > 3.34*   CALCIUM mg/dL 8.7 8.6 8.9   < > 8.0*   BILIRUBIN mg/dL  --   --   --   --  0.6   ALK PHOS U/L  --   --   --   --  94   ALT (SGPT) U/L  --   --   --   --  11   AST (SGOT) U/L  --   --   --   --  15   GLUCOSE mg/dL 109* 116* 132*   < > 129*    < > = values in this interval not displayed.     Results from last 7 days   Lab Units 12/21/20  1450 12/19/20  0439   INR  1.30* 1.37*     Lab Results   Lab Value Date/Time    LIPASE 17 12/18/2020 1653    LIPASE 914 (H) 10/27/2020 0330    LIPASE 1,087 (H) 10/26/2020 0410    LIPASE 1,653 (H) 10/25/2020 0323       Radiology:  CT Head Without Contrast   Final Result       No acute intracranial hemorrhage or hydrocephalus. If there is further   clinical concern, MRI could be considered for further evaluation.       This report was finalized on 12/18/2020 7:22 PM by Dr. Jluis Meza M.D.          CT Abdomen Pelvis Without Contrast   Final Result           1. Diffuse wall thickening the urinary bladder with small surrounding   fat stranding suggests cystitis. Bilateral perinephric fat stranding is   also present. Nonobstructive right nephrolithiasis.   2. Conspicuous abnormal wall thickening is apparent in the rectum,   circumferentially, that may reflect rectal wall lesion/neoplasm or   proctitis, direct visualization advised.   3. Borderline, nonspecific lymph nodes, as described.       Discussed by telephone with Dr. Wang for Dr. Mast at 1930, 12/18/2020.       This report was finalized on 12/18/2020 7:34 PM by Dr. Jluis Meza M.D.          XR Chest 1 View   Final Result          Assessment/Plan     Patient Active Problem List   Diagnosis   • Complicated UTI (urinary tract infection)   • Macrocytosis   • Sepsis secondary to UTI (CMS/HCC)   • Metabolic encephalopathy   • Hyperlipidemia   • Hypertension   • Monoclonal gammopathy   • ESRD (end stage renal disease) (CMS/HCC)   • DM2 (diabetes mellitus, type 2) (CMS/HCC)   • Abnormal CT of the  abdomen   • Normal anion gap metabolic acidosis   • Anemia, chronic disease   • Ventricular tachycardia (paroxysmal) (CMS/HCC)          Assessment:  1. Abnormal imaging with cystitis/urinary tract infection  2. Confusion/delirium likely secondary to urosepsis  3. Abnormal imaging of the rectum  4. Microcytic anemia  5. Elevated troponin     Plan:  · He will eventually benefit from EGD and colonoscopy for microcytic anemia and abnormal findings of the rectum when his confusion clears, when he is adequately been treated for cystitis/urinary tract infection, and when his elevated troponin has been worked up and he has been cleared by cardiology.  · Cardiology has cleared him for the procedures but after discussion with him today, he has the prep at home, instructions and a date for EGD and colonoscopy with Dr. Castellon ([facility]), I think it would be safest to wait a couple weeks for the scopes due to the fact that he had nonsustained V. tach just yesterday.  He is agreeable to doing the scopes as an outpatient  · Follow hemoglobin  I discussed the patients findings and my recommendations with patient and nursing staff.    Kip Wiley MD

## 2020-12-23 NOTE — THERAPY TREATMENT NOTE
Patient Name: Gregorio Alejandro  : 1955    MRN: [id number]                              Today's Date: 2020       Admit Date: 2020    Visit Dx:     ICD-10-CM ICD-9-CM   1. Complicated UTI (urinary tract infection)  N39.0 599.0   2. Confusion  R41.0 298.9   3. Normal anion gap metabolic acidosis  E87.2 276.2   4. Sepsis secondary to UTI (CMS/HCC)  A41.9 038.9    N39.0 995.91     599.0     Patient Active Problem List   Diagnosis   • Complicated UTI (urinary tract infection)   • Macrocytosis   • Sepsis secondary to UTI (CMS/[facility])   • Metabolic encephalopathy   • Hyperlipidemia   • Hypertension   • Monoclonal gammopathy   • ESRD (end stage renal disease) (CMS/[facility])   • DM2 (diabetes mellitus, type 2) (CMS/[facility])   • Abnormal CT of the abdomen   • Normal anion gap metabolic acidosis   • Anemia, chronic disease   • Ventricular tachycardia (paroxysmal) (CMS/[facility])     Past Medical History:   Diagnosis Date   • Back pain    • CKD (chronic kidney disease)    • DM type 2 (diabetes mellitus, type 2) (CMS/[facility])    • ED (erectile dysfunction)    • Hyperlipidemia    • Hypertension    • SCOTTY (iron deficiency anemia)    • Monoclonal gammopathy    • Osteoarthritis      No past surgical history on file.  General Information     Row Name 20 1325          Physical Therapy Time and Intention    Document Type  therapy note (daily note)  -     Mode of Treatment  individual therapy;physical therapy  -     Row Name 20 1325          General Information    Existing Precautions/Restrictions  fall  -     Row Name 20 1325          Cognition    Orientation Status (Cognition)  oriented x 3  -     Row Name 20 1325          Safety Issues, Functional Mobility    Safety Issues Affecting Function (Mobility)  ability to follow commands;awareness of need for assistance;insight into deficits/self-awareness;impulsivity;safety precaution awareness;judgment;problem-solving  -     Impairments Affecting Function  (Mobility)  balance;endurance/activity tolerance;strength;postural/trunk control  -       User Key  (r) = Recorded By, (t) = Taken By, (c) = Cosigned By    Initials Name Provider Type     Zaira Raymond PTA Physical Therapy Assistant        Mobility     Row Name 12/23/20 1325          Bed Mobility    Supine-Sit [place] (Bed Mobility)  moderate assist (50% patient effort)  -     Row Name 12/23/20 1325          Transfers    Comment (Transfers)  Pt this morning performed squat pivot to w/c.  This afternoon nsg called to assist with pt btb, however pt did not want to go back to bed and insisted standing while getting cleaned up.  -     Row Name 12/23/20 1325          Bed-Chair Transfer    Bed-Chair [place] (Transfers)  moderate assist (50% patient effort);2 person assist  -     Row Name 12/23/20 1325          Sit-Stand Transfer    Sit-Stand [place] (Transfers)  maximum assist (25% patient effort);2 person assist Attempted 3Xs, pt able to stand long enough to get brief changed. Pt with ffp  -     Assistive Device (Sit-Stand Transfers)  walker, standard  -       User Key  (r) = Recorded By, (t) = Taken By, (c) = Cosigned By    Initials Name Provider Type     Zaira Raymond PTA Physical Therapy Assistant        Obj/Interventions     Row Name 12/23/20 1327          Balance    Static Standing Balance  severe impairment;supported ModAX2  -       User Key  (r) = Recorded By, (t) = Taken By, (c) = Cosigned By    Initials Name Provider Type     Zaira Raymond PTA Physical Therapy Assistant        Goals/Plan    No documentation.       Clinical Impression     Row Name 12/23/20 1328          Plan of Care Review    Plan of Care Reviewed With  patient  -     Progress  improving  -     Outcome Summary  Pt required Max encouragement to participate with PT this morning. Pt finally agreed. Pt is ModA with bed mobility. Pt performed a squat pivot from bed to w/c with ModAX2. Saw pt again this  afternoon to assist nsg staff with getting pt cleaned up. Pt refused to get back into bed and insisted on standing. Pt performed 3 sit<->stand transfers with Max assist of 2. Pt unable to barely clear bottom off chair. Pt able to clear bottom off chair enough for nsg aid to clean pt. Will continue to progress pt as tolerated.  -     Row Name 12/23/20 1328          Positioning and Restraints    Pre-Treatment Position  in bed  -EH     Post Treatment Position  wheelchair  -EH     In Wheelchair  sitting;call light within reach;encouraged to call for assist;exit alarm on;notified nsg  -       User Key  (r) = Recorded By, (t) = Taken By, (c) = Cosigned By    Initials Name Provider Type     Zaira Raymond PTA Physical Therapy Assistant        Outcome Measures     Row Name 12/23/20 1328          How much help from another person do you currently need...    Turning from your back to your side while in flat bed without using bedrails?  2  -EH     Moving from lying on back to sitting on the side of a flat bed without bedrails?  2  -EH     Moving to and from a bed to a chair (including a wheelchair)?  2  -EH     Standing up from a chair using your arms (e.g., wheelchair, bedside chair)?  2  -EH     Climbing 3-5 steps with a railing?  1  -EH     To walk in hospital room?  1  -EH     AM-PAC 6 Clicks Score (PT)  10  -       User Key  (r) = Recorded By, (t) = Taken By, (c) = Cosigned By    Initials Name Provider Type     Zaira Raymond PTA Physical Therapy Assistant        Physical Therapy Education                 Title: PT OT SLP Therapies (In Progress)     Topic: Physical Therapy (In Progress)     Point: Mobility training (In Progress)     Learning Progress Summary           Patient Acceptance, E,D, NR by  at 12/23/2020 1328    Acceptance, E,D, VU,NR by  at 12/22/2020 1531                   Point: Home exercise program (In Progress)     Learning Progress Summary           Patient Acceptance, E,D, NR by  at  12/23/2020 1328    Acceptance, E,D, VU,NR by  at 12/22/2020 1531                   Point: Body mechanics (In Progress)     Learning Progress Summary           Patient Acceptance, E,D, NR by  at 12/23/2020 1328    Acceptance, E,D, VU,NR by  at 12/22/2020 1531                   Point: Precautions (In Progress)     Learning Progress Summary           Patient Acceptance, E,D, NR by  at 12/23/2020 1328    Acceptance, E,D, VU,NR by  at 12/22/2020 1531                               User Key     Initials Effective Dates Name Provider Type Discipline     02/05/19 -  Daniela Rich, PT Physical Therapist PT     08/19/18 -  Zaira Raymond PTA Physical Therapy Assistant PT              PT Recommendation and Plan     Plan of Care Reviewed With: patient  Progress: improving  Outcome Summary: Pt required Max encouragement to participate with PT this morning. Pt finally agreed. Pt is ModA with bed mobility. Pt performed a squat pivot from bed to w/c with ModAX2. Saw pt again this afternoon to assist nsg staff with getting pt cleaned up. Pt refused to get back into bed and insisted on standing. Pt performed 3 sit<->stand transfers with Max assist of 2. Pt unable to barely clear bottom off chair. Pt able to clear bottom off chair enough for nsg aid to clean pt. Will continue to progress pt as tolerated.     Time Calculation:   PT Charges     Row Name 12/23/20 1324 12/23/20 1323          Time Calculation    Start Time  1300  -  1057  -     Stop Time  1315  -EH  1118  -     Time Calculation (min)  15 min  -  21 min  -     PT Received On  12/23/20  -  12/23/20  -     PT - Next Appointment  12/26/20  -  12/26/20  -        Time Calculation- PT    Total Timed Code Minutes- PT  15 minute(s)  -  --       User Key  (r) = Recorded By, (t) = Taken By, (c) = Cosigned By    Initials Name Provider Type     Zaira Raymond PTA Physical Therapy Assistant        Therapy Charges for Today     Code  Description Service Date Service Provider Modifiers Qty    31785713630 HC PT THERAPEUTIC ACT EA 15 MIN 12/23/2020 Zaira Raymond PTA GP 3    73435795505 HC PT THER SUPP EA 15 MIN 12/23/2020 Zaira Raymond PTA GP 3          PT G-Codes  Outcome Measure Options: AM-PAC 6 Clicks Basic Mobility (PT)  AM-PAC 6 Clicks Score (PT): 10    Zaira Raymond PTA  12/23/2020

## 2020-12-23 NOTE — PROGRESS NOTES
"  Infectious Diseases Progress Note    Tonja Azevedo MD     [facility]  [place]: 5 days  Patient Identification:  Name: Gregorio Alejandro  Age: 65 y.o.  Sex: male  :  1955  MRN: [id number]         Primary Care Physician: Maya Ribeiro APRN            Subjective: Denies any specific complaints.  Feeling overall better.  Interval History: See consultation    Objective:    Scheduled Meds:amLODIPine, 5 mg, Oral, Q24H  aspirin, 81 mg, Oral, Daily  atorvastatin, 20 mg, Oral, Daily  brimonidine, 1 drop, Both Eyes, BID  dorzolamide, 1 drop, Right Eye, BID  furosemide, 80 mg, Oral, Daily  influenza vaccine, 0.5 mL, Intramuscular, Once  insulin lispro, 0-7 Units, Subcutaneous, TID AC  piperacillin-tazobactam, 3.375 g, Intravenous, Q12H  sodium bicarbonate, 650 mg, Oral, BID  sodium chloride, 10 mL, Intravenous, Q12H      Continuous Infusions:     Vital signs in last 24 hours:  Temp:  [97.3 °F (36.3 °C)-98.1 °F (36.7 °C)] 97.7 °F (36.5 °C)  Heart Rate:  [66-89] 75  Resp:  [16-18] 18  BP: (130-158)/() 138/84    Intake/Output:    Intake/Output Summary (Last 24 hours) at 2020 1154  Last data filed at 2020 0812  Gross per 24 hour   Intake 380 ml   Output --   Net 380 ml       Exam:  /84 (BP Location: Right arm, Patient Position: Lying)   Pulse 75   Temp 97.7 °F (36.5 °C) (Oral)   Resp 18   Ht 190.5 cm (75\")   Wt 114 kg (251 lb 8 oz)   SpO2 100%   BMI 31.44 kg/m²   Patient is examined using the personal protective equipment as per guidelines from infection control for this particular patient as enacted.  Hand washing was performed before and after patient interaction.  General Appearance:    Alert, cooperative, no distress, AAOx3                          Head:    Normocephalic, without obvious abnormality, atraumatic                           Eyes:    PERRL, conjunctivae/corneas clear, EOM's intact, both eyes                         Throat:   Lips, tongue, gums normal; oral mucosa " pink and moist                           Neck:   Supple, symmetrical, trachea midline, no JVD                         Lungs:    Clear to auscultation bilaterally, respirations unlabored                 Chest Wall:    No tenderness or deformity                          Heart:    Regular rate and rhythm, S1 and S2 normal, no murmur, no rub                                         or gallop                  Abdomen:   Soft nontender                 extremities: Lateral lower extremity wrapped, no obvious wound on the feet noted                        Pulses:   Pulses palpable in all extremities                            Skin:   Skin is warm and dry,  no rashes or palpable lesions                  Neurologic: Grossly nonfocal       Data Review:    I reviewed the patient's new clinical results.  Results from last 7 days   Lab Units 12/22/20  0549 12/21/20  1450 12/21/20  0447 12/20/20  0631 12/19/20  0439 12/18/20  2024   WBC 10*3/mm3 7.29 10.42 8.90 11.70* 14.16* 15.88*   HEMOGLOBIN g/dL 8.2* 8.6* 8.1* 8.6* 8.1* 7.9*   PLATELETS 10*3/mm3 237 242 223 196 233 197     Results from last 7 days   Lab Units 12/23/20  0357 12/22/20  0549 12/21/20  0447 12/20/20  0631 12/19/20  0439 12/18/20  1653   SODIUM mmol/L 136 138 137 137 135* 136   POTASSIUM mmol/L 3.8 3.7 3.6 4.2 3.4* 3.3*   CHLORIDE mmol/L 107 110* 109* 110* 106 109*   CO2 mmol/L 21.1* 20.6* 19.3* 16.6* 18.7* 16.7*   BUN mg/dL 41* 43* 47* 53* 55* 48*   CREATININE mg/dL 3.26* 3.65* 3.37* 3.64* 3.82* 3.34*   CALCIUM mg/dL 8.7 8.7 8.6 8.9 8.6 8.0*   GLUCOSE mg/dL 114* 109* 116* 132* 140* 129*       Microbiology Results (last 10 days)     Procedure Component Value - Date/Time    Blood Culture - Blood, Arm, Left [[id number]] Collected: 12/18/20 1851    Lab Status: Preliminary result Specimen: Blood from Arm, Left Updated: 12/22/20 1900     Blood Culture No growth at 4 days    Blood Culture - Blood, Arm, Left [[id number]] Collected: 12/18/20 4023    Lab Status: Preliminary  result Specimen: Blood from Arm, Left Updated: 12/22/20 1830     Blood Culture No growth at 4 days    Urine Culture - Urine, Urine, Clean Catch [[id number]] Collected: 12/18/20 1707    Lab Status: Final result Specimen: Urine, Clean Catch Updated: 12/20/20 1043     Urine Culture 50,000 CFU/mL Mixed Jamil Isolated    Narrative:      Specimen contains mixed organisms of questionable pathogenicity which indicates contamination with commensal jamil.  Further identification is unlikely to provide clinically useful information.  Suggest recollection.    Respiratory Panel PCR w/COVID-19(SARS-CoV-2) CRIST[facility]BAL/RACHAEL/YAAKOV/PAD/COR/MAD/KANU In-House, NP Swab in UTM/VTM, 3-4 HR TAT - Swab, Nasopharynx [[id number]]  (Normal) Collected: 12/18/20 1655    Lab Status: Final result Specimen: Swab from Nasopharynx Updated: 12/18/20 1819     ADENOVIRUS, PCR Not Detected     Coronavirus 229E Not Detected     Coronavirus HKU1 Not Detected     Coronavirus NL63 Not Detected     Coronavirus OC43 Not Detected     COVID19 Not Detected     Human Metapneumovirus Not Detected     Human Rhinovirus/Enterovirus Not Detected     Influenza A PCR Not Detected     Influenza B PCR Not Detected     Parainfluenza Virus 1 Not Detected     Parainfluenza Virus 2 Not Detected     Parainfluenza Virus 3 Not Detected     Parainfluenza Virus 4 Not Detected     RSV, PCR Not Detected     Bordetella pertussis pcr Not Detected     Bordetella parapertussis PCR Not Detected     Chlamydophila pneumoniae PCR Not Detected     Mycoplasma pneumo by PCR Not Detected    Narrative:      Fact sheet for providers: https://docs.[employer]/wp-content/uploads/PGD2793-1717-PK6.1-EUA-Provider-Fact-Sheet-3.pdf    Fact sheet for patients: https://docs.[employer]/wp-content/uploads/CUW5280-6009-FG1.1-EUA-Patient-Fact-Sheet-1.pdf    Test performed by PCR.          Assessment:    Sepsis secondary to UTI (CMS/HCC)    Complicated UTI (urinary tract infection)    Macrocytosis    Metabolic  encephalopathy    Hyperlipidemia    Hypertension    Monoclonal gammopathy    ESRD (end stage renal disease) (CMS/HCC)    DM2 (diabetes mellitus, type 2) (CMS/HCC)    Abnormal CT of the abdomen    Normal anion gap metabolic acidosis    Anemia, chronic disease    Ventricular tachycardia (paroxysmal) (CMS/HCC)  1-toxic metabolic encephalopathy overall improved and likely because of it at the time of presentation  2-urinary tract infection  3-probably venous stasis ulcer and secondary cellulitis of the bilateral lower extremities currently wrapped  4-other diagnosis per internal medicine service        Recommendations/Discussions:  See my discussion on 12/21/2020  Given the information we have in the response to treatment on the current antibiotics it is very difficult to come up with the oral antibiotic regimen that would address his polymicrobial urinary tract infection along with was going on in his lower extremities.  Ideal treatment would be Zosyn for total of 10 days.  But if above treatment is not practical because of issues with IV access and logistics involved in delivering this medication out of the hospital then based on the culture data less than optimal but reasonable alternative would be combination of Levaquin plus Augmentin for similar duration with understanding that if patient shows worsening of sepsis in terms of increasing leukocytosis or development of fever and chills that he needs to be reevaluated with understanding of restarting the treatment with Zosyn.  Safety of Levaquin in the setting of history of nonsustained V. tach is questionable and will discuss with Dr. Frank.      Tonja Azevedo MD  12/23/2020  11:54 EST    Much of this encounter note is an electronic transcription/translation of spoken language to printed text. The electronic translation of spoken language may permit erroneous, or at times, nonsensical words or phrases to be inadvertently transcribed; Although I have reviewed the  note for such errors, some may still exist

## 2020-12-23 NOTE — PROGRESS NOTES
[facility]  Inpatient Progress Note    Reason for Follow Up: Iron deficiency anemia, abnormal imaging     Subjective     Interval History:   No GI complaints    Current Facility-Administered Medications:   •  acetaminophen (TYLENOL) tablet 650 mg, 650 mg, Oral, Q4H PRN, 650 mg at 12/21/20 2013 **OR** acetaminophen (TYLENOL) 160 MG/5ML solution 650 mg, 650 mg, Oral, Q4H PRN **OR** acetaminophen (TYLENOL) suppository 650 mg, 650 mg, Rectal, Q4H PRN, Olga Lidia Reed APRN  •  amLODIPine (NORVASC) tablet 5 mg, 5 mg, Oral, Q24H, Nixon Robertson MD, 5 mg at 12/23/20 0812  •  aspirin EC tablet 81 mg, 81 mg, Oral, Daily, Mame Connor APRN, 81 mg at 12/23/20 0812  •  atorvastatin (LIPITOR) tablet 20 mg, 20 mg, Oral, Daily, Rashad Frank MD, 20 mg at 12/23/20 0812  •  brimonidine (ALPHAGAN P) 0.1 % ophthalmic solution 1 drop, 1 drop, Both Eyes, BID, Rashad Frank MD, 1 drop at 12/23/20 0812  •  dextrose (D50W) 25 g/ 50mL Intravenous Solution 25 g, 25 g, Intravenous, Q15 Min PRN, Olga Lidia Reed APRN  •  dextrose (GLUTOSE) oral gel 15 g, 15 g, Oral, Q15 Min PRN, Olga Lidia Reed APRN  •  dorzolamide (TRUSOPT) 2 % ophthalmic solution 1 drop, 1 drop, Right Eye, BID, Rashad Frank MD, 1 drop at 12/23/20 0812  •  furosemide (LASIX) tablet 80 mg, 80 mg, Oral, Daily, Nixon Robertson MD, 80 mg at 12/23/20 0813  •  glucagon (human recombinant) (GLUCAGEN DIAGNOSTIC) injection 1 mg, 1 mg, Subcutaneous, Q15 Min PRN, Olga Lidia Reed APRN  •  influenza vac split quad (FLUZONE,FLUARIX,AFLURIA,FLULAVAL) injection 0.5 mL, 0.5 mL, Intramuscular, Once, Rashad Frank MD  •  insulin lispro (ADMELOG) injection 0-7 Units, 0-7 Units, Subcutaneous, TID AC, Olga Lidia Reed, APRN, Stopped at 12/19/20 1744  •  nitroglycerin (NITROSTAT) SL tablet 0.4 mg, 0.4 mg, Sublingual, Q5 Min PRN, Olga Lidia Reed, APRN  •  ondansetron (ZOFRAN) injection 4 mg, 4 mg,  Intravenous, Q6H PRN, Olga Lidia Reed APRN  •  piperacillin-tazobactam (ZOSYN) 3.375 g in iso-osmotic dextrose 50 ml (premix), 3.375 g, Intravenous, Q12H, Tonja Azevedo MD, 3.375 g at 12/23/20 0607  •  sodium bicarbonate tablet 650 mg, 650 mg, Oral, BID, Nixon Robertson MD, 650 mg at 12/23/20 0812  •  [COMPLETED] Insert peripheral IV, , , Once **AND** sodium chloride 0.9 % flush 10 mL, 10 mL, Intravenous, PRN, Terrance Mast II, MD  •  sodium chloride 0.9 % flush 10 mL, 10 mL, Intravenous, Q12H, Olga Lidia Reed APRN, 10 mL at 12/22/20 2005  •  sodium chloride 0.9 % flush 10 mL, 10 mL, Intravenous, PRN, Olga Lidia Reed APRN  Review of Systems:   GI: negative    Objective     Vital Signs  Temp:  [97.3 °F (36.3 °C)-98.1 °F (36.7 °C)] 97.7 °F (36.5 °C)  Heart Rate:  [66-88] 88  Resp:  [16-18] 18  BP: (130-158)/() 157/105  Body mass index is 31.44 kg/m².    Intake/Output Summary (Last 24 hours) at 12/23/2020 0922  Last data filed at 12/23/2020 0100  Gross per 24 hour   Intake 170 ml   Output --   Net 170 ml     No intake/output data recorded.     Physical Exam:   General: patient awake, alert and cooperative   Abdomen: soft, nontender, nondistended; normal bowel sounds   Extremities: no rash or edema   Psychiatric: Normal mood and behavior; memory intact     Results Review:     I reviewed the patient's new clinical results.    Results from last 7 days   Lab Units 12/22/20  0549 12/21/20  1450 12/21/20  0447   WBC 10*3/mm3 7.29 10.42 8.90   HEMOGLOBIN g/dL 8.2* 8.6* 8.1*   HEMATOCRIT % 27.6* 29.1* 26.4*   PLATELETS 10*3/mm3 237 242 223     Results from last 7 days   Lab Units 12/23/20  0357 12/22/20  0549 12/21/20  0447  12/18/20  1653   SODIUM mmol/L 136 138 137   < > 136   POTASSIUM mmol/L 3.8 3.7 3.6   < > 3.3*   CHLORIDE mmol/L 107 110* 109*   < > 109*   CO2 mmol/L 21.1* 20.6* 19.3*   < > 16.7*   BUN mg/dL 41* 43* 47*   < > 48*   CREATININE mg/dL 3.26* 3.65* 3.37*   < > 3.34*    CALCIUM mg/dL 8.7 8.7 8.6   < > 8.0*   BILIRUBIN mg/dL  --   --   --   --  0.6   ALK PHOS U/L  --   --   --   --  94   ALT (SGPT) U/L  --   --   --   --  11   AST (SGOT) U/L  --   --   --   --  15   GLUCOSE mg/dL 114* 109* 116*   < > 129*    < > = values in this interval not displayed.     Results from last 7 days   Lab Units 12/21/20  1450 12/19/20  0439   INR  1.30* 1.37*     Lab Results   Lab Value Date/Time    LIPASE 17 12/18/2020 1653    LIPASE 914 (H) 10/27/2020 0330    LIPASE 1,087 (H) 10/26/2020 0410    LIPASE 1,653 (H) 10/25/2020 0323       Radiology:  CT Head Without Contrast   Final Result       No acute intracranial hemorrhage or hydrocephalus. If there is further   clinical concern, MRI could be considered for further evaluation.       This report was finalized on 12/18/2020 7:22 PM by Dr. Jluis Meza M.D.          CT Abdomen Pelvis Without Contrast   Final Result           1. Diffuse wall thickening the urinary bladder with small surrounding   fat stranding suggests cystitis. Bilateral perinephric fat stranding is   also present. Nonobstructive right nephrolithiasis.   2. Conspicuous abnormal wall thickening is apparent in the rectum,   circumferentially, that may reflect rectal wall lesion/neoplasm or   proctitis, direct visualization advised.   3. Borderline, nonspecific lymph nodes, as described.       Discussed by telephone with Dr. Wang for Dr. Mast at 1930, 12/18/2020.       This report was finalized on 12/18/2020 7:34 PM by Dr. Jluis Meza M.D.          XR Chest 1 View   Final Result          Assessment/Plan     Patient Active Problem List   Diagnosis   • Complicated UTI (urinary tract infection)   • Macrocytosis   • Sepsis secondary to UTI (CMS/HCC)   • Metabolic encephalopathy   • Hyperlipidemia   • Hypertension   • Monoclonal gammopathy   • ESRD (end stage renal disease) (CMS/HCC)   • DM2 (diabetes mellitus, type 2) (CMS/HCC)   • Abnormal CT of the abdomen   • Normal  anion gap metabolic acidosis   • Anemia, chronic disease   • Ventricular tachycardia (paroxysmal) (CMS/HCC)          Assessment:  1. Abnormal imaging with cystitis/urinary tract infection  2. Confusion/delirium likely secondary to urosepsis  3. Abnormal imaging of the rectum  4. Microcytic anemia  5. Elevated troponin     Plan:  · He will eventually benefit from EGD and colonoscopy for microcytic anemia and abnormal findings of the rectum when his confusion clears, when he is adequately been treated for cystitis/urinary tract infection, and when his elevated troponin has been worked up and he has been cleared by cardiology.  · Cardiology has cleared him for the procedures but after Dr Wiley's discussion with him yesterday, he has the prep at home, instructions and a date for EGD and colonoscopy with Dr. Castellon ([facility]), I think it would be safest to wait a couple weeks for the scopes due to the fact that he had nonsustained V. tach just yesterday.  He is agreeable to doing the scopes as an outpatient    OK for AL home from GI standpoint.          Kip Dumas M.D.  [facility]  [address] [place] [address]  Office: [phone]

## 2020-12-23 NOTE — PLAN OF CARE
Goal Outcome Evaluation:  Plan of Care Reviewed With: patient  Progress: improving  Outcome Summary: Vitals stable. pt discharged home with  and [facility] for IV antibiotics.

## 2020-12-23 NOTE — PROGRESS NOTES
Continued Stay Note  [facility]     Patient Name: Gregorio Alejandro  MRN: [id number]  Today's Date: 12/23/2020    Admit Date: 12/18/2020    Discharge Plan     Row Name 12/23/20 1328       Plan    Plan  Home with spouse and [facility]. Pt to receive IV Zosyn through 12/30/20. [facility] infusion to provide Zosyn.    Patient/Family in Agreement with Plan  yes    Plan Comments  Per Dr Frank, pt needs IV Zosyn through 12/30. IV to place Midline. Called and notified Alexa/[facility], she will get price check of medication. Call placed to wife to notify of D/C plan with no answer.  left with return number. Jero Abarca RN-BC        Discharge Codes    No documentation.       Expected Discharge Date and Time     Expected Discharge Date Expected Discharge Time    Dec 23, 2020             Jero Abarca, RN

## 2020-12-23 NOTE — PROGRESS NOTES
NEPHROLOGY PROGRESS NOTE    PATIENT IDENTIFICATION:   Name:  Gregorio Alejandro      MRN:  [id number]     65 y.o.  male             Reason for visit: CKD4    SUBJECTIVE:   Breathing is comfortable on room air; no chest pain; no palpitations; appetite is fair    OBJECTIVE:  Vitals:    12/23/20 0812 12/23/20 1048 12/23/20 1052 12/23/20 1417   BP: (!) 157/105 139/95 138/84 141/89   BP Location: Right arm Right arm Right arm Right arm   Patient Position: Lying Lying Lying Sitting   Pulse: 89  75    Resp:   18 18   Temp:    97.3 °F (36.3 °C)   TempSrc:    Oral   SpO2:   100%    Weight:       Height:               Body mass index is 31.44 kg/m².    Intake/Output Summary (Last 24 hours) at 12/23/2020 1448  Last data filed at 12/23/2020 1417  Gross per 24 hour   Intake 600 ml   Output --   Net 600 ml     Wt Readings from Last 1 Encounters:   12/22/20 0825 114 kg (251 lb 8 oz)   12/21/20 0819 109 kg (240 lb)   12/18/20 1642 109 kg (240 lb)     Wt Readings from Last 3 Encounters:   12/22/20 114 kg (251 lb 8 oz)         Exam:  NAD; pleasant; oriented fully; looks older than stated age  Obese; chronically ill-appearing  MMM; AT/NC   No eye discharge; no scleral icterus  No JVD; no carotid bruits  Fairly clear; no wheezes; not labored on RA  RRR, no rub  Soft, NT, +D, BS+  Woody edema both lower legs and feet; legs are wrapped  No bruit or thrill left arm; no obvious incision from prior vascular surgery that I can see  +clubbing  No asterixis  Moves all extremities   Speech is fluent  Flat affect; mood is normal    Scheduled meds:    amLODIPine, 5 mg, Oral, Q24H  aspirin, 81 mg, Oral, Daily  atorvastatin, 20 mg, Oral, Daily  brimonidine, 1 drop, Both Eyes, BID  dorzolamide, 1 drop, Right Eye, BID  furosemide, 80 mg, Oral, Daily  influenza vaccine, 0.5 mL, Intramuscular, Once  insulin lispro, 0-7 Units, Subcutaneous, TID AC  piperacillin-tazobactam, 3.375 g, Intravenous, Q12H  sodium bicarbonate, 650 mg, Oral, BID  sodium  chloride, 10 mL, Intravenous, Q12H      IV meds:                             Data Review:    Results from last 7 days   Lab Units 12/23/20  0357 12/22/20  0549 12/21/20 0447  12/18/20  1653   SODIUM mmol/L 136 138 137   < > 136   POTASSIUM mmol/L 3.8 3.7 3.6   < > 3.3*   CHLORIDE mmol/L 107 110* 109*   < > 109*   CO2 mmol/L 21.1* 20.6* 19.3*   < > 16.7*   BUN mg/dL 41* 43* 47*   < > 48*   CREATININE mg/dL 3.26* 3.65* 3.37*   < > 3.34*   CALCIUM mg/dL 8.7 8.7 8.6   < > 8.0*   BILIRUBIN mg/dL  --   --   --   --  0.6   ALK PHOS U/L  --   --   --   --  94   ALT (SGPT) U/L  --   --   --   --  11   AST (SGOT) U/L  --   --   --   --  15   GLUCOSE mg/dL 114* 109* 116*   < > 129*    < > = values in this interval not displayed.     Estimated Creatinine Clearance: 30 mL/min (A) (by C-G formula based on SCr of 3.26 mg/dL (H)).      Results from last 7 days   Lab Units 12/23/20  0357 12/21/20  0447 12/20/20  0631 12/19/20  0439   MAGNESIUM mg/dL  --  2.0 1.9 1.8   PHOSPHORUS mg/dL 2.6 3.2 3.9  --        Results from last 7 days   Lab Units 12/22/20  0549 12/21/20  1450 12/21/20  0447 12/20/20  0631 12/19/20  0439   WBC 10*3/mm3 7.29 10.42 8.90 11.70* 14.16*   HEMOGLOBIN g/dL 8.2* 8.6* 8.1* 8.6* 8.1*   PLATELETS 10*3/mm3 237 242 223 196 233       Results from last 7 days   Lab Units 12/21/20  1450 12/19/20  0439   INR  1.30* 1.37*             ASSESSMENT:     Sepsis secondary to UTI (CMS/HCC)    Complicated UTI (urinary tract infection)    Macrocytosis    Metabolic encephalopathy    Hyperlipidemia    Hypertension    Monoclonal gammopathy    ESRD (end stage renal disease) (CMS/HCC)    DM2 (diabetes mellitus, type 2) (CMS/HCC)    Abnormal CT of the abdomen    Normal anion gap metabolic acidosis    Anemia, chronic disease    Ventricular tachycardia (paroxysmal) (CMS/HCC)    1.  CKD4: stable though poor function.  Has biopsy-proven diabetic nephropathy. Central volume stable by exam, tho chest x-ray on arrival did suggest some  central pulmonary congestion. Hypervolemic hyponatremia, resolved; likely NAGMA, much improved on sodium bicarbonate.  Urinalysis with only 30 mg/dL protein, 21-30 WBCs/hpf, and TNTC RBCs.  Saw vascular surgeon earlier this year and had an AV fistula created in the left arm, although patient denies having had this done.  No bruit or thrill left arm  2.  Fever and confusion, with the latter resolved  3.  Bilateral lower extremity wounds  4.  Cystitis and proctitis by CT  5.  Anemia, profound, with history of MGUS; iron stores are adequate  6.  Immobility syndrome: Has not walked for 6 months  7.  Longstanding DM2  8.  Hypertension, not controlled  9.  NSVT early AM 12/21      PLAN:  1.  Lack of significant proteinuria argues against--but does not rule out--amyloidosis.  Renal biopsy did not demonstrate amyloidosis, tho this biopsy was done in 2012  2.  Continue amlodipine 5 mg daily and furosemide 80 mg once daily  3.  Will have pt's vascular surgeon address his clotted access as an outpatient  4.  Continue Na bicarb at low dose of 650 mg BID  5.  Home anytime from renal view  6.  Patient will continue follow-up with his primary nephrologist, Dr. Coleen Robertson MD  12/23/2020    14:48 EST

## 2020-12-23 NOTE — PLAN OF CARE
Goal Outcome Evaluation:  Plan of Care Reviewed With: patient  Progress: no change  Outcome Summary: VSS, no c/o pain, pt up to wheelchair, slept in bed most of night, IV abx, will continue to monitor

## 2020-12-23 NOTE — DISCHARGE SUMMARY
Patient Name: Gregorio Alejandro  : 1955  MRN: [id number]    Date of Admission: 2020  Date of Discharge:  2020  Primary Care Physician: Maya Ribeiro APRN      Chief Complaint:   Altered Mental Status and Fever      Discharge Diagnoses     Active Hospital Problems    Diagnosis  POA   • **Sepsis secondary to UTI (CMS/HCC) [A41.9, N39.0]  Yes   • Ventricular tachycardia (paroxysmal) (CMS/HCC) [I47.2]  Unknown   • Macrocytosis [D75.89]  Yes   • Metabolic encephalopathy [G93.41]  Yes   • Anemia, chronic disease [D63.8]  Unknown   • Hyperlipidemia [E78.5]  Yes   • Hypertension [I10]  Yes   • Monoclonal gammopathy [D47.2]  Yes   •  Chronic kidney disease stage V  Yes   • DM2 (diabetes mellitus, type 2) (CMS/HCC) [E11.9]  Yes   • Abnormal CT of the abdomen [R93.5]  Yes   • Normal anion gap metabolic acidosis [E87.2]  Yes   • Complicated UTI (urinary tract infection) [N39.0]  Yes      Resolved Hospital Problems   No resolved problems to display.        Hospital Course     Mr. Alejandro is a 65 y.o. male with a history of chronic kidney disease stage V and chronic lower extremity wounds who presented to [facility] initially complaining of fevers chills and altered mental status.  Please see the admitting history and physical for further details.  He was found to have toxic metabolic encephalopathy and possible urinary tract infection and was admitted to the hospital for further evaluation and treatment.  He was admitted to the hospital and started on IV antibiotics.  Infectious disease and nephrology were both consulted for assistance.  Local wound care was performed on his lower extremities.  Culture results were reviewed and recommendations from infectious disease were to place him on Zosyn here in the hospital.  Initially we had discussed placing him on oral antibiotics at discharge however this was not a possibility given his cardiac issues.  Over the course the hospitalization he did  develop some nonsustained ventricular tachycardia.  He was seen and evaluated by cardiology who did an echocardiogram and recommended a stress test and possible heart cath.  The patient was unwilling to proceed in this at this time and wanted to go home.  He declined any further work-up or evaluation from a cardiac perspective.  He developed a small heart block and beta-blocker was discontinued.  Blood pressure medications were titrated with a calcium channel blocker.  His renal function stabilized and he did not require dialysis.  His dialysis fistula was evaluated and does have clotting to it and he is recommended to follow-up with his vascular surgeon in the outpatient setting.  Given the heart block and cardiac issues it was recommended that he not take a fluoroquinolone at discharge and since there was not an oral alternative IV antibiotics were required.  A midline catheter was placed and he was prescribed IV antibiotics to complete those in 7 days.  The plan was discussed with the patient and his wife and they knowledge understanding and the plan is to discharge home in stable condition today.  He does need close follow-up with his nephrologist cardiologist and primary care physician following discharge.  At discharge he can resume his wound care plans prior to admission.    Day of Discharge     Subjective:  He is feeling okay his visual disturbances described prior to discharge are improving.  He is to follow-up with his eye doctor in the outpatient setting.  He denies new issues and wants to go home    Review of Systems   Constitutional: Negative for chills, fatigue and fever.   Eyes: Positive for visual disturbance.   Respiratory: Negative for cough, shortness of breath and wheezing.    Cardiovascular: Negative for chest pain, palpitations and leg swelling.   Gastrointestinal: Negative for constipation, diarrhea, nausea and vomiting.       Physical Exam:  Temp:  [97.3 °F (36.3 °C)-98.1 °F (36.7 °C)] 97.7  °F (36.5 °C)  Heart Rate:  [66-89] 75  Resp:  [16-18] 18  BP: (130-158)/() 138/84  Body mass index is 31.44 kg/m².  Physical Exam  Vitals signs and nursing note reviewed.   Constitutional:       Appearance: Normal appearance.   HENT:      Head: Normocephalic and atraumatic.   Cardiovascular:      Rate and Rhythm: Normal rate and regular rhythm.   Pulmonary:      Effort: Pulmonary effort is normal.      Breath sounds: Normal breath sounds.   Abdominal:      General: Bowel sounds are normal.      Palpations: Abdomen is soft.   Neurological:      Mental Status: He is alert.         Consultants     Consult Orders (all) (From admission, onward)     Start     Ordered    12/23/20 1236  IV TEAM - Consult for Midline Placement (IV Team to Determine Number of Lumens)  Once     Provider:  (Not yet assigned)    12/23/20 1235    12/21/20 0702  Inpatient Cardiology Consult  IN AM     Specialty:  Cardiology  Provider:  Paddy Low MD    12/20/20 1817    12/20/20 0844  Inpatient Infectious Diseases Consult  Once     Specialty:  Infectious Diseases  Provider:  Tonja Azevedo MD    12/20/20 0843    12/19/20 0004  Inpatient Case Management  Consult  Once     Provider:  (Not yet assigned)    12/19/20 0003    12/18/20 2233  Inpatient Gastroenterology Consult  Once     Specialty:  Gastroenterology  Provider:  Alessandro Welsh MD    12/18/20 2234    12/18/20 2232  Inpatient Nephrology Consult  Once     Specialty:  Nephrology  Provider:  Nixon Robertson MD    12/18/20 2234    12/18/20 2033  [facility] (on-call MD unless specified) Details  Once     Specialty:  Hospitalist  Provider:  (Not yet assigned)    12/18/20 2032              Procedures     Imaging Results (All)     Procedure Component Value Units Date/Time    CT Abdomen Pelvis Without Contrast [[id number]] Collected: 12/18/20 1923     Updated: 12/18/20 1937    Narrative:      CT ABDOMEN PELVIS WO CONTRAST-     INDICATIONS: Fever, hematuria     TECHNIQUE:  Radiation dose reduction techniques were utilized, including  automated exposure control and exposure modulation based on body size.  Unenhanced ABDOMEN AND PELVIS CT     COMPARISON: None available     FINDINGS:     Diffuse wall thickening the urinary bladder with small surrounding fat  stranding suggests cystitis. Bilateral perinephric stranding may be  chronic in nature or may be evidence of infection. A couple 3 mm  nonobstructive stones are seen in the right kidney. Assessment of the  distal ureters and urinary bladder is limited by attenuation artifact  from bilateral hip arthroplasty hardware.     Assessment for liver lesions is significantly limited without  intravenous contrast material.     Otherwise unremarkable unenhanced appearance of the liver, gallbladder,  spleen, adrenal glands, pancreas, kidneys, bladder.     No bowel obstruction. Conspicuous abnormal wall thickening is apparent  in the rectum, circumferentially, for example 2 cm, axial image 163,  that may reflect rectal wall lesion proctitis, direct visualization  advised.     No free intraperitoneal gas or free fluid.     Left para-aortic lymph node measuring 8 mm short axis, axial image 86 is  borderline prominent, nonspecific, could be reactive in nature or  potentially evidence of neoplasm. Borderline prominent subcutaneous  lymph nodes are seen in the bilateral groin, for example on the right,  1.2 cm short axis, axial image 188. Clinical correlation and follow-up  recommended; if further imaging evaluation is indicated, positron  emission tomography could be considered.     Abdominal aorta is not aneurysmal. Aortic and other arterial  calcifications are present.     The lung bases show mild atelectasis. Minimal bilateral pleural  effusions are seen. The heart is enlarged.     Degenerative changes are seen in the spine. No acute fracture is  identified.             Impression:            1. Diffuse wall thickening the urinary bladder with  small surrounding  fat stranding suggests cystitis. Bilateral perinephric fat stranding is  also present. Nonobstructive right nephrolithiasis.  2. Conspicuous abnormal wall thickening is apparent in the rectum,  circumferentially, that may reflect rectal wall lesion/neoplasm or  proctitis, direct visualization advised.  3. Borderline, nonspecific lymph nodes, as described.     Discussed by telephone with Dr. Wang for Dr. Mast at 1930, 12/18/2020.     This report was finalized on 12/18/2020 7:34 PM by Dr. Jluis Meza M.D.       CT Head Without Contrast [[id number]] Collected: 12/18/20 1918     Updated: 12/18/20 1925    Narrative:      CT HEAD WO CONTRAST-     INDICATIONS: Altered mental status     TECHNIQUE: Radiation dose reduction techniques were utilized, including  automated exposure control and exposure modulation based on body size.  Noncontrast head CT     COMPARISON: None available     FINDINGS:           No acute intracranial hemorrhage, midline shift or mass effect. No acute  territorial infarct is identified.     Mild periventricular hypodensities suggest chronic small vessel ischemic  change in a patient this age.     Arterial calcifications are seen at the base of the brain.     Ventricles, cisterns, cerebral sulci are unremarkable for patient age.     Procedural change of the left globe is apparent. The visualized  paranasal sinuses, orbits, mastoid air cells are otherwise unremarkable.                   Impression:         No acute intracranial hemorrhage or hydrocephalus. If there is further  clinical concern, MRI could be considered for further evaluation.     This report was finalized on 12/18/2020 7:22 PM by Dr. Jluis Meza M.D.       XR Chest 1 View [[id number]] Collected: 12/18/20 1736     Updated: 12/18/20 1741    Narrative:      XR CHEST 1 VW-     HISTORY:  Fever, altered mental status.     COMPARISON:  None.     FINDINGS:    A single portable view of the chest was obtained.  There is an implanted  cardiac loop recording device. The cardiac silhouette is mildly  enlarged. Mediastinal contours are within normal limits. There is  calcific aortic atherosclerosis. There is central pulmonary venous  congestion. There are low lung volumes. There is mild hazy opacity in  both lungs, which could be due to atelectasis in the setting of low lung  volumes or edema, however, multifocal pneumonia is not excluded.  Recommend follow-up PA and lateral chest radiographs. Pleural spaces are  clear. There is multilevel degenerative disc disease.     This report was finalized on 12/18/2020 5:38 PM by Dr. Lindsey Medel M.D.             Pertinent Labs     Results from last 7 days   Lab Units 12/22/20  0549 12/21/20  1450 12/21/20  0447 12/20/20  0631   WBC 10*3/mm3 7.29 10.42 8.90 11.70*   HEMOGLOBIN g/dL 8.2* 8.6* 8.1* 8.6*   PLATELETS 10*3/mm3 237 242 223 196     Results from last 7 days   Lab Units 12/23/20  0357 12/22/20  0549 12/21/20 0447 12/20/20  0631   SODIUM mmol/L 136 138 137 137   POTASSIUM mmol/L 3.8 3.7 3.6 4.2   CHLORIDE mmol/L 107 110* 109* 110*   CO2 mmol/L 21.1* 20.6* 19.3* 16.6*   BUN mg/dL 41* 43* 47* 53*   CREATININE mg/dL 3.26* 3.65* 3.37* 3.64*   GLUCOSE mg/dL 114* 109* 116* 132*   Estimated Creatinine Clearance: 30 mL/min (A) (by C-G formula based on SCr of 3.26 mg/dL (H)).  Results from last 7 days   Lab Units 12/23/20  0357 12/21/20  0447 12/20/20  0631 12/18/20  1653   ALBUMIN g/dL 2.20* 2.20* 2.10* 2.40*   BILIRUBIN mg/dL  --   --   --  0.6   ALK PHOS U/L  --   --   --  94   AST (SGOT) U/L  --   --   --  15   ALT (SGPT) U/L  --   --   --  11     Results from last 7 days   Lab Units 12/23/20  0357 12/22/20  0549 12/21/20  0447 12/20/20  0631 12/19/20  0439 12/18/20  1653   CALCIUM mg/dL 8.7 8.7 8.6 8.9 8.6 8.0*   ALBUMIN g/dL 2.20*  --  2.20* 2.10*  --  2.40*   MAGNESIUM mg/dL  --   --  2.0 1.9 1.8  --    PHOSPHORUS mg/dL 2.6  --  3.2 3.9  --   --      Results from last 7 days    Lab Units 12/18/20  1653   LIPASE U/L 17     Results from last 7 days   Lab Units 12/23/20  0357 12/22/20  0549 12/21/20  1450 12/21/20  1223   TROPONIN T ng/mL 0.171* 0.141* 0.147* 0.128*       Results from last 7 days   Lab Units 12/22/20  0549   CHOLESTEROL mg/dL 108   TRIGLYCERIDES mg/dL 86   HDL CHOL mg/dL 49   LDL CHOL mg/dL 42     Results from last 7 days   Lab Units 12/18/20  1851 12/18/20  1814 12/18/20  1707   BLOODCX  No growth at 4 days No growth at 4 days  --    URINECX   --   --  50,000 CFU/mL Mixed Abbi Isolated       Test Results Pending at Discharge     Pending Labs     Order Current Status    Blood Culture - Blood, Arm, Left Preliminary result    Blood Culture - Blood, Arm, Left Preliminary result          Discharge Details        Discharge Medications      New Medications      Instructions Start Date   amLODIPine 5 MG tablet  Commonly known as: NORVASC   5 mg, Oral, Every 24 Hours Scheduled   Start Date: December 24, 2020     aspirin 81 MG EC tablet   81 mg, Oral, Daily   Start Date: December 24, 2020     piperacillin-tazobactam 3-0.375 GM/50ML IVPB  Commonly known as: ZOSYN   3.375 g, Intravenous, Every 12 Hours      sodium bicarbonate 650 MG tablet   650 mg, Oral, 2 Times Daily         Changes to Medications      Instructions Start Date   furosemide 80 MG tablet  Commonly known as: LASIX  What changed:   medication strength  how much to take  when to take this   80 mg, Oral, Daily   Start Date: December 24, 2020        Continue These Medications      Instructions Start Date   atorvastatin 20 MG tablet  Commonly known as: LIPITOR   20 mg, Oral, Daily      brimonidine 0.1 % solution ophthalmic solution  Commonly known as: ALPHAGAN P   1 drop, 2 times daily      dorzolamide 2 % ophthalmic solution  Commonly known as: TRUSOPT   1 drop, Right Eye, 2 times daily      HumaLOG KwikPen 100 UNIT/ML solution pen-injector  Generic drug: Insulin Lispro (1 Unit Dial)   INJECT 20 UNITS INTO THE SKIN TWICE  DAILY      HYDROcodone-acetaminophen  MG per tablet  Commonly known as: NORCO   Take 1 tablet po every 4 hours PRN for moderate pain, take two tablets po every 4 hours PRN for severe pain, valid if faxed to [facility]      diphenhydrAMINE 25 mg capsule  Commonly known as: BENADRYL   25 mg, Oral, Every 6 Hours PRN      [employer] Allergy 25 mg capsule  Generic drug: diphenhydrAMINE   25 mg, Oral, Every 6 Hours PRN         Stop These Medications    gabapentin 300 MG capsule  Commonly known as: NEURONTIN            No Known Allergies      Discharge Disposition:  Home or Self Care    Discharge Diet:  Diet Order   Procedures   • Diet Regular; Cardiac, Renal       Discharge Activity:   Activity Instructions     Activity as Tolerated            CODE STATUS:    Code Status and Medical Interventions:   Ordered at: 12/18/20 2234     Code Status:    CPR     Medical Interventions (Level of Support Prior to Arrest):    Full       No future appointments.  Additional Instructions for the Follow-ups that You Need to Schedule     Ambulatory Referral to Home Health   As directed      Face to Face Visit Date: 12/23/2020    Follow-up provider for Plan of Care?: I treated the patient in an acute care facility and will not continue treatment after discharge.    Follow-up provider: MAYA RAY [[id number]]    Reason/Clinical Findings: infection and renal failure    Describe mobility limitations that make leaving home difficult: doesnt drive    Nursing/Therapeutic Services Requested: Skilled Nursing Physical Therapy    Skilled nursing orders: Infusion therapy (midline care)    Frequency: 1 Week 1         Discharge Follow-up with PCP   As directed       Currently Documented PCP:    Maya Ray APRN    PCP Phone Number:    [phone]     Follow Up Details: 1-2 weekx         Discharge Follow-up with Specified Provider: cardiology; 2 Weeks   As directed      To: cardiology    Follow Up: 2 Weeks         Discharge Follow-up with  Specified Provider: nephrology; 2 Weeks   As directed      To: nephrology    Follow Up: 2 Weeks         Discharge Follow-up with Specified Provider: wound care as directed   As directed      To: wound care as directed            Contact information for follow-up providers     Paddy Low MD Follow up on 1/14/2021.    Specialty: Cardiology  Why: at 3:30pm   Contact information:  [address]    [facility] [id number] [phone]             Maya Ribeiro APRN .    Specialty: Family Medicine  Why: 1-2 weekx  Contact information:  [address] [facility] [id number] [phone]                   Contact information for after-discharge care     Home Medical Care     [facility] .    Service: Home Health Services  Contact information:  [address] [facility] [address]  [place] [address] [phone]                             Additional Instructions for the Follow-ups that You Need to Schedule     Ambulatory Referral to Home Health   As directed      Face to Face Visit Date: 12/23/2020    Follow-up provider for Plan of Care?: I treated the patient in an acute care facility and will not continue treatment after discharge.    Follow-up provider: MAYA RIBEIRO [[id number]]    Reason/Clinical Findings: infection and renal failure    Describe mobility limitations that make leaving home difficult: doesnt drive    Nursing/Therapeutic Services Requested: Skilled Nursing Physical Therapy    Skilled nursing orders: Infusion therapy (midline care)    Frequency: 1 Week 1         Discharge Follow-up with PCP   As directed       Currently Documented PCP:    Maya Ribeiro APRN    PCP Phone Number:    [phone]     Follow Up Details: 1-2 weekx         Discharge Follow-up with Specified Provider: cardiology; 2 Weeks   As directed      To: cardiology    Follow Up: 2 Weeks         Discharge Follow-up with Specified Provider: nephrology; 2 Weeks   As directed      To:  nephrology    Follow Up: 2 Weeks         Discharge Follow-up with Specified Provider: wound care as directed   As directed      To: wound care as directed           Time Spent on Discharge:  Greater than 30 minutes      Rashad Frank MD  [facility]  12/23/20  12:39 EST

## 2020-12-23 NOTE — PROGRESS NOTES
Gregorio Alejandro   65 y.o.  male    LOS: 5 days   Patient Care Team:  Maya Ribeiro APRN as PCP - General (Family Medicine)      Subjective   Feeling better would like to go home   Review of Systems:   NO SOA NO CP    Medication Review:   Current Facility-Administered Medications:   •  acetaminophen (TYLENOL) tablet 650 mg, 650 mg, Oral, Q4H PRN, 650 mg at 12/21/20 2013 **OR** acetaminophen (TYLENOL) 160 MG/5ML solution 650 mg, 650 mg, Oral, Q4H PRN **OR** acetaminophen (TYLENOL) suppository 650 mg, 650 mg, Rectal, Q4H PRN, Olga Lidia Reed APRN  •  amLODIPine (NORVASC) tablet 5 mg, 5 mg, Oral, Q24H, Nixon Robertson MD, 5 mg at 12/23/20 0812  •  aspirin EC tablet 81 mg, 81 mg, Oral, Daily, Mame Connor APRN, 81 mg at 12/23/20 0812  •  atorvastatin (LIPITOR) tablet 20 mg, 20 mg, Oral, Daily, Rashad Frank MD, 20 mg at 12/23/20 0812  •  brimonidine (ALPHAGAN P) 0.1 % ophthalmic solution 1 drop, 1 drop, Both Eyes, BID, Rashad Frank MD, 1 drop at 12/23/20 0812  •  dextrose (D50W) 25 g/ 50mL Intravenous Solution 25 g, 25 g, Intravenous, Q15 Min PRN, Olga Lidia Reed APRN  •  dextrose (GLUTOSE) oral gel 15 g, 15 g, Oral, Q15 Min PRN, Olga Lidia Reed APRN  •  dorzolamide (TRUSOPT) 2 % ophthalmic solution 1 drop, 1 drop, Right Eye, BID, Rashad Frank MD, 1 drop at 12/23/20 0812  •  furosemide (LASIX) tablet 80 mg, 80 mg, Oral, Daily, Nixon Robertson MD, 80 mg at 12/23/20 0813  •  glucagon (human recombinant) (GLUCAGEN DIAGNOSTIC) injection 1 mg, 1 mg, Subcutaneous, Q15 Min PRN, Derek, Olga Lidia M, APRN  •  influenza vac split quad (FLUZONE,FLUARIX,AFLURIA,FLULAVAL) injection 0.5 mL, 0.5 mL, Intramuscular, Once, Rashad Frank MD  •  insulin lispro (ADMELOG) injection 0-7 Units, 0-7 Units, Subcutaneous, TID AC, Olga Lidia Reed, APRN, Stopped at 12/19/20 5360  •  nitroglycerin (NITROSTAT) SL tablet 0.4 mg, 0.4 mg, Sublingual, Q5 Min PRN, Olga Lidia Reed,  APRN  •  ondansetron (ZOFRAN) injection 4 mg, 4 mg, Intravenous, Q6H PRN, Olga Lidia Reed APRN  •  piperacillin-tazobactam (ZOSYN) 3.375 g in iso-osmotic dextrose 50 ml (premix), 3.375 g, Intravenous, Q12H, Tonja Azevedo MD, 3.375 g at 12/23/20 0607  •  sodium bicarbonate tablet 650 mg, 650 mg, Oral, BID, Nixon Robertson MD, 650 mg at 12/23/20 0812  •  [COMPLETED] Insert peripheral IV, , , Once **AND** sodium chloride 0.9 % flush 10 mL, 10 mL, Intravenous, PRN, Terrance Mast II, MD  •  sodium chloride 0.9 % flush 10 mL, 10 mL, Intravenous, Q12H, Olga Lidia Reed APRN, 10 mL at 12/22/20 2005  •  sodium chloride 0.9 % flush 10 mL, 10 mL, Intravenous, PRN, Olga Lidia Reed APRN      Objective     Vital Sign Min/Max for last 24 hours  Temp  Min: 97.3 °F (36.3 °C)  Max: 98.1 °F (36.7 °C)   BP  Min: 130/89  Max: 158/88    Pulse  Min: 66  Max: 88         12/18/20  1642 12/21/20  0819 12/22/20  0825   Weight: 109 kg (240 lb) 109 kg (240 lb) 114 kg (251 lb 8 oz)        Intake/Output Summary (Last 24 hours) at 12/23/2020 0836  Last data filed at 12/23/2020 0100  Gross per 24 hour   Intake 170 ml   Output --   Net 170 ml     Physical Exam:      General Appearance:    Well developed and well nourished in no acute distress   Neck:    no JVD   Lungs:     Clear to auscultation bilaterally. No wheezes, rhonchi or rales. No accessory muscle use.     Heart:    Regular rate and rhythm.  Normal S1 and S2. No murmur, no gallop, rub or lift.    Abdomen:     Normal bowel sounds, soft non-tender, non-distended   Extremities:   BLE wrapped in bandage. BLE warm with no cyanosis   Skin:   Warm and dry   Neurologic:   awake alert and oriented x3, speech clear and approp      Monitor:  SR with 1st degree avb  Results Review:     I reviewed the patient's new clinical results.    Sodium Sodium   Date Value Ref Range Status   12/23/2020 136 136 - 145 mmol/L Final   12/22/2020 138 136 - 145 mmol/L Final   12/21/2020 137  136 - 145 mmol/L Final      Potassium Potassium   Date Value Ref Range Status   12/23/2020 3.8 3.5 - 5.2 mmol/L Final   12/22/2020 3.7 3.5 - 5.2 mmol/L Final   12/21/2020 3.6 3.5 - 5.2 mmol/L Final      Chloride Chloride   Date Value Ref Range Status   12/23/2020 107 98 - 107 mmol/L Final   12/22/2020 110 (H) 98 - 107 mmol/L Final   12/21/2020 109 (H) 98 - 107 mmol/L Final      Bicarbonate No results found for: PLASMABICARB   BUN BUN   Date Value Ref Range Status   12/23/2020 41 (H) 8 - 23 mg/dL Final   12/22/2020 43 (H) 8 - 23 mg/dL Final   12/21/2020 47 (H) 8 - 23 mg/dL Final      Creatinine Creatinine   Date Value Ref Range Status   12/23/2020 3.26 (H) 0.76 - 1.27 mg/dL Final   12/22/2020 3.65 (H) 0.76 - 1.27 mg/dL Final   12/21/2020 3.37 (H) 0.76 - 1.27 mg/dL Final      Calcium Calcium   Date Value Ref Range Status   12/23/2020 8.7 8.6 - 10.5 mg/dL Final   12/22/2020 8.7 8.6 - 10.5 mg/dL Final   12/21/2020 8.6 8.6 - 10.5 mg/dL Final      Magnesium Magnesium   Date Value Ref Range Status   12/21/2020 2.0 1.6 - 2.4 mg/dL Final        Results from last 7 days   Lab Units 12/22/20  0549   WBC 10*3/mm3 7.29   HEMOGLOBIN g/dL 8.2*   HEMATOCRIT % 27.6*   PLATELETS 10*3/mm3 237     Results from last 7 days   Lab Units 12/23/20  0357 12/22/20  0549 12/21/20  1450 12/21/20  1223   TROPONIN T ng/mL 0.171* 0.141* 0.147* 0.128*     Lab Results   Component Value Date    CHOL 108 12/22/2020     Lab Results   Component Value Date    HDL 49 12/22/2020     Lab Results   Component Value Date    LDL 42 12/22/2020     Lab Results   Component Value Date    TRIG 86 12/22/2020     Results from last 7 days   Lab Units 12/21/20  1450 12/19/20  0439   INR  1.30* 1.37*     Assessment/Plan   Assessment/ Plan  1. Sepsis secondary to UTI  2. NSVT and junctional rhythm         -History of nonsustained V. tach during dobutamine stress  3. Metabolic encephalopathy  4. ESRD   5. Microcytic anemia  6. Bradycardia with syncope status post LINQ loop  recorder 4/23/2019/1st degree AVB          -NO BETA BLOCKER          -LINQ interrogated 12/21/20: showed 50 mins of a fib on 11/11/20 at 2046, there are also 3 episodes qauestionsable of pauses recorded 8/11/20, 4/8/20 and 2/18/20, most episodes occurring at night  7. Troponin elevation likely Type 2 in the setting of sepsis         -Peak trop 0.147         -EKG T wave inversion in anterolateral leads and nonspecific T wave changes in the inferior              Leads         -No WMA on echo   8. HFpEF       -2D echo 12/21/20: EF 59% Mod septal asymmetric hypertrophy. Grade 1 DD. LA cavity mod dilated. RA ,ild-mod dilated         -2D echo 2/28/2020: TDS.  Moderate to severe concentric LVH.  EF 55 to 60%.  Restrictive physiology (grade 3 diastolic dysfunction).  Normal function of all valves.  9. OFELIA, insurance denied CPAP  10. Immobility   11. Monoclonal gammopathy   12. Prolonged QT/QTc          -QTc using Bazett is 487 on EKG 12/23/20     Plan:  PO lasix 80mg resumed yesterday per david. Cr improved today. Norvasc started yesterday. NO BB given questionable pauses and bradycardia. No ACE or ARB given CKD. Telemetry showing SB with 1st degree AVB . Trop slightly more elevated that yesterday although showing a flat trend, patient still refusing stress test.     Discussed patient in detail and the plan is as follows:   Dr Low plans to work up as outpatient for possible PPM, although hoping CPAP would resolve pauses. No pauses seen here. Plan to obtain PYP scan as outpatient. Patient has QT prolongation now and discussion about possibility of starting Levaquin, cardiology would favor in avoiding medication that could worsen QT prolongation.     Follow up made with Dr. Low on 1/14/21 at 3:30.     ANTWAN Bess  12/23/20  08:36 EST      Time: 18 mins    Dictated portions using Dragon dictation software.     During the entire encounter, I was wearing recommended PPE including face mask and eye protection. Hand  "sanitization was performed prior to entering room and upon exit.    Paddy Low M.D.   Reviewed our cardiology group Nurse Practitioner's note.    Pt interviewed and examined.   Findings verified.   Reviewed and agree with corrections or modifications of history, physical, and plans as indicated:     Long talk with pt re risk factor modification.   Legs under wraps seem softer. No rales. No ascites.   Agree re ABs.   Creat and trop basically stable.   Probable PPM as OP, although have seen OFELIA treatment eliminate the \"pause\" issues. -24 min    EMR Dragon/Transcription disclaimer:   Much of this encounter note is an electronic transcription/translation of spoken language to printed text. The electronic translation of spoken language may permit erroneous, or at times, nonsensical words or phrases to be inadvertently transcribed.  Although I have reviewed the note for such errors, some may still exist. Please contact me if needed for clarification or correction.  Paddy Low M.D.    "

## 2020-12-23 NOTE — PROGRESS NOTES
Case Management Discharge Note      Final Note: Home with spouse and [facility]. Pt to receive IV Zosyn through 12/30/20. [facility] infusion to provide Zosyn. Transported by family    Provided Post Acute Provider List?: Refused    Selected Continued Care - Admitted Since 12/18/2020     Destination    No services have been selected for the patient.              Durable Medical Equipment    No services have been selected for the patient.              Dialysis/Infusion Coordination complete    Service Provider Selected Services Address Phone Fax Patient Preferred    [facility]  Infusion [facility] IV Therapy [address], [facility] [address] [phone] --          Home Medical Care Coordination complete    Service Provider Selected Services Address Phone Fax Patient Preferred    [facility]  [facility] [address] [address] [phone] --          Therapy    No services have been selected for the patient.              Community Resources    No services have been selected for the patient.                  Transportation Services  Private: Car    Final Discharge Disposition Code: 06 - home with home health care

## 2020-12-24 NOTE — PAYOR COMM NOTE
"Javon Mor SHIPLEY (65 y.o. Male)     DC SUMMARY FOR [id number]        Date of Birth Social Security Number Address Home Phone MRN    1955  [id number] [address] [phone] [id number]    [employer] Marital Status          Episcopalian        Admission Date Admission Type Admitting Provider Attending Provider Department, Room/Bed    12/18/20 Emergency Howie Andrade MD  [address], E[address]/1    Discharge Date Discharge Disposition Discharge Destination        12/23/2020 Home or Self Care              Attending Provider: (none)   Allergies: No Known Allergies    Isolation: None   Infection: None   Code Status: Prior    Ht: 190.5 cm (75\")   Wt: 114 kg (251 lb 8 oz)    Admission Cmt: None   Principal Problem: Sepsis secondary to UTI (CMS/[facility]) [A41.9,N39.0]                 Active Insurance as of 12/18/2020     Primary Coverage     Payor Plan Insurance Group Employer/Plan Group    [employer] [place] MEDICARE REPLACEMENT WELLCARE MEDICARE REPLACEMENT [id number]     Payor Plan Address Payor Plan Phone Number Payor Plan Fax Number Effective Dates    [address] [phone]  8/13/2020 - None Entered    [facility] [address]       Subscriber Name Subscriber Birth Date Member ID       Mor Alejandro 1955 [id number]           Secondary Coverage     Payor Plan Insurance Group Employer/Plan Group    [place]      Payor Plan Address Payor Plan Phone Number Payor Plan Fax Number Effective Dates    [address] [phone]  8/13/2020 - None Entered    [facility] [id number]       Subscriber Name Subscriber Birth Date Member ID       MOR ALEJANDRO 1955 [id number]                 Emergency Contacts      (Rel.) Home Phone Work Phone Mobile Phone    Patricia Alonzo (Spouse) [phone] -- [phone]    Celia Eastman (Daughter) -- -- --               Discharge Summary      Rashad Frank MD at 12/23/20 1239  "             Patient Name: Gregorio Alejandro  : 1955  MRN: [id number]    Date of Admission: 2020  Date of Discharge:  2020  Primary Care Physician: Maya Ribeiro APRN      Chief Complaint:   Altered Mental Status and Fever      Discharge Diagnoses     Active Hospital Problems    Diagnosis  POA   • **Sepsis secondary to UTI (CMS/HCC) [A41.9, N39.0]  Yes   • Ventricular tachycardia (paroxysmal) (CMS/HCC) [I47.2]  Unknown   • Macrocytosis [D75.89]  Yes   • Metabolic encephalopathy [G93.41]  Yes   • Anemia, chronic disease [D63.8]  Unknown   • Hyperlipidemia [E78.5]  Yes   • Hypertension [I10]  Yes   • Monoclonal gammopathy [D47.2]  Yes   •  Chronic kidney disease stage V  Yes   • DM2 (diabetes mellitus, type 2) (CMS/HCC) [E11.9]  Yes   • Abnormal CT of the abdomen [R93.5]  Yes   • Normal anion gap metabolic acidosis [E87.2]  Yes   • Complicated UTI (urinary tract infection) [N39.0]  Yes      Resolved Hospital Problems   No resolved problems to display.        Hospital Course     Mr. Alejandro is a 65 y.o. male with a history of chronic kidney disease stage V and chronic lower extremity wounds who presented to [facility] initially complaining of fevers chills and altered mental status.  Please see the admitting history and physical for further details.  He was found to have toxic metabolic encephalopathy and possible urinary tract infection and was admitted to the hospital for further evaluation and treatment.  He was admitted to the hospital and started on IV antibiotics.  Infectious disease and nephrology were both consulted for assistance.  Local wound care was performed on his lower extremities.  Culture results were reviewed and recommendations from infectious disease were to place him on Zosyn here in the hospital.  Initially we had discussed placing him on oral antibiotics at discharge however this was not a possibility given his cardiac issues.  Over the course the  hospitalization he did develop some nonsustained ventricular tachycardia.  He was seen and evaluated by cardiology who did an echocardiogram and recommended a stress test and possible heart cath.  The patient was unwilling to proceed in this at this time and wanted to go home.  He declined any further work-up or evaluation from a cardiac perspective.  He developed a small heart block and beta-blocker was discontinued.  Blood pressure medications were titrated with a calcium channel blocker.  His renal function stabilized and he did not require dialysis.  His dialysis fistula was evaluated and does have clotting to it and he is recommended to follow-up with his vascular surgeon in the outpatient setting.  Given the heart block and cardiac issues it was recommended that he not take a fluoroquinolone at discharge and since there was not an oral alternative IV antibiotics were required.  A midline catheter was placed and he was prescribed IV antibiotics to complete those in 7 days.  The plan was discussed with the patient and his wife and they knowledge understanding and the plan is to discharge home in stable condition today.  He does need close follow-up with his nephrologist cardiologist and primary care physician following discharge.  At discharge he can resume his wound care plans prior to admission.    Day of Discharge     Subjective:  He is feeling okay his visual disturbances described prior to discharge are improving.  He is to follow-up with his eye doctor in the outpatient setting.  He denies new issues and wants to go home    Review of Systems   Constitutional: Negative for chills, fatigue and fever.   Eyes: Positive for visual disturbance.   Respiratory: Negative for cough, shortness of breath and wheezing.    Cardiovascular: Negative for chest pain, palpitations and leg swelling.   Gastrointestinal: Negative for constipation, diarrhea, nausea and vomiting.       Physical Exam:  Temp:  [97.3 °F (36.3  °C)-98.1 °F (36.7 °C)] 97.7 °F (36.5 °C)  Heart Rate:  [66-89] 75  Resp:  [16-18] 18  BP: (130-158)/() 138/84  Body mass index is 31.44 kg/m².  Physical Exam  Vitals signs and nursing note reviewed.   Constitutional:       Appearance: Normal appearance.   HENT:      Head: Normocephalic and atraumatic.   Cardiovascular:      Rate and Rhythm: Normal rate and regular rhythm.   Pulmonary:      Effort: Pulmonary effort is normal.      Breath sounds: Normal breath sounds.   Abdominal:      General: Bowel sounds are normal.      Palpations: Abdomen is soft.   Neurological:      Mental Status: He is alert.         Consultants     Consult Orders (all) (From admission, onward)     Start     Ordered    12/23/20 1236  IV TEAM - Consult for Midline Placement (IV Team to Determine Number of Lumens)  Once     Provider:  (Not yet assigned)    12/23/20 1235    12/21/20 0702  Inpatient Cardiology Consult  IN AM     Specialty:  Cardiology  Provider:  Paddy Low MD    12/20/20 1817    12/20/20 0844  Inpatient Infectious Diseases Consult  Once     Specialty:  Infectious Diseases  Provider:  Tonja Azevedo MD    12/20/20 0843    12/19/20 0004  Inpatient Case Management  Consult  Once     Provider:  (Not yet assigned)    12/19/20 0003    12/18/20 2233  Inpatient Gastroenterology Consult  Once     Specialty:  Gastroenterology  Provider:  Alessandro Welsh MD    12/18/20 2234    12/18/20 2232  Inpatient Nephrology Consult  Once     Specialty:  Nephrology  Provider:  Nixon Robertson MD    12/18/20 2234    12/18/20 2033  A (on-call MD unless specified) Details  Once     Specialty:  Hospitalist  Provider:  (Not yet assigned)    12/18/20 2032              Procedures     Imaging Results (All)     Procedure Component Value Units Date/Time    CT Abdomen Pelvis Without Contrast [[id number]] Collected: 12/18/20 1923     Updated: 12/18/20 1937    Narrative:      CT ABDOMEN PELVIS WO CONTRAST-     INDICATIONS: Fever,  hematuria     TECHNIQUE: Radiation dose reduction techniques were utilized, including  automated exposure control and exposure modulation based on body size.  Unenhanced ABDOMEN AND PELVIS CT     COMPARISON: None available     FINDINGS:     Diffuse wall thickening the urinary bladder with small surrounding fat  stranding suggests cystitis. Bilateral perinephric stranding may be  chronic in nature or may be evidence of infection. A couple 3 mm  nonobstructive stones are seen in the right kidney. Assessment of the  distal ureters and urinary bladder is limited by attenuation artifact  from bilateral hip arthroplasty hardware.     Assessment for liver lesions is significantly limited without  intravenous contrast material.     Otherwise unremarkable unenhanced appearance of the liver, gallbladder,  spleen, adrenal glands, pancreas, kidneys, bladder.     No bowel obstruction. Conspicuous abnormal wall thickening is apparent  in the rectum, circumferentially, for example 2 cm, axial image 163,  that may reflect rectal wall lesion proctitis, direct visualization  advised.     No free intraperitoneal gas or free fluid.     Left para-aortic lymph node measuring 8 mm short axis, axial image 86 is  borderline prominent, nonspecific, could be reactive in nature or  potentially evidence of neoplasm. Borderline prominent subcutaneous  lymph nodes are seen in the bilateral groin, for example on the right,  1.2 cm short axis, axial image 188. Clinical correlation and follow-up  recommended; if further imaging evaluation is indicated, positron  emission tomography could be considered.     Abdominal aorta is not aneurysmal. Aortic and other arterial  calcifications are present.     The lung bases show mild atelectasis. Minimal bilateral pleural  effusions are seen. The heart is enlarged.     Degenerative changes are seen in the spine. No acute fracture is  identified.             Impression:            1. Diffuse wall thickening the  urinary bladder with small surrounding  fat stranding suggests cystitis. Bilateral perinephric fat stranding is  also present. Nonobstructive right nephrolithiasis.  2. Conspicuous abnormal wall thickening is apparent in the rectum,  circumferentially, that may reflect rectal wall lesion/neoplasm or  proctitis, direct visualization advised.  3. Borderline, nonspecific lymph nodes, as described.     Discussed by telephone with Dr. Wang for Dr. Mast at 1930, 12/18/2020.     This report was finalized on 12/18/2020 7:34 PM by Dr. Jluis Meza M.D.       CT Head Without Contrast [[id number]] Collected: 12/18/20 1918     Updated: 12/18/20 1925    Narrative:      CT HEAD WO CONTRAST-     INDICATIONS: Altered mental status     TECHNIQUE: Radiation dose reduction techniques were utilized, including  automated exposure control and exposure modulation based on body size.  Noncontrast head CT     COMPARISON: None available     FINDINGS:           No acute intracranial hemorrhage, midline shift or mass effect. No acute  territorial infarct is identified.     Mild periventricular hypodensities suggest chronic small vessel ischemic  change in a patient this age.     Arterial calcifications are seen at the base of the brain.     Ventricles, cisterns, cerebral sulci are unremarkable for patient age.     Procedural change of the left globe is apparent. The visualized  paranasal sinuses, orbits, mastoid air cells are otherwise unremarkable.                   Impression:         No acute intracranial hemorrhage or hydrocephalus. If there is further  clinical concern, MRI could be considered for further evaluation.     This report was finalized on 12/18/2020 7:22 PM by Dr. Jluis Meza M.D.       XR Chest 1 View [[id number]] Collected: 12/18/20 1736     Updated: 12/18/20 1741    Narrative:      XR CHEST 1 VW-     HISTORY:  Fever, altered mental status.     COMPARISON:  None.     FINDINGS:    A single portable view of the  chest was obtained. There is an implanted  cardiac loop recording device. The cardiac silhouette is mildly  enlarged. Mediastinal contours are within normal limits. There is  calcific aortic atherosclerosis. There is central pulmonary venous  congestion. There are low lung volumes. There is mild hazy opacity in  both lungs, which could be due to atelectasis in the setting of low lung  volumes or edema, however, multifocal pneumonia is not excluded.  Recommend follow-up PA and lateral chest radiographs. Pleural spaces are  clear. There is multilevel degenerative disc disease.     This report was finalized on 12/18/2020 5:38 PM by Dr. Lindsey Medel M.D.             Pertinent Labs     Results from last 7 days   Lab Units 12/22/20  0549 12/21/20  1450 12/21/20  0447 12/20/20  0631   WBC 10*3/mm3 7.29 10.42 8.90 11.70*   HEMOGLOBIN g/dL 8.2* 8.6* 8.1* 8.6*   PLATELETS 10*3/mm3 237 242 223 196     Results from last 7 days   Lab Units 12/23/20  0357 12/22/20  0549 12/21/20 0447 12/20/20  0631   SODIUM mmol/L 136 138 137 137   POTASSIUM mmol/L 3.8 3.7 3.6 4.2   CHLORIDE mmol/L 107 110* 109* 110*   CO2 mmol/L 21.1* 20.6* 19.3* 16.6*   BUN mg/dL 41* 43* 47* 53*   CREATININE mg/dL 3.26* 3.65* 3.37* 3.64*   GLUCOSE mg/dL 114* 109* 116* 132*   Estimated Creatinine Clearance: 30 mL/min (A) (by C-G formula based on SCr of 3.26 mg/dL (H)).  Results from last 7 days   Lab Units 12/23/20  0357 12/21/20  0447 12/20/20  0631 12/18/20  1653   ALBUMIN g/dL 2.20* 2.20* 2.10* 2.40*   BILIRUBIN mg/dL  --   --   --  0.6   ALK PHOS U/L  --   --   --  94   AST (SGOT) U/L  --   --   --  15   ALT (SGPT) U/L  --   --   --  11     Results from last 7 days   Lab Units 12/23/20  0357 12/22/20  0549 12/21/20  0447 12/20/20  0631 12/19/20  0439 12/18/20  1653   CALCIUM mg/dL 8.7 8.7 8.6 8.9 8.6 8.0*   ALBUMIN g/dL 2.20*  --  2.20* 2.10*  --  2.40*   MAGNESIUM mg/dL  --   --  2.0 1.9 1.8  --    PHOSPHORUS mg/dL 2.6  --  3.2 3.9  --   --      Results  from last 7 days   Lab Units 12/18/20  1653   LIPASE U/L 17     Results from last 7 days   Lab Units 12/23/20  0357 12/22/20  0549 12/21/20  1450 12/21/20  1223   TROPONIN T ng/mL 0.171* 0.141* 0.147* 0.128*       Results from last 7 days   Lab Units 12/22/20  0549   CHOLESTEROL mg/dL 108   TRIGLYCERIDES mg/dL 86   HDL CHOL mg/dL 49   LDL CHOL mg/dL 42     Results from last 7 days   Lab Units 12/18/20  1851 12/18/20  1814 12/18/20  1707   BLOODCX  No growth at 4 days No growth at 4 days  --    URINECX   --   --  50,000 CFU/mL Mixed Abbi Isolated       Test Results Pending at Discharge     Pending Labs     Order Current Status    Blood Culture - Blood, Arm, Left Preliminary result    Blood Culture - Blood, Arm, Left Preliminary result          Discharge Details        Discharge Medications      New Medications      Instructions Start Date   amLODIPine 5 MG tablet  Commonly known as: NORVASC   5 mg, Oral, Every 24 Hours Scheduled   Start Date: December 24, 2020     aspirin 81 MG EC tablet   81 mg, Oral, Daily   Start Date: December 24, 2020     piperacillin-tazobactam 3-0.375 GM/50ML IVPB  Commonly known as: ZOSYN   3.375 g, Intravenous, Every 12 Hours      sodium bicarbonate 650 MG tablet   650 mg, Oral, 2 Times Daily         Changes to Medications      Instructions Start Date   furosemide 80 MG tablet  Commonly known as: LASIX  What changed:   · medication strength  · how much to take  · when to take this   80 mg, Oral, Daily   Start Date: December 24, 2020        Continue These Medications      Instructions Start Date   atorvastatin 20 MG tablet  Commonly known as: LIPITOR   20 mg, Oral, Daily      brimonidine 0.1 % solution ophthalmic solution  Commonly known as: ALPHAGAN P   1 drop, 2 times daily      dorzolamide 2 % ophthalmic solution  Commonly known as: TRUSOPT   1 drop, Right Eye, 2 times daily      HumaLOG KwikPen 100 UNIT/ML solution pen-injector  Generic drug: Insulin Lispro (1 Unit Dial)   INJECT 20  UNITS INTO THE SKIN TWICE DAILY      HYDROcodone-acetaminophen  MG per tablet  Commonly known as: NORCO   Take 1 tablet po every 4 hours PRN for moderate pain, take two tablets po every 4 hours PRN for severe pain, valid if faxed to [facility]        diphenhydrAMINE 25 mg capsule  Commonly known as: BENADRYL   25 mg, Oral, Every 6 Hours PRN      [employer] Allergy 25 mg capsule  Generic drug: diphenhydrAMINE   25 mg, Oral, Every 6 Hours PRN         Stop These Medications    gabapentin 300 MG capsule  Commonly known as: NEURONTIN            No Known Allergies      Discharge Disposition:  Home or Self Care    Discharge Diet:  Diet Order   Procedures   • Diet Regular; Cardiac, Renal       Discharge Activity:   Activity Instructions     Activity as Tolerated            CODE STATUS:    Code Status and Medical Interventions:   Ordered at: 12/18/20 2234     Code Status:    CPR     Medical Interventions (Level of Support Prior to Arrest):    Full       No future appointments.  Additional Instructions for the Follow-ups that You Need to Schedule     Ambulatory Referral to Home Health   As directed      Face to Face Visit Date: 12/23/2020    Follow-up provider for Plan of Care?: I treated the patient in an acute care facility and will not continue treatment after discharge.    Follow-up provider: MAYA RAY [[id number]]    Reason/Clinical Findings: infection and renal failure    Describe mobility limitations that make leaving home difficult: doesnt drive    Nursing/Therapeutic Services Requested: Skilled Nursing Physical Therapy    Skilled nursing orders: Infusion therapy (midline care)    Frequency: 1 Week 1         Discharge Follow-up with PCP   As directed       Currently Documented PCP:    Maya Ray APRN    PCP Phone Number:    [phone]     Follow Up Details: 1-2 weekx         Discharge Follow-up with Specified Provider: cardiology; 2 Weeks   As directed      To: cardiology    Follow Up: 2 Weeks          Discharge Follow-up with Specified Provider: nephrology; 2 Weeks   As directed      To: nephrology    Follow Up: 2 Weeks         Discharge Follow-up with Specified Provider: wound care as directed   As directed      To: wound care as directed            Contact information for follow-up providers     Paddy Low MD Follow up on 1/14/2021.    Specialty: Cardiology  Why: at 3:30pm   Contact information:  [address]  [facility] [address]  [facility] [id number] [phone]             Maya Ribeiro APRN .    Specialty: Family Medicine  Why: 1-2 weekx  Contact information:  [address] [facility] [id number] [phone]                   Contact information for after-discharge care     Home Medical Care     [facility] .    Service: Home Health Services  Contact information:  [address] [facility] [address]  [facility] [address] [phone]                             Additional Instructions for the Follow-ups that You Need to Schedule     Ambulatory Referral to Home Health   As directed      Face to Face Visit Date: 12/23/2020    Follow-up provider for Plan of Care?: I treated the patient in an acute care facility and will not continue treatment after discharge.    Follow-up provider: MAYA RIBEIRO [[id number]]    Reason/Clinical Findings: infection and renal failure    Describe mobility limitations that make leaving home difficult: doesnt drive    Nursing/Therapeutic Services Requested: Skilled Nursing Physical Therapy    Skilled nursing orders: Infusion therapy (midline care)    Frequency: 1 Week 1         Discharge Follow-up with PCP   As directed       Currently Documented PCP:    Maya Ribeiro APRN    PCP Phone Number:    [phone]     Follow Up Details: 1-2 weekx         Discharge Follow-up with Specified Provider: cardiology; 2 Weeks   As directed      To: cardiology    Follow Up: 2 Weeks         Discharge Follow-up with Specified Provider: nephrology; 2 Weeks    As directed      To: nephrology    Follow Up: 2 Weeks         Discharge Follow-up with Specified Provider: wound care as directed   As directed      To: wound care as directed           Time Spent on Discharge:  Greater than 30 minutes      Rashad Frank MD  [facility]  12/23/20  12:39 EST                Electronically signed by Rashad Frank MD at 12/23/20 8463

## 2020-12-24 NOTE — OUTREACH NOTE
Prep Survey      Responses   [facility] facility patient discharged from?  [place]   Is LACE score < 7 ?  No   Emergency Room discharge w/ pulse ox?  No   Eligibility  Readm Mgmt   Discharge diagnosis  Sepsis secondary to UTI, toxic metabolic encephalopathy, ESRD   Does the patient have one of the following disease processes/diagnoses(primary or secondary)?  Sepsis   Does the patient have Home health ordered?  Yes   What is the Home health agency?   [facility] [place]   Is there a DME ordered?  No   Comments regarding appointments  Needs f/u scheduled   Prep survey completed?  Yes          Gabby Minaya RN

## 2020-12-28 NOTE — OUTREACH NOTE
Sepsis Week 1 Survey      Responses   [facility] patient discharged from?  [place]   Does the patient have one of the following disease processes/diagnoses(primary or secondary)?  Sepsis   Week 1 attempt successful?  No   Unsuccessful attempts  Attempt 1          Ciera Beard RN

## 2020-12-29 NOTE — OUTREACH NOTE
Sepsis Week 1 Survey      Responses   [facility] patient discharged from?  [place]   Does the patient have one of the following disease processes/diagnoses(primary or secondary)?  Sepsis   Week 1 attempt successful?  No   Unsuccessful attempts  Attempt 2          Liane Monge RN

## 2020-12-30 NOTE — OUTREACH NOTE
Sepsis Week 1 Survey      Responses   [facility] patient discharged from?  [place]   Does the patient have one of the following disease processes/diagnoses(primary or secondary)?  Sepsis   Week 1 attempt successful?  Yes   Call start time  1337   Rescheduled  Rescheduled-pt requested   Discharge diagnosis  Sepsis secondary to UTI, toxic metabolic encephalopathy, ESRD   Is patient permission given to speak with other caregiver?  Yes   Person spoke with today (if not patient) and relationship  Patricia Hodges RN

## 2021-01-01 ENCOUNTER — APPOINTMENT (OUTPATIENT)
Dept: GENERAL RADIOLOGY | Facility: HOSPITAL | Age: 66
End: 2021-01-01

## 2021-01-01 ENCOUNTER — ANESTHESIA (OUTPATIENT)
Dept: GASTROENTEROLOGY | Facility: HOSPITAL | Age: 66
End: 2021-01-01

## 2021-01-01 ENCOUNTER — APPOINTMENT (OUTPATIENT)
Dept: WOUND CARE | Facility: HOSPITAL | Age: 66
End: 2021-01-01

## 2021-01-01 ENCOUNTER — READMISSION MANAGEMENT (OUTPATIENT)
Dept: CALL CENTER | Facility: HOSPITAL | Age: 66
End: 2021-01-01

## 2021-01-01 ENCOUNTER — OFFICE VISIT (OUTPATIENT)
Dept: WOUND CARE | Facility: HOSPITAL | Age: 66
End: 2021-01-01

## 2021-01-01 ENCOUNTER — HOSPITAL ENCOUNTER (INPATIENT)
Facility: HOSPITAL | Age: 66
LOS: 15 days | Discharge: NURSING, REHAB OR LONG-TERM CARE | End: 2021-02-19
Attending: EMERGENCY MEDICINE | Admitting: INTERNAL MEDICINE

## 2021-01-01 ENCOUNTER — HOSPITAL ENCOUNTER (INPATIENT)
Facility: HOSPITAL | Age: 66
LOS: 6 days | End: 2021-09-06
Attending: EMERGENCY MEDICINE | Admitting: INTERNAL MEDICINE

## 2021-01-01 ENCOUNTER — APPOINTMENT (OUTPATIENT)
Dept: MRI IMAGING | Facility: HOSPITAL | Age: 66
End: 2021-01-01

## 2021-01-01 ENCOUNTER — APPOINTMENT (OUTPATIENT)
Dept: CT IMAGING | Facility: HOSPITAL | Age: 66
End: 2021-01-01

## 2021-01-01 ENCOUNTER — HOSPITAL ENCOUNTER (OUTPATIENT)
Facility: HOSPITAL | Age: 66
Discharge: NURSING, REHAB OR LONG-TERM CARE | End: 2021-06-09
Attending: EMERGENCY MEDICINE | Admitting: HOSPITALIST

## 2021-01-01 ENCOUNTER — APPOINTMENT (OUTPATIENT)
Dept: NEUROLOGY | Facility: HOSPITAL | Age: 66
End: 2021-01-01

## 2021-01-01 ENCOUNTER — ANESTHESIA EVENT (OUTPATIENT)
Dept: GASTROENTEROLOGY | Facility: HOSPITAL | Age: 66
End: 2021-01-01

## 2021-01-01 VITALS
OXYGEN SATURATION: 100 % | HEIGHT: 75 IN | HEART RATE: 50 BPM | BODY MASS INDEX: 28.97 KG/M2 | TEMPERATURE: 97 F | DIASTOLIC BLOOD PRESSURE: 92 MMHG | WEIGHT: 233 LBS | RESPIRATION RATE: 16 BRPM | SYSTOLIC BLOOD PRESSURE: 173 MMHG

## 2021-01-01 VITALS
HEIGHT: 75 IN | WEIGHT: 164.24 LBS | OXYGEN SATURATION: 90 % | DIASTOLIC BLOOD PRESSURE: 53 MMHG | BODY MASS INDEX: 20.42 KG/M2 | SYSTOLIC BLOOD PRESSURE: 92 MMHG | TEMPERATURE: 97.4 F

## 2021-01-01 VITALS
TEMPERATURE: 98.1 F | BODY MASS INDEX: 28.32 KG/M2 | HEIGHT: 75 IN | DIASTOLIC BLOOD PRESSURE: 93 MMHG | OXYGEN SATURATION: 97 % | WEIGHT: 227.8 LBS | SYSTOLIC BLOOD PRESSURE: 143 MMHG | RESPIRATION RATE: 16 BRPM | HEART RATE: 80 BPM

## 2021-01-01 DIAGNOSIS — N17.9 AKI (ACUTE KIDNEY INJURY) (HCC): ICD-10-CM

## 2021-01-01 DIAGNOSIS — R41.82 ALTERED MENTAL STATUS, UNSPECIFIED ALTERED MENTAL STATUS TYPE: Primary | ICD-10-CM

## 2021-01-01 DIAGNOSIS — N39.0 ACUTE UTI: ICD-10-CM

## 2021-01-01 DIAGNOSIS — K92.2 GASTROINTESTINAL HEMORRHAGE, UNSPECIFIED GASTROINTESTINAL HEMORRHAGE TYPE: Primary | ICD-10-CM

## 2021-01-01 DIAGNOSIS — G93.41 ACUTE METABOLIC ENCEPHALOPATHY: Primary | ICD-10-CM

## 2021-01-01 DIAGNOSIS — N28.9 ACUTE RENAL INSUFFICIENCY: ICD-10-CM

## 2021-01-01 DIAGNOSIS — I10 ACCELERATED HYPERTENSION: ICD-10-CM

## 2021-01-01 DIAGNOSIS — J18.9 PNEUMONIA OF RIGHT LOWER LOBE DUE TO INFECTIOUS ORGANISM: ICD-10-CM

## 2021-01-01 DIAGNOSIS — R77.8 ELEVATED TROPONIN: ICD-10-CM

## 2021-01-01 DIAGNOSIS — L98.9 SORE ON LEG: ICD-10-CM

## 2021-01-01 DIAGNOSIS — N39.0 URINARY TRACT INFECTION, ACUTE: ICD-10-CM

## 2021-01-01 DIAGNOSIS — E87.6 HYPOKALEMIA: ICD-10-CM

## 2021-01-01 LAB
ABO GROUP BLD: NORMAL
ABO GROUP BLD: NORMAL
ACANTHOCYTES BLD QL SMEAR: ABNORMAL
ADV 40+41 DNA STL QL NAA+NON-PROBE: NOT DETECTED
ADV 40+41 DNA STL QL NAA+NON-PROBE: NOT DETECTED
ALBUMIN SERPL-MCNC: 1.9 G/DL (ref 3.5–5.2)
ALBUMIN SERPL-MCNC: 2 G/DL (ref 3.5–5.2)
ALBUMIN SERPL-MCNC: 2.1 G/DL (ref 3.5–5.2)
ALBUMIN SERPL-MCNC: 2.2 G/DL (ref 3.5–5.2)
ALBUMIN SERPL-MCNC: 2.2 G/DL (ref 3.5–5.2)
ALBUMIN SERPL-MCNC: 2.3 G/DL (ref 3.5–5.2)
ALBUMIN SERPL-MCNC: 2.3 G/DL (ref 3.5–5.2)
ALBUMIN SERPL-MCNC: 2.4 G/DL (ref 3.5–5.2)
ALBUMIN SERPL-MCNC: 2.4 G/DL (ref 3.5–5.2)
ALBUMIN SERPL-MCNC: 2.5 G/DL (ref 3.5–5.2)
ALBUMIN SERPL-MCNC: 2.6 G/DL (ref 3.5–5.2)
ALBUMIN SERPL-MCNC: 2.6 G/DL (ref 3.5–5.2)
ALBUMIN SERPL-MCNC: 2.7 G/DL (ref 3.5–5.2)
ALBUMIN SERPL-MCNC: 2.8 G/DL (ref 3.5–5.2)
ALBUMIN SERPL-MCNC: 2.9 G/DL (ref 3.5–5.2)
ALBUMIN SERPL-MCNC: 3.1 G/DL (ref 3.5–5.2)
ALBUMIN SERPL-MCNC: 3.1 G/DL (ref 3.5–5.2)
ALBUMIN SERPL-MCNC: 3.2 G/DL (ref 3.5–5.2)
ALBUMIN/GLOB SERPL: 0.5 G/DL
ALBUMIN/GLOB SERPL: 0.6 G/DL
ALBUMIN/GLOB SERPL: 0.7 G/DL
ALBUMIN/GLOB SERPL: 0.8 G/DL
ALBUMIN/GLOB SERPL: 0.9 G/DL
ALP SERPL-CCNC: 103 U/L (ref 39–117)
ALP SERPL-CCNC: 104 U/L (ref 39–117)
ALP SERPL-CCNC: 127 U/L (ref 39–117)
ALP SERPL-CCNC: 181 U/L (ref 39–117)
ALP SERPL-CCNC: 91 U/L (ref 39–117)
ALT SERPL W P-5'-P-CCNC: 11 U/L (ref 1–41)
ALT SERPL W P-5'-P-CCNC: 13 U/L (ref 1–41)
ALT SERPL W P-5'-P-CCNC: 7 U/L (ref 1–41)
AMMONIA BLD-SCNC: 21 UMOL/L (ref 16–60)
AMMONIA BLD-SCNC: 22 UMOL/L (ref 16–60)
AMMONIA BLD-SCNC: 25 UMOL/L (ref 16–60)
ANION GAP SERPL CALCULATED.3IONS-SCNC: 10 MMOL/L (ref 5–15)
ANION GAP SERPL CALCULATED.3IONS-SCNC: 10 MMOL/L (ref 5–15)
ANION GAP SERPL CALCULATED.3IONS-SCNC: 10.7 MMOL/L (ref 5–15)
ANION GAP SERPL CALCULATED.3IONS-SCNC: 11 MMOL/L (ref 5–15)
ANION GAP SERPL CALCULATED.3IONS-SCNC: 11.1 MMOL/L (ref 5–15)
ANION GAP SERPL CALCULATED.3IONS-SCNC: 11.6 MMOL/L (ref 5–15)
ANION GAP SERPL CALCULATED.3IONS-SCNC: 11.6 MMOL/L (ref 5–15)
ANION GAP SERPL CALCULATED.3IONS-SCNC: 11.7 MMOL/L (ref 5–15)
ANION GAP SERPL CALCULATED.3IONS-SCNC: 11.8 MMOL/L (ref 5–15)
ANION GAP SERPL CALCULATED.3IONS-SCNC: 12.1 MMOL/L (ref 5–15)
ANION GAP SERPL CALCULATED.3IONS-SCNC: 12.2 MMOL/L (ref 5–15)
ANION GAP SERPL CALCULATED.3IONS-SCNC: 12.7 MMOL/L (ref 5–15)
ANION GAP SERPL CALCULATED.3IONS-SCNC: 13.3 MMOL/L (ref 5–15)
ANION GAP SERPL CALCULATED.3IONS-SCNC: 13.4 MMOL/L (ref 5–15)
ANION GAP SERPL CALCULATED.3IONS-SCNC: 13.9 MMOL/L (ref 5–15)
ANION GAP SERPL CALCULATED.3IONS-SCNC: 14.3 MMOL/L (ref 5–15)
ANION GAP SERPL CALCULATED.3IONS-SCNC: 15.3 MMOL/L (ref 5–15)
ANION GAP SERPL CALCULATED.3IONS-SCNC: 17 MMOL/L (ref 5–15)
ANION GAP SERPL CALCULATED.3IONS-SCNC: 17.6 MMOL/L (ref 5–15)
ANION GAP SERPL CALCULATED.3IONS-SCNC: 18.5 MMOL/L (ref 5–15)
ANION GAP SERPL CALCULATED.3IONS-SCNC: 19.4 MMOL/L (ref 5–15)
ANION GAP SERPL CALCULATED.3IONS-SCNC: 20.1 MMOL/L (ref 5–15)
ANION GAP SERPL CALCULATED.3IONS-SCNC: 20.5 MMOL/L (ref 5–15)
ANION GAP SERPL CALCULATED.3IONS-SCNC: 20.6 MMOL/L (ref 5–15)
ANION GAP SERPL CALCULATED.3IONS-SCNC: 8.1 MMOL/L (ref 5–15)
ANION GAP SERPL CALCULATED.3IONS-SCNC: 8.7 MMOL/L (ref 5–15)
ANION GAP SERPL CALCULATED.3IONS-SCNC: 8.7 MMOL/L (ref 5–15)
ANION GAP SERPL CALCULATED.3IONS-SCNC: 8.8 MMOL/L (ref 5–15)
ANION GAP SERPL CALCULATED.3IONS-SCNC: 9.5 MMOL/L (ref 5–15)
ANION GAP SERPL CALCULATED.3IONS-SCNC: 9.8 MMOL/L (ref 5–15)
ANION GAP SERPL CALCULATED.3IONS-SCNC: 9.9 MMOL/L (ref 5–15)
ANISOCYTOSIS BLD QL: ABNORMAL
ARTERIAL PATENCY WRIST A: ABNORMAL
AST SERPL-CCNC: 10 U/L (ref 1–40)
AST SERPL-CCNC: 14 U/L (ref 1–40)
AST SERPL-CCNC: 18 U/L (ref 1–40)
AST SERPL-CCNC: 21 U/L (ref 1–40)
AST SERPL-CCNC: 7 U/L (ref 1–40)
ASTRO TYP 1-8 RNA STL QL NAA+NON-PROBE: NOT DETECTED
ASTRO TYP 1-8 RNA STL QL NAA+NON-PROBE: NOT DETECTED
ATMOSPHERIC PRESS: 745.9 MMHG
ATMOSPHERIC PRESS: 747.3 MMHG
B PARAPERT DNA SPEC QL NAA+PROBE: NOT DETECTED
B PERT DNA SPEC QL NAA+PROBE: NOT DETECTED
BACTERIA BLD CULT: ABNORMAL
BACTERIA SPEC AEROBE CULT: ABNORMAL
BACTERIA SPEC AEROBE CULT: NORMAL
BACTERIA UR QL AUTO: ABNORMAL /HPF
BASE EXCESS BLDA CALC-SCNC: -7.7 MMOL/L (ref 0–2)
BASE EXCESS BLDV CALC-SCNC: -9.2 MMOL/L (ref -2–2)
BASO STIPL COARSE BLD QL SMEAR: ABNORMAL
BASOPHILS # BLD AUTO: 0.03 10*3/MM3 (ref 0–0.2)
BASOPHILS # BLD AUTO: 0.03 10*3/MM3 (ref 0–0.2)
BASOPHILS # BLD AUTO: 0.04 10*3/MM3 (ref 0–0.2)
BASOPHILS # BLD AUTO: 0.05 10*3/MM3 (ref 0–0.2)
BASOPHILS # BLD MANUAL: 0.07 10*3/MM3 (ref 0–0.2)
BASOPHILS # BLD MANUAL: 0.08 10*3/MM3 (ref 0–0.2)
BASOPHILS # BLD MANUAL: 0.1 10*3/MM3 (ref 0–0.2)
BASOPHILS NFR BLD AUTO: 0.4 % (ref 0–1.5)
BASOPHILS NFR BLD AUTO: 0.5 % (ref 0–1.5)
BASOPHILS NFR BLD AUTO: 0.6 % (ref 0–1.5)
BASOPHILS NFR BLD AUTO: 0.7 % (ref 0–1.5)
BASOPHILS NFR BLD AUTO: 1 % (ref 0–1.5)
BDY SITE: ABNORMAL
BDY SITE: ABNORMAL
BILIRUB SERPL-MCNC: 0.3 MG/DL (ref 0–1.2)
BILIRUB SERPL-MCNC: 0.6 MG/DL (ref 0–1.2)
BILIRUB SERPL-MCNC: 0.6 MG/DL (ref 0–1.2)
BILIRUB SERPL-MCNC: 0.9 MG/DL (ref 0–1.2)
BILIRUB SERPL-MCNC: 1.2 MG/DL (ref 0–1.2)
BILIRUB UR QL STRIP: NEGATIVE
BLD GP AB SCN SERPL QL: NEGATIVE
BLD GP AB SCN SERPL QL: NEGATIVE
BUN SERPL-MCNC: 19 MG/DL (ref 8–23)
BUN SERPL-MCNC: 19 MG/DL (ref 8–23)
BUN SERPL-MCNC: 20 MG/DL (ref 8–23)
BUN SERPL-MCNC: 21 MG/DL (ref 8–23)
BUN SERPL-MCNC: 22 MG/DL (ref 8–23)
BUN SERPL-MCNC: 22 MG/DL (ref 8–23)
BUN SERPL-MCNC: 23 MG/DL (ref 8–23)
BUN SERPL-MCNC: 23 MG/DL (ref 8–23)
BUN SERPL-MCNC: 24 MG/DL (ref 8–23)
BUN SERPL-MCNC: 24 MG/DL (ref 8–23)
BUN SERPL-MCNC: 25 MG/DL (ref 8–23)
BUN SERPL-MCNC: 25 MG/DL (ref 8–23)
BUN SERPL-MCNC: 31 MG/DL (ref 8–23)
BUN SERPL-MCNC: 36 MG/DL (ref 8–23)
BUN SERPL-MCNC: 40 MG/DL (ref 8–23)
BUN SERPL-MCNC: 48 MG/DL (ref 8–23)
BUN SERPL-MCNC: 54 MG/DL (ref 8–23)
BUN SERPL-MCNC: 56 MG/DL (ref 8–23)
BUN SERPL-MCNC: 63 MG/DL (ref 8–23)
BUN SERPL-MCNC: 68 MG/DL (ref 8–23)
BUN SERPL-MCNC: 70 MG/DL (ref 8–23)
BUN SERPL-MCNC: 71 MG/DL (ref 8–23)
BUN SERPL-MCNC: 72 MG/DL (ref 8–23)
BUN SERPL-MCNC: 73 MG/DL (ref 8–23)
BUN SERPL-MCNC: 75 MG/DL (ref 8–23)
BUN SERPL-MCNC: 76 MG/DL (ref 8–23)
BUN SERPL-MCNC: 78 MG/DL (ref 8–23)
BUN SERPL-MCNC: 85 MG/DL (ref 8–23)
BUN/CREAT SERPL: 14.8 (ref 7–25)
BUN/CREAT SERPL: 15.4 (ref 7–25)
BUN/CREAT SERPL: 16.4 (ref 7–25)
BUN/CREAT SERPL: 17.1 (ref 7–25)
BUN/CREAT SERPL: 17.3 (ref 7–25)
BUN/CREAT SERPL: 18.2 (ref 7–25)
BUN/CREAT SERPL: 18.4 (ref 7–25)
BUN/CREAT SERPL: 18.9 (ref 7–25)
BUN/CREAT SERPL: 19.1 (ref 7–25)
BUN/CREAT SERPL: 19.5 (ref 7–25)
BUN/CREAT SERPL: 19.5 (ref 7–25)
BUN/CREAT SERPL: 19.9 (ref 7–25)
BUN/CREAT SERPL: 20.3 (ref 7–25)
BUN/CREAT SERPL: 20.9 (ref 7–25)
BUN/CREAT SERPL: 21.3 (ref 7–25)
BUN/CREAT SERPL: 23.9 (ref 7–25)
BUN/CREAT SERPL: 8.2 (ref 7–25)
BUN/CREAT SERPL: 8.2 (ref 7–25)
BUN/CREAT SERPL: 8.4 (ref 7–25)
BUN/CREAT SERPL: 8.4 (ref 7–25)
BUN/CREAT SERPL: 8.7 (ref 7–25)
BUN/CREAT SERPL: 8.8 (ref 7–25)
BUN/CREAT SERPL: 9 (ref 7–25)
BUN/CREAT SERPL: 9 (ref 7–25)
BUN/CREAT SERPL: 9.2 (ref 7–25)
BUN/CREAT SERPL: 9.3 (ref 7–25)
BUN/CREAT SERPL: 9.4 (ref 7–25)
BUN/CREAT SERPL: 9.4 (ref 7–25)
BUN/CREAT SERPL: 9.5 (ref 7–25)
BUN/CREAT SERPL: 9.6 (ref 7–25)
BUN/CREAT SERPL: 9.7 (ref 7–25)
BURR CELLS BLD QL SMEAR: ABNORMAL
C CAYETANENSIS DNA STL QL NAA+NON-PROBE: NOT DETECTED
C CAYETANENSIS DNA STL QL NAA+NON-PROBE: NOT DETECTED
C COLI+JEJ+UPSA DNA STL QL NAA+NON-PROBE: NOT DETECTED
C DIFF TOX GENS STL QL NAA+PROBE: NEGATIVE
C PNEUM DNA NPH QL NAA+NON-PROBE: NOT DETECTED
C3 FRG RBC-MCNC: ABNORMAL
CALCIUM SPEC-SCNC: 8 MG/DL (ref 8.6–10.5)
CALCIUM SPEC-SCNC: 8.2 MG/DL (ref 8.6–10.5)
CALCIUM SPEC-SCNC: 8.3 MG/DL (ref 8.6–10.5)
CALCIUM SPEC-SCNC: 8.3 MG/DL (ref 8.6–10.5)
CALCIUM SPEC-SCNC: 8.5 MG/DL (ref 8.6–10.5)
CALCIUM SPEC-SCNC: 8.5 MG/DL (ref 8.6–10.5)
CALCIUM SPEC-SCNC: 8.6 MG/DL (ref 8.6–10.5)
CALCIUM SPEC-SCNC: 8.7 MG/DL (ref 8.6–10.5)
CALCIUM SPEC-SCNC: 8.8 MG/DL (ref 8.6–10.5)
CALCIUM SPEC-SCNC: 9 MG/DL (ref 8.6–10.5)
CALCIUM SPEC-SCNC: 9.1 MG/DL (ref 8.6–10.5)
CALCIUM SPEC-SCNC: 9.1 MG/DL (ref 8.6–10.5)
CALCIUM SPEC-SCNC: 9.2 MG/DL (ref 8.6–10.5)
CALCIUM SPEC-SCNC: 9.3 MG/DL (ref 8.6–10.5)
CALCIUM SPEC-SCNC: 9.3 MG/DL (ref 8.6–10.5)
CAMPY SP DNA.DIARRHEA STL QL NAA+PROBE: NOT DETECTED
CHLORIDE SERPL-SCNC: 106 MMOL/L (ref 98–107)
CHLORIDE SERPL-SCNC: 108 MMOL/L (ref 98–107)
CHLORIDE SERPL-SCNC: 109 MMOL/L (ref 98–107)
CHLORIDE SERPL-SCNC: 110 MMOL/L (ref 98–107)
CHLORIDE SERPL-SCNC: 111 MMOL/L (ref 98–107)
CHLORIDE SERPL-SCNC: 111 MMOL/L (ref 98–107)
CHLORIDE SERPL-SCNC: 112 MMOL/L (ref 98–107)
CHLORIDE SERPL-SCNC: 113 MMOL/L (ref 98–107)
CHLORIDE SERPL-SCNC: 114 MMOL/L (ref 98–107)
CHLORIDE SERPL-SCNC: 115 MMOL/L (ref 98–107)
CHLORIDE SERPL-SCNC: 116 MMOL/L (ref 98–107)
CHLORIDE SERPL-SCNC: 116 MMOL/L (ref 98–107)
CHLORIDE SERPL-SCNC: 117 MMOL/L (ref 98–107)
CHLORIDE UR-SCNC: 47 MMOL/L
CLARITY UR: ABNORMAL
CLARITY UR: ABNORMAL
CLARITY UR: CLEAR
CO2 SERPL-SCNC: 11.3 MMOL/L (ref 22–29)
CO2 SERPL-SCNC: 12 MMOL/L (ref 22–29)
CO2 SERPL-SCNC: 12.4 MMOL/L (ref 22–29)
CO2 SERPL-SCNC: 12.7 MMOL/L (ref 22–29)
CO2 SERPL-SCNC: 12.9 MMOL/L (ref 22–29)
CO2 SERPL-SCNC: 13.4 MMOL/L (ref 22–29)
CO2 SERPL-SCNC: 13.5 MMOL/L (ref 22–29)
CO2 SERPL-SCNC: 13.6 MMOL/L (ref 22–29)
CO2 SERPL-SCNC: 13.9 MMOL/L (ref 22–29)
CO2 SERPL-SCNC: 14 MMOL/L (ref 22–29)
CO2 SERPL-SCNC: 14.5 MMOL/L (ref 22–29)
CO2 SERPL-SCNC: 14.6 MMOL/L (ref 22–29)
CO2 SERPL-SCNC: 14.7 MMOL/L (ref 22–29)
CO2 SERPL-SCNC: 15.2 MMOL/L (ref 22–29)
CO2 SERPL-SCNC: 16 MMOL/L (ref 22–29)
CO2 SERPL-SCNC: 16 MMOL/L (ref 22–29)
CO2 SERPL-SCNC: 16.1 MMOL/L (ref 22–29)
CO2 SERPL-SCNC: 16.2 MMOL/L (ref 22–29)
CO2 SERPL-SCNC: 16.4 MMOL/L (ref 22–29)
CO2 SERPL-SCNC: 16.5 MMOL/L (ref 22–29)
CO2 SERPL-SCNC: 16.7 MMOL/L (ref 22–29)
CO2 SERPL-SCNC: 16.9 MMOL/L (ref 22–29)
CO2 SERPL-SCNC: 17.1 MMOL/L (ref 22–29)
CO2 SERPL-SCNC: 17.3 MMOL/L (ref 22–29)
CO2 SERPL-SCNC: 17.8 MMOL/L (ref 22–29)
CO2 SERPL-SCNC: 18.2 MMOL/L (ref 22–29)
CO2 SERPL-SCNC: 18.9 MMOL/L (ref 22–29)
CO2 SERPL-SCNC: 19.3 MMOL/L (ref 22–29)
CO2 SERPL-SCNC: 21.4 MMOL/L (ref 22–29)
COLOR UR: ABNORMAL
COLOR UR: YELLOW
COLOR UR: YELLOW
CREAT SERPL-MCNC: 2.02 MG/DL (ref 0.76–1.27)
CREAT SERPL-MCNC: 2.06 MG/DL (ref 0.76–1.27)
CREAT SERPL-MCNC: 2.1 MG/DL (ref 0.76–1.27)
CREAT SERPL-MCNC: 2.2 MG/DL (ref 0.76–1.27)
CREAT SERPL-MCNC: 2.21 MG/DL (ref 0.76–1.27)
CREAT SERPL-MCNC: 2.23 MG/DL (ref 0.76–1.27)
CREAT SERPL-MCNC: 2.32 MG/DL (ref 0.76–1.27)
CREAT SERPL-MCNC: 2.37 MG/DL (ref 0.76–1.27)
CREAT SERPL-MCNC: 2.37 MG/DL (ref 0.76–1.27)
CREAT SERPL-MCNC: 2.39 MG/DL (ref 0.76–1.27)
CREAT SERPL-MCNC: 2.46 MG/DL (ref 0.76–1.27)
CREAT SERPL-MCNC: 2.49 MG/DL (ref 0.76–1.27)
CREAT SERPL-MCNC: 2.54 MG/DL (ref 0.76–1.27)
CREAT SERPL-MCNC: 2.55 MG/DL (ref 0.76–1.27)
CREAT SERPL-MCNC: 2.55 MG/DL (ref 0.76–1.27)
CREAT SERPL-MCNC: 2.6 MG/DL (ref 0.76–1.27)
CREAT SERPL-MCNC: 2.61 MG/DL (ref 0.76–1.27)
CREAT SERPL-MCNC: 2.64 MG/DL (ref 0.76–1.27)
CREAT SERPL-MCNC: 2.69 MG/DL (ref 0.76–1.27)
CREAT SERPL-MCNC: 2.72 MG/DL (ref 0.76–1.27)
CREAT SERPL-MCNC: 2.76 MG/DL (ref 0.76–1.27)
CREAT SERPL-MCNC: 2.81 MG/DL (ref 0.76–1.27)
CREAT SERPL-MCNC: 3.25 MG/DL (ref 0.76–1.27)
CREAT SERPL-MCNC: 3.75 MG/DL (ref 0.76–1.27)
CREAT SERPL-MCNC: 3.81 MG/DL (ref 0.76–1.27)
CREAT SERPL-MCNC: 3.97 MG/DL (ref 0.76–1.27)
CREAT SERPL-MCNC: 4.11 MG/DL (ref 0.76–1.27)
CREAT SERPL-MCNC: 4.12 MG/DL (ref 0.76–1.27)
CREAT SERPL-MCNC: 4.13 MG/DL (ref 0.76–1.27)
CREAT SERPL-MCNC: 4.22 MG/DL (ref 0.76–1.27)
CREAT SERPL-MCNC: 4.36 MG/DL (ref 0.76–1.27)
CREAT UR-MCNC: 39.4 MG/DL
CRYPTOSP DNA STL QL NAA+NON-PROBE: NOT DETECTED
CRYPTOSP STL CULT: NOT DETECTED
CYTO UR: NORMAL
D-LACTATE SERPL-SCNC: 1.3 MMOL/L (ref 0.5–2)
D-LACTATE SERPL-SCNC: 1.5 MMOL/L (ref 0.5–2)
D-LACTATE SERPL-SCNC: 2.4 MMOL/L (ref 0.5–2)
D-LACTATE SERPL-SCNC: 2.8 MMOL/L (ref 0.5–2)
D-LACTATE SERPL-SCNC: 3.5 MMOL/L (ref 0.5–2)
DEPRECATED RDW RBC AUTO: 49.8 FL (ref 37–54)
DEPRECATED RDW RBC AUTO: 49.8 FL (ref 37–54)
DEPRECATED RDW RBC AUTO: 50.6 FL (ref 37–54)
DEPRECATED RDW RBC AUTO: 50.7 FL (ref 37–54)
DEPRECATED RDW RBC AUTO: 51.5 FL (ref 37–54)
DEPRECATED RDW RBC AUTO: 52.1 FL (ref 37–54)
DEPRECATED RDW RBC AUTO: 52.3 FL (ref 37–54)
DEPRECATED RDW RBC AUTO: 53.8 FL (ref 37–54)
DEPRECATED RDW RBC AUTO: 54 FL (ref 37–54)
DEPRECATED RDW RBC AUTO: 54.7 FL (ref 37–54)
DEPRECATED RDW RBC AUTO: 54.9 FL (ref 37–54)
DEPRECATED RDW RBC AUTO: 55.4 FL (ref 37–54)
DEPRECATED RDW RBC AUTO: 55.6 FL (ref 37–54)
DEPRECATED RDW RBC AUTO: 56 FL (ref 37–54)
DEPRECATED RDW RBC AUTO: 56.1 FL (ref 37–54)
DEPRECATED RDW RBC AUTO: 56.5 FL (ref 37–54)
DEPRECATED RDW RBC AUTO: 56.6 FL (ref 37–54)
DEPRECATED RDW RBC AUTO: 57.3 FL (ref 37–54)
DEPRECATED RDW RBC AUTO: 57.6 FL (ref 37–54)
DEPRECATED RDW RBC AUTO: 57.9 FL (ref 37–54)
DEPRECATED RDW RBC AUTO: 58.1 FL (ref 37–54)
DEPRECATED RDW RBC AUTO: 58.2 FL (ref 37–54)
DEPRECATED RDW RBC AUTO: 58.3 FL (ref 37–54)
DEPRECATED RDW RBC AUTO: 58.9 FL (ref 37–54)
DEPRECATED RDW RBC AUTO: 59.1 FL (ref 37–54)
DEPRECATED RDW RBC AUTO: 59.7 FL (ref 37–54)
DEPRECATED RDW RBC AUTO: 61.3 FL (ref 37–54)
E COLI DNA SPEC QL NAA+PROBE: NOT DETECTED
E HISTOLYT AG STL-ACNC: NOT DETECTED
E HISTOLYT DNA STL QL NAA+NON-PROBE: NOT DETECTED
EAEC PAA PLAS AGGR+AATA ST NAA+NON-PRB: NOT DETECTED
EAEC PAA PLAS AGGR+AATA ST NAA+NON-PRB: NOT DETECTED
EC STX1 + STX2 GENES STL NAA+PROBE: NOT DETECTED
EC STX1+STX2 GENES STL QL NAA+NON-PROBE: NOT DETECTED
ELLIPTOCYTES BLD QL SMEAR: ABNORMAL
EOSINOPHIL # BLD AUTO: 0.06 10*3/MM3 (ref 0–0.4)
EOSINOPHIL # BLD AUTO: 0.1 10*3/MM3 (ref 0–0.4)
EOSINOPHIL # BLD AUTO: 0.16 10*3/MM3 (ref 0–0.4)
EOSINOPHIL # BLD AUTO: 0.24 10*3/MM3 (ref 0–0.4)
EOSINOPHIL # BLD MANUAL: 0.08 10*3/MM3 (ref 0–0.4)
EOSINOPHIL # BLD MANUAL: 0.1 10*3/MM3 (ref 0–0.4)
EOSINOPHIL # BLD MANUAL: 0.19 10*3/MM3 (ref 0–0.4)
EOSINOPHIL # BLD MANUAL: 0.22 10*3/MM3 (ref 0–0.4)
EOSINOPHIL # BLD MANUAL: 0.24 10*3/MM3 (ref 0–0.4)
EOSINOPHIL NFR BLD AUTO: 0.8 % (ref 0.3–6.2)
EOSINOPHIL NFR BLD AUTO: 1.5 % (ref 0.3–6.2)
EOSINOPHIL NFR BLD AUTO: 2.7 % (ref 0.3–6.2)
EOSINOPHIL NFR BLD AUTO: 3.6 % (ref 0.3–6.2)
EOSINOPHIL NFR BLD MANUAL: 1 % (ref 0.3–6.2)
EOSINOPHIL NFR BLD MANUAL: 1 % (ref 0.3–6.2)
EOSINOPHIL NFR BLD MANUAL: 3 % (ref 0.3–6.2)
EOSINOPHIL NFR BLD MANUAL: 3.1 % (ref 0.3–6.2)
EOSINOPHIL NFR BLD MANUAL: 3.2 % (ref 0.3–6.2)
EPEC EAE GENE STL QL NAA+NON-PROBE: NOT DETECTED
EPEC EAE GENE STL QL NAA+NON-PROBE: NOT DETECTED
ERYTHROCYTE [DISTWIDTH] IN BLOOD BY AUTOMATED COUNT: 20.6 % (ref 12.3–15.4)
ERYTHROCYTE [DISTWIDTH] IN BLOOD BY AUTOMATED COUNT: 20.7 % (ref 12.3–15.4)
ERYTHROCYTE [DISTWIDTH] IN BLOOD BY AUTOMATED COUNT: 21.1 % (ref 12.3–15.4)
ERYTHROCYTE [DISTWIDTH] IN BLOOD BY AUTOMATED COUNT: 21.6 % (ref 12.3–15.4)
ERYTHROCYTE [DISTWIDTH] IN BLOOD BY AUTOMATED COUNT: 21.7 % (ref 12.3–15.4)
ERYTHROCYTE [DISTWIDTH] IN BLOOD BY AUTOMATED COUNT: 21.8 % (ref 12.3–15.4)
ERYTHROCYTE [DISTWIDTH] IN BLOOD BY AUTOMATED COUNT: 21.9 % (ref 12.3–15.4)
ERYTHROCYTE [DISTWIDTH] IN BLOOD BY AUTOMATED COUNT: 22.4 % (ref 12.3–15.4)
ERYTHROCYTE [DISTWIDTH] IN BLOOD BY AUTOMATED COUNT: 22.8 % (ref 12.3–15.4)
ERYTHROCYTE [DISTWIDTH] IN BLOOD BY AUTOMATED COUNT: 23.5 % (ref 12.3–15.4)
ERYTHROCYTE [DISTWIDTH] IN BLOOD BY AUTOMATED COUNT: 23.5 % (ref 12.3–15.4)
ERYTHROCYTE [DISTWIDTH] IN BLOOD BY AUTOMATED COUNT: 23.6 % (ref 12.3–15.4)
ERYTHROCYTE [DISTWIDTH] IN BLOOD BY AUTOMATED COUNT: 23.7 % (ref 12.3–15.4)
ERYTHROCYTE [DISTWIDTH] IN BLOOD BY AUTOMATED COUNT: 23.8 % (ref 12.3–15.4)
ERYTHROCYTE [DISTWIDTH] IN BLOOD BY AUTOMATED COUNT: 23.9 % (ref 12.3–15.4)
ERYTHROCYTE [DISTWIDTH] IN BLOOD BY AUTOMATED COUNT: 24 % (ref 12.3–15.4)
ERYTHROCYTE [DISTWIDTH] IN BLOOD BY AUTOMATED COUNT: 24.1 % (ref 12.3–15.4)
ERYTHROCYTE [DISTWIDTH] IN BLOOD BY AUTOMATED COUNT: 24.2 % (ref 12.3–15.4)
ERYTHROCYTE [DISTWIDTH] IN BLOOD BY AUTOMATED COUNT: 24.5 % (ref 12.3–15.4)
ETEC LTA+ST1A+ST1B TOX ST NAA+NON-PROBE: NOT DETECTED
ETEC LTA+ST1A+ST1B TOX ST NAA+NON-PROBE: NOT DETECTED
EXPIRATION DATE: ABNORMAL
FECAL OCCULT BLOOD SCREEN, POC: POSITIVE
FLUAV SUBTYP SPEC NAA+PROBE: NOT DETECTED
FLUBV RNA ISLT QL NAA+PROBE: NOT DETECTED
FOLATE SERPL-MCNC: 7.39 NG/ML (ref 4.78–24.2)
FUNGUS WND CULT: NORMAL
G LAMBLIA DNA SPEC QL NAA+PROBE: NOT DETECTED
G LAMBLIA DNA STL QL NAA+NON-PROBE: NOT DETECTED
GFR SERPL CREATININE-BSD FRML MDRD: 17 ML/MIN/1.73
GFR SERPL CREATININE-BSD FRML MDRD: 17 ML/MIN/1.73
GFR SERPL CREATININE-BSD FRML MDRD: 18 ML/MIN/1.73
GFR SERPL CREATININE-BSD FRML MDRD: 19 ML/MIN/1.73
GFR SERPL CREATININE-BSD FRML MDRD: 20 ML/MIN/1.73
GFR SERPL CREATININE-BSD FRML MDRD: 23 ML/MIN/1.73
GFR SERPL CREATININE-BSD FRML MDRD: 27 ML/MIN/1.73
GFR SERPL CREATININE-BSD FRML MDRD: 28 ML/MIN/1.73
GFR SERPL CREATININE-BSD FRML MDRD: 28 ML/MIN/1.73
GFR SERPL CREATININE-BSD FRML MDRD: 29 ML/MIN/1.73
GFR SERPL CREATININE-BSD FRML MDRD: 29 ML/MIN/1.73
GFR SERPL CREATININE-BSD FRML MDRD: 30 ML/MIN/1.73
GFR SERPL CREATININE-BSD FRML MDRD: 30 ML/MIN/1.73
GFR SERPL CREATININE-BSD FRML MDRD: 31 ML/MIN/1.73
GFR SERPL CREATININE-BSD FRML MDRD: 32 ML/MIN/1.73
GFR SERPL CREATININE-BSD FRML MDRD: 32 ML/MIN/1.73
GFR SERPL CREATININE-BSD FRML MDRD: 33 ML/MIN/1.73
GFR SERPL CREATININE-BSD FRML MDRD: 34 ML/MIN/1.73
GFR SERPL CREATININE-BSD FRML MDRD: 36 ML/MIN/1.73
GFR SERPL CREATININE-BSD FRML MDRD: 38 ML/MIN/1.73
GFR SERPL CREATININE-BSD FRML MDRD: 39 ML/MIN/1.73
GFR SERPL CREATININE-BSD FRML MDRD: 40 ML/MIN/1.73
GIE STN SPEC: NORMAL
GLOBULIN UR ELPH-MCNC: 3.5 GM/DL
GLOBULIN UR ELPH-MCNC: 3.7 GM/DL
GLOBULIN UR ELPH-MCNC: 3.8 GM/DL
GLOBULIN UR ELPH-MCNC: 3.9 GM/DL
GLOBULIN UR ELPH-MCNC: 3.9 GM/DL
GLUCOSE BLDC GLUCOMTR-MCNC: 101 MG/DL (ref 70–130)
GLUCOSE BLDC GLUCOMTR-MCNC: 102 MG/DL (ref 70–130)
GLUCOSE BLDC GLUCOMTR-MCNC: 104 MG/DL (ref 70–130)
GLUCOSE BLDC GLUCOMTR-MCNC: 105 MG/DL (ref 70–130)
GLUCOSE BLDC GLUCOMTR-MCNC: 109 MG/DL (ref 70–130)
GLUCOSE BLDC GLUCOMTR-MCNC: 110 MG/DL (ref 70–130)
GLUCOSE BLDC GLUCOMTR-MCNC: 111 MG/DL (ref 70–130)
GLUCOSE BLDC GLUCOMTR-MCNC: 112 MG/DL (ref 70–130)
GLUCOSE BLDC GLUCOMTR-MCNC: 113 MG/DL (ref 70–130)
GLUCOSE BLDC GLUCOMTR-MCNC: 113 MG/DL (ref 70–130)
GLUCOSE BLDC GLUCOMTR-MCNC: 114 MG/DL (ref 70–130)
GLUCOSE BLDC GLUCOMTR-MCNC: 116 MG/DL (ref 70–130)
GLUCOSE BLDC GLUCOMTR-MCNC: 117 MG/DL (ref 70–130)
GLUCOSE BLDC GLUCOMTR-MCNC: 118 MG/DL (ref 70–130)
GLUCOSE BLDC GLUCOMTR-MCNC: 120 MG/DL (ref 70–130)
GLUCOSE BLDC GLUCOMTR-MCNC: 120 MG/DL (ref 70–130)
GLUCOSE BLDC GLUCOMTR-MCNC: 121 MG/DL (ref 70–130)
GLUCOSE BLDC GLUCOMTR-MCNC: 121 MG/DL (ref 70–130)
GLUCOSE BLDC GLUCOMTR-MCNC: 122 MG/DL (ref 70–130)
GLUCOSE BLDC GLUCOMTR-MCNC: 122 MG/DL (ref 70–130)
GLUCOSE BLDC GLUCOMTR-MCNC: 125 MG/DL (ref 70–130)
GLUCOSE BLDC GLUCOMTR-MCNC: 126 MG/DL (ref 70–130)
GLUCOSE BLDC GLUCOMTR-MCNC: 126 MG/DL (ref 70–130)
GLUCOSE BLDC GLUCOMTR-MCNC: 127 MG/DL (ref 70–130)
GLUCOSE BLDC GLUCOMTR-MCNC: 127 MG/DL (ref 70–130)
GLUCOSE BLDC GLUCOMTR-MCNC: 128 MG/DL (ref 70–130)
GLUCOSE BLDC GLUCOMTR-MCNC: 128 MG/DL (ref 70–130)
GLUCOSE BLDC GLUCOMTR-MCNC: 129 MG/DL (ref 70–130)
GLUCOSE BLDC GLUCOMTR-MCNC: 133 MG/DL (ref 70–130)
GLUCOSE BLDC GLUCOMTR-MCNC: 134 MG/DL (ref 70–130)
GLUCOSE BLDC GLUCOMTR-MCNC: 135 MG/DL (ref 70–130)
GLUCOSE BLDC GLUCOMTR-MCNC: 136 MG/DL (ref 70–130)
GLUCOSE BLDC GLUCOMTR-MCNC: 136 MG/DL (ref 70–130)
GLUCOSE BLDC GLUCOMTR-MCNC: 137 MG/DL (ref 70–130)
GLUCOSE BLDC GLUCOMTR-MCNC: 137 MG/DL (ref 70–130)
GLUCOSE BLDC GLUCOMTR-MCNC: 138 MG/DL (ref 70–130)
GLUCOSE BLDC GLUCOMTR-MCNC: 140 MG/DL (ref 70–130)
GLUCOSE BLDC GLUCOMTR-MCNC: 141 MG/DL (ref 70–130)
GLUCOSE BLDC GLUCOMTR-MCNC: 142 MG/DL (ref 70–130)
GLUCOSE BLDC GLUCOMTR-MCNC: 143 MG/DL (ref 70–130)
GLUCOSE BLDC GLUCOMTR-MCNC: 144 MG/DL (ref 70–130)
GLUCOSE BLDC GLUCOMTR-MCNC: 145 MG/DL (ref 70–130)
GLUCOSE BLDC GLUCOMTR-MCNC: 146 MG/DL (ref 70–130)
GLUCOSE BLDC GLUCOMTR-MCNC: 146 MG/DL (ref 70–130)
GLUCOSE BLDC GLUCOMTR-MCNC: 147 MG/DL (ref 70–130)
GLUCOSE BLDC GLUCOMTR-MCNC: 147 MG/DL (ref 70–130)
GLUCOSE BLDC GLUCOMTR-MCNC: 148 MG/DL (ref 70–130)
GLUCOSE BLDC GLUCOMTR-MCNC: 149 MG/DL (ref 70–130)
GLUCOSE BLDC GLUCOMTR-MCNC: 149 MG/DL (ref 70–130)
GLUCOSE BLDC GLUCOMTR-MCNC: 150 MG/DL (ref 70–130)
GLUCOSE BLDC GLUCOMTR-MCNC: 151 MG/DL (ref 70–130)
GLUCOSE BLDC GLUCOMTR-MCNC: 154 MG/DL (ref 70–130)
GLUCOSE BLDC GLUCOMTR-MCNC: 157 MG/DL (ref 70–130)
GLUCOSE BLDC GLUCOMTR-MCNC: 158 MG/DL (ref 70–130)
GLUCOSE BLDC GLUCOMTR-MCNC: 160 MG/DL (ref 70–130)
GLUCOSE BLDC GLUCOMTR-MCNC: 161 MG/DL (ref 70–130)
GLUCOSE BLDC GLUCOMTR-MCNC: 162 MG/DL (ref 70–130)
GLUCOSE BLDC GLUCOMTR-MCNC: 162 MG/DL (ref 70–130)
GLUCOSE BLDC GLUCOMTR-MCNC: 166 MG/DL (ref 70–130)
GLUCOSE BLDC GLUCOMTR-MCNC: 167 MG/DL (ref 70–130)
GLUCOSE BLDC GLUCOMTR-MCNC: 167 MG/DL (ref 70–130)
GLUCOSE BLDC GLUCOMTR-MCNC: 169 MG/DL (ref 70–130)
GLUCOSE BLDC GLUCOMTR-MCNC: 173 MG/DL (ref 70–130)
GLUCOSE BLDC GLUCOMTR-MCNC: 174 MG/DL (ref 70–130)
GLUCOSE BLDC GLUCOMTR-MCNC: 176 MG/DL (ref 70–130)
GLUCOSE BLDC GLUCOMTR-MCNC: 180 MG/DL (ref 70–130)
GLUCOSE BLDC GLUCOMTR-MCNC: 182 MG/DL (ref 70–130)
GLUCOSE BLDC GLUCOMTR-MCNC: 183 MG/DL (ref 70–130)
GLUCOSE BLDC GLUCOMTR-MCNC: 193 MG/DL (ref 70–130)
GLUCOSE BLDC GLUCOMTR-MCNC: 196 MG/DL (ref 70–130)
GLUCOSE BLDC GLUCOMTR-MCNC: 237 MG/DL (ref 70–130)
GLUCOSE BLDC GLUCOMTR-MCNC: 73 MG/DL (ref 70–130)
GLUCOSE BLDC GLUCOMTR-MCNC: 74 MG/DL (ref 70–130)
GLUCOSE BLDC GLUCOMTR-MCNC: 92 MG/DL (ref 70–130)
GLUCOSE BLDC GLUCOMTR-MCNC: 96 MG/DL (ref 70–130)
GLUCOSE BLDC GLUCOMTR-MCNC: 99 MG/DL (ref 70–130)
GLUCOSE SERPL-MCNC: 101 MG/DL (ref 65–99)
GLUCOSE SERPL-MCNC: 109 MG/DL (ref 65–99)
GLUCOSE SERPL-MCNC: 112 MG/DL (ref 65–99)
GLUCOSE SERPL-MCNC: 113 MG/DL (ref 65–99)
GLUCOSE SERPL-MCNC: 117 MG/DL (ref 65–99)
GLUCOSE SERPL-MCNC: 117 MG/DL (ref 65–99)
GLUCOSE SERPL-MCNC: 120 MG/DL (ref 65–99)
GLUCOSE SERPL-MCNC: 123 MG/DL (ref 65–99)
GLUCOSE SERPL-MCNC: 129 MG/DL (ref 65–99)
GLUCOSE SERPL-MCNC: 132 MG/DL (ref 65–99)
GLUCOSE SERPL-MCNC: 141 MG/DL (ref 65–99)
GLUCOSE SERPL-MCNC: 144 MG/DL (ref 65–99)
GLUCOSE SERPL-MCNC: 147 MG/DL (ref 65–99)
GLUCOSE SERPL-MCNC: 147 MG/DL (ref 65–99)
GLUCOSE SERPL-MCNC: 149 MG/DL (ref 65–99)
GLUCOSE SERPL-MCNC: 152 MG/DL (ref 65–99)
GLUCOSE SERPL-MCNC: 153 MG/DL (ref 65–99)
GLUCOSE SERPL-MCNC: 156 MG/DL (ref 65–99)
GLUCOSE SERPL-MCNC: 159 MG/DL (ref 65–99)
GLUCOSE SERPL-MCNC: 161 MG/DL (ref 65–99)
GLUCOSE SERPL-MCNC: 164 MG/DL (ref 65–99)
GLUCOSE SERPL-MCNC: 167 MG/DL (ref 65–99)
GLUCOSE SERPL-MCNC: 169 MG/DL (ref 65–99)
GLUCOSE SERPL-MCNC: 179 MG/DL (ref 65–99)
GLUCOSE SERPL-MCNC: 188 MG/DL (ref 65–99)
GLUCOSE SERPL-MCNC: 233 MG/DL (ref 65–99)
GLUCOSE SERPL-MCNC: 74 MG/DL (ref 65–99)
GLUCOSE SERPL-MCNC: 98 MG/DL (ref 65–99)
GLUCOSE SERPL-MCNC: 98 MG/DL (ref 65–99)
GLUCOSE UR STRIP-MCNC: ABNORMAL MG/DL
GLUCOSE UR STRIP-MCNC: NEGATIVE MG/DL
GLUCOSE UR STRIP-MCNC: NEGATIVE MG/DL
GRAM STN SPEC: ABNORMAL
HADV DNA SPEC NAA+PROBE: NOT DETECTED
HBA1C MFR BLD: 7.18 % (ref 4.8–5.6)
HCO3 BLDA-SCNC: 14.1 MMOL/L (ref 22–28)
HCO3 BLDV-SCNC: 14.2 MMOL/L (ref 22–28)
HCOV 229E RNA SPEC QL NAA+PROBE: NOT DETECTED
HCOV HKU1 RNA SPEC QL NAA+PROBE: NOT DETECTED
HCOV NL63 RNA SPEC QL NAA+PROBE: NOT DETECTED
HCOV OC43 RNA SPEC QL NAA+PROBE: NOT DETECTED
HCT VFR BLD AUTO: 25.9 % (ref 37.5–51)
HCT VFR BLD AUTO: 26 % (ref 37.5–51)
HCT VFR BLD AUTO: 27.2 % (ref 37.5–51)
HCT VFR BLD AUTO: 27.2 % (ref 37.5–51)
HCT VFR BLD AUTO: 27.5 % (ref 37.5–51)
HCT VFR BLD AUTO: 28.6 % (ref 37.5–51)
HCT VFR BLD AUTO: 29.2 % (ref 37.5–51)
HCT VFR BLD AUTO: 29.3 % (ref 37.5–51)
HCT VFR BLD AUTO: 29.4 % (ref 37.5–51)
HCT VFR BLD AUTO: 29.5 % (ref 37.5–51)
HCT VFR BLD AUTO: 29.6 % (ref 37.5–51)
HCT VFR BLD AUTO: 29.7 % (ref 37.5–51)
HCT VFR BLD AUTO: 30.4 % (ref 37.5–51)
HCT VFR BLD AUTO: 30.5 % (ref 37.5–51)
HCT VFR BLD AUTO: 30.7 % (ref 37.5–51)
HCT VFR BLD AUTO: 30.8 % (ref 37.5–51)
HCT VFR BLD AUTO: 31 % (ref 37.5–51)
HCT VFR BLD AUTO: 31 % (ref 37.5–51)
HCT VFR BLD AUTO: 31.1 % (ref 37.5–51)
HCT VFR BLD AUTO: 31.2 % (ref 37.5–51)
HCT VFR BLD AUTO: 31.2 % (ref 37.5–51)
HCT VFR BLD AUTO: 31.3 % (ref 37.5–51)
HCT VFR BLD AUTO: 31.5 % (ref 37.5–51)
HCT VFR BLD AUTO: 32 % (ref 37.5–51)
HCT VFR BLD AUTO: 32.1 % (ref 37.5–51)
HCT VFR BLD AUTO: 33.1 % (ref 37.5–51)
HCT VFR BLD AUTO: 33.2 % (ref 37.5–51)
HCT VFR BLD AUTO: 33.6 % (ref 37.5–51)
HCT VFR BLD AUTO: 33.9 % (ref 37.5–51)
HCT VFR BLD AUTO: 33.9 % (ref 37.5–51)
HCT VFR BLD AUTO: 34.5 % (ref 37.5–51)
HCT VFR BLD AUTO: 34.6 % (ref 37.5–51)
HCT VFR BLD AUTO: 34.7 % (ref 37.5–51)
HELMET CELLS: ABNORMAL
HELMET CELLS: ABNORMAL
HGB BLD-MCNC: 10 G/DL (ref 13–17.7)
HGB BLD-MCNC: 10.1 G/DL (ref 13–17.7)
HGB BLD-MCNC: 10.2 G/DL (ref 13–17.7)
HGB BLD-MCNC: 10.3 G/DL (ref 13–17.7)
HGB BLD-MCNC: 10.4 G/DL (ref 13–17.7)
HGB BLD-MCNC: 10.5 G/DL (ref 13–17.7)
HGB BLD-MCNC: 10.6 G/DL (ref 13–17.7)
HGB BLD-MCNC: 10.7 G/DL (ref 13–17.7)
HGB BLD-MCNC: 10.7 G/DL (ref 13–17.7)
HGB BLD-MCNC: 10.8 G/DL (ref 13–17.7)
HGB BLD-MCNC: 10.9 G/DL (ref 13–17.7)
HGB BLD-MCNC: 10.9 G/DL (ref 13–17.7)
HGB BLD-MCNC: 11 G/DL (ref 13–17.7)
HGB BLD-MCNC: 11.3 G/DL (ref 13–17.7)
HGB BLD-MCNC: 11.4 G/DL (ref 13–17.7)
HGB BLD-MCNC: 8.2 G/DL (ref 13–17.7)
HGB BLD-MCNC: 8.4 G/DL (ref 13–17.7)
HGB BLD-MCNC: 8.6 G/DL (ref 13–17.7)
HGB BLD-MCNC: 9.2 G/DL (ref 13–17.7)
HGB BLD-MCNC: 9.3 G/DL (ref 13–17.7)
HGB BLD-MCNC: 9.3 G/DL (ref 13–17.7)
HGB BLD-MCNC: 9.4 G/DL (ref 13–17.7)
HGB BLD-MCNC: 9.5 G/DL (ref 13–17.7)
HGB BLD-MCNC: 9.5 G/DL (ref 13–17.7)
HGB BLD-MCNC: 9.6 G/DL (ref 13–17.7)
HGB BLD-MCNC: 9.7 G/DL (ref 13–17.7)
HGB BLD-MCNC: 9.7 G/DL (ref 13–17.7)
HGB BLD-MCNC: 9.8 G/DL (ref 13–17.7)
HGB BLD-MCNC: 9.8 G/DL (ref 13–17.7)
HGB BLD-MCNC: 9.9 G/DL (ref 13–17.7)
HGB BLD-MCNC: 9.9 G/DL (ref 13–17.7)
HGB UR QL STRIP.AUTO: ABNORMAL
HMPV RNA NPH QL NAA+NON-PROBE: NOT DETECTED
HOLD SPECIMEN: NORMAL
HPIV1 RNA SPEC QL NAA+PROBE: NOT DETECTED
HPIV2 RNA SPEC QL NAA+PROBE: NOT DETECTED
HPIV3 RNA NPH QL NAA+PROBE: NOT DETECTED
HPIV4 P GENE NPH QL NAA+PROBE: NOT DETECTED
HYALINE CASTS UR QL AUTO: ABNORMAL /LPF
HYALINE CASTS UR QL AUTO: ABNORMAL /LPF
HYPOCHROMIA BLD QL: ABNORMAL
HYPOCHROMIA BLD QL: ABNORMAL
IMM GRANULOCYTES # BLD AUTO: 0.02 10*3/MM3 (ref 0–0.05)
IMM GRANULOCYTES NFR BLD AUTO: 0.3 % (ref 0–0.5)
INHALED O2 CONCENTRATION: 30 %
ISOLATED FROM: ABNORMAL
KETONES UR QL STRIP: ABNORMAL
KETONES UR QL STRIP: NEGATIVE
KETONES UR QL STRIP: NEGATIVE
LAB AP CASE REPORT: NORMAL
LEUKOCYTE ESTERASE UR QL STRIP.AUTO: ABNORMAL
LYMPHOCYTES # BLD AUTO: 1.05 10*3/MM3 (ref 0.7–3.1)
LYMPHOCYTES # BLD AUTO: 1.11 10*3/MM3 (ref 0.7–3.1)
LYMPHOCYTES # BLD AUTO: 1.12 10*3/MM3 (ref 0.7–3.1)
LYMPHOCYTES # BLD AUTO: 1.61 10*3/MM3 (ref 0.7–3.1)
LYMPHOCYTES # BLD MANUAL: 0 10*3/MM3 (ref 0.7–3.1)
LYMPHOCYTES # BLD MANUAL: 0.29 10*3/MM3 (ref 0.7–3.1)
LYMPHOCYTES # BLD MANUAL: 0.37 10*3/MM3 (ref 0.7–3.1)
LYMPHOCYTES # BLD MANUAL: 0.38 10*3/MM3 (ref 0.7–3.1)
LYMPHOCYTES # BLD MANUAL: 0.42 10*3/MM3 (ref 0.7–3.1)
LYMPHOCYTES # BLD MANUAL: 0.62 10*3/MM3 (ref 0.7–3.1)
LYMPHOCYTES # BLD MANUAL: 0.65 10*3/MM3 (ref 0.7–3.1)
LYMPHOCYTES # BLD MANUAL: 0.69 10*3/MM3 (ref 0.7–3.1)
LYMPHOCYTES # BLD MANUAL: 0.93 10*3/MM3 (ref 0.7–3.1)
LYMPHOCYTES # BLD MANUAL: 0.94 10*3/MM3 (ref 0.7–3.1)
LYMPHOCYTES # BLD MANUAL: 1.19 10*3/MM3 (ref 0.7–3.1)
LYMPHOCYTES NFR BLD AUTO: 14.4 % (ref 19.6–45.3)
LYMPHOCYTES NFR BLD AUTO: 17 % (ref 19.6–45.3)
LYMPHOCYTES NFR BLD AUTO: 18.7 % (ref 19.6–45.3)
LYMPHOCYTES NFR BLD AUTO: 24.1 % (ref 19.6–45.3)
LYMPHOCYTES NFR BLD MANUAL: 0 % (ref 19.6–45.3)
LYMPHOCYTES NFR BLD MANUAL: 1 % (ref 5–12)
LYMPHOCYTES NFR BLD MANUAL: 1 % (ref 5–12)
LYMPHOCYTES NFR BLD MANUAL: 10.3 % (ref 19.6–45.3)
LYMPHOCYTES NFR BLD MANUAL: 11 % (ref 19.6–45.3)
LYMPHOCYTES NFR BLD MANUAL: 11.3 % (ref 19.6–45.3)
LYMPHOCYTES NFR BLD MANUAL: 15 % (ref 19.6–45.3)
LYMPHOCYTES NFR BLD MANUAL: 2 % (ref 5–12)
LYMPHOCYTES NFR BLD MANUAL: 2.1 % (ref 5–12)
LYMPHOCYTES NFR BLD MANUAL: 4 % (ref 19.6–45.3)
LYMPHOCYTES NFR BLD MANUAL: 4.1 % (ref 19.6–45.3)
LYMPHOCYTES NFR BLD MANUAL: 4.2 % (ref 19.6–45.3)
LYMPHOCYTES NFR BLD MANUAL: 4.2 % (ref 5–12)
LYMPHOCYTES NFR BLD MANUAL: 6 % (ref 19.6–45.3)
LYMPHOCYTES NFR BLD MANUAL: 6.1 % (ref 19.6–45.3)
LYMPHOCYTES NFR BLD MANUAL: 7.2 % (ref 5–12)
LYMPHOCYTES NFR BLD MANUAL: 9 % (ref 19.6–45.3)
Lab: 164
M PNEUMO IGG SER IA-ACNC: NOT DETECTED
MACROCYTES BLD QL SMEAR: ABNORMAL
MAGNESIUM SERPL-MCNC: 1.7 MG/DL (ref 1.6–2.4)
MAGNESIUM SERPL-MCNC: 1.8 MG/DL (ref 1.6–2.4)
MAGNESIUM SERPL-MCNC: 1.9 MG/DL (ref 1.6–2.4)
MAGNESIUM SERPL-MCNC: 1.9 MG/DL (ref 1.6–2.4)
MAGNESIUM SERPL-MCNC: 2 MG/DL (ref 1.6–2.4)
MCH RBC QN AUTO: 22.2 PG (ref 26.6–33)
MCH RBC QN AUTO: 22.3 PG (ref 26.6–33)
MCH RBC QN AUTO: 22.4 PG (ref 26.6–33)
MCH RBC QN AUTO: 22.5 PG (ref 26.6–33)
MCH RBC QN AUTO: 22.6 PG (ref 26.6–33)
MCH RBC QN AUTO: 22.7 PG (ref 26.6–33)
MCH RBC QN AUTO: 22.8 PG (ref 26.6–33)
MCH RBC QN AUTO: 22.9 PG (ref 26.6–33)
MCH RBC QN AUTO: 23 PG (ref 26.6–33)
MCH RBC QN AUTO: 23 PG (ref 26.6–33)
MCH RBC QN AUTO: 23.1 PG (ref 26.6–33)
MCH RBC QN AUTO: 23.2 PG (ref 26.6–33)
MCH RBC QN AUTO: 23.3 PG (ref 26.6–33)
MCH RBC QN AUTO: 23.4 PG (ref 26.6–33)
MCHC RBC AUTO-ENTMCNC: 30.8 G/DL (ref 31.5–35.7)
MCHC RBC AUTO-ENTMCNC: 31.1 G/DL (ref 31.5–35.7)
MCHC RBC AUTO-ENTMCNC: 31.1 G/DL (ref 31.5–35.7)
MCHC RBC AUTO-ENTMCNC: 31.3 G/DL (ref 31.5–35.7)
MCHC RBC AUTO-ENTMCNC: 31.3 G/DL (ref 31.5–35.7)
MCHC RBC AUTO-ENTMCNC: 31.5 G/DL (ref 31.5–35.7)
MCHC RBC AUTO-ENTMCNC: 31.5 G/DL (ref 31.5–35.7)
MCHC RBC AUTO-ENTMCNC: 31.6 G/DL (ref 31.5–35.7)
MCHC RBC AUTO-ENTMCNC: 31.9 G/DL (ref 31.5–35.7)
MCHC RBC AUTO-ENTMCNC: 32.2 G/DL (ref 31.5–35.7)
MCHC RBC AUTO-ENTMCNC: 32.3 G/DL (ref 31.5–35.7)
MCHC RBC AUTO-ENTMCNC: 32.3 G/DL (ref 31.5–35.7)
MCHC RBC AUTO-ENTMCNC: 32.4 G/DL (ref 31.5–35.7)
MCHC RBC AUTO-ENTMCNC: 32.5 G/DL (ref 31.5–35.7)
MCHC RBC AUTO-ENTMCNC: 32.6 G/DL (ref 31.5–35.7)
MCHC RBC AUTO-ENTMCNC: 32.7 G/DL (ref 31.5–35.7)
MCHC RBC AUTO-ENTMCNC: 32.7 G/DL (ref 31.5–35.7)
MCHC RBC AUTO-ENTMCNC: 32.9 G/DL (ref 31.5–35.7)
MCHC RBC AUTO-ENTMCNC: 33 G/DL (ref 31.5–35.7)
MCHC RBC AUTO-ENTMCNC: 33.3 G/DL (ref 31.5–35.7)
MCHC RBC AUTO-ENTMCNC: 33.3 G/DL (ref 31.5–35.7)
MCHC RBC AUTO-ENTMCNC: 33.4 G/DL (ref 31.5–35.7)
MCHC RBC AUTO-ENTMCNC: 34 G/DL (ref 31.5–35.7)
MCHC RBC AUTO-ENTMCNC: 34.6 G/DL (ref 31.5–35.7)
MCHC RBC AUTO-ENTMCNC: 34.7 G/DL (ref 31.5–35.7)
MCV RBC AUTO: 67.1 FL (ref 79–97)
MCV RBC AUTO: 67.5 FL (ref 79–97)
MCV RBC AUTO: 68 FL (ref 79–97)
MCV RBC AUTO: 68.1 FL (ref 79–97)
MCV RBC AUTO: 68.4 FL (ref 79–97)
MCV RBC AUTO: 68.7 FL (ref 79–97)
MCV RBC AUTO: 68.9 FL (ref 79–97)
MCV RBC AUTO: 69 FL (ref 79–97)
MCV RBC AUTO: 69 FL (ref 79–97)
MCV RBC AUTO: 69.3 FL (ref 79–97)
MCV RBC AUTO: 69.7 FL (ref 79–97)
MCV RBC AUTO: 69.7 FL (ref 79–97)
MCV RBC AUTO: 69.8 FL (ref 79–97)
MCV RBC AUTO: 70 FL (ref 79–97)
MCV RBC AUTO: 70.1 FL (ref 79–97)
MCV RBC AUTO: 71.8 FL (ref 79–97)
MCV RBC AUTO: 71.9 FL (ref 79–97)
MCV RBC AUTO: 72 FL (ref 79–97)
MCV RBC AUTO: 72.1 FL (ref 79–97)
MCV RBC AUTO: 72.3 FL (ref 79–97)
MCV RBC AUTO: 72.6 FL (ref 79–97)
MCV RBC AUTO: 73.3 FL (ref 79–97)
MCV RBC AUTO: 73.4 FL (ref 79–97)
MCV RBC AUTO: 73.6 FL (ref 79–97)
MCV RBC AUTO: 73.7 FL (ref 79–97)
MCV RBC AUTO: 74.8 FL (ref 79–97)
MCV RBC AUTO: 75 FL (ref 79–97)
METAMYELOCYTES NFR BLD MANUAL: 1 % (ref 0–0)
MICROCYTES BLD QL: ABNORMAL
MODALITY: ABNORMAL
MODALITY: ABNORMAL
MONOCYTES # BLD AUTO: 0.09 10*3/MM3 (ref 0.1–0.9)
MONOCYTES # BLD AUTO: 0.1 10*3/MM3 (ref 0.1–0.9)
MONOCYTES # BLD AUTO: 0.13 10*3/MM3 (ref 0.1–0.9)
MONOCYTES # BLD AUTO: 0.15 10*3/MM3 (ref 0.1–0.9)
MONOCYTES # BLD AUTO: 0.15 10*3/MM3 (ref 0.1–0.9)
MONOCYTES # BLD AUTO: 0.16 10*3/MM3 (ref 0.1–0.9)
MONOCYTES # BLD AUTO: 0.17 10*3/MM3 (ref 0.1–0.9)
MONOCYTES # BLD AUTO: 0.29 10*3/MM3 (ref 0.1–0.9)
MONOCYTES # BLD AUTO: 0.3 10*3/MM3 (ref 0.1–0.9)
MONOCYTES # BLD AUTO: 0.38 10*3/MM3 (ref 0.1–0.9)
MONOCYTES # BLD AUTO: 0.38 10*3/MM3 (ref 0.1–0.9)
MONOCYTES # BLD AUTO: 0.46 10*3/MM3 (ref 0.1–0.9)
MONOCYTES # BLD AUTO: 0.6 10*3/MM3 (ref 0.1–0.9)
MONOCYTES NFR BLD AUTO: 4.1 % (ref 5–12)
MONOCYTES NFR BLD AUTO: 5.8 % (ref 5–12)
MONOCYTES NFR BLD AUTO: 6.4 % (ref 5–12)
MONOCYTES NFR BLD AUTO: 6.9 % (ref 5–12)
MRSA DNA SPEC QL NAA+PROBE: NORMAL
NEGATIVE CONTROL: NEGATIVE
NEUTROPHILS # BLD AUTO: 5.32 10*3/MM3 (ref 1.7–7)
NEUTROPHILS # BLD AUTO: 6.01 10*3/MM3 (ref 1.7–7)
NEUTROPHILS # BLD AUTO: 6.24 10*3/MM3 (ref 1.7–7)
NEUTROPHILS # BLD AUTO: 6.39 10*3/MM3 (ref 1.7–7)
NEUTROPHILS # BLD AUTO: 6.71 10*3/MM3 (ref 1.7–7)
NEUTROPHILS # BLD AUTO: 6.77 10*3/MM3 (ref 1.7–7)
NEUTROPHILS # BLD AUTO: 7.11 10*3/MM3 (ref 1.7–7)
NEUTROPHILS # BLD AUTO: 7.34 10*3/MM3 (ref 1.7–7)
NEUTROPHILS # BLD AUTO: 8.62 10*3/MM3 (ref 1.7–7)
NEUTROPHILS # BLD AUTO: 8.85 10*3/MM3 (ref 1.7–7)
NEUTROPHILS # BLD AUTO: 9.64 10*3/MM3 (ref 1.7–7)
NEUTROPHILS NFR BLD AUTO: 4.25 10*3/MM3 (ref 1.7–7)
NEUTROPHILS NFR BLD AUTO: 4.26 10*3/MM3 (ref 1.7–7)
NEUTROPHILS NFR BLD AUTO: 4.91 10*3/MM3 (ref 1.7–7)
NEUTROPHILS NFR BLD AUTO: 5.82 10*3/MM3 (ref 1.7–7)
NEUTROPHILS NFR BLD AUTO: 64 % (ref 42.7–76)
NEUTROPHILS NFR BLD AUTO: 71.4 % (ref 42.7–76)
NEUTROPHILS NFR BLD AUTO: 74.8 % (ref 42.7–76)
NEUTROPHILS NFR BLD AUTO: 79.9 % (ref 42.7–76)
NEUTROPHILS NFR BLD MANUAL: 79 % (ref 42.7–76)
NEUTROPHILS NFR BLD MANUAL: 80.4 % (ref 42.7–76)
NEUTROPHILS NFR BLD MANUAL: 84.5 % (ref 42.7–76)
NEUTROPHILS NFR BLD MANUAL: 87 % (ref 42.7–76)
NEUTROPHILS NFR BLD MANUAL: 88 % (ref 42.7–76)
NEUTROPHILS NFR BLD MANUAL: 88.4 % (ref 42.7–76)
NEUTROPHILS NFR BLD MANUAL: 93 % (ref 42.7–76)
NEUTROPHILS NFR BLD MANUAL: 93.9 % (ref 42.7–76)
NEUTROPHILS NFR BLD MANUAL: 94 % (ref 42.7–76)
NEUTROPHILS NFR BLD MANUAL: 94.8 % (ref 42.7–76)
NEUTROPHILS NFR BLD MANUAL: 96.9 % (ref 42.7–76)
NITRITE UR QL STRIP: NEGATIVE
NOROVIRUS GI+II RNA STL QL NAA+NON-PROBE: NOT DETECTED
NOROVIRUS GI+II RNA STL QL NAA+NON-PROBE: NOT DETECTED
NRBC BLD AUTO-RTO: 0.2 /100 WBC (ref 0–0.2)
NRBC BLD AUTO-RTO: 0.6 /100 WBC (ref 0–0.2)
NRBC SPEC MANUAL: 1 /100 WBC (ref 0–0.2)
NRBC SPEC MANUAL: 2.1 /100 WBC (ref 0–0.2)
NRBC SPEC MANUAL: 5.1 /100 WBC (ref 0–0.2)
NT-PROBNP SERPL-MCNC: ABNORMAL PG/ML (ref 0–900)
OVALOCYTES BLD QL SMEAR: ABNORMAL
P SHIGELLOIDES DNA STL QL NAA+NON-PROBE: NOT DETECTED
P SHIGELLOIDES DNA STL QL NAA+PROBE: NOT DETECTED
PATH REPORT.FINAL DX SPEC: NORMAL
PATH REPORT.GROSS SPEC: NORMAL
PCO2 BLDA: 20.3 MM HG (ref 35–45)
PCO2 BLDV: 22.9 MM HG (ref 41–51)
PEEP RESPIRATORY: 5 CM[H2O]
PH BLDA: 7.45 PH UNITS (ref 7.35–7.45)
PH BLDV: 7.4 PH UNITS (ref 7.31–7.41)
PH UR STRIP.AUTO: 6.5 [PH] (ref 5–8)
PH UR STRIP.AUTO: 6.5 [PH] (ref 5–8)
PH UR STRIP.AUTO: 8 [PH] (ref 5–8)
PHOSPHATE SERPL-MCNC: 1.4 MG/DL (ref 2.5–4.5)
PHOSPHATE SERPL-MCNC: 1.8 MG/DL (ref 2.5–4.5)
PHOSPHATE SERPL-MCNC: 2.1 MG/DL (ref 2.5–4.5)
PHOSPHATE SERPL-MCNC: 2.1 MG/DL (ref 2.5–4.5)
PHOSPHATE SERPL-MCNC: 2.3 MG/DL (ref 2.5–4.5)
PHOSPHATE SERPL-MCNC: 2.3 MG/DL (ref 2.5–4.5)
PHOSPHATE SERPL-MCNC: 2.4 MG/DL (ref 2.5–4.5)
PHOSPHATE SERPL-MCNC: 2.4 MG/DL (ref 2.5–4.5)
PHOSPHATE SERPL-MCNC: 2.5 MG/DL (ref 2.5–4.5)
PHOSPHATE SERPL-MCNC: 2.6 MG/DL (ref 2.5–4.5)
PHOSPHATE SERPL-MCNC: 2.7 MG/DL (ref 2.5–4.5)
PHOSPHATE SERPL-MCNC: 2.8 MG/DL (ref 2.5–4.5)
PHOSPHATE SERPL-MCNC: 2.9 MG/DL (ref 2.5–4.5)
PHOSPHATE SERPL-MCNC: 3 MG/DL (ref 2.5–4.5)
PHOSPHATE SERPL-MCNC: 3.1 MG/DL (ref 2.5–4.5)
PHOSPHATE SERPL-MCNC: 3.4 MG/DL (ref 2.5–4.5)
PHOSPHATE SERPL-MCNC: 3.6 MG/DL (ref 2.5–4.5)
PHOSPHATE SERPL-MCNC: 3.6 MG/DL (ref 2.5–4.5)
PLAT MORPH BLD: NORMAL
PLATELET # BLD AUTO: 161 10*3/MM3 (ref 140–450)
PLATELET # BLD AUTO: 168 10*3/MM3 (ref 140–450)
PLATELET # BLD AUTO: 170 10*3/MM3 (ref 140–450)
PLATELET # BLD AUTO: 182 10*3/MM3 (ref 140–450)
PLATELET # BLD AUTO: 184 10*3/MM3 (ref 140–450)
PLATELET # BLD AUTO: 185 10*3/MM3 (ref 140–450)
PLATELET # BLD AUTO: 185 10*3/MM3 (ref 140–450)
PLATELET # BLD AUTO: 195 10*3/MM3 (ref 140–450)
PLATELET # BLD AUTO: 198 10*3/MM3 (ref 140–450)
PLATELET # BLD AUTO: 201 10*3/MM3 (ref 140–450)
PLATELET # BLD AUTO: 206 10*3/MM3 (ref 140–450)
PLATELET # BLD AUTO: 209 10*3/MM3 (ref 140–450)
PLATELET # BLD AUTO: 214 10*3/MM3 (ref 140–450)
PLATELET # BLD AUTO: 234 10*3/MM3 (ref 140–450)
PLATELET # BLD AUTO: 244 10*3/MM3 (ref 140–450)
PLATELET # BLD AUTO: 245 10*3/MM3 (ref 140–450)
PLATELET # BLD AUTO: 246 10*3/MM3 (ref 140–450)
PLATELET # BLD AUTO: 246 10*3/MM3 (ref 140–450)
PLATELET # BLD AUTO: 247 10*3/MM3 (ref 140–450)
PLATELET # BLD AUTO: 247 10*3/MM3 (ref 140–450)
PLATELET # BLD AUTO: 253 10*3/MM3 (ref 140–450)
PLATELET # BLD AUTO: 280 10*3/MM3 (ref 140–450)
PLATELET # BLD AUTO: 287 10*3/MM3 (ref 140–450)
PLATELET # BLD AUTO: 296 10*3/MM3 (ref 140–450)
PLATELET # BLD AUTO: 300 10*3/MM3 (ref 140–450)
PLATELET # BLD AUTO: 306 10*3/MM3 (ref 140–450)
PLATELET # BLD AUTO: 85 10*3/MM3 (ref 140–450)
PMV BLD AUTO: 8.5 FL (ref 6–12)
PMV BLD AUTO: 8.8 FL (ref 6–12)
PMV BLD AUTO: ABNORMAL FL
PO2 BLDA: 76.9 MM HG (ref 80–100)
PO2 BLDV: 25.6 MM HG (ref 35–45)
POIKILOCYTOSIS BLD QL SMEAR: ABNORMAL
POLYCHROMASIA BLD QL SMEAR: ABNORMAL
POLYCHROMASIA BLD QL SMEAR: ABNORMAL
POSITIVE CONTROL: POSITIVE
POTASSIUM SERPL-SCNC: 2.2 MMOL/L (ref 3.5–5.2)
POTASSIUM SERPL-SCNC: 2.4 MMOL/L (ref 3.5–5.2)
POTASSIUM SERPL-SCNC: 2.6 MMOL/L (ref 3.5–5.2)
POTASSIUM SERPL-SCNC: 2.7 MMOL/L (ref 3.5–5.2)
POTASSIUM SERPL-SCNC: 2.8 MMOL/L (ref 3.5–5.2)
POTASSIUM SERPL-SCNC: 2.9 MMOL/L (ref 3.5–5.2)
POTASSIUM SERPL-SCNC: 2.9 MMOL/L (ref 3.5–5.2)
POTASSIUM SERPL-SCNC: 3 MMOL/L (ref 3.5–5.2)
POTASSIUM SERPL-SCNC: 3.1 MMOL/L (ref 3.5–5.2)
POTASSIUM SERPL-SCNC: 3.1 MMOL/L (ref 3.5–5.2)
POTASSIUM SERPL-SCNC: 3.2 MMOL/L (ref 3.5–5.2)
POTASSIUM SERPL-SCNC: 3.2 MMOL/L (ref 3.5–5.2)
POTASSIUM SERPL-SCNC: 3.3 MMOL/L (ref 3.5–5.2)
POTASSIUM SERPL-SCNC: 3.4 MMOL/L (ref 3.5–5.2)
POTASSIUM SERPL-SCNC: 3.5 MMOL/L (ref 3.5–5.2)
POTASSIUM SERPL-SCNC: 3.5 MMOL/L (ref 3.5–5.2)
POTASSIUM SERPL-SCNC: 3.6 MMOL/L (ref 3.5–5.2)
POTASSIUM SERPL-SCNC: 3.7 MMOL/L (ref 3.5–5.2)
POTASSIUM SERPL-SCNC: 3.7 MMOL/L (ref 3.5–5.2)
POTASSIUM SERPL-SCNC: 3.8 MMOL/L (ref 3.5–5.2)
POTASSIUM SERPL-SCNC: 3.9 MMOL/L (ref 3.5–5.2)
POTASSIUM SERPL-SCNC: 4 MMOL/L (ref 3.5–5.2)
POTASSIUM SERPL-SCNC: 4 MMOL/L (ref 3.5–5.2)
POTASSIUM SERPL-SCNC: 4.1 MMOL/L (ref 3.5–5.2)
POTASSIUM SERPL-SCNC: 4.1 MMOL/L (ref 3.5–5.2)
POTASSIUM SERPL-SCNC: 4.7 MMOL/L (ref 3.5–5.2)
POTASSIUM SERPL-SCNC: 5 MMOL/L (ref 3.5–5.2)
POTASSIUM UR-SCNC: <3 MMOL/L
PROCALCITONIN SERPL-MCNC: 0.15 NG/ML (ref 0–0.25)
PROCALCITONIN SERPL-MCNC: 0.66 NG/ML (ref 0–0.25)
PROCALCITONIN SERPL-MCNC: 3.96 NG/ML (ref 0–0.25)
PROT SERPL-MCNC: 5.8 G/DL (ref 6–8.5)
PROT SERPL-MCNC: 6.2 G/DL (ref 6–8.5)
PROT SERPL-MCNC: 6.4 G/DL (ref 6–8.5)
PROT SERPL-MCNC: 6.6 G/DL (ref 6–8.5)
PROT SERPL-MCNC: 7.1 G/DL (ref 6–8.5)
PROT UR QL STRIP: ABNORMAL
PROT UR-MCNC: 199.5 MG/DL
PROT/CREAT UR: 5063.5 MG/G CREA (ref 0–200)
PSV: 10 CMH2O
QT INTERVAL: 338 MS
QT INTERVAL: 381 MS
QT INTERVAL: 382 MS
QT INTERVAL: 445 MS
QT INTERVAL: 454 MS
QT INTERVAL: 482 MS
QT INTERVAL: 528 MS
RBC # BLD AUTO: 3.71 10*6/MM3 (ref 4.14–5.8)
RBC # BLD AUTO: 3.71 10*6/MM3 (ref 4.14–5.8)
RBC # BLD AUTO: 3.73 10*6/MM3 (ref 4.14–5.8)
RBC # BLD AUTO: 4.01 10*6/MM3 (ref 4.14–5.8)
RBC # BLD AUTO: 4.02 10*6/MM3 (ref 4.14–5.8)
RBC # BLD AUTO: 4.14 10*6/MM3 (ref 4.14–5.8)
RBC # BLD AUTO: 4.16 10*6/MM3 (ref 4.14–5.8)
RBC # BLD AUTO: 4.26 10*6/MM3 (ref 4.14–5.8)
RBC # BLD AUTO: 4.26 10*6/MM3 (ref 4.14–5.8)
RBC # BLD AUTO: 4.32 10*6/MM3 (ref 4.14–5.8)
RBC # BLD AUTO: 4.32 10*6/MM3 (ref 4.14–5.8)
RBC # BLD AUTO: 4.33 10*6/MM3 (ref 4.14–5.8)
RBC # BLD AUTO: 4.37 10*6/MM3 (ref 4.14–5.8)
RBC # BLD AUTO: 4.38 10*6/MM3 (ref 4.14–5.8)
RBC # BLD AUTO: 4.47 10*6/MM3 (ref 4.14–5.8)
RBC # BLD AUTO: 4.52 10*6/MM3 (ref 4.14–5.8)
RBC # BLD AUTO: 4.58 10*6/MM3 (ref 4.14–5.8)
RBC # BLD AUTO: 4.61 10*6/MM3 (ref 4.14–5.8)
RBC # BLD AUTO: 4.61 10*6/MM3 (ref 4.14–5.8)
RBC # BLD AUTO: 4.64 10*6/MM3 (ref 4.14–5.8)
RBC # BLD AUTO: 4.66 10*6/MM3 (ref 4.14–5.8)
RBC # BLD AUTO: 4.75 10*6/MM3 (ref 4.14–5.8)
RBC # BLD AUTO: 4.77 10*6/MM3 (ref 4.14–5.8)
RBC # BLD AUTO: 4.82 10*6/MM3 (ref 4.14–5.8)
RBC # BLD AUTO: 4.91 10*6/MM3 (ref 4.14–5.8)
RBC # BLD AUTO: 4.92 10*6/MM3 (ref 4.14–5.8)
RBC # BLD AUTO: 4.99 10*6/MM3 (ref 4.14–5.8)
RBC # UR: ABNORMAL /HPF
REF LAB TEST METHOD: ABNORMAL
RH BLD: POSITIVE
RH BLD: POSITIVE
RHINOVIRUS RNA SPEC NAA+PROBE: NOT DETECTED
RSV RNA NPH QL NAA+NON-PROBE: NOT DETECTED
RV RNA STL NAA+PROBE: NOT DETECTED
RVA RNA STL QL NAA+NON-PROBE: NOT DETECTED
S ENT+BONG DNA STL QL NAA+NON-PROBE: NOT DETECTED
SALMONELLA DNA SPEC QL NAA+PROBE: NOT DETECTED
SAO2 % BLDCOA: 49 % (ref 92–99)
SAO2 % BLDCOA: 96.3 % (ref 92–99)
SAPO I+II+IV+V RNA STL QL NAA+NON-PROBE: NOT DETECTED
SAPO I+II+IV+V RNA STL QL NAA+NON-PROBE: NOT DETECTED
SARS-COV-2 ORF1AB RESP QL NAA+PROBE: NOT DETECTED
SARS-COV-2 ORF1AB RESP QL NAA+PROBE: NOT DETECTED
SARS-COV-2 RNA NPH QL NAA+NON-PROBE: NOT DETECTED
SARS-COV-2 RNA RESP QL NAA+PROBE: NOT DETECTED
SCHISTOCYTES BLD QL SMEAR: ABNORMAL
SET MECH RESP RATE: 16
SHIGELLA SP+EIEC IPAH ST NAA+NON-PROBE: NOT DETECTED
SHIGELLA SP+EIEC IPAH STL QL NAA+PROBE: NOT DETECTED
SMUDGE CELLS BLD QL SMEAR: ABNORMAL
SMUDGE CELLS BLD QL SMEAR: ABNORMAL
SODIUM SERPL-SCNC: 134 MMOL/L (ref 136–145)
SODIUM SERPL-SCNC: 136 MMOL/L (ref 136–145)
SODIUM SERPL-SCNC: 137 MMOL/L (ref 136–145)
SODIUM SERPL-SCNC: 137 MMOL/L (ref 136–145)
SODIUM SERPL-SCNC: 138 MMOL/L (ref 136–145)
SODIUM SERPL-SCNC: 138 MMOL/L (ref 136–145)
SODIUM SERPL-SCNC: 139 MMOL/L (ref 136–145)
SODIUM SERPL-SCNC: 140 MMOL/L (ref 136–145)
SODIUM SERPL-SCNC: 140 MMOL/L (ref 136–145)
SODIUM SERPL-SCNC: 141 MMOL/L (ref 136–145)
SODIUM SERPL-SCNC: 142 MMOL/L (ref 136–145)
SODIUM SERPL-SCNC: 143 MMOL/L (ref 136–145)
SODIUM SERPL-SCNC: 145 MMOL/L (ref 136–145)
SODIUM SERPL-SCNC: 146 MMOL/L (ref 136–145)
SODIUM SERPL-SCNC: 147 MMOL/L (ref 136–145)
SODIUM UR-SCNC: 53 MMOL/L
SODIUM UR-SCNC: <20 MMOL/L
SP GR UR STRIP: 1.01 (ref 1–1.03)
SP GR UR STRIP: 1.01 (ref 1–1.03)
SP GR UR STRIP: 1.02 (ref 1–1.03)
SQUAMOUS #/AREA URNS HPF: ABNORMAL /HPF
T&S EXPIRATION DATE: NORMAL
T&S EXPIRATION DATE: NORMAL
TARGETS BLD QL SMEAR: ABNORMAL
TOTAL RATE: 24 BREATHS/MINUTE
TOTAL RATE: 31 BREATHS/MINUTE
TROPONIN T SERPL-MCNC: 0.11 NG/ML (ref 0–0.03)
TROPONIN T SERPL-MCNC: 0.2 NG/ML (ref 0–0.03)
TSH SERPL DL<=0.05 MIU/L-ACNC: 1.75 UIU/ML (ref 0.27–4.2)
URATE SERPL-MCNC: 7.2 MG/DL (ref 3.4–7)
URATE SERPL-MCNC: 7.8 MG/DL (ref 3.4–7)
URATE SERPL-MCNC: 8.3 MG/DL (ref 3.4–7)
URATE SERPL-MCNC: 8.5 MG/DL (ref 3.4–7)
URATE SERPL-MCNC: 8.8 MG/DL (ref 3.4–7)
URATE SERPL-MCNC: 8.8 MG/DL (ref 3.4–7)
UROBILINOGEN UR QL STRIP: ABNORMAL
V CHOL+PARA+VUL DNA STL QL NAA+NON-PROBE: NOT DETECTED
V CHOLERAE DNA SPEC QL NAA+PROBE: NOT DETECTED
V CHOLERAE DNA STL QL NAA+NON-PROBE: NOT DETECTED
VANCOMYCIN SERPL-MCNC: 15.1 MCG/ML (ref 5–40)
VANCOMYCIN SERPL-MCNC: 15.8 MCG/ML (ref 5–40)
VANCOMYCIN SERPL-MCNC: 16.5 MCG/ML (ref 5–40)
VANCOMYCIN SERPL-MCNC: 16.6 MCG/ML (ref 5–40)
VANCOMYCIN SERPL-MCNC: 16.7 MCG/ML (ref 5–40)
VANCOMYCIN SERPL-MCNC: 17.2 MCG/ML (ref 5–40)
VANCOMYCIN SERPL-MCNC: 17.4 MCG/ML (ref 5–40)
VANCOMYCIN SERPL-MCNC: 19.3 MCG/ML (ref 5–40)
VENTILATOR MODE: ABNORMAL
VIBRIO DNA SPEC NAA+PROBE: NOT DETECTED
VIT B12 BLD-MCNC: >2000 PG/ML (ref 211–946)
VT ON VENT VENT: 500 ML
WBC # BLD AUTO: 10.26 10*3/MM3 (ref 3.4–10.8)
WBC # BLD AUTO: 11.58 10*3/MM3 (ref 3.4–10.8)
WBC # BLD AUTO: 12.09 10*3/MM3 (ref 3.4–10.8)
WBC # BLD AUTO: 13.64 10*3/MM3 (ref 3.4–10.8)
WBC # BLD AUTO: 4.83 10*3/MM3 (ref 3.4–10.8)
WBC # BLD AUTO: 5.66 10*3/MM3 (ref 3.4–10.8)
WBC # BLD AUTO: 5.9 10*3/MM3 (ref 3.4–10.8)
WBC # BLD AUTO: 5.95 10*3/MM3 (ref 3.4–10.8)
WBC # BLD AUTO: 5.96 10*3/MM3 (ref 3.4–10.8)
WBC # BLD AUTO: 6.29 10*3/MM3 (ref 3.4–10.8)
WBC # BLD AUTO: 6.5 10*3/MM3 (ref 3.4–10.8)
WBC # BLD AUTO: 6.57 10*3/MM3 (ref 3.4–10.8)
WBC # BLD AUTO: 6.67 10*3/MM3 (ref 3.4–10.8)
WBC # BLD AUTO: 6.8 10*3/MM3 (ref 3.4–10.8)
WBC # BLD AUTO: 6.81 10*3/MM3 (ref 3.4–10.8)
WBC # BLD AUTO: 7.29 10*3/MM3 (ref 3.4–10.8)
WBC # BLD AUTO: 7.34 10*3/MM3 (ref 3.4–10.8)
WBC # BLD AUTO: 7.35 10*3/MM3 (ref 3.4–10.8)
WBC # BLD AUTO: 7.48 10*3/MM3 (ref 3.4–10.8)
WBC # BLD AUTO: 7.69 10*3/MM3 (ref 3.4–10.8)
WBC # BLD AUTO: 7.9 10*3/MM3 (ref 3.4–10.8)
WBC # BLD AUTO: 8.44 10*3/MM3 (ref 3.4–10.8)
WBC # BLD AUTO: 8.5 10*3/MM3 (ref 3.4–10.8)
WBC # BLD AUTO: 8.52 10*3/MM3 (ref 3.4–10.8)
WBC # BLD AUTO: 8.61 10*3/MM3 (ref 3.4–10.8)
WBC # BLD AUTO: 9.09 10*3/MM3 (ref 3.4–10.8)
WBC # BLD AUTO: 9.52 10*3/MM3 (ref 3.4–10.8)
WBC MORPH BLD: NORMAL
WBC UR QL AUTO: ABNORMAL /HPF
WHOLE BLOOD HOLD SPECIMEN: NORMAL
Y ENTEROCOL DNA STL QL NAA+NON-PROBE: NOT DETECTED
YERSINIA STL CULT: NOT DETECTED

## 2021-01-01 PROCEDURE — 25010000002 MORPHINE PER 10 MG: Performed by: INTERNAL MEDICINE

## 2021-01-01 PROCEDURE — 25010000002 VANCOMYCIN 10 G RECONSTITUTED SOLUTION: Performed by: EMERGENCY MEDICINE

## 2021-01-01 PROCEDURE — 80069 RENAL FUNCTION PANEL: CPT | Performed by: INTERNAL MEDICINE

## 2021-01-01 PROCEDURE — 93010 ELECTROCARDIOGRAM REPORT: CPT | Performed by: INTERNAL MEDICINE

## 2021-01-01 PROCEDURE — 25010000002 VANCOMYCIN PER 500 MG: Performed by: HOSPITALIST

## 2021-01-01 PROCEDURE — 84156 ASSAY OF PROTEIN URINE: CPT | Performed by: INTERNAL MEDICINE

## 2021-01-01 PROCEDURE — 82140 ASSAY OF AMMONIA: CPT | Performed by: INTERNAL MEDICINE

## 2021-01-01 PROCEDURE — 87086 URINE CULTURE/COLONY COUNT: CPT | Performed by: PHYSICIAN ASSISTANT

## 2021-01-01 PROCEDURE — 99232 SBSQ HOSP IP/OBS MODERATE 35: CPT | Performed by: INTERNAL MEDICINE

## 2021-01-01 PROCEDURE — 36415 COLL VENOUS BLD VENIPUNCTURE: CPT | Performed by: NURSE PRACTITIONER

## 2021-01-01 PROCEDURE — 82270 OCCULT BLOOD FECES: CPT | Performed by: NURSE PRACTITIONER

## 2021-01-01 PROCEDURE — 99232 SBSQ HOSP IP/OBS MODERATE 35: CPT | Performed by: PSYCHIATRY & NEUROLOGY

## 2021-01-01 PROCEDURE — 84550 ASSAY OF BLOOD/URIC ACID: CPT | Performed by: INTERNAL MEDICINE

## 2021-01-01 PROCEDURE — 83605 ASSAY OF LACTIC ACID: CPT | Performed by: INTERNAL MEDICINE

## 2021-01-01 PROCEDURE — 84132 ASSAY OF SERUM POTASSIUM: CPT | Performed by: HOSPITALIST

## 2021-01-01 PROCEDURE — 63710000001 INSULIN LISPRO (HUMAN) PER 5 UNITS: Performed by: INTERNAL MEDICINE

## 2021-01-01 PROCEDURE — 80202 ASSAY OF VANCOMYCIN: CPT | Performed by: INTERNAL MEDICINE

## 2021-01-01 PROCEDURE — 94799 UNLISTED PULMONARY SVC/PX: CPT

## 2021-01-01 PROCEDURE — 94660 CPAP INITIATION&MGMT: CPT

## 2021-01-01 PROCEDURE — 51702 INSERT TEMP BLADDER CATH: CPT

## 2021-01-01 PROCEDURE — 85007 BL SMEAR W/DIFF WBC COUNT: CPT | Performed by: HOSPITALIST

## 2021-01-01 PROCEDURE — 83735 ASSAY OF MAGNESIUM: CPT | Performed by: INTERNAL MEDICINE

## 2021-01-01 PROCEDURE — 85025 COMPLETE CBC W/AUTO DIFF WBC: CPT | Performed by: EMERGENCY MEDICINE

## 2021-01-01 PROCEDURE — 74018 RADEX ABDOMEN 1 VIEW: CPT

## 2021-01-01 PROCEDURE — 36415 COLL VENOUS BLD VENIPUNCTURE: CPT | Performed by: PHYSICIAN ASSISTANT

## 2021-01-01 PROCEDURE — 25010000003 LEVETIRACETAM IN NACL 0.75% 1000 MG/100ML SOLUTION: Performed by: PSYCHIATRY & NEUROLOGY

## 2021-01-01 PROCEDURE — 87102 FUNGUS ISOLATION CULTURE: CPT | Performed by: INTERNAL MEDICINE

## 2021-01-01 PROCEDURE — 82746 ASSAY OF FOLIC ACID SERUM: CPT | Performed by: INTERNAL MEDICINE

## 2021-01-01 PROCEDURE — 81001 URINALYSIS AUTO W/SCOPE: CPT | Performed by: NURSE PRACTITIONER

## 2021-01-01 PROCEDURE — 99231 SBSQ HOSP IP/OBS SF/LOW 25: CPT | Performed by: INTERNAL MEDICINE

## 2021-01-01 PROCEDURE — 85027 COMPLETE CBC AUTOMATED: CPT | Performed by: INTERNAL MEDICINE

## 2021-01-01 PROCEDURE — 82962 GLUCOSE BLOOD TEST: CPT

## 2021-01-01 PROCEDURE — 25010000003 POTASSIUM CHLORIDE 10 MEQ/100ML SOLUTION: Performed by: HOSPITALIST

## 2021-01-01 PROCEDURE — 0202U NFCT DS 22 TRGT SARS-COV-2: CPT | Performed by: EMERGENCY MEDICINE

## 2021-01-01 PROCEDURE — 25010000002 CEFTRIAXONE PER 250 MG: Performed by: HOSPITALIST

## 2021-01-01 PROCEDURE — 25010000002 HYDRALAZINE PER 20 MG: Performed by: INTERNAL MEDICINE

## 2021-01-01 PROCEDURE — P9612 CATHETERIZE FOR URINE SPEC: HCPCS

## 2021-01-01 PROCEDURE — 36415 COLL VENOUS BLD VENIPUNCTURE: CPT | Performed by: HOSPITALIST

## 2021-01-01 PROCEDURE — 99225 PR SBSQ OBSERVATION CARE/DAY 25 MINUTES: CPT | Performed by: NURSE PRACTITIONER

## 2021-01-01 PROCEDURE — 25010000002 CEFTRIAXONE PER 250 MG: Performed by: NURSE PRACTITIONER

## 2021-01-01 PROCEDURE — 80053 COMPREHEN METABOLIC PANEL: CPT | Performed by: NURSE PRACTITIONER

## 2021-01-01 PROCEDURE — 43239 EGD BIOPSY SINGLE/MULTIPLE: CPT | Performed by: INTERNAL MEDICINE

## 2021-01-01 PROCEDURE — 85007 BL SMEAR W/DIFF WBC COUNT: CPT | Performed by: INTERNAL MEDICINE

## 2021-01-01 PROCEDURE — 83880 ASSAY OF NATRIURETIC PEPTIDE: CPT | Performed by: EMERGENCY MEDICINE

## 2021-01-01 PROCEDURE — 25010000002 METOCLOPRAMIDE PER 10 MG: Performed by: INTERNAL MEDICINE

## 2021-01-01 PROCEDURE — 85025 COMPLETE CBC W/AUTO DIFF WBC: CPT | Performed by: INTERNAL MEDICINE

## 2021-01-01 PROCEDURE — 93005 ELECTROCARDIOGRAM TRACING: CPT | Performed by: INTERNAL MEDICINE

## 2021-01-01 PROCEDURE — G0378 HOSPITAL OBSERVATION PER HR: HCPCS

## 2021-01-01 PROCEDURE — 25010000002 LORAZEPAM PER 2 MG: Performed by: INTERNAL MEDICINE

## 2021-01-01 PROCEDURE — 80048 BASIC METABOLIC PNL TOTAL CA: CPT | Performed by: INTERNAL MEDICINE

## 2021-01-01 PROCEDURE — 25010000002 HEPARIN (PORCINE) PER 1000 UNITS: Performed by: INTERNAL MEDICINE

## 2021-01-01 PROCEDURE — 99231 SBSQ HOSP IP/OBS SF/LOW 25: CPT | Performed by: PSYCHIATRY & NEUROLOGY

## 2021-01-01 PROCEDURE — 71045 X-RAY EXAM CHEST 1 VIEW: CPT

## 2021-01-01 PROCEDURE — 25010000002 PHENYLEPHRINE PER 1 ML: Performed by: ANESTHESIOLOGY

## 2021-01-01 PROCEDURE — 80053 COMPREHEN METABOLIC PANEL: CPT | Performed by: INTERNAL MEDICINE

## 2021-01-01 PROCEDURE — 25010000002 PIPERACILLIN SOD-TAZOBACTAM PER 1 G: Performed by: INTERNAL MEDICINE

## 2021-01-01 PROCEDURE — 86900 BLOOD TYPING SEROLOGIC ABO: CPT | Performed by: NURSE PRACTITIONER

## 2021-01-01 PROCEDURE — 97110 THERAPEUTIC EXERCISES: CPT

## 2021-01-01 PROCEDURE — 87186 SC STD MICRODIL/AGAR DIL: CPT | Performed by: PHYSICIAN ASSISTANT

## 2021-01-01 PROCEDURE — 83605 ASSAY OF LACTIC ACID: CPT | Performed by: PHYSICIAN ASSISTANT

## 2021-01-01 PROCEDURE — 82803 BLOOD GASES ANY COMBINATION: CPT

## 2021-01-01 PROCEDURE — 99232 SBSQ HOSP IP/OBS MODERATE 35: CPT | Performed by: NURSE PRACTITIONER

## 2021-01-01 PROCEDURE — 25010000002 ALBUMIN HUMAN 5% PER 50 ML: Performed by: INTERNAL MEDICINE

## 2021-01-01 PROCEDURE — 25010000002 HYDRALAZINE PER 20 MG: Performed by: HOSPITALIST

## 2021-01-01 PROCEDURE — 80053 COMPREHEN METABOLIC PANEL: CPT | Performed by: EMERGENCY MEDICINE

## 2021-01-01 PROCEDURE — 85007 BL SMEAR W/DIFF WBC COUNT: CPT | Performed by: EMERGENCY MEDICINE

## 2021-01-01 PROCEDURE — 51798 US URINE CAPACITY MEASURE: CPT

## 2021-01-01 PROCEDURE — 83605 ASSAY OF LACTIC ACID: CPT | Performed by: EMERGENCY MEDICINE

## 2021-01-01 PROCEDURE — 83735 ASSAY OF MAGNESIUM: CPT | Performed by: EMERGENCY MEDICINE

## 2021-01-01 PROCEDURE — 87086 URINE CULTURE/COLONY COUNT: CPT | Performed by: NURSE PRACTITIONER

## 2021-01-01 PROCEDURE — 95816 EEG AWAKE AND DROWSY: CPT

## 2021-01-01 PROCEDURE — 25010000002 CEFTRIAXONE PER 250 MG: Performed by: INTERNAL MEDICINE

## 2021-01-01 PROCEDURE — U0004 COV-19 TEST NON-CDC HGH THRU: HCPCS | Performed by: PHYSICIAN ASSISTANT

## 2021-01-01 PROCEDURE — 84100 ASSAY OF PHOSPHORUS: CPT | Performed by: INTERNAL MEDICINE

## 2021-01-01 PROCEDURE — 87150 DNA/RNA AMPLIFIED PROBE: CPT | Performed by: PHYSICIAN ASSISTANT

## 2021-01-01 PROCEDURE — 87077 CULTURE AEROBIC IDENTIFY: CPT | Performed by: NURSE PRACTITIONER

## 2021-01-01 PROCEDURE — 86901 BLOOD TYPING SEROLOGIC RH(D): CPT | Performed by: EMERGENCY MEDICINE

## 2021-01-01 PROCEDURE — 84443 ASSAY THYROID STIM HORMONE: CPT | Performed by: INTERNAL MEDICINE

## 2021-01-01 PROCEDURE — 85025 COMPLETE CBC W/AUTO DIFF WBC: CPT | Performed by: HOSPITALIST

## 2021-01-01 PROCEDURE — 96376 TX/PRO/DX INJ SAME DRUG ADON: CPT

## 2021-01-01 PROCEDURE — 84132 ASSAY OF SERUM POTASSIUM: CPT | Performed by: INTERNAL MEDICINE

## 2021-01-01 PROCEDURE — 85014 HEMATOCRIT: CPT | Performed by: NURSE PRACTITIONER

## 2021-01-01 PROCEDURE — 81001 URINALYSIS AUTO W/SCOPE: CPT | Performed by: EMERGENCY MEDICINE

## 2021-01-01 PROCEDURE — 25010000002 VANCOMYCIN 750 MG RECONSTITUTED SOLUTION: Performed by: INTERNAL MEDICINE

## 2021-01-01 PROCEDURE — 70450 CT HEAD/BRAIN W/O DYE: CPT

## 2021-01-01 PROCEDURE — 0097U HC BIOFIRE FILMARRAY GI PANEL: CPT | Performed by: INTERNAL MEDICINE

## 2021-01-01 PROCEDURE — 97162 PT EVAL MOD COMPLEX 30 MIN: CPT

## 2021-01-01 PROCEDURE — 87181 SC STD AGAR DILUTION PER AGT: CPT | Performed by: PHYSICIAN ASSISTANT

## 2021-01-01 PROCEDURE — 25010000002 PIPERACILLIN SOD-TAZOBACTAM PER 1 G: Performed by: EMERGENCY MEDICINE

## 2021-01-01 PROCEDURE — 25010000002 MAGNESIUM SULFATE IN D5W 1G/100ML (PREMIX) 1-5 GM/100ML-% SOLUTION: Performed by: INTERNAL MEDICINE

## 2021-01-01 PROCEDURE — 74176 CT ABD & PELVIS W/O CONTRAST: CPT

## 2021-01-01 PROCEDURE — 88305 TISSUE EXAM BY PATHOLOGIST: CPT | Performed by: INTERNAL MEDICINE

## 2021-01-01 PROCEDURE — 25010000002 PHENYLEPHRINE 10 MG/ML SOLUTION: Performed by: INTERNAL MEDICINE

## 2021-01-01 PROCEDURE — 99222 1ST HOSP IP/OBS MODERATE 55: CPT | Performed by: PSYCHIATRY & NEUROLOGY

## 2021-01-01 PROCEDURE — 63710000001 INSULIN LISPRO (HUMAN) PER 5 UNITS: Performed by: NURSE PRACTITIONER

## 2021-01-01 PROCEDURE — 96375 TX/PRO/DX INJ NEW DRUG ADDON: CPT

## 2021-01-01 PROCEDURE — 99222 1ST HOSP IP/OBS MODERATE 55: CPT | Performed by: INTERNAL MEDICINE

## 2021-01-01 PROCEDURE — 84145 PROCALCITONIN (PCT): CPT | Performed by: PHYSICIAN ASSISTANT

## 2021-01-01 PROCEDURE — 82962 GLUCOSE BLOOD TEST: CPT | Performed by: NURSE PRACTITIONER

## 2021-01-01 PROCEDURE — P9041 ALBUMIN (HUMAN),5%, 50ML: HCPCS | Performed by: INTERNAL MEDICINE

## 2021-01-01 PROCEDURE — 80202 ASSAY OF VANCOMYCIN: CPT | Performed by: HOSPITALIST

## 2021-01-01 PROCEDURE — 84133 ASSAY OF URINE POTASSIUM: CPT | Performed by: INTERNAL MEDICINE

## 2021-01-01 PROCEDURE — 83036 HEMOGLOBIN GLYCOSYLATED A1C: CPT | Performed by: NURSE PRACTITIONER

## 2021-01-01 PROCEDURE — 25010000002 VANCOMYCIN 10 G RECONSTITUTED SOLUTION: Performed by: INTERNAL MEDICINE

## 2021-01-01 PROCEDURE — 82607 VITAMIN B-12: CPT | Performed by: INTERNAL MEDICINE

## 2021-01-01 PROCEDURE — 36415 COLL VENOUS BLD VENIPUNCTURE: CPT | Performed by: EMERGENCY MEDICINE

## 2021-01-01 PROCEDURE — C9803 HOPD COVID-19 SPEC COLLECT: HCPCS

## 2021-01-01 PROCEDURE — 87086 URINE CULTURE/COLONY COUNT: CPT | Performed by: INTERNAL MEDICINE

## 2021-01-01 PROCEDURE — 87040 BLOOD CULTURE FOR BACTERIA: CPT | Performed by: INTERNAL MEDICINE

## 2021-01-01 PROCEDURE — 25010000002 LORAZEPAM PER 2 MG: Performed by: HOSPITALIST

## 2021-01-01 PROCEDURE — 92610 EVALUATE SWALLOWING FUNCTION: CPT

## 2021-01-01 PROCEDURE — U0003 INFECTIOUS AGENT DETECTION BY NUCLEIC ACID (DNA OR RNA); SEVERE ACUTE RESPIRATORY SYNDROME CORONAVIRUS 2 (SARS-COV-2) (CORONAVIRUS DISEASE [COVID-19]), AMPLIFIED PROBE TECHNIQUE, MAKING USE OF HIGH THROUGHPUT TECHNOLOGIES AS DESCRIBED BY CMS-2020-01-R: HCPCS | Performed by: INTERNAL MEDICINE

## 2021-01-01 PROCEDURE — 25010000002 LORAZEPAM PER 2 MG: Performed by: PSYCHIATRY & NEUROLOGY

## 2021-01-01 PROCEDURE — 84484 ASSAY OF TROPONIN QUANT: CPT | Performed by: EMERGENCY MEDICINE

## 2021-01-01 PROCEDURE — 97162 PT EVAL MOD COMPLEX 30 MIN: CPT | Performed by: PHYSICAL THERAPIST

## 2021-01-01 PROCEDURE — 99285 EMERGENCY DEPT VISIT HI MDM: CPT

## 2021-01-01 PROCEDURE — 85018 HEMOGLOBIN: CPT | Performed by: NURSE PRACTITIONER

## 2021-01-01 PROCEDURE — 25010000002 DIGOXIN PER 500 MCG: Performed by: INTERNAL MEDICINE

## 2021-01-01 PROCEDURE — 25010000002 VANCOMYCIN PER 500 MG: Performed by: INTERNAL MEDICINE

## 2021-01-01 PROCEDURE — 25010000002 ENOXAPARIN PER 10 MG: Performed by: INTERNAL MEDICINE

## 2021-01-01 PROCEDURE — 94760 N-INVAS EAR/PLS OXIMETRY 1: CPT

## 2021-01-01 PROCEDURE — 96365 THER/PROPH/DIAG IV INF INIT: CPT

## 2021-01-01 PROCEDURE — 87641 MR-STAPH DNA AMP PROBE: CPT | Performed by: INTERNAL MEDICINE

## 2021-01-01 PROCEDURE — 25010000003 LIDOCAINE 1 % SOLUTION: Performed by: RADIOLOGY

## 2021-01-01 PROCEDURE — 83605 ASSAY OF LACTIC ACID: CPT | Performed by: NURSE PRACTITIONER

## 2021-01-01 PROCEDURE — 86900 BLOOD TYPING SEROLOGIC ABO: CPT | Performed by: EMERGENCY MEDICINE

## 2021-01-01 PROCEDURE — 82436 ASSAY OF URINE CHLORIDE: CPT | Performed by: INTERNAL MEDICINE

## 2021-01-01 PROCEDURE — 86850 RBC ANTIBODY SCREEN: CPT | Performed by: EMERGENCY MEDICINE

## 2021-01-01 PROCEDURE — 25010000002 MORPHINE PER 10 MG: Performed by: NURSE PRACTITIONER

## 2021-01-01 PROCEDURE — 25010000003 POTASSIUM CHLORIDE 10 MEQ/100ML SOLUTION: Performed by: PHYSICIAN ASSISTANT

## 2021-01-01 PROCEDURE — 82140 ASSAY OF AMMONIA: CPT | Performed by: EMERGENCY MEDICINE

## 2021-01-01 PROCEDURE — 36600 WITHDRAWAL OF ARTERIAL BLOOD: CPT

## 2021-01-01 PROCEDURE — 86901 BLOOD TYPING SEROLOGIC RH(D): CPT | Performed by: NURSE PRACTITIONER

## 2021-01-01 PROCEDURE — 87040 BLOOD CULTURE FOR BACTERIA: CPT | Performed by: PHYSICIAN ASSISTANT

## 2021-01-01 PROCEDURE — U0004 COV-19 TEST NON-CDC HGH THRU: HCPCS | Performed by: NURSE PRACTITIONER

## 2021-01-01 PROCEDURE — 85025 COMPLETE CBC W/AUTO DIFF WBC: CPT | Performed by: NURSE PRACTITIONER

## 2021-01-01 PROCEDURE — 93005 ELECTROCARDIOGRAM TRACING: CPT | Performed by: EMERGENCY MEDICINE

## 2021-01-01 PROCEDURE — 86850 RBC ANTIBODY SCREEN: CPT | Performed by: NURSE PRACTITIONER

## 2021-01-01 PROCEDURE — 99284 EMERGENCY DEPT VISIT MOD MDM: CPT

## 2021-01-01 PROCEDURE — 84145 PROCALCITONIN (PCT): CPT | Performed by: NURSE PRACTITIONER

## 2021-01-01 PROCEDURE — 25010000002 PROPOFOL 10 MG/ML EMULSION: Performed by: ANESTHESIOLOGY

## 2021-01-01 PROCEDURE — 87040 BLOOD CULTURE FOR BACTERIA: CPT | Performed by: EMERGENCY MEDICINE

## 2021-01-01 PROCEDURE — 45330 DIAGNOSTIC SIGMOIDOSCOPY: CPT | Performed by: INTERNAL MEDICINE

## 2021-01-01 PROCEDURE — 84300 ASSAY OF URINE SODIUM: CPT | Performed by: INTERNAL MEDICINE

## 2021-01-01 PROCEDURE — 93005 ELECTROCARDIOGRAM TRACING: CPT | Performed by: HOSPITALIST

## 2021-01-01 PROCEDURE — 84145 PROCALCITONIN (PCT): CPT | Performed by: EMERGENCY MEDICINE

## 2021-01-01 PROCEDURE — 87186 SC STD MICRODIL/AGAR DIL: CPT | Performed by: NURSE PRACTITIONER

## 2021-01-01 PROCEDURE — 82570 ASSAY OF URINE CREATININE: CPT | Performed by: INTERNAL MEDICINE

## 2021-01-01 PROCEDURE — 25010000002 CEFTRIAXONE PER 250 MG: Performed by: PHYSICIAN ASSISTANT

## 2021-01-01 PROCEDURE — 87206 SMEAR FLUORESCENT/ACID STAI: CPT | Performed by: INTERNAL MEDICINE

## 2021-01-01 PROCEDURE — 92526 ORAL FUNCTION THERAPY: CPT

## 2021-01-01 PROCEDURE — 87493 C DIFF AMPLIFIED PROBE: CPT | Performed by: INTERNAL MEDICINE

## 2021-01-01 PROCEDURE — 25010000002 ONDANSETRON PER 1 MG: Performed by: NURSE PRACTITIONER

## 2021-01-01 PROCEDURE — 009U3ZX DRAINAGE OF SPINAL CANAL, PERCUTANEOUS APPROACH, DIAGNOSTIC: ICD-10-PCS | Performed by: RADIOLOGY

## 2021-01-01 PROCEDURE — 70551 MRI BRAIN STEM W/O DYE: CPT

## 2021-01-01 RX ORDER — DORZOLAMIDE HCL 20 MG/ML
1 SOLUTION/ DROPS OPHTHALMIC 3 TIMES DAILY
Status: DISCONTINUED | OUTPATIENT
Start: 2021-01-01 | End: 2021-01-01 | Stop reason: HOSPADM

## 2021-01-01 RX ORDER — POTASSIUM CHLORIDE 1.5 G/1.77G
40 POWDER, FOR SOLUTION ORAL AS NEEDED
Status: DISCONTINUED | OUTPATIENT
Start: 2021-01-01 | End: 2021-01-01

## 2021-01-01 RX ORDER — HALOPERIDOL 1 MG/1
2 TABLET ORAL EVERY 4 HOURS PRN
Status: DISCONTINUED | OUTPATIENT
Start: 2021-01-01 | End: 2021-01-01 | Stop reason: HOSPADM

## 2021-01-01 RX ORDER — NICOTINE POLACRILEX 4 MG
15 LOZENGE BUCCAL
Status: DISCONTINUED | OUTPATIENT
Start: 2021-01-01 | End: 2021-01-01 | Stop reason: HOSPADM

## 2021-01-01 RX ORDER — TORSEMIDE 20 MG/1
40 TABLET ORAL
Status: DISCONTINUED | OUTPATIENT
Start: 2021-01-01 | End: 2021-01-01

## 2021-01-01 RX ORDER — ACETAMINOPHEN 160 MG/5ML
650 SOLUTION ORAL EVERY 4 HOURS PRN
Status: DISCONTINUED | OUTPATIENT
Start: 2021-01-01 | End: 2021-01-01 | Stop reason: HOSPADM

## 2021-01-01 RX ORDER — DIGOXIN 0.25 MG/ML
125 INJECTION INTRAMUSCULAR; INTRAVENOUS EVERY 4 HOURS
Status: COMPLETED | OUTPATIENT
Start: 2021-01-01 | End: 2021-01-01

## 2021-01-01 RX ORDER — ACETAMINOPHEN 650 MG/1
650 SUPPOSITORY RECTAL EVERY 4 HOURS PRN
Status: DISCONTINUED | OUTPATIENT
Start: 2021-01-01 | End: 2021-01-01 | Stop reason: HOSPADM

## 2021-01-01 RX ORDER — PHENYLEPHRINE HCL IN 0.9% NACL 0.5 MG/5ML
.5-3 SYRINGE (ML) INTRAVENOUS
Status: DISCONTINUED | OUTPATIENT
Start: 2021-01-01 | End: 2021-01-01

## 2021-01-01 RX ORDER — HYDROMORPHONE HCL 110MG/55ML
1.5 PATIENT CONTROLLED ANALGESIA SYRINGE INTRAVENOUS
Status: DISCONTINUED | OUTPATIENT
Start: 2021-01-01 | End: 2021-01-01 | Stop reason: HOSPADM

## 2021-01-01 RX ORDER — AMMONIUM LACTATE 120 MG/G
CREAM TOPICAL 2 TIMES DAILY
Status: ON HOLD
Start: 2021-01-01 | End: 2021-01-01

## 2021-01-01 RX ORDER — INSULIN LISPRO 100 [IU]/ML
0-9 INJECTION, SOLUTION INTRAVENOUS; SUBCUTANEOUS
Status: DISCONTINUED | OUTPATIENT
Start: 2021-01-01 | End: 2021-01-01 | Stop reason: HOSPADM

## 2021-01-01 RX ORDER — CYCLOBENZAPRINE HCL 10 MG
10 TABLET ORAL EVERY 8 HOURS PRN
COMMUNITY

## 2021-01-01 RX ORDER — ATORVASTATIN CALCIUM 20 MG/1
20 TABLET, FILM COATED ORAL DAILY
Status: DISCONTINUED | OUTPATIENT
Start: 2021-01-01 | End: 2021-01-01 | Stop reason: HOSPADM

## 2021-01-01 RX ORDER — SODIUM CHLORIDE 0.9 % (FLUSH) 0.9 %
10 SYRINGE (ML) INJECTION EVERY 12 HOURS SCHEDULED
Status: DISCONTINUED | OUTPATIENT
Start: 2021-01-01 | End: 2021-01-01 | Stop reason: HOSPADM

## 2021-01-01 RX ORDER — HYDROMORPHONE HYDROCHLORIDE 1 MG/ML
0.5 INJECTION, SOLUTION INTRAMUSCULAR; INTRAVENOUS; SUBCUTANEOUS
Status: DISCONTINUED | OUTPATIENT
Start: 2021-01-01 | End: 2021-01-01

## 2021-01-01 RX ORDER — POTASSIUM CHLORIDE 750 MG/1
40 TABLET, FILM COATED, EXTENDED RELEASE ORAL AS NEEDED
Status: DISCONTINUED | OUTPATIENT
Start: 2021-01-01 | End: 2021-01-01

## 2021-01-01 RX ORDER — DEXTROSE MONOHYDRATE 25 G/50ML
25 INJECTION, SOLUTION INTRAVENOUS
Status: DISCONTINUED | OUTPATIENT
Start: 2021-01-01 | End: 2021-01-01 | Stop reason: HOSPADM

## 2021-01-01 RX ORDER — ACETAMINOPHEN 325 MG/1
650 TABLET ORAL EVERY 4 HOURS PRN
Status: DISCONTINUED | OUTPATIENT
Start: 2021-01-01 | End: 2021-01-01 | Stop reason: HOSPADM

## 2021-01-01 RX ORDER — DIPHENHYDRAMINE HYDROCHLORIDE 50 MG/ML
25 INJECTION INTRAMUSCULAR; INTRAVENOUS EVERY 6 HOURS PRN
Status: DISCONTINUED | OUTPATIENT
Start: 2021-01-01 | End: 2021-01-01 | Stop reason: HOSPADM

## 2021-01-01 RX ORDER — GLYCOPYRROLATE 0.2 MG/ML
0.4 INJECTION INTRAMUSCULAR; INTRAVENOUS
Status: DISCONTINUED | OUTPATIENT
Start: 2021-01-01 | End: 2021-01-01 | Stop reason: HOSPADM

## 2021-01-01 RX ORDER — PROCHLORPERAZINE 25 MG
25 SUPPOSITORY, RECTAL RECTAL EVERY 12 HOURS PRN
Status: DISCONTINUED | OUTPATIENT
Start: 2021-01-01 | End: 2021-01-01 | Stop reason: HOSPADM

## 2021-01-01 RX ORDER — FAMOTIDINE 20 MG/1
20 TABLET, FILM COATED ORAL DAILY
Status: DISCONTINUED | OUTPATIENT
Start: 2021-01-01 | End: 2021-01-01 | Stop reason: HOSPADM

## 2021-01-01 RX ORDER — POTASSIUM CHLORIDE 7.45 MG/ML
10 INJECTION INTRAVENOUS ONCE
Status: DISCONTINUED | OUTPATIENT
Start: 2021-01-01 | End: 2021-01-01

## 2021-01-01 RX ORDER — TAMSULOSIN HYDROCHLORIDE 0.4 MG/1
1 CAPSULE ORAL DAILY
COMMUNITY

## 2021-01-01 RX ORDER — AMLODIPINE BESYLATE 5 MG/1
5 TABLET ORAL
Status: DISCONTINUED | OUTPATIENT
Start: 2021-01-01 | End: 2021-01-01

## 2021-01-01 RX ORDER — POTASSIUM CHLORIDE 750 MG/1
40 TABLET, FILM COATED, EXTENDED RELEASE ORAL ONCE
Status: COMPLETED | OUTPATIENT
Start: 2021-01-01 | End: 2021-01-01

## 2021-01-01 RX ORDER — CEFTRIAXONE SODIUM 1 G/50ML
1 INJECTION, SOLUTION INTRAVENOUS EVERY 24 HOURS
Status: DISCONTINUED | OUTPATIENT
Start: 2021-01-01 | End: 2021-01-01 | Stop reason: HOSPADM

## 2021-01-01 RX ORDER — FAMOTIDINE 10 MG/ML
20 INJECTION, SOLUTION INTRAVENOUS DAILY
Status: DISCONTINUED | OUTPATIENT
Start: 2021-01-01 | End: 2021-01-01

## 2021-01-01 RX ORDER — PROPOFOL 10 MG/ML
VIAL (ML) INTRAVENOUS AS NEEDED
Status: DISCONTINUED | OUTPATIENT
Start: 2021-01-01 | End: 2021-01-01 | Stop reason: SURG

## 2021-01-01 RX ORDER — HALOPERIDOL 1 MG/1
1 TABLET ORAL EVERY 4 HOURS PRN
Status: DISCONTINUED | OUTPATIENT
Start: 2021-01-01 | End: 2021-01-01 | Stop reason: HOSPADM

## 2021-01-01 RX ORDER — KETOROLAC TROMETHAMINE 15 MG/ML
15 INJECTION, SOLUTION INTRAMUSCULAR; INTRAVENOUS EVERY 6 HOURS PRN
Status: DISCONTINUED | OUTPATIENT
Start: 2021-01-01 | End: 2021-01-01 | Stop reason: HOSPADM

## 2021-01-01 RX ORDER — LORAZEPAM 2 MG/ML
2 INJECTION INTRAMUSCULAR
Status: DISCONTINUED | OUTPATIENT
Start: 2021-01-01 | End: 2021-01-01

## 2021-01-01 RX ORDER — GABAPENTIN 300 MG/1
300 CAPSULE ORAL 3 TIMES DAILY
COMMUNITY
End: 2021-01-01 | Stop reason: HOSPADM

## 2021-01-01 RX ORDER — MORPHINE SULFATE 2 MG/ML
4 INJECTION, SOLUTION INTRAMUSCULAR; INTRAVENOUS
Status: DISCONTINUED | OUTPATIENT
Start: 2021-01-01 | End: 2021-01-01 | Stop reason: HOSPADM

## 2021-01-01 RX ORDER — SODIUM CHLORIDE 9 MG/ML
75 INJECTION, SOLUTION INTRAVENOUS CONTINUOUS
Status: DISCONTINUED | OUTPATIENT
Start: 2021-01-01 | End: 2021-01-01

## 2021-01-01 RX ORDER — HALOPERIDOL 5 MG/ML
1 INJECTION INTRAMUSCULAR EVERY 4 HOURS PRN
Status: DISCONTINUED | OUTPATIENT
Start: 2021-01-01 | End: 2021-01-01 | Stop reason: HOSPADM

## 2021-01-01 RX ORDER — SODIUM CHLORIDE 0.9 % (FLUSH) 0.9 %
10 SYRINGE (ML) INJECTION AS NEEDED
Status: DISCONTINUED | OUTPATIENT
Start: 2021-01-01 | End: 2021-01-01 | Stop reason: HOSPADM

## 2021-01-01 RX ORDER — INSULIN LISPRO 100 [IU]/ML
0-7 INJECTION, SOLUTION INTRAVENOUS; SUBCUTANEOUS EVERY 6 HOURS
Status: DISCONTINUED | OUTPATIENT
Start: 2021-01-01 | End: 2021-01-01

## 2021-01-01 RX ORDER — DIPHENHYDRAMINE HYDROCHLORIDE 50 MG/ML
6.25 INJECTION INTRAMUSCULAR; INTRAVENOUS
Status: DISCONTINUED | OUTPATIENT
Start: 2021-01-01 | End: 2021-01-01 | Stop reason: HOSPADM

## 2021-01-01 RX ORDER — HYDRALAZINE HYDROCHLORIDE 50 MG/1
100 TABLET, FILM COATED ORAL EVERY 8 HOURS SCHEDULED
Status: DISCONTINUED | OUTPATIENT
Start: 2021-01-01 | End: 2021-01-01

## 2021-01-01 RX ORDER — LORAZEPAM 2 MG/ML
0.5 CONCENTRATE ORAL
Status: DISCONTINUED | OUTPATIENT
Start: 2021-01-01 | End: 2021-01-01 | Stop reason: HOSPADM

## 2021-01-01 RX ORDER — ACETAMINOPHEN 325 MG/1
650 TABLET ORAL EVERY 6 HOURS PRN
COMMUNITY

## 2021-01-01 RX ORDER — LORAZEPAM 2 MG/ML
2 INJECTION INTRAMUSCULAR
Status: DISCONTINUED | OUTPATIENT
Start: 2021-01-01 | End: 2021-01-01 | Stop reason: HOSPADM

## 2021-01-01 RX ORDER — MORPHINE SULFATE 2 MG/ML
6 INJECTION, SOLUTION INTRAMUSCULAR; INTRAVENOUS
Status: DISCONTINUED | OUTPATIENT
Start: 2021-01-01 | End: 2021-01-01 | Stop reason: HOSPADM

## 2021-01-01 RX ORDER — LORAZEPAM 2 MG/ML
0.5 CONCENTRATE ORAL
Status: DISCONTINUED | OUTPATIENT
Start: 2021-01-01 | End: 2021-01-01

## 2021-01-01 RX ORDER — DIPHENOXYLATE HYDROCHLORIDE AND ATROPINE SULFATE 2.5; .025 MG/1; MG/1
1 TABLET ORAL
Status: DISCONTINUED | OUTPATIENT
Start: 2021-01-01 | End: 2021-01-01 | Stop reason: HOSPADM

## 2021-01-01 RX ORDER — AMOXICILLIN 250 MG
1 CAPSULE ORAL NIGHTLY
Start: 2021-01-01

## 2021-01-01 RX ORDER — ONDANSETRON 4 MG/1
4 TABLET, FILM COATED ORAL EVERY 6 HOURS PRN
Status: DISCONTINUED | OUTPATIENT
Start: 2021-01-01 | End: 2021-01-01 | Stop reason: HOSPADM

## 2021-01-01 RX ORDER — LEVETIRACETAM 10 MG/ML
1000 INJECTION INTRAVASCULAR EVERY 12 HOURS SCHEDULED
Status: DISCONTINUED | OUTPATIENT
Start: 2021-01-01 | End: 2021-01-01

## 2021-01-01 RX ORDER — DEXTROSE MONOHYDRATE 25 G/50ML
25 INJECTION, SOLUTION INTRAVENOUS
Status: DISCONTINUED | OUTPATIENT
Start: 2021-01-01 | End: 2021-01-01

## 2021-01-01 RX ORDER — PROCHLORPERAZINE MALEATE 10 MG
5 TABLET ORAL EVERY 6 HOURS PRN
Status: DISCONTINUED | OUTPATIENT
Start: 2021-01-01 | End: 2021-01-01 | Stop reason: HOSPADM

## 2021-01-01 RX ORDER — MORPHINE SULFATE 20 MG/ML
20 SOLUTION ORAL
Status: DISCONTINUED | OUTPATIENT
Start: 2021-01-01 | End: 2021-01-01 | Stop reason: HOSPADM

## 2021-01-01 RX ORDER — IPRATROPIUM BROMIDE AND ALBUTEROL SULFATE 2.5; .5 MG/3ML; MG/3ML
3 SOLUTION RESPIRATORY (INHALATION) EVERY 4 HOURS PRN
Status: DISCONTINUED | OUTPATIENT
Start: 2021-01-01 | End: 2021-01-01 | Stop reason: HOSPADM

## 2021-01-01 RX ORDER — LORAZEPAM 2 MG/ML
1 CONCENTRATE ORAL
Status: DISCONTINUED | OUTPATIENT
Start: 2021-01-01 | End: 2021-01-01

## 2021-01-01 RX ORDER — ACETAMINOPHEN 650 MG/1
650 SUPPOSITORY RECTAL EVERY 4 HOURS PRN
Status: DISCONTINUED | OUTPATIENT
Start: 2021-01-01 | End: 2021-01-01

## 2021-01-01 RX ORDER — ONDANSETRON 2 MG/ML
4 INJECTION INTRAMUSCULAR; INTRAVENOUS ONCE AS NEEDED
Status: DISCONTINUED | OUTPATIENT
Start: 2021-01-01 | End: 2021-01-01 | Stop reason: HOSPADM

## 2021-01-01 RX ORDER — LIDOCAINE HYDROCHLORIDE 20 MG/ML
INJECTION, SOLUTION INFILTRATION; PERINEURAL AS NEEDED
Status: DISCONTINUED | OUTPATIENT
Start: 2021-01-01 | End: 2021-01-01 | Stop reason: SURG

## 2021-01-01 RX ORDER — FAMOTIDINE 20 MG/1
20 TABLET, FILM COATED ORAL DAILY
Start: 2021-01-01

## 2021-01-01 RX ORDER — CEFTRIAXONE SODIUM 1 G/50ML
1 INJECTION, SOLUTION INTRAVENOUS EVERY 24 HOURS
Status: COMPLETED | OUTPATIENT
Start: 2021-01-01 | End: 2021-01-01

## 2021-01-01 RX ORDER — POTASSIUM CHLORIDE 1.5 G/1.77G
40 POWDER, FOR SOLUTION ORAL
Status: ACTIVE | OUTPATIENT
Start: 2021-01-01 | End: 2021-01-01

## 2021-01-01 RX ORDER — LORAZEPAM 2 MG/ML
1 INJECTION INTRAMUSCULAR EVERY 4 HOURS PRN
Status: DISCONTINUED | OUTPATIENT
Start: 2021-01-01 | End: 2021-01-01

## 2021-01-01 RX ORDER — GLYCOPYRROLATE 0.2 MG/ML
0.4 INJECTION INTRAMUSCULAR; INTRAVENOUS
Status: DISCONTINUED | OUTPATIENT
Start: 2021-01-01 | End: 2021-01-01

## 2021-01-01 RX ORDER — LORAZEPAM 2 MG/ML
0.5 INJECTION INTRAMUSCULAR
Status: DISCONTINUED | OUTPATIENT
Start: 2021-01-01 | End: 2021-01-01 | Stop reason: HOSPADM

## 2021-01-01 RX ORDER — BUSPIRONE HYDROCHLORIDE 15 MG/1
7.5 TABLET ORAL 2 TIMES DAILY
COMMUNITY

## 2021-01-01 RX ORDER — PROCHLORPERAZINE EDISYLATE 5 MG/ML
5 INJECTION INTRAMUSCULAR; INTRAVENOUS EVERY 6 HOURS PRN
Status: DISCONTINUED | OUTPATIENT
Start: 2021-01-01 | End: 2021-01-01 | Stop reason: HOSPADM

## 2021-01-01 RX ORDER — ONDANSETRON 4 MG/1
4 TABLET, FILM COATED ORAL EVERY 6 HOURS PRN
Status: DISCONTINUED | OUTPATIENT
Start: 2021-01-01 | End: 2021-01-01

## 2021-01-01 RX ORDER — HYDRALAZINE HYDROCHLORIDE 25 MG/1
25 TABLET, FILM COATED ORAL EVERY 6 HOURS PRN
Status: DISCONTINUED | OUTPATIENT
Start: 2021-01-01 | End: 2021-01-01

## 2021-01-01 RX ORDER — FERROUS SULFATE 325(65) MG
325 TABLET ORAL
COMMUNITY

## 2021-01-01 RX ORDER — HYDRALAZINE HYDROCHLORIDE 50 MG/1
50 TABLET, FILM COATED ORAL 2 TIMES DAILY
COMMUNITY
End: 2021-01-01 | Stop reason: HOSPADM

## 2021-01-01 RX ORDER — MORPHINE SULFATE 20 MG/ML
5 SOLUTION ORAL
Status: DISCONTINUED | OUTPATIENT
Start: 2021-01-01 | End: 2021-01-01 | Stop reason: HOSPADM

## 2021-01-01 RX ORDER — LOSARTAN POTASSIUM 25 MG/1
25 TABLET ORAL 2 TIMES DAILY
COMMUNITY
End: 2021-01-01 | Stop reason: HOSPADM

## 2021-01-01 RX ORDER — ATORVASTATIN CALCIUM 20 MG/1
20 TABLET, FILM COATED ORAL NIGHTLY
Status: DISCONTINUED | OUTPATIENT
Start: 2021-01-01 | End: 2021-01-01 | Stop reason: HOSPADM

## 2021-01-01 RX ORDER — POTASSIUM CHLORIDE 750 MG/1
40 TABLET, FILM COATED, EXTENDED RELEASE ORAL
Status: COMPLETED | OUTPATIENT
Start: 2021-01-01 | End: 2021-01-01

## 2021-01-01 RX ORDER — LORAZEPAM 2 MG/ML
1 CONCENTRATE ORAL
Status: DISCONTINUED | OUTPATIENT
Start: 2021-01-01 | End: 2021-01-01 | Stop reason: HOSPADM

## 2021-01-01 RX ORDER — GLYCOPYRROLATE 0.2 MG/ML
0.2 INJECTION INTRAMUSCULAR; INTRAVENOUS
Status: DISCONTINUED | OUTPATIENT
Start: 2021-01-01 | End: 2021-01-01 | Stop reason: HOSPADM

## 2021-01-01 RX ORDER — HALOPERIDOL 5 MG/ML
2 INJECTION INTRAMUSCULAR EVERY 4 HOURS PRN
Status: DISCONTINUED | OUTPATIENT
Start: 2021-01-01 | End: 2021-01-01 | Stop reason: HOSPADM

## 2021-01-01 RX ORDER — MORPHINE SULFATE 2 MG/ML
6 INJECTION, SOLUTION INTRAMUSCULAR; INTRAVENOUS
Status: DISCONTINUED | OUTPATIENT
Start: 2021-01-01 | End: 2021-01-01

## 2021-01-01 RX ORDER — FLUCONAZOLE 100 MG/1
100 TABLET ORAL EVERY 24 HOURS
Qty: 6 TABLET | Refills: 0
Start: 2021-01-01 | End: 2021-01-01

## 2021-01-01 RX ORDER — ASPIRIN 81 MG/1
81 TABLET ORAL DAILY
Status: DISCONTINUED | OUTPATIENT
Start: 2021-01-01 | End: 2021-01-01

## 2021-01-01 RX ORDER — CEFTRIAXONE SODIUM 1 G/50ML
1 INJECTION, SOLUTION INTRAVENOUS ONCE
Status: COMPLETED | OUTPATIENT
Start: 2021-01-01 | End: 2021-01-01

## 2021-01-01 RX ORDER — SODIUM BICARBONATE 650 MG/1
1300 TABLET ORAL 3 TIMES DAILY
Status: DISCONTINUED | OUTPATIENT
Start: 2021-01-01 | End: 2021-01-01

## 2021-01-01 RX ORDER — SODIUM BICARBONATE IN D5W 150/1000ML
150 PLASTIC BAG, INJECTION (ML) INTRAVENOUS CONTINUOUS
Status: DISPENSED | OUTPATIENT
Start: 2021-01-01 | End: 2021-01-01

## 2021-01-01 RX ORDER — MAGNESIUM SULFATE 1 G/100ML
1 INJECTION INTRAVENOUS ONCE
Status: COMPLETED | OUTPATIENT
Start: 2021-01-01 | End: 2021-01-01

## 2021-01-01 RX ORDER — ONDANSETRON 2 MG/ML
4 INJECTION INTRAMUSCULAR; INTRAVENOUS EVERY 6 HOURS PRN
Status: DISCONTINUED | OUTPATIENT
Start: 2021-01-01 | End: 2021-01-01 | Stop reason: HOSPADM

## 2021-01-01 RX ORDER — LOPERAMIDE HYDROCHLORIDE 2 MG/1
2 CAPSULE ORAL 2 TIMES DAILY PRN
COMMUNITY

## 2021-01-01 RX ORDER — HYDRALAZINE HYDROCHLORIDE 25 MG/1
25 TABLET, FILM COATED ORAL EVERY 8 HOURS SCHEDULED
Status: DISCONTINUED | OUTPATIENT
Start: 2021-01-01 | End: 2021-01-01 | Stop reason: HOSPADM

## 2021-01-01 RX ORDER — SODIUM CHLORIDE 0.9 % (FLUSH) 0.9 %
20 SYRINGE (ML) INJECTION AS NEEDED
Status: DISCONTINUED | OUTPATIENT
Start: 2021-01-01 | End: 2021-01-01

## 2021-01-01 RX ORDER — HYDRALAZINE HYDROCHLORIDE 20 MG/ML
10 INJECTION INTRAMUSCULAR; INTRAVENOUS EVERY 6 HOURS PRN
Status: DISCONTINUED | OUTPATIENT
Start: 2021-01-01 | End: 2021-01-01

## 2021-01-01 RX ORDER — POTASSIUM CHLORIDE 750 MG/1
20 TABLET, FILM COATED, EXTENDED RELEASE ORAL
Status: DISCONTINUED | OUTPATIENT
Start: 2021-01-01 | End: 2021-01-01

## 2021-01-01 RX ORDER — AMLODIPINE BESYLATE 5 MG/1
5 TABLET ORAL
Status: DISCONTINUED | OUTPATIENT
Start: 2021-01-01 | End: 2021-01-01 | Stop reason: HOSPADM

## 2021-01-01 RX ORDER — AMLODIPINE BESYLATE 2.5 MG/1
2.5 TABLET ORAL
Start: 2021-01-01

## 2021-01-01 RX ORDER — MORPHINE SULFATE 20 MG/ML
10 SOLUTION ORAL
Status: DISCONTINUED | OUTPATIENT
Start: 2021-01-01 | End: 2021-01-01 | Stop reason: HOSPADM

## 2021-01-01 RX ORDER — AMOXICILLIN 250 MG
1 CAPSULE ORAL NIGHTLY
Status: DISCONTINUED | OUTPATIENT
Start: 2021-01-01 | End: 2021-01-01 | Stop reason: HOSPADM

## 2021-01-01 RX ORDER — LORAZEPAM 2 MG/ML
1 INJECTION INTRAMUSCULAR ONCE
Status: COMPLETED | OUTPATIENT
Start: 2021-01-01 | End: 2021-01-01

## 2021-01-01 RX ORDER — LORAZEPAM 2 MG/ML
0.5 INJECTION INTRAMUSCULAR
Status: DISCONTINUED | OUTPATIENT
Start: 2021-01-01 | End: 2021-01-01

## 2021-01-01 RX ORDER — ACETAMINOPHEN 325 MG/1
650 TABLET ORAL EVERY 4 HOURS PRN
Status: DISCONTINUED | OUTPATIENT
Start: 2021-01-01 | End: 2021-01-01

## 2021-01-01 RX ORDER — ECHINACEA PURPUREA EXTRACT 125 MG
2 TABLET ORAL AS NEEDED
Status: DISCONTINUED | OUTPATIENT
Start: 2021-01-01 | End: 2021-01-01 | Stop reason: HOSPADM

## 2021-01-01 RX ORDER — SODIUM BICARBONATE 650 MG/1
650 TABLET ORAL 2 TIMES DAILY
Status: DISCONTINUED | OUTPATIENT
Start: 2021-01-01 | End: 2021-01-01

## 2021-01-01 RX ORDER — SODIUM BICARBONATE 650 MG/1
650 TABLET ORAL 3 TIMES DAILY
Status: DISCONTINUED | OUTPATIENT
Start: 2021-01-01 | End: 2021-01-01

## 2021-01-01 RX ORDER — SODIUM BICARBONATE 650 MG/1
1300 TABLET ORAL 2 TIMES DAILY
Status: DISCONTINUED | OUTPATIENT
Start: 2021-01-01 | End: 2021-01-01

## 2021-01-01 RX ORDER — SODIUM CHLORIDE 0.9 % (FLUSH) 0.9 %
10 SYRINGE (ML) INJECTION AS NEEDED
Status: DISCONTINUED | OUTPATIENT
Start: 2021-01-01 | End: 2021-01-01

## 2021-01-01 RX ORDER — MORPHINE SULFATE 2 MG/ML
1 INJECTION, SOLUTION INTRAMUSCULAR; INTRAVENOUS EVERY 4 HOURS PRN
Status: DISCONTINUED | OUTPATIENT
Start: 2021-01-01 | End: 2021-01-01

## 2021-01-01 RX ORDER — BISACODYL 10 MG
10 SUPPOSITORY, RECTAL RECTAL DAILY PRN
Status: DISCONTINUED | OUTPATIENT
Start: 2021-01-01 | End: 2021-01-01 | Stop reason: HOSPADM

## 2021-01-01 RX ORDER — HYDROMORPHONE HYDROCHLORIDE 1 MG/ML
0.5 INJECTION, SOLUTION INTRAMUSCULAR; INTRAVENOUS; SUBCUTANEOUS
Status: DISCONTINUED | OUTPATIENT
Start: 2021-01-01 | End: 2021-01-01 | Stop reason: HOSPADM

## 2021-01-01 RX ORDER — MIRTAZAPINE 7.5 MG/1
7.5 TABLET, FILM COATED ORAL NIGHTLY
COMMUNITY

## 2021-01-01 RX ORDER — SODIUM CHLORIDE 450 MG/100ML
125 INJECTION, SOLUTION INTRAVENOUS CONTINUOUS
Status: DISCONTINUED | OUTPATIENT
Start: 2021-01-01 | End: 2021-01-01

## 2021-01-01 RX ORDER — POLYETHYLENE GLYCOL 3350 17 G/17G
17 POWDER, FOR SOLUTION ORAL DAILY PRN
Status: DISCONTINUED | OUTPATIENT
Start: 2021-01-01 | End: 2021-01-01 | Stop reason: HOSPADM

## 2021-01-01 RX ORDER — SODIUM CHLORIDE 9 MG/ML
1000 INJECTION, SOLUTION INTRAVENOUS CONTINUOUS
Status: DISCONTINUED | OUTPATIENT
Start: 2021-01-01 | End: 2021-01-01

## 2021-01-01 RX ORDER — NETARSUDIL AND LATANOPROST OPHTHALMIC SOLUTION, 0.02%/0.005% .2; .05 MG/ML; MG/ML
1 SOLUTION/ DROPS OPHTHALMIC; TOPICAL NIGHTLY
Status: ON HOLD | COMMUNITY
End: 2021-01-01

## 2021-01-01 RX ORDER — OLANZAPINE 10 MG/1
5 INJECTION, POWDER, LYOPHILIZED, FOR SOLUTION INTRAMUSCULAR ONCE
Status: COMPLETED | OUTPATIENT
Start: 2021-01-01 | End: 2021-01-01

## 2021-01-01 RX ORDER — LIDOCAINE HYDROCHLORIDE 10 MG/ML
10 INJECTION, SOLUTION INFILTRATION; PERINEURAL ONCE
Status: COMPLETED | OUTPATIENT
Start: 2021-01-01 | End: 2021-01-01

## 2021-01-01 RX ORDER — FLUCONAZOLE 100 MG/1
100 TABLET ORAL EVERY 24 HOURS
Status: DISCONTINUED | OUTPATIENT
Start: 2021-01-01 | End: 2021-01-01 | Stop reason: HOSPADM

## 2021-01-01 RX ORDER — NICOTINE POLACRILEX 4 MG
15 LOZENGE BUCCAL
Status: DISCONTINUED | OUTPATIENT
Start: 2021-01-01 | End: 2021-01-01

## 2021-01-01 RX ORDER — DIPHENHYDRAMINE HCL 12.5MG/5ML
25 LIQUID (ML) ORAL EVERY 6 HOURS PRN
Status: DISCONTINUED | OUTPATIENT
Start: 2021-01-01 | End: 2021-01-01

## 2021-01-01 RX ORDER — SODIUM CHLORIDE 0.9 % (FLUSH) 0.9 %
10 SYRINGE (ML) INJECTION EVERY 12 HOURS SCHEDULED
Status: DISCONTINUED | OUTPATIENT
Start: 2021-01-01 | End: 2021-01-01

## 2021-01-01 RX ORDER — INSULIN LISPRO 100 [IU]/ML
0-7 INJECTION, SOLUTION INTRAVENOUS; SUBCUTANEOUS
Status: DISCONTINUED | OUTPATIENT
Start: 2021-01-01 | End: 2021-01-01

## 2021-01-01 RX ORDER — ALBUMIN, HUMAN INJ 5% 5 %
500 SOLUTION INTRAVENOUS ONCE
Status: COMPLETED | OUTPATIENT
Start: 2021-01-01 | End: 2021-01-01

## 2021-01-01 RX ORDER — POTASSIUM CHLORIDE 750 MG/1
20 TABLET, FILM COATED, EXTENDED RELEASE ORAL DAILY
COMMUNITY
End: 2021-01-01 | Stop reason: HOSPADM

## 2021-01-01 RX ORDER — LABETALOL HYDROCHLORIDE 5 MG/ML
20 INJECTION, SOLUTION INTRAVENOUS EVERY 6 HOURS PRN
Status: DISCONTINUED | OUTPATIENT
Start: 2021-01-01 | End: 2021-01-01

## 2021-01-01 RX ORDER — MORPHINE SULFATE 20 MG/ML
20 SOLUTION ORAL
Status: DISCONTINUED | OUTPATIENT
Start: 2021-01-01 | End: 2021-01-01

## 2021-01-01 RX ORDER — CHLORHEXIDINE GLUCONATE 0.12 MG/ML
15 RINSE ORAL EVERY 12 HOURS SCHEDULED
Status: DISCONTINUED | OUTPATIENT
Start: 2021-01-01 | End: 2021-01-01

## 2021-01-01 RX ORDER — MORPHINE SULFATE 2 MG/ML
4 INJECTION, SOLUTION INTRAMUSCULAR; INTRAVENOUS ONCE
Status: COMPLETED | OUTPATIENT
Start: 2021-01-01 | End: 2021-01-01

## 2021-01-01 RX ORDER — ASPIRIN 81 MG/1
81 TABLET ORAL DAILY
COMMUNITY
End: 2021-01-01 | Stop reason: HOSPADM

## 2021-01-01 RX ORDER — DIPHENHYDRAMINE HYDROCHLORIDE AND LIDOCAINE HYDROCHLORIDE AND ALUMINUM HYDROXIDE AND MAGNESIUM HYDRO
5 KIT EVERY 4 HOURS PRN
Status: DISCONTINUED | OUTPATIENT
Start: 2021-01-01 | End: 2021-01-01 | Stop reason: HOSPADM

## 2021-01-01 RX ORDER — BRIMONIDINE TARTRATE 2 MG/ML
1 SOLUTION/ DROPS OPHTHALMIC EVERY 12 HOURS
COMMUNITY

## 2021-01-01 RX ORDER — GLYCOPYRROLATE 0.2 MG/ML
0.2 INJECTION INTRAMUSCULAR; INTRAVENOUS
Status: DISCONTINUED | OUTPATIENT
Start: 2021-01-01 | End: 2021-01-01

## 2021-01-01 RX ORDER — AMMONIUM LACTATE 120 MG/G
CREAM TOPICAL 2 TIMES DAILY
Status: DISCONTINUED | OUTPATIENT
Start: 2021-01-01 | End: 2021-01-01 | Stop reason: HOSPADM

## 2021-01-01 RX ORDER — LORAZEPAM 2 MG/ML
2 CONCENTRATE ORAL
Status: DISCONTINUED | OUTPATIENT
Start: 2021-01-01 | End: 2021-01-01

## 2021-01-01 RX ORDER — AMMONIUM LACTATE 120 MG/G
CREAM TOPICAL EVERY 12 HOURS PRN
Status: DISCONTINUED | OUTPATIENT
Start: 2021-01-01 | End: 2021-01-01 | Stop reason: HOSPADM

## 2021-01-01 RX ORDER — CEFTRIAXONE SODIUM 1 G/50ML
1 INJECTION, SOLUTION INTRAVENOUS EVERY 24 HOURS
Status: DISCONTINUED | OUTPATIENT
Start: 2021-01-01 | End: 2021-01-01

## 2021-01-01 RX ORDER — FAMOTIDINE 10 MG/ML
20 INJECTION, SOLUTION INTRAVENOUS EVERY 12 HOURS SCHEDULED
Status: DISCONTINUED | OUTPATIENT
Start: 2021-01-01 | End: 2021-01-01

## 2021-01-01 RX ORDER — KETOROLAC TROMETHAMINE 30 MG/ML
15 INJECTION, SOLUTION INTRAMUSCULAR; INTRAVENOUS EVERY 6 HOURS PRN
Status: DISCONTINUED | OUTPATIENT
Start: 2021-01-01 | End: 2021-01-01

## 2021-01-01 RX ORDER — POTASSIUM CHLORIDE 7.45 MG/ML
10 INJECTION INTRAVENOUS
Status: DISCONTINUED | OUTPATIENT
Start: 2021-01-01 | End: 2021-01-01

## 2021-01-01 RX ORDER — LORAZEPAM 2 MG/ML
1 INJECTION INTRAMUSCULAR
Status: DISCONTINUED | OUTPATIENT
Start: 2021-01-01 | End: 2021-01-01

## 2021-01-01 RX ORDER — MULTIVIT WITH MINERALS/LUTEIN
500 TABLET ORAL 2 TIMES DAILY
COMMUNITY

## 2021-01-01 RX ORDER — ALBUMIN, HUMAN INJ 5% 5 %
500 SOLUTION INTRAVENOUS ONCE
Status: DISCONTINUED | OUTPATIENT
Start: 2021-01-01 | End: 2021-01-01

## 2021-01-01 RX ORDER — HALOPERIDOL 2 MG/ML
2 SOLUTION ORAL EVERY 4 HOURS PRN
Status: DISCONTINUED | OUTPATIENT
Start: 2021-01-01 | End: 2021-01-01 | Stop reason: HOSPADM

## 2021-01-01 RX ORDER — SODIUM BICARBONATE 650 MG/1
1300 TABLET ORAL 3 TIMES DAILY
Start: 2021-01-01

## 2021-01-01 RX ORDER — HYDRALAZINE HYDROCHLORIDE 50 MG/1
50 TABLET, FILM COATED ORAL EVERY 8 HOURS SCHEDULED
Status: DISCONTINUED | OUTPATIENT
Start: 2021-01-01 | End: 2021-01-01

## 2021-01-01 RX ORDER — OLOPATADINE HYDROCHLORIDE 2 MG/ML
1 SOLUTION/ DROPS OPHTHALMIC DAILY
COMMUNITY

## 2021-01-01 RX ORDER — DIPHENHYDRAMINE HCL 25 MG
25 CAPSULE ORAL EVERY 6 HOURS PRN
Status: DISCONTINUED | OUTPATIENT
Start: 2021-01-01 | End: 2021-01-01

## 2021-01-01 RX ORDER — HYDROCODONE BITARTRATE AND ACETAMINOPHEN 10; 325 MG/1; MG/1
1 TABLET ORAL EVERY 6 HOURS PRN
Status: DISCONTINUED | OUTPATIENT
Start: 2021-01-01 | End: 2021-01-01

## 2021-01-01 RX ORDER — INSULIN LISPRO 100 [IU]/ML
0-9 INJECTION, SOLUTION INTRAVENOUS; SUBCUTANEOUS
Status: DISCONTINUED | OUTPATIENT
Start: 2021-01-01 | End: 2021-01-01

## 2021-01-01 RX ORDER — LABETALOL HYDROCHLORIDE 5 MG/ML
5 INJECTION, SOLUTION INTRAVENOUS
Status: DISCONTINUED | OUTPATIENT
Start: 2021-01-01 | End: 2021-01-01 | Stop reason: HOSPADM

## 2021-01-01 RX ORDER — HYDRALAZINE HYDROCHLORIDE 20 MG/ML
10 INJECTION INTRAMUSCULAR; INTRAVENOUS
Status: DISCONTINUED | OUTPATIENT
Start: 2021-01-01 | End: 2021-01-01 | Stop reason: HOSPADM

## 2021-01-01 RX ORDER — SODIUM CHLORIDE, SODIUM LACTATE, POTASSIUM CHLORIDE, CALCIUM CHLORIDE 600; 310; 30; 20 MG/100ML; MG/100ML; MG/100ML; MG/100ML
125 INJECTION, SOLUTION INTRAVENOUS CONTINUOUS
Status: DISCONTINUED | OUTPATIENT
Start: 2021-01-01 | End: 2021-01-01

## 2021-01-01 RX ORDER — CEFTRIAXONE SODIUM 2 G/50ML
2 INJECTION, SOLUTION INTRAVENOUS ONCE
Status: DISCONTINUED | OUTPATIENT
Start: 2021-01-01 | End: 2021-01-01

## 2021-01-01 RX ORDER — DEXTROSE MONOHYDRATE 50 MG/ML
100 INJECTION, SOLUTION INTRAVENOUS CONTINUOUS
Status: ACTIVE | OUTPATIENT
Start: 2021-01-01 | End: 2021-01-01

## 2021-01-01 RX ORDER — LORAZEPAM 2 MG/ML
2 CONCENTRATE ORAL
Status: DISCONTINUED | OUTPATIENT
Start: 2021-01-01 | End: 2021-01-01 | Stop reason: HOSPADM

## 2021-01-01 RX ORDER — NOREPINEPHRINE BIT/0.9 % NACL 8 MG/250ML
.02-.3 INFUSION BOTTLE (ML) INTRAVENOUS
Status: DISCONTINUED | OUTPATIENT
Start: 2021-01-01 | End: 2021-01-01

## 2021-01-01 RX ORDER — POTASSIUM CHLORIDE 7.45 MG/ML
10 INJECTION INTRAVENOUS ONCE
Status: COMPLETED | OUTPATIENT
Start: 2021-01-01 | End: 2021-01-01

## 2021-01-01 RX ORDER — POTASSIUM CHLORIDE 1.5 G/1.77G
40 POWDER, FOR SOLUTION ORAL
Status: COMPLETED | OUTPATIENT
Start: 2021-01-01 | End: 2021-01-01

## 2021-01-01 RX ORDER — NALOXONE HCL 0.4 MG/ML
0.4 VIAL (ML) INJECTION AS NEEDED
Status: DISCONTINUED | OUTPATIENT
Start: 2021-01-01 | End: 2021-01-01 | Stop reason: HOSPADM

## 2021-01-01 RX ORDER — HYDRALAZINE HYDROCHLORIDE 20 MG/ML
10 INJECTION INTRAMUSCULAR; INTRAVENOUS EVERY 4 HOURS PRN
Status: DISCONTINUED | OUTPATIENT
Start: 2021-01-01 | End: 2021-01-01

## 2021-01-01 RX ORDER — POTASSIUM CHLORIDE 20 MEQ/1
20 TABLET, EXTENDED RELEASE ORAL DAILY
Start: 2021-01-01

## 2021-01-01 RX ORDER — FENTANYL CITRATE 50 UG/ML
25 INJECTION, SOLUTION INTRAMUSCULAR; INTRAVENOUS
Status: DISCONTINUED | OUTPATIENT
Start: 2021-01-01 | End: 2021-01-01 | Stop reason: HOSPADM

## 2021-01-01 RX ORDER — MORPHINE SULFATE 2 MG/ML
2 INJECTION, SOLUTION INTRAMUSCULAR; INTRAVENOUS
Status: DISCONTINUED | OUTPATIENT
Start: 2021-01-01 | End: 2021-01-01 | Stop reason: HOSPADM

## 2021-01-01 RX ORDER — MORPHINE SULFATE 2 MG/ML
2 INJECTION, SOLUTION INTRAMUSCULAR; INTRAVENOUS
Status: DISCONTINUED | OUTPATIENT
Start: 2021-01-01 | End: 2021-01-01

## 2021-01-01 RX ORDER — AMLODIPINE BESYLATE 10 MG/1
10 TABLET ORAL
Status: DISCONTINUED | OUTPATIENT
Start: 2021-01-01 | End: 2021-01-01

## 2021-01-01 RX ORDER — EPHEDRINE SULFATE 50 MG/ML
5 INJECTION, SOLUTION INTRAVENOUS ONCE AS NEEDED
Status: DISCONTINUED | OUTPATIENT
Start: 2021-01-01 | End: 2021-01-01 | Stop reason: HOSPADM

## 2021-01-01 RX ORDER — AMMONIUM LACTATE 120 MG/G
CREAM TOPICAL 2 TIMES DAILY
Start: 2021-01-01 | End: 2021-01-01

## 2021-01-01 RX ORDER — TAMSULOSIN HYDROCHLORIDE 0.4 MG/1
0.4 CAPSULE ORAL DAILY
Status: DISCONTINUED | OUTPATIENT
Start: 2021-01-01 | End: 2021-01-01 | Stop reason: HOSPADM

## 2021-01-01 RX ORDER — MORPHINE SULFATE 20 MG/ML
10 SOLUTION ORAL
Status: DISCONTINUED | OUTPATIENT
Start: 2021-01-01 | End: 2021-01-01

## 2021-01-01 RX ORDER — LABETALOL HYDROCHLORIDE 5 MG/ML
20 INJECTION, SOLUTION INTRAVENOUS ONCE
Status: COMPLETED | OUTPATIENT
Start: 2021-01-01 | End: 2021-01-01

## 2021-01-01 RX ORDER — HYDROCODONE BITARTRATE AND ACETAMINOPHEN 10; 325 MG/1; MG/1
1 TABLET ORAL EVERY 4 HOURS
COMMUNITY
End: 2021-01-01 | Stop reason: HOSPADM

## 2021-01-01 RX ORDER — ONDANSETRON 2 MG/ML
4 INJECTION INTRAMUSCULAR; INTRAVENOUS ONCE
Status: COMPLETED | OUTPATIENT
Start: 2021-01-01 | End: 2021-01-01

## 2021-01-01 RX ORDER — LIDOCAINE HYDROCHLORIDE 20 MG/ML
5 SOLUTION OROPHARYNGEAL EVERY 4 HOURS PRN
Status: DISCONTINUED | OUTPATIENT
Start: 2021-01-01 | End: 2021-01-01

## 2021-01-01 RX ORDER — MULTIPLE VITAMINS W/ MINERALS TAB 9MG-400MCG
1 TAB ORAL DAILY
COMMUNITY

## 2021-01-01 RX ORDER — MORPHINE SULFATE 20 MG/ML
5 SOLUTION ORAL
Status: DISCONTINUED | OUTPATIENT
Start: 2021-01-01 | End: 2021-01-01

## 2021-01-01 RX ORDER — LATANOPROST 50 UG/ML
1 SOLUTION/ DROPS OPHTHALMIC NIGHTLY
COMMUNITY

## 2021-01-01 RX ORDER — CALCIUM CARBONATE 200(500)MG
2 TABLET,CHEWABLE ORAL 2 TIMES DAILY PRN
Status: DISCONTINUED | OUTPATIENT
Start: 2021-01-01 | End: 2021-01-01 | Stop reason: HOSPADM

## 2021-01-01 RX ORDER — POTASSIUM CHLORIDE 750 MG/1
20 TABLET, FILM COATED, EXTENDED RELEASE ORAL DAILY
Status: DISCONTINUED | OUTPATIENT
Start: 2021-01-01 | End: 2021-01-01 | Stop reason: HOSPADM

## 2021-01-01 RX ORDER — LORAZEPAM 2 MG/ML
1 INJECTION INTRAMUSCULAR
Status: DISCONTINUED | OUTPATIENT
Start: 2021-01-01 | End: 2021-01-01 | Stop reason: HOSPADM

## 2021-01-01 RX ORDER — AMLODIPINE BESYLATE 2.5 MG/1
2.5 TABLET ORAL
Status: DISCONTINUED | OUTPATIENT
Start: 2021-01-01 | End: 2021-01-01 | Stop reason: HOSPADM

## 2021-01-01 RX ORDER — FAMOTIDINE 20 MG/1
20 TABLET, FILM COATED ORAL
Status: DISCONTINUED | OUTPATIENT
Start: 2021-01-01 | End: 2021-01-01

## 2021-01-01 RX ORDER — POTASSIUM CHLORIDE 1.5 G/1.77G
40 POWDER, FOR SOLUTION ORAL ONCE
Status: COMPLETED | OUTPATIENT
Start: 2021-01-01 | End: 2021-01-01

## 2021-01-01 RX ORDER — ASPIRIN 81 MG/1
81 TABLET, CHEWABLE ORAL DAILY
Status: DISCONTINUED | OUTPATIENT
Start: 2021-01-01 | End: 2021-01-01

## 2021-01-01 RX ORDER — LORAZEPAM 2 MG/ML
0.5 INJECTION INTRAMUSCULAR EVERY 6 HOURS PRN
Status: DISCONTINUED | OUTPATIENT
Start: 2021-01-01 | End: 2021-01-01

## 2021-01-01 RX ORDER — POTASSIUM CHLORIDE 750 MG/1
40 TABLET, FILM COATED, EXTENDED RELEASE ORAL DAILY
Status: DISCONTINUED | OUTPATIENT
Start: 2021-01-01 | End: 2021-01-01

## 2021-01-01 RX ORDER — LORAZEPAM 2 MG/ML
0.5 INJECTION INTRAMUSCULAR EVERY 6 HOURS
Status: DISCONTINUED | OUTPATIENT
Start: 2021-01-01 | End: 2021-01-01

## 2021-01-01 RX ORDER — AMLODIPINE BESYLATE 5 MG/1
5 TABLET ORAL
Status: COMPLETED | OUTPATIENT
Start: 2021-01-01 | End: 2021-01-01

## 2021-01-01 RX ORDER — MORPHINE SULFATE 2 MG/ML
4 INJECTION, SOLUTION INTRAMUSCULAR; INTRAVENOUS
Status: DISCONTINUED | OUTPATIENT
Start: 2021-01-01 | End: 2021-01-01

## 2021-01-01 RX ORDER — HEPARIN SODIUM 5000 [USP'U]/ML
5000 INJECTION, SOLUTION INTRAVENOUS; SUBCUTANEOUS EVERY 8 HOURS SCHEDULED
Status: DISCONTINUED | OUTPATIENT
Start: 2021-01-01 | End: 2021-01-01

## 2021-01-01 RX ORDER — ONDANSETRON 2 MG/ML
4 INJECTION INTRAMUSCULAR; INTRAVENOUS EVERY 6 HOURS PRN
Status: DISCONTINUED | OUTPATIENT
Start: 2021-01-01 | End: 2021-01-01

## 2021-01-01 RX ORDER — ACETAMINOPHEN 160 MG/5ML
650 SOLUTION ORAL EVERY 4 HOURS PRN
Status: DISCONTINUED | OUTPATIENT
Start: 2021-01-01 | End: 2021-01-01

## 2021-01-01 RX ORDER — HYDRALAZINE HYDROCHLORIDE 25 MG/1
25 TABLET, FILM COATED ORAL EVERY 8 HOURS SCHEDULED
Start: 2021-01-01 | End: 2021-01-01

## 2021-01-01 RX ORDER — DIPHENHYDRAMINE HCL 25 MG
25 CAPSULE ORAL EVERY 6 HOURS PRN
Status: DISCONTINUED | OUTPATIENT
Start: 2021-01-01 | End: 2021-01-01 | Stop reason: HOSPADM

## 2021-01-01 RX ORDER — LABETALOL HYDROCHLORIDE 5 MG/ML
20 INJECTION, SOLUTION INTRAVENOUS ONCE AS NEEDED
Status: COMPLETED | OUTPATIENT
Start: 2021-01-01 | End: 2021-01-01

## 2021-01-01 RX ORDER — METOCLOPRAMIDE HYDROCHLORIDE 5 MG/ML
10 INJECTION INTRAMUSCULAR; INTRAVENOUS EVERY 6 HOURS
Status: COMPLETED | OUTPATIENT
Start: 2021-01-01 | End: 2021-01-01

## 2021-01-01 RX ORDER — HALOPERIDOL 2 MG/ML
1 SOLUTION ORAL EVERY 4 HOURS PRN
Status: DISCONTINUED | OUTPATIENT
Start: 2021-01-01 | End: 2021-01-01 | Stop reason: HOSPADM

## 2021-01-01 RX ORDER — ATORVASTATIN CALCIUM 20 MG/1
20 TABLET, FILM COATED ORAL DAILY
Status: DISCONTINUED | OUTPATIENT
Start: 2021-01-01 | End: 2021-01-01

## 2021-01-01 RX ORDER — BISACODYL 5 MG/1
5 TABLET, DELAYED RELEASE ORAL DAILY PRN
Status: DISCONTINUED | OUTPATIENT
Start: 2021-01-01 | End: 2021-01-01 | Stop reason: HOSPADM

## 2021-01-01 RX ORDER — AMOXICILLIN 250 MG
2 CAPSULE ORAL 2 TIMES DAILY
Status: DISCONTINUED | OUTPATIENT
Start: 2021-01-01 | End: 2021-01-01 | Stop reason: HOSPADM

## 2021-01-01 RX ORDER — SODIUM BICARBONATE 650 MG/1
1300 TABLET ORAL 3 TIMES DAILY
Status: DISCONTINUED | OUTPATIENT
Start: 2021-01-01 | End: 2021-01-01 | Stop reason: HOSPADM

## 2021-01-01 RX ORDER — NITROGLYCERIN 0.4 MG/1
0.4 TABLET SUBLINGUAL
Status: DISCONTINUED | OUTPATIENT
Start: 2021-01-01 | End: 2021-01-01 | Stop reason: HOSPADM

## 2021-01-01 RX ADMIN — ASPIRIN 81 MG: 81 TABLET, COATED ORAL at 08:20

## 2021-01-01 RX ADMIN — CEFTRIAXONE SODIUM 1 G: 1 INJECTION, SOLUTION INTRAVENOUS at 01:06

## 2021-01-01 RX ADMIN — CEFTRIAXONE SODIUM 1 G: 1 INJECTION, SOLUTION INTRAVENOUS at 18:11

## 2021-01-01 RX ADMIN — SODIUM BICARBONATE 650 MG: 650 TABLET ORAL at 20:53

## 2021-01-01 RX ADMIN — TAMSULOSIN HYDROCHLORIDE 0.4 MG: 0.4 CAPSULE ORAL at 09:29

## 2021-01-01 RX ADMIN — DIGOXIN 125 MCG: 250 INJECTION, SOLUTION INTRAMUSCULAR; INTRAVENOUS; PARENTERAL at 12:48

## 2021-01-01 RX ADMIN — DORZOLAMIDE HYDROCHLORIDE 1 DROP: 20 SOLUTION/ DROPS OPHTHALMIC at 15:00

## 2021-01-01 RX ADMIN — LIDOCAINE HYDROCHLORIDE 60 MG: 20 INJECTION, SOLUTION INFILTRATION; PERINEURAL at 09:27

## 2021-01-01 RX ADMIN — SODIUM CHLORIDE, PRESERVATIVE FREE 10 ML: 5 INJECTION INTRAVENOUS at 09:21

## 2021-01-01 RX ADMIN — METOPROLOL TARTRATE 12.5 MG: 25 TABLET, FILM COATED ORAL at 08:58

## 2021-01-01 RX ADMIN — SODIUM BICARBONATE 1300 MG: 650 TABLET ORAL at 20:29

## 2021-01-01 RX ADMIN — ATORVASTATIN CALCIUM 20 MG: 20 TABLET, FILM COATED ORAL at 10:36

## 2021-01-01 RX ADMIN — Medication: at 10:02

## 2021-01-01 RX ADMIN — BRIMONIDINE TARTRATE 1 DROP: 1 SOLUTION/ DROPS OPHTHALMIC at 20:53

## 2021-01-01 RX ADMIN — SODIUM BICARBONATE 1300 MG: 650 TABLET ORAL at 20:53

## 2021-01-01 RX ADMIN — HYDROCODONE BITARTRATE AND ACETAMINOPHEN 1 TABLET: 10; 325 TABLET ORAL at 20:53

## 2021-01-01 RX ADMIN — SODIUM BICARBONATE 1300 MG: 650 TABLET ORAL at 21:12

## 2021-01-01 RX ADMIN — BRIMONIDINE TARTRATE 1 DROP: 1 SOLUTION/ DROPS OPHTHALMIC at 08:16

## 2021-01-01 RX ADMIN — ASPIRIN 81 MG: 81 TABLET, CHEWABLE ORAL at 10:54

## 2021-01-01 RX ADMIN — FLUCONAZOLE 100 MG: 100 TABLET ORAL at 14:16

## 2021-01-01 RX ADMIN — SODIUM CHLORIDE 125 ML/HR: 4.5 INJECTION, SOLUTION INTRAVENOUS at 06:43

## 2021-01-01 RX ADMIN — POTASSIUM CHLORIDE 40 MEQ: 1.5 POWDER, FOR SOLUTION ORAL at 21:01

## 2021-01-01 RX ADMIN — INSULIN LISPRO 2 UNITS: 100 INJECTION, SOLUTION INTRAVENOUS; SUBCUTANEOUS at 18:21

## 2021-01-01 RX ADMIN — SODIUM BICARBONATE 1300 MG: 650 TABLET ORAL at 17:03

## 2021-01-01 RX ADMIN — SODIUM BICARBONATE 1300 MG: 650 TABLET ORAL at 09:29

## 2021-01-01 RX ADMIN — HEPARIN SODIUM 5000 UNITS: 5000 INJECTION INTRAVENOUS; SUBCUTANEOUS at 21:16

## 2021-01-01 RX ADMIN — AMLODIPINE BESYLATE 10 MG: 10 TABLET ORAL at 10:21

## 2021-01-01 RX ADMIN — CEFTRIAXONE SODIUM 1 G: 1 INJECTION, SOLUTION INTRAVENOUS at 00:34

## 2021-01-01 RX ADMIN — SODIUM CHLORIDE, PRESERVATIVE FREE 10 ML: 5 INJECTION INTRAVENOUS at 21:22

## 2021-01-01 RX ADMIN — Medication: at 09:08

## 2021-01-01 RX ADMIN — CEFTRIAXONE SODIUM 1 G: 1 INJECTION, SOLUTION INTRAVENOUS at 18:35

## 2021-01-01 RX ADMIN — MORPHINE SULFATE 1 MG: 2 INJECTION, SOLUTION INTRAMUSCULAR; INTRAVENOUS at 22:14

## 2021-01-01 RX ADMIN — DORZOLAMIDE HYDROCHLORIDE 1 DROP: 20 SOLUTION/ DROPS OPHTHALMIC at 08:27

## 2021-01-01 RX ADMIN — METRONIDAZOLE 500 MG: 500 INJECTION, SOLUTION INTRAVENOUS at 12:24

## 2021-01-01 RX ADMIN — AMLODIPINE BESYLATE 5 MG: 5 TABLET ORAL at 10:54

## 2021-01-01 RX ADMIN — FAMOTIDINE 20 MG: 20 TABLET, FILM COATED ORAL at 17:46

## 2021-01-01 RX ADMIN — METOCLOPRAMIDE HYDROCHLORIDE 10 MG: 5 INJECTION INTRAMUSCULAR; INTRAVENOUS at 20:16

## 2021-01-01 RX ADMIN — SODIUM CHLORIDE, PRESERVATIVE FREE 10 ML: 5 INJECTION INTRAVENOUS at 08:58

## 2021-01-01 RX ADMIN — DILTIAZEM HYDROCHLORIDE 30 MG: 30 TABLET, FILM COATED ORAL at 21:19

## 2021-01-01 RX ADMIN — SODIUM BICARBONATE 650 MG: 650 TABLET ORAL at 08:20

## 2021-01-01 RX ADMIN — ATORVASTATIN CALCIUM 20 MG: 20 TABLET, FILM COATED ORAL at 08:18

## 2021-01-01 RX ADMIN — TAZOBACTAM SODIUM AND PIPERACILLIN SODIUM 3.38 G: 375; 3 INJECTION, SOLUTION INTRAVENOUS at 12:09

## 2021-01-01 RX ADMIN — HYDRALAZINE HYDROCHLORIDE 100 MG: 50 TABLET, FILM COATED ORAL at 13:31

## 2021-01-01 RX ADMIN — METRONIDAZOLE 500 MG: 500 INJECTION, SOLUTION INTRAVENOUS at 12:19

## 2021-01-01 RX ADMIN — POTASSIUM CHLORIDE 40 MEQ: 750 TABLET, EXTENDED RELEASE ORAL at 22:26

## 2021-01-01 RX ADMIN — MORPHINE SULFATE 4 MG: 2 INJECTION, SOLUTION INTRAMUSCULAR; INTRAVENOUS at 23:57

## 2021-01-01 RX ADMIN — SODIUM CHLORIDE, POTASSIUM CHLORIDE, SODIUM LACTATE AND CALCIUM CHLORIDE 125 ML/HR: 600; 310; 30; 20 INJECTION, SOLUTION INTRAVENOUS at 07:49

## 2021-01-01 RX ADMIN — SODIUM BICARBONATE 1300 MG: 650 TABLET ORAL at 20:51

## 2021-01-01 RX ADMIN — CEFTRIAXONE SODIUM 1 G: 1 INJECTION, SOLUTION INTRAVENOUS at 17:36

## 2021-01-01 RX ADMIN — ASPIRIN 81 MG: 81 TABLET, COATED ORAL at 08:32

## 2021-01-01 RX ADMIN — DILTIAZEM HYDROCHLORIDE 30 MG: 30 TABLET, FILM COATED ORAL at 14:30

## 2021-01-01 RX ADMIN — HYDRALAZINE HYDROCHLORIDE 100 MG: 50 TABLET, FILM COATED ORAL at 21:30

## 2021-01-01 RX ADMIN — MAGNESIUM SULFATE HEPTAHYDRATE 1 G: 1 INJECTION, SOLUTION INTRAVENOUS at 08:40

## 2021-01-01 RX ADMIN — CEFTRIAXONE SODIUM 1 G: 1 INJECTION, SOLUTION INTRAVENOUS at 19:13

## 2021-01-01 RX ADMIN — METOCLOPRAMIDE HYDROCHLORIDE 10 MG: 5 INJECTION INTRAMUSCULAR; INTRAVENOUS at 17:43

## 2021-01-01 RX ADMIN — Medication: at 09:48

## 2021-01-01 RX ADMIN — SODIUM CHLORIDE, PRESERVATIVE FREE 10 ML: 5 INJECTION INTRAVENOUS at 20:54

## 2021-01-01 RX ADMIN — HYDRALAZINE HYDROCHLORIDE 100 MG: 50 TABLET, FILM COATED ORAL at 05:39

## 2021-01-01 RX ADMIN — LABETALOL HYDROCHLORIDE 20 MG: 5 INJECTION, SOLUTION INTRAVENOUS at 19:09

## 2021-01-01 RX ADMIN — SODIUM CHLORIDE, PRESERVATIVE FREE 10 ML: 5 INJECTION INTRAVENOUS at 08:01

## 2021-01-01 RX ADMIN — ACETAMINOPHEN 650 MG: 325 TABLET, FILM COATED ORAL at 03:20

## 2021-01-01 RX ADMIN — DILTIAZEM HYDROCHLORIDE 30 MG: 30 TABLET, FILM COATED ORAL at 06:53

## 2021-01-01 RX ADMIN — SODIUM CHLORIDE 125 ML/HR: 4.5 INJECTION, SOLUTION INTRAVENOUS at 16:02

## 2021-01-01 RX ADMIN — POTASSIUM CHLORIDE 40 MEQ: 1.5 POWDER, FOR SOLUTION ORAL at 18:36

## 2021-01-01 RX ADMIN — AMLODIPINE BESYLATE 5 MG: 5 TABLET ORAL at 08:01

## 2021-01-01 RX ADMIN — ASPIRIN 81 MG: 81 TABLET, CHEWABLE ORAL at 09:21

## 2021-01-01 RX ADMIN — SODIUM CHLORIDE, PRESERVATIVE FREE 10 ML: 5 INJECTION INTRAVENOUS at 09:30

## 2021-01-01 RX ADMIN — Medication: at 21:24

## 2021-01-01 RX ADMIN — ATORVASTATIN CALCIUM 20 MG: 20 TABLET, FILM COATED ORAL at 08:20

## 2021-01-01 RX ADMIN — CEFTRIAXONE SODIUM 1 G: 1 INJECTION, SOLUTION INTRAVENOUS at 18:08

## 2021-01-01 RX ADMIN — APIXABAN 2.5 MG: 2.5 TABLET, FILM COATED ORAL at 20:17

## 2021-01-01 RX ADMIN — HYDRALAZINE HYDROCHLORIDE 25 MG: 25 TABLET, FILM COATED ORAL at 20:53

## 2021-01-01 RX ADMIN — ATORVASTATIN CALCIUM 20 MG: 20 TABLET, FILM COATED ORAL at 09:58

## 2021-01-01 RX ADMIN — TAZOBACTAM SODIUM AND PIPERACILLIN SODIUM 3.38 G: 375; 3 INJECTION, SOLUTION INTRAVENOUS at 23:21

## 2021-01-01 RX ADMIN — Medication: at 21:21

## 2021-01-01 RX ADMIN — AMLODIPINE BESYLATE 10 MG: 10 TABLET ORAL at 10:30

## 2021-01-01 RX ADMIN — DILTIAZEM HYDROCHLORIDE 30 MG: 30 TABLET, FILM COATED ORAL at 21:05

## 2021-01-01 RX ADMIN — METRONIDAZOLE 500 MG: 500 INJECTION, SOLUTION INTRAVENOUS at 21:45

## 2021-01-01 RX ADMIN — INSULIN LISPRO 2 UNITS: 100 INJECTION, SOLUTION INTRAVENOUS; SUBCUTANEOUS at 14:21

## 2021-01-01 RX ADMIN — POTASSIUM CHLORIDE 40 MEQ: 750 TABLET, EXTENDED RELEASE ORAL at 17:29

## 2021-01-01 RX ADMIN — POTASSIUM CHLORIDE 10 MEQ: 7.46 INJECTION, SOLUTION INTRAVENOUS at 12:25

## 2021-01-01 RX ADMIN — APIXABAN 2.5 MG: 2.5 TABLET, FILM COATED ORAL at 19:40

## 2021-01-01 RX ADMIN — DEXMEDETOMIDINE HYDROCHLORIDE 0.2 MCG/KG/HR: 100 INJECTION, SOLUTION, CONCENTRATE INTRAVENOUS at 12:16

## 2021-01-01 RX ADMIN — TAMSULOSIN HYDROCHLORIDE 0.4 MG: 0.4 CAPSULE ORAL at 08:15

## 2021-01-01 RX ADMIN — DOCUSATE SODIUM 50MG AND SENNOSIDES 8.6MG 1 TABLET: 8.6; 5 TABLET, FILM COATED ORAL at 20:16

## 2021-01-01 RX ADMIN — POTASSIUM CHLORIDE 10 MEQ: 7.46 INJECTION, SOLUTION INTRAVENOUS at 11:36

## 2021-01-01 RX ADMIN — SODIUM CHLORIDE, POTASSIUM CHLORIDE, SODIUM LACTATE AND CALCIUM CHLORIDE 125 ML/HR: 600; 310; 30; 20 INJECTION, SOLUTION INTRAVENOUS at 15:50

## 2021-01-01 RX ADMIN — DORZOLAMIDE HYDROCHLORIDE 1 DROP: 20 SOLUTION/ DROPS OPHTHALMIC at 21:45

## 2021-01-01 RX ADMIN — HYDRALAZINE HYDROCHLORIDE 100 MG: 50 TABLET, FILM COATED ORAL at 21:02

## 2021-01-01 RX ADMIN — CEFTRIAXONE SODIUM 1 G: 1 INJECTION, SOLUTION INTRAVENOUS at 12:58

## 2021-01-01 RX ADMIN — Medication: at 08:20

## 2021-01-01 RX ADMIN — HYDRALAZINE HYDROCHLORIDE 10 MG: 20 INJECTION, SOLUTION INTRAMUSCULAR; INTRAVENOUS at 16:52

## 2021-01-01 RX ADMIN — SODIUM CHLORIDE, PRESERVATIVE FREE 10 ML: 5 INJECTION INTRAVENOUS at 21:12

## 2021-01-01 RX ADMIN — SODIUM CHLORIDE, PRESERVATIVE FREE 10 ML: 5 INJECTION INTRAVENOUS at 08:36

## 2021-01-01 RX ADMIN — HYDRALAZINE HYDROCHLORIDE 100 MG: 50 TABLET, FILM COATED ORAL at 21:18

## 2021-01-01 RX ADMIN — VANCOMYCIN HYDROCHLORIDE 2250 MG: 10 INJECTION, POWDER, LYOPHILIZED, FOR SOLUTION INTRAVENOUS at 15:45

## 2021-01-01 RX ADMIN — METRONIDAZOLE 500 MG: 500 INJECTION, SOLUTION INTRAVENOUS at 14:43

## 2021-01-01 RX ADMIN — Medication: at 20:00

## 2021-01-01 RX ADMIN — SERTRALINE HYDROCHLORIDE 50 MG: 50 TABLET, FILM COATED ORAL at 13:46

## 2021-01-01 RX ADMIN — BRIMONIDINE TARTRATE 1 DROP: 1 SOLUTION/ DROPS OPHTHALMIC at 09:29

## 2021-01-01 RX ADMIN — MORPHINE SULFATE 1 MG: 2 INJECTION, SOLUTION INTRAMUSCULAR; INTRAVENOUS at 05:15

## 2021-01-01 RX ADMIN — POTASSIUM CHLORIDE 40 MEQ: 1.5 POWDER, FOR SOLUTION ORAL at 17:01

## 2021-01-01 RX ADMIN — HYDRALAZINE HYDROCHLORIDE 50 MG: 50 TABLET ORAL at 04:31

## 2021-01-01 RX ADMIN — LEVETIRACETAM 1000 MG: 10 INJECTION INTRAVENOUS at 20:01

## 2021-01-01 RX ADMIN — SODIUM BICARBONATE 650 MG: 650 TABLET ORAL at 17:15

## 2021-01-01 RX ADMIN — INSULIN LISPRO 2 UNITS: 100 INJECTION, SOLUTION INTRAVENOUS; SUBCUTANEOUS at 08:39

## 2021-01-01 RX ADMIN — AMLODIPINE BESYLATE 2.5 MG: 2.5 TABLET ORAL at 09:29

## 2021-01-01 RX ADMIN — METRONIDAZOLE 500 MG: 500 INJECTION, SOLUTION INTRAVENOUS at 20:53

## 2021-01-01 RX ADMIN — SERTRALINE HYDROCHLORIDE 50 MG: 50 TABLET, FILM COATED ORAL at 08:22

## 2021-01-01 RX ADMIN — SODIUM CHLORIDE, PRESERVATIVE FREE 10 ML: 5 INJECTION INTRAVENOUS at 10:30

## 2021-01-01 RX ADMIN — Medication 150 MEQ: at 20:52

## 2021-01-01 RX ADMIN — Medication: at 21:26

## 2021-01-01 RX ADMIN — BRIMONIDINE TARTRATE 1 DROP: 1 SOLUTION/ DROPS OPHTHALMIC at 08:29

## 2021-01-01 RX ADMIN — DORZOLAMIDE HYDROCHLORIDE 1 DROP: 20 SOLUTION/ DROPS OPHTHALMIC at 12:03

## 2021-01-01 RX ADMIN — ASPIRIN 81 MG: 81 TABLET, COATED ORAL at 08:58

## 2021-01-01 RX ADMIN — SODIUM CHLORIDE 750 MG: 900 INJECTION, SOLUTION INTRAVENOUS at 15:46

## 2021-01-01 RX ADMIN — AMLODIPINE BESYLATE 10 MG: 10 TABLET ORAL at 08:20

## 2021-01-01 RX ADMIN — TORSEMIDE 40 MG: 20 TABLET ORAL at 14:18

## 2021-01-01 RX ADMIN — VANCOMYCIN HYDROCHLORIDE 500 MG: 500 INJECTION, POWDER, LYOPHILIZED, FOR SOLUTION INTRAVENOUS at 14:31

## 2021-01-01 RX ADMIN — INSULIN LISPRO 2 UNITS: 100 INJECTION, SOLUTION INTRAVENOUS; SUBCUTANEOUS at 11:46

## 2021-01-01 RX ADMIN — INSULIN LISPRO 2 UNITS: 100 INJECTION, SOLUTION INTRAVENOUS; SUBCUTANEOUS at 17:38

## 2021-01-01 RX ADMIN — AMLODIPINE BESYLATE 5 MG: 5 TABLET ORAL at 10:36

## 2021-01-01 RX ADMIN — DORZOLAMIDE HYDROCHLORIDE 1 DROP: 20 SOLUTION/ DROPS OPHTHALMIC at 20:53

## 2021-01-01 RX ADMIN — INSULIN LISPRO 2 UNITS: 100 INJECTION, SOLUTION INTRAVENOUS; SUBCUTANEOUS at 07:49

## 2021-01-01 RX ADMIN — ATORVASTATIN CALCIUM 20 MG: 20 TABLET, FILM COATED ORAL at 08:15

## 2021-01-01 RX ADMIN — HYDRALAZINE HYDROCHLORIDE 100 MG: 50 TABLET, FILM COATED ORAL at 14:19

## 2021-01-01 RX ADMIN — SODIUM CHLORIDE, PRESERVATIVE FREE 10 ML: 5 INJECTION INTRAVENOUS at 21:44

## 2021-01-01 RX ADMIN — POTASSIUM CHLORIDE 10 MEQ: 7.46 INJECTION, SOLUTION INTRAVENOUS at 20:22

## 2021-01-01 RX ADMIN — DOCUSATE SODIUM 50MG AND SENNOSIDES 8.6MG 1 TABLET: 8.6; 5 TABLET, FILM COATED ORAL at 20:53

## 2021-01-01 RX ADMIN — HYDRALAZINE HYDROCHLORIDE 50 MG: 50 TABLET ORAL at 15:34

## 2021-01-01 RX ADMIN — CHLORHEXIDINE GLUCONATE 15 ML: 1.2 RINSE ORAL at 08:22

## 2021-01-01 RX ADMIN — SODIUM CHLORIDE 100 ML/HR: 9 INJECTION, SOLUTION INTRAVENOUS at 03:46

## 2021-01-01 RX ADMIN — POTASSIUM CHLORIDE 10 MEQ: 7.46 INJECTION, SOLUTION INTRAVENOUS at 21:33

## 2021-01-01 RX ADMIN — AMLODIPINE BESYLATE 2.5 MG: 2.5 TABLET ORAL at 08:16

## 2021-01-01 RX ADMIN — METOROPROLOL TARTRATE 5 MG: 5 INJECTION, SOLUTION INTRAVENOUS at 22:52

## 2021-01-01 RX ADMIN — TAZOBACTAM SODIUM AND PIPERACILLIN SODIUM 3.38 G: 375; 3 INJECTION, SOLUTION INTRAVENOUS at 10:13

## 2021-01-01 RX ADMIN — AMLODIPINE BESYLATE 5 MG: 5 TABLET ORAL at 10:01

## 2021-01-01 RX ADMIN — CEFTRIAXONE SODIUM 1 G: 1 INJECTION, SOLUTION INTRAVENOUS at 17:27

## 2021-01-01 RX ADMIN — AMLODIPINE BESYLATE 2.5 MG: 2.5 TABLET ORAL at 08:15

## 2021-01-01 RX ADMIN — ACETAMINOPHEN 650 MG: 325 TABLET, FILM COATED ORAL at 21:45

## 2021-01-01 RX ADMIN — SODIUM CHLORIDE 500 ML: 9 INJECTION, SOLUTION INTRAVENOUS at 18:23

## 2021-01-01 RX ADMIN — HEPARIN SODIUM 5000 UNITS: 5000 INJECTION INTRAVENOUS; SUBCUTANEOUS at 21:45

## 2021-01-01 RX ADMIN — CHLORHEXIDINE GLUCONATE 15 ML: 1.2 RINSE ORAL at 09:32

## 2021-01-01 RX ADMIN — BRIMONIDINE TARTRATE 1 DROP: 1 SOLUTION/ DROPS OPHTHALMIC at 21:23

## 2021-01-01 RX ADMIN — SODIUM CHLORIDE, PRESERVATIVE FREE 10 ML: 5 INJECTION INTRAVENOUS at 21:25

## 2021-01-01 RX ADMIN — Medication: at 20:22

## 2021-01-01 RX ADMIN — FAMOTIDINE 20 MG: 20 TABLET, FILM COATED ORAL at 08:15

## 2021-01-01 RX ADMIN — Medication: at 21:03

## 2021-01-01 RX ADMIN — BRIMONIDINE TARTRATE 1 DROP: 1 SOLUTION/ DROPS OPHTHALMIC at 20:29

## 2021-01-01 RX ADMIN — METRONIDAZOLE 500 MG: 500 INJECTION, SOLUTION INTRAVENOUS at 04:02

## 2021-01-01 RX ADMIN — Medication 1 PACKET: at 17:29

## 2021-01-01 RX ADMIN — ATORVASTATIN CALCIUM 20 MG: 20 TABLET, FILM COATED ORAL at 19:41

## 2021-01-01 RX ADMIN — HYDRALAZINE HYDROCHLORIDE 100 MG: 50 TABLET, FILM COATED ORAL at 20:51

## 2021-01-01 RX ADMIN — SODIUM CHLORIDE, POTASSIUM CHLORIDE, SODIUM LACTATE AND CALCIUM CHLORIDE 125 ML/HR: 600; 310; 30; 20 INJECTION, SOLUTION INTRAVENOUS at 18:25

## 2021-01-01 RX ADMIN — INSULIN LISPRO 4 UNITS: 100 INJECTION, SOLUTION INTRAVENOUS; SUBCUTANEOUS at 12:02

## 2021-01-01 RX ADMIN — DIGOXIN 125 MCG: 250 INJECTION, SOLUTION INTRAMUSCULAR; INTRAVENOUS; PARENTERAL at 08:57

## 2021-01-01 RX ADMIN — ATORVASTATIN CALCIUM 20 MG: 20 TABLET, FILM COATED ORAL at 08:32

## 2021-01-01 RX ADMIN — SODIUM CHLORIDE, PRESERVATIVE FREE 10 ML: 5 INJECTION INTRAVENOUS at 10:35

## 2021-01-01 RX ADMIN — CEFTRIAXONE SODIUM 1 G: 1 INJECTION, SOLUTION INTRAVENOUS at 01:44

## 2021-01-01 RX ADMIN — VANCOMYCIN HYDROCHLORIDE 500 MG: 500 INJECTION, POWDER, LYOPHILIZED, FOR SOLUTION INTRAVENOUS at 16:43

## 2021-01-01 RX ADMIN — LORAZEPAM 1 MG: 2 INJECTION INTRAMUSCULAR; INTRAVENOUS at 01:51

## 2021-01-01 RX ADMIN — POTASSIUM CHLORIDE 10 MEQ: 7.46 INJECTION, SOLUTION INTRAVENOUS at 20:00

## 2021-01-01 RX ADMIN — Medication: at 11:26

## 2021-01-01 RX ADMIN — SODIUM CHLORIDE, PRESERVATIVE FREE 10 ML: 5 INJECTION INTRAVENOUS at 20:53

## 2021-01-01 RX ADMIN — LABETALOL HYDROCHLORIDE 20 MG: 5 INJECTION, SOLUTION INTRAVENOUS at 23:32

## 2021-01-01 RX ADMIN — TAZOBACTAM SODIUM AND PIPERACILLIN SODIUM 3.38 G: 375; 3 INJECTION, SOLUTION INTRAVENOUS at 21:44

## 2021-01-01 RX ADMIN — HYDRALAZINE HYDROCHLORIDE 25 MG: 25 TABLET, FILM COATED ORAL at 14:11

## 2021-01-01 RX ADMIN — TAMSULOSIN HYDROCHLORIDE 0.4 MG: 0.4 CAPSULE ORAL at 10:47

## 2021-01-01 RX ADMIN — CEFTRIAXONE SODIUM 1 G: 1 INJECTION, SOLUTION INTRAVENOUS at 11:35

## 2021-01-01 RX ADMIN — AMLODIPINE BESYLATE 10 MG: 10 TABLET ORAL at 08:32

## 2021-01-01 RX ADMIN — SODIUM CHLORIDE, PRESERVATIVE FREE 10 ML: 5 INJECTION INTRAVENOUS at 20:00

## 2021-01-01 RX ADMIN — SODIUM CHLORIDE, PRESERVATIVE FREE 10 ML: 5 INJECTION INTRAVENOUS at 01:55

## 2021-01-01 RX ADMIN — POTASSIUM CHLORIDE 10 MEQ: 7.46 INJECTION, SOLUTION INTRAVENOUS at 14:43

## 2021-01-01 RX ADMIN — LORAZEPAM 0.5 MG: 2 INJECTION INTRAMUSCULAR; INTRAVENOUS at 16:42

## 2021-01-01 RX ADMIN — CEFTRIAXONE SODIUM 1 G: 1 INJECTION, SOLUTION INTRAVENOUS at 11:02

## 2021-01-01 RX ADMIN — SODIUM CHLORIDE 1000 ML: 9 INJECTION, SOLUTION INTRAVENOUS at 08:50

## 2021-01-01 RX ADMIN — APIXABAN 2.5 MG: 2.5 TABLET, FILM COATED ORAL at 08:01

## 2021-01-01 RX ADMIN — SODIUM BICARBONATE 650 MG: 650 TABLET ORAL at 12:41

## 2021-01-01 RX ADMIN — FAMOTIDINE 20 MG: 10 INJECTION INTRAVENOUS at 12:59

## 2021-01-01 RX ADMIN — ATORVASTATIN CALCIUM 20 MG: 20 TABLET, FILM COATED ORAL at 10:29

## 2021-01-01 RX ADMIN — ALBUMIN HUMAN 500 ML: 0.05 INJECTION, SOLUTION INTRAVENOUS at 20:13

## 2021-01-01 RX ADMIN — ASPIRIN 81 MG: 81 TABLET, COATED ORAL at 10:01

## 2021-01-01 RX ADMIN — Medication: at 21:30

## 2021-01-01 RX ADMIN — POTASSIUM CHLORIDE 40 MEQ: 750 TABLET, EXTENDED RELEASE ORAL at 08:19

## 2021-01-01 RX ADMIN — APIXABAN 2.5 MG: 2.5 TABLET, FILM COATED ORAL at 20:51

## 2021-01-01 RX ADMIN — SODIUM BICARBONATE 650 MG: 650 TABLET ORAL at 08:19

## 2021-01-01 RX ADMIN — GLYCOPYRROLATE 0.4 MG: 0.2 INJECTION INTRAMUSCULAR; INTRAVENOUS at 01:51

## 2021-01-01 RX ADMIN — SODIUM CHLORIDE, PRESERVATIVE FREE 10 ML: 5 INJECTION INTRAVENOUS at 21:45

## 2021-01-01 RX ADMIN — METRONIDAZOLE 500 MG: 500 INJECTION, SOLUTION INTRAVENOUS at 04:30

## 2021-01-01 RX ADMIN — ATORVASTATIN CALCIUM 20 MG: 20 TABLET, FILM COATED ORAL at 21:26

## 2021-01-01 RX ADMIN — SODIUM BICARBONATE 650 MG: 650 TABLET ORAL at 17:32

## 2021-01-01 RX ADMIN — SODIUM CHLORIDE, PRESERVATIVE FREE 10 ML: 5 INJECTION INTRAVENOUS at 10:02

## 2021-01-01 RX ADMIN — POTASSIUM CHLORIDE 40 MEQ: 750 TABLET, EXTENDED RELEASE ORAL at 12:06

## 2021-01-01 RX ADMIN — APIXABAN 2.5 MG: 2.5 TABLET, FILM COATED ORAL at 08:54

## 2021-01-01 RX ADMIN — ATORVASTATIN CALCIUM 20 MG: 20 TABLET, FILM COATED ORAL at 08:58

## 2021-01-01 RX ADMIN — TAZOBACTAM SODIUM AND PIPERACILLIN SODIUM 3.38 G: 375; 3 INJECTION, SOLUTION INTRAVENOUS at 09:33

## 2021-01-01 RX ADMIN — Medication: at 20:53

## 2021-01-01 RX ADMIN — SODIUM CHLORIDE, PRESERVATIVE FREE 10 ML: 5 INJECTION INTRAVENOUS at 10:48

## 2021-01-01 RX ADMIN — HYDRALAZINE HYDROCHLORIDE 100 MG: 50 TABLET, FILM COATED ORAL at 06:11

## 2021-01-01 RX ADMIN — TAZOBACTAM SODIUM AND PIPERACILLIN SODIUM 3.38 G: 375; 3 INJECTION, SOLUTION INTRAVENOUS at 23:03

## 2021-01-01 RX ADMIN — TAMSULOSIN HYDROCHLORIDE 0.4 MG: 0.4 CAPSULE ORAL at 08:32

## 2021-01-01 RX ADMIN — SODIUM BICARBONATE 1300 MG: 650 TABLET ORAL at 21:45

## 2021-01-01 RX ADMIN — DORZOLAMIDE HYDROCHLORIDE 1 DROP: 20 SOLUTION/ DROPS OPHTHALMIC at 08:33

## 2021-01-01 RX ADMIN — SODIUM CHLORIDE 500 ML: 9 INJECTION, SOLUTION INTRAVENOUS at 10:13

## 2021-01-01 RX ADMIN — HYDRALAZINE HYDROCHLORIDE 100 MG: 50 TABLET, FILM COATED ORAL at 05:33

## 2021-01-01 RX ADMIN — INSULIN LISPRO 2 UNITS: 100 INJECTION, SOLUTION INTRAVENOUS; SUBCUTANEOUS at 13:30

## 2021-01-01 RX ADMIN — POTASSIUM CHLORIDE 40 MEQ: 750 TABLET, EXTENDED RELEASE ORAL at 17:16

## 2021-01-01 RX ADMIN — SODIUM BICARBONATE 650 MG: 650 TABLET ORAL at 08:26

## 2021-01-01 RX ADMIN — Medication 1 PACKET: at 06:54

## 2021-01-01 RX ADMIN — DORZOLAMIDE HYDROCHLORIDE 1 DROP: 20 SOLUTION/ DROPS OPHTHALMIC at 09:29

## 2021-01-01 RX ADMIN — METOPROLOL TARTRATE 12.5 MG: 25 TABLET, FILM COATED ORAL at 10:36

## 2021-01-01 RX ADMIN — HYDROCODONE BITARTRATE AND ACETAMINOPHEN 1 TABLET: 10; 325 TABLET ORAL at 21:04

## 2021-01-01 RX ADMIN — DORZOLAMIDE HYDROCHLORIDE 1 DROP: 20 SOLUTION/ DROPS OPHTHALMIC at 20:29

## 2021-01-01 RX ADMIN — SODIUM CHLORIDE 125 ML/HR: 4.5 INJECTION, SOLUTION INTRAVENOUS at 05:12

## 2021-01-01 RX ADMIN — FAMOTIDINE 20 MG: 10 INJECTION INTRAVENOUS at 20:27

## 2021-01-01 RX ADMIN — METRONIDAZOLE 500 MG: 500 INJECTION, SOLUTION INTRAVENOUS at 20:29

## 2021-01-01 RX ADMIN — LORAZEPAM 1 MG: 2 INJECTION INTRAMUSCULAR; INTRAVENOUS at 15:29

## 2021-01-01 RX ADMIN — SODIUM BICARBONATE 1300 MG: 650 TABLET ORAL at 08:58

## 2021-01-01 RX ADMIN — TAZOBACTAM SODIUM AND PIPERACILLIN SODIUM 3.38 G: 375; 3 INJECTION, SOLUTION INTRAVENOUS at 10:42

## 2021-01-01 RX ADMIN — DORZOLAMIDE HYDROCHLORIDE 1 DROP: 20 SOLUTION/ DROPS OPHTHALMIC at 13:32

## 2021-01-01 RX ADMIN — ATORVASTATIN CALCIUM 20 MG: 20 TABLET, FILM COATED ORAL at 20:17

## 2021-01-01 RX ADMIN — LEVETIRACETAM 1000 MG: 10 INJECTION INTRAVENOUS at 10:54

## 2021-01-01 RX ADMIN — SODIUM CHLORIDE, PRESERVATIVE FREE 10 ML: 5 INJECTION INTRAVENOUS at 21:04

## 2021-01-01 RX ADMIN — DEXTROSE MONOHYDRATE 100 ML/HR: 50 INJECTION, SOLUTION INTRAVENOUS at 20:22

## 2021-01-01 RX ADMIN — FLUCONAZOLE 100 MG: 100 TABLET ORAL at 15:20

## 2021-01-01 RX ADMIN — HYDRALAZINE HYDROCHLORIDE 25 MG: 25 TABLET, FILM COATED ORAL at 05:26

## 2021-01-01 RX ADMIN — CHLORHEXIDINE GLUCONATE 15 ML: 1.2 RINSE ORAL at 10:53

## 2021-01-01 RX ADMIN — SODIUM CHLORIDE 500 ML: 9 INJECTION, SOLUTION INTRAVENOUS at 18:15

## 2021-01-01 RX ADMIN — HYDRALAZINE HYDROCHLORIDE 100 MG: 50 TABLET, FILM COATED ORAL at 14:48

## 2021-01-01 RX ADMIN — POTASSIUM CHLORIDE 40 MEQ: 1.5 POWDER, FOR SOLUTION ORAL at 14:23

## 2021-01-01 RX ADMIN — POTASSIUM CHLORIDE 10 MEQ: 7.46 INJECTION, SOLUTION INTRAVENOUS at 13:36

## 2021-01-01 RX ADMIN — TORSEMIDE 40 MG: 20 TABLET ORAL at 10:30

## 2021-01-01 RX ADMIN — METRONIDAZOLE 500 MG: 500 INJECTION, SOLUTION INTRAVENOUS at 14:10

## 2021-01-01 RX ADMIN — DILTIAZEM HYDROCHLORIDE 30 MG: 30 TABLET, FILM COATED ORAL at 21:30

## 2021-01-01 RX ADMIN — LEVETIRACETAM 1000 MG: 10 INJECTION INTRAVENOUS at 09:21

## 2021-01-01 RX ADMIN — POTASSIUM CHLORIDE 10 MEQ: 7.46 INJECTION, SOLUTION INTRAVENOUS at 17:00

## 2021-01-01 RX ADMIN — SODIUM CHLORIDE, PRESERVATIVE FREE 10 ML: 5 INJECTION INTRAVENOUS at 08:27

## 2021-01-01 RX ADMIN — TAZOBACTAM SODIUM AND PIPERACILLIN SODIUM 3.38 G: 375; 3 INJECTION, SOLUTION INTRAVENOUS at 21:45

## 2021-01-01 RX ADMIN — DORZOLAMIDE HYDROCHLORIDE 1 DROP: 20 SOLUTION/ DROPS OPHTHALMIC at 08:16

## 2021-01-01 RX ADMIN — Medication: at 10:01

## 2021-01-01 RX ADMIN — SODIUM BICARBONATE 650 MG: 650 TABLET ORAL at 10:30

## 2021-01-01 RX ADMIN — SODIUM BICARBONATE 1300 MG: 650 TABLET ORAL at 15:38

## 2021-01-01 RX ADMIN — HEPARIN SODIUM 5000 UNITS: 5000 INJECTION INTRAVENOUS; SUBCUTANEOUS at 06:42

## 2021-01-01 RX ADMIN — CHLORHEXIDINE GLUCONATE 15 ML: 1.2 RINSE ORAL at 21:16

## 2021-01-01 RX ADMIN — INSULIN LISPRO 2 UNITS: 100 INJECTION, SOLUTION INTRAVENOUS; SUBCUTANEOUS at 12:44

## 2021-01-01 RX ADMIN — ASPIRIN 81 MG: 81 TABLET, COATED ORAL at 10:29

## 2021-01-01 RX ADMIN — Medication: at 08:33

## 2021-01-01 RX ADMIN — METRONIDAZOLE 500 MG: 500 INJECTION, SOLUTION INTRAVENOUS at 06:00

## 2021-01-01 RX ADMIN — TORSEMIDE 40 MG: 20 TABLET ORAL at 20:53

## 2021-01-01 RX ADMIN — METRONIDAZOLE 500 MG: 500 INJECTION, SOLUTION INTRAVENOUS at 05:26

## 2021-01-01 RX ADMIN — AMLODIPINE BESYLATE 5 MG: 5 TABLET ORAL at 09:21

## 2021-01-01 RX ADMIN — POTASSIUM CHLORIDE 10 MEQ: 7.46 INJECTION, SOLUTION INTRAVENOUS at 18:06

## 2021-01-01 RX ADMIN — CEFTRIAXONE SODIUM 1 G: 1 INJECTION, SOLUTION INTRAVENOUS at 12:08

## 2021-01-01 RX ADMIN — SODIUM CHLORIDE 1000 ML: 9 INJECTION, SOLUTION INTRAVENOUS at 23:53

## 2021-01-01 RX ADMIN — GLYCERIN, HYPROMELLOSE, POLYETHYLENE GLYCOL 1 DROP: .2; .2; 1 LIQUID OPHTHALMIC at 21:26

## 2021-01-01 RX ADMIN — SODIUM CHLORIDE, PRESERVATIVE FREE 10 ML: 5 INJECTION INTRAVENOUS at 10:19

## 2021-01-01 RX ADMIN — CEFTRIAXONE SODIUM 1 G: 1 INJECTION, SOLUTION INTRAVENOUS at 08:16

## 2021-01-01 RX ADMIN — POTASSIUM CHLORIDE 10 MEQ: 7.46 INJECTION, SOLUTION INTRAVENOUS at 04:27

## 2021-01-01 RX ADMIN — SODIUM CHLORIDE, PRESERVATIVE FREE 10 ML: 5 INJECTION INTRAVENOUS at 20:16

## 2021-01-01 RX ADMIN — TAMSULOSIN HYDROCHLORIDE 0.4 MG: 0.4 CAPSULE ORAL at 14:18

## 2021-01-01 RX ADMIN — POTASSIUM CHLORIDE 40 MEQ: 750 TABLET, EXTENDED RELEASE ORAL at 12:08

## 2021-01-01 RX ADMIN — Medication 1 APPLICATION: at 08:18

## 2021-01-01 RX ADMIN — POTASSIUM CHLORIDE 40 MEQ: 750 TABLET, EXTENDED RELEASE ORAL at 10:19

## 2021-01-01 RX ADMIN — SODIUM BICARBONATE 1300 MG: 650 TABLET ORAL at 10:47

## 2021-01-01 RX ADMIN — ENOXAPARIN SODIUM 30 MG: 30 INJECTION SUBCUTANEOUS at 20:27

## 2021-01-01 RX ADMIN — HYDRALAZINE HYDROCHLORIDE 50 MG: 50 TABLET ORAL at 15:13

## 2021-01-01 RX ADMIN — INSULIN LISPRO 2 UNITS: 100 INJECTION, SOLUTION INTRAVENOUS; SUBCUTANEOUS at 08:57

## 2021-01-01 RX ADMIN — VANCOMYCIN HYDROCHLORIDE 500 MG: 500 INJECTION, POWDER, LYOPHILIZED, FOR SOLUTION INTRAVENOUS at 18:18

## 2021-01-01 RX ADMIN — POTASSIUM PHOSPHATE, MONOBASIC AND POTASSIUM PHOSPHATE, DIBASIC 20 MMOL: 224; 236 INJECTION, SOLUTION, CONCENTRATE INTRAVENOUS at 09:31

## 2021-01-01 RX ADMIN — INSULIN LISPRO 2 UNITS: 100 INJECTION, SOLUTION INTRAVENOUS; SUBCUTANEOUS at 17:07

## 2021-01-01 RX ADMIN — SODIUM BICARBONATE 1300 MG: 650 TABLET ORAL at 08:32

## 2021-01-01 RX ADMIN — DOCUSATE SODIUM 50MG AND SENNOSIDES 8.6MG 1 TABLET: 8.6; 5 TABLET, FILM COATED ORAL at 20:29

## 2021-01-01 RX ADMIN — CHLORHEXIDINE GLUCONATE 15 ML: 1.2 RINSE ORAL at 20:17

## 2021-01-01 RX ADMIN — DORZOLAMIDE HYDROCHLORIDE 1 DROP: 20 SOLUTION/ DROPS OPHTHALMIC at 21:23

## 2021-01-01 RX ADMIN — LIDOCAINE HYDROCHLORIDE 13 ML: 10 INJECTION, SOLUTION INFILTRATION; PERINEURAL at 13:44

## 2021-01-01 RX ADMIN — SODIUM CHLORIDE, PRESERVATIVE FREE 10 ML: 5 INJECTION INTRAVENOUS at 20:29

## 2021-01-01 RX ADMIN — DORZOLAMIDE HYDROCHLORIDE 1 DROP: 20 SOLUTION/ DROPS OPHTHALMIC at 15:38

## 2021-01-01 RX ADMIN — LORAZEPAM 0.5 MG: 2 INJECTION INTRAMUSCULAR; INTRAVENOUS at 23:17

## 2021-01-01 RX ADMIN — POTASSIUM CHLORIDE 40 MEQ: 750 TABLET, EXTENDED RELEASE ORAL at 22:48

## 2021-01-01 RX ADMIN — CHLORHEXIDINE GLUCONATE 15 ML: 1.2 RINSE ORAL at 08:54

## 2021-01-01 RX ADMIN — METOPROLOL TARTRATE 12.5 MG: 25 TABLET, FILM COATED ORAL at 21:19

## 2021-01-01 RX ADMIN — SODIUM CHLORIDE, PRESERVATIVE FREE 10 ML: 5 INJECTION INTRAVENOUS at 19:50

## 2021-01-01 RX ADMIN — SODIUM CHLORIDE, POTASSIUM CHLORIDE, SODIUM LACTATE AND CALCIUM CHLORIDE 125 ML/HR: 600; 310; 30; 20 INJECTION, SOLUTION INTRAVENOUS at 12:59

## 2021-01-01 RX ADMIN — SODIUM CHLORIDE, PRESERVATIVE FREE 10 ML: 5 INJECTION INTRAVENOUS at 08:54

## 2021-01-01 RX ADMIN — ATORVASTATIN CALCIUM 20 MG: 20 TABLET, FILM COATED ORAL at 10:54

## 2021-01-01 RX ADMIN — VANCOMYCIN HYDROCHLORIDE 500 MG: 500 INJECTION, POWDER, LYOPHILIZED, FOR SOLUTION INTRAVENOUS at 16:52

## 2021-01-01 RX ADMIN — LEVETIRACETAM 1000 MG: 10 INJECTION INTRAVENOUS at 20:22

## 2021-01-01 RX ADMIN — Medication: at 10:30

## 2021-01-01 RX ADMIN — HYDROCODONE BITARTRATE AND ACETAMINOPHEN 1 TABLET: 10; 325 TABLET ORAL at 03:39

## 2021-01-01 RX ADMIN — SODIUM CHLORIDE 1000 ML: 9 INJECTION, SOLUTION INTRAVENOUS at 08:26

## 2021-01-01 RX ADMIN — HYDRALAZINE HYDROCHLORIDE 100 MG: 50 TABLET, FILM COATED ORAL at 06:39

## 2021-01-01 RX ADMIN — BRIMONIDINE TARTRATE 1 DROP: 1 SOLUTION/ DROPS OPHTHALMIC at 12:03

## 2021-01-01 RX ADMIN — FAMOTIDINE 20 MG: 20 TABLET, FILM COATED ORAL at 17:02

## 2021-01-01 RX ADMIN — POTASSIUM CHLORIDE 10 MEQ: 7.46 INJECTION, SOLUTION INTRAVENOUS at 06:33

## 2021-01-01 RX ADMIN — OLANZAPINE 5 MG: 10 INJECTION, POWDER, FOR SOLUTION INTRAMUSCULAR at 09:24

## 2021-01-01 RX ADMIN — SODIUM CHLORIDE 500 ML: 9 INJECTION, SOLUTION INTRAVENOUS at 12:44

## 2021-01-01 RX ADMIN — METOCLOPRAMIDE HYDROCHLORIDE 10 MG: 5 INJECTION INTRAMUSCULAR; INTRAVENOUS at 03:30

## 2021-01-01 RX ADMIN — SODIUM BICARBONATE 1300 MG: 650 TABLET ORAL at 08:15

## 2021-01-01 RX ADMIN — SODIUM BICARBONATE 1300 MG: 650 TABLET ORAL at 17:35

## 2021-01-01 RX ADMIN — DORZOLAMIDE HYDROCHLORIDE 1 DROP: 20 SOLUTION/ DROPS OPHTHALMIC at 17:43

## 2021-01-01 RX ADMIN — SODIUM BICARBONATE 1300 MG: 650 TABLET ORAL at 21:05

## 2021-01-01 RX ADMIN — ATORVASTATIN CALCIUM 20 MG: 20 TABLET, FILM COATED ORAL at 10:47

## 2021-01-01 RX ADMIN — HYDRALAZINE HYDROCHLORIDE 50 MG: 50 TABLET ORAL at 21:21

## 2021-01-01 RX ADMIN — Medication 1 APPLICATION: at 20:55

## 2021-01-01 RX ADMIN — HEPARIN SODIUM 5000 UNITS: 5000 INJECTION INTRAVENOUS; SUBCUTANEOUS at 06:27

## 2021-01-01 RX ADMIN — SODIUM CHLORIDE, PRESERVATIVE FREE 10 ML: 5 INJECTION INTRAVENOUS at 21:07

## 2021-01-01 RX ADMIN — TAZOBACTAM SODIUM AND PIPERACILLIN SODIUM 3.38 G: 375; 3 INJECTION, SOLUTION INTRAVENOUS at 10:19

## 2021-01-01 RX ADMIN — Medication: at 20:17

## 2021-01-01 RX ADMIN — METRONIDAZOLE 500 MG: 500 INJECTION, SOLUTION INTRAVENOUS at 05:32

## 2021-01-01 RX ADMIN — APIXABAN 2.5 MG: 2.5 TABLET, FILM COATED ORAL at 21:44

## 2021-01-01 RX ADMIN — ASPIRIN 81 MG: 81 TABLET, COATED ORAL at 08:18

## 2021-01-01 RX ADMIN — AMLODIPINE BESYLATE 5 MG: 5 TABLET ORAL at 15:12

## 2021-01-01 RX ADMIN — METRONIDAZOLE 500 MG: 500 INJECTION, SOLUTION INTRAVENOUS at 21:23

## 2021-01-01 RX ADMIN — HYDRALAZINE HYDROCHLORIDE 10 MG: 20 INJECTION, SOLUTION INTRAMUSCULAR; INTRAVENOUS at 14:14

## 2021-01-01 RX ADMIN — AMLODIPINE BESYLATE 2.5 MG: 2.5 TABLET ORAL at 08:31

## 2021-01-01 RX ADMIN — SODIUM CHLORIDE, PRESERVATIVE FREE 10 ML: 5 INJECTION INTRAVENOUS at 08:18

## 2021-01-01 RX ADMIN — INSULIN LISPRO 2 UNITS: 100 INJECTION, SOLUTION INTRAVENOUS; SUBCUTANEOUS at 06:00

## 2021-01-01 RX ADMIN — SODIUM CHLORIDE, PRESERVATIVE FREE 10 ML: 5 INJECTION INTRAVENOUS at 08:21

## 2021-01-01 RX ADMIN — DORZOLAMIDE HYDROCHLORIDE 1 DROP: 20 SOLUTION/ DROPS OPHTHALMIC at 17:03

## 2021-01-01 RX ADMIN — ATORVASTATIN CALCIUM 20 MG: 20 TABLET, FILM COATED ORAL at 10:02

## 2021-01-01 RX ADMIN — METOPROLOL TARTRATE 12.5 MG: 25 TABLET, FILM COATED ORAL at 21:05

## 2021-01-01 RX ADMIN — ACETAMINOPHEN 650 MG: 325 TABLET, FILM COATED ORAL at 06:53

## 2021-01-01 RX ADMIN — SODIUM CHLORIDE, PRESERVATIVE FREE 10 ML: 5 INJECTION INTRAVENOUS at 08:19

## 2021-01-01 RX ADMIN — POTASSIUM CHLORIDE 10 MEQ: 7.46 INJECTION, SOLUTION INTRAVENOUS at 13:06

## 2021-01-01 RX ADMIN — HYDRALAZINE HYDROCHLORIDE 50 MG: 50 TABLET ORAL at 05:29

## 2021-01-01 RX ADMIN — Medication: at 10:37

## 2021-01-01 RX ADMIN — PROPOFOL 140 MCG/KG/MIN: 10 INJECTION, EMULSION INTRAVENOUS at 09:27

## 2021-01-01 RX ADMIN — SODIUM BICARBONATE 650 MG: 650 TABLET ORAL at 21:18

## 2021-01-01 RX ADMIN — DOCUSATE SODIUM 50MG AND SENNOSIDES 8.6MG 1 TABLET: 8.6; 5 TABLET, FILM COATED ORAL at 21:45

## 2021-01-01 RX ADMIN — Medication: at 08:01

## 2021-01-01 RX ADMIN — FAMOTIDINE 20 MG: 20 TABLET, FILM COATED ORAL at 05:26

## 2021-01-01 RX ADMIN — DORZOLAMIDE HYDROCHLORIDE 1 DROP: 20 SOLUTION/ DROPS OPHTHALMIC at 10:48

## 2021-01-01 RX ADMIN — CHLORHEXIDINE GLUCONATE 15 ML: 1.2 RINSE ORAL at 21:45

## 2021-01-01 RX ADMIN — APIXABAN 2.5 MG: 2.5 TABLET, FILM COATED ORAL at 08:22

## 2021-01-01 RX ADMIN — HEPARIN SODIUM 5000 UNITS: 5000 INJECTION INTRAVENOUS; SUBCUTANEOUS at 15:47

## 2021-01-01 RX ADMIN — HEPARIN SODIUM 5000 UNITS: 5000 INJECTION INTRAVENOUS; SUBCUTANEOUS at 08:26

## 2021-01-01 RX ADMIN — SODIUM BICARBONATE 1300 MG: 650 TABLET ORAL at 21:23

## 2021-01-01 RX ADMIN — DILTIAZEM HYDROCHLORIDE 30 MG: 30 TABLET, FILM COATED ORAL at 17:29

## 2021-01-01 RX ADMIN — BRIMONIDINE TARTRATE 1 DROP: 1 SOLUTION/ DROPS OPHTHALMIC at 21:12

## 2021-01-01 RX ADMIN — POTASSIUM CHLORIDE 40 MEQ: 750 TABLET, EXTENDED RELEASE ORAL at 20:14

## 2021-01-01 RX ADMIN — Medication: at 21:01

## 2021-01-01 RX ADMIN — ASPIRIN 81 MG: 81 TABLET, COATED ORAL at 10:36

## 2021-01-01 RX ADMIN — HYDRALAZINE HYDROCHLORIDE 100 MG: 50 TABLET, FILM COATED ORAL at 21:01

## 2021-01-01 RX ADMIN — POTASSIUM CHLORIDE 40 MEQ: 750 TABLET, EXTENDED RELEASE ORAL at 19:18

## 2021-01-01 RX ADMIN — HYDRALAZINE HYDROCHLORIDE 100 MG: 50 TABLET, FILM COATED ORAL at 17:29

## 2021-01-01 RX ADMIN — METOCLOPRAMIDE HYDROCHLORIDE 10 MG: 5 INJECTION INTRAMUSCULAR; INTRAVENOUS at 12:01

## 2021-01-01 RX ADMIN — HYDROCODONE BITARTRATE AND ACETAMINOPHEN 1 TABLET: 10; 325 TABLET ORAL at 14:56

## 2021-01-01 RX ADMIN — CHLORHEXIDINE GLUCONATE 15 ML: 1.2 RINSE ORAL at 21:52

## 2021-01-01 RX ADMIN — DILTIAZEM HYDROCHLORIDE 30 MG: 30 TABLET, FILM COATED ORAL at 08:58

## 2021-01-01 RX ADMIN — INSULIN LISPRO 2 UNITS: 100 INJECTION, SOLUTION INTRAVENOUS; SUBCUTANEOUS at 12:25

## 2021-01-01 RX ADMIN — METOPROLOL TARTRATE 12.5 MG: 25 TABLET, FILM COATED ORAL at 10:01

## 2021-01-01 RX ADMIN — CEFTRIAXONE SODIUM 1 G: 1 INJECTION, SOLUTION INTRAVENOUS at 17:32

## 2021-01-01 RX ADMIN — DILTIAZEM HYDROCHLORIDE 30 MG: 30 TABLET, FILM COATED ORAL at 05:32

## 2021-01-01 RX ADMIN — BRIMONIDINE TARTRATE 1 DROP: 1 SOLUTION/ DROPS OPHTHALMIC at 08:26

## 2021-01-01 RX ADMIN — Medication: at 09:46

## 2021-01-01 RX ADMIN — POTASSIUM CHLORIDE 40 MEQ: 750 TABLET, EXTENDED RELEASE ORAL at 05:54

## 2021-01-01 RX ADMIN — POTASSIUM CHLORIDE 40 MEQ: 750 TABLET, EXTENDED RELEASE ORAL at 15:12

## 2021-01-01 RX ADMIN — BRIMONIDINE TARTRATE 1 DROP: 1 SOLUTION/ DROPS OPHTHALMIC at 08:33

## 2021-01-01 RX ADMIN — SODIUM CHLORIDE, PRESERVATIVE FREE 10 ML: 5 INJECTION INTRAVENOUS at 11:07

## 2021-01-01 RX ADMIN — CEFTRIAXONE SODIUM 1 G: 1 INJECTION, SOLUTION INTRAVENOUS at 03:13

## 2021-01-01 RX ADMIN — HYDRALAZINE HYDROCHLORIDE 50 MG: 50 TABLET ORAL at 21:01

## 2021-01-01 RX ADMIN — SODIUM CHLORIDE, PRESERVATIVE FREE 10 ML: 5 INJECTION INTRAVENOUS at 22:16

## 2021-01-01 RX ADMIN — POTASSIUM CHLORIDE 10 MEQ: 7.46 INJECTION, SOLUTION INTRAVENOUS at 00:51

## 2021-01-01 RX ADMIN — ONDANSETRON 4 MG: 2 INJECTION INTRAMUSCULAR; INTRAVENOUS at 23:55

## 2021-01-01 RX ADMIN — METRONIDAZOLE 500 MG: 500 INJECTION, SOLUTION INTRAVENOUS at 21:12

## 2021-01-01 RX ADMIN — HYDRALAZINE HYDROCHLORIDE 100 MG: 50 TABLET, FILM COATED ORAL at 06:54

## 2021-01-01 RX ADMIN — HYDROCODONE BITARTRATE AND ACETAMINOPHEN 1 TABLET: 10; 325 TABLET ORAL at 04:30

## 2021-01-01 RX ADMIN — POTASSIUM CHLORIDE 40 MEQ: 750 TABLET, EXTENDED RELEASE ORAL at 21:32

## 2021-01-01 RX ADMIN — AMLODIPINE BESYLATE 5 MG: 5 TABLET ORAL at 14:57

## 2021-01-01 RX ADMIN — POTASSIUM CHLORIDE 10 MEQ: 7.46 INJECTION, SOLUTION INTRAVENOUS at 22:34

## 2021-01-01 RX ADMIN — SODIUM BICARBONATE 650 MG: 650 TABLET ORAL at 18:13

## 2021-01-01 RX ADMIN — SODIUM BICARBONATE 1300 MG: 650 TABLET ORAL at 15:02

## 2021-01-01 RX ADMIN — HYDRALAZINE HYDROCHLORIDE 100 MG: 50 TABLET, FILM COATED ORAL at 14:30

## 2021-01-01 RX ADMIN — POTASSIUM CHLORIDE 10 MEQ: 7.46 INJECTION, SOLUTION INTRAVENOUS at 23:50

## 2021-01-01 RX ADMIN — SODIUM CHLORIDE, POTASSIUM CHLORIDE, SODIUM LACTATE AND CALCIUM CHLORIDE 1000 ML: 600; 310; 30; 20 INJECTION, SOLUTION INTRAVENOUS at 10:14

## 2021-01-01 RX ADMIN — ASPIRIN 81 MG: 81 TABLET, COATED ORAL at 09:58

## 2021-01-01 RX ADMIN — Medication: at 21:20

## 2021-01-01 RX ADMIN — AMLODIPINE BESYLATE 2.5 MG: 2.5 TABLET ORAL at 10:47

## 2021-01-01 RX ADMIN — MORPHINE SULFATE 1 MG: 2 INJECTION, SOLUTION INTRAMUSCULAR; INTRAVENOUS at 15:35

## 2021-01-01 RX ADMIN — METOPROLOL TARTRATE 12.5 MG: 25 TABLET, FILM COATED ORAL at 21:29

## 2021-01-01 RX ADMIN — AMLODIPINE BESYLATE 5 MG: 5 TABLET ORAL at 08:25

## 2021-01-01 RX ADMIN — POTASSIUM CHLORIDE 10 MEQ: 7.46 INJECTION, SOLUTION INTRAVENOUS at 21:10

## 2021-01-01 RX ADMIN — ATORVASTATIN CALCIUM 20 MG: 20 TABLET, FILM COATED ORAL at 09:21

## 2021-01-01 RX ADMIN — SODIUM CHLORIDE, PRESERVATIVE FREE 10 ML: 5 INJECTION INTRAVENOUS at 19:41

## 2021-01-01 RX ADMIN — HYDRALAZINE HYDROCHLORIDE 100 MG: 50 TABLET, FILM COATED ORAL at 21:05

## 2021-01-01 RX ADMIN — POTASSIUM CHLORIDE 40 MEQ: 750 TABLET, EXTENDED RELEASE ORAL at 20:53

## 2021-01-01 RX ADMIN — HYDRALAZINE HYDROCHLORIDE 100 MG: 50 TABLET, FILM COATED ORAL at 14:56

## 2021-01-01 RX ADMIN — PHENYLEPHRINE HYDROCHLORIDE 0.5 MCG/KG/MIN: 10 INJECTION, SOLUTION INTRAVENOUS at 17:42

## 2021-01-01 RX ADMIN — SODIUM CHLORIDE, PRESERVATIVE FREE 10 ML: 5 INJECTION INTRAVENOUS at 09:48

## 2021-01-01 RX ADMIN — POTASSIUM CHLORIDE 40 MEQ: 750 TABLET, EXTENDED RELEASE ORAL at 16:56

## 2021-01-01 RX ADMIN — POTASSIUM CHLORIDE 10 MEQ: 7.46 INJECTION, SOLUTION INTRAVENOUS at 03:27

## 2021-01-01 RX ADMIN — Medication: at 21:06

## 2021-01-01 RX ADMIN — FAMOTIDINE 20 MG: 20 TABLET, FILM COATED ORAL at 06:00

## 2021-01-01 RX ADMIN — BRIMONIDINE TARTRATE 1 DROP: 1 SOLUTION/ DROPS OPHTHALMIC at 20:16

## 2021-01-01 RX ADMIN — ATORVASTATIN CALCIUM 20 MG: 20 TABLET, FILM COATED ORAL at 08:25

## 2021-01-01 RX ADMIN — Medication: at 10:55

## 2021-01-01 RX ADMIN — DORZOLAMIDE HYDROCHLORIDE 1 DROP: 20 SOLUTION/ DROPS OPHTHALMIC at 20:16

## 2021-01-01 RX ADMIN — SODIUM BICARBONATE 1300 MG: 650 TABLET ORAL at 08:16

## 2021-01-01 RX ADMIN — PROPOFOL 70 MG: 10 INJECTION, EMULSION INTRAVENOUS at 09:27

## 2021-01-01 RX ADMIN — HYDRALAZINE HYDROCHLORIDE 10 MG: 20 INJECTION, SOLUTION INTRAMUSCULAR; INTRAVENOUS at 23:38

## 2021-01-01 RX ADMIN — DORZOLAMIDE HYDROCHLORIDE 1 DROP: 20 SOLUTION/ DROPS OPHTHALMIC at 17:16

## 2021-01-01 RX ADMIN — SERTRALINE HYDROCHLORIDE 50 MG: 50 TABLET, FILM COATED ORAL at 08:54

## 2021-01-01 RX ADMIN — TAZOBACTAM SODIUM AND PIPERACILLIN SODIUM 3.38 G: 375; 3 INJECTION, SOLUTION INTRAVENOUS at 00:54

## 2021-01-01 RX ADMIN — APIXABAN 2.5 MG: 2.5 TABLET, FILM COATED ORAL at 21:26

## 2021-01-01 RX ADMIN — LORAZEPAM 1 MG: 2 INJECTION INTRAMUSCULAR; INTRAVENOUS at 23:48

## 2021-01-01 RX ADMIN — SODIUM CHLORIDE, PRESERVATIVE FREE 10 ML: 5 INJECTION INTRAVENOUS at 22:52

## 2021-01-01 RX ADMIN — VANCOMYCIN HYDROCHLORIDE 1750 MG: 10 INJECTION, POWDER, LYOPHILIZED, FOR SOLUTION INTRAVENOUS at 11:23

## 2021-01-01 RX ADMIN — CEFTRIAXONE SODIUM 1 G: 1 INJECTION, SOLUTION INTRAVENOUS at 00:31

## 2021-01-01 RX ADMIN — MORPHINE SULFATE 1 MG: 2 INJECTION, SOLUTION INTRAMUSCULAR; INTRAVENOUS at 10:01

## 2021-01-01 RX ADMIN — DORZOLAMIDE HYDROCHLORIDE 1 DROP: 20 SOLUTION/ DROPS OPHTHALMIC at 21:12

## 2021-01-01 RX ADMIN — AMLODIPINE BESYLATE 5 MG: 5 TABLET ORAL at 08:58

## 2021-01-01 RX ADMIN — SODIUM CHLORIDE 125 ML/HR: 4.5 INJECTION, SOLUTION INTRAVENOUS at 19:47

## 2021-01-01 RX ADMIN — SODIUM CHLORIDE 125 ML/HR: 4.5 INJECTION, SOLUTION INTRAVENOUS at 17:56

## 2021-01-01 RX ADMIN — SODIUM BICARBONATE 650 MG: 650 TABLET ORAL at 20:16

## 2021-01-01 RX ADMIN — TAMSULOSIN HYDROCHLORIDE 0.4 MG: 0.4 CAPSULE ORAL at 08:16

## 2021-01-01 RX ADMIN — INSULIN LISPRO 2 UNITS: 100 INJECTION, SOLUTION INTRAVENOUS; SUBCUTANEOUS at 00:53

## 2021-01-01 RX ADMIN — INSULIN LISPRO 2 UNITS: 100 INJECTION, SOLUTION INTRAVENOUS; SUBCUTANEOUS at 17:30

## 2021-01-01 RX ADMIN — DORZOLAMIDE HYDROCHLORIDE 1 DROP: 20 SOLUTION/ DROPS OPHTHALMIC at 15:52

## 2021-01-01 RX ADMIN — HYDRALAZINE HYDROCHLORIDE 100 MG: 50 TABLET, FILM COATED ORAL at 06:06

## 2021-01-01 RX ADMIN — GLYCOPYRROLATE 0.2 MG: 0.2 INJECTION INTRAMUSCULAR; INTRAVENOUS at 23:32

## 2021-01-01 RX ADMIN — SODIUM CHLORIDE, PRESERVATIVE FREE 10 ML: 5 INJECTION INTRAVENOUS at 21:24

## 2021-01-01 RX ADMIN — POTASSIUM PHOSPHATE, MONOBASIC AND POTASSIUM PHOSPHATE, DIBASIC 20 MMOL: 224; 236 INJECTION, SOLUTION, CONCENTRATE INTRAVENOUS at 11:07

## 2021-01-01 RX ADMIN — CHLORHEXIDINE GLUCONATE 15 ML: 1.2 RINSE ORAL at 09:23

## 2021-01-01 RX ADMIN — SODIUM BICARBONATE 1300 MG: 650 TABLET ORAL at 15:55

## 2021-01-01 RX ADMIN — SODIUM BICARBONATE 1300 MG: 650 TABLET ORAL at 13:31

## 2021-01-01 RX ADMIN — BRIMONIDINE TARTRATE 1 DROP: 1 SOLUTION/ DROPS OPHTHALMIC at 21:45

## 2021-01-01 RX ADMIN — SODIUM BICARBONATE 1300 MG: 650 TABLET ORAL at 17:43

## 2021-01-01 RX ADMIN — HEPARIN SODIUM 5000 UNITS: 5000 INJECTION INTRAVENOUS; SUBCUTANEOUS at 14:55

## 2021-01-01 RX ADMIN — CEFTRIAXONE SODIUM 1 G: 1 INJECTION, SOLUTION INTRAVENOUS at 21:01

## 2021-01-01 RX ADMIN — Medication: at 09:21

## 2021-01-01 RX ADMIN — BRIMONIDINE TARTRATE 1 DROP: 1 SOLUTION/ DROPS OPHTHALMIC at 10:48

## 2021-01-01 RX ADMIN — APIXABAN 2.5 MG: 2.5 TABLET, FILM COATED ORAL at 11:46

## 2021-01-01 RX ADMIN — DEXTROSE MONOHYDRATE 100 ML/HR: 50 INJECTION, SOLUTION INTRAVENOUS at 06:08

## 2021-01-01 RX ADMIN — ATORVASTATIN CALCIUM 20 MG: 20 TABLET, FILM COATED ORAL at 09:29

## 2021-01-01 RX ADMIN — VANCOMYCIN HYDROCHLORIDE 500 MG: 500 INJECTION, POWDER, LYOPHILIZED, FOR SOLUTION INTRAVENOUS at 16:27

## 2021-01-01 RX ADMIN — PHENYLEPHRINE HYDROCHLORIDE 200 MCG: 10 INJECTION INTRAVENOUS at 09:48

## 2021-01-01 RX ADMIN — SODIUM CHLORIDE 125 ML/HR: 4.5 INJECTION, SOLUTION INTRAVENOUS at 17:10

## 2021-01-01 RX ADMIN — SODIUM CHLORIDE, PRESERVATIVE FREE 10 ML: 5 INJECTION INTRAVENOUS at 20:22

## 2021-01-01 RX ADMIN — ATORVASTATIN CALCIUM 20 MG: 20 TABLET, FILM COATED ORAL at 12:01

## 2021-01-01 RX ADMIN — AMLODIPINE BESYLATE 5 MG: 5 TABLET ORAL at 07:05

## 2021-01-01 RX ADMIN — POTASSIUM CHLORIDE 10 MEQ: 7.46 INJECTION, SOLUTION INTRAVENOUS at 05:26

## 2021-01-01 RX ADMIN — VANCOMYCIN HYDROCHLORIDE 500 MG: 500 INJECTION, POWDER, LYOPHILIZED, FOR SOLUTION INTRAVENOUS at 14:47

## 2021-01-01 RX ADMIN — MORPHINE SULFATE 1 MG: 2 INJECTION, SOLUTION INTRAMUSCULAR; INTRAVENOUS at 19:59

## 2021-01-03 NOTE — OUTREACH NOTE
Sepsis Week 1 Survey      Responses   [facility] patient discharged from?  [place]   Does the patient have one of the following disease processes/diagnoses(primary or secondary)?  Sepsis   Week 1 attempt successful?  Yes   Call start time  1658   Call end time  1702   Discharge diagnosis  Sepsis secondary to UTI, toxic metabolic encephalopathy, ESRD   Is patient permission given to speak with other caregiver?  Yes   Meds reviewed with patient/caregiver?  Yes   Is the patient having any side effects they believe may be caused by any medication additions or changes?  No   Does the patient have all medications related to this admission filled (includes all antibiotics, inhalers, nebulizers,steroids,etc.)  No   What is keeping the patient from filling the prescriptions?  Script on hold per patient   Nursing Interventions  No intervention needed   Is the patient taking all medications as directed (includes completed medication regime)?  Yes   Does the patient have a primary care provider?   Yes   Does the patient have an appointment with their PCP within 7 days of discharge?  Yes   Has the patient kept scheduled appointments due by today?  N/A   What is the Home health agency?   [facility]   Has home health visited the patient within 72 hours of discharge?  Yes   Psychosocial issues?  No   Did the patient receive a copy of their discharge instructions?  Yes   Nursing interventions  Reviewed instructions with patient   What is the patient's perception of their health status since discharge?  Improving   Nursing interventions  Nurse provided patient education   Is the patient/caregiver able to teach back Sepsis?  S - Shivering,fever or very cold, S - Short of breath, S - Sleepy, difficult to arouse,confused, E - Extreme pain or generalized discomfort (worst ever,especially abdomen)   Nursing interventions  Nurse provided reassurance to patient, Nurse provided  patient education   Is patient/caregiver able to teach back steps to recovery at home?  Set small, achievable goals for return to baseline health, Rest and regain strength, Exercise as tolerated, Eat a balanced diet, Make a list of questions for PCP appoinment   Is the patient/caregiver able to teach back signs and symptoms of worsening condition:  Fever, Edema, Rapid heart rate (>90), Shortness of breath/rapid respiratory rate   If the patient is a current smoker, are they able to teach back resources for cessation?  Not a smoker   Is the patient/caregiver able to teach back the hierarchy of who to call/visit for symptoms/problems? PCP, Specialist, Home health nurse, Urgent Care, ED, 911  Yes   Additional teach back comments  He is doing some better, will continue to drink water.    Week 1 call completed?  Yes   Wrap up additional comments  Improving.          Irma Hodges RN

## 2021-01-11 NOTE — OUTREACH NOTE
Sepsis Week 2 Survey      Responses   [facility] [place] patient discharged from?  [place]   Does the patient have one of the following disease processes/diagnoses(primary or secondary)?  Sepsis   Week 2 attempt successful?  No   Unsuccessful attempts  Attempt 1          Irma Hodges RN

## 2021-02-04 PROBLEM — E87.6 HYPOKALEMIA: Status: ACTIVE | Noted: 2021-01-01

## 2021-02-04 PROBLEM — N17.9 AKI (ACUTE KIDNEY INJURY) (HCC): Status: ACTIVE | Noted: 2021-01-01

## 2021-02-04 PROBLEM — R77.8 ELEVATED TROPONIN: Status: ACTIVE | Noted: 2021-01-01

## 2021-02-04 PROBLEM — I10 UNCONTROLLED HYPERTENSION: Status: ACTIVE | Noted: 2021-01-01

## 2021-02-04 PROBLEM — N39.0 UTI (URINARY TRACT INFECTION), BACTERIAL: Status: ACTIVE | Noted: 2021-01-01

## 2021-02-04 PROBLEM — A49.9 UTI (URINARY TRACT INFECTION), BACTERIAL: Status: ACTIVE | Noted: 2021-01-01

## 2021-02-04 PROBLEM — G93.41 ACUTE METABOLIC ENCEPHALOPATHY: Status: ACTIVE | Noted: 2021-01-01

## 2021-02-04 NOTE — ED PROVIDER NOTES
MD ATTESTATION NOTE  Patient was placed in face mask in first look and the following protective measures were taken unless additional measures were taken and documented below in the ED course. Patient was wearing facemask when I entered the room and throughout our encounter. I wore full protective equipment throughout this patient encounter including a face mask, and gloves. Hand hygiene was performed before donning protective equipment and after removal when leaving the room.    The MOUSTAPHA and I have discussed this patient's history, physical exam, and treatment plan. I have reviewed the documentation and personally had a face to face interaction with the patient. I affirm the MOUSTAPHA documentation and agree with their diagnostics, findings, treatment, plan, and disposition.  The attached note describes my personal findings.    Gregorio Alejandro is a 65 y.o. male who presents to the ED c/o confusion.  History supplied by patient and patient's spouse.  Patient spouse reports that patient quit taking his insulin a week ago, states that he just does not want to take it.  Patient has had confusion, constant, no headache, no facial droop, no slurred speech, no word finding difficulty, no weakness or numbness.  She reports that patient has generalized weakness but no focal deficits.  She denies any runny nose congestion or cough, no vomiting, has been having diarrhea, approximately 1-2 episodes daily, no blood or mucus.  Patient denies any abdominal pain.  Patient denies any urinary symptoms.  Patient denies any fever or systemic symptoms.  Patient denies any complaints at present.  Patient is eating a sandwich during my initial evaluation.    On exam:  General: NAD  Head: NCAT  ENT: Extraocular motion intact, pupils equal and round reactive to light, moist mucous membranes  Neck: Supple, trachea midline  Cardiac: regular rate and rhythm  Lungs: Clear to auscultation bilaterally  Abdomen: Soft, nontender, no rebound  tenderness/guarding/rigidity  : Deferred to MOUSTAPHA  Extremities: Moves all extremities well, no peripheral edema  Neuro: Alert and oriented x3, extraocular motion intact, pupils are equal and round reactive to light, cranial nerves II through XII are grossly intact, normal speech, moves all extremities well, 5 out of 5 strength all 4 extremities, sensation intact light touch all 4 extremities, no ataxia.  Skin: Warm, dry    Medical Decision Making:  After the initial H&P, I discussed pertinent information from history and physical exam with patient/family.  Discussed differential diagnosis.  Discussed plan for ED evaluation/work-up/treatment.  All questions answered.  Patient/family is agreeable with plan.    ED Course as of Feb 06 0743   Thu Feb 04, 2021   1347 Patient presents with reported 1 week history of altered mental status, polyuria.  Differential diagnoses include but not limited to UTI, sepsis, electrolyte abnormality.    [EE]   1349 EKG independently viewed and contemporaneously interpreted by ED physician. Time: 1337.  Rate 72.  Interpretation: Atrial fibrillation, left axis deviation, nonspecific interventricular conduction delay, poor R wave progression, no ST elevation, slight ST depression with T wave inversion in high lateral leads.  Prior EKG appears similar although ST depression is more pronounced in high lateral leads.  Prior EKG showed significant T wave inversions and anterior lateral leads which are not present on this EKG.    [JG]   1630 Lactate: 1.3 [EE]   1631 Procalcitonin: 0.15 [EE]   1631 Magnesium: 1.9 [EE]   1646 This was 2.6, on 1/23/2021.   Creatinine(!): 2.46 [EE]   1646 Potassium ordered, I will consult nephrology   Potassium(!!): 2.2 [EE]   1719 I discussed case with Dr. Del Toro, nephrology.  He has reviewed patient's labs.  He recommends giving oral potassium if patient is able to swallow and is asymptomatic.  He recommends 40 mEq every 2 hours x 4.  The patient has already had 1  dose of IV potassium and 1 dose of oral potassium.  We will write for 2 more orders of oral potassium total.    [EE]   1730 Recheck of patient.  He is resting comfortably.  He is in no distress.  Stable vitals.    [EE]   1741 WBC: 6.57 [EE]   1741 Hemoglobin(!): 10.7 [EE]   1926 I discussed case with Dr. Baker, he agrees to admit the patient.    [EE]   1928 Updated patient on plan for admission.  He is in agreement.    [EE]      ED Course User Index  [EE] Kevan Ulloa, PA  [JG] Josafat Mckeon MD       Diagnosis  Final diagnoses:   Acute metabolic encephalopathy   Acute UTI   Hypokalemia   Accelerated hypertension        Josafat Mckeon MD  02/06/21 0705

## 2021-02-04 NOTE — ED NOTES
RN attempted to place IV without success x2.  IV therapy paged.     Marcus Rosen RN  02/04/21 3652

## 2021-02-04 NOTE — ED TRIAGE NOTES
Patient presents to er via wheelchair from wound clinic.  Patient arrives incontinent of urine and confused as to situation.  Patient was not seen by wound clinic but brought by staff and spouse to ER.  Patient was placed in face mask during first look triage.  Patient was wearing a face mask throughout encounter.  I wore personal protective equipment throughout the encounter.  Hand hygiene was performed before and after patient encounter.

## 2021-02-04 NOTE — ED PROVIDER NOTES
EMERGENCY DEPARTMENT ENCOUNTER    Room Number:  34/34  Date of encounter:  2/4/2021  PCP: Maya Ribeiro APRN  Historian: Patient, spouse      I used full protective equipment while examining this patient.  This includes face mask, gloves and protective eyewear.  I washed my hands before entering the room and immediately upon leaving the room      HPI:  Chief Complaint: Altered mental status  A complete HPI/ROS/PMH/PSH/SH/FH are unobtainable due to: Altered mental status    Context: Gregorio Alejandro is a 65 y.o. male who presents to the ED c/o approximate 1 week history of weakness, confusion, polyuria.  Patient lives at home with his wife.  Patient is essentially bedbound.  His wife states that she has had more difficulty transferring him lately.  He has been urinating more frequently.  He denies any chest pain, shortness of breath, abdominal pain, vomiting, fever.    Review of Medical Records  I reviewed patient's last admission from 12/18/2020 through 12/23/2020.  Patient was admitted for sepsis secondary to complicated UTI.    PAST MEDICAL HISTORY  Active Ambulatory Problems     Diagnosis Date Noted   • Complicated UTI (urinary tract infection) 12/18/2020   • Macrocytosis 12/19/2020   • Sepsis secondary to UTI (CMS/[facility]) 12/19/2020   • Metabolic encephalopathy 12/19/2020   • Hyperlipidemia    • Hypertension    • Monoclonal gammopathy    • ESRD (end stage renal disease) (CMS/[facility])    • DM2 (diabetes mellitus, type 2) (CMS/[facility])    • Abnormal CT of the abdomen    • Normal anion gap metabolic acidosis    • Anemia, chronic disease 12/19/2020   • Ventricular tachycardia (paroxysmal) (CMS/[facility]) 12/21/2020     Resolved Ambulatory Problems     Diagnosis Date Noted   • No Resolved Ambulatory Problems     Past Medical History:   Diagnosis Date   • Back pain    • CKD (chronic kidney disease)    • DM type 2 (diabetes mellitus, type 2) (CMS/[facility])    • ED (erectile dysfunction)    • SCOTTY (iron deficiency anemia)    •  Osteoarthritis          PAST SURGICAL HISTORY  History reviewed. No pertinent surgical history.      FAMILY HISTORY  History reviewed. No pertinent family history.      SOCIAL HISTORY  Social History     Socioeconomic History   • Marital status:      Spouse name: Not on file   • Number of children: Not on file   • Years of education: Not on file   • Highest education level: Not on file   Tobacco Use   • Smoking status: Never Smoker   Substance and Sexual Activity   • Alcohol use: Yes   • Drug use: Never   • Sexual activity: Defer         ALLERGIES  Patient has no known allergies.        REVIEW OF SYSTEMS  All systems reviewed and negative except for those discussed in HPI.       PHYSICAL EXAM    I have reviewed the triage vital signs and nursing notes.    ED Triage Vitals [02/04/21 1250]   Temp Heart Rate Resp BP SpO2   99.9 °F (37.7 °C) 67 16 164/87 97 %      Temp src Heart Rate Source Patient Position BP Location FiO2 (%)   Tympanic Monitor -- -- --       Physical Exam  GENERAL: Alert, oriented to self, chronically ill-appearing, not distressed  HENT: head atraumatic, no nuchal rigidity  EYES: no scleral icterus, EOMI  CV: regular rhythm, regular rate, no murmur  RESPIRATORY: normal effort, CTA  ABDOMEN: soft, obese, nontender  MUSCULOSKELETAL: Hyperpigmentation of lower extremities.  Mild diffuse swelling of lower extremities.  Multiple open sores.  No significant erythema or skin breakdown.  Neurovascularly intact distally.  Legs wrapped.  NEURO: alert, moves all extremities, follows commands  SKIN: warm, dry        LAB RESULTS  Recent Results (from the past 24 hour(s))   POC Glucose    Collection Time: 02/04/21 12:37 PM    Specimen: Blood   Result Value Ref Range    Glucose 160 (H) 70 - 130 mg/dL   ECG 12 Lead    Collection Time: 02/04/21  1:37 PM   Result Value Ref Range    QT Interval 445 ms   POC Glucose Once    Collection Time: 02/04/21  3:25 PM    Specimen: Blood   Result Value Ref Range    Glucose  167 (H) 70 - 130 mg/dL   Comprehensive Metabolic Panel    Collection Time: 02/04/21  3:51 PM    Specimen: Blood   Result Value Ref Range    Glucose 179 (H) 65 - 99 mg/dL    BUN 23 8 - 23 mg/dL    Creatinine 2.46 (H) 0.76 - 1.27 mg/dL    Sodium 139 136 - 145 mmol/L    Potassium 2.2 (C) 3.5 - 5.2 mmol/L    Chloride 106 98 - 107 mmol/L    CO2 21.4 (L) 22.0 - 29.0 mmol/L    Calcium 9.1 8.6 - 10.5 mg/dL    Total Protein 7.1 6.0 - 8.5 g/dL    Albumin 3.20 (L) 3.50 - 5.20 g/dL    ALT (SGPT) 7 1 - 41 U/L    AST (SGOT) 14 1 - 40 U/L    Alkaline Phosphatase 104 39 - 117 U/L    Total Bilirubin 0.9 0.0 - 1.2 mg/dL    eGFR  African Amer 32 (L) >60 mL/min/1.73    Globulin 3.9 gm/dL    A/G Ratio 0.8 g/dL    BUN/Creatinine Ratio 9.3 7.0 - 25.0    Anion Gap 11.6 5.0 - 15.0 mmol/L   Troponin    Collection Time: 02/04/21  3:51 PM    Specimen: Blood   Result Value Ref Range    Troponin T 0.110 (C) 0.000 - 0.030 ng/mL   Magnesium    Collection Time: 02/04/21  3:51 PM    Specimen: Blood   Result Value Ref Range    Magnesium 1.9 1.6 - 2.4 mg/dL   Light Blue Top    Collection Time: 02/04/21  3:51 PM   Result Value Ref Range    Extra Tube hold for add-on    Green Top (Gel)    Collection Time: 02/04/21  3:51 PM   Result Value Ref Range    Extra Tube Hold for add-ons.    Lavender Top    Collection Time: 02/04/21  3:51 PM   Result Value Ref Range    Extra Tube hold for add-on    Gold Top - SST    Collection Time: 02/04/21  3:51 PM   Result Value Ref Range    Extra Tube Hold for add-ons.    CBC Auto Differential    Collection Time: 02/04/21  3:51 PM    Specimen: Blood   Result Value Ref Range    WBC 6.57 3.40 - 10.80 10*3/mm3    RBC 4.64 4.14 - 5.80 10*6/mm3    Hemoglobin 10.7 (L) 13.0 - 17.7 g/dL    Hematocrit 34.7 (L) 37.5 - 51.0 %    MCV 74.8 (L) 79.0 - 97.0 fL    MCH 23.1 (L) 26.6 - 33.0 pg    MCHC 30.8 (L) 31.5 - 35.7 g/dL    RDW 24.2 (H) 12.3 - 15.4 %    RDW-SD 61.3 (H) 37.0 - 54.0 fl    Platelets 300 140 - 450 10*3/mm3    Neutrophil %  74.8 42.7 - 76.0 %    Lymphocyte % 17.0 (L) 19.6 - 45.3 %    Monocyte % 5.8 5.0 - 12.0 %    Eosinophil % 1.5 0.3 - 6.2 %    Basophil % 0.6 0.0 - 1.5 %    Neutrophils, Absolute 4.91 1.70 - 7.00 10*3/mm3    Lymphocytes, Absolute 1.12 0.70 - 3.10 10*3/mm3    Monocytes, Absolute 0.38 0.10 - 0.90 10*3/mm3    Eosinophils, Absolute 0.10 0.00 - 0.40 10*3/mm3    Basophils, Absolute 0.04 0.00 - 0.20 10*3/mm3   Procalcitonin    Collection Time: 02/04/21  3:51 PM    Specimen: Blood   Result Value Ref Range    Procalcitonin 0.15 0.00 - 0.25 ng/mL   Lactic Acid, Plasma    Collection Time: 02/04/21  3:51 PM    Specimen: Blood   Result Value Ref Range    Lactate 1.3 0.5 - 2.0 mmol/L   Urinalysis With Microscopic If Indicated (No Culture) - Urine, Catheter    Collection Time: 02/04/21  4:12 PM    Specimen: Urine, Catheter   Result Value Ref Range    Color, UA Yellow Yellow, Straw    Appearance, UA Clear Clear    pH, UA 6.5 5.0 - 8.0    Specific Gravity, UA 1.008 1.005 - 1.030    Glucose,  mg/dL (1+) (A) Negative    Ketones, UA Negative Negative    Bilirubin, UA Negative Negative    Blood, UA Small (1+) (A) Negative    Protein, UA >=300 mg/dL (3+) (A) Negative    Leuk Esterase, UA Small (1+) (A) Negative    Nitrite, UA Negative Negative    Urobilinogen, UA 0.2 E.U./dL 0.2 - 1.0 E.U./dL   Urinalysis, Microscopic Only - Urine, Catheter    Collection Time: 02/04/21  4:12 PM    Specimen: Urine, Catheter   Result Value Ref Range    RBC, UA 0-2 None Seen, 0-2 /HPF    WBC, UA Too Numerous to Count (A) None Seen, 0-2 /HPF    Bacteria, UA None Seen None Seen /HPF    Squamous Epithelial Cells, UA 0-2 None Seen, 0-2 /HPF    Hyaline Casts, UA 0-2 None Seen /LPF    Methodology Automated Microscopy        Ordered the above labs and independently reviewed the results.        RADIOLOGY  Xr Chest 1 View    Result Date: 2/4/2021  ONE VIEW PORTABLE CHEST  HISTORY: Weakness and confusion. Hypertension.  FINDINGS: The lungs are well-expanded and  clear and unchanged from 12/18/2020. The heart remains slightly enlarged.  This report was finalized on 2/4/2021 2:57 PM by Dr. Celso Clifford M.D.        I ordered the above noted radiological studies. Reviewed by me and discussed with radiologist.  See dictation for official radiology interpretation.    Critical Care  Performed by: Kevan Ulloa PA  Authorized by: Josafat Mckeon MD     Critical care provider statement:     Critical care time (minutes):  33    Critical care time was exclusive of:  Separately billable procedures and treating other patients    Critical care was necessary to treat or prevent imminent or life-threatening deterioration of the following conditions:  Circulatory failure, dehydration, metabolic crisis, renal failure and endocrine crisis    Critical care was time spent personally by me on the following activities:  Development of treatment plan with patient or surrogate, discussions with consultants, evaluation of patient's response to treatment, blood draw for specimens, examination of patient, interpretation of cardiac output measurements, obtaining history from patient or surrogate, ordering and performing treatments and interventions, ordering and review of laboratory studies, ordering and review of radiographic studies, pulse oximetry, re-evaluation of patient's condition and review of old charts          MEDICATIONS GIVEN IN ER    Medications   sodium chloride 0.9 % flush 10 mL (has no administration in time range)   potassium chloride (K-DUR,KLOR-CON) ER tablet 40 mEq (has no administration in time range)   potassium chloride (K-DUR,KLOR-CON) ER tablet 40 mEq (40 mEq Oral Given 2/4/21 1656)   potassium chloride 10 mEq in 100 mL IVPB (0 mEq Intravenous Stopped 2/4/21 1808)   cefTRIAXone (ROCEPHIN) IVPB 1 g (0 g Intravenous Stopped 2/4/21 1845)   labetalol (NORMODYNE,TRANDATE) injection 20 mg (20 mg Intravenous Given 2/4/21 1909)         PROGRESS, DATA ANALYSIS, CONSULTS, AND  MEDICAL DECISION MAKING    All labs have been independently reviewed by me.  All radiology studies have been reviewed by me and discussed with radiologist dictating the report.   EKG's independently viewed and interpreted by me.  Discussion below represents my analysis of pertinent findings related to patient's condition, differential diagnosis, treatment plan and final disposition.    I have discussed case with Dr. Mckeon, emergency room physician.  He has performed his own bedside examination and agrees with treatment plan.    ED Course as of Feb 04 1929   Thu Feb 04, 2021   1347 Patient presents with reported 1 week history of altered mental status, polyuria.  Differential diagnoses include but not limited to UTI, sepsis, electrolyte abnormality.    [EE]   1349 EKG independently viewed and contemporaneously interpreted by ED physician. Time: 1337.  Rate 72.  Interpretation: Atrial fibrillation, left axis deviation, nonspecific interventricular conduction delay, poor R wave progression, no ST elevation, slight ST depression with T wave inversion in high lateral leads.  Prior EKG appears similar although ST depression is more pronounced in high lateral leads.  Prior EKG showed significant T wave inversions and anterior lateral leads which are not present on this EKG.    [JG]   1630 Lactate: 1.3 [EE]   1631 Procalcitonin: 0.15 [EE]   1631 Magnesium: 1.9 [EE]   1646 This was 2.6, on 1/23/2021.   Creatinine(!): 2.46 [EE]   1646 Potassium ordered, I will consult nephrology   Potassium(!!): 2.2 [EE]   1719 I discussed case with Dr. Del Toro, nephrology.  He has reviewed patient's labs.  He recommends giving oral potassium if patient is able to swallow and is asymptomatic.  He recommends 40 mEq every 2 hours x 4.  The patient has already had 1 dose of IV potassium and 1 dose of oral potassium.  We will write for 2 more orders of oral potassium total.    [EE]   1730 Recheck of patient.  He is resting comfortably.  He is in  no distress.  Stable vitals.    [EE]   1741 WBC: 6.57 [EE]   1741 Hemoglobin(!): 10.7 [EE]   1926 I discussed case with Dr. Baker, he agrees to admit the patient.    [EE]   1928 Updated patient on plan for admission.  He is in agreement.    [EE]      ED Course User Index  [EE] Kevan Ulloa PA  [JG] Josafat Mckeon MD       AS OF 19:29 EST VITALS:    BP - (!) 199/109  HR - 72  TEMP - 97.9 °F (36.6 °C) (Oral)  O2 SATS - 99%        DIAGNOSIS  Final diagnoses:   Acute metabolic encephalopathy   Acute UTI   Hypokalemia   Accelerated hypertension         DISPOSITION  Admitted           Kevan Ulloa PA  02/04/21 1929

## 2021-02-04 NOTE — ED NOTES
IV therapy unable to obtain blood from IV placement.  Phlebotomist contacted for blood draw.     Marcus Rosen RN  02/04/21 5733

## 2021-02-05 NOTE — PROGRESS NOTES
Name: Gregorio Alejandro ADMIT: 2021   : 1955  PCP: Maya Ribeiro APRN    MRN: [id number] LOS: 1 days   AGE/SEX: 65 y.o. male  ROOM: [facility]   Subjective   Chief Complaint   Patient presents with   • Altered Mental Status      He would awaken but unsure how accurate of a historian he is. He basically said no to all my ROS and symptom questions including CP SOA NVD dysuria HA abdominal pain and peripheral pain. Would fall back to sleep after questions but was protecting airway and NAD.    Objective   Vital Signs  Temp:  [96.7 °F (35.9 °C)-98.9 °F (37.2 °C)] 98.6 °F (37 °C)  Heart Rate:  [67-91] 86  Resp:  [16-18] 18  BP: (166-204)/(100-131) 189/131  SpO2:  [96 %-100 %] 100 %  on   ;   Device (Oxygen Therapy): room air  Body mass index is 30.09 kg/m².    Physical Exam  Vitals signs and nursing note reviewed.   Constitutional:       General: He is not in acute distress.     Appearance: He is ill-appearing.   HENT:      Head: Normocephalic and atraumatic.   Eyes:      Conjunctiva/sclera: Conjunctivae normal.      Pupils: Pupils are equal, round, and reactive to light.   Neck:      Musculoskeletal: Neck supple. No muscular tenderness.   Cardiovascular:      Rate and Rhythm: Normal rate and regular rhythm.      Pulses: Normal pulses.   Pulmonary:      Effort: Pulmonary effort is normal.      Breath sounds: Decreased breath sounds present. No wheezing or rales.   Abdominal:      General: There is no distension.      Tenderness: There is no abdominal tenderness. There is no guarding or rebound.   Musculoskeletal:         General: Swelling present. No tenderness.      Comments: ble dressed   Skin:     General: Skin is warm and dry.   Neurological:      Mental Status: He is alert. He is disoriented.   Psychiatric:         Cognition and Memory: Cognition is impaired. Memory is impaired.         Results Review:       I reviewed the patient's new clinical results.     I reviewed imaging, agree with  interpretation.     I reviewed telemetry/EKG results, atrial flutter      Results from last 7 days   Lab Units 02/05/21  0312 02/04/21  1551   WBC 10*3/mm3 7.69 6.57   HEMOGLOBIN g/dL 9.8* 10.7*   PLATELETS 10*3/mm3 296 300     Results from last 7 days   Lab Units 02/05/21  0312 02/04/21  1551   SODIUM mmol/L 142 139   POTASSIUM mmol/L 2.4* 2.2*   CHLORIDE mmol/L 112* 106   CO2 mmol/L 19.3* 21.4*   BUN mg/dL 23 23   CREATININE mg/dL 2.55* 2.46*   GLUCOSE mg/dL 147* 179*   Estimated Creatinine Clearance: 37.5 mL/min (A) (by C-G formula based on SCr of 2.55 mg/dL (H)).  Results from last 7 days   Lab Units 02/05/21  0312 02/04/21  1551   CALCIUM mg/dL 8.6 9.1   ALBUMIN g/dL 2.50* 3.20*   MAGNESIUM mg/dL 1.8 1.9   PHOSPHORUS mg/dL 2.1*  --           [START ON 2/6/2021] amLODIPine, 10 mg, Oral, Q24H  amLODIPine, 5 mg, Oral, Q24H  ammonium lactate, , Topical, BID  aspirin, 81 mg, Oral, Daily  atorvastatin, 20 mg, Oral, Daily  cefTRIAXone, 1 g, Intravenous, Q24H  hydrALAZINE, 50 mg, Oral, Q8H  insulin lispro, 0-9 Units, Subcutaneous, TID AC  sodium chloride, 10 mL, Intravenous, Q12H  vancomycin, 20 mg/kg, Intravenous, Once  Vancomycin Pharmacy Intermittent Dosing, , Does not apply, Daily      Pharmacy to dose vancomycin,     NPO Diet    Assessment/Plan      Active Hospital Problems    Diagnosis  POA   • **Acute metabolic encephalopathy [G93.41]  Yes   • UTI (urinary tract infection), bacterial [N39.0, A49.9]  Yes   • Elevated troponin [R77.8]  Yes   • Hypokalemia [E87.6]  Yes   • Uncontrolled hypertension [I10]  Yes   • Anemia, chronic disease [D63.8]  Yes   • DM2 (diabetes mellitus, type 2) (CMS/HCC) [E11.9]  Yes   • Stage 4 chronic kidney disease (CMS/HCC) [N18.4]  Yes   • Hyperlipidemia [E78.5]  Yes   • Hypertension [I10]  Yes      Resolved Hospital Problems   No resolved problems to display.       · Metabolic Encephalopathy: With atrial flutter and decision on AC tbd will check CT head. He did not have endoscopy here  during prior admission in december although outpatient endoscopy with Dr Castellon looks like it was set up. Will ask for records. Continue antibiotics and will follow up Cx results.  · UTI: Cx ng but had many WBC on UA. With the possible contaminate vs GPC infection on BCx, have started vancomycin, repeated BCx, and will consult ID.  · HTN: Additional hydralazine prn ordered. Avoiding bblocker due to previous heart block issues. Cant use ace/arb with renal function. Limited options but if additional hydralazine not effective, could start clonidine. May have to be transdermal if not able to take PO. Alternative would be cardene drip.  · Hypokalemia: Severe. Replacement per nephrology.  · CKD4: DM Nephropathy. Nephrology following.  · Low Phos: Poor intake at home reported. Replacement per renal  · DM2: insulin  · AFlutter: Cardiology following.  · Disposition: TBD    Nixon Quinteros MD  [facility]  02/05/21  14:31 EST    Dictated portions using Dragon dictation software.    During the entire encounter, I was wearing recommended PPE including face mask and eye protection. Hand sanitization was performed prior to entering room and upon exit.

## 2021-02-05 NOTE — PLAN OF CARE
Goal Outcome Evaluation:  Plan of Care Reviewed With: patient  Progress: no change  Outcome Summary: Pt alert x 3 with intermediate confusion. B/P elevated thoughout the night with labetolol given with some improvement. Potassium this morning was 2.4. Orders to give potassium x 4 doses and recheck. Pt unable to get out of bed, purewick placed for monitoring of output. Cardiology consulted for elevated troponin and afib. Will see today. CTM

## 2021-02-05 NOTE — CONSULTS
Gregorio Alejandro   65 y.o.  male    LOS: 1 day   Patient Care Team:  Maya Ribeiro APRN as PCP - General (Family Medicine)      Subjective     Patient Complaints: Admitted with altered mental status and confusion  Cardiology consult requested due to elevated troponin and development of atrial flutter fib.  History of Present Illness: Patient not able to give me much of a history.  Apparently is immobile mostly bedbound and lives at home with his wife.  He was sent to the hospital for confusion altered mental status.  He knows he is in the hospital but he is not sure which hospital.  History of chronic kidney disease, diabetes, hypertension, hyperlipidemia.  Our group has seen the patient previously and he was here in December 2020.  See the assessment below and the plan below for further discussion of the cardiac issues.  Seems to deny any chest pain but I am not sure the accuracy of that history.  I cannot get much else from the patient at this point.  He has severe hypokalemia initial potassium 2.2 that is being corrected.  Nephrology has seen the patient and I reviewed their note.      Review of Systems:   Unable to obtain    Medication Review:   Current Facility-Administered Medications:   •  acetaminophen (TYLENOL) tablet 650 mg, 650 mg, Oral, Q4H PRN **OR** acetaminophen (TYLENOL) 160 MG/5ML solution 650 mg, 650 mg, Oral, Q4H PRN **OR** acetaminophen (TYLENOL) suppository 650 mg, 650 mg, Rectal, Q4H PRN, Robert Baker MD  •  [START ON 2/6/2021] amLODIPine (NORVASC) tablet 10 mg, 10 mg, Oral, Q24H, Miles Del Toro MD  •  aspirin EC tablet 81 mg, 81 mg, Oral, Daily, Robert Baker MD, 81 mg at 02/05/21 0818  •  atorvastatin (LIPITOR) tablet 20 mg, 20 mg, Oral, Daily, Roebrt Baker MD, 20 mg at 02/05/21 0818  •  cefTRIAXone (ROCEPHIN) IVPB 1 g, 1 g, Intravenous, Q24H, Robert Baker MD  •  dextrose (D50W) 25 g/ 50mL Intravenous Solution 25 g, 25 g, Intravenous, Q15 Min PRN, Robert Baker  MD  •  dextrose (GLUTOSE) oral gel 15 g, 15 g, Oral, Q15 Min PRN, Robert Baker MD  •  diphenhydrAMINE (BENADRYL) capsule 25 mg, 25 mg, Oral, Q6H PRN, Robert Baker MD  •  glucagon (human recombinant) (GLUCAGEN DIAGNOSTIC) injection 1 mg, 1 mg, Subcutaneous, Q15 Min PRN, Robert Baker MD  •  HYDROcodone-acetaminophen (NORCO)  MG per tablet 1 tablet, 1 tablet, Oral, Q6H PRN, Robert Baker MD  •  insulin lispro (humaLOG, ADMELOG) injection 0-9 Units, 0-9 Units, Subcutaneous, TID AC, Robert Baker MD  •  ondansetron (ZOFRAN) tablet 4 mg, 4 mg, Oral, Q6H PRN **OR** ondansetron (ZOFRAN) injection 4 mg, 4 mg, Intravenous, Q6H PRN, Robert Baker MD  •  potassium chloride (K-DUR,KLOR-CON) ER tablet 40 mEq, 40 mEq, Oral, Q2H, KateyMiles MD, 40 mEq at 02/05/21 0819  •  sodium chloride 0.9 % flush 10 mL, 10 mL, Intravenous, PRN, Josafat Mckeon MD  •  sodium chloride 0.9 % flush 10 mL, 10 mL, Intravenous, Q12H, Robert Baker MD, 10 mL at 02/05/21 0819  •  sodium chloride 0.9 % flush 10 mL, 10 mL, Intravenous, PRN, Robert Baker MD      History reviewed. No pertinent family history.  Social History     Socioeconomic History   • Marital status:      Spouse name: Not on file   • Number of children: Not on file   • Years of education: Not on file   • Highest education level: Not on file   Tobacco Use   • Smoking status: Never Smoker   • Smokeless tobacco: Never Used   Substance and Sexual Activity   • Alcohol use: Yes   • Drug use: Never   • Sexual activity: Defer     Objective   History reviewed. No pertinent surgical history.  Past Medical History:   Diagnosis Date   • Back pain    • CKD (chronic kidney disease)    • DM type 2 (diabetes mellitus, type 2) (CMS/[facility], [facility])    • ED (erectile dysfunction)    • Hyperlipidemia    • Hypertension    • SCOTTY (iron deficiency anemia)    • Monoclonal gammopathy    • Osteoarthritis        Vital Sign Min/Max for last 24 hours  Temp  Min: 96.7  °F (35.9 °C)  Max: 99.9 °F (37.7 °C)   BP  Min: 164/87  Max: 204/115    Pulse  Min: 67  Max: 91     Wt Readings from Last 3 Encounters:   02/04/21 109 kg (240 lb 11.9 oz)   12/22/20 114 kg (251 lb 8 oz)        Physical Exam:      General Appearance:   Somewhat lethargic but awakens,, in no acute distress   Head:    Normocephalic, without obvious abnormality, atraumatic   Eyes:            Conjunctivae normal, no   icterus   Neck:   No adenopathy, supple, trachea midline, no thyromegaly, no   carotid bruit, no JVD   Lungs:    Exam difficult due to limited cooperation but I did not hear any definite rales    Heart:    Irregular rhythm and normal rate, normal S1 and S2,            No murmur, no gallop, no rub, no click   Chest Wall:    No abnormalities observed   Abdomen:     Normal bowel sounds, no masses, no organomegaly, soft        non-tender, non-distended, no guarding, no rebound                tenderness   Rectal:     Deferred   Extremities:   No definite edema.        Skin:   No bleeding, bruising or rash   Neurologic:  Not well oriented        Results Review:     I reviewed the patient's new clinical results.  Potassium   Date Value Ref Range Status   02/05/2021 2.4 (C) 3.5 - 5.2 mmol/L Final     Creatinine   Date Value Ref Range Status   02/05/2021 2.55 (H) 0.76 - 1.27 mg/dL Final     Troponin T   Date Value Ref Range Status   02/04/2021 0.110 (C) 0.000 - 0.030 ng/mL Final      Echo EF Estimated  )No results found for: ECHOEFEST    Sodium Sodium   Date Value Ref Range Status   02/05/2021 142 136 - 145 mmol/L Final   02/04/2021 139 136 - 145 mmol/L Final      Potassium Potassium   Date Value Ref Range Status   02/05/2021 2.4 (C) 3.5 - 5.2 mmol/L Final   02/04/2021 2.2 (C) 3.5 - 5.2 mmol/L Final      Chloride Chloride   Date Value Ref Range Status   02/05/2021 112 (H) 98 - 107 mmol/L Final   02/04/2021 106 98 - 107 mmol/L Final      Bicarbonate No results found for: PLASMABICARB   BUN BUN   Date Value Ref Range  Status   02/05/2021 23 8 - 23 mg/dL Final   02/04/2021 23 8 - 23 mg/dL Final      Creatinine Creatinine   Date Value Ref Range Status   02/05/2021 2.55 (H) 0.76 - 1.27 mg/dL Final   02/04/2021 2.46 (H) 0.76 - 1.27 mg/dL Final      Calcium Calcium   Date Value Ref Range Status   02/05/2021 8.6 8.6 - 10.5 mg/dL Final   02/04/2021 9.1 8.6 - 10.5 mg/dL Final      Magnesium Magnesium   Date Value Ref Range Status   02/05/2021 1.8 1.6 - 2.4 mg/dL Final   02/04/2021 1.9 1.6 - 2.4 mg/dL Final        Results from last 7 days   Lab Units 02/05/21  0312   WBC 10*3/mm3 7.69   HEMOGLOBIN g/dL 9.8*   HEMATOCRIT % 31.5*   PLATELETS 10*3/mm3 296     Lab Results   Lab Value Date/Time    TROPONINT 0.110 (C) 02/04/2021 1551    TROPONINT 0.171 (C) 12/23/2020 0357    TROPONINT 0.141 (C) 12/22/2020 0549    TROPONINT 0.147 (C) 12/21/2020 1450    TROPONINT 0.128 (C) 12/21/2020 1223     Lab Results   Component Value Date    CHOL 108 12/22/2020     Lab Results   Component Value Date    HDL 49 12/22/2020     Lab Results   Component Value Date    LDL 42 12/22/2020     Lab Results   Component Value Date    TRIG 86 12/22/2020     No components found for: CHOLHDL       Assessment/ Plan      Acute metabolic encephalopathy    Hyperlipidemia    Hypertension    ESRD (end stage renal disease) (CMS/[facility])    DM2 (diabetes mellitus, type 2) (CMS/[facility])    Anemia, chronic disease    UTI (urinary tract infection), bacterial    Elevated troponin    Hypokalemia    Uncontrolled hypertension  History of nonsustained ventricular tachycardia  History of marked first-degree AV block and history of some junctional rhythm  History of syncope  Hypertensive cardiovascular disease, normal LVEF 59% on echo 12/20/2020  He has refused stress testing in the past.  History of monoclonal gammopathy  History of immobility  History of microcytic anemia and abnormal findings of the rectum.  Gastroenterology mentions need for EGD and colonoscopy when seen in December 2020.  Unsure  if he ever had those done.  Plan #1 his troponin yesterday was in the same range as previous troponins this seems to be a chronic elevation probably related to his renal failure.  Repolarization abnormalities on the EKG are little more prominent than previously but suspect this is related to the severe hypokalemia and probably not ischemia.  2.  There is mention of atrial fibrillation in the past.  He has a loop recorder and at one time is reported he had maybe 50 minutes of atrial fibrillation.  Currently atrial flutter fib.  Rate is okay now.  We need to avoid any beta-blockers or diltiazem.  He has a previous history of pauses, junctional rhythm and prominent first-degree AV block.  We will see how the rhythm does after the potassium is corrected.  At this point he does not appear to be a good candidate to start anticoagulation.  I am not certain if he ever had the EGD or colonoscopy done as mentioned above.  3.  Will follow.  Terrance Muhammad MD  02/05/21  10:07 EST      Time: 48 minutes

## 2021-02-05 NOTE — NURSING NOTE
Patients wife states he was having clear gel discharge from his rectum prior to admission. He has also had a tender abdomen for approx 1 week.

## 2021-02-05 NOTE — PROGRESS NOTES
Continued Stay Note  [facility]     Patient Name: Gregorio Alejandro  MRN: [id number]  Today's Date: 2/5/2021    Admit Date: 2/4/2021    Discharge Plan     Row Name 02/05/21 8327       Plan    Plan  SNF referrals to LTC-    Patient/Family in Agreement with Plan  yes    Plan Comments  CCP spoke with Laquita[facility] pt accepted pending [facility] pre-cert. CCP spoke with Hanna/[facility] pt also accepted pending PT eval. CCP spoke with Eduardo/[facility] pt accepted pending family choice. CCP called pts wife Patricia [phone] and explained all 3 facilities accepted. She was at the grocery store and she will call CCP back. Reji RNCCP        Discharge Codes    No documentation.             Daniela Curry, RN

## 2021-02-05 NOTE — PAYOR COMM NOTE
"Mor Alejandro (65 y.o. Male)     PLEASE SEE ATTACHED CLNS  FOR INPATIENT AUTH AND DAYS ADMITTED TO TELEM BED.     PLEASE CALL    OR  [phone] WITH INPT AUTH AND DAYS APPROVED.     THANK YOU    CHADD VARMA LPN CCP    Date of Birth Social Security Number Address Home Phone MRN    1955  [address] [address] [phone] [id number]    Advent[employer] Marital Status          Jain        Admission Date Admission Type Admitting Provider Attending Provider Department, Room/Bed    2/4/21 Emergency Robert Baker MD Baumann, Patrick D, MD [address], [address]    Discharge Date Discharge Disposition Discharge Destination                       Attending Provider: Nixon Quinteros MD    Allergies: No Known Allergies    Isolation: None   Infection: None   Code Status: CPR    Ht: 190.5 cm (75\")   Wt: 109 kg (240 lb 11.9 oz)    Admission Cmt: None   Principal Problem: Acute metabolic encephalopathy [G93.41]                 Active Insurance as of 2/4/2021     Primary Coverage     Payor Plan Insurance Group Employer/Plan Group    WELLCARE [facility] MEDICARE REPLACEMENT WELLCARE MEDICARE REPLACEMENT [id number]     Payor Plan Address Payor Plan Phone Number Payor Plan Fax Number Effective Dates    [address] [address] [phone]  8/13/2020 - None Entered    [facility] [id number]       Subscriber Name Subscriber Birth Date Member ID       Mor Alejandro 1955 [id number]           Secondary Coverage     Payor Plan Insurance Group Employer/Plan Group    [place]      Payor Plan Address Payor Plan Phone Number Payor Plan Fax Number Effective Dates    [address] [phone]  8/13/2020 - None Entered    [facility] [id number]       Subscriber Name Subscriber Birth Date Member ID       MOR ALEJANDRO 1955 [id number]                 Emergency Contacts      (Rel.) Home Phone Work Phone Mobile Phone    " RabiaPatricia Alejandro (Spouse) [phone] -- [phone]    Celia Eastman (Daughter) -- -- --               History & Physical      Robert Baker MD at 02/04/21 2331              Patient Name:  Gregorio Alejandro  YOB: 1955  MRN:  [id number]  Admit Date:  2/4/2021  Patient Care Team:  Maya Ribeiro APRN as PCP - General (Family Medicine)      Subjective   History Present Illness     Chief Complaint   Patient presents with   • Altered Mental Status         History of Present Illness     65-year-old male, with history of CKD, diabetes, hyperlipidemia, hypertension, presented to the hospital, primary complaint was altered mental status, patient was sent to the emergency room, with reported history of weakness, confusion.  Patient is bedbound, he lives with his wife at home.  Patient was sent to the hospital for further evaluation.  Patient is awake, intermittently confused.  He does have bilateral lower extremities which are currently wrapped in dressings.  He also has end-stage renal disease, he is dependent on hemodialysis.    Review of Systems     Unable to obtain because of altered mental status.    Personal History     Past Medical History:   Diagnosis Date   • Back pain    • CKD (chronic kidney disease)    • DM type 2 (diabetes mellitus, type 2) (CMS/[facility])    • ED (erectile dysfunction)    • Hyperlipidemia    • Hypertension    • SCOTYT (iron deficiency anemia)    • Monoclonal gammopathy    • Osteoarthritis      History reviewed. No pertinent surgical history.  History reviewed. No pertinent family history.  Social History     Tobacco Use   • Smoking status: Never Smoker   • Smokeless tobacco: Never Used   Substance Use Topics   • Alcohol use: Yes   • Drug use: Never     No current facility-administered medications on file prior to encounter.      Current Outpatient Medications on File Prior to Encounter   Medication Sig Dispense Refill   • amLODIPine (NORVASC) 5 MG tablet Take 1 tablet by  mouth Daily. 30 tablet 0   • aspirin 81 MG EC tablet Take 1 tablet by mouth Daily. 30 tablet 0   • atorvastatin (LIPITOR) 20 MG tablet Take 20 mg by mouth Daily.     • brimonidine (ALPHAGAN P) 0.1 % solution ophthalmic solution 1 drop 2 (two) times a day.     • dorzolamide (TRUSOPT) 2 % ophthalmic solution Administer 1 drop to the right eye 2 (two) times a day.     • furosemide (LASIX) 80 MG tablet Take 1 tablet by mouth Daily. 30 tablet 0   • HUMALOG KWIKPEN 100 UNIT/ML solution pen-injector INJECT 20 UNITS INTO THE SKIN TWICE DAILY 15 mL 0   • sodium bicarbonate 650 MG tablet Take 1 tablet by mouth 2 (Two) Times a Day. 60 tablet 0   • diphenhydrAMINE (BENADRYL) 25 mg capsule Take 25 mg by mouth Every 6 (Six) Hours As Needed for Itching or Allergies.     • HYDROcodone-acetaminophen (NORCO)  MG per tablet Take 1 tablet po every 4 hours PRN for moderate pain, take two tablets po every 4 hours PRN for severe pain, valid if faxed to [facility]   tablet 0   • WAL-DRYL ALLERGY 25 MG Take 1 capsule by mouth Every 6 (Six) Hours As Needed for itching. 30 capsule 0     No Known Allergies    Objective    Objective     Vital Signs  Temp:  [96.7 °F (35.9 °C)-99.9 °F (37.7 °C)] 96.7 °F (35.9 °C)  Heart Rate:  [67-91] 91  Resp:  [16] 16  BP: (164-204)/() 189/120  SpO2:  [96 %-100 %] 96 %  on   ;   Device (Oxygen Therapy): room air  Body mass index is 31.37 kg/m².    Physical Exam    General:   Patient is confused  Head and ENT: normocephalic and atraumatic.  Lungs: symmetric expansion and equal air entry bilaterally.  Heart: regular rate, rhythm, no murmurs.  Abdomen:  Abdominal surgical scar seen.  Extremities:   Bilateral lower extremities, currently in dressing.  Neurologic:     Unable to evaluate because of altered mental status  Psychiatry:   Unable to evaluate because of altered mental status  Skin:  Warm and no rash.    Results Review:  I reviewed the patient's new clinical results.  I reviewed the patient's  new imaging results and agree with the interpretation.  I reviewed the patient's other test results and agree with the interpretation  I personally viewed and interpreted the patient's EKG/Telemetry data  Discussed with ED provider.    Lab Results (last 24 hours)     Procedure Component Value Units Date/Time    POC Glucose [[id number]]  (Abnormal) Collected: 02/04/21 1237    Specimen: Blood Updated: 02/04/21 1246     Glucose 160 mg/dL     POC Glucose Once [[id number]]  (Abnormal) Collected: 02/04/21 1525    Specimen: Blood Updated: 02/04/21 1528     Glucose 167 mg/dL     CBC & Differential [[id number]]  (Abnormal) Collected: 02/04/21 1551    Specimen: Blood Updated: 02/04/21 1739    Narrative:      The following orders were created for panel order CBC & Differential.  Procedure                               Abnormality         Status                     ---------                               -----------         ------                     CBC Auto Differential[[id number]]        Abnormal            Final result                 Please view results for these tests on the individual orders.    Comprehensive Metabolic Panel [[id number]]  (Abnormal) Collected: 02/04/21 1551    Specimen: Blood Updated: 02/04/21 1633     Glucose 179 mg/dL      BUN 23 mg/dL      Creatinine 2.46 mg/dL      Sodium 139 mmol/L      Potassium 2.2 mmol/L      Chloride 106 mmol/L      CO2 21.4 mmol/L      Calcium 9.1 mg/dL      Total Protein 7.1 g/dL      Albumin 3.20 g/dL      ALT (SGPT) 7 U/L      AST (SGOT) 14 U/L      Alkaline Phosphatase 104 U/L      Total Bilirubin 0.9 mg/dL      eGFR  African Amer 32 mL/min/1.73      Globulin 3.9 gm/dL      A/G Ratio 0.8 g/dL      BUN/Creatinine Ratio 9.3     Anion Gap 11.6 mmol/L     Narrative:      GFR Normal >60  Chronic Kidney Disease <60  Kidney Failure <15      Troponin [[id number]]  (Abnormal) Collected: 02/04/21 1551    Specimen: Blood Updated: 02/04/21 1633     Troponin T 0.110 ng/mL     Narrative:       Troponin T Reference Range:  <= 0.03 ng/mL-   Negative for AMI  >0.03 ng/mL-     Abnormal for myocardial necrosis.  Clinicians would have to utilize clinical acumen, EKG, Troponin and serial changes to determine if it is an Acute Myocardial Infarction or myocardial injury due to an underlying chronic condition.       Results may be falsely decreased if patient taking Biotin.      Magnesium [[id number]]  (Normal) Collected: 02/04/21 1551    Specimen: Blood Updated: 02/04/21 1622     Magnesium 1.9 mg/dL     CBC Auto Differential [[id number]]  (Abnormal) Collected: 02/04/21 1551    Specimen: Blood Updated: 02/04/21 1739     WBC 6.57 10*3/mm3      RBC 4.64 10*6/mm3      Hemoglobin 10.7 g/dL      Hematocrit 34.7 %      MCV 74.8 fL      MCH 23.1 pg      MCHC 30.8 g/dL      RDW 24.2 %      RDW-SD 61.3 fl      Platelets 300 10*3/mm3      Neutrophil % 74.8 %      Lymphocyte % 17.0 %      Monocyte % 5.8 %      Eosinophil % 1.5 %      Basophil % 0.6 %      Neutrophils, Absolute 4.91 10*3/mm3      Lymphocytes, Absolute 1.12 10*3/mm3      Monocytes, Absolute 0.38 10*3/mm3      Eosinophils, Absolute 0.10 10*3/mm3      Basophils, Absolute 0.04 10*3/mm3     Blood Culture - Blood, Hand, Right [[id number]] Collected: 02/04/21 1551    Specimen: Blood from Hand, Right Updated: 02/04/21 1558    Blood Culture - Blood, Hand, Right [[id number]] Collected: 02/04/21 1551    Specimen: Blood from Hand, Right Updated: 02/04/21 1559    Procalcitonin [[id number]]  (Normal) Collected: 02/04/21 1551    Specimen: Blood Updated: 02/04/21 1628     Procalcitonin 0.15 ng/mL     Narrative:      As a Marker for Sepsis (Non-Neonates):   1. <0.5 ng/mL represents a low risk of severe sepsis and/or septic shock.  1. >2 ng/mL represents a high risk of severe sepsis and/or septic shock.    As a Marker for Lower Respiratory Tract Infections that require antibiotic therapy:  PCT on Admission     Antibiotic Therapy             6-12 Hrs later  > 0.5                 "Strongly Recommended            >0.25 - <0.5         Recommended  0.1 - 0.25           Discouraged                   Remeasure/reassess PCT  <0.1                 Strongly Discouraged          Remeasure/reassess PCT      As 28 day mortality risk marker: \"Change in Procalcitonin Result\" (> 80 % or <=80 %) if Day 0 (or Day 1) and Day 4 values are available. Refer to http://www.[employer] [facility]-pct-calculator.com/   Change in PCT <=80 %   A decrease of PCT levels below or equal to 80 % defines a positive change in PCT test result representing a higher risk for 28-day all-cause mortality of patients diagnosed with severe sepsis or septic shock.  Change in PCT > 80 %   A decrease of PCT levels of more than 80 % defines a negative change in PCT result representing a lower risk for 28-day all-cause mortality of patients diagnosed with severe sepsis or septic shock.                Results may be falsely decreased if patient taking Biotin.     Lactic Acid, Plasma [[id number]]  (Normal) Collected: 02/04/21 1551    Specimen: Blood Updated: 02/04/21 1621     Lactate 1.3 mmol/L     Urinalysis With Microscopic If Indicated (No Culture) - Urine, Catheter [[id number]]  (Abnormal) Collected: 02/04/21 1612    Specimen: Urine, Catheter Updated: 02/04/21 1634     Color, UA Yellow     Appearance, UA Clear     pH, UA 6.5     Specific Gravity, UA 1.008     Glucose,  mg/dL (1+)     Ketones, UA Negative     Bilirubin, UA Negative     Blood, UA Small (1+)     Protein, UA >=300 mg/dL (3+)     Leuk Esterase, UA Small (1+)     Nitrite, UA Negative     Urobilinogen, UA 0.2 E.U./dL    Urinalysis, Microscopic Only - Urine, Catheter [[id number]]  (Abnormal) Collected: 02/04/21 1612    Specimen: Urine, Catheter Updated: 02/04/21 1634     RBC, UA 0-2 /HPF      WBC, UA Too Numerous to Count /HPF      Bacteria, UA None Seen /HPF      Squamous Epithelial Cells, UA 0-2 /HPF      Hyaline Casts, UA 0-2 /LPF      Methodology Automated Microscopy    Urine Culture - " Urine, Urine, Catheter [[id number]] Collected: 02/04/21 1612    Specimen: Urine, Catheter Updated: 02/04/21 1712    COVID PRE-OP / PRE-PROCEDURE SCREENING ORDER (NO ISOLATION) - Swab, Nasopharynx [[id number]]  (Normal) Collected: 02/04/21 1810    Specimen: Swab from Nasopharynx Updated: 02/04/21 2206    Narrative:      The following orders were created for panel order COVID PRE-OP / PRE-PROCEDURE SCREENING ORDER (NO ISOLATION) - Swab, Nasopharynx.  Procedure                               Abnormality         Status                     ---------                               -----------         ------                     COVID-19,APTIMA PANTHER,...[[id number]]  Normal              Final result                 Please view results for these tests on the individual orders.    COVID-19,APTIMA PANTHER,[facility], NP/OP SWAB IN UTM/VTM/SALINE TRANSPORT MEDIA,24 HR TAT - Swab, Nasopharynx [[id number]]  (Normal) Collected: 02/04/21 1810    Specimen: Swab from Nasopharynx Updated: 02/04/21 2206     COVID19 Not Detected    Narrative:      Fact sheet for providers: https://www.fda.gov/media/593581/download     Fact sheet for patients: https://www.fda.gov/media/454261/download    Test performed by RT PCR.          Imaging Results (Last 24 Hours)     Procedure Component Value Units Date/Time    XR Chest 1 View [[id number]] Collected: 02/04/21 1452     Updated: 02/04/21 1500    Narrative:      ONE VIEW PORTABLE CHEST     HISTORY: Weakness and confusion. Hypertension.     FINDINGS: The lungs are well-expanded and clear and unchanged from  12/18/2020. The heart remains slightly enlarged.     This report was finalized on 2/4/2021 2:57 PM by Dr. Celso Clifford M.D.             Results for orders placed during the hospital encounter of 12/18/20   Adult Transthoracic Echo Complete W/ Cont if Necessary Per Protocol    Narrative · Calculated left ventricular EF = 59% Estimated left ventricular EF was   in agreement with the calculated left  ventricular EF.  · Left ventricular diastolic function is consistent with (grade I)   impaired relaxation.     Biatrial enlargement, with asymmetric LVH.  Speckled type pattern.    Consider amyloid.  No significant valve disease.         ECG 12 Lead   Final Result   HEART RATE= 72  bpm   RR Interval= 827  ms   UT Interval=   ms   P Horizontal Axis=   deg   P Front Axis=   deg   QRSD Interval= 122  ms   QT Interval= 445  ms   QRS Axis= -58  deg   T Wave Axis= 170  deg   - ABNORMAL ECG -   NSR WITH LONG UT   Nonspecific IVCD with LAD   Consider anterior infarct   Abnormal T, consider ischemia, diffuse leads   NO SIGNIFICANT CHANGE FROM PREVIOUS ECG   Electronically Signed By: Thaddeus Gardner ([facility]) 04-Feb-2021 13:55:42   Date and Time of Study: 2021-02-04 13:37:26           Assessment/Plan     Active Hospital Problems    Diagnosis  POA   • **Acute metabolic encephalopathy [G93.41]  Yes   • UTI (urinary tract infection), bacterial [N39.0, A49.9]  Yes   • Elevated troponin [R77.8]  Yes   • Hypokalemia [E87.6]  Yes   • SILVIA (acute kidney injury) (CMS/HCC) [N17.9]  Yes   • Uncontrolled hypertension [I10]  Yes   • Anemia, chronic disease [D63.8]  Yes   • DM2 (diabetes mellitus, type 2) (CMS/HCC) [E11.9]  Yes   • ESRD (end stage renal disease) (CMS/[facility]) [N18.6]  Yes   • Hyperlipidemia [E78.5]  Yes   • Hypertension [I10]  Yes      Resolved Hospital Problems   No resolved problems to display.      Assessment and plan  1. Acute metabolic encephalopathy, admit patient to medicine service.  Continue to monitor for mental status changes.  Patient does have underlying UTI.  2.  Acute UTI, follow urine cultures, continue Rocephin.  3.  Elevated troponin, in the setting of renal failure, cardiology evaluation requested.  4.  Hypokalemia, ER provider did discuss case with Nephrology, nephrology evaluation for further input.  Continue to recheck labs.  5.  Uncontrolled hypertension, titrate antihypertensives based on BP  response.  6.  Diabetes mellitus, continue Accu-Cheks and sliding scale insulin coverage.  7.   Hyperlipidemia, continue Lipitor.  8.  Code status is full code.  DVT prophylaxis.    Robert Baker MD  [facility]  02/04/21  23:31 EST    Electronically signed by Robert Baker MD at 02/04/21 2341          Emergency Department Notes      Veronica Villanueva, RN at 02/04/21 1249        Patient presents to er via wheelchair from wound clinic.  Patient arrives incontinent of urine and confused as to situation.  Patient was not seen by wound clinic but brought by staff and spouse to ER.  Patient was placed in face mask during first look triage.  Patient was wearing a face mask throughout encounter.  I wore personal protective equipment throughout the encounter.  Hand hygiene was performed before and after patient encounter.     Electronically signed by Veronica Villanueva, RN at 02/04/21 1250     Kevan Ulloa PA at 02/04/21 1347      Procedure Orders    1. Critical Care [[id number]] ordered by Kevan Ulloa PA                EMERGENCY DEPARTMENT ENCOUNTER    Room Number:  34/34  Date of encounter:  2/4/2021  PCP: Maya Ribeiro APRN  Historian: Patient, spouse      I used full protective equipment while examining this patient.  This includes face mask, gloves and protective eyewear.  I washed my hands before entering the room and immediately upon leaving the room      HPI:  Chief Complaint: Altered mental status  A complete HPI/ROS/PMH/PSH/SH/FH are unobtainable due to: Altered mental status    Context: Gregorio Alejandro is a 65 y.o. male who presents to the ED c/o approximate 1 week history of weakness, confusion, polyuria.  Patient lives at home with his wife.  Patient is essentially bedbound.  His wife states that she has had more difficulty transferring him lately.  He has been urinating more frequently.  He denies any chest pain, shortness of breath, abdominal pain, vomiting, fever.    Review of  Medical Records  I reviewed patient's last admission from 12/18/2020 through 12/23/2020.  Patient was admitted for sepsis secondary to complicated UTI.    PAST MEDICAL HISTORY  Active Ambulatory Problems     Diagnosis Date Noted   • Complicated UTI (urinary tract infection) 12/18/2020   • Macrocytosis 12/19/2020   • Sepsis secondary to UTI (CMS/[facility]) 12/19/2020   • Metabolic encephalopathy 12/19/2020   • Hyperlipidemia    • Hypertension    • Monoclonal gammopathy    • ESRD (end stage renal disease) (CMS/[facility])    • DM2 (diabetes mellitus, type 2) (CMS/[facility])    • Abnormal CT of the abdomen    • Normal anion gap metabolic acidosis    • Anemia, chronic disease 12/19/2020   • Ventricular tachycardia (paroxysmal) (CMS/[facility]) 12/21/2020     Resolved Ambulatory Problems     Diagnosis Date Noted   • No Resolved Ambulatory Problems     Past Medical History:   Diagnosis Date   • Back pain    • CKD (chronic kidney disease)    • DM type 2 (diabetes mellitus, type 2) (CMS/[facility])    • ED (erectile dysfunction)    • SCOTTY (iron deficiency anemia)    • Osteoarthritis          PAST SURGICAL HISTORY  History reviewed. No pertinent surgical history.      FAMILY HISTORY  History reviewed. No pertinent family history.      SOCIAL HISTORY  Social History     Socioeconomic History   • Marital status:      Spouse name: Not on file   • Number of children: Not on file   • Years of education: Not on file   • Highest education level: Not on file   Tobacco Use   • Smoking status: Never Smoker   Substance and Sexual Activity   • Alcohol use: Yes   • Drug use: Never   • Sexual activity: Defer         ALLERGIES  Patient has no known allergies.        REVIEW OF SYSTEMS  All systems reviewed and negative except for those discussed in HPI.       PHYSICAL EXAM    I have reviewed the triage vital signs and nursing notes.    ED Triage Vitals [02/04/21 1250]   Temp Heart Rate Resp BP SpO2   99.9 °F (37.7 °C) 67 16 164/87 97 %      Temp src Heart Rate Source  Patient Position BP Location FiO2 (%)   Tympanic Monitor -- -- --       Physical Exam  GENERAL: Alert, oriented to self, chronically ill-appearing, not distressed  HENT: head atraumatic, no nuchal rigidity  EYES: no scleral icterus, EOMI  CV: regular rhythm, regular rate, no murmur  RESPIRATORY: normal effort, CTA  ABDOMEN: soft, obese, nontender  MUSCULOSKELETAL: Hyperpigmentation of lower extremities.  Mild diffuse swelling of lower extremities.  Multiple open sores.  No significant erythema or skin breakdown.  Neurovascularly intact distally.  Legs wrapped.  NEURO: alert, moves all extremities, follows commands  SKIN: warm, dry        LAB RESULTS  Recent Results (from the past 24 hour(s))   POC Glucose    Collection Time: 02/04/21 12:37 PM    Specimen: Blood   Result Value Ref Range    Glucose 160 (H) 70 - 130 mg/dL   ECG 12 Lead    Collection Time: 02/04/21  1:37 PM   Result Value Ref Range    QT Interval 445 ms   POC Glucose Once    Collection Time: 02/04/21  3:25 PM    Specimen: Blood   Result Value Ref Range    Glucose 167 (H) 70 - 130 mg/dL   Comprehensive Metabolic Panel    Collection Time: 02/04/21  3:51 PM    Specimen: Blood   Result Value Ref Range    Glucose 179 (H) 65 - 99 mg/dL    BUN 23 8 - 23 mg/dL    Creatinine 2.46 (H) 0.76 - 1.27 mg/dL    Sodium 139 136 - 145 mmol/L    Potassium 2.2 (C) 3.5 - 5.2 mmol/L    Chloride 106 98 - 107 mmol/L    CO2 21.4 (L) 22.0 - 29.0 mmol/L    Calcium 9.1 8.6 - 10.5 mg/dL    Total Protein 7.1 6.0 - 8.5 g/dL    Albumin 3.20 (L) 3.50 - 5.20 g/dL    ALT (SGPT) 7 1 - 41 U/L    AST (SGOT) 14 1 - 40 U/L    Alkaline Phosphatase 104 39 - 117 U/L    Total Bilirubin 0.9 0.0 - 1.2 mg/dL    eGFR  African Amer 32 (L) >60 mL/min/1.73    Globulin 3.9 gm/dL    A/G Ratio 0.8 g/dL    BUN/Creatinine Ratio 9.3 7.0 - 25.0    Anion Gap 11.6 5.0 - 15.0 mmol/L   Troponin    Collection Time: 02/04/21  3:51 PM    Specimen: Blood   Result Value Ref Range    Troponin T 0.110 (C) 0.000 - 0.030  ng/mL   Magnesium    Collection Time: 02/04/21  3:51 PM    Specimen: Blood   Result Value Ref Range    Magnesium 1.9 1.6 - 2.4 mg/dL   Light Blue Top    Collection Time: 02/04/21  3:51 PM   Result Value Ref Range    Extra Tube hold for add-on    Green Top (Gel)    Collection Time: 02/04/21  3:51 PM   Result Value Ref Range    Extra Tube Hold for add-ons.    Lavender Top    Collection Time: 02/04/21  3:51 PM   Result Value Ref Range    Extra Tube hold for add-on    Gold Top - SST    Collection Time: 02/04/21  3:51 PM   Result Value Ref Range    Extra Tube Hold for add-ons.    CBC Auto Differential    Collection Time: 02/04/21  3:51 PM    Specimen: Blood   Result Value Ref Range    WBC 6.57 3.40 - 10.80 10*3/mm3    RBC 4.64 4.14 - 5.80 10*6/mm3    Hemoglobin 10.7 (L) 13.0 - 17.7 g/dL    Hematocrit 34.7 (L) 37.5 - 51.0 %    MCV 74.8 (L) 79.0 - 97.0 fL    MCH 23.1 (L) 26.6 - 33.0 pg    MCHC 30.8 (L) 31.5 - 35.7 g/dL    RDW 24.2 (H) 12.3 - 15.4 %    RDW-SD 61.3 (H) 37.0 - 54.0 fl    Platelets 300 140 - 450 10*3/mm3    Neutrophil % 74.8 42.7 - 76.0 %    Lymphocyte % 17.0 (L) 19.6 - 45.3 %    Monocyte % 5.8 5.0 - 12.0 %    Eosinophil % 1.5 0.3 - 6.2 %    Basophil % 0.6 0.0 - 1.5 %    Neutrophils, Absolute 4.91 1.70 - 7.00 10*3/mm3    Lymphocytes, Absolute 1.12 0.70 - 3.10 10*3/mm3    Monocytes, Absolute 0.38 0.10 - 0.90 10*3/mm3    Eosinophils, Absolute 0.10 0.00 - 0.40 10*3/mm3    Basophils, Absolute 0.04 0.00 - 0.20 10*3/mm3   Procalcitonin    Collection Time: 02/04/21  3:51 PM    Specimen: Blood   Result Value Ref Range    Procalcitonin 0.15 0.00 - 0.25 ng/mL   Lactic Acid, Plasma    Collection Time: 02/04/21  3:51 PM    Specimen: Blood   Result Value Ref Range    Lactate 1.3 0.5 - 2.0 mmol/L   Urinalysis With Microscopic If Indicated (No Culture) - Urine, Catheter    Collection Time: 02/04/21  4:12 PM    Specimen: Urine, Catheter   Result Value Ref Range    Color, UA Yellow Yellow, Straw    Appearance, UA Clear Clear     pH, UA 6.5 5.0 - 8.0    Specific Gravity, UA 1.008 1.005 - 1.030    Glucose,  mg/dL (1+) (A) Negative    Ketones, UA Negative Negative    Bilirubin, UA Negative Negative    Blood, UA Small (1+) (A) Negative    Protein, UA >=300 mg/dL (3+) (A) Negative    Leuk Esterase, UA Small (1+) (A) Negative    Nitrite, UA Negative Negative    Urobilinogen, UA 0.2 E.U./dL 0.2 - 1.0 E.U./dL   Urinalysis, Microscopic Only - Urine, Catheter    Collection Time: 02/04/21  4:12 PM    Specimen: Urine, Catheter   Result Value Ref Range    RBC, UA 0-2 None Seen, 0-2 /HPF    WBC, UA Too Numerous to Count (A) None Seen, 0-2 /HPF    Bacteria, UA None Seen None Seen /HPF    Squamous Epithelial Cells, UA 0-2 None Seen, 0-2 /HPF    Hyaline Casts, UA 0-2 None Seen /LPF    Methodology Automated Microscopy        Ordered the above labs and independently reviewed the results.        RADIOLOGY  Xr Chest 1 View    Result Date: 2/4/2021  ONE VIEW PORTABLE CHEST  HISTORY: Weakness and confusion. Hypertension.  FINDINGS: The lungs are well-expanded and clear and unchanged from 12/18/2020. The heart remains slightly enlarged.  This report was finalized on 2/4/2021 2:57 PM by Dr. Celso Clifford M.D.        I ordered the above noted radiological studies. Reviewed by me and discussed with radiologist.  See dictation for official radiology interpretation.    Critical Care  Performed by: Kevan Ulloa PA  Authorized by: Josafat Mckeon MD     Critical care provider statement:     Critical care time (minutes):  33    Critical care time was exclusive of:  Separately billable procedures and treating other patients    Critical care was necessary to treat or prevent imminent or life-threatening deterioration of the following conditions:  Circulatory failure, dehydration, metabolic crisis, renal failure and endocrine crisis    Critical care was time spent personally by me on the following activities:  Development of treatment plan with patient or  surrogate, discussions with consultants, evaluation of patient's response to treatment, blood draw for specimens, examination of patient, interpretation of cardiac output measurements, obtaining history from patient or surrogate, ordering and performing treatments and interventions, ordering and review of laboratory studies, ordering and review of radiographic studies, pulse oximetry, re-evaluation of patient's condition and review of old charts          MEDICATIONS GIVEN IN ER    Medications   sodium chloride 0.9 % flush 10 mL (has no administration in time range)   potassium chloride (K-DUR,KLOR-CON) ER tablet 40 mEq (has no administration in time range)   potassium chloride (K-DUR,KLOR-CON) ER tablet 40 mEq (40 mEq Oral Given 2/4/21 1656)   potassium chloride 10 mEq in 100 mL IVPB (0 mEq Intravenous Stopped 2/4/21 1808)   cefTRIAXone (ROCEPHIN) IVPB 1 g (0 g Intravenous Stopped 2/4/21 1845)   labetalol (NORMODYNE,TRANDATE) injection 20 mg (20 mg Intravenous Given 2/4/21 1909)         PROGRESS, DATA ANALYSIS, CONSULTS, AND MEDICAL DECISION MAKING    All labs have been independently reviewed by me.  All radiology studies have been reviewed by me and discussed with radiologist dictating the report.   EKG's independently viewed and interpreted by me.  Discussion below represents my analysis of pertinent findings related to patient's condition, differential diagnosis, treatment plan and final disposition.    I have discussed case with Dr. Mckeon, emergency room physician.  He has performed his own bedside examination and agrees with treatment plan.    ED Course as of Feb 04 1929   Thu Feb 04, 2021   1347 Patient presents with reported 1 week history of altered mental status, polyuria.  Differential diagnoses include but not limited to UTI, sepsis, electrolyte abnormality.    [EE]   1349 EKG independently viewed and contemporaneously interpreted by ED physician. Time: 1337.  Rate 72.  Interpretation: Atrial  fibrillation, left axis deviation, nonspecific interventricular conduction delay, poor R wave progression, no ST elevation, slight ST depression with T wave inversion in high lateral leads.  Prior EKG appears similar although ST depression is more pronounced in high lateral leads.  Prior EKG showed significant T wave inversions and anterior lateral leads which are not present on this EKG.    [JG]   1630 Lactate: 1.3 [EE]   1631 Procalcitonin: 0.15 [EE]   1631 Magnesium: 1.9 [EE]   1646 This was 2.6, on 1/23/2021.   Creatinine(!): 2.46 [EE]   1646 Potassium ordered, I will consult nephrology   Potassium(!!): 2.2 [EE]   1719 I discussed case with Dr. Del Toro, nephrology.  He has reviewed patient's labs.  He recommends giving oral potassium if patient is able to swallow and is asymptomatic.  He recommends 40 mEq every 2 hours x 4.  The patient has already had 1 dose of IV potassium and 1 dose of oral potassium.  We will write for 2 more orders of oral potassium total.    [EE]   1730 Recheck of patient.  He is resting comfortably.  He is in no distress.  Stable vitals.    [EE]   1741 WBC: 6.57 [EE]   1741 Hemoglobin(!): 10.7 [EE]   1926 I discussed case with Dr. Baker, he agrees to admit the patient.    [EE]   1928 Updated patient on plan for admission.  He is in agreement.    [EE]      ED Course User Index  [EE] Kevan Ulloa PA  [JG] Josafat Mckeon MD       AS OF 19:29 EST VITALS:    BP - (!) 199/109  HR - 72  TEMP - 97.9 °F (36.6 °C) (Oral)  O2 SATS - 99%        DIAGNOSIS  Final diagnoses:   Acute metabolic encephalopathy   Acute UTI   Hypokalemia   Accelerated hypertension         DISPOSITION  Admitted           Kevan Ulloa PA  02/04/21 1929      Electronically signed by Kevan Ulloa PA at 02/04/21 1929     Marcus Rosen RN at 02/04/21 1408        RN attempted to place IV without success x2.  IV therapy paged.     Marcus Rosen RN  02/04/21 1408      Electronically signed by Marcus Rosen RN  "at 02/04/21 1408     Marcus Rosen, RN at 02/04/21 1508        IV therapy unable to obtain blood from IV placement.  Phlebotomist contacted for blood draw.     Marcus Rosen RN  02/04/21 1508      Electronically signed by Marcus Rosen RN at 02/04/21 1508     Marli Phillips, RN at 02/04/21 1823        Bilateral LE wrapped with W-D kerlix.     Marli Phillips RN  02/04/21 1824      Electronically signed by Marli Phillips RN at 02/04/21 1824     Marli Phillips RN at 02/04/21 2100          \"  Nursing report ED to floor  Gregorio Alejandro  65 y.o.  male    HPI (triage note):   Chief Complaint   Patient presents with   • Altered Mental Status       Admitting doctor:   Robert Baker MD    Admitting diagnosis:   The primary encounter diagnosis was Acute metabolic encephalopathy. Diagnoses of Acute UTI, Hypokalemia, and Accelerated hypertension were also pertinent to this visit.    Code status:   Current Code Status     Date Active Code Status Order ID Comments User Context       2/4/2021 2046 CPR [id number]  Robert Baker MD ED     Advance Care Planning Activity      Questions for Current Code Status     Question Answer Comment    Code Status CPR     Medical Interventions (Level of Support Prior to Arrest) Full           Allergies:   Patient has no known allergies.    Weight:       02/04/21  1250   Weight: 114 kg (251 lb)       Most recent vitals:   Vitals:    02/04/21 1909 02/04/21 1935 02/04/21 1957 02/04/21 2005   BP:  (!) 192/112  (!) 181/111   BP Location:       Patient Position:       Pulse: 72 71 73 69   Resp:       Temp:       TempSrc:       SpO2:  99% 97% 99%   Weight:       Height:           Active LDAs/IV Access:   Lines, Drains & Airways    Active LDAs     Name:   Placement date:   Placement time:   Site:   Days:    Midline Catheter - Double Lumen 12/23/20 Right Basilic   12/23/20    1529     43    Peripheral IV 02/04/21 1506 Left Hand   02/04/21    1506    Hand   less than 1          "       Labs (abnormal labs have a star):   Labs Reviewed   COMPREHENSIVE METABOLIC PANEL - Abnormal; Notable for the following components:       Result Value    Glucose 179 (*)     Creatinine 2.46 (*)     Potassium 2.2 (*)     CO2 21.4 (*)     Albumin 3.20 (*)     eGFR   Amer 32 (*)     All other components within normal limits    Narrative:     GFR Normal >60  Chronic Kidney Disease <60  Kidney Failure <15     TROPONIN (IN-HOUSE) - Abnormal; Notable for the following components:    Troponin T 0.110 (*)     All other components within normal limits    Narrative:     Troponin T Reference Range:  <= 0.03 ng/mL-   Negative for AMI  >0.03 ng/mL-     Abnormal for myocardial necrosis.  Clinicians would have to utilize clinical acumen, EKG, Troponin and serial changes to determine if it is an Acute Myocardial Infarction or myocardial injury due to an underlying chronic condition.       Results may be falsely decreased if patient taking Biotin.     URINALYSIS W/ MICROSCOPIC IF INDICATED (NO CULTURE) - Abnormal; Notable for the following components:    Glucose,  mg/dL (1+) (*)     Blood, UA Small (1+) (*)     Protein, UA >=300 mg/dL (3+) (*)     Leuk Esterase, UA Small (1+) (*)     All other components within normal limits   CBC WITH AUTO DIFFERENTIAL - Abnormal; Notable for the following components:    Hemoglobin 10.7 (*)     Hematocrit 34.7 (*)     MCV 74.8 (*)     MCH 23.1 (*)     MCHC 30.8 (*)     RDW 24.2 (*)     RDW-SD 61.3 (*)     Lymphocyte % 17.0 (*)     All other components within normal limits   URINALYSIS, MICROSCOPIC ONLY - Abnormal; Notable for the following components:    WBC, UA Too Numerous to Count (*)     All other components within normal limits   POCT GLUCOSE FINGERSTICK - Abnormal; Notable for the following components:    Glucose 167 (*)     All other components within normal limits   MAGNESIUM - Normal   PROCALCITONIN - Normal    Narrative:     As a Marker for Sepsis (Non-Neonates):   1.  "<0.5 ng/mL represents a low risk of severe sepsis and/or septic shock.  1. >2 ng/mL represents a high risk of severe sepsis and/or septic shock.    As a Marker for Lower Respiratory Tract Infections that require antibiotic therapy:  PCT on Admission     Antibiotic Therapy             6-12 Hrs later  > 0.5                Strongly Recommended            >0.25 - <0.5         Recommended  0.1 - 0.25           Discouraged                   Remeasure/reassess PCT  <0.1                 Strongly Discouraged          Remeasure/reassess PCT      As 28 day mortality risk marker: \"Change in Procalcitonin Result\" (> 80 % or <=80 %) if Day 0 (or Day 1) and Day 4 values are available. Refer to http://www.[facility] City[facility]ink[facility]olar Holdingpct-calculator.com/   Change in PCT <=80 %   A decrease of PCT levels below or equal to 80 % defines a positive change in PCT test result representing a higher risk for 28-day all-cause mortality of patients diagnosed with severe sepsis or septic shock.  Change in PCT > 80 %   A decrease of PCT levels of more than 80 % defines a negative change in PCT result representing a lower risk for 28-day all-cause mortality of patients diagnosed with severe sepsis or septic shock.                Results may be falsely decreased if patient taking Biotin.    LACTIC ACID, PLASMA - Normal   BLOOD CULTURE   BLOOD CULTURE   URINE CULTURE   COVID PRE-OP / PRE-PROCEDURE SCREENING ORDER (NO ISOLATION)    Narrative:     The following orders were created for panel order COVID PRE-OP / PRE-PROCEDURE SCREENING ORDER (NO ISOLATION) - Swab, Nasopharynx.  Procedure                               Abnormality         Status                     ---------                               -----------         ------                     COVID-19,APTIMA PANTHER,...[[id number]]                      In process                   Please view results for these tests on the individual orders.   COVID-19,APTIMA PANTHERCRISTÓBAL IN-HOUSE,NP/OP SWAB IN UTM/VTM/SALINE " TRANSPORT MEDIA,24 HR TAT   RAINBOW DRAW    Narrative:     The following orders were created for panel order [place] Draw.  Procedure                               Abnormality         Status                     ---------                               -----------         ------                     Light Blue Top[[id number]]                                   Final result               Green Top (Gel)[[id number]]                                  Final result               Lavender Top[[id number]]                                     Final result               Gold Top - SST[[id number]]                                   Final result                 Please view results for these tests on the individual orders.   BLOOD GAS, VENOUS   POCT GLUCOSE FINGERSTICK   CBC AND DIFFERENTIAL    Narrative:     The following orders were created for panel order CBC & Differential.  Procedure                               Abnormality         Status                     ---------                               -----------         ------                     CBC Auto Differential[[id number]]        Abnormal            Final result                 Please view results for these tests on the individual orders.   LIGHT BLUE TOP   GREEN TOP   LAVENDER TOP   GOLD TOP - SST       EKG:   ECG 12 Lead   Final Result   HEART RATE= 72  bpm   RR Interval= 827  ms   RI Interval=   ms   P Horizontal Axis=   deg   P Front Axis=   deg   QRSD Interval= 122  ms   QT Interval= 445  ms   QRS Axis= -58  deg   T Wave Axis= 170  deg   - ABNORMAL ECG -   NSR WITH LONG RI   Nonspecific IVCD with LAD   Consider anterior infarct   Abnormal T, consider ischemia, diffuse leads   NO SIGNIFICANT CHANGE FROM PREVIOUS ECG   Electronically Signed By: Thaddeus Gardner ([facility]) 04-Feb-2021 13:55:42   Date and Time of Study: 2021-02-04 13:37:26          Meds given in ED:   Medications   sodium chloride 0.9 % flush 10 mL (has no administration in time range)   potassium chloride  (K-DUR,KLOR-CON) ER tablet 40 mEq (40 mEq Oral Given 2/4/21 2014)   cefTRIAXone (ROCEPHIN) IVPB 1 g (has no administration in time range)   sodium chloride 0.9 % flush 10 mL (has no administration in time range)   sodium chloride 0.9 % flush 10 mL (has no administration in time range)   acetaminophen (TYLENOL) tablet 650 mg (has no administration in time range)     Or   acetaminophen (TYLENOL) 160 MG/5ML solution 650 mg (has no administration in time range)     Or   acetaminophen (TYLENOL) suppository 650 mg (has no administration in time range)   ondansetron (ZOFRAN) tablet 4 mg (has no administration in time range)     Or   ondansetron (ZOFRAN) injection 4 mg (has no administration in time range)   potassium chloride (K-DUR,KLOR-CON) ER tablet 40 mEq (40 mEq Oral Given 2/4/21 1656)   potassium chloride 10 mEq in 100 mL IVPB (0 mEq Intravenous Stopped 2/4/21 1808)   cefTRIAXone (ROCEPHIN) IVPB 1 g (0 g Intravenous Stopped 2/4/21 1845)   labetalol (NORMODYNE,TRANDATE) injection 20 mg (20 mg Intravenous Given 2/4/21 1909)       Imaging results:  No radiology results for the last day    Ambulatory status:   - slide    Social issues:   Social History     Socioeconomic History   • Marital status:      Spouse name: Not on file   • Number of children: Not on file   • Years of education: Not on file   • Highest education level: Not on file   Tobacco Use   • Smoking status: Never Smoker   Substance and Sexual Activity   • Alcohol use: Yes   • Drug use: Never   • Sexual activity: Defer    Nursing report ED to floor       Marli Phillips, RN  02/04/21 2100      Electronically signed by Marli Phillips RN at 02/04/21 2100

## 2021-02-05 NOTE — DISCHARGE PLACEMENT REQUEST
"Mor Alejandro (65 y.o. Male)     Date of Birth Social Security Number Address Home Phone MRN    1955  [id number] [facility]  [address] [place] [address] [phone] [id number]    Anabaptism Marital Status          [place]        Admission Date Admission Type Admitting Provider Attending Provider Department, Room/Bed    2/4/21 Emergency Robert Baker MD Baumann, Patrick D, MD [address], [address]    Discharge Date Discharge Disposition Discharge Destination                       Attending Provider: Nixon Quinteros MD    Allergies: No Known Allergies    Isolation: None   Infection: None   Code Status: CPR    Ht: 190.5 cm (75\")   Wt: 109 kg (240 lb 11.9 oz)    Admission Cmt: None   Principal Problem: Acute metabolic encephalopathy [G93.41]                 Active Insurance as of 2/4/2021     Primary Coverage     Payor Plan Insurance Group Employer/Plan Group    [facility] WELL[employer] MEDICARE REPLACEMENT [id number]     Payor Plan Address Payor Plan Phone Number Payor Plan Fax Number Effective Dates    [address] [address] [phone]  8/13/2020 - None Entered    [facility] [id number]       Subscriber Name Subscriber Birth Date Member ID       Mor Alejandro 1955 [id number]           Secondary Coverage     Payor Plan Insurance Group Employer/Plan Group    [place]      Payor Plan Address Payor Plan Phone Number Payor Plan Fax Number Effective Dates    [address] [phone]  8/13/2020 - None Entered    [facility] [id number]       Subscriber Name Subscriber Birth Date Member ID       MOR ALEJANDRO 1955 [id number]                 Emergency Contacts      (Rel.) Home Phone Work Phone Mobile Phone    Patricia Alonzo (Spouse) [phone] -- [phone]    Celia Eastman (Daughter) -- -- --            {Outbreak/Travel/Exposure Documentation......;  Question Available Choices Patient Response   Outbreak " Screen: Do you currently have a new onset of the following symptoms?        Fever/Chils, Cough, Shortness of air, Loss of taste or smell, No, Unknown  No (02/04/21 2228)   Outbreak Screen: In the last 14 days, have you had contact with anyone who is ill, has show any of the symptoms listed above and/or has been diagnosis with the 2019 Novel Coronavirus? This includes any immediate household members but excludes any patients with whom you have been in contact within your normal work duties wearing proper PPE, if you are a healthcare worker.  Yes, No, Unknown              No (02/04/21 2228)   Outbreak Screen: Who was notified?    Free text  (not recorded)   Travel Screen: Have you traveled in the last month? If so, to what country have you traveled? If [place] what state? Yes, No, Unknown  List of all countries  List of all States No (02/04/21 2228)  (not recorded)  (not recorded)   Infection Risk: Do you currently have the following symptoms?  (If cough is selected, the Tuberculosis Screen is performed.) Cough, Fever, Rash, No No (02/04/21 2228)   Tuberculosis Screen: Do you have any of the following Tuberculosis Risks?  · Have you lived or spent time with anyone who had or may have TB?  · Have you lived in or visited any of the following areas for more than one month: [place], [place], [place], [place] or [place], the [place] or [place]?  · Do you have HIV/AIDS?  · Have you lived in or worked in a nursing home, homeless shelter, correctional facility, or substance abuse treatment facility?   · No    If Yes do you have any of the following symptoms? Yes responses display to the right    If Yes, symptoms listed are:  Cough greater than or equal to 3 weeks, Loss of appetite, Unexplained weight loss, Night sweats, Bloody sputum or hemoptysis, Hoarseness, Fever, Fatigue, Chest pain, No (not recorded)  (not recorded)   Exposure Screen: Have you been exposed to any of these contagious diseases in the last month?  Measles, Chickenpox, Meningitis, Pertussis, Whooping Cough, No No (02/04/21 9567)

## 2021-02-05 NOTE — PROGRESS NOTES
Continued Stay Note  [facility]     Patient Name: Gregorio Alejandro  MRN: [id number]  Today's Date: 2/5/2021    Admit Date: 2/4/2021    Discharge Plan     Row Name 02/05/21 1142       Plan    Plan  Follow up on referrals made.    Plan Comments  Spoke to Mimi with [facility] who stated that they are out of network with the patient’s insurance.  Eduardo with [facility] stated that the patient has been approved for a bed at [facility].  Follow up on referrals made to the following facilities and discuss with the patient's wife:  [facility], [facility], [facility], [facility], [facility], [facility], [facility], [facility] and [facility]. MEE Keen    Row Name 02/05/21 1122       Plan    Plan  Follow up on referrals made and discuss with patient's wife.    Provided Post Acute Provider List?  N/A    N/A Provider List Comment  The patient's wife was not provided with a HH/SNF list nor a print out of the HH/nursing home compare list from Medicare.gov as she is agreeable to local referrals being made and discuss accepting facilities with her.    Provided Post Acute Provider Quality & Resource List?  N/A    N/A Quality & Resource List Comment  The patient's wife was not provided with a HH/SNF list nor a print out of the HH/nursing home compare list from Medicare.gov as she is agreeable to local referrals being made and discuss accepting facilities with her.    Patient/Family in Agreement with Plan  yes    Plan Comments  Attempted to meet with the patient at bedside who indicated that he wanted CCP to call his wife.  Spoke to the patient’s wife Patricia Alonzo [phone]; explained role of CCP, verified facesheet, checked IMM and discussed discharge planning needs.  The patient’s wife states that she provides care for the patient at home, they reside in a 1st floor apartment, she uses a wheelchair as well and they use [facility] to get to  their appointments.  The patient’s wife states that the patient’s pharmacy is [facility] on [facility], she assists him with taking his medication, has no trouble affording his medication, denies any HH history, states that the patient has been to [facility] in the past for rehab, denies any POA documents, states that she is wanting short term rehab with the option for long term care for the patient upon d/c, is agreeable to local referrals being made and discuss accepting facilities with her.  The patient’s wife was informed that the insurance coverage is not guaranteed for an ambulance transport.  The following voicemail referrals were made:  Kim for [facility], Veronica for [facility] and [facility], Hanna for [facility] and [facility] and referrals were made to: Eduardo for [facility], Ana for [facility], Mimi for [facility] and [facility], Laquita for [place] and [facility] and Emanuel for [facility] who requested that referral information be faxed to him at [phone]. [facility] will follow up on referrals made and will discuss options with the patient’s spouse.  MEE Keen        Discharge Codes    No documentation.             MEE Wilson

## 2021-02-05 NOTE — NURSING NOTE
CWOCN consult- patient from home. He has a history of venous status and BLE edema, and pressure injury. I removed the dressings to the BLE. There was hydrofera blue on open areas of the skin. The main wound is on the left medial lower leg. There is a small open area on the left shin and a few other scattered very small wounds, blisters noted on the RLE. Skin is wrinkled from edema improving and very dry, scaly, flaky. There is a healed red epithelialized wound to the right posterior thigh and a pink epithelialized wound to the left ischium. Hard scaly skin to heels. His midline abdomen has scabs and a closed healed scar. There is also a scar from an ostomy takedown. There is scaring at the penis possibly from prior catheter? Patient is stiff and difficult to turn. Buttocks intact but patient is having loose bm.   Bath was given. Recommend to apply lac-hydrin lotion to BLE and a silicone border over the left medial lower leg wound. Skin should improve as lotion is applied and also reduce cracking and risk for new wounds. Legs can be open to air to keep them dry. Recommend barrier ointment for buttocks, perineum, prior wounds to ischium/thigh. Turn, low air loss mattress, elevate heels- heel boots placed.      02/05/21 1201   Wound 12/21/20 1135 Left lower leg Venous Ulcer   Placement Date/Time: 12/21/20 1135   Side: Left  Orientation: lower  Location: leg  Primary Wound Type: Venous Ulcer  Additional Comments: anterior and posterior leg wounds   Dressing Appearance moist drainage   Base moist;pink   Periwound dry;other (see comments)  (scaly skin)   Edges open   Wound Length (cm) 3 cm   Wound Width (cm) 3 cm   Wound Depth (cm) 0.1 cm   Drainage Characteristics/Odor serosanguineous   Drainage Amount small   Care, Wound cleansed with;sterile normal saline   Dressing Care dressing changed;border dressing;silicone

## 2021-02-05 NOTE — PROGRESS NOTES
Discharge Planning Assessment  [facility]     Patient Name: Gregorio Alejandro  MRN: [id number]  Today's Date: 2/5/2021    Admit Date: 2/4/2021    Discharge Needs Assessment     Row Name 02/05/21 1124       Living Environment    Lives With  spouse    Name(s) of Who Lives With Patient  wife Patricia Alonzo [phone]    Current Living Arrangements  home/apartment/condo    Primary Care Provided by  self;spouse/significant other    Provides Primary Care For  no one, unable/limited ability to care for self    Family Caregiver if Needed  spouse    Family Caregiver Names  wife Patricia Alonzo [phone]    Quality of Family Relationships  helpful;involved;supportive    Able to Return to Prior Arrangements  yes       Resource/Environmental Concerns    Resource/Environmental Concerns  none    Transportation Concerns  car, none       Transition Planning    Patient/Family Anticipated Services at Transition  skilled nursing    Transportation Anticipated  other (see comments) Ambulance or wheelchair van       Discharge Needs Assessment    Current Outpatient/Agency/Support Group  skilled nursing facility    Equipment Currently Used at Home  wheelchair    Concerns to be Addressed  discharge planning    Anticipated Changes Related to Illness  inability to care for self    Outpatient/Agency/Support Group Needs  skilled nursing facility    Discharge Facility/Level of Care Needs  nursing facility, skilled    Current Discharge Risk  physical impairment        Discharge Plan     Row Name 02/05/21 1122       Plan    Plan  Follow up on referrals made and discuss with patient's wife.    Provided Post Acute Provider List?  N/A    N/A Provider List Comment  The patient's wife was not provided with a HH/SNF list nor a print out of the HH/nursing home compare list from Medicare.gov as she is agreeable to local referrals being made and discuss accepting facilities with her.    Provided Post Acute Provider  Quality & Resource List?  N/A    N/A Quality & Resource List Comment  The patient's wife was not provided with a HH/SNF list nor a print out of the HH/nursing home compare list from Medicare.gov as she is agreeable to local referrals being made and discuss accepting facilities with her.    Patient/Family in Agreement with Plan  yes    Plan Comments  Attempted to meet with the patient at bedside who indicated that he wanted CCP to call his wife.  Spoke to the patient’s wife Patricia Alonzo [phone]; explained role of CCP, verified facesheet, checked IMM and discussed discharge planning needs.  The patient’s wife states that she provides care for the patient at home, they reside in a 1st floor apartment, she uses a wheelchair as well and they use [facility] to get to their appointments.  The patient’s wife states that the patient’s pharmacy is [facility], she assists him with taking his medication, has no trouble affording his medication, denies any HH history, states that the patient has been to [facility] in the past for rehab, denies any POA documents, states that she is wanting short term rehab with the option for long term care for the patient upon d/c, is agreeable to local referrals being made and discuss accepting facilities with her.  The patient’s wife was informed that the insurance coverage is not guaranteed for an ambulance transport.  The following voicemail referrals were made:  Kim for [facility], Veronica for [facility] and [facility], Hanna for [facility] and [facility] and referrals were made to: Eduardo for [facility], Ana for [facility], Mimi for [facility] and [facility], Laquita for [facility] and Mar[facility] Quach and Emanuel for [facility] who requested that referral information be faxed to him at [phone]. CCP will follow up on referrals made and will discuss options with the patient’s spouse.  Alix Avila,  CSW        Continued Care and Services - Admitted Since 2/4/2021     Destination     Service Provider Request Status Selected Services Address Phone Fax Patient Preferred    [facility]  Accepted N/A [address], [facility] [id number] [phone] --    [facility]  Pending - Request Sent N/A [address] , [facility] [id number] [phone] --    [facility]  Pending - Request Sent N/A [address] , [facility] [id number] [phone] --    [facility]  Pending - Request Sent N/A [address] [facility] [id number] [phone] --    [facility]  Pending - Request Sent N/A [address] [facility] [id number] [phone] --    [facility]  Pending - [facility] Sent N/A [address] [facility] [id number] [phone] --    [facility]  Pending - Request Sent N/A [address] [facility] [address] [phone] --    [facility]  Pending - Request Sent N/A [address] [facility] [address] [phone] --    [facility]  Pending - Request Sent N/A [address] , [facility] [id number] [phone] --    [facility]  Pending - Request Sent N/A [address] [facility] [address] [phone] --    [facility] [place]  Declined  Patient Insurance Not Accepted N/A [address] [facility] [address] [phone] --    [facility]  Declined  Patient Insurance Not Accepted N/A [address] [facility] [address] [phone] --            Selected Continued Care - Prior Encounters Includes selections from prior encounters from 11/6/2020 to 2/5/2021    Discharged on 12/23/2020 Admission date: 12/18/2020 - Discharge disposition: Home-Health Care [facility]     Dialysis/Infusion     Service Provider Selected Services Address Phone Fax Patient Preferred    [facility] INFUSION  Infusion and IV Therapy [address], [facility] [address] [phone] --          Home Medical Care     Service Provider Selected Services Address Phone Fax Patient Preferred    [facility] [place]  Home Health Services [address] [place] [address] [phone] --                      Demographic Summary     Row Name 02/05/21 1123       General Information    Admission Type  inpatient    Arrived From  home    Referral Source  admission list    Reason for Consult  discharge planning    Preferred Language  English     Used During This Interaction  no       Contact Information    Permission Granted to Share Info With  family/designee        Functional Status     Row Name 02/05/21 1124       Functional Status    Usual Activity Tolerance  poor    Current Activity Tolerance  poor       Functional Status, IADL    Medications  completely dependent    Meal Preparation  completely dependent    Housekeeping  completely dependent    Laundry  completely dependent    Shopping  completely dependent       Mental Status    General Appearance WDL  WDL       Mental Status Summary    Recent Changes in Mental Status/Cognitive Functioning  unable to assess        Psychosocial    No documentation.       Abuse/Neglect    No documentation.       Legal    No documentation.       Substance Abuse    No documentation.       Patient Forms    No documentation.           MEE Wilson

## 2021-02-05 NOTE — PROGRESS NOTES
"Pharmacokinetic Consult - Vancomycin Dosing (Follow-up Note)    Gregorio Alejandro is on day 1 pharmacy to dose vancomycin for bacteremia.  Pharmacy dosing vancomycin per Dr Quinteros's request.   Goal AUC= 400-600   Duration of therapy: 7 days  Is Patient on Dialysis or Renal Replacement: No but poor renal function so will dose intermittently    Relevant clinical data and objective history reviewed:  65 y.o. male 190.5 cm (75\") 109 kg (240 lb 11.9 oz)    Creatinine   Date Value Ref Range Status   02/05/2021 2.55 (H) 0.76 - 1.27 mg/dL Final   02/04/2021 2.46 (H) 0.76 - 1.27 mg/dL Final   12/23/2020 3.26 (H) 0.76 - 1.27 mg/dL Final   02/27/2020 6.7 (H) 0.7 - 1.5 mg/dL Final   02/27/2020 6.5 (H) 0.7 - 1.5 mg/dL Final   02/18/2020 7.2 (H) 0.7 - 1.5 mg/dL Final     BUN   Date Value Ref Range Status   02/05/2021 23 8 - 23 mg/dL Final   02/27/2020 57 (H) 7 - 20 mg/dL Final     Estimated Creatinine Clearance: 37.5 mL/min (A) (by C-G formula based on SCr of 2.55 mg/dL (H)).    Lab Results   Component Value Date    WBC 7.69 02/05/2021     Temp Readings from Last 3 Encounters:   02/05/21 98.6 °F (37 °C) (Oral)   12/23/20 97.3 °F (36.3 °C) (Oral)     Cultures: pending       Anti-Infectives (From admission, onward)      Ordered     Dose/Rate Route Frequency Start Stop    02/04/21 2042  cefTRIAXone (ROCEPHIN) IVPB 1 g     Ordering Provider: Robert Baker MD    1 g  100 mL/hr over 30 Minutes Intravenous Every 24 Hours 02/05/21 1800 02/09/21 1759    02/05/21 1330  vancomycin 2250 mg/500 mL 0.9% NS IVPB ([facility])     Ordering Provider: Nixon Quinteros MD    20 mg/kg × 109 kg Intravenous Once 02/05/21 1430      02/05/21 1330  Vancomycin Pharmacy Intermittent Dosing     Ordering Provider: Nixon Quinteros MD     Does not apply Daily 02/05/21 1430 02/12/21 0859    02/05/21 1327  Pharmacy to dose vancomycin     Ordering Provider: Nixon Quinteros MD     Does not apply Continuous PRN 02/05/21 1327 02/12/21 1326    02/04/21 1744  " cefTRIAXone (ROCEPHIN) IVPB 1 g     Ordering Provider: Kevan Ulloa PA    1 g  100 mL/hr over 30 Minutes Intravenous Once 02/04/21 1746 02/04/21 1845           No results found for: VANCOTROUGH  No results found for: VANCORANDOM    Dosing History      Assessment/Plan  Will dose based on levels due to poor renal function. Will give a one tome dose of 2250mg vanc today and check a random level in the morning. Pharmacy will continue to follow daily while on vancomycin and adjust as needed.     Francisco Javier Quijano PharmD

## 2021-02-05 NOTE — ED NOTES
"\"  Nursing report ED to floor  Gregorio Alejandro  65 y.o.  male    HPI (triage note):   Chief Complaint   Patient presents with   • Altered Mental Status       Admitting doctor:   Robert Baker MD    Admitting diagnosis:   The primary encounter diagnosis was Acute metabolic encephalopathy. Diagnoses of Acute UTI, Hypokalemia, and Accelerated hypertension were also pertinent to this visit.    Code status:   Current Code Status     Date Active Code Status Order ID Comments User Context       2/4/2021 2046 CPR [id number]  Robert Baker MD ED     Advance Care Planning Activity      Questions for Current Code Status     Question Answer Comment    Code Status CPR     Medical Interventions (Level of Support Prior to Arrest) Full           Allergies:   Patient has no known allergies.    Weight:       02/04/21  1250   Weight: 114 kg (251 lb)       Most recent vitals:   Vitals:    02/04/21 1909 02/04/21 1935 02/04/21 1957 02/04/21 2005   BP:  (!) 192/112  (!) 181/111   BP Location:       Patient Position:       Pulse: 72 71 73 69   Resp:       Temp:       TempSrc:       SpO2:  99% 97% 99%   Weight:       Height:           Active LDAs/IV Access:   Lines, Drains & Airways    Active LDAs     Name:   Placement date:   Placement time:   Site:   Days:    Midline Catheter - Double Lumen 12/23/20 Right Basilic   12/23/20    1529     43    Peripheral IV 02/04/21 1506 Left Hand   02/04/21    1506    Hand   less than 1                Labs (abnormal labs have a star):   Labs Reviewed   COMPREHENSIVE METABOLIC PANEL - Abnormal; Notable for the following components:       Result Value    Glucose 179 (*)     Creatinine 2.46 (*)     Potassium 2.2 (*)     CO2 21.4 (*)     Albumin 3.20 (*)     eGFR   Amer 32 (*)     All other components within normal limits    Narrative:     GFR Normal >60  Chronic Kidney Disease <60  Kidney Failure <15     TROPONIN (IN-HOUSE) - Abnormal; Notable for the following components:    Troponin T 0.110 (*)  "    All other components within normal limits    Narrative:     Troponin T Reference Range:  <= 0.03 ng/mL-   Negative for AMI  >0.03 ng/mL-     Abnormal for myocardial necrosis.  Clinicians would have to utilize clinical acumen, EKG, Troponin and serial changes to determine if it is an Acute Myocardial Infarction or myocardial injury due to an underlying chronic condition.       Results may be falsely decreased if patient taking Biotin.     URINALYSIS W/ MICROSCOPIC IF INDICATED (NO CULTURE) - Abnormal; Notable for the following components:    Glucose,  mg/dL (1+) (*)     Blood, UA Small (1+) (*)     Protein, UA >=300 mg/dL (3+) (*)     Leuk Esterase, UA Small (1+) (*)     All other components within normal limits   CBC WITH AUTO DIFFERENTIAL - Abnormal; Notable for the following components:    Hemoglobin 10.7 (*)     Hematocrit 34.7 (*)     MCV 74.8 (*)     MCH 23.1 (*)     MCHC 30.8 (*)     RDW 24.2 (*)     RDW-SD 61.3 (*)     Lymphocyte % 17.0 (*)     All other components within normal limits   URINALYSIS, MICROSCOPIC ONLY - Abnormal; Notable for the following components:    WBC, UA Too Numerous to Count (*)     All other components within normal limits   POCT GLUCOSE FINGERSTICK - Abnormal; Notable for the following components:    Glucose 167 (*)     All other components within normal limits   MAGNESIUM - Normal   PROCALCITONIN - Normal    Narrative:     As a Marker for Sepsis (Non-Neonates):   1. <0.5 ng/mL represents a low risk of severe sepsis and/or septic shock.  1. >2 ng/mL represents a high risk of severe sepsis and/or septic shock.    As a Marker for Lower Respiratory Tract Infections that require antibiotic therapy:  PCT on Admission     Antibiotic Therapy             6-12 Hrs later  > 0.5                Strongly Recommended            >0.25 - <0.5         Recommended  0.1 - 0.25           Discouraged                   Remeasure/reassess PCT  <0.1                 Strongly Discouraged           "Remeasure/reassess PCT      As 28 day mortality risk marker: \"Change in Procalcitonin Result\" (> 80 % or <=80 %) if Day 0 (or Day 1) and Day 4 values are available. Refer to http://www.[facility]-pct-calculator.com/   Change in PCT <=80 %   A decrease of PCT levels below or equal to 80 % defines a positive change in PCT test result representing a higher risk for 28-day all-cause mortality of patients diagnosed with severe sepsis or septic shock.  Change in PCT > 80 %   A decrease of PCT levels of more than 80 % defines a negative change in PCT result representing a lower risk for 28-day all-cause mortality of patients diagnosed with severe sepsis or septic shock.                Results may be falsely decreased if patient taking Biotin.    LACTIC ACID, PLASMA - Normal   BLOOD CULTURE   BLOOD CULTURE   URINE CULTURE   COVID PRE-OP / PRE-PROCEDURE SCREENING ORDER (NO ISOLATION)    Narrative:     The following orders were created for panel order COVID PRE-OP / PRE-PROCEDURE SCREENING ORDER (NO ISOLATION) - Swab, Nasopharynx.  Procedure                               Abnormality         Status                     ---------                               -----------         ------                     COVID-19,APTIMA PANTHER,...[[id number]]                      In process                   Please view results for these tests on the individual orders.   COVID-19,APTIMA PANTHER,CRISTÓBAL IN-HOUSE,NP/OP SWAB IN UTM/VTM/SALINE TRANSPORT MEDIA,24 HR TAT   RAINBOW DRAW    Narrative:     The following orders were created for panel order [place] Draw.  Procedure                               Abnormality         Status                     ---------                               -----------         ------                     Light Blue Top[[id number]]                                   Final result               Green Top (Gel)[[id number]]                                  Final result               Lavender Top[[id number]]                            "          Final result               [facility][[id number]]                                   Final result                 Please view results for these tests on the individual orders.   BLOOD GAS, VENOUS   POCT GLUCOSE FINGERSTICK   CBC AND DIFFERENTIAL    Narrative:     The following orders were created for panel order CBC & Differential.  Procedure                               Abnormality         Status                     ---------                               -----------         ------                     CBC Auto Differential[[id number]]        Abnormal            Final result                 Please view results for these tests on the individual orders.   LIGHT BLUE TOP   GREEN TOP   LAVENDER TOP   [facility]       EKG:   ECG 12 Lead   Final Result   HEART RATE= 72  bpm   RR Interval= 827  ms   NH Interval=   ms   P Horizontal Axis=   deg   P Front Axis=   deg   QRSD Interval= 122  ms   QT Interval= 445  ms   QRS Axis= -58  deg   T Wave Axis= 170  deg   - ABNORMAL ECG -   NSR WITH LONG NH   Nonspecific IVCD with LAD   Consider anterior infarct   Abnormal T, consider ischemia, diffuse leads   NO SIGNIFICANT CHANGE FROM PREVIOUS ECG   Electronically Signed By: Thaddeus Gardner ([facility]) 04-Feb-2021 13:55:42   Date and Time of Study: 2021-02-04 13:37:26          Meds given in ED:   Medications   sodium chloride 0.9 % flush 10 mL (has no administration in time range)   potassium chloride (K-DUR,KLOR-CON) ER tablet 40 mEq (40 mEq Oral Given 2/4/21 2014)   cefTRIAXone (ROCEPHIN) IVPB 1 g (has no administration in time range)   sodium chloride 0.9 % flush 10 mL (has no administration in time range)   sodium chloride 0.9 % flush 10 mL (has no administration in time range)   acetaminophen (TYLENOL) tablet 650 mg (has no administration in time range)     Or   acetaminophen (TYLENOL) 160 MG/5ML solution 650 mg (has no administration in time range)     Or   acetaminophen (TYLENOL) suppository 650 mg (has no  administration in time range)   ondansetron (ZOFRAN) tablet 4 mg (has no administration in time range)     Or   ondansetron (ZOFRAN) injection 4 mg (has no administration in time range)   potassium chloride (K-DUR,KLOR-CON) ER tablet 40 mEq (40 mEq Oral Given 2/4/21 1656)   potassium chloride 10 mEq in 100 mL IVPB (0 mEq Intravenous Stopped 2/4/21 1808)   cefTRIAXone (ROCEPHIN) IVPB 1 g (0 g Intravenous Stopped 2/4/21 1845)   labetalol (NORMODYNE,TRANDATE) injection 20 mg (20 mg Intravenous Given 2/4/21 1909)       Imaging results:  No radiology results for the last day    Ambulatory status:   - slide    Social issues:   Social History     Socioeconomic History   • Marital status:      Spouse name: Not on file   • Number of children: Not on file   • Years of education: Not on file   • Highest education level: Not on file   Tobacco Use   • Smoking status: Never Smoker   Substance and Sexual Activity   • Alcohol use: Yes   • Drug use: Never   • Sexual activity: Defer    Nursing report ED to floor       Marli Phillips, RN  02/04/21 2100

## 2021-02-05 NOTE — NURSING NOTE
Pg on call Dr for potassium of 2.4. Orders to give 40 mg potassium q 2hrs x 4 doses and recheck potassium 1 hr after.

## 2021-02-05 NOTE — CONSULTS
CONSULT NOTE    Infectious Diseases - Tonja Cruz MD  [facility]       Patient Identification:  Name: Gregorio Alejandro  Age: 65 y.o.  Sex: male  :  1955  MRN: [id number]             Date of Consultation: 2021      Primary Care Physician: Maya Ribeiro APRN                               Requesting Physician: Dr. Mack  Reason for Consultation: UTI with gram-positive cocci bacteremia    Impression: 65-year-old male who has complicated past medical history including history of hospitalization for 5 days from 2020 through 2020 for altered mental status and sepsis due to urinary tract infection and possibility of secondary cellulitis from chronic bilateral lower extremity wound.  He was eventually discharged on 2 weeks of IV Zosyn as oral antibiotic choices were not safe to cover for pathogens isolated from right lower extremity wound and suspected pathogen in urine culture.  Is unclear how patient has done since then but he was discharged home and has been taking care of at home until about a week ago when he started having increasing weakness confusion frequent urination.  He has been becoming more dependent on transferring him.  Patient himself is unable to engage in any information exchange.  He was brought to the emergency room on 2021 and work-up included urinalysis and urine culture and blood cultures.  His urine culture showed gram-negative rods and blood cultures are showing gram-positive cocci in clusters consisting of coag negative staph.  Patient has been started on ceftriaxone and later vancomycin was added based on the blood culture results.  Infectious disease service is consulted.  This presentation in the above context is consistent with:  1-toxic metabolic encephalopathy secondary to combination of  2-urinary tract infection with infected lower extremity wound with possible cellulitis  3-coag negative staph bacteremia either part of the systemic sepsis  originating from lower extremities or possible contaminant during the process of collection  4-embolization syndrome  5-diabetes mellitus with complications including peripheral neuropathy, diabetic nephropathy  6-stage V kidney disease  7-uncontrolled hypertension  8-anemia multifactorial.    Recommendations/Discussions: At this juncture I agree with the care plan consisting of Rocephin and vancomycin while following up on the sensitivity of the panel was negative for specific toxin assay is pending and if positive will require de-escalation of antibiotics.  Aggressive local wound care of lower extremities is needed.  Follow-up on repeat blood cultures that we have ordered and if they are negative and patient current blood culture identified as coag negative staph with no associated organ system involvement then it can be assessed and assumed that the pathogens in the blood culture is contaminant during the process of collection and hence would not further work-up and treatment.  Overall prognosis is guarded as patient is progressively declining and has frequent hospitalization with similar issues.  Thank you Dr. Mack for letting me be the part of your patient care please see above impression and recommendations.        History of Present Illness:   65-year-old male who has complicated past medical history including history of hospitalization for 5 days from 12/18/2020 through 12/23/2020 for altered mental status and sepsis due to urinary tract infection and possibility of secondary cellulitis from chronic bilateral lower extremity wound.  He was eventually discharged on 2 weeks of IV Zosyn as oral antibiotic choices were not safe to cover for pathogens isolated from right lower extremity wound and suspected pathogen in urine culture.  Is unclear how patient has done since then but he was discharged home and has been taking care of at home until about a week ago when he started having increasing weakness  confusion frequent urination.  He has been becoming more dependent on transferring him.  Patient himself is unable to engage in any information exchange.  He was brought to the emergency room on 2/4/2021 and work-up included urinalysis and urine culture and blood cultures.  His urine culture showed gram-negative rods and blood cultures are showing gram-positive cocci in clusters consisting of coag negative staph.  Patient has been started on ceftriaxone and later vancomycin was added based on the blood culture results.  Infectious disease service is consulted.      Past Medical History:  Past Medical History:   Diagnosis Date   • Back pain    • CKD (chronic kidney disease)    • DM type 2 (diabetes mellitus, type 2) (CMS/[facility]ly [facility])    • ED (erectile dysfunction)    • Hyperlipidemia    • Hypertension    • SCOTTY (iron deficiency anemia)    • Monoclonal gammopathy    • Osteoarthritis      Past Surgical History:  History reviewed. No pertinent surgical history.   Home Meds:  Medications Prior to Admission   Medication Sig Dispense Refill Last Dose   • amLODIPine (NORVASC) 5 MG tablet Take 1 tablet by mouth Daily. 30 tablet 0 2/4/2021 at Unknown time   • aspirin 81 MG EC tablet Take 1 tablet by mouth Daily. 30 tablet 0 2/4/2021 at Unknown time   • atorvastatin (LIPITOR) 20 MG tablet Take 20 mg by mouth Daily.   2/4/2021 at Unknown time   • brimonidine (ALPHAGAN P) 0.1 % solution ophthalmic solution 1 drop 2 (two) times a day.   2/4/2021 at Unknown time   • dorzolamide (TRUSOPT) 2 % ophthalmic solution Administer 1 drop to the right eye 2 (two) times a day.   2/4/2021 at Unknown time   • furosemide (LASIX) 80 MG tablet Take 1 tablet by mouth Daily. 30 tablet 0 2/4/2021 at Unknown time   • HUMALOG KWIKPEN 100 UNIT/ML solution pen-injector INJECT 20 UNITS INTO THE SKIN TWICE DAILY 15 mL 0 2/4/2021 at Unknown time   • sodium bicarbonate 650 MG tablet Take 1 tablet by mouth 2 (Two) Times a Day. 60 tablet 0 2/4/2021 at Unknown time   •  diphenhydrAMINE (BENADRYL) 25 mg capsule Take 25 mg by mouth Every 6 (Six) Hours As Needed for Itching or Allergies.   More than a month at Unknown time   • HYDROcodone-acetaminophen (NORCO)  MG per tablet Take 1 tablet po every 4 hours PRN for moderate pain, take two tablets po every 4 hours PRN for severe pain, valid if faxed to [facility]   tablet 0 Unknown at Unknown time   • WAL-DRYL ALLERGY 25 MG Take 1 capsule by mouth Every 6 (Six) Hours As Needed for itching. 30 capsule 0 Unknown at Unknown time     Current Meds:     Current Facility-Administered Medications:   •  acetaminophen (TYLENOL) tablet 650 mg, 650 mg, Oral, Q4H PRN **OR** acetaminophen (TYLENOL) 160 MG/5ML solution 650 mg, 650 mg, Oral, Q4H PRN **OR** acetaminophen (TYLENOL) suppository 650 mg, 650 mg, Rectal, Q4H PRN, Robert Baker MD  •  [START ON 2/6/2021] amLODIPine (NORVASC) tablet 10 mg, 10 mg, Oral, Q24H, Miles Del Toro MD  •  amLODIPine (NORVASC) tablet 5 mg, 5 mg, Oral, Q24H, Terrance Muhammad MD  •  ammonium lactate (AMLACTIN) cream, , Topical, BID, Nixon Quinteros MD  •  aspirin EC tablet 81 mg, 81 mg, Oral, Daily, Robert Baker MD, 81 mg at 02/05/21 0818  •  atorvastatin (LIPITOR) tablet 20 mg, 20 mg, Oral, Daily, Robert Baker MD, 20 mg at 02/05/21 0818  •  cefTRIAXone (ROCEPHIN) IVPB 1 g, 1 g, Intravenous, Q24H, Robert Baker MD  •  dextrose (D50W) 25 g/ 50mL Intravenous Solution 25 g, 25 g, Intravenous, Q15 Min PRN, Robert Baker MD  •  dextrose (GLUTOSE) oral gel 15 g, 15 g, Oral, Q15 Min PRN, Robert Baker MD  •  diphenhydrAMINE (BENADRYL) capsule 25 mg, 25 mg, Oral, Q6H PRN, Robert Baker MD  •  glucagon (human recombinant) (GLUCAGEN DIAGNOSTIC) injection 1 mg, 1 mg, Subcutaneous, Q15 Min PRN, Robert Baker MD  •  hydrALAZINE (APRESOLINE) tablet 25 mg, 25 mg, Oral, Q6H PRN, Nixon Quinteros MD  •  hydrALAZINE (APRESOLINE) tablet 50 mg, 50 mg, Oral, Q8H, Terrance Muhammad  MD Emil  •  HYDROcodone-acetaminophen (NORCO)  MG per tablet 1 tablet, 1 tablet, Oral, Q6H PRN, Robert Baker MD  •  insulin lispro (humaLOG, ADMELOG) injection 0-9 Units, 0-9 Units, Subcutaneous, TID AC, Robert Baker MD  •  ondansetron (ZOFRAN) tablet 4 mg, 4 mg, Oral, Q6H PRN **OR** ondansetron (ZOFRAN) injection 4 mg, 4 mg, Intravenous, Q6H PRN, Robert Baker MD  •  Pharmacy to dose vancomycin, , Does not apply, Continuous PRN, Nixon Quinteros MD  •  sodium chloride 0.9 % flush 10 mL, 10 mL, Intravenous, PRN, Josafat Mckeon MD  •  sodium chloride 0.9 % flush 10 mL, 10 mL, Intravenous, Q12H, Robert Baker MD, 10 mL at 02/05/21 0819  •  sodium chloride 0.9 % flush 10 mL, 10 mL, Intravenous, PRN, Robert Baker MD  •  vancomycin 2250 mg/500 mL 0.9% NS IVPB ([facility]), 20 mg/kg, Intravenous, Once, Nixon Quinteros MD  •  Vancomycin Pharmacy Intermittent Dosing, , Does not apply, Daily, Nixon Quinteros MD  Allergies:  No Known Allergies  Social History:   Social History     Tobacco Use   • Smoking status: Never Smoker   • Smokeless tobacco: Never Used   Substance Use Topics   • Alcohol use: Yes      Family History:  History reviewed. No pertinent family history.       Review of Systems  See history of present illness and past medical history.  Patient denies headache, dizziness, syncope, falls, trauma, change in vision, change in hearing, change in taste, changes in weight, changes in appetite, focal weakness, numbness, or paresthesia.  Patient denies chest pain, palpitations, dyspnea, orthopnea, PND, cough, sinus pressure, rhinorrhea, epistaxis, hemoptysis, nausea, vomiting,hematemesis, diarrhea, constipation or hematchezia.  Denies cold or heat intolerance, polydipsia, polyuria, polyphagia. Denies hematuria, pyuria, dysuria, hesitancy, frequency or urgency. Denies consumption of raw and under cooked meats foods or change in water source.  Denies fever, chills, sweats, night  "sweats.  Denies missing any routine medications. Remainder of ROS is negative.      Vitals:   BP (!) 189/131 (BP Location: Right arm, Patient Position: Lying) Comment: rn notified  Pulse 86   Temp 98.6 °F (37 °C) (Oral)   Resp 18   Ht 190.5 cm (75\")   Wt 109 kg (240 lb 11.9 oz)   SpO2 100%   BMI 30.09 kg/m²   I/O:     Intake/Output Summary (Last 24 hours) at 2/5/2021 1455  Last data filed at 2/5/2021 1253  Gross per 24 hour   Intake 200 ml   Output 1550 ml   Net -1350 ml     Exam:  Patient is examined using the personal protective equipment as per guidelines from infection control for this particular patient as enacted.  Hand washing was performed before and after patient interaction.  General Appearance:   Confused male who seems to understand what I am talking but unable to engage with verbal responses.  At times he resists interaction and examination.  Appears ill.   Head:    Normocephalic, without obvious abnormality, atraumatic   Eyes:    PERRL, conjunctivae/corneas clear, EOM's intact, both eyes   Ears:    Normal external ear canals, both ears   Nose:   Nares normal, septum midline, mucosa normal, no drainage    or sinus tenderness   Throat:   Lips, tongue, gums normal; oral mucosa pink and moist   Neck:   Supple, symmetrical, trachea midline, no adenopathy;     thyroid:  no enlargement/tenderness/nodules; no carotid    bruit or JVD   Back:     Symmetric, no curvature, ROM normal, no CVA tenderness   Lungs:     Clear to auscultation bilaterally, respirations unlabored   Chest Wall:    No tenderness or deformity    Heart:   S1-S2 regular no murmur heard   Abdomen:    Obese soft nontender   Extremities:  Bilateral lower extremity chronic changes in feet and legs.  Lower extremities are wrapped.   Pulses:   Pulses palpable in all extremities; symmetric all extremities   Skin:   Skin color normal, Skin is warm and dry,  no rashes or palpable lesions   Neurologic:  Contractures and disuse atrophy of the " lower extremities.  Confused and not oriented.       Data Review:    I reviewed the patient's new clinical results.  Results from last 7 days   Lab Units 02/05/21  0312 02/04/21  1551   WBC 10*3/mm3 7.69 6.57   HEMOGLOBIN g/dL 9.8* 10.7*   PLATELETS 10*3/mm3 296 300     Results from last 7 days   Lab Units 02/05/21  0312 02/04/21  1551   SODIUM mmol/L 142 139   POTASSIUM mmol/L 2.4* 2.2*   CHLORIDE mmol/L 112* 106   CO2 mmol/L 19.3* 21.4*   BUN mg/dL 23 23   CREATININE mg/dL 2.55* 2.46*   CALCIUM mg/dL 8.6 9.1   GLUCOSE mg/dL 147* 179*     Microbiology Results (last 10 days)     Procedure Component Value - Date/Time    COVID PRE-OP / PRE-PROCEDURE SCREENING ORDER (NO ISOLATION) - Swab, Nasopharynx [[id number]]  (Normal) Collected: 02/04/21 1810    Lab Status: Final result Specimen: Swab from Nasopharynx Updated: 02/04/21 2206    Narrative:      The following orders were created for panel order COVID PRE-OP / PRE-PROCEDURE SCREENING ORDER (NO ISOLATION) - Swab, Nasopharynx.  Procedure                               Abnormality         Status                     ---------                               -----------         ------                     COVID-19,APTIMA PANTHER,...[[id number]]  Normal              Final result                 Please view results for these tests on the individual orders.    COVID-19,APTIMA PANTHER,[facility], NP/OP SWAB IN UTM/VTM/[facility] TRANSPORT MEDIA,24 HR TAT - Swab, Nasopharynx [[id number]]  (Normal) Collected: 02/04/21 1810    Lab Status: Final result Specimen: Swab from Nasopharynx Updated: 02/04/21 2206     COVID19 Not Detected    Narrative:      Fact sheet for providers: https://www.fda.gov/media/195007/download     Fact sheet for patients: https://www.fda.gov/media/546584/download    Test performed by RT PCR.    Urine Culture - Urine, Urine, Catheter [[id number]]  (Normal) Collected: 02/04/21 1612    Lab Status: Preliminary result Specimen: Urine, Catheter Updated: 02/05/21 0817      Urine Culture No growth    Blood Culture - Blood, Hand, Right [[id number]]  (Abnormal) Collected: 02/04/21 1551    Lab Status: Preliminary result Specimen: Blood from Hand, Right Updated: 02/05/21 1403     Blood Culture Abnormal Stain     Gram Stain Anaerobic Bottle Gram positive cocci in clusters    Blood Culture - Blood, Hand, Right [[id number]]  (Abnormal) Collected: 02/04/21 1551    Lab Status: Preliminary result Specimen: Blood from Hand, Right Updated: 02/05/21 1404     Blood Culture Abnormal Stain     Gram Stain Aerobic Bottle Gram positive cocci in clusters    Blood Culture ID, PCR - Blood, Hand, Right [[id number]]  (Abnormal) Collected: 02/04/21 1551    Lab Status: Final result Specimen: Blood from Hand, Right Updated: 02/05/21 1250     BCID, PCR Staphylococcus spp, not aureus. Identification by BCID PCR.            Assessment:  Active Hospital Problems    Diagnosis  POA   • **Acute metabolic encephalopathy [G93.41]  Yes   • UTI (urinary tract infection), bacterial [N39.0, A49.9]  Yes   • Elevated troponin [R77.8]  Yes   • Hypokalemia [E87.6]  Yes   • Uncontrolled hypertension [I10]  Yes   • Anemia, chronic disease [D63.8]  Yes   • DM2 (diabetes mellitus, type 2) (CMS/HCC) [E11.9]  Yes   • Stage 4 chronic kidney disease (CMS/HCC) [N18.4]  Yes   • Hyperlipidemia [E78.5]  Yes   • Hypertension [I10]  Yes      Resolved Hospital Problems   No resolved problems to display.         Plan:  See above  Tonja Azevedo MD   2/5/2021  14:55 EST    Much of this encounter note is an electronic transcription/translation of spoken language to printed text. The electronic translation of spoken language may permit erroneous, or at times, nonsensical words or phrases to be inadvertently transcribed; Although I have reviewed the note for such errors, some may still exist

## 2021-02-05 NOTE — H&P
Patient Name:  Gregorio Alejandro  YOB: 1955  MRN:  [id number]  Admit Date:  2/4/2021  Patient Care Team:  Maya Ribeiro APRN as PCP - General (Family Medicine)      Subjective   History Present Illness     Chief Complaint   Patient presents with   • Altered Mental Status         History of Present Illness     65-year-old male, with history of CKD, diabetes, hyperlipidemia, hypertension, presented to the hospital, primary complaint was altered mental status, patient was sent to the emergency room, with reported history of weakness, confusion.  Patient is bedbound, he lives with his wife at home.  Patient was sent to the hospital for further evaluation.  Patient is awake, intermittently confused.  He does have bilateral lower extremities which are currently wrapped in dressings.  He also has end-stage renal disease, he is dependent on hemodialysis.    Review of Systems     Unable to obtain because of altered mental status.    Personal History     Past Medical History:   Diagnosis Date   • Back pain    • CKD (chronic kidney disease)    • DM type 2 (diabetes mellitus, type 2) (CMS/[facility])    • ED (erectile dysfunction)    • Hyperlipidemia    • Hypertension    • SCOTTY (iron deficiency anemia)    • Monoclonal gammopathy    • Osteoarthritis      History reviewed. No pertinent surgical history.  History reviewed. No pertinent family history.  Social History     Tobacco Use   • Smoking status: Never Smoker   • Smokeless tobacco: Never Used   Substance Use Topics   • Alcohol use: Yes   • Drug use: Never     No current facility-administered medications on file prior to encounter.      Current Outpatient Medications on File Prior to Encounter   Medication Sig Dispense Refill   • amLODIPine (NORVASC) 5 MG tablet Take 1 tablet by mouth Daily. 30 tablet 0   • aspirin 81 MG EC tablet Take 1 tablet by mouth Daily. 30 tablet 0   • atorvastatin (LIPITOR) 20 MG tablet Take 20 mg by mouth Daily.     • brimonidine  (ALPHAGAN P) 0.1 % solution ophthalmic solution 1 drop 2 (two) times a day.     • dorzolamide (TRUSOPT) 2 % ophthalmic solution Administer 1 drop to the right eye 2 (two) times a day.     • furosemide (LASIX) 80 MG tablet Take 1 tablet by mouth Daily. 30 tablet 0   • HUMALOG KWIKPEN 100 UNIT/ML solution pen-injector INJECT 20 UNITS INTO THE SKIN TWICE DAILY 15 mL 0   • sodium bicarbonate 650 MG tablet Take 1 tablet by mouth 2 (Two) Times a Day. 60 tablet 0   • diphenhydrAMINE (BENADRYL) 25 mg capsule Take 25 mg by mouth Every 6 (Six) Hours As Needed for Itching or Allergies.     • HYDROcodone-acetaminophen (NORCO)  MG per tablet Take 1 tablet po every 4 hours PRN for moderate pain, take two tablets po every 4 hours PRN for severe pain, valid if faxed to [facility]   tablet 0   • WAL-DRYL ALLERGY 25 MG Take 1 capsule by mouth Every 6 (Six) Hours As Needed for itching. 30 capsule 0     No Known Allergies    Objective    Objective     Vital Signs  Temp:  [96.7 °F (35.9 °C)-99.9 °F (37.7 °C)] 96.7 °F (35.9 °C)  Heart Rate:  [67-91] 91  Resp:  [16] 16  BP: (164-204)/() 189/120  SpO2:  [96 %-100 %] 96 %  on   ;   Device (Oxygen Therapy): room air  Body mass index is 31.37 kg/m².    Physical Exam    General:   Patient is confused  Head and ENT: normocephalic and atraumatic.  Lungs: symmetric expansion and equal air entry bilaterally.  Heart: regular rate, rhythm, no murmurs.  Abdomen:  Abdominal surgical scar seen.  Extremities:   Bilateral lower extremities, currently in dressing.  Neurologic:     Unable to evaluate because of altered mental status  Psychiatry:   Unable to evaluate because of altered mental status  Skin:  Warm and no rash.    Results Review:  I reviewed the patient's new clinical results.  I reviewed the patient's new imaging results and agree with the interpretation.  I reviewed the patient's other test results and agree with the interpretation  I personally viewed and interpreted the  patient's EKG/Telemetry data  Discussed with ED provider.    Lab Results (last 24 hours)     Procedure Component Value Units Date/Time    POC Glucose [[id number]]  (Abnormal) Collected: 02/04/21 1237    Specimen: Blood Updated: 02/04/21 1246     Glucose 160 mg/dL     POC Glucose Once [[id number]]  (Abnormal) Collected: 02/04/21 1525    Specimen: Blood Updated: 02/04/21 1528     Glucose 167 mg/dL     CBC & Differential [[id number]]  (Abnormal) Collected: 02/04/21 1551    Specimen: Blood Updated: 02/04/21 1739    Narrative:      The following orders were created for panel order CBC & Differential.  Procedure                               Abnormality         Status                     ---------                               -----------         ------                     CBC Auto Differential[[id number]]        Abnormal            Final result                 Please view results for these tests on the individual orders.    Comprehensive Metabolic Panel [[id number]]  (Abnormal) Collected: 02/04/21 1551    Specimen: Blood Updated: 02/04/21 1633     Glucose 179 mg/dL      BUN 23 mg/dL      Creatinine 2.46 mg/dL      Sodium 139 mmol/L      Potassium 2.2 mmol/L      Chloride 106 mmol/L      CO2 21.4 mmol/L      Calcium 9.1 mg/dL      Total Protein 7.1 g/dL      Albumin 3.20 g/dL      ALT (SGPT) 7 U/L      AST (SGOT) 14 U/L      Alkaline Phosphatase 104 U/L      Total Bilirubin 0.9 mg/dL      eGFR  African Amer 32 mL/min/1.73      Globulin 3.9 gm/dL      A/G Ratio 0.8 g/dL      BUN/Creatinine Ratio 9.3     Anion Gap 11.6 mmol/L     Narrative:      GFR Normal >60  Chronic Kidney Disease <60  Kidney Failure <15      Troponin [[id number]]  (Abnormal) Collected: 02/04/21 1551    Specimen: Blood Updated: 02/04/21 1633     Troponin T 0.110 ng/mL     Narrative:      Troponin T Reference Range:  <= 0.03 ng/mL-   Negative for AMI  >0.03 ng/mL-     Abnormal for myocardial necrosis.  Clinicians would have to utilize clinical acumen, EKG,  Troponin and serial changes to determine if it is an Acute Myocardial Infarction or myocardial injury due to an underlying chronic condition.       Results may be falsely decreased if patient taking Biotin.      Magnesium [[id number]]  (Normal) Collected: 02/04/21 1551    Specimen: Blood Updated: 02/04/21 1622     Magnesium 1.9 mg/dL     CBC Auto Differential [[id number]]  (Abnormal) Collected: 02/04/21 1551    Specimen: Blood Updated: 02/04/21 1739     WBC 6.57 10*3/mm3      RBC 4.64 10*6/mm3      Hemoglobin 10.7 g/dL      Hematocrit 34.7 %      MCV 74.8 fL      MCH 23.1 pg      MCHC 30.8 g/dL      RDW 24.2 %      RDW-SD 61.3 fl      Platelets 300 10*3/mm3      Neutrophil % 74.8 %      Lymphocyte % 17.0 %      Monocyte % 5.8 %      Eosinophil % 1.5 %      Basophil % 0.6 %      Neutrophils, Absolute 4.91 10*3/mm3      Lymphocytes, Absolute 1.12 10*3/mm3      Monocytes, Absolute 0.38 10*3/mm3      Eosinophils, Absolute 0.10 10*3/mm3      Basophils, Absolute 0.04 10*3/mm3     Blood Culture - Blood, Hand, Right [[id number]] Collected: 02/04/21 1551    Specimen: Blood from Hand, Right Updated: 02/04/21 1558    Blood Culture - Blood, Hand, Right [[id number]] Collected: 02/04/21 1551    Specimen: Blood from Hand, Right Updated: 02/04/21 1559    Procalcitonin [[id number]]  (Normal) Collected: 02/04/21 1551    Specimen: Blood Updated: 02/04/21 1628     Procalcitonin 0.15 ng/mL     Narrative:      As a Marker for Sepsis (Non-Neonates):   1. <0.5 ng/mL represents a low risk of severe sepsis and/or septic shock.  1. >2 ng/mL represents a high risk of severe sepsis and/or septic shock.    As a Marker for Lower Respiratory Tract Infections that require antibiotic therapy:  PCT on Admission     Antibiotic Therapy             6-12 Hrs later  > 0.5                Strongly Recommended            >0.25 - <0.5         Recommended  0.1 - 0.25           Discouraged                   Remeasure/reassess PCT  <0.1                 Strongly  "Discouraged          Remeasure/reassess PCT      As 28 day mortality risk marker: \"Change in Procalcitonin Result\" (> 80 % or <=80 %) if Day 0 (or Day 1) and Day 4 values are available. Refer to http://[email or web address]-pct-calculator.[email or web address]/   Change in PCT <=80 %   A decrease of PCT levels below or equal to 80 % defines a positive change in PCT test result representing a higher risk for 28-day all-cause mortality of patients diagnosed with severe sepsis or septic shock.  Change in PCT > 80 %   A decrease of PCT levels of more than 80 % defines a negative change in PCT result representing a lower risk for 28-day all-cause mortality of patients diagnosed with severe sepsis or septic shock.                Results may be falsely decreased if patient taking Biotin.     Lactic Acid, Plasma [[id number]]  (Normal) Collected: 02/04/21 1551    Specimen: Blood Updated: 02/04/21 1621     Lactate 1.3 mmol/L     Urinalysis With Microscopic If Indicated (No Culture) - Urine, Catheter [[id number]]  (Abnormal) Collected: 02/04/21 1612    Specimen: Urine, Catheter Updated: 02/04/21 1634     Color, UA Yellow     Appearance, UA Clear     pH, UA 6.5     Specific Gravity, UA 1.008     Glucose,  mg/dL (1+)     Ketones, UA Negative     Bilirubin, UA Negative     Blood, UA Small (1+)     Protein, UA >=300 mg/dL (3+)     Leuk Esterase, UA Small (1+)     Nitrite, UA Negative     Urobilinogen, UA 0.2 E.U./dL    Urinalysis, Microscopic Only - Urine, Catheter [[id number]]  (Abnormal) Collected: 02/04/21 1612    Specimen: Urine, Catheter Updated: 02/04/21 1634     RBC, UA 0-2 /HPF      WBC, UA Too Numerous to Count /HPF      Bacteria, UA None Seen /HPF      Squamous Epithelial Cells, UA 0-2 /HPF      Hyaline Casts, UA 0-2 /LPF      Methodology Automated Microscopy    Urine Culture - Urine, Urine, Catheter [[id number]] Collected: 02/04/21 1612    Specimen: Urine, Catheter Updated: 02/04/21 1712    COVID PRE-OP / PRE-PROCEDURE SCREENING ORDER (NO " ISOLATION) - Swab, Nasopharynx [[id number]]  (Normal) Collected: 02/04/21 1810    Specimen: Swab from Nasopharynx Updated: 02/04/21 2206    Narrative:      The following orders were created for panel order COVID PRE-OP / PRE-PROCEDURE SCREENING ORDER (NO ISOLATION) - Swab, Nasopharynx.  Procedure                               Abnormality         Status                     ---------                               -----------         ------                     COVID-19,APTIMA PANTHER,...[[id number]]  Normal              Final result                 Please view results for these tests on the individual orders.    COVID-19,APTIMA PANTHER,[facility], NP/OP SWAB IN UTM/VTM/SALINE TRANSPORT MEDIA,24 HR TAT - Swab, Nasopharynx [[id number]]  (Normal) Collected: 02/04/21 1810    Specimen: Swab from Nasopharynx Updated: 02/04/21 2206     COVID19 Not Detected    Narrative:      Fact sheet for providers: https://www.fda.gov/media/914180/download     Fact sheet for patients: https://www.fda.gov/media/240391/download    Test performed by RT PCR.          Imaging Results (Last 24 Hours)     Procedure Component Value Units Date/Time    XR Chest 1 View [[id number]] Collected: 02/04/21 1452     Updated: 02/04/21 1500    Narrative:      ONE VIEW PORTABLE CHEST     HISTORY: Weakness and confusion. Hypertension.     FINDINGS: The lungs are well-expanded and clear and unchanged from  12/18/2020. The heart remains slightly enlarged.     This report was finalized on 2/4/2021 2:57 PM by Dr. Celso Clifford M.D.             Results for orders placed during the hospital encounter of 12/18/20   Adult Transthoracic Echo Complete W/ Cont if Necessary Per Protocol    Narrative · Calculated left ventricular EF = 59% Estimated left ventricular EF was   in agreement with the calculated left ventricular EF.  · Left ventricular diastolic function is consistent with (grade I)   impaired relaxation.     Biatrial enlargement, with asymmetric LVH.  Speckled  type pattern.    Consider amyloid.  No significant valve disease.         ECG 12 Lead   Final Result   HEART RATE= 72  bpm   RR Interval= 827  ms   WI Interval=   ms   P Horizontal Axis=   deg   P Front Axis=   deg   QRSD Interval= 122  ms   QT Interval= 445  ms   QRS Axis= -58  deg   T Wave Axis= 170  deg   - ABNORMAL ECG -   NSR WITH LONG WI   Nonspecific IVCD with LAD   Consider anterior infarct   Abnormal T, consider ischemia, diffuse leads   NO SIGNIFICANT CHANGE FROM PREVIOUS ECG   Electronically Signed By: Thaddeus Gardner ([facility]) 04-Feb-2021 13:55:42   Date and Time of Study: 2021-02-04 13:37:26           Assessment/Plan     Active Hospital Problems    Diagnosis  POA   • **Acute metabolic encephalopathy [G93.41]  Yes   • UTI (urinary tract infection), bacterial [N39.0, A49.9]  Yes   • Elevated troponin [R77.8]  Yes   • Hypokalemia [E87.6]  Yes   • SILVIA (acute kidney injury) (CMS/[facility]) [N17.9]  Yes   • Uncontrolled hypertension [I10]  Yes   • Anemia, chronic disease [D63.8]  Yes   • DM2 (diabetes mellitus, type 2) (CMS/[facility]) [E11.9]  Yes   • ESRD (end stage renal disease) (CMS/[facility]) [N18.6]  Yes   • Hyperlipidemia [E78.5]  Yes   • Hypertension [I10]  Yes      Resolved Hospital Problems   No resolved problems to display.      Assessment and plan  1. Acute metabolic encephalopathy, admit patient to medicine service.  Continue to monitor for mental status changes.  Patient does have underlying UTI.  2.  Acute UTI, follow urine cultures, continue Rocephin.  3.  Elevated troponin, in the setting of renal failure, cardiology evaluation requested.  4.  Hypokalemia, ER provider did discuss case with Nephrology, nephrology evaluation for further input.  Continue to recheck labs.  5.  Uncontrolled hypertension, titrate antihypertensives based on BP response.  6.  Diabetes mellitus, continue Accu-Cheks and sliding scale insulin coverage.  7.   Hyperlipidemia, continue Lipitor.  8.  Code status is full code.  DVT  prophylaxis.    Robert Baker MD  [facility]  02/04/21  23:31 EST

## 2021-02-06 PROBLEM — A41.9 SEPTICEMIA (HCC): Status: ACTIVE | Noted: 2021-01-01

## 2021-02-06 NOTE — PROGRESS NOTES
"Pharmacokinetic Consult - Vancomycin Dosing (Follow-up Note)    Gregorio Alejandro is on day 2 pharmacy to dose vancomycin for bacteremia.  Pharmacy dosing vancomycin per Dr Quinteros's request.   Goal AUC= 400-600   Duration of therapy: 7 days  Is Patient on Dialysis or Renal Replacement: No but poor renal function so will dose intermittently    Relevant clinical data and objective history reviewed:  65 y.o. male 190.5 cm (75\") 109 kg (240 lb 11.9 oz)    Creatinine   Date Value Ref Range Status   02/06/2021 2.39 (H) 0.76 - 1.27 mg/dL Final   02/05/2021 2.55 (H) 0.76 - 1.27 mg/dL Final   02/04/2021 2.46 (H) 0.76 - 1.27 mg/dL Final   02/27/2020 6.7 (H) 0.7 - 1.5 mg/dL Final   02/27/2020 6.5 (H) 0.7 - 1.5 mg/dL Final   02/18/2020 7.2 (H) 0.7 - 1.5 mg/dL Final     BUN   Date Value Ref Range Status   02/06/2021 20 8 - 23 mg/dL Final   02/27/2020 57 (H) 7 - 20 mg/dL Final     Estimated Creatinine Clearance: 40 mL/min (A) (by C-G formula based on SCr of 2.39 mg/dL (H)).    Lab Results   Component Value Date    WBC 9.52 02/06/2021     Temp Readings from Last 3 Encounters:   02/06/21 97 °F (36.1 °C) (Oral)   12/23/20 97.3 °F (36.3 °C) (Oral)     Cultures:   Microbiology Results (last 10 days)     Procedure Component Value - Date/Time    Clostridium Difficile Toxin - Stool, Per Rectum [[id number]]  (Normal) Collected: 02/05/21 2204    Lab Status: Final result Specimen: Stool from Per Rectum Updated: 02/05/21 2303    Narrative:      The following orders were created for panel order Clostridium Difficile Toxin - Stool, Per Rectum.  Procedure                               Abnormality         Status                     ---------                               -----------         ------                     Clostridium Difficile To...[[id number]]  Normal              Final result                 Please view results for these tests on the individual orders.    Clostridium Difficile Toxin, PCR - Stool, Per Rectum [[id number]]  (Normal) " Collected: 02/05/21 2204    Lab Status: Final result Specimen: Stool from Per Rectum Updated: 02/05/21 2303     C. Difficile Toxins by PCR Negative    COVID PRE-OP / PRE-PROCEDURE SCREENING ORDER (NO ISOLATION) - Swab, Nasopharynx [[id number]]  (Normal) Collected: 02/04/21 1810    Lab Status: Final result Specimen: Swab from Nasopharynx Updated: 02/04/21 2206    Narrative:      The following orders were created for panel order COVID PRE-OP / PRE-PROCEDURE SCREENING ORDER (NO ISOLATION) - Swab, Nasopharynx.  Procedure                               Abnormality         Status                     ---------                               -----------         ------                     COVID-19,APTIMA PANTHER,...[[id number]]  Normal              Final result                 Please view results for these tests on the individual orders.    COVID-19,APTIMA PANTHER,[facility], NP/OP SWAB IN UTM/VTM/[facility]ALINE TRANSPORT MEDIA,24 HR TAT - Swab, Nasopharynx [[id number]]  (Normal) Collected: 02/04/21 1810    Lab Status: Final result Specimen: Swab from Nasopharynx Updated: 02/04/21 2206     COVID19 Not Detected    Narrative:      Fact sheet for providers: https://www.fda.gov/media/847059/download     Fact sheet for patients: https://www.fda.gov/media/606255/download    Test performed by RT PCR.    Urine Culture - Urine, Urine, Catheter [[id number]]  (Abnormal) Collected: 02/04/21 1612    Lab Status: Preliminary result Specimen: Urine, Catheter Updated: 02/05/21 1506     Urine Culture 25,000 CFU/mL Gram Negative Bacilli    Blood Culture - Blood, Hand, Right [[id number]]  (Abnormal) Collected: 02/04/21 1551    Lab Status: Preliminary result Specimen: Blood from Hand, Right Updated: 02/06/21 0712     Blood Culture Gram Positive Cocci     Isolated from Anaerobic Bottle     Blood Culture Gram Positive Cocci     Isolated from Anaerobic Bottle     Gram Stain Anaerobic Bottle Gram positive cocci in clusters    Blood Culture - Blood, Hand, Right  [[id number]]  (Abnormal) Collected: 02/04/21 1551    Lab Status: Preliminary result Specimen: Blood from Hand, Right Updated: 02/06/21 0711     Blood Culture Gram Positive Cocci     Isolated from Aerobic Bottle     Blood Culture Gram Positive Cocci     Isolated from Aerobic Bottle     Gram Stain Aerobic Bottle Gram positive cocci in clusters      Anaerobic Bottle Gram positive cocci in clusters    Blood Culture ID, PCR - Blood, Hand, Right [[id number]]  (Abnormal) Collected: 02/04/21 1551    Lab Status: Final result Specimen: Blood from Hand, Right Updated: 02/05/21 1250     BCID, PCR Staphylococcus spp, not aureus. Identification by BCID PCR.               Anti-Infectives (From admission, onward)    Ordered     Dose/Rate Route Frequency Start Stop    02/04/21 2042  cefTRIAXone (ROCEPHIN) IVPB 1 g     Ordering Provider: Robert Baker MD    1 g  100 mL/hr over 30 Minutes Intravenous Every 24 Hours 02/05/21 1800 02/09/21 1759    02/05/21 1330  vancomycin 2250 mg/500 mL 0.9% NS IVPB ([facility])     Ordering Provider: Nixon Quinteros MD    20 mg/kg × 109 kg Intravenous Once 02/05/21 1430 02/05/21 1545    02/05/21 1330  Vancomycin Pharmacy Intermittent Dosing     Ordering Provider: Nixon Quinteros MD     Does not apply Daily 02/05/21 1430 02/12/21 0859    02/05/21 1327  Pharmacy to dose vancomycin     Ordering Provider: Nixon Quinteros MD     Does not apply Continuous PRN 02/05/21 1327 02/12/21 1326    02/04/21 1744  cefTRIAXone (ROCEPHIN) IVPB 1 g     Ordering Provider: Kevan Ulloa PA    1 g  100 mL/hr over 30 Minutes Intravenous Once 02/04/21 1746 02/04/21 1845         No results found for: DOROTHY  Lab Results   Component Value Date    VANCORANDOM 15.10 02/06/2021       Dosing History  2/5 1545 vanc 2250 mg IV x1   2/6 0610 15.1 mcg/mL (~14 hr level)    Assessment/Plan  - monitor repeat cultures  - continue intermittent vancomycin dosing. Give 750 mg IV x1 and recheck random level in AM. No plans for HD  noted at this time    Trung Latif, RPH

## 2021-02-06 NOTE — PROGRESS NOTES
Name: Gregorio Alejandro ADMIT: 2021   : 1955  PCP: Maya Ribeiro APRN    MRN: [id number] LOS: 2 days   AGE/SEX: 65 y.o. male  ROOM: [facility]   Subjective   Chief Complaint   Patient presents with   • Altered Mental Status      Grimacing at times but denies CP SOA NVD and abdominal pain. When palpated he did have pain over RLQ but no guarding. Nursing reports several large loose bowel movements overnight.    Objective   Vital Signs  Temp:  [96.6 °F (35.9 °C)-98.5 °F (36.9 °C)] 97 °F (36.1 °C)  Heart Rate:  [82-99] 99  Resp:  [18-22] 18  BP: (143-181)/() 150/110  SpO2:  [91 %-96 %] 93 %  on   ;   Device (Oxygen Therapy): room air  Body mass index is 30.09 kg/m².    Physical Exam  Vitals signs and nursing note reviewed.   Constitutional:       General: He is in acute distress (mild, pain).      Appearance: He is ill-appearing.   HENT:      Head: Normocephalic and atraumatic.   Eyes:      Conjunctiva/sclera: Conjunctivae normal.      Pupils: Pupils are equal, round, and reactive to light.   Neck:      Musculoskeletal: Neck supple. No muscular tenderness.   Cardiovascular:      Rate and Rhythm: Normal rate and regular rhythm.      Pulses: Normal pulses.   Pulmonary:      Effort: Pulmonary effort is normal.      Breath sounds: Decreased breath sounds present. No wheezing or rales.   Abdominal:      General: There is no distension.      Tenderness: There is no abdominal tenderness. There is no guarding or rebound.   Musculoskeletal:         General: Swelling present. No tenderness.      Comments: ble dressed   Skin:     General: Skin is warm and dry.   Neurological:      Mental Status: He is alert. He is disoriented.   Psychiatric:         Cognition and Memory: Cognition is impaired. Memory is impaired.         Results Review:       I reviewed the patient's new clinical results.     I reviewed imaging, agree with interpretation.     I reviewed telemetry/EKG results, atrial flutter      Results from  last 7 days   Lab Units 02/06/21  0610 02/05/21 0312 02/04/21  1551   WBC 10*3/mm3 9.52 7.69 6.57   HEMOGLOBIN g/dL 10.6* 9.8* 10.7*   PLATELETS 10*3/mm3 280 296 300     Results from last 7 days   Lab Units 02/06/21  0610 02/05/21  2105 02/05/21  1431 02/05/21 0312 02/04/21  1551   SODIUM mmol/L 142  --   --  142 139   POTASSIUM mmol/L 3.3* 3.5 2.8* 2.4* 2.2*   CHLORIDE mmol/L 115*  --   --  112* 106   CO2 mmol/L 18.9*  --   --  19.3* 21.4*   BUN mg/dL 20  --   --  23 23   CREATININE mg/dL 2.39*  --   --  2.55* 2.46*   GLUCOSE mg/dL 164*  --   --  147* 179*   Estimated Creatinine Clearance: 40 mL/min (A) (by C-G formula based on SCr of 2.39 mg/dL (H)).  Results from last 7 days   Lab Units 02/06/21  0610 02/05/21  0312 02/04/21  1551   CALCIUM mg/dL 8.5* 8.6 9.1   ALBUMIN g/dL 2.50* 2.50* 3.20*   MAGNESIUM mg/dL 1.7 1.8 1.9   PHOSPHORUS mg/dL 1.4* 2.1*  --           amLODIPine, 10 mg, Oral, Q24H  ammonium lactate, , Topical, BID  aspirin, 81 mg, Oral, Daily  atorvastatin, 20 mg, Oral, Daily  cefTRIAXone, 1 g, Intravenous, Q24H  hydrALAZINE, 50 mg, Oral, Q8H  insulin lispro, 0-9 Units, Subcutaneous, TID AC  sodium chloride, 10 mL, Intravenous, Q12H  vancomycin, 7.5 mg/kg, Intravenous, Once  Vancomycin Pharmacy Intermittent Dosing, , Does not apply, Daily      Pharmacy to dose vancomycin,     NPO Diet    Assessment/Plan      Active Hospital Problems    Diagnosis  POA   • **Acute metabolic encephalopathy [G93.41]  Yes   • Septicemia (CMS/HCC) [A41.9]  Yes   • UTI (urinary tract infection), bacterial [N39.0, A49.9]  Yes   • Elevated troponin [R77.8]  Yes   • Hypokalemia [E87.6]  Yes   • Uncontrolled hypertension [I10]  Yes   • Anemia, chronic disease [D63.8]  Yes   • DM2 (diabetes mellitus, type 2) (CMS/HCC) [E11.9]  Yes   • Stage 4 chronic kidney disease (CMS/HCC) [N18.4]  Yes   • Hyperlipidemia [E78.5]  Yes   • Hypertension [I10]  Yes      Resolved Hospital Problems   No resolved problems to display.       · UTI:  Proteus. No stone on CT. Continue ABx. ID following.  · Septicemia: 2/2 GPC. Coag neg staph on BCID. Repeat Cx pending. On Vancomycin.  · Metabolic Encephalopathy 2/2 above.  · Colon Thickening/Diarrhea: Asked for records yesterday but non received. Don't think he had outpatient endoscopy. Stool Studies with negative cdiff, GI PCR pending. Consult GI.  · HTN: Above goal. Amlodipine increased. Did not need hydralazine overnight. Continue prn. Avoiding bblocker due to prior heart block.   · CKD4: DM Nephropathy. Nephrology following.  · Hypokalemia/Hypophosphatemia: Replacement ongoing per renal  · DM2: insulin  · AFlutter: Cardiology following.  · PPx: SCD with planned endoscopy once ok with cardiology  · Disposition: TBD    Nixon Quinteros MD  [facility]  02/06/21  13:38 EST    Dictated portions using Dragon dictation software.    During the entire encounter, I was wearing recommended PPE including face mask and eye protection. Hand sanitization was performed prior to entering room and upon exit.

## 2021-02-06 NOTE — PROGRESS NOTES
"  Infectious Diseases Progress Note    Tonja Azevedo MD     [facility]  Los: 2 days  Patient Identification:  Name: Gregorio Alejandro  Age: 65 y.o.  Sex: male  :  1955  MRN: [id number]         Primary Care Physician: Maya Ribeiro APRN            Subjective: Does not engage in proper information exchange.  It appears that he attempts to converse but does not engage.  Interval History: See consultation note.    Objective:    Scheduled Meds:amLODIPine, 10 mg, Oral, Q24H  ammonium lactate, , Topical, BID  aspirin, 81 mg, Oral, Daily  atorvastatin, 20 mg, Oral, Daily  cefTRIAXone, 1 g, Intravenous, Q24H  hydrALAZINE, 50 mg, Oral, Q8H  insulin lispro, 0-9 Units, Subcutaneous, TID AC  potassium phosphate, 20 mmol, Intravenous, Once  sodium chloride, 10 mL, Intravenous, Q12H  vancomycin, 7.5 mg/kg, Intravenous, Once  Vancomycin Pharmacy Intermittent Dosing, , Does not apply, Daily      Continuous Infusions:Pharmacy to dose vancomycin,         Vital signs in last 24 hours:  Temp:  [96.6 °F (35.9 °C)-98.6 °F (37 °C)] 97 °F (36.1 °C)  Heart Rate:  [77-99] 99  Resp:  [18-22] 18  BP: (143-189)/() 150/110    Intake/Output:    Intake/Output Summary (Last 24 hours) at 2021 1105  Last data filed at 2021 1253  Gross per 24 hour   Intake --   Output 450 ml   Net -450 ml       Exam:  BP (!) 150/110   Pulse 99   Temp 97 °F (36.1 °C) (Oral)   Resp 18   Ht 190.5 cm (75\")   Wt 109 kg (240 lb 11.9 oz)   SpO2 93%   BMI 30.09 kg/m²   Patient is examined using the personal protective equipment as per guidelines from infection control for this particular patient as enacted.  Hand washing was performed before and after patient interaction.  General Appearance:    Alert, cooperative, no distress, AAOx3                          Head:    Normocephalic, without obvious abnormality, atraumatic                           Eyes:    PERRL, conjunctivae/corneas clear, EOM's intact, both eyes                     "     Throat:   Lips, tongue, gums normal; oral mucosa pink and moist                           Neck:   Supple, symmetrical, trachea midline, no JVD                         Lungs:    Clear to auscultation bilaterally, respirations unlabored                 Chest Wall:    No tenderness or deformity                          Heart:    Regular rate and rhythm, S1 and S2 normal, no murmur, no rub                                         or gallop                  Abdomen:     Soft, non-tender, bowel sounds active, no masses, no                                                        organomegaly                  Extremities:   Extremities normal, atraumatic, no cyanosis or edema                        Pulses:   Pulses palpable in all extremities                            Skin:   Skin is warm and dry,  no rashes or palpable lesions                  Neurologic:   CNII-XII intact, motor strength grossly intact, sensation grossly                                         intact to light touch, no focal deficits noted       Data Review:    I reviewed the patient's new clinical results.  Results from last 7 days   Lab Units 02/06/21  0610 02/05/21  0312 02/04/21  1551   WBC 10*3/mm3 9.52 7.69 6.57   HEMOGLOBIN g/dL 10.6* 9.8* 10.7*   PLATELETS 10*3/mm3 280 296 300     Results from last 7 days   Lab Units 02/06/21  0610 02/05/21  2105 02/05/21  1431 02/05/21  0312 02/04/21  1551   SODIUM mmol/L 142  --   --  142 139   POTASSIUM mmol/L 3.3* 3.5 2.8* 2.4* 2.2*   CHLORIDE mmol/L 115*  --   --  112* 106   CO2 mmol/L 18.9*  --   --  19.3* 21.4*   BUN mg/dL 20  --   --  23 23   CREATININE mg/dL 2.39*  --   --  2.55* 2.46*   CALCIUM mg/dL 8.5*  --   --  8.6 9.1   GLUCOSE mg/dL 164*  --   --  147* 179*     Microbiology Results (last 10 days)     Procedure Component Value - Date/Time    Clostridium Difficile Toxin - Stool, Per Rectum [[id number]]  (Normal) Collected: 02/05/21 2204    Lab Status: Final result Specimen: Stool from Per Rectum  Updated: 02/05/21 2303    Narrative:      The following orders were created for panel order Clostridium Difficile Toxin - Stool, Per Rectum.  Procedure                               Abnormality         Status                     ---------                               -----------         ------                     Clostridium Difficile To...[[id number]]  Normal              Final result                 Please view results for these tests on the individual orders.    Clostridium Difficile Toxin, PCR - Stool, Per Rectum [[id number]]  (Normal) Collected: 02/05/21 2204    Lab Status: Final result Specimen: Stool from Per Rectum Updated: 02/05/21 2303     C. Difficile Toxins by PCR Negative    COVID PRE-OP / PRE-PROCEDURE SCREENING ORDER (NO ISOLATION) - Swab, Nasopharynx [[id number]]  (Normal) Collected: 02/04/21 1810    Lab Status: Final result Specimen: Swab from Nasopharynx Updated: 02/04/21 2206    Narrative:      The following orders were created for panel order COVID PRE-OP / PRE-PROCEDURE SCREENING ORDER (NO ISOLATION) - Swab, Nasopharynx.  Procedure                               Abnormality         Status                     ---------                               -----------         ------                     COVID-19,APTIMA PANTHER,...[[id number]]  Normal              Final result                 Please view results for these tests on the individual orders.    COVID-19,APTIMA PANTHER,[facility], NP/OP SWAB IN UTM/VTM/[facility] TRANSPORT MEDIA,24 HR TAT - Swab, Nasopharynx [[id number]]  (Normal) Collected: 02/04/21 1810    Lab Status: Final result Specimen: Swab from Nasopharynx Updated: 02/04/21 2206     COVID19 Not Detected    Narrative:      Fact sheet for providers: https://www.fda.gov/media/659585/download     Fact sheet for patients: https://www.fda.gov/media/154927/download    Test performed by RT PCR.    Urine Culture - Urine, Urine, Catheter [[id number]]  (Abnormal)  (Susceptibility) Collected: 02/04/21 1612     Lab Status: Final result Specimen: Urine, Catheter Updated: 02/06/21 0940     Urine Culture 25,000 CFU/mL Proteus mirabilis    Susceptibility      Proteus mirabilis     ISAC     Ampicillin Susceptible     Ampicillin + Sulbactam Susceptible     Cefazolin Susceptible     Cefepime Susceptible     Ceftazidime Susceptible     Ceftriaxone Susceptible     Gentamicin Susceptible     Levofloxacin Intermediate     Nitrofurantoin Resistant     Piperacillin + Tazobactam Susceptible     Tetracycline Resistant     Trimethoprim + Sulfamethoxazole Resistant                    Blood Culture - Blood, Hand, Right [[id number]]  (Abnormal) Collected: 02/04/21 1551    Lab Status: Preliminary result Specimen: Blood from Hand, Right Updated: 02/06/21 0712     Blood Culture Gram Positive Cocci     Isolated from Anaerobic Bottle     Blood Culture Gram Positive Cocci     Isolated from Anaerobic Bottle     Gram Stain Anaerobic Bottle Gram positive cocci in clusters    Blood Culture - Blood, Hand, Right [[id number]]  (Abnormal) Collected: 02/04/21 1551    Lab Status: Preliminary result Specimen: Blood from Hand, Right Updated: 02/06/21 0711     Blood Culture Gram Positive Cocci     Isolated from Aerobic Bottle     Blood Culture Gram Positive Cocci     Isolated from Aerobic Bottle     Gram Stain Aerobic Bottle Gram positive cocci in clusters      Anaerobic Bottle Gram positive cocci in clusters    Blood Culture ID, PCR - Blood, Hand, Right [[id number]]  (Abnormal) Collected: 02/04/21 1551    Lab Status: Final result Specimen: Blood from Hand, Right Updated: 02/05/21 1250     BCID, PCR Staphylococcus spp, not aureus. Identification by BCID PCR.            Assessment:    Acute metabolic encephalopathy    Hyperlipidemia    Hypertension    Stage 4 chronic kidney disease (CMS/[facility])    DM2 (diabetes mellitus, type 2) (CMS/[facility])    Anemia, chronic disease    UTI (urinary tract infection), bacterial    Elevated troponin    Hypokalemia    Uncontrolled  hypertension  1-toxic metabolic encephalopathy secondary to combination of  2-urinary tract infection with infected lower extremity wound with possible cellulitis  3-coag negative staph bacteremia either part of the systemic sepsis originating from lower extremities or possible contaminant during the process of collection  4-embolization syndrome  5-diabetes mellitus with complications including peripheral neuropathy, diabetic nephropathy  6-stage V kidney disease  7-uncontrolled hypertension  8-anemia multifactorial.     Recommendations/Discussions:   Continue present antibiotic regimen until the culture result is finalized.  If blood cultures turn out to be coag negative staph then it could be safely considered as contaminant and his antibiotics can be simplified with treatment directed towards UTI and mixed infection of the lower extremities.  Prognosis is guarded.  Tonja Azevedo MD  2/6/2021  11:05 EST    Much of this encounter note is an electronic transcription/translation of spoken language to printed text. The electronic translation of spoken language may permit erroneous, or at times, nonsensical words or phrases to be inadvertently transcribed; Although I have reviewed the note for such errors, some may still exist

## 2021-02-06 NOTE — CONSULTS
Gastroenterology   Initial Inpatient Consult Note  Thanks for asking our opinion on this patient.  Referring Provider: Nixon Quinteros MD    Reason for Consultation: Abnormal CT scan    Subjective     History of present illness:    65 y.o. male who is a very poor historian secondary to metabolic encephalopathy who we are asked to see for abnormality of the colon seen on CT scan.  Patient cannot give me any real meaningful history.  He does seem to indicate that he does have diarrhea and a C. difficile toxin was ordered which is negative.  The stool PCR is pending.  He also seems to admit to some abdominal pain but this is vague.  He also said he had a recent colonoscopy but I cannot find any report on this.  Is unclear if he is sure of what we were asking him about.  He denies any hematemesis but said he has been having some rectal bleeding but again unclear if this is reliable information.  Patient was seen by our GI service here back in December and it was recommended that he undergo an EGD and a colonoscopy but apparently that was never done as it is not on our system or the [facility] system.    Past Medical History:  Past Medical History:   Diagnosis Date   • Back pain    • CKD (chronic kidney disease)    • DM type 2 (diabetes mellitus, type 2) (CMS/[facility])    • ED (erectile dysfunction)    • Hyperlipidemia    • Hypertension    • SCOTTY (iron deficiency anemia)    • Monoclonal gammopathy    • Osteoarthritis      Past Surgical History:  History reviewed. No pertinent surgical history.   Social History:   Social History     Tobacco Use   • Smoking status: Never Smoker   • Smokeless tobacco: Never Used   Substance Use Topics   • Alcohol use: Yes      Family History:  History reviewed. No pertinent family history.    Home Meds:  Medications Prior to Admission   Medication Sig Dispense Refill Last Dose   • amLODIPine (NORVASC) 5 MG tablet Take 1 tablet by mouth Daily. 30 tablet 0 2/4/2021 at Unknown time   • aspirin 81 MG EC  tablet Take 1 tablet by mouth Daily. 30 tablet 0 2/4/2021 at Unknown time   • atorvastatin (LIPITOR) 20 MG tablet Take 20 mg by mouth Daily.   2/4/2021 at Unknown time   • brimonidine (ALPHAGAN P) 0.1 % solution ophthalmic solution 1 drop 2 (two) times a day.   2/4/2021 at Unknown time   • dorzolamide (TRUSOPT) 2 % ophthalmic solution Administer 1 drop to the right eye 2 (two) times a day.   2/4/2021 at Unknown time   • furosemide (LASIX) 80 MG tablet Take 1 tablet by mouth Daily. 30 tablet 0 2/4/2021 at Unknown time   • HUMALOG KWIKPEN 100 UNIT/ML solution pen-injector INJECT 20 UNITS INTO THE SKIN TWICE DAILY 15 mL 0 2/4/2021 at Unknown time   • sodium bicarbonate 650 MG tablet Take 1 tablet by mouth 2 (Two) Times a Day. 60 tablet 0 2/4/2021 at Unknown time   • diphenhydrAMINE (BENADRYL) 25 mg capsule Take 25 mg by mouth Every 6 (Six) Hours As Needed for Itching or Allergies.   More than a month at Unknown time   • HYDROcodone-acetaminophen (NORCO)  MG per tablet Take 1 tablet po every 4 hours PRN for moderate pain, take two tablets po every 4 hours PRN for severe pain, valid if faxed to [facility]   tablet 0 Unknown at Unknown time   • WAL-DRYL ALLERGY 25 MG Take 1 capsule by mouth Every 6 (Six) Hours As Needed for itching. 30 capsule 0 Unknown at Unknown time     Current Meds:   amLODIPine, 10 mg, Oral, Q24H  ammonium lactate, , Topical, BID  aspirin, 81 mg, Oral, Daily  atorvastatin, 20 mg, Oral, Daily  cefTRIAXone, 1 g, Intravenous, Q24H  hydrALAZINE, 50 mg, Oral, Q8H  insulin lispro, 0-9 Units, Subcutaneous, TID AC  sodium chloride, 10 mL, Intravenous, Q12H  vancomycin, 7.5 mg/kg, Intravenous, Once  Vancomycin Pharmacy Intermittent Dosing, , Does not apply, Daily      Allergies:  No Known Allergies  Review of Systems  Pertinent items are noted in HPI, all other systems reviewed and negative    Objective     Vital Signs  Temp:  [96.6 °F (35.9 °C)-98.6 °F (37 °C)] 97 °F (36.1 °C)  Heart Rate:  [82-99]  99  Resp:  [18-22] 18  BP: (143-189)/() 150/110    Physical Exam:  CONSTITUTIONAL:  today's vital signs reviewed  EARS NOSE THROAT: trachea midline and no deformity of the nares  EYES: no scleral icterus  GASTROINTESTINAL: abdomen is soft, nontender, nondistended with normal active bowel sounds, no masses are appreciated  PSYCHIATRIC: appropriate mood and affect but he seems confused to specific questions.  RESPIRATORY: normal inspiratory effort with no increased work of breathing  NEUROLOGIC: patient is awake and alert  DERMATOLOGIC: skin is warm with no cyanosis  LYMPHATIC: no periumbilical lymphadenopathy     Results Review:   I reviewed the patient's new clinical results.    Results from last 7 days   Lab Units 02/06/21  0610 02/05/21  0312 02/04/21  1551   WBC 10*3/mm3 9.52 7.69 6.57   HEMOGLOBIN g/dL 10.6* 9.8* 10.7*   HEMATOCRIT % 33.2* 31.5* 34.7*   PLATELETS 10*3/mm3 280 296 300     Results from last 7 days   Lab Units 02/06/21  0610 02/05/21  2105 02/05/21  1431 02/05/21 0312 02/04/21  1551   SODIUM mmol/L 142  --   --  142 139   POTASSIUM mmol/L 3.3* 3.5 2.8* 2.4* 2.2*   CHLORIDE mmol/L 115*  --   --  112* 106   CO2 mmol/L 18.9*  --   --  19.3* 21.4*   BUN mg/dL 20  --   --  23 23   CREATININE mg/dL 2.39*  --   --  2.55* 2.46*   CALCIUM mg/dL 8.5*  --   --  8.6 9.1   BILIRUBIN mg/dL  --   --   --  0.6 0.9   ALK PHOS U/L  --   --   --  91 104   ALT (SGPT) U/L  --   --   --  7 7   AST (SGOT) U/L  --   --   --  10 14   GLUCOSE mg/dL 164*  --   --  147* 179*         Lab Results   Lab Value Date/Time    LIPASE 17 12/18/2020 1653    LIPASE 914 (H) 10/27/2020 0330    LIPASE 1,087 (H) 10/26/2020 0410    LIPASE 1,653 (H) 10/25/2020 0323       Radiology:  CT Head Without Contrast   Final Result   No acute intracranial findings.       Radiation dose reduction techniques were utilized, including automated   exposure control and exposure modulation based on body size.       This report was finalized on 2/5/2021  9:30 PM by Dr. Vero Hansen M.D.          CT Abdomen Pelvis Without Contrast   Final Result       1. Severely technically limited examination due to motion artifact. The   patient's sigmoid colon in particular appears thick walled, with   adjacent pericolonic soft tissue stranding. There is also involvement of   the rectum, although to a lesser extent. Appearance is suggestive of   colitis. No pneumatosis or free air is seen. Given the patient had some   rectal wall thickening on prior CT, consideration for follow-up with   endoscopy is suggested. There is some diffuse gaseous distention of   bowel, which may reflect some ileus.   2. Thick-walled appearance to the urinary bladder, with adjacent   perivesical soft tissue stranding, concerning for cystitis.       Radiation dose reduction techniques were utilized, including automated   exposure control and exposure modulation based on body size.       This report was finalized on 2/5/2021 9:39 PM by Dr. Vero Hansen M.D.          XR Chest 1 View   Final Result          Assessment/Plan   Patient Active Problem List   Diagnosis   • Complicated UTI (urinary tract infection)   • Macrocytosis   • Sepsis secondary to UTI (CMS/HCC)   • Metabolic encephalopathy   • Hyperlipidemia   • Hypertension   • Monoclonal gammopathy   • Stage 4 chronic kidney disease (CMS/HCC)   • DM2 (diabetes mellitus, type 2) (CMS/HCC)   • Abnormal CT of the abdomen   • Normal anion gap metabolic acidosis   • Anemia, chronic disease   • Ventricular tachycardia (paroxysmal) (CMS/HCC)   • Acute metabolic encephalopathy   • UTI (urinary tract infection), bacterial   • Elevated troponin   • Hypokalemia   • SILVIA (acute kidney injury) (CMS/HCC)   • Uncontrolled hypertension       Assessment:  1. Abnormal CT scan of the colon.  Recommend colonoscopy when cleared by cardiology  2. Iron deficiency anemia.  Recommend EGD to be done at the same time as his colonoscopy again once he has been cleared  by the other services  3. Diarrhea.  C. difficile is negative.  Await stool PCR  4. Metabolic encephalopathy.  5. Chronic kidney disease.  Stable.  6. Deconditioning syndrome.    Plan:  · EGD and colonoscopy when okay with cardiology.  · Monitor H&H  · Await results of stool PCR      I discussed the patients findings and my recommendations with patient and nursing staff.    MD Gamal Bliss M.D.  [facility] [place] [address]  Office: [phone]

## 2021-02-06 NOTE — PLAN OF CARE
Goal Outcome Evaluation:  Plan of Care Reviewed With: patient  Progress: no change  Outcome Summary: Patient confused this shift. Potassium replaced today per Dr. Del Toro.  Urine sent to lab. Multiple BM's this shift. Will send stool to lab with next BM.  Wound care nurse saw patient.  Awaiting CT's.  Will monitor the remainder of my shift.

## 2021-02-06 NOTE — PLAN OF CARE
Goal Outcome Evaluation:  Plan of Care Reviewed With: patient  Progress: no change  Outcome Summary: Pt confused with some agitation, prn norco given for pain. Repeat potassium 3.5. Stool samp was neg for c-diff, with only one BM for the night. CT was done. B/P elevated throughout shift, improvment with scheduled hydralazine. CTM

## 2021-02-06 NOTE — PROGRESS NOTES
"   LOS: 2 days    Patient Care Team:  Maya Ribeiro APRN as PCP - General (Family Medicine)      Subjective     Patient remains confused.  Awake and responds to verbal stimulus  No acute respite distress overnight    Objective     Vital Sign Min/Max for last 24 hours  Temp:  [96.6 °F (35.9 °C)-98.5 °F (36.9 °C)] 98 °F (36.7 °C)  Heart Rate:  [82-99] 86  Resp:  [18-22] 20  BP: (140-181)/() 140/112                       Flowsheet Rows      First Filed Value   Admission Height  190.5 cm (75\") Documented at 02/04/2021 1250   Admission Weight  114 kg (251 lb) Documented at 02/04/2021 1250          I/O this shift:  In: -   Out: 300 [Urine:300]  I/O last 3 completed shifts:  In: -   Out: 1050 [Urine:1050]    Physical Exam:  Physical Exam    General.  Drowsy but arousable, confused, disoriented  Head.  Atraumatic, normocephalic  Neck.  Supple.  No JVD  ENT.  Oral mucosa moist  Lungs.  Clear to auscultation anteriorly  Heart.  Regular rhythm.  Normal S1-S2  Abdominal.  Obese, soft, nontender  Extremities.  No significant lower extreme edema     LABS:  Lab Results   Component Value Date    CALCIUM 8.5 (L) 02/06/2021    PHOS 1.4 (C) 02/06/2021     Results from last 7 days   Lab Units 02/06/21  0610 02/05/21  2105 02/05/21  1431 02/05/21  0312 02/04/21  1551   MAGNESIUM mg/dL 1.7  --   --  1.8 1.9   SODIUM mmol/L 142  --   --  142 139   POTASSIUM mmol/L 3.3* 3.5 2.8* 2.4* 2.2*   CHLORIDE mmol/L 115*  --   --  112* 106   CO2 mmol/L 18.9*  --   --  19.3* 21.4*   BUN mg/dL 20  --   --  23 23   CREATININE mg/dL 2.39*  --   --  2.55* 2.46*   GLUCOSE mg/dL 164*  --   --  147* 179*   CALCIUM mg/dL 8.5*  --   --  8.6 9.1   WBC 10*3/mm3 9.52  --   --  7.69 6.57   HEMOGLOBIN g/dL 10.6*  --   --  9.8* 10.7*   PLATELETS 10*3/mm3 280  --   --  296 300   ALT (SGPT) U/L  --   --   --  7 7   AST (SGOT) U/L  --   --   --  10 14     Lab Results   Component Value Date    TROPONINI <0.012 01/23/2021    TROPONINT 0.110 (C) 02/04/2021 "     Estimated Creatinine Clearance: 40 mL/min (A) (by C-G formula based on SCr of 2.39 mg/dL (H)).      Brief Urine Lab Results  (Last result in the past 365 days)      Color   Clarity   Blood   Leuk Est   Nitrite   Protein   CREAT   Urine HCG        02/05/21 1242             39.4           WEIGHTS:     Wt Readings from Last 1 Encounters:   02/04/21 2215 109 kg (240 lb 11.9 oz)   02/04/21 1250 114 kg (251 lb)       amLODIPine, 10 mg, Oral, Q24H  ammonium lactate, , Topical, BID  aspirin, 81 mg, Oral, Daily  atorvastatin, 20 mg, Oral, Daily  cefTRIAXone, 1 g, Intravenous, Q24H  hydrALAZINE, 50 mg, Oral, Q8H  insulin lispro, 0-9 Units, Subcutaneous, TID AC  sodium chloride, 10 mL, Intravenous, Q12H  Vancomycin Pharmacy Intermittent Dosing, , Does not apply, Daily      Pharmacy to dose vancomycin,         Assessment/Plan       1.  Chronic kidney disease stage IV  Secondary to diabetic nephropathy  Creatinine, volume status okay  Continue supportive care    2.  Bacteremia  Culture growing gram-positive cocci   IV vancomycin.  Work-up in progress by primary team    3.  Hypokalemia  IV replacement    4.  Hypophosphatemia  IV replacement    5.  Hypertension with CKD 4  Labile.  Continue current oral antihypertensive  Add IV as needed hydralazine    6.  Altered mental status  Most likely metabolic encephalopathy  CT head negative        Soy Pearl MD  02/06/21  16:38 EST

## 2021-02-06 NOTE — PROGRESS NOTES
S: confused    Medication:  Amlodipine 10 mg  Aspirin 81 mg   Atorvastatin 20 mg   Ceftriaxone 1 G every day  Hydralazine 50 mg tid  Insulin lispro sliding scale  Magnesium sulfate 1 gram once  Potasium Chloride 60 mEq x 2 given  Potassium phosphate one dose  Vancomycin IV    O:    Nurse tells me he is not eating    HR 99, R 18, /110, T 36.7  No intake charted, output 450 mL  N: confused, disoriented, not agitated  Neck: no distended external jugular veins, evaluation of cvp is difficult  Chest; clear  Cor: no lift, normal s1 and s2, no murmur, no s3  Ab: no fluid wave, soft  Ex: no edema      Lab 2/6/21:  Na 142, K 3.3, HCO3 19, BUN 20, Creatinine 2.39, GFR 33, Mg 1.7, Hb 10.6    Echocardiogram 12/21/20:  LVEF 59%, moderate septal hypertroph, grade 1 diastolic dysfunction, normal LAP  Moderate LA dilatation, mild to moderate RA dilatation  Normal RV  Aortic valve sclerosis, MAC, trace TR, RVSP < 35 mm Hg, trivial pericardial effusion      A:  Acute metabolic encephalopathy    Hyperlipidemia    Hypertension ... poorly controlled past 24 hours    ESRD (end stage renal disease) (CMS/[facility])    DM2 (diabetes mellitus, type 2) (CMS/[facility])    Anemia, chronic disease    UTI (urinary tract infection), bacterial    Elevated troponin ... nonspecific, likely related to ESRD    Hypokalemia    Uncontrolled hypertension  Past Atrial Fibrillation .... monitor now shows atrial fibrillation with controlled response  History of nonsustained ventricular tachycardia  History of marked first-degree AV block and history of some junctional rhythm  History of syncope  Hypertensive cardiovascular disease, normal LVEF 59% on echo 12/20/2020  He has refused stress testing in the past.  History of monoclonal gammopathy  History of immobility  History of microcytic anemia and abnormal findings of the rectum.  Gastroenterology mentions need for EGD and colonoscopy when seen in December 2020.  Unsure if he ever had those done.    2.  There is  mention of atrial fibrillation in the past.  He has a loop recorder and at one time is reported he had maybe 50 minutes of atrial fibrillation.  Currently atrial flutter fib.  Rate is okay now.  We need to avoid any beta-blockers or diltiazem.  He has a previous history of pauses, junctional rhythm and prominent first-degree AV block.  We will see how the rhythm does after the potassium is corrected.  At this point he does not appear to be a good candidate to start anticoagulation.  I am not certain if he ever had the EGD or colonoscopy done as mentioned above.    3. Elevated blood pressure with recent augmentation of antihypertensive tx with increased dose amlodipine. I am not able to make a good estimate of intravascular volume.  His encephalopathy makes evaluation difficult. His uncontrolled BP is not making his acute encephalopathy better. I would not advance antihypertensive therapy today, since he is not receiving enteral intake, and BP was better in early am.

## 2021-02-07 NOTE — PROGRESS NOTES
"   LOS: 3 days    Patient Care Team:  Maya Ribeiro APRN as PCP - General (Family Medicine)      Subjective     Patient more awake this morning but still confused although able to answer simple questions  No acute respite distress overnight    Objective     Vital Sign Min/Max for last 24 hours  Temp:  [97.4 °F (36.3 °C)-98 °F (36.7 °C)] 97.6 °F (36.4 °C)  Heart Rate:  [66-97] 66  Resp:  [18-20] 18  BP: (137-165)/() 162/122                       Flowsheet Rows      First Filed Value   Admission Height  190.5 cm (75\") Documented at 02/04/2021 1250   Admission Weight  114 kg (251 lb) Documented at 02/04/2021 1250          No intake/output data recorded.  I/O last 3 completed shifts:  In: -   Out: 300 [Urine:300]    Physical Exam:  Physical Exam    General.  Awake, confused, disoriented  Head.  Atraumatic, normocephalic  Neck.  Supple.  No JVD  ENT.  Oral mucosa moist  Lungs.  Clear to auscultation anteriorly  Heart.  Regular rhythm.  Normal S1-S2  Abdominal.  Obese, soft, nontender  Extremities. 1+ lower extreme edema     LABS:  Lab Results   Component Value Date    CALCIUM 8.7 02/07/2021    PHOS 1.8 (C) 02/07/2021     Results from last 7 days   Lab Units 02/07/21  0458 02/06/21 2021 02/06/21  0610  02/05/21  0312 02/04/21  1551   MAGNESIUM mg/dL  --  2.0 1.7  --  1.8 1.9   SODIUM mmol/L 143  --  142  --  142 139   POTASSIUM mmol/L 2.8* 2.9* 3.3*   < > 2.4* 2.2*   CHLORIDE mmol/L 115*  --  115*  --  112* 106   CO2 mmol/L 16.4*  --  18.9*  --  19.3* 21.4*   BUN mg/dL 20  --  20  --  23 23   CREATININE mg/dL 2.37*  --  2.39*  --  2.55* 2.46*   GLUCOSE mg/dL 156*  --  164*  --  147* 179*   CALCIUM mg/dL 8.7  --  8.5*  --  8.6 9.1   WBC 10*3/mm3 8.61  --  9.52  --  7.69 6.57   HEMOGLOBIN g/dL 10.0*  --  10.6*  --  9.8* 10.7*   PLATELETS 10*3/mm3 253  --  280  --  296 300   ALT (SGPT) U/L  --   --   --   --  7 7   AST (SGOT) U/L  --   --   --   --  10 14    < > = values in this interval not displayed.     Lab " Results   Component Value Date    TROPONINI <0.012 01/23/2021    TROPONINT 0.110 (C) 02/04/2021     Estimated Creatinine Clearance: 40.3 mL/min (A) (by C-G formula based on SCr of 2.37 mg/dL (H)).      Brief Urine Lab Results  (Last result in the past 365 days)      Color   Clarity   Blood   Leuk Est   Nitrite   Protein   CREAT   Urine HCG        02/05/21 1242             39.4           WEIGHTS:     Wt Readings from Last 1 Encounters:   02/04/21 2215 109 kg (240 lb 11.9 oz)   02/04/21 1250 114 kg (251 lb)       amLODIPine, 10 mg, Oral, Q24H  ammonium lactate, , Topical, BID  aspirin, 81 mg, Oral, Daily  atorvastatin, 20 mg, Oral, Daily  cefTRIAXone, 1 g, Intravenous, Q24H  hydrALAZINE, 75 mg, Oral, Q6H  insulin lispro, 0-9 Units, Subcutaneous, TID AC  sodium chloride, 10 mL, Intravenous, Q12H  vancomycin, 750 mg, Intravenous, Once  Vancomycin Pharmacy Intermittent Dosing, , Does not apply, Daily      Pharmacy to dose vancomycin,         Assessment/Plan       1.  Chronic kidney disease stage IV  Secondary to diabetic nephropathy along with nephrotic range proteinuria  Creatinine, volume status okay  Metabolic acidosis noticed.  Adding oral sodium bicarbonate    2.  Bacteremia  Culture growing gram-positive cocci   IV vancomycin.  Infectious disease following    3.  Hypokalemia  IV replacement    4.  Hypophosphatemia  IV replacement    5.  Hypertension with CKD 4  Blood pressure still running high  Increase hydralazine dose    6.  Altered mental status  Most likely metabolic encephalopathy  CT head negative        Soy Pearl MD  02/07/21  10:48 EST

## 2021-02-07 NOTE — PROGRESS NOTES
Gastroenterology   Inpatient Progress Note    Reason for Follow Up: Abnormal CT scan with abnormality in the distal colon seen on CT scan    Subjective     Interval History:   Patient is a poor historian secondary to encephalopathy unable to give a good history he denies any complaints upon direct questioning    Current Facility-Administered Medications:   •  acetaminophen (TYLENOL) tablet 650 mg, 650 mg, Oral, Q4H PRN **OR** acetaminophen (TYLENOL) 160 MG/5ML solution 650 mg, 650 mg, Oral, Q4H PRN **OR** acetaminophen (TYLENOL) suppository 650 mg, 650 mg, Rectal, Q4H PRN, Robert Baker MD  •  amLODIPine (NORVASC) tablet 10 mg, 10 mg, Oral, Q24H, Miles Del Toro MD, 10 mg at 02/06/21 0832  •  ammonium lactate (AMLACTIN) cream, , Topical, BID, Nixon Quinteros MD, Given at 02/06/21 2121  •  aspirin EC tablet 81 mg, 81 mg, Oral, Daily, Robert Baker MD, 81 mg at 02/06/21 0832  •  atorvastatin (LIPITOR) tablet 20 mg, 20 mg, Oral, Daily, Robert Baker MD, 20 mg at 02/06/21 0832  •  cefTRIAXone (ROCEPHIN) IVPB 1 g, 1 g, Intravenous, Q24H, Robert Baker MD, Last Rate: 100 mL/hr at 02/06/21 1835, 1 g at 02/06/21 1835  •  dextrose (D50W) 25 g/ 50mL Intravenous Solution 25 g, 25 g, Intravenous, Q15 Min PRN, Robert Baker MD  •  dextrose (GLUTOSE) oral gel 15 g, 15 g, Oral, Q15 Min PRN, Robert Baker MD  •  diphenhydrAMINE (BENADRYL) capsule 25 mg, 25 mg, Oral, Q6H PRN, Robert Baker MD  •  glucagon (human recombinant) (GLUCAGEN DIAGNOSTIC) injection 1 mg, 1 mg, Subcutaneous, Q15 Min PRN, Robert Baker MD  •  hydrALAZINE (APRESOLINE) injection 10 mg, 10 mg, Intravenous, Q4H PRN, Soy Pearl MD  •  hydrALAZINE (APRESOLINE) tablet 25 mg, 25 mg, Oral, Q6H PRN, Nixon Quinteros MD  •  hydrALAZINE (APRESOLINE) tablet 50 mg, 50 mg, Oral, Q8H, Terrance Muhammad MD, 50 mg at 02/07/21 0529  •  HYDROcodone-acetaminophen (NORCO)  MG per tablet 1 tablet, 1 tablet, Oral, Q6H PRN,  Robert Baker MD, 1 tablet at 02/07/21 0339  •  insulin lispro (humaLOG, ADMELOG) injection 0-9 Units, 0-9 Units, Subcutaneous, TID AC, Robert Baker MD, 2 Units at 02/06/21 0839  •  ondansetron (ZOFRAN) tablet 4 mg, 4 mg, Oral, Q6H PRN **OR** ondansetron (ZOFRAN) injection 4 mg, 4 mg, Intravenous, Q6H PRN, Robert Baker MD  •  Pharmacy to dose vancomycin, , Does not apply, Continuous PRN, Nixon Quinteros MD  •  potassium phosphate 20 mmol in sodium chloride 0.9 % 500 mL infusion, 20 mmol, Intravenous, Once, Miles Del Toro MD  •  sodium chloride 0.9 % flush 10 mL, 10 mL, Intravenous, PRN, Josafat Mckeon MD  •  sodium chloride 0.9 % flush 10 mL, 10 mL, Intravenous, Q12H, Robert Baker MD, 10 mL at 02/06/21 2122  •  sodium chloride 0.9 % flush 10 mL, 10 mL, Intravenous, PRN, Robert Baker MD  •  vancomycin 750 mg/250 mL 0.9% NS add-vantage, 750 mg, Intravenous, Once, Nixon Quinteros MD  •  Vancomycin Pharmacy Intermittent Dosing, , Does not apply, Daily, Nixon Quinteros MD  Review of Systems:    Review of systems could not be obtained due to  patient confusion.    Objective     Vital Signs  Temp:  [97.4 °F (36.3 °C)-98 °F (36.7 °C)] 97.6 °F (36.4 °C)  Heart Rate:  [66-99] 66  Resp:  [18-20] 18  BP: (137-165)/() 162/122  Body mass index is 30.09 kg/m².    Intake/Output Summary (Last 24 hours) at 2/7/2021 0827  Last data filed at 2/6/2021 1229  Gross per 24 hour   Intake --   Output 300 ml   Net -300 ml     No intake/output data recorded.     Physical Exam:   General: patient awake, alert and cooperative but he is confused and trying to get out of bed   Eyes: no scleral icterus   Skin: warm and dry, not jaundiced   Abdomen: soft, nontender, nondistended; normal bowel sounds, abnormal scarring of the midline vertically no masses palpated, no periumbical lymphadenopathy   Psychiatric: Appropriate affect and behavior     Results Review:     I reviewed the patient's new  clinical results.    Results from last 7 days   Lab Units 02/07/21 0458 02/06/21 0610 02/05/21 0312   WBC 10*3/mm3 8.61 9.52 7.69   HEMOGLOBIN g/dL 10.0* 10.6* 9.8*   HEMATOCRIT % 31.0* 33.2* 31.5*   PLATELETS 10*3/mm3 253 280 296     Results from last 7 days   Lab Units 02/07/21 0458 02/06/21 2021 02/06/21  0610  02/05/21  0312 02/04/21  1551   SODIUM mmol/L 143  --  142  --  142 139   POTASSIUM mmol/L 2.8* 2.9* 3.3*   < > 2.4* 2.2*   CHLORIDE mmol/L 115*  --  115*  --  112* 106   CO2 mmol/L 16.4*  --  18.9*  --  19.3* 21.4*   BUN mg/dL 20  --  20  --  23 23   CREATININE mg/dL 2.37*  --  2.39*  --  2.55* 2.46*   CALCIUM mg/dL 8.7  --  8.5*  --  8.6 9.1   BILIRUBIN mg/dL  --   --   --   --  0.6 0.9   ALK PHOS U/L  --   --   --   --  91 104   ALT (SGPT) U/L  --   --   --   --  7 7   AST (SGOT) U/L  --   --   --   --  10 14   GLUCOSE mg/dL 156*  --  164*  --  147* 179*    < > = values in this interval not displayed.         Lab Results   Lab Value Date/Time    LIPASE 17 12/18/2020 1653    LIPASE 914 (H) 10/27/2020 0330    LIPASE 1,087 (H) 10/26/2020 0410    LIPASE 1,653 (H) 10/25/2020 0323       Radiology:  CT Head Without Contrast   Final Result   No acute intracranial findings.       Radiation dose reduction techniques were utilized, including automated   exposure control and exposure modulation based on body size.       This report was finalized on 2/5/2021 9:30 PM by Dr. Vero Hansen M.D.          CT Abdomen Pelvis Without Contrast   Final Result       1. Severely technically limited examination due to motion artifact. The   patient's sigmoid colon in particular appears thick walled, with   adjacent pericolonic soft tissue stranding. There is also involvement of   the rectum, although to a lesser extent. Appearance is suggestive of   colitis. No pneumatosis or free air is seen. Given the patient had some   rectal wall thickening on prior CT, consideration for follow-up with   endoscopy is suggested.  There is some diffuse gaseous distention of   bowel, which may reflect some ileus.   2. Thick-walled appearance to the urinary bladder, with adjacent   perivesical soft tissue stranding, concerning for cystitis.       Radiation dose reduction techniques were utilized, including automated   exposure control and exposure modulation based on body size.       This report was finalized on 2/5/2021 9:39 PM by Dr. Vero Hansen M.D.          XR Chest 1 View   Final Result          Assessment/Plan     Patient Active Problem List   Diagnosis   • Complicated UTI (urinary tract infection)   • Macrocytosis   • Sepsis secondary to UTI (CMS/HCC)   • Metabolic encephalopathy   • Hyperlipidemia   • Hypertension   • Monoclonal gammopathy   • Stage 4 chronic kidney disease (CMS/HCC)   • DM2 (diabetes mellitus, type 2) (CMS/HCC)   • Abnormal CT of the abdomen   • Normal anion gap metabolic acidosis   • Anemia, chronic disease   • Ventricular tachycardia (paroxysmal) (CMS/HCC)   • Acute metabolic encephalopathy   • UTI (urinary tract infection), bacterial   • Elevated troponin   • Hypokalemia   • SILVIA (acute kidney injury) (CMS/HCC)   • Uncontrolled hypertension   • Septicemia (CMS/HCC)       Assessment:  1. Metabolic encephalopathy  2. On a kidney disease  3. Iron deficiency anemia  4. Abnormal CT scan of the colon  5. Deconditioned  6. Diarrhea although patient does not report that problem to me this morning, panel negative so far  7. Multiple electrolyte abnormalities including hypophosphatemia and hypokalemia elevated glucose    These problems are new to me.  Plan:  · From a GI perspective the overall plan is that of endoscopic evaluation bidirectionally with an EGD and colonoscopy as well as evaluation for this diarrhea which seems to be somewhat improved clinically per his report but also negative so far by stool studies  · There are multiple confounding issues that should be optimized prior to putting him through endoscopic  procedures specifically currently he is significantly hypokalemic hypophosphatemic he is still confused and is still being optimized with his blood pressure and cardiac issues.  Her preference would be to optimize these prior to putting him through endoscopic procedures unless active bleeding is a concern  I discussed the patients findings and my recommendations with patient.    MD Gamal Hoang M.D.  [facility] [place] [address]  Office: [phone]

## 2021-02-07 NOTE — PROGRESS NOTES
"Pharmacokinetic Consult - Vancomycin Dosing (Follow-up Note)    Gregorio Alejandro is on day 3 pharmacy to dose vancomycin for bacteremia.  Pharmacy dosing vancomycin per Dr Quinteros's request.   Goal AUC= 400-600   Duration of therapy: 7 days  Is Patient on Dialysis or Renal Replacement: No but poor renal function so will dose intermittently    Relevant clinical data and objective history reviewed:  65 y.o. male 190.5 cm (75\") 109 kg (240 lb 11.9 oz)    Creatinine   Date Value Ref Range Status   02/07/2021 2.37 (H) 0.76 - 1.27 mg/dL Final   02/06/2021 2.39 (H) 0.76 - 1.27 mg/dL Final   02/05/2021 2.55 (H) 0.76 - 1.27 mg/dL Final   02/27/2020 6.7 (H) 0.7 - 1.5 mg/dL Final   02/27/2020 6.5 (H) 0.7 - 1.5 mg/dL Final   02/18/2020 7.2 (H) 0.7 - 1.5 mg/dL Final     BUN   Date Value Ref Range Status   02/07/2021 20 8 - 23 mg/dL Final   02/27/2020 57 (H) 7 - 20 mg/dL Final     Estimated Creatinine Clearance: 40.3 mL/min (A) (by C-G formula based on SCr of 2.37 mg/dL (H)).    Lab Results   Component Value Date    WBC 8.61 02/07/2021     Temp Readings from Last 3 Encounters:   02/06/21 97.4 °F (36.3 °C) (Oral)   12/23/20 97.3 °F (36.3 °C) (Oral)     Cultures:   Microbiology Results (last 10 days)     Procedure Component Value - Date/Time    Clostridium Difficile Toxin - Stool, Per Rectum [[id number]]  (Normal) Collected: 02/05/21 2204    Lab Status: Final result Specimen: Stool from Per Rectum Updated: 02/05/21 2303    Narrative:      The following orders were created for panel order Clostridium Difficile Toxin - Stool, Per Rectum.  Procedure                               Abnormality         Status                     ---------                               -----------         ------                     Clostridium Difficile To...[[id number]]  Normal              Final result                 Please view results for these tests on the individual orders.    Clostridium Difficile Toxin, PCR - Stool, Per Rectum [[id number]]  (Normal) " Collected: 02/05/21 2204    Lab Status: Final result Specimen: Stool from Per Rectum Updated: 02/05/21 2303     C. Difficile Toxins by PCR Negative    Blood Culture - Blood, Arm, Left [[id number]] Collected: 02/05/21 1433    Lab Status: Preliminary result Specimen: Blood from Arm, Left Updated: 02/06/21 1446     Blood Culture No growth at 24 hours    Blood Culture - Blood, Arm, Right [[id number]] Collected: 02/05/21 1431    Lab Status: Preliminary result Specimen: Blood from Arm, Right Updated: 02/06/21 1446     Blood Culture No growth at 24 hours    COVID PRE-OP / PRE-PROCEDURE SCREENING ORDER (NO ISOLATION) - Swab, Nasopharynx [[id number]]  (Normal) Collected: 02/04/21 1810    Lab Status: Final result Specimen: Swab from Nasopharynx Updated: 02/04/21 2206    Narrative:      The following orders were created for panel order COVID PRE-OP / PRE-PROCEDURE SCREENING ORDER (NO ISOLATION) - Swab, Nasopharynx.  Procedure                               Abnormality         Status                     ---------                               -----------         ------                     COVID-19,APTIMA PANTHER,...[[id number]]  Normal              Final result                 Please view results for these tests on the individual orders.    COVID-19,APTIMA PANTHER,[facility], NP/OP SWAB IN UTM/VTM/SALINE TRANSPORT MEDIA,24 HR TAT - Swab, Nasopharynx [[id number]]  (Normal) Collected: 02/04/21 1810    Lab Status: Final result Specimen: Swab from Nasopharynx Updated: 02/04/21 2206     COVID19 Not Detected    Narrative:      Fact sheet for providers: https://www.fda.gov/media/709566/download     Fact sheet for patients: https://www.fda.gov/media/102046/download    Test performed by RT PCR.    Urine Culture - Urine, Urine, Catheter [[id number]]  (Abnormal)  (Susceptibility) Collected: 02/04/21 1612    Lab Status: Final result Specimen: Urine, Catheter Updated: 02/06/21 0940     Urine Culture 25,000 CFU/mL Proteus mirabilis     Susceptibility      Proteus mirabilis     ISAC     Ampicillin Susceptible     Ampicillin + Sulbactam Susceptible     Cefazolin Susceptible     Cefepime Susceptible     Ceftazidime Susceptible     Ceftriaxone Susceptible     Gentamicin Susceptible     Levofloxacin Intermediate     Nitrofurantoin Resistant     Piperacillin + Tazobactam Susceptible     Tetracycline Resistant     Trimethoprim + Sulfamethoxazole Resistant                    Blood Culture - Blood, Hand, Right [[id number]]  (Abnormal) Collected: 02/04/21 1551    Lab Status: Preliminary result Specimen: Blood from Hand, Right Updated: 02/07/21 0642     Blood Culture Staphylococcus epidermidis     Comment: Refer to previous blood culture collected on 2-4-21 for MICs        Isolated from Anaerobic Bottle     Blood Culture Gram Positive Cocci     Isolated from Anaerobic Bottle     Gram Stain Anaerobic Bottle Gram positive cocci in clusters    Blood Culture - Blood, Hand, Right [[id number]]  (Abnormal)  (Susceptibility) Collected: 02/04/21 1551    Lab Status: Preliminary result Specimen: Blood from Hand, Right Updated: 02/07/21 0642     Blood Culture Staphylococcus epidermidis     Isolated from Aerobic Bottle     Blood Culture Gram Positive Cocci     Isolated from Anaerobic Bottle     Gram Stain Aerobic Bottle Gram positive cocci in clusters      Anaerobic Bottle Gram positive cocci in clusters    Susceptibility      Staphylococcus epidermidis     ISAC     Oxacillin Resistant     Vancomycin Susceptible                Susceptibility Comments     Staphylococcus epidermidis    This isolate is presumed to be clindamycin resistant based on detection of inducible clindamycin resistance.  Clindamycin may still be effective in some patients.             Blood Culture ID, PCR - Blood, Hand, Right [[id number]]  (Abnormal) Collected: 02/04/21 1551    Lab Status: Final result Specimen: Blood from Hand, Right Updated: 02/05/21 1250     BCID, PCR Staphylococcus spp, not aureus.  [facility]               Anti-Infectives (From admission, onward)    Ordered     Dose/Rate Route Frequency Start Stop    02/06/21 0905  vancomycin 750 mg/250 mL 0.9% NS add-vantage     Ordering Provider: Nixon Quinteros MD    7.5 mg/kg × 109 kg  over 45 Minutes Intravenous Once 02/06/21 1000 02/06/21 1631    02/04/21 2042  cefTRIAXone (ROCEPHIN) IVPB 1 g     Ordering Provider: Robert Baker MD    1 g  100 mL/hr over 30 Minutes Intravenous Every 24 Hours 02/05/21 1800 02/09/21 1759    02/05/21 1330  vancomycin 2250 mg/500 mL 0.9% NS IVPB ([facility])     Ordering Provider: Nixon Quinteros MD    20 mg/kg × 109 kg Intravenous Once 02/05/21 1430 02/05/21 1545    02/05/21 1330  Vancomycin Pharmacy Intermittent Dosing     Ordering Provider: Nixon Quinteros MD     Does not apply Daily 02/05/21 1430 02/12/21 0859    02/05/21 1327  Pharmacy to dose vancomycin     Ordering Provider: Nixon Quinteros MD     Does not apply Continuous PRN 02/05/21 1327 02/12/21 1326    02/04/21 1744  cefTRIAXone (ROCEPHIN) IVPB 1 g     Ordering Provider: Kevan Ulloa PA    1 g  100 mL/hr over 30 Minutes Intravenous Once 02/04/21 1746 02/04/21 1845         No results found for: DOROTHY  Lab Results   Component Value Date    VANCORANDOM 17.40 02/07/2021       Dosing History  2/5 1545 vanc 2250 mg IV x1   2/6 0610 15.1 mcg/mL (~14 hr level)   2/6 1546 750 mg IV x1 (patient did not have IV access until later in day)   2/7 0458 random = 17.4 mcg/mL (~13 hr level)    Assessment/Plan  - monitor repeat cultures, no growth at 24hrs  - continue intermittent vancomycin dosing. Give 750 mg IV x1 and recheck random level in AM. No plans for HD noted at this time.    [facility]ung Latif [facility]

## 2021-02-07 NOTE — PROGRESS NOTES
S: confused     Medication:  Amlodipine 10 mg  Aspirin 81 mg   Atorvastatin 20 mg   Ceftriaxone 1 G every day  Hydralazine 50 mg tid  Insulin lispro sliding scale  Magnesium sulfate 1 gram once  Potasium Chloride 60 mEq x 2 given  Potassium phosphate one dose  Vancomycin IV     O:     Nurse tells me he is not eating     HR 66, R 18, /122, T 36.4  No intake charted, output 300 mL  N: confused, disoriented, not agitated  Neck: no distended external jugular veins, evaluation of cvp is difficult  Chest; clear  Cor: no lift, normal s1 and s2, no murmur, no s3  Ab: no fluid wave, soft  Ex: no edema          Lab 2/6/21:  Na 142, K 2.8, HCO3 19, BUN 20, Creatinine 2.37, GFR 33, Mg 1.7, Hb 10.0 (10.6)  Phosphorous: 1.8     Echocardiogram 12/21/20:  LVEF 59%, moderate septal hypertroph, grade 1 diastolic dysfunction, normal LAP  Moderate LA dilatation, mild to moderate RA dilatation  Normal RV  Aortic valve sclerosis, MAC, trace TR, RVSP < 35 mm Hg, trivial pericardial effusion        A:  Acute metabolic encephalopathy    Hyperlipidemia    Hypertension ... poorly controlled past 24 hours    ESRD (end stage renal disease) (CMS/[facility])    DM2 (diabetes mellitus, type 2) (CMS/[facility])    Anemia, chronic disease    UTI (urinary tract infection), bacterial    Elevated troponin ... nonspecific, likely related to ESRD    Hypokalemia    Uncontrolled hypertension  Past Atrial Fibrillation .... monitor now shows atrial fibrillation with controlled response  History of nonsustained ventricular tachycardia  History of marked first-degree AV block and history of some junctional rhythm  History of syncope  Hypertensive cardiovascular disease, normal LVEF 59% on echo 12/20/2020  He has refused stress testing in the past.  History of monoclonal gammopathy  History of immobility  History of microcytic anemia and abnormal findings of the rectum.  Gastroenterology mentions need for EGD and colonoscopy when seen in December 2020.  Unsure if he  ever had those done.     2. Persistent atrial fibrillation, controlled V rate.  We need to avoid any beta-blockers or diltiazem.  He has a previous history of pauses, junctional rhythm and prominent first-degree AV block.  We will see how the rhythm does after the potassium is corrected.  At this point he does not appear to be a good candidate to start anticoagulation.  I am not certain if he ever had the EGD or colonoscopy done as mentioned above.      3. Elevated blood pressure with recent augmentation of antihypertensive tx with increased dose amlodipine. I am not able to make a good estimate of intravascular volume.  His encephalopathy makes evaluation difficult. His uncontrolled BP is not making his acute encephalopathy better.  I will advance hydralazine to 100 mg tid. May need to change amlodipine to nifedipine ER.

## 2021-02-07 NOTE — PLAN OF CARE
"Goal Outcome Evaluation:  Pt alert and oriented to self only. Responds to questions he does not know with \"not much\". Pt blood pressure high, meds adjusted today. RA. Will CTM  "

## 2021-02-07 NOTE — PROGRESS NOTES
"  Infectious Diseases Progress Note    Tonja Azevedo MD     [facility]  [place]: 3 days  Patient Identification:  Name: Gregorio Alejandro  Age: 65 y.o.  Sex: male  :  1955  MRN: [id number]         Primary Care Physician: Maya Ribeiro APRN            Subjective: More awake and interactive dramatically different.  Denies any complaints.  Interval History: See consultation note.    Objective:    Scheduled Meds:amLODIPine, 10 mg, Oral, Q24H  ammonium lactate, , Topical, BID  aspirin, 81 mg, Oral, Daily  atorvastatin, 20 mg, Oral, Daily  cefTRIAXone, 1 g, Intravenous, Q24H  hydrALAZINE, 100 mg, Oral, Q8H  insulin lispro, 0-9 Units, Subcutaneous, TID AC  sodium bicarbonate, 650 mg, Oral, TID  sodium chloride, 10 mL, Intravenous, Q12H  vancomycin, 750 mg, Intravenous, Once  Vancomycin Pharmacy Intermittent Dosing, , Does not apply, Daily      Continuous Infusions:Pharmacy to dose vancomycin,         Vital signs in last 24 hours:  Temp:  [97.4 °F (36.3 °C)-98 °F (36.7 °C)] 97.6 °F (36.4 °C)  Heart Rate:  [66-97] 66  Resp:  [18-20] 18  BP: (137-165)/() 162/122    Intake/Output:    Intake/Output Summary (Last 24 hours) at 2021 1219  Last data filed at 2021 1229  Gross per 24 hour   Intake --   Output 300 ml   Net -300 ml       Exam:  BP (!) 162/122 (BP Location: Left arm, Patient Position: Lying)   Pulse 66   Temp 97.6 °F (36.4 °C) (Oral)   Resp 18   Ht 190.5 cm (75\")   Wt 109 kg (240 lb 11.9 oz)   SpO2 95%   BMI 30.09 kg/m²   Patient is examined using the personal protective equipment as per guidelines from infection control for this particular patient as enacted.  Hand washing was performed before and after patient interaction.  General Appearance:  Has better color and does not appear as toxic interactive and appropriate.                          Head:    Normocephalic, without obvious abnormality, atraumatic                           Eyes:    PERRL, conjunctivae/corneas clear, " EOM's intact, both eyes                         Throat:   Lips, tongue, gums normal; oral mucosa pink and moist                           Neck:   Supple, symmetrical, trachea midline, no JVD                         Lungs:    Decreased breath sounds at the base                 Chest Wall:    No tenderness or deformity                          Heart:  S1-S2 regular                  abdomen:   Soft nontender                 Extremities:   Extremities normal, atraumatic, no cyanosis or edema                        Pulses:   Pulses palpable in all extremities                            Skin: Chronic ulcerative changes involving bilateral lower extremities with no clear cellulitis                  Neurologic: Bilateral lower extremity weakness       Data Review:    I reviewed the patient's new clinical results.  Results from last 7 days   Lab Units 02/07/21 0458 02/06/21  0610 02/05/21  0312 02/04/21  1551   WBC 10*3/mm3 8.61 9.52 7.69 6.57   HEMOGLOBIN g/dL 10.0* 10.6* 9.8* 10.7*   PLATELETS 10*3/mm3 253 280 296 300     Results from last 7 days   Lab Units 02/07/21  0458 02/06/21  2021 02/06/21  0610 02/05/21  2105 02/05/21  1431 02/05/21 0312 02/04/21  1551   SODIUM mmol/L 143  --  142  --   --  142 139   POTASSIUM mmol/L 2.8* 2.9* 3.3* 3.5 2.8* 2.4* 2.2*   CHLORIDE mmol/L 115*  --  115*  --   --  112* 106   CO2 mmol/L 16.4*  --  18.9*  --   --  19.3* 21.4*   BUN mg/dL 20  --  20  --   --  23 23   CREATININE mg/dL 2.37*  --  2.39*  --   --  2.55* 2.46*   CALCIUM mg/dL 8.7  --  8.5*  --   --  8.6 9.1   GLUCOSE mg/dL 156*  --  164*  --   --  147* 179*     Microbiology Results (last 10 days)     Procedure Component Value - Date/Time    Gastrointestinal Panel, PCR - Stool, Per Rectum [[id number]]  (Normal) Collected: 02/05/21 2204    Lab Status: Final result Specimen: Stool from Per Rectum Updated: 02/07/21 0952     Campylobacter Not Detected     Plesiomonas shigelloides Not Detected     Salmonella Not Detected     Vibrio  Not Detected     Vibrio cholerae Not Detected     Yersinia enterocolitica Not Detected     Enteroaggregative E. coli (EAEC) Not Detected     Enteropathogenic E. coli (EPEC) Not Detected     Enterotoxigenic E. coli (ETEC) lt/st Not Detected     Shiga-like toxin-producing E. coli (STEC) stx1/stx2 Not Detected     E. coli O157 Not Detected     Shigella/Enteroinvasive E. coli (EIEC) Not Detected     Cryptosporidium Not Detected     Cyclospora cayetanensis Not Detected     Entamoeba histolytica Not Detected     Giardia lamblia Not Detected     Adenovirus F40/41 Not Detected     Astrovirus Not Detected     Norovirus GI/GII Not Detected     Rotavirus A Not Detected     Sapovirus (I, II, IV or V) Not Detected    Narrative:      If Aeromonas, Staphylococcus aureus or Bacillus cereus are suspected, please order [id number]: Stool Culture, Aeromonas, S aureus, B Cereus.    Clostridium Difficile Toxin - Stool, Per Rectum [[id number]]  (Normal) Collected: 02/05/21 2204    Lab Status: Final result Specimen: Stool from Per Rectum Updated: 02/05/21 2303    Narrative:      The following orders were created for panel order Clostridium Difficile Toxin - Stool, Per Rectum.  Procedure                               Abnormality         Status                     ---------                               -----------         ------                     Clostridium Difficile To...[[id number]]  Normal              Final result                 Please view results for these tests on the individual orders.    Clostridium Difficile Toxin, PCR - Stool, Per Rectum [[id number]]  (Normal) Collected: 02/05/21 2204    Lab Status: Final result Specimen: Stool from Per Rectum Updated: 02/05/21 2303     C. Difficile Toxins by PCR Negative    Blood Culture - Blood, Arm, Left [[id number]] Collected: 02/05/21 1433    Lab Status: Preliminary result Specimen: Blood from Arm, Left Updated: 02/06/21 1446     Blood Culture No growth at 24 hours    Blood Culture - Blood,  Arm, Right [[id number]] Collected: 02/05/21 1431    Lab Status: Preliminary result Specimen: Blood from Arm, Right Updated: 02/06/21 1446     Blood Culture No growth at 24 hours    COVID PRE-OP / PRE-PROCEDURE SCREENING ORDER (NO ISOLATION) - Swab, Nasopharynx [[id number]]  (Normal) Collected: 02/04/21 1810    Lab Status: Final result Specimen: Swab from Nasopharynx Updated: 02/04/21 2206    Narrative:      The following orders were created for panel order COVID PRE-OP / PRE-PROCEDURE SCREENING ORDER (NO ISOLATION) - Swab, Nasopharynx.  Procedure                               Abnormality         Status                     ---------                               -----------         ------                     COVID-19,APTIMA PANTHER,...[[id number]]  Normal              Final result                 Please view results for these tests on the individual orders.    COVID-19,APTIMA PANTHER,[facility], NP/OP SWAB IN UTM/VTM/[facility] TRANSPORT MEDIA,24 HR TAT - Swab, Nasopharynx [[id number]]  (Normal) Collected: 02/04/21 1810    Lab Status: Final result Specimen: Swab from Nasopharynx Updated: 02/04/21 2206     COVID19 Not Detected    Narrative:      Fact sheet for providers: https://www.fda.gov/media/873866/download     Fact sheet for patients: https://www.fda.gov/media/502855/download    Test performed by RT PCR.    Urine Culture - Urine, Urine, Catheter [[id number]]  (Abnormal)  (Susceptibility) Collected: 02/04/21 1612    Lab Status: Final result Specimen: Urine, Catheter Updated: 02/06/21 0940     Urine Culture 25,000 CFU/mL Proteus mirabilis    Susceptibility      Proteus mirabilis     ISAC     Ampicillin Susceptible     Ampicillin + Sulbactam Susceptible     Cefazolin Susceptible     Cefepime Susceptible     Ceftazidime Susceptible     Ceftriaxone Susceptible     Gentamicin Susceptible     Levofloxacin Intermediate     Nitrofurantoin Resistant     Piperacillin + Tazobactam Susceptible     Tetracycline Resistant      Trimethoprim + Sulfamethoxazole Resistant                    Blood Culture - Blood, Hand, Right [[id number]]  (Abnormal) Collected: 02/04/21 1551    Lab Status: Preliminary result Specimen: Blood from Hand, Right Updated: 02/07/21 0642     Blood Culture Staphylococcus epidermidis     Comment: Refer to previous blood culture collected on 2-4-21 for MICs        Isolated from Anaerobic Bottle     Blood Culture Gram Positive Cocci     Isolated from Anaerobic Bottle     Gram Stain Anaerobic Bottle Gram positive cocci in clusters    Blood Culture - Blood, Hand, Right [[id number]]  (Abnormal)  (Susceptibility) Collected: 02/04/21 1551    Lab Status: Preliminary result Specimen: Blood from Hand, Right Updated: 02/07/21 0642     Blood Culture Staphylococcus epidermidis     Isolated from Aerobic Bottle     Blood Culture Gram Positive Cocci     Isolated from Anaerobic Bottle     Gram Stain Aerobic Bottle Gram positive cocci in clusters      Anaerobic Bottle Gram positive cocci in clusters    Susceptibility      Staphylococcus epidermidis     ISAC     Oxacillin Resistant     Vancomycin Susceptible                Susceptibility Comments     Staphylococcus epidermidis    This isolate is presumed to be clindamycin resistant based on detection of inducible clindamycin resistance.  Clindamycin may still be effective in some patients.             Blood Culture ID, PCR - Blood, Hand, Right [[id number]]  (Abnormal) Collected: 02/04/21 1551    Lab Status: Final result Specimen: Blood from Hand, Right Updated: 02/05/21 1250     BCID, PCR Staphylococcus spp, not aureus. Identification by BCID PCR.              Assessment:    Acute metabolic encephalopathy    Hyperlipidemia    Hypertension    Stage 4 chronic kidney disease (CMS/HCC)    DM2 (diabetes mellitus, type 2) (CMS/HCC)    Anemia, chronic disease    UTI (urinary tract infection), bacterial    Elevated troponin    Hypokalemia    Uncontrolled hypertension    Septicemia (CMS/HCC)  1-toxic  metabolic encephalopathy secondary to combination of  2-urinary tract infection with infected lower extremity wound with possible cellulitis  3-coag negative staph bacteremia either part of the systemic sepsis originating from lower extremities or possible contaminant during the process of collection  4-embolization syndrome  5-diabetes mellitus with complications including peripheral neuropathy, diabetic nephropathy  6-stage V kidney disease  7-uncontrolled hypertension  8-anemia multifactorial.     Recommendations/Discussions:   · Continue Rocephin and vancomycin while following up on the sensitivity of coag negative staph.  · Aggressive local wound care of lower extremities is needed.    · Follow-up on repeat blood cultures that we have ordered and if  negative then it can be assessed and assumed that the pathogens in the blood culture is contaminant during the process of collection and hence would not further work-up and treatment.    Tonja Azevedo MD  2/7/2021  12:19 EST    Much of this encounter note is an electronic transcription/translation of spoken language to printed text. The electronic translation of spoken language may permit erroneous, or at times, nonsensical words or phrases to be inadvertently transcribed; Although I have reviewed the note for such errors, some may still exist

## 2021-02-07 NOTE — PROGRESS NOTES
Name: Gregorio Alejandro ADMIT: 2021   : 1955  PCP: Maya Ribeiro APRN    MRN: [id number] LOS: 3 days   AGE/SEX: 65 y.o. male  ROOM: [facility], [place]   Subjective   Chief Complaint   Patient presents with   • Altered Mental Status      He is much more alert today although still confused.  He was answering questions.  I do not think he was doing this appropriately because he did have some perseveration when asked more complex questions.  He denied chest pain shortness of breath nausea vomiting diarrhea abdominal and peripheral pain.  When asked his name he stated his last name.  When asked location he was unable to answer and then stated his name again.  I discussed current treatment with him and that he was currently admitted to the hospital.    Objective   Vital Signs  Temp:  [97.4 °F (36.3 °C)-98 °F (36.7 °C)] 97.6 °F (36.4 °C)  Heart Rate:  [66-97] 66  Resp:  [18-20] 18  BP: (137-165)/() 162/122  SpO2:  [94 %-98 %] 95 %  on   ;   Device (Oxygen Therapy): room air  Body mass index is 30.09 kg/m².    Physical Exam  Vitals signs and nursing note reviewed.   Constitutional:       General: He is not in acute distress.     Appearance: He is ill-appearing.   HENT:      Head: Normocephalic and atraumatic.   Eyes:      Conjunctiva/sclera: Conjunctivae normal.      Pupils: Pupils are equal, round, and reactive to light.   Neck:      Musculoskeletal: Neck supple. No muscular tenderness.   Cardiovascular:      Rate and Rhythm: Normal rate and regular rhythm.      Pulses: Normal pulses.   Pulmonary:      Effort: Pulmonary effort is normal.      Breath sounds: Decreased breath sounds present. No wheezing or rales.   Abdominal:      General: There is no distension.      Tenderness: There is no abdominal tenderness. There is no guarding or rebound.   Musculoskeletal:         General: Swelling present. No tenderness.      Comments: ble dressed   Skin:     General: Skin is warm and dry.   Neurological:      Mental  Status: He is alert. He is disoriented.      Comments: Oriented to person   Psychiatric:         Cognition and Memory: Cognition is impaired. Memory is impaired.         Results Review:       I reviewed the patient's new clinical results.        Results from last 7 days   Lab Units 02/07/21 0458 02/06/21 0610 02/05/21 0312 02/04/21  1551   WBC 10*3/mm3 8.61 9.52 7.69 6.57   HEMOGLOBIN g/dL 10.0* 10.6* 9.8* 10.7*   PLATELETS 10*3/mm3 253 280 296 300     Results from last 7 days   Lab Units 02/07/21 0458 02/06/21 2021 02/06/21 0610 02/05/21 2105 02/05/21 0312 02/04/21  1551   SODIUM mmol/L 143  --  142  --   --  142 139   POTASSIUM mmol/L 2.8* 2.9* 3.3* 3.5   < > 2.4* 2.2*   CHLORIDE mmol/L 115*  --  115*  --   --  112* 106   CO2 mmol/L 16.4*  --  18.9*  --   --  19.3* 21.4*   BUN mg/dL 20  --  20  --   --  23 23   CREATININE mg/dL 2.37*  --  2.39*  --   --  2.55* 2.46*   GLUCOSE mg/dL 156*  --  164*  --   --  147* 179*    < > = values in this interval not displayed.   Estimated Creatinine Clearance: 40.3 mL/min (A) (by C-G formula based on SCr of 2.37 mg/dL (H)).  Results from last 7 days   Lab Units 02/07/21 0458 02/06/21 2021 02/06/21 0610 02/05/21 0312 02/04/21  1551   CALCIUM mg/dL 8.7  --  8.5* 8.6 9.1   ALBUMIN g/dL 2.70*  --  2.50* 2.50* 3.20*   MAGNESIUM mg/dL  --  2.0 1.7 1.8 1.9   PHOSPHORUS mg/dL 1.8* 2.1* 1.4* 2.1*  --           amLODIPine, 10 mg, Oral, Q24H  ammonium lactate, , Topical, BID  aspirin, 81 mg, Oral, Daily  atorvastatin, 20 mg, Oral, Daily  cefTRIAXone, 1 g, Intravenous, Q24H  hydrALAZINE, 100 mg, Oral, Q8H  insulin lispro, 0-9 Units, Subcutaneous, TID AC  sodium bicarbonate, 650 mg, Oral, TID  sodium chloride, 10 mL, Intravenous, Q12H  vancomycin, 750 mg, Intravenous, Once  Vancomycin Pharmacy Intermittent Dosing, , Does not apply, Daily      Pharmacy to dose vancomycin,     Diet Clear Liquid    Assessment/Plan      Active Hospital Problems    Diagnosis  POA   • **Acute  metabolic encephalopathy [G93.41]  Yes   • Septicemia (CMS/HCC) [A41.9]  Yes   • UTI (urinary tract infection), bacterial [N39.0, A49.9]  Yes   • Elevated troponin [R77.8]  Yes   • Hypokalemia [E87.6]  Yes   • Uncontrolled hypertension [I10]  Yes   • Anemia, chronic disease [D63.8]  Yes   • DM2 (diabetes mellitus, type 2) (CMS/HCC) [E11.9]  Yes   • Stage 4 chronic kidney disease (CMS/formerly [facility]) [N18.4]  Yes   • Hyperlipidemia [E78.5]  Yes   • Hypertension [I10]  Yes      Resolved Hospital Problems   No resolved problems to display.       · UTI: Proteus. No stone on CT. Continue ABx. ID following.  · Septicemia: 2/2 staph epidermidis, repeat cultures no growth to date. On Vancomycin.  · Metabolic Encephalopathy 2/2 above.  · Colon Thickening/Diarrhea: Stool Studies with negative cdiff, GI PCR.  GI following.  · HTN: Hydralazine increased today.  Monitor. Avoiding bblocker due to prior heart block.   · CKD4: DM Nephropathy. Nephrology following.  · Hypokalemia/Hypophosphatemia: Replacement ongoing per renal  · DM2: Low-dose SSI as needed with only minimal requirements so far.  · AFlutter: Cardiology following.  · PPx: SCD with planned endoscopy once ok with cardiology and his electrolytes and blood pressure are better controlled  · Disposition: TBD    Nixon Quinteros MD  [facility]  02/07/21  14:03 EST    Dictated portions using Dragon dictation software.    During the entire encounter, I was wearing recommended PPE including face mask and eye protection. Hand sanitization was performed prior to entering room and upon exit.

## 2021-02-07 NOTE — NURSING NOTE
Pg out to Dr on call regarding potassium of 2.8 and phosphorus of 1.8. Orders to give potassium phosphate run over 4 hrs. One hour recheck potassium and phosphorus and call with results.

## 2021-02-07 NOTE — PLAN OF CARE
Goal Outcome Evaluation:  Plan of Care Reviewed With: patient  Progress: no change  Outcome Summary: VSS. Pt more alert with confusion a times, improved from yesterday. B/P controlled through the night. Will continue to monitor the rest of my shift.

## 2021-02-08 NOTE — PROGRESS NOTES
"Pharmacokinetic Consult - Vancomycin Dosing (Follow-up Note)    Gregorio Alejandro is on day 4 pharmacy to dose vancomycin for bacteremia  Pharmacy dosing vancomycin per Dr. Quinteros's request.   Goal trough: <20 mcg/ml   DOT: 7 days     Relevant clinical data and objective history reviewed:  65 y.o. male 190.5 cm (75\") 109 kg (240 lb 11.9 oz)    Past Medical History:   Diagnosis Date    Back pain     CKD (chronic kidney disease)     DM type 2 (diabetes mellitus, type 2) (CMS/[facility])     ED (erectile dysfunction)     Hyperlipidemia     Hypertension     SCOTTY (iron deficiency anemia)     Monoclonal gammopathy     Osteoarthritis      Creatinine   Date Value Ref Range Status   02/08/2021 2.23 (H) 0.76 - 1.27 mg/dL Final   02/07/2021 2.32 (H) 0.76 - 1.27 mg/dL Final   02/07/2021 2.37 (H) 0.76 - 1.27 mg/dL Final   02/27/2020 6.7 (H) 0.7 - 1.5 mg/dL Final   02/27/2020 6.5 (H) 0.7 - 1.5 mg/dL Final   02/18/2020 7.2 (H) 0.7 - 1.5 mg/dL Final     BUN   Date Value Ref Range Status   02/08/2021 20 8 - 23 mg/dL Final   02/27/2020 57 (H) 7 - 20 mg/dL Final     Estimated Creatinine Clearance: 42.9 mL/min (A) (by C-G formula based on SCr of 2.23 mg/dL (H)).    Lab Results   Component Value Date    WBC 11.58 (H) 02/08/2021     Temp Readings from Last 3 Encounters:   02/08/21 97.8 °F (36.6 °C) (Oral)   12/23/20 97.3 °F (36.3 °C) (Oral)     Anti-Infectives (From admission, onward)      Ordered     Dose/Rate Route Frequency Start Stop    02/07/21 0814  vancomycin 750 mg/250 mL 0.9% NS add-vantage     Ordering Provider: Nixon Quinteros MD    750 mg  over 45 Minutes Intravenous Once 02/07/21 1200      02/06/21 0905  vancomycin 750 mg/250 mL 0.9% NS add-vantage     Ordering Provider: Nixon Quinteros MD    7.5 mg/kg × 109 kg  over 45 Minutes Intravenous Once 02/06/21 1000 02/06/21 1631    02/04/21 2042  cefTRIAXone (ROCEPHIN) IVPB 1 g     Ordering Provider: Robert Baker MD    1 g  100 mL/hr over 30 Minutes Intravenous Every 24 Hours " 02/05/21 1800 02/09/21 1759    02/05/21 1330  vancomycin 2250 mg/500 mL 0.9% NS IVPB ([facility])     Ordering Provider: Nixon Quinteros MD    20 mg/kg × 109 kg Intravenous Once 02/05/21 1430 02/05/21 1545    02/05/21 1330  Vancomycin Pharmacy Intermittent Dosing     Ordering Provider: Nixon Quinteros MD     Does not apply Daily 02/05/21 1430 02/12/21 0859    02/05/21 1327  Pharmacy to dose vancomycin     Ordering Provider: Nixon Quinteros MD     Does not apply Continuous PRN 02/05/21 1327 02/12/21 1326    02/04/21 1744  cefTRIAXone (ROCEPHIN) IVPB 1 g     Ordering Provider: Kevan Ulloa PA    1 g  100 mL/hr over 30 Minutes Intravenous Once 02/04/21 1746 02/04/21 1845           No results found for: DOROTHY  Lab Results   Component Value Date    VANCORANDOM 17.20 02/08/2021   Dosing History  2/5 1545 vanc 2250 mg IV x1              2/6 0610 15.1 mcg/mL (~14 hr level)   2/6 1546 750 mg IV x1 (patient did not have IV access until later in day)              2/7 0458 random = 17.4 mcg/mL (~13 hr level)              750mg ordered yesterday was not given (lost iv access)    Assessment/Plan  Random level this am = 17.2 mcg/ml (therapeutic) therefore will give 500mg of vancomycin x1 today. (did not receive the 750mg yesterday) Random vancomycin level in the am. Pharmacy will continue intermittent dosing and continue to follow daily.    Obed Okeefe [facility]

## 2021-02-08 NOTE — PROGRESS NOTES
"DAILY PROGRESS NOTE  [facility]    Patient Identification:  Name: Gregorio Alejandro  Age: 65 y.o.  Sex: male  :  1955  MRN: [id number]         Primary Care Physician: Maya Ribeiro APRN    Subjective:  Interval History:He is confused.     Objective:    Scheduled Meds:amLODIPine, 10 mg, Oral, Q24H  ammonium lactate, , Topical, BID  aspirin, 81 mg, Oral, Daily  atorvastatin, 20 mg, Oral, Daily  cefTRIAXone, 1 g, Intravenous, Q24H  hydrALAZINE, 100 mg, Oral, Q8H  insulin lispro, 0-9 Units, Subcutaneous, TID AC  sodium bicarbonate, 650 mg, Oral, TID  sodium chloride, 10 mL, Intravenous, Q12H  torsemide, 40 mg, Oral, BID  vancomycin, 500 mg, Intravenous, Once  Vancomycin Pharmacy Intermittent Dosing, , Does not apply, Daily      Continuous Infusions:Pharmacy to dose vancomycin,         Vital signs in last 24 hours:  Temp:  [97 °F (36.1 °C)-98 °F (36.7 °C)] 97.6 °F (36.4 °C)  Heart Rate:  [] 104  Resp:  [18-22] 18  BP: (136-168)/() 136/78    Intake/Output:  No intake or output data in the 24 hours ending 21 1551    Exam:  /78   Pulse 104   Temp 97.6 °F (36.4 °C) (Oral)   Resp 18   Ht 190.5 cm (75\")   Wt 109 kg (240 lb 11.9 oz)   SpO2 98%   BMI 30.09 kg/m²     General Appearance:    confused, no distress   Head:    Normocephalic, without obvious abnormality, atraumatic   Eyes:       Throat:   Lips, tongue, gums normal   Neck:   Supple, symmetrical, trachea midline, no JVD   Lungs:     Clear to auscultation bilaterally, respirations unlabored   Chest Wall:    No tenderness or deformity    Heart:    Regular rate and rhythm, S1 and S2 normal, no murmur,no  Rub or gallop   Abdomen:     Soft, nontender, bowel sounds active, no masses, no organomegaly    Extremities:   Extremities normal, atraumatic, no cyanosis or edema   Pulses:      Skin:   Skin is warm and dry,  no rashes or palpable lesions   Neurologic:   Confused       Lab Results (last 72 hours)     Procedure " Component Value Units Date/Time    Blood Culture - Blood, Arm, Right [[id number]] Collected: 02/05/21 1431    Specimen: Blood from Arm, Right Updated: 02/08/21 1445     Blood Culture No growth at 3 days    Blood Culture - Blood, Arm, Left [[id number]] Collected: 02/05/21 1433    Specimen: Blood from Arm, Left Updated: 02/08/21 1445     Blood Culture No growth at 3 days    POC Glucose Once [[id number]]  (Abnormal) Collected: 02/08/21 1150    Specimen: Blood Updated: 02/08/21 1203     Glucose 193 mg/dL     Blood Culture - Blood, Hand, Right [[id number]]  (Abnormal) Collected: 02/04/21 1551    Specimen: Blood from Hand, Right Updated: 02/08/21 0821     Blood Culture Staphylococcus epidermidis     Comment: Refer to previous blood culture collected on 2-4-21 for MICs        Isolated from Anaerobic Bottle     Blood Culture Aerococcus viridans     Isolated from Anaerobic Bottle     Gram Stain Anaerobic Bottle Gram positive cocci in clusters    Blood Culture - Blood, Hand, Right [[id number]]  (Abnormal)  (Susceptibility) Collected: 02/04/21 1551    Specimen: Blood from Hand, Right Updated: 02/08/21 0820     Blood Culture Staphylococcus epidermidis     Isolated from Aerobic Bottle     Blood Culture Aerococcus viridans     Comment: Susceptibilities for Ceftriaxone and Vancomycin to follow.        Isolated from Aerobic Bottle     Blood Culture Globicatella sanguinis     Comment: Anaerobic bottle only  No CLSI guidelines and no validated ISAC testing method.          Isolated from Anaerobic Bottle     Gram Stain Aerobic Bottle Gram positive cocci in clusters      Anaerobic Bottle Gram positive cocci in clusters    Narrative:            Susceptibility      Staphylococcus epidermidis     ISAC     Oxacillin Resistant     Vancomycin Susceptible                Susceptibility Comments     Staphylococcus epidermidis    This isolate is presumed to be clindamycin resistant based on detection of inducible clindamycin resistance.  Clindamycin  may still be effective in some patients.             CBC (No Diff) [[id number]]  (Abnormal) Collected: 02/08/21 0609    Specimen: Blood Updated: 02/08/21 0725     WBC 11.58 10*3/mm3      RBC 4.75 10*6/mm3      Hemoglobin 10.7 g/dL      Hematocrit 33.1 %      MCV 69.7 fL      MCH 22.5 pg      MCHC 32.3 g/dL      RDW 24.5 %      RDW-SD 57.6 fl      Platelets 209 10*3/mm3     Vancomycin, Random [[id number]]  (Normal) Collected: 02/08/21 0609    Specimen: Blood Updated: 02/08/21 0704     Vancomycin Random 17.20 mcg/mL     Narrative:      Therapeutic Ranges for Vancomycin    Vancomycin Random   5.0-40.0 mcg/mL  Vancomycin Trough   5.0-20.0 mcg.mL  Vancomycin Peak     20.0-40.0 mcg/mL    Renal Function Panel [[id number]]  (Abnormal) Collected: 02/08/21 0609    Specimen: Blood Updated: 02/08/21 0704     Glucose 159 mg/dL      BUN 20 mg/dL      Creatinine 2.23 mg/dL      Sodium 142 mmol/L      Potassium 3.8 mmol/L      Comment: Slight hemolysis detected by analyzer. Results may be affected.        Chloride 114 mmol/L      CO2 14.6 mmol/L      Calcium 9.0 mg/dL      Albumin 2.60 g/dL      Phosphorus 2.7 mg/dL      Anion Gap 13.4 mmol/L      BUN/Creatinine Ratio 9.0     eGFR  African Amer 36 mL/min/1.73     Narrative:      GFR Normal >60  Chronic Kidney Disease <60  Kidney Failure <15      Magnesium [[id number]]  (Normal) Collected: 02/08/21 0609    Specimen: Blood Updated: 02/08/21 0703     Magnesium 2.0 mg/dL     Ammonia [[id number]]  (Normal) Collected: 02/08/21 0609    Specimen: Blood Updated: 02/08/21 0654     Ammonia 25 umol/L     POC Glucose Once [[id number]]  (Abnormal) Collected: 02/08/21 0628    Specimen: Blood Updated: 02/08/21 0629     Glucose 138 mg/dL     Potassium, Urine, Random - Urine, Clean Catch [[id number]] Collected: 02/08/21 0550    Specimen: Urine, Clean Catch Updated: 02/08/21 0625     Potassium, Urine <3.0 mmol/L     Narrative:      Reference intervals for random urine have not been established.  Clinical  usage is dependent upon physician's interpretation in combination with other laboratory tests.       Sodium, Urine, Random - Urine, Clean Catch [[id number]] Collected: 02/08/21 0550    Specimen: Urine, Clean Catch Updated: 02/08/21 0625     Sodium, Urine <20 mmol/L     Narrative:      Reference intervals for random urine have not been established.  Clinical usage is dependent upon physician's interpretation in combination with other laboratory tests.       Potassium [[id number]]  (Abnormal) Collected: 02/08/21 0007    Specimen: Blood Updated: 02/08/21 0046     Potassium 2.9 mmol/L     POC Glucose Once [[id number]]  (Abnormal) Collected: 02/07/21 2108    Specimen: Blood Updated: 02/07/21 2110     Glucose 144 mg/dL     Renal Function Panel [[id number]]  (Abnormal) Collected: 02/07/21 1557    Specimen: Blood from Arm, Right Updated: 02/07/21 1657     Glucose 147 mg/dL      BUN 19 mg/dL      Creatinine 2.32 mg/dL      Sodium 145 mmol/L      Potassium 2.7 mmol/L      Chloride 115 mmol/L      CO2 16.7 mmol/L      Calcium 8.7 mg/dL      Albumin 2.90 g/dL      Phosphorus 2.8 mg/dL      Anion Gap 13.3 mmol/L      BUN/Creatinine Ratio 8.2     eGFR  African Amer 34 mL/min/1.73     Narrative:      GFR Normal >60  Chronic Kidney Disease <60  Kidney Failure <15      POC Glucose Once [[id number]]  (Abnormal) Collected: 02/07/21 1537    Specimen: Blood Updated: 02/07/21 1539     Glucose 162 mg/dL     POC Glucose Once [[id number]]  (Abnormal) Collected: 02/07/21 1130    Specimen: Blood Updated: 02/07/21 1137     Glucose 147 mg/dL     Gastrointestinal Panel, PCR - Stool, Per Rectum [[id number]]  (Normal) Collected: 02/05/21 2204    Specimen: Stool from Per Rectum Updated: 02/07/21 0950     Campylobacter Not Detected     Plesiomonas shigelloides Not Detected     Salmonella Not Detected     Vibrio Not Detected     Vibrio cholerae Not Detected     Yersinia enterocolitica Not Detected     Enteroaggregative E. coli (EAEC) Not Detected      Enteropathogenic E. coli (EPEC) Not Detected     Enterotoxigenic E. coli (ETEC) lt/st Not Detected     Shiga-like toxin-producing E. coli (STEC) stx1/stx2 Not Detected     E. coli O157 Not Detected     Shigella/Enteroinvasive E. coli (EIEC) Not Detected     Cryptosporidium Not Detected     Cyclospora cayetanensis Not Detected     Entamoeba histolytica Not Detected     Giardia lamblia Not Detected     Adenovirus F40/41 Not Detected     Astrovirus Not Detected     Norovirus GI/GII Not Detected     Rotavirus A Not Detected     Sapovirus (I, II, IV or V) Not Detected    Narrative:      If Aeromonas, Staphylococcus aureus or Bacillus cereus are suspected, please order [id number]: Stool Culture, Aeromonas, S aureus, B Cereus.    POC Glucose Once [[id number]]  (Abnormal) Collected: 02/07/21 0616    Specimen: Blood Updated: 02/07/21 0617     Glucose 182 mg/dL     Renal Function Panel [[id number]]  (Abnormal) Collected: 02/07/21 0458    Specimen: Blood Updated: 02/07/21 0609     Glucose 156 mg/dL      BUN 20 mg/dL      Creatinine 2.37 mg/dL      Sodium 143 mmol/L      Potassium 2.8 mmol/L      Chloride 115 mmol/L      CO2 16.4 mmol/L      Calcium 8.7 mg/dL      Albumin 2.70 g/dL      Phosphorus 1.8 mg/dL      Anion Gap 11.6 mmol/L      BUN/Creatinine Ratio 8.4     eGFR  African Amer 33 mL/min/1.73     Narrative:      GFR Normal >60  Chronic Kidney Disease <60  Kidney Failure <15      Vancomycin, Random [[id number]]  (Normal) Collected: 02/07/21 0458    Specimen: Blood Updated: 02/07/21 0553     Vancomycin Random 17.40 mcg/mL     Narrative:      Therapeutic Ranges for Vancomycin    Vancomycin Random   5.0-40.0 mcg/mL  Vancomycin Trough   5.0-20.0 mcg.mL  Vancomycin Peak     20.0-40.0 mcg/mL    CBC (No Diff) [[id number]]  (Abnormal) Collected: 02/07/21 0458    Specimen: Blood Updated: 02/07/21 0535     WBC 8.61 10*3/mm3      RBC 4.32 10*6/mm3      Hemoglobin 10.0 g/dL      Hematocrit 31.0 %      MCV 71.8 fL      MCH 23.1 pg      MCHC  32.3 g/dL      RDW 23.8 %      RDW-SD 58.1 fl      MPV --     Comment: Unable to calculate        Platelets 253 10*3/mm3     Potassium [[id number]]  (Abnormal) Collected: 02/06/21 2021    Specimen: Blood Updated: 02/06/21 2059     Potassium 2.9 mmol/L     Phosphorus [[id number]]  (Abnormal) Collected: 02/06/21 2021    Specimen: Blood Updated: 02/06/21 2059     Phosphorus 2.1 mg/dL     Magnesium [[id number]]  (Normal) Collected: 02/06/21 2021    Specimen: Blood Updated: 02/06/21 2052     Magnesium 2.0 mg/dL     POC Glucose Once [[id number]]  (Abnormal) Collected: 02/06/21 2043    Specimen: Blood Updated: 02/06/21 2044     Glucose 158 mg/dL     POC Glucose Once [[id number]]  (Abnormal) Collected: 02/06/21 1706    Specimen: Blood Updated: 02/06/21 1708     Glucose 134 mg/dL     POC Glucose Once [[id number]]  (Normal) Collected: 02/06/21 1114    Specimen: Blood Updated: 02/06/21 1116     Glucose 110 mg/dL     Urine Culture - Urine, Urine, Catheter [[id number]]  (Abnormal)  (Susceptibility) Collected: 02/04/21 1612    Specimen: Urine, Catheter Updated: 02/06/21 0940     Urine Culture 25,000 CFU/mL Proteus mirabilis    Susceptibility      Proteus mirabilis     ISAC     Ampicillin Susceptible     Ampicillin + Sulbactam Susceptible     Cefazolin Susceptible     Cefepime Susceptible     Ceftazidime Susceptible     Ceftriaxone Susceptible     Gentamicin Susceptible     Levofloxacin Intermediate     Nitrofurantoin Resistant     Piperacillin + Tazobactam Susceptible     Tetracycline Resistant     Trimethoprim + Sulfamethoxazole Resistant                    Manual Differential [[id number]]  (Abnormal) Collected: 02/06/21 0610    Specimen: Blood Updated: 02/06/21 0722     Neutrophil % 93.0 %      Lymphocyte % 4.0 %      Monocyte % 1.0 %      Eosinophil % 1.0 %      Basophil % 1.0 %      Neutrophils Absolute 8.85 10*3/mm3      Lymphocytes Absolute 0.38 10*3/mm3      Monocytes Absolute 0.10 10*3/mm3      Eosinophils Absolute 0.10 10*3/mm3       Basophils Absolute 0.10 10*3/mm3      Anisocytosis Mod/2+     Liz Cells Slight/1+     Hypochromia Slight/1+     Macrocytes Slight/1+     Microcytes Slight/1+     Poikilocytes Mod/2+     Polychromasia Large/3+     RBC Fragments Slight/1+     Target Cells Slight/1+     Schistocytes Slight/1+     WBC Morphology Normal     Platelet Morphology Normal    Renal Function Panel [[id number]]  (Abnormal) Collected: 02/06/21 0610    Specimen: Blood Updated: 02/06/21 0716     Glucose 164 mg/dL      BUN 20 mg/dL      Creatinine 2.39 mg/dL      Sodium 142 mmol/L      Potassium 3.3 mmol/L      Chloride 115 mmol/L      CO2 18.9 mmol/L      Calcium 8.5 mg/dL      Albumin 2.50 g/dL      Phosphorus 1.4 mg/dL      Anion Gap 8.1 mmol/L      BUN/Creatinine Ratio 8.4     eGFR  African Amer 33 mL/min/1.73     Narrative:      GFR Normal >60  Chronic Kidney Disease <60  Kidney Failure <15      Uric Acid [[id number]]  (Abnormal) Collected: 02/06/21 0610    Specimen: Blood Updated: 02/06/21 0701     Uric Acid 7.2 mg/dL     Magnesium [[id number]]  (Normal) Collected: 02/06/21 0610    Specimen: Blood Updated: 02/06/21 0701     Magnesium 1.7 mg/dL     Vancomycin, Random [[id number]]  (Normal) Collected: 02/06/21 0610    Specimen: Blood Updated: 02/06/21 0659     Vancomycin Random 15.10 mcg/mL     Narrative:      Therapeutic Ranges for Vancomycin    Vancomycin Random   5.0-40.0 mcg/mL  Vancomycin Trough   5.0-20.0 mcg.mL  Vancomycin Peak     20.0-40.0 mcg/mL    CBC & Differential [[id number]]  (Abnormal) Collected: 02/06/21 0610    Specimen: Blood Updated: 02/06/21 0644    Narrative:      The following orders were created for panel order CBC & Differential.  Procedure                               Abnormality         Status                     ---------                               -----------         ------                     CBC Auto Differential[[id number]]        Abnormal            Final result                 Please view results for these  tests on the individual orders.    CBC Auto Differential [[id number]]  (Abnormal) Collected: 02/06/21 0610    Specimen: Blood Updated: 02/06/21 0644     WBC 9.52 10*3/mm3      RBC 4.61 10*6/mm3      Hemoglobin 10.6 g/dL      Hematocrit 33.2 %      MCV 72.0 fL      MCH 23.0 pg      MCHC 31.9 g/dL      RDW 24.0 %      RDW-SD 57.9 fl      Platelets 280 10*3/mm3     POC Glucose Once [[id number]]  (Abnormal) Collected: 02/06/21 0619    Specimen: Blood Updated: 02/06/21 0623     Glucose 154 mg/dL     Clostridium Difficile Toxin - Stool, Per Rectum [[id number]]  (Normal) Collected: 02/05/21 2204    Specimen: Stool from Per Rectum Updated: 02/05/21 2303    Narrative:      The following orders were created for panel order Clostridium Difficile Toxin - Stool, Per Rectum.  Procedure                               Abnormality         Status                     ---------                               -----------         ------                     Clostridium Difficile To...[[id number]]  Normal              Final result                 Please view results for these tests on the individual orders.    Clostridium Difficile Toxin, PCR - Stool, Per Rectum [[id number]]  (Normal) Collected: 02/05/21 2204    Specimen: Stool from Per Rectum Updated: 02/05/21 2303     C. Difficile Toxins by PCR Negative    Potassium [[id number]]  (Normal) Collected: 02/05/21 2105    Specimen: Blood from Hand, Left Updated: 02/05/21 2146     Potassium 3.5 mmol/L     POC Glucose Once [[id number]]  (Abnormal) Collected: 02/05/21 2010    Specimen: Blood Updated: 02/05/21 2011     Glucose 146 mg/dL         Data Review:  Results from last 7 days   Lab Units 02/08/21  0609 02/08/21  0007 02/07/21  1557 02/07/21  0458   SODIUM mmol/L 142  --  145 143   POTASSIUM mmol/L 3.8 2.9* 2.7* 2.8*   CHLORIDE mmol/L 114*  --  115* 115*   CO2 mmol/L 14.6*  --  16.7* 16.4*   BUN mg/dL 20  --  19 20   CREATININE mg/dL 2.23*  --  2.32* 2.37*   GLUCOSE mg/dL 159*  --  147* 156*    CALCIUM mg/dL 9.0  --  8.7 8.7     Results from last 7 days   Lab Units 02/08/21  0609 02/07/21  0458 02/06/21  0610   WBC 10*3/mm3 11.58* 8.61 9.52   HEMOGLOBIN g/dL 10.7* 10.0* 10.6*   HEMATOCRIT % 33.1* 31.0* 33.2*   PLATELETS 10*3/mm3 209 253 280             Lab Results   Lab Value Date/Time    TROPONINT 0.110 (C) 02/04/2021 1551    TROPONINT 0.171 (C) 12/23/2020 0357    TROPONINT 0.141 (C) 12/22/2020 0549    TROPONINT 0.147 (C) 12/21/2020 1450    TROPONINT 0.128 (C) 12/21/2020 1223         Results from last 7 days   Lab Units 02/05/21  0312 02/04/21  1551   ALK PHOS U/L 91 104   BILIRUBIN mg/dL 0.6 0.9   ALT (SGPT) U/L 7 7   AST (SGOT) U/L 10 14             Glucose   Date/Time Value Ref Range Status   02/08/2021 1150 193 (H) 70 - 130 mg/dL Final   02/08/2021 0628 138 (H) 70 - 130 mg/dL Final   02/07/2021 2108 144 (H) 70 - 130 mg/dL Final   02/07/2021 1537 162 (H) 70 - 130 mg/dL Final   02/07/2021 1130 147 (H) 70 - 130 mg/dL Final   02/07/2021 0616 182 (H) 70 - 130 mg/dL Final   02/06/2021 2043 158 (H) 70 - 130 mg/dL Final   02/06/2021 1706 134 (H) 70 - 130 mg/dL Final           Past Medical History:   Diagnosis Date   • Back pain    • CKD (chronic kidney disease)    • DM type 2 (diabetes mellitus, type 2) (CMS/HCC)    • ED (erectile dysfunction)    • Hyperlipidemia    • Hypertension    • SCOTTY (iron deficiency anemia)    • Monoclonal gammopathy    • Osteoarthritis        Assessment:  Active Hospital Problems    Diagnosis  POA   • **Acute metabolic encephalopathy [G93.41]  Yes   • Septicemia (CMS/HCC) [A41.9]  Yes   • UTI (urinary tract infection), bacterial [N39.0, A49.9]  Yes   • Elevated troponin [R77.8]  Yes   • Hypokalemia [E87.6]  Yes   • Uncontrolled hypertension [I10]  Yes   • Anemia, chronic disease [D63.8]  Yes   • DM2 (diabetes mellitus, type 2) (CMS/HCC) [E11.9]  Yes   • Stage 4 chronic kidney disease (CMS/HCC) [N18.4]  Yes   • Hyperlipidemia [E78.5]  Yes   • Hypertension [I10]  Yes      Resolved  Hospital Problems   No resolved problems to display.       Plan:  Continue antibiotics. As per multiple consults. Ask for neurology to see.  Follow lab.    Jono Montes MD  2/8/2021  15:51 EST

## 2021-02-08 NOTE — PROGRESS NOTES
"Gregorio Alejandro   65 y.o.  male    LOS: 4 days   Patient Care Team:  Maya Ribeiro APRN as PCP - General (Family Medicine)      Subjective   No events overnight; answers some \"yes\" and \"no\" questions, remains confused      Medication Review:   Current Facility-Administered Medications:   •  acetaminophen (TYLENOL) tablet 650 mg, 650 mg, Oral, Q4H PRN **OR** acetaminophen (TYLENOL) 160 MG/5ML solution 650 mg, 650 mg, Oral, Q4H PRN **OR** acetaminophen (TYLENOL) suppository 650 mg, 650 mg, Rectal, Q4H PRN, Robert Baker MD  •  amLODIPine (NORVASC) tablet 10 mg, 10 mg, Oral, Q24H, Miles Del Toro MD, 10 mg at 02/08/21 0820  •  ammonium lactate (AMLACTIN) cream, , Topical, BID, Nixon Quinteros MD, Given at 02/08/21 0820  •  aspirin EC tablet 81 mg, 81 mg, Oral, Daily, Robert Baker MD, 81 mg at 02/08/21 0820  •  atorvastatin (LIPITOR) tablet 20 mg, 20 mg, Oral, Daily, Robert Baker MD, 20 mg at 02/08/21 0820  •  cefTRIAXone (ROCEPHIN) IVPB 1 g, 1 g, Intravenous, Q24H, Robert Baker MD, Last Rate: 100 mL/hr at 02/07/21 1811, 1 g at 02/07/21 1811  •  dextrose (D50W) 25 g/ 50mL Intravenous Solution 25 g, 25 g, Intravenous, Q15 Min PRN, Robert Baker MD  •  dextrose (GLUTOSE) oral gel 15 g, 15 g, Oral, Q15 Min PRN, Robert Baker MD  •  diphenhydrAMINE (BENADRYL) capsule 25 mg, 25 mg, Oral, Q6H PRN, Robert Baker MD  •  glucagon (human recombinant) (GLUCAGEN DIAGNOSTIC) injection 1 mg, 1 mg, Subcutaneous, Q15 Min PRN, Robert Baker MD  •  hydrALAZINE (APRESOLINE) injection 10 mg, 10 mg, Intravenous, Q4H PRN, Soy Pearl MD  •  hydrALAZINE (APRESOLINE) tablet 100 mg, 100 mg, Oral, Q8H, Soy Pearl MD, 100 mg at 02/08/21 0539  •  hydrALAZINE (APRESOLINE) tablet 25 mg, 25 mg, Oral, Q6H PRN, Nixon Quinteros MD  •  HYDROcodone-acetaminophen (NORCO)  MG per tablet 1 tablet, 1 tablet, Oral, Q6H PRN, Robert Baker MD, 1 tablet at 02/07/21 2053  •  insulin lispro (humaLOG, " ADMELOG) injection 0-9 Units, 0-9 Units, Subcutaneous, TID AC, Robert Baker MD, 2 Units at 02/06/21 0839  •  ondansetron (ZOFRAN) tablet 4 mg, 4 mg, Oral, Q6H PRN **OR** ondansetron (ZOFRAN) injection 4 mg, 4 mg, Intravenous, Q6H PRN, Robert Baker MD  •  Pharmacy to dose vancomycin, , Does not apply, Continuous PRN, Nixon Quinteros MD  •  potassium chloride (KLOR-CON) packet 40 mEq, 40 mEq, Oral, Once, Miles Del Toro MD  •  sodium bicarbonate tablet 650 mg, 650 mg, Oral, TID, Soy Pearl MD, 650 mg at 02/08/21 0820  •  sodium chloride 0.9 % flush 10 mL, 10 mL, Intravenous, PRN, Josafat Mckeon MD  •  sodium chloride 0.9 % flush 10 mL, 10 mL, Intravenous, Q12H, Robert Baker MD, 10 mL at 02/07/21 2054  •  sodium chloride 0.9 % flush 10 mL, 10 mL, Intravenous, PRN, Robert Baker MD  •  torsemide (DEMADEX) tablet 40 mg, 40 mg, Oral, BID, Miles Del Toro MD  •  vancomycin 500 mg/100 mL 0.9% NS IVPB (mbp), 500 mg, Intravenous, Once, Tonja Azevedo MD  •  Vancomycin Pharmacy Intermittent Dosing, , Does not apply, Daily, Nixon Quinteros MD      Objective   Vital Sign Min/Max for last 24 hours  Temp  Min: 96.4 °F (35.8 °C)  Max: 98 °F (36.7 °C)   BP  Min: 125/96  Max: 168/106    Pulse  Min: 92  Max: 98     Wt Readings from Last 3 Encounters:   02/04/21 109 kg (240 lb 11.9 oz)   12/22/20 114 kg (251 lb 8 oz)      No intake or output data in the 24 hours ending 02/08/21 1231  Physical Exam:   Gen: NAD  Neuro: follows some commands, winces in pain when touching legs, disoriented to person/ place/ time/ event  CVS: irreg rate/ rhythm, no murmur, S1/S2, no S3/S4  Resp: CTA  Extremities: generalized edema (likely chronic), scaly skin , PPP      Monitor:  Controlled afib    Results Review:         Sodium Sodium   Date Value Ref Range Status   02/08/2021 142 136 - 145 mmol/L Final   02/07/2021 145 136 - 145 mmol/L Final   02/07/2021 143 136 - 145 mmol/L Final   02/06/2021 142 136 - 145 mmol/L  Final      Potassium Potassium   Date Value Ref Range Status   02/08/2021 3.8 3.5 - 5.2 mmol/L Final     Comment:     Slight hemolysis detected by analyzer. Results may be affected.   02/08/2021 2.9 (L) 3.5 - 5.2 mmol/L Final   02/07/2021 2.7 (L) 3.5 - 5.2 mmol/L Final   02/07/2021 2.8 (L) 3.5 - 5.2 mmol/L Final   02/06/2021 2.9 (L) 3.5 - 5.2 mmol/L Final   02/06/2021 3.3 (L) 3.5 - 5.2 mmol/L Final   02/05/2021 3.5 3.5 - 5.2 mmol/L Final   02/05/2021 2.8 (L) 3.5 - 5.2 mmol/L Final      Chloride Chloride   Date Value Ref Range Status   02/08/2021 114 (H) 98 - 107 mmol/L Final   02/07/2021 115 (H) 98 - 107 mmol/L Final   02/07/2021 115 (H) 98 - 107 mmol/L Final   02/06/2021 115 (H) 98 - 107 mmol/L Final      Bicarbonate No results found for: PLASMABICARB   BUN BUN   Date Value Ref Range Status   02/08/2021 20 8 - 23 mg/dL Final   02/07/2021 19 8 - 23 mg/dL Final   02/07/2021 20 8 - 23 mg/dL Final   02/06/2021 20 8 - 23 mg/dL Final      Creatinine Creatinine   Date Value Ref Range Status   02/08/2021 2.23 (H) 0.76 - 1.27 mg/dL Final   02/07/2021 2.32 (H) 0.76 - 1.27 mg/dL Final   02/07/2021 2.37 (H) 0.76 - 1.27 mg/dL Final   02/06/2021 2.39 (H) 0.76 - 1.27 mg/dL Final      Calcium Calcium   Date Value Ref Range Status   02/08/2021 9.0 8.6 - 10.5 mg/dL Final   02/07/2021 8.7 8.6 - 10.5 mg/dL Final   02/07/2021 8.7 8.6 - 10.5 mg/dL Final   02/06/2021 8.5 (L) 8.6 - 10.5 mg/dL Final      Magnesium Magnesium   Date Value Ref Range Status   02/08/2021 2.0 1.6 - 2.4 mg/dL Final   02/06/2021 2.0 1.6 - 2.4 mg/dL Final   02/06/2021 1.7 1.6 - 2.4 mg/dL Final        Results from last 7 days   Lab Units 02/08/21  0609   WBC 10*3/mm3 11.58*   HEMOGLOBIN g/dL 10.7*   HEMATOCRIT % 33.1*   PLATELETS 10*3/mm3 209     Lab Results   Lab Value Date/Time    TROPONINT 0.110 (C) 02/04/2021 1551    TROPONINT 0.171 (C) 12/23/2020 0357    TROPONINT 0.141 (C) 12/22/2020 0549    TROPONINT 0.147 (C) 12/21/2020 1450    TROPONINT 0.128 (C) 12/21/2020  1223     Lab Results   Component Value Date    CHOL 108 12/22/2020     Lab Results   Component Value Date    HDL 49 12/22/2020     Lab Results   Component Value Date    LDL 42 12/22/2020     Lab Results   Component Value Date    TRIG 86 12/22/2020     No components found for: CHOLHDL  No results found for: PTT  No components found for: PT/INR  No results found for: HGBA1C   No results found for: TSH     Echocardiogram 12/21/20:  LVEF 59%, moderate septal hypertroph, grade 1 diastolic dysfunction, normal LAP  Moderate LA dilatation, mild to moderate RA dilatation  Normal RV  Aortic valve sclerosis, MAC, trace TR, RVSP < 35 mm Hg, trivial pericardial effusion    Assessment/ Plan  1. Acute metabolic encephalopathy  2. Sepsis- UTI/ gram neg staph; infected lower extremity wound  3. Persistent afib  4. Type elevated troponin secondary to sepsis  5. Hypokalemia  6. HTN  7. SILVIA (on CKD stage 4)- stable creat  8. H/o marked first degree AV block  9. H/o junctional rhythm  10. H/o NSVT  11. DM  12.  Immobilization  13. Microcytic anemia    WBC appears to being increasing 8.61->11.58 overnight. On iv antbx. BP remains elevated on Hydralazine and amlodipine. Hydralazine increased yesterday. Demadex started today. Will avoid BB and diltiazem due to previous h/o pauses, junctionl rhythm and marked first degree AV block. Will continue to monitor.     Apoorva Wright, APRN  02/08/21  12:31 EST    Paddy Low M.D.   Reviewed our cardiology group Nurse Practitioner's note.    Pt interviewed and examined.   Findings verified.   Reviewed and agree with corrections or modifications of history, physical, and plans as indicated:     Renal has started potassium supplement and loop diuretics.  Think this is fine.  Not eating.  Blood pressures 130/110.  Heart rates in the 95 range.  Continues atrial flutter 4-1 conduction.  Meds from a cardiac standpoint appear well adjusted.  Will follow creatinine tomorrow..    EMR  Dragon/Transcription disclaimer:   Much of this encounter note is an electronic transcription/translation of spoken language to printed text. The electronic translation of spoken language may permit erroneous, or at times, nonsensical words or phrases to be inadvertently transcribed.  Although I have reviewed the note for such errors, some may still exist. Please contact me if needed for clarification or correction.  Paddy Low M.D.

## 2021-02-08 NOTE — PROGRESS NOTES
[facility]  Inpatient Progress Note    Reason for Follow Up:  Abnormal CT scan with abnormality in the distal colon seen on CT scan    Subjective     Interval History:   Patient unable to provide any reliable history.  No grimacing when I palpate his abdomen.  When asked him if he has any abdominal pain he says no but he answers no to all questions    Current Facility-Administered Medications:   •  acetaminophen (TYLENOL) tablet 650 mg, 650 mg, Oral, Q4H PRN **OR** acetaminophen (TYLENOL) 160 MG/5ML solution 650 mg, 650 mg, Oral, Q4H PRN **OR** acetaminophen (TYLENOL) suppository 650 mg, 650 mg, Rectal, Q4H PRN, Robert Baker MD  •  amLODIPine (NORVASC) tablet 10 mg, 10 mg, Oral, Q24H, Miles Del Toro MD, 10 mg at 02/08/21 0820  •  ammonium lactate (AMLACTIN) cream, , Topical, BID, Nixon Quinteros MD, Given at 02/08/21 0820  •  aspirin EC tablet 81 mg, 81 mg, Oral, Daily, Robert Baker MD, 81 mg at 02/08/21 0820  •  atorvastatin (LIPITOR) tablet 20 mg, 20 mg, Oral, Daily, Robert Baker MD, 20 mg at 02/08/21 0820  •  cefTRIAXone (ROCEPHIN) IVPB 1 g, 1 g, Intravenous, Q24H, Robert Baker MD, Last Rate: 100 mL/hr at 02/07/21 1811, 1 g at 02/07/21 1811  •  dextrose (D50W) 25 g/ 50mL Intravenous Solution 25 g, 25 g, Intravenous, Q15 Min PRN, Robert Baker MD  •  dextrose (GLUTOSE) oral gel 15 g, 15 g, Oral, Q15 Min PRN, Robert Baker MD  •  diphenhydrAMINE (BENADRYL) capsule 25 mg, 25 mg, Oral, Q6H PRN, Robert Baker MD  •  glucagon (human recombinant) (GLUCAGEN DIAGNOSTIC) injection 1 mg, 1 mg, Subcutaneous, Q15 Min PRN, Robert Baker MD  •  hydrALAZINE (APRESOLINE) injection 10 mg, 10 mg, Intravenous, Q4H PRN, Soy Pearl MD  •  hydrALAZINE (APRESOLINE) tablet 100 mg, 100 mg, Oral, Q8H, Soy Pearl MD, 100 mg at 02/08/21 0539  •  hydrALAZINE (APRESOLINE) tablet 25 mg, 25 mg, Oral, Q6H PRN, Nixon Quinteros MD  •  HYDROcodone-acetaminophen (NORCO)   MG per tablet 1 tablet, 1 tablet, Oral, Q6H PRN, Robert Baker MD, 1 tablet at 02/07/21 2053  •  insulin lispro (humaLOG, ADMELOG) injection 0-9 Units, 0-9 Units, Subcutaneous, TID AC, Robert Baker MD, 2 Units at 02/06/21 0839  •  ondansetron (ZOFRAN) tablet 4 mg, 4 mg, Oral, Q6H PRN **OR** ondansetron (ZOFRAN) injection 4 mg, 4 mg, Intravenous, Q6H PRN, Robert Baker MD  •  Pharmacy to dose vancomycin, , Does not apply, Continuous PRN, Nixon Quinteros MD  •  sodium bicarbonate tablet 650 mg, 650 mg, Oral, TID, PearlSoy pringle MD, 650 mg at 02/08/21 0820  •  sodium chloride 0.9 % flush 10 mL, 10 mL, Intravenous, PRN, Josafat Mckeon MD  •  sodium chloride 0.9 % flush 10 mL, 10 mL, Intravenous, Q12H, Robert Baker MD, 10 mL at 02/07/21 2054  •  sodium chloride 0.9 % flush 10 mL, 10 mL, Intravenous, PRN, Robert Baker MD  •  vancomycin 750 mg/250 mL 0.9% NS add-vantage, 750 mg, Intravenous, Once, Nixon Quinteros MD, Stopped at 02/07/21 1254  •  Vancomycin Pharmacy Intermittent Dosing, , Does not apply, Daily, Nixon Quinteros MD  Review of Systems:    Negative for fevers or chills, negative for diarrhea or vomiting    Objective     Vital Signs  Temp:  [96.4 °F (35.8 °C)-98 °F (36.7 °C)] 97.8 °F (36.6 °C)  Heart Rate:  [92-98] 98  Resp:  [18-22] 18  BP: (125-168)/() 168/106  Body mass index is 30.09 kg/m².  No intake or output data in the 24 hours ending 02/08/21 0937  No intake/output data recorded.     Physical Exam:   General: patient awake, confused   Eyes: Normal lids and lashes, no scleral icterus   Neck: supple, normal ROM   Skin: warm and dry, not jaundiced   Pulm:   regular and unlabored   Abdomen: soft, nontender, nondistended;    Extremities: no rash or edema        Results Review:     I reviewed the patient's new clinical results.    Results from last 7 days   Lab Units 02/08/21  0609 02/07/21  0458 02/06/21  0610   WBC 10*3/mm3 11.58* 8.61 9.52   HEMOGLOBIN g/dL  10.7* 10.0* 10.6*   HEMATOCRIT % 33.1* 31.0* 33.2*   PLATELETS 10*3/mm3 209 253 280     Results from last 7 days   Lab Units 02/08/21  0609 02/08/21  0007 02/07/21  1557 02/07/21  0458  02/05/21  0312 02/04/21  1551   SODIUM mmol/L 142  --  145 143   < > 142 139   POTASSIUM mmol/L 3.8 2.9* 2.7* 2.8*   < > 2.4* 2.2*   CHLORIDE mmol/L 114*  --  115* 115*   < > 112* 106   CO2 mmol/L 14.6*  --  16.7* 16.4*   < > 19.3* 21.4*   BUN mg/dL 20  --  19 20   < > 23 23   CREATININE mg/dL 2.23*  --  2.32* 2.37*   < > 2.55* 2.46*   CALCIUM mg/dL 9.0  --  8.7 8.7   < > 8.6 9.1   BILIRUBIN mg/dL  --   --   --   --   --  0.6 0.9   ALK PHOS U/L  --   --   --   --   --  91 104   ALT (SGPT) U/L  --   --   --   --   --  7 7   AST (SGOT) U/L  --   --   --   --   --  10 14   GLUCOSE mg/dL 159*  --  147* 156*   < > 147* 179*    < > = values in this interval not displayed.         Lab Results   Lab Value Date/Time    LIPASE 17 12/18/2020 1653    LIPASE 914 (H) 10/27/2020 0330    LIPASE 1,087 (H) 10/26/2020 0410    LIPASE 1,653 (H) 10/25/2020 0323       Radiology:  CT Head Without Contrast   Final Result   No acute intracranial findings.       Radiation dose reduction techniques were utilized, including automated   exposure control and exposure modulation based on body size.       This report was finalized on 2/5/2021 9:30 PM by Dr. Vero Hansen M.D.          CT Abdomen Pelvis Without Contrast   Final Result       1. Severely technically limited examination due to motion artifact. The   patient's sigmoid colon in particular appears thick walled, with   adjacent pericolonic soft tissue stranding. There is also involvement of   the rectum, although to a lesser extent. Appearance is suggestive of   colitis. No pneumatosis or free air is seen. Given the patient had some   rectal wall thickening on prior CT, consideration for follow-up with   endoscopy is suggested. There is some diffuse gaseous distention of   bowel, which may reflect  some ileus.   2. Thick-walled appearance to the urinary bladder, with adjacent   perivesical soft tissue stranding, concerning for cystitis.       Radiation dose reduction techniques were utilized, including automated   exposure control and exposure modulation based on body size.       This report was finalized on 2/5/2021 9:39 PM by Dr. Vero Hansen M.D.          XR Chest 1 View   Final Result          Assessment/Plan     Patient Active Problem List   Diagnosis   • Complicated UTI (urinary tract infection)   • Macrocytosis   • Sepsis secondary to UTI (CMS/HCC)   • Metabolic encephalopathy   • Hyperlipidemia   • Hypertension   • Monoclonal gammopathy   • Stage 4 chronic kidney disease (CMS/HCC)   • DM2 (diabetes mellitus, type 2) (CMS/HCC)   • Abnormal CT of the abdomen   • Normal anion gap metabolic acidosis   • Anemia, chronic disease   • Ventricular tachycardia (paroxysmal) (CMS/HCC)   • Acute metabolic encephalopathy   • UTI (urinary tract infection), bacterial   • Elevated troponin   • Hypokalemia   • SILVIA (acute kidney injury) (CMS/HCC)   • Uncontrolled hypertension   • Septicemia (CMS/HCC)     All problems are new to me today  Assessment:  1. Abnormal CT of the sigmoid colon, rectum to a lesser extent  2. Metabolic encephalopathy  3. Multiple electrolyte abnormalities including hypophosphatemia, hypokalemia  4. Diarrhea-normal C. difficile and GI PCR panel 2/5  5. Iron deficiency anemia  6. Chronic kidney disease      Plan:  Patient with significant confusion, electrolyte abnormalities-needs medical optimization prior to consideration of colonoscopy to evaluate abnormal CT findings and diarrhea    Stool output has been improving-negative infectious work-up    We will continue to follow    I discussed the patients findings and my recommendations with patient.    Chrissy Gordon MD

## 2021-02-08 NOTE — PLAN OF CARE
Goal Outcome Evaluation:  Plan of Care Reviewed With: patient  Progress: no change  Outcome Summary: BP's elevated, pt confused and restless, potassium replaced orally, will CTM

## 2021-02-08 NOTE — PROGRESS NOTES
"   LOS: 4 days    Patient Care Team:  Maay Ribeiro APRN as PCP - General (Family Medicine)    Chief Complaint:    Chief Complaint   Patient presents with   • Altered Mental Status     Follow-up chronic kidney disease and hypokalemia  Subjective     Interval History:   Patient is confused, not answering question properly      Review of Systems:   Not obtainable    Objective     Vital Signs  Temp:  [96.4 °F (35.8 °C)-98 °F (36.7 °C)] 97.8 °F (36.6 °C)  Heart Rate:  [92-98] 98  Resp:  [18-22] 18  BP: (125-168)/() 168/106    Flowsheet Rows      First Filed Value   Admission Height  190.5 cm (75\") Documented at 02/04/2021 1250   Admission Weight  114 kg (251 lb) Documented at 02/04/2021 1250          No intake/output data recorded.  No intake/output data recorded.  No intake or output data in the 24 hours ending 02/08/21 1130    Physical Exam:  General Appearance: Confused, disoriented, appears to be chronically, no acute distress,   Skin: warm and dry  HEENT: Nonicteric sclerae, oral mucosa is  Neck: no JVD, trachea midline  Lungs: Scattered rhonchi, unlabored breathing effort  Heart: RRR, normal S1 and S2, no S3, no rub  Abdomen: soft, nontender, normoactive bowels  : no palpable bladder, external catheter in place  Extremities: no edema, cyanosis or clubbing  Neuro: Difficult to assess      Results Review:    Results from last 7 days   Lab Units 02/08/21  0609 02/08/21  0007 02/07/21  1557 02/07/21  0458  02/05/21  0312 02/04/21  1551   SODIUM mmol/L 142  --  145 143   < > 142 139   POTASSIUM mmol/L 3.8 2.9* 2.7* 2.8*   < > 2.4* 2.2*   CHLORIDE mmol/L 114*  --  115* 115*   < > 112* 106   CO2 mmol/L 14.6*  --  16.7* 16.4*   < > 19.3* 21.4*   BUN mg/dL 20  --  19 20   < > 23 23   CREATININE mg/dL 2.23*  --  2.32* 2.37*   < > 2.55* 2.46*   CALCIUM mg/dL 9.0  --  8.7 8.7   < > 8.6 9.1   BILIRUBIN mg/dL  --   --   --   --   --  0.6 0.9   ALK PHOS U/L  --   --   --   --   --  91 104   ALT (SGPT) U/L  --   --   " --   --   --  7 7   AST (SGOT) U/L  --   --   --   --   --  10 14   GLUCOSE mg/dL 159*  --  147* 156*   < > 147* 179*    < > = values in this interval not displayed.       Estimated Creatinine Clearance: 42.9 mL/min (A) (by C-G formula based on SCr of 2.23 mg/dL (H)).    Results from last 7 days   Lab Units 02/08/21  0609 02/07/21  1557 02/07/21  0458 02/06/21 2021 02/06/21  0610   MAGNESIUM mg/dL 2.0  --   --  2.0 1.7   PHOSPHORUS mg/dL 2.7 2.8 1.8* 2.1* 1.4*       Results from last 7 days   Lab Units 02/06/21  0610   URIC ACID mg/dL 7.2*       Results from last 7 days   Lab Units 02/08/21  0609 02/07/21  0458 02/06/21  0610 02/05/21 0312 02/04/21  1551   WBC 10*3/mm3 11.58* 8.61 9.52 7.69 6.57   HEMOGLOBIN g/dL 10.7* 10.0* 10.6* 9.8* 10.7*   PLATELETS 10*3/mm3 209 253 280 296 300               Imaging Results (Last 24 Hours)     ** No results found for the last 24 hours. **        amLODIPine, 10 mg, Oral, Q24H  ammonium lactate, , Topical, BID  aspirin, 81 mg, Oral, Daily  atorvastatin, 20 mg, Oral, Daily  cefTRIAXone, 1 g, Intravenous, Q24H  hydrALAZINE, 100 mg, Oral, Q8H  insulin lispro, 0-9 Units, Subcutaneous, TID AC  sodium bicarbonate, 650 mg, Oral, TID  sodium chloride, 10 mL, Intravenous, Q12H  vancomycin, 750 mg, Intravenous, Once  Vancomycin Pharmacy Intermittent Dosing, , Does not apply, Daily      Pharmacy to dose vancomycin,         Medication Review:   Current Facility-Administered Medications   Medication Dose Route Frequency Provider Last Rate Last Admin   • acetaminophen (TYLENOL) tablet 650 mg  650 mg Oral Q4H PRN Robert Baker MD        Or   • acetaminophen (TYLENOL) 160 MG/5ML solution 650 mg  650 mg Oral Q4H PRN Robert Baker MD        Or   • acetaminophen (TYLENOL) suppository 650 mg  650 mg Rectal Q4H PRN Robert Baker MD       • amLODIPine (NORVASC) tablet 10 mg  10 mg Oral Q24H Miles Del Toro MD   10 mg at 02/08/21 0820   • ammonium lactate (AMLACTIN) cream   Topical  BID Nixon Quinteros MD   Given at 02/08/21 0820   • aspirin EC tablet 81 mg  81 mg Oral Daily Robert Baker MD   81 mg at 02/08/21 0820   • atorvastatin (LIPITOR) tablet 20 mg  20 mg Oral Daily Robert Baker MD   20 mg at 02/08/21 0820   • cefTRIAXone (ROCEPHIN) IVPB 1 g  1 g Intravenous Q24H Robert Baker  mL/hr at 02/07/21 1811 1 g at 02/07/21 1811   • dextrose (D50W) 25 g/ 50mL Intravenous Solution 25 g  25 g Intravenous Q15 Min PRN Robert Baker MD       • dextrose (GLUTOSE) oral gel 15 g  15 g Oral Q15 Min PRN Robert Baker MD       • diphenhydrAMINE (BENADRYL) capsule 25 mg  25 mg Oral Q6H PRN Robert Baker MD       • glucagon (human recombinant) (GLUCAGEN DIAGNOSTIC) injection 1 mg  1 mg Subcutaneous Q15 Min PRN Robert Baker MD       • hydrALAZINE (APRESOLINE) injection 10 mg  10 mg Intravenous Q4H PRN Soy Pearl MD       • hydrALAZINE (APRESOLINE) tablet 100 mg  100 mg Oral Q8H Soy Pearl MD   100 mg at 02/08/21 0539   • hydrALAZINE (APRESOLINE) tablet 25 mg  25 mg Oral Q6H PRN Nixon Quinteros MD       • HYDROcodone-acetaminophen (NORCO)  MG per tablet 1 tablet  1 tablet Oral Q6H PRN Robert Baker MD   1 tablet at 02/07/21 2053   • insulin lispro (humaLOG, ADMELOG) injection 0-9 Units  0-9 Units Subcutaneous TID AC Robert Baker MD   2 Units at 02/06/21 0839   • ondansetron (ZOFRAN) tablet 4 mg  4 mg Oral Q6H PRN Robert Baker MD        Or   • ondansetron (ZOFRAN) injection 4 mg  4 mg Intravenous Q6H PRN Robert Baker MD       • Pharmacy to dose vancomycin   Does not apply Continuous PRN Nixon Quinteros MD       • sodium bicarbonate tablet 650 mg  650 mg Oral TID Soy Pearl MD   650 mg at 02/08/21 0820   • sodium chloride 0.9 % flush 10 mL  10 mL Intravenous PRN Josafat Mckeon MD       • sodium chloride 0.9 % flush 10 mL  10 mL Intravenous Q12H Robert Baker MD   10 mL at 02/07/21 2054   • sodium chloride 0.9 % flush 10 mL  10 mL  Intravenous PRN Robert Baker MD       • vancomycin 750 mg/250 mL 0.9% NS add-vantage  750 mg Intravenous Once Nixon Quinteros MD   Stopped at 02/07/21 1254   • Vancomycin Pharmacy Intermittent Dosing   Does not apply Daily Nixon Quinteros MD           Assessment/Plan   1.  Chronic kidney disease second diabetic nephropathy biopsy-proven, creatinine very stable 2.23.  Volume status within acceptable range.  2.  Hypokalemia, resolved  3.  Metabolic acidosis none anion gap associate with chronic kidney disease total CO2 14.6.  4.  Anemia of chronic kidney disease, hemoglobin is 10.7, no JOSSELYN as needed.  5.  Immobility syndrome  6.  Hypertension, not well controlled  7.  Diabetes mellitus type 2, with diabetic nephropathy, biopsy-proven.        Plan:  1.  Potassium supplement and start loop diuretics  2.  Surveillance labs              Miles Del Toro MD  02/08/21  11:30 EST

## 2021-02-09 PROBLEM — F01.50 VASCULAR DEMENTIA WITHOUT BEHAVIORAL DISTURBANCE (HCC): Status: ACTIVE | Noted: 2021-01-01

## 2021-02-09 NOTE — PROGRESS NOTES
"DAILY PROGRESS NOTE  [facility]    Patient Identification:  Name: Gregorio Alejandro  Age: 65 y.o.  Sex: male  :  1955  MRN: [id number]         Primary Care Physician: Maya Ribeiro APRN    Subjective:  Interval History:He is confused.     Objective:    Scheduled Meds:[START ON 2/10/2021] amLODIPine, 5 mg, Oral, Q24H  ammonium lactate, , Topical, BID  aspirin, 81 mg, Oral, Daily  atorvastatin, 20 mg, Oral, Daily  cefTRIAXone, 1 g, Intravenous, Q24H  dilTIAZem, 30 mg, Oral, Q8H  hydrALAZINE, 100 mg, Oral, Q8H  insulin lispro, 0-9 Units, Subcutaneous, TID AC  metoprolol tartrate, 12.5 mg, Oral, Q12H  potassium & sodium phosphates, 1 packet, Oral, BID AC  potassium chloride, 40 mEq, Oral, Once  sodium bicarbonate, 650 mg, Oral, TID  sodium chloride, 10 mL, Intravenous, Q12H  [START ON 2/10/2021] vancomycin, 500 mg, Intravenous, Once  Vancomycin Pharmacy Intermittent Dosing, , Does not apply, Daily      Continuous Infusions:Pharmacy to dose vancomycin,         Vital signs in last 24 hours:  Temp:  [97.3 °F (36.3 °C)-97.9 °F (36.6 °C)] 97.9 °F (36.6 °C)  Heart Rate:  [] 103  Resp:  [18-20] 20  BP: (119-140)/() 140/102    Intake/Output:    Intake/Output Summary (Last 24 hours) at 2021 1550  Last data filed at 2021 0700  Gross per 24 hour   Intake 900 ml   Output --   Net 900 ml       Exam:  BP (!) 140/102 (BP Location: Left arm, Patient Position: Lying)   Pulse 103   Temp 97.9 °F (36.6 °C) (Axillary)   Resp 20   Ht 190.5 cm (75\")   Wt 109 kg (240 lb 11.9 oz)   SpO2 98%   BMI 30.09 kg/m²     General Appearance:    confused, no distress   Head:    Normocephalic, without obvious abnormality, atraumatic   Eyes:       Throat:   Lips, tongue, gums normal   Neck:   Supple, symmetrical, trachea midline, no JVD   Lungs:     Clear to auscultation bilaterally, respirations unlabored   Chest Wall:    No tenderness or deformity    Heart:    Regular rate and rhythm, S1 and S2 normal, " no murmur,no  Rub or gallop   Abdomen:     Soft, nontender, bowel sounds active, no masses, no organomegaly    Extremities:   Extremities normal, atraumatic, no cyanosis or edema   Pulses:      Skin:   Skin is warm and dry,  no rashes or palpable lesions   Neurologic:   Confused       Lab Results (last 72 hours)     Procedure Component Value Units Date/Time    Blood Culture - Blood, Arm, Right [[id number]] Collected: 02/05/21 1431    Specimen: Blood from Arm, Right Updated: 02/08/21 1445     Blood Culture No growth at 3 days    Blood Culture - Blood, Arm, Left [[id number]] Collected: 02/05/21 1433    Specimen: Blood from Arm, Left Updated: 02/08/21 1445     Blood Culture No growth at 3 days    POC Glucose Once [[id number]]  (Abnormal) Collected: 02/08/21 1150    Specimen: Blood Updated: 02/08/21 1203     Glucose 193 mg/dL     Blood Culture - Blood, Hand, Right [[id number]]  (Abnormal) Collected: 02/04/21 1551    Specimen: Blood from Hand, Right Updated: 02/08/21 0821     Blood Culture Staphylococcus epidermidis     Comment: Refer to previous blood culture collected on 2-4-21 for MICs        Isolated from Anaerobic Bottle     Blood Culture Aerococcus viridans     Isolated from Anaerobic Bottle     Gram Stain Anaerobic Bottle Gram positive cocci in clusters    Blood Culture - Blood, Hand, Right [[id number]]  (Abnormal)  (Susceptibility) Collected: 02/04/21 1551    Specimen: Blood from Hand, Right Updated: 02/08/21 0820     Blood Culture Staphylococcus epidermidis     Isolated from Aerobic Bottle     Blood Culture Aerococcus viridans     Comment: Susceptibilities for Ceftriaxone and Vancomycin to follow.        Isolated from Aerobic Bottle     Blood Culture Globicatella sanguinis     Comment: Anaerobic bottle only  No CLSI guidelines and no validated ISAC testing method.          Isolated from Anaerobic Bottle     Gram Stain Aerobic Bottle Gram positive cocci in clusters      Anaerobic Bottle Gram positive cocci in clusters     Narrative:            Susceptibility      Staphylococcus epidermidis     ISAC     Oxacillin Resistant     Vancomycin Susceptible                Susceptibility Comments     Staphylococcus epidermidis    This isolate is presumed to be clindamycin resistant based on detection of inducible clindamycin resistance.  Clindamycin may still be effective in some patients.             CBC (No Diff) [[id number]]  (Abnormal) Collected: 02/08/21 0609    Specimen: Blood Updated: 02/08/21 0725     WBC 11.58 10*3/mm3      RBC 4.75 10*6/mm3      Hemoglobin 10.7 g/dL      Hematocrit 33.1 %      MCV 69.7 fL      MCH 22.5 pg      MCHC 32.3 g/dL      RDW 24.5 %      RDW-SD 57.6 fl      Platelets 209 10*3/mm3     Vancomycin, Random [[id number]]  (Normal) Collected: 02/08/21 0609    Specimen: Blood Updated: 02/08/21 0704     Vancomycin Random 17.20 mcg/mL     Narrative:      Therapeutic Ranges for Vancomycin    Vancomycin Random   5.0-40.0 mcg/mL  Vancomycin Trough   5.0-20.0 mcg.mL  Vancomycin Peak     20.0-40.0 mcg/mL    Renal Function Panel [[id number]]  (Abnormal) Collected: 02/08/21 0609    Specimen: Blood Updated: 02/08/21 0704     Glucose 159 mg/dL      BUN 20 mg/dL      Creatinine 2.23 mg/dL      Sodium 142 mmol/L      Potassium 3.8 mmol/L      Comment: Slight hemolysis detected by analyzer. Results may be affected.        Chloride 114 mmol/L      CO2 14.6 mmol/L      Calcium 9.0 mg/dL      Albumin 2.60 g/dL      Phosphorus 2.7 mg/dL      Anion Gap 13.4 mmol/L      BUN/Creatinine Ratio 9.0     eGFR  African Amer 36 mL/min/1.73     Narrative:      GFR Normal >60  Chronic Kidney Disease <60  Kidney Failure <15      Magnesium [[id number]]  (Normal) Collected: 02/08/21 0609    Specimen: Blood Updated: 02/08/21 0703     Magnesium 2.0 mg/dL     Ammonia [[id number]]  (Normal) Collected: 02/08/21 0609    Specimen: Blood Updated: 02/08/21 0654     Ammonia 25 umol/L     POC Glucose Once [[id number]]  (Abnormal) Collected: 02/08/21 0628     Specimen: Blood Updated: 02/08/21 0629     Glucose 138 mg/dL     Potassium, Urine, Random - Urine, Clean Catch [[id number]] Collected: 02/08/21 0550    Specimen: Urine, Clean Catch Updated: 02/08/21 0625     Potassium, Urine <3.0 mmol/L     Narrative:      Reference intervals for random urine have not been established.  Clinical usage is dependent upon physician's interpretation in combination with other laboratory tests.       Sodium, Urine, Random - Urine, Clean Catch [[id number]] Collected: 02/08/21 0550    Specimen: Urine, Clean Catch Updated: 02/08/21 0625     Sodium, Urine <20 mmol/L     Narrative:      Reference intervals for random urine have not been established.  Clinical usage is dependent upon physician's interpretation in combination with other laboratory tests.       Potassium [[id number]]  (Abnormal) Collected: 02/08/21 0007    Specimen: Blood Updated: 02/08/21 0046     Potassium 2.9 mmol/L     POC Glucose Once [[id number]]  (Abnormal) Collected: 02/07/21 2108    Specimen: Blood Updated: 02/07/21 2110     Glucose 144 mg/dL     Renal Function Panel [[id number]]  (Abnormal) Collected: 02/07/21 1557    Specimen: Blood from Arm, Right Updated: 02/07/21 1657     Glucose 147 mg/dL      BUN 19 mg/dL      Creatinine 2.32 mg/dL      Sodium 145 mmol/L      Potassium 2.7 mmol/L      Chloride 115 mmol/L      CO2 16.7 mmol/L      Calcium 8.7 mg/dL      Albumin 2.90 g/dL      Phosphorus 2.8 mg/dL      Anion Gap 13.3 mmol/L      BUN/Creatinine Ratio 8.2     eGFR  African Amer 34 mL/min/1.73     Narrative:      GFR Normal >60  Chronic Kidney Disease <60  Kidney Failure <15      POC Glucose Once [[id number]]  (Abnormal) Collected: 02/07/21 1537    Specimen: Blood Updated: 02/07/21 1539     Glucose 162 mg/dL     POC Glucose Once [[id number]]  (Abnormal) Collected: 02/07/21 1130    Specimen: Blood Updated: 02/07/21 1137     Glucose 147 mg/dL     Gastrointestinal Panel, PCR - Stool, Per Rectum [[id number]]  (Normal) Collected:  02/05/21 2204    Specimen: Stool from Per Rectum Updated: 02/07/21 0950     Campylobacter Not Detected     Plesiomonas shigelloides Not Detected     Salmonella Not Detected     Vibrio Not Detected     Vibrio cholerae Not Detected     Yersinia enterocolitica Not Detected     Enteroaggregative E. coli (EAEC) Not Detected     Enteropathogenic E. coli (EPEC) Not Detected     Enterotoxigenic E. coli (ETEC) lt/st Not Detected     Shiga-like toxin-producing E. coli (STEC) stx1/stx2 Not Detected     E. coli O157 Not Detected     Shigella/Enteroinvasive E. coli (EIEC) Not Detected     Cryptosporidium Not Detected     Cyclospora cayetanensis Not Detected     Entamoeba histolytica Not Detected     Giardia lamblia Not Detected     Adenovirus F40/41 Not Detected     Astrovirus Not Detected     Norovirus GI/GII Not Detected     Rotavirus A Not Detected     Sapovirus (I, II, IV or V) Not Detected    Narrative:      If Aeromonas, Staphylococcus aureus or Bacillus cereus are suspected, please order [id number]: Stool Culture, Aeromonas, S aureus, B Cereus.    POC Glucose Once [[id number]]  (Abnormal) Collected: 02/07/21 0616    Specimen: Blood Updated: 02/07/21 0617     Glucose 182 mg/dL     Renal Function Panel [[id number]]  (Abnormal) Collected: 02/07/21 0458    Specimen: Blood Updated: 02/07/21 0609     Glucose 156 mg/dL      BUN 20 mg/dL      Creatinine 2.37 mg/dL      Sodium 143 mmol/L      Potassium 2.8 mmol/L      Chloride 115 mmol/L      CO2 16.4 mmol/L      Calcium 8.7 mg/dL      Albumin 2.70 g/dL      Phosphorus 1.8 mg/dL      Anion Gap 11.6 mmol/L      BUN/Creatinine Ratio 8.4     eGFR  African Amer 33 mL/min/1.73     Narrative:      GFR Normal >60  Chronic Kidney Disease <60  Kidney Failure <15      Vancomycin, Random [[id number]]  (Normal) Collected: 02/07/21 0458    Specimen: Blood Updated: 02/07/21 0553     Vancomycin Random 17.40 mcg/mL     Narrative:      Therapeutic Ranges for Vancomycin    Vancomycin Random   5.0-40.0  mcg/mL  Vancomycin Trough   5.0-20.0 mcg.mL  Vancomycin Peak     20.0-40.0 mcg/mL    CBC (No Diff) [[id number]]  (Abnormal) Collected: 02/07/21 0458    Specimen: Blood Updated: 02/07/21 0535     WBC 8.61 10*3/mm3      RBC 4.32 10*6/mm3      Hemoglobin 10.0 g/dL      Hematocrit 31.0 %      MCV 71.8 fL      MCH 23.1 pg      MCHC 32.3 g/dL      RDW 23.8 %      RDW-SD 58.1 fl      MPV --     Comment: Unable to calculate        Platelets 253 10*3/mm3     Potassium [[id number]]  (Abnormal) Collected: 02/06/21 2021    Specimen: Blood Updated: 02/06/21 2059     Potassium 2.9 mmol/L     Phosphorus [[id number]]  (Abnormal) Collected: 02/06/21 2021    Specimen: Blood Updated: 02/06/21 2059     Phosphorus 2.1 mg/dL     Magnesium [[id number]]  (Normal) Collected: 02/06/21 2021    Specimen: Blood Updated: 02/06/21 2052     Magnesium 2.0 mg/dL     POC Glucose Once [[id number]]  (Abnormal) Collected: 02/06/21 2043    Specimen: Blood Updated: 02/06/21 2044     Glucose 158 mg/dL     POC Glucose Once [[id number]]  (Abnormal) Collected: 02/06/21 1706    Specimen: Blood Updated: 02/06/21 1708     Glucose 134 mg/dL     POC Glucose Once [[id number]]  (Normal) Collected: 02/06/21 1114    Specimen: Blood Updated: 02/06/21 1116     Glucose 110 mg/dL     Urine Culture - Urine, Urine, Catheter [[id number]]  (Abnormal)  (Susceptibility) Collected: 02/04/21 1612    Specimen: Urine, Catheter Updated: 02/06/21 0940     Urine Culture 25,000 CFU/mL Proteus mirabilis    Susceptibility      Proteus mirabilis     ISAC     Ampicillin Susceptible     Ampicillin + Sulbactam Susceptible     Cefazolin Susceptible     Cefepime Susceptible     Ceftazidime Susceptible     Ceftriaxone Susceptible     Gentamicin Susceptible     Levofloxacin Intermediate     Nitrofurantoin Resistant     Piperacillin + Tazobactam Susceptible     Tetracycline Resistant     Trimethoprim + Sulfamethoxazole Resistant                    Manual Differential [[id number]]  (Abnormal) Collected:  02/06/21 0610    Specimen: Blood Updated: 02/06/21 0722     Neutrophil % 93.0 %      Lymphocyte % 4.0 %      Monocyte % 1.0 %      Eosinophil % 1.0 %      Basophil % 1.0 %      Neutrophils Absolute 8.85 10*3/mm3      Lymphocytes Absolute 0.38 10*3/mm3      Monocytes Absolute 0.10 10*3/mm3      Eosinophils Absolute 0.10 10*3/mm3      Basophils Absolute 0.10 10*3/mm3      Anisocytosis Mod/2+     Seligman Cells Slight/1+     Hypochromia Slight/1+     Macrocytes Slight/1+     Microcytes Slight/1+     Poikilocytes Mod/2+     Polychromasia Large/3+     RBC Fragments Slight/1+     Target Cells Slight/1+     Schistocytes Slight/1+     WBC Morphology Normal     Platelet Morphology Normal    Renal Function Panel [[id number]]  (Abnormal) Collected: 02/06/21 0610    Specimen: Blood Updated: 02/06/21 0716     Glucose 164 mg/dL      BUN 20 mg/dL      Creatinine 2.39 mg/dL      Sodium 142 mmol/L      Potassium 3.3 mmol/L      Chloride 115 mmol/L      CO2 18.9 mmol/L      Calcium 8.5 mg/dL      Albumin 2.50 g/dL      Phosphorus 1.4 mg/dL      Anion Gap 8.1 mmol/L      BUN/Creatinine Ratio 8.4     eGFR  African Amer 33 mL/min/1.73     Narrative:      GFR Normal >60  Chronic Kidney Disease <60  Kidney Failure <15      Uric Acid [[id number]]  (Abnormal) Collected: 02/06/21 0610    Specimen: Blood Updated: 02/06/21 0701     Uric Acid 7.2 mg/dL     Magnesium [[id number]]  (Normal) Collected: 02/06/21 0610    Specimen: Blood Updated: 02/06/21 0701     Magnesium 1.7 mg/dL     Vancomycin, Random [[id number]]  (Normal) Collected: 02/06/21 0610    Specimen: Blood Updated: 02/06/21 0659     Vancomycin Random 15.10 mcg/mL     Narrative:      Therapeutic Ranges for Vancomycin    Vancomycin Random   5.0-40.0 mcg/mL  Vancomycin Trough   5.0-20.0 mcg.mL  Vancomycin Peak     20.0-40.0 mcg/mL    CBC & Differential [[id number]]  (Abnormal) Collected: 02/06/21 0610    Specimen: Blood Updated: 02/06/21 0644    Narrative:      The following orders were created  for panel order CBC & Differential.  Procedure                               Abnormality         Status                     ---------                               -----------         ------                     CBC Auto Differential[[id number]]        Abnormal            Final result                 Please view results for these tests on the individual orders.    CBC Auto Differential [[id number]]  (Abnormal) Collected: 02/06/21 0610    Specimen: Blood Updated: 02/06/21 0644     WBC 9.52 10*3/mm3      RBC 4.61 10*6/mm3      Hemoglobin 10.6 g/dL      Hematocrit 33.2 %      MCV 72.0 fL      MCH 23.0 pg      MCHC 31.9 g/dL      RDW 24.0 %      RDW-SD 57.9 fl      Platelets 280 10*3/mm3     POC Glucose Once [[id number]]  (Abnormal) Collected: 02/06/21 0619    Specimen: Blood Updated: 02/06/21 0623     Glucose 154 mg/dL     Clostridium Difficile Toxin - Stool, Per Rectum [[id number]]  (Normal) Collected: 02/05/21 2204    Specimen: Stool from Per Rectum Updated: 02/05/21 2303    Narrative:      The following orders were created for panel order Clostridium Difficile Toxin - Stool, Per Rectum.  Procedure                               Abnormality         Status                     ---------                               -----------         ------                     Clostridium Difficile To...[[id number]]  Normal              Final result                 Please view results for these tests on the individual orders.    Clostridium Difficile Toxin, PCR - Stool, Per Rectum [[id number]]  (Normal) Collected: 02/05/21 2204    Specimen: Stool from Per Rectum Updated: 02/05/21 2303     C. Difficile Toxins by PCR Negative    Potassium [[id number]]  (Normal) Collected: 02/05/21 2105    Specimen: Blood from Hand, Left Updated: 02/05/21 2146     Potassium 3.5 mmol/L     POC Glucose Once [[id number]]  (Abnormal) Collected: 02/05/21 2010    Specimen: Blood Updated: 02/05/21 2011     Glucose 146 mg/dL         Data Review:  Results from last 7 days    Lab Units 02/09/21  0918 02/08/21 2018 02/08/21  0609  02/07/21  1557   SODIUM mmol/L 145  --  142  --  145   POTASSIUM mmol/L 3.1* 3.1* 3.8   < > 2.7*   CHLORIDE mmol/L 115*  --  114*  --  115*   CO2 mmol/L 17.3*  --  14.6*  --  16.7*   BUN mg/dL 22  --  20  --  19   CREATININE mg/dL 2.69*  --  2.23*  --  2.32*   GLUCOSE mg/dL 149*  --  159*  --  147*   CALCIUM mg/dL 9.2  --  9.0  --  8.7    < > = values in this interval not displayed.     Results from last 7 days   Lab Units 02/09/21  0633 02/08/21  0609 02/07/21  0458   WBC 10*3/mm3 9.09 11.58* 8.61   HEMOGLOBIN g/dL 10.4* 10.7* 10.0*   HEMATOCRIT % 32.0* 33.1* 31.0*   PLATELETS 10*3/mm3 287 209 253             Lab Results   Lab Value Date/Time    TROPONINT 0.110 (C) 02/04/2021 1551    TROPONINT 0.171 (C) 12/23/2020 0357    TROPONINT 0.141 (C) 12/22/2020 0549    TROPONINT 0.147 (C) 12/21/2020 1450    TROPONINT 0.128 (C) 12/21/2020 1223         Results from last 7 days   Lab Units 02/05/21  0312 02/04/21  1551   ALK PHOS U/L 91 104   BILIRUBIN mg/dL 0.6 0.9   ALT (SGPT) U/L 7 7   AST (SGOT) U/L 10 14             Glucose   Date/Time Value Ref Range Status   02/09/2021 1059 143 (H) 70 - 130 mg/dL Final   02/09/2021 0609 136 (H) 70 - 130 mg/dL Final   02/08/2021 2100 138 (H) 70 - 130 mg/dL Final   02/08/2021 1622 127 70 - 130 mg/dL Final   02/08/2021 1150 193 (H) 70 - 130 mg/dL Final   02/08/2021 0628 138 (H) 70 - 130 mg/dL Final   02/07/2021 2108 144 (H) 70 - 130 mg/dL Final   02/07/2021 1537 162 (H) 70 - 130 mg/dL Final           Past Medical History:   Diagnosis Date   • Back pain    • CKD (chronic kidney disease)    • DM type 2 (diabetes mellitus, type 2) (CMS/HCC)    • ED (erectile dysfunction)    • Hyperlipidemia    • Hypertension    • SCOTTY (iron deficiency anemia)    • Monoclonal gammopathy    • Osteoarthritis        Assessment:  Active Hospital Problems    Diagnosis  POA   • **Acute metabolic encephalopathy [G93.41]  Yes   • Vascular dementia without  behavioral disturbance (CMS/[facility]) [F01.50]  Unknown   • Septicemia (CMS/[facility]) [A41.9]  Yes   • UTI (urinary tract infection), bacterial [N39.0, A49.9]  Yes   • Elevated troponin [R77.8]  Yes   • Hypokalemia [E87.6]  Yes   • Uncontrolled hypertension [I10]  Yes   • Anemia, chronic disease [D63.8]  Yes   • DM2 (diabetes mellitus, type 2) (CMS/[facility]) [E11.9]  Yes   • Stage 4 chronic kidney disease (CMS/[facility]) [N18.4]  Yes   • Hyperlipidemia [E78.5]  Yes   • Hypertension [I10]  Yes      Resolved Hospital Problems   No resolved problems to display.       Plan:  Continue antibiotics. As per multiple consults.  neurology consult noted.  Follow lab.    Jono Montes MD  2/9/2021  15:50 EST

## 2021-02-09 NOTE — PROGRESS NOTES
[facility], operated by [facility]  Inpatient Progress Note    Reason for Follow Up: The and abnormal CT scan    Subjective     Interval History:   Still confused this morning    Current Facility-Administered Medications:   •  acetaminophen (TYLENOL) tablet 650 mg, 650 mg, Oral, Q4H PRN **OR** acetaminophen (TYLENOL) 160 MG/5ML solution 650 mg, 650 mg, Oral, Q4H PRN **OR** acetaminophen (TYLENOL) suppository 650 mg, 650 mg, Rectal, Q4H PRN, Robert Baker MD  •  amLODIPine (NORVASC) tablet 10 mg, 10 mg, Oral, Q24H, Miles Del Toro MD, 10 mg at 02/09/21 1030  •  ammonium lactate (AMLACTIN) cream, , Topical, BID, Nixon Quinteros MD, Given at 02/09/21 1030  •  aspirin EC tablet 81 mg, 81 mg, Oral, Daily, Robert Baker MD, 81 mg at 02/09/21 1029  •  atorvastatin (LIPITOR) tablet 20 mg, 20 mg, Oral, Daily, Robert Baker MD, 20 mg at 02/09/21 1029  •  cefTRIAXone (ROCEPHIN) IVPB 1 g, 1 g, Intravenous, Q24H, Jono Montes MD, Last Rate: 100 mL/hr at 02/08/21 2101, 1 g at 02/08/21 2101  •  dextrose (D50W) 25 g/ 50mL Intravenous Solution 25 g, 25 g, Intravenous, Q15 Min PRN, Robert Baker MD  •  dextrose (GLUTOSE) oral gel 15 g, 15 g, Oral, Q15 Min PRN, Robert Baker MD  •  diphenhydrAMINE (BENADRYL) capsule 25 mg, 25 mg, Oral, Q6H PRN, Robert Baker MD  •  glucagon (human recombinant) (GLUCAGEN DIAGNOSTIC) injection 1 mg, 1 mg, Subcutaneous, Q15 Min PRN, Robert Baker MD  •  hydrALAZINE (APRESOLINE) injection 10 mg, 10 mg, Intravenous, Q4H PRN, Soy Pearl MD  •  hydrALAZINE (APRESOLINE) tablet 100 mg, 100 mg, Oral, Q8H, Soy Pearl MD, 100 mg at 02/09/21 0639  •  hydrALAZINE (APRESOLINE) tablet 25 mg, 25 mg, Oral, Q6H PRN, Nixon Quinteros MD  •  HYDROcodone-acetaminophen (NORCO)  MG per tablet 1 tablet, 1 tablet, Oral, Q6H PRN, Robert Baker MD, 1 tablet at 02/08/21 2053  •  insulin lispro (humaLOG, ADMELOG) injection 0-9 Units, 0-9 Units, Subcutaneous, TID Samuel LEES,  MD Robert, 2 Units at 02/08/21 1421  •  ondansetron (ZOFRAN) tablet 4 mg, 4 mg, Oral, Q6H PRN **OR** ondansetron (ZOFRAN) injection 4 mg, 4 mg, Intravenous, Q6H PRN, Robert Baker MD  •  Pharmacy to dose vancomycin, , Does not apply, Continuous PRN, Nixon Quinteros MD  •  sodium bicarbonate tablet 650 mg, 650 mg, Oral, TID, Soy Pearl MD, 650 mg at 02/09/21 1030  •  sodium chloride 0.9 % flush 10 mL, 10 mL, Intravenous, PRN, Josafat Mckeon MD  •  sodium chloride 0.9 % flush 10 mL, 10 mL, Intravenous, Q12H, Robert Baker MD, 10 mL at 02/09/21 1030  •  sodium chloride 0.9 % flush 10 mL, 10 mL, Intravenous, PRN, Robert Baker MD  •  torsemide (DEMADEX) tablet 40 mg, 40 mg, Oral, BID, Katey, Miles Silver MD, 40 mg at 02/09/21 1030  •  [START ON 2/10/2021] vancomycin 500 mg/100 mL 0.9% NS IVPB (mbp), 500 mg, Intravenous, Once, Jono Montes MD  •  Vancomycin Pharmacy Intermittent Dosing, , Does not apply, Daily, Nixon Quinteros MD  Review of Systems:    Review of systems unobtainable as he has confusion    Objective     Vital Signs  Temp:  [97.3 °F (36.3 °C)-97.6 °F (36.4 °C)] 97.3 °F (36.3 °C)  Heart Rate:  [] 91  Resp:  [18-20] 20  BP: (119-156)/() 138/82  Body mass index is 30.09 kg/m².    Intake/Output Summary (Last 24 hours) at 2/9/2021 1045  Last data filed at 2/9/2021 0700  Gross per 24 hour   Intake 900 ml   Output --   Net 900 ml     No intake/output data recorded.     Physical Exam:   General: patient awake, alert and cooperative   Eyes: Normal lids and lashes, no scleral icterus   Neck: supple, normal ROM   Skin: warm and dry, not jaundiced   Cardiovascular: regular rhythm and rate, no murmurs auscultated   Pulm: clear to auscultation bilaterally, regular and unlabored   Abdomen: soft, nontender, nondistended; normal bowel sounds   Extremities: no rash or edema   Psychiatric: Normal mood and behavior; memory intact     Results Review:     I reviewed the patient's new  clinical results.    Results from last 7 days   Lab Units 02/09/21  0633 02/08/21  0609 02/07/21  0458   WBC 10*3/mm3 9.09 11.58* 8.61   HEMOGLOBIN g/dL 10.4* 10.7* 10.0*   HEMATOCRIT % 32.0* 33.1* 31.0*   PLATELETS 10*3/mm3 287 209 253     Results from last 7 days   Lab Units 02/08/21 2018 02/08/21  0609 02/08/21  0007 02/07/21  1557 02/07/21  0458  02/05/21  0312 02/04/21  1551   SODIUM mmol/L  --  142  --  145 143   < > 142 139   POTASSIUM mmol/L 3.1* 3.8 2.9* 2.7* 2.8*   < > 2.4* 2.2*   CHLORIDE mmol/L  --  114*  --  115* 115*   < > 112* 106   CO2 mmol/L  --  14.6*  --  16.7* 16.4*   < > 19.3* 21.4*   BUN mg/dL  --  20  --  19 20   < > 23 23   CREATININE mg/dL  --  2.23*  --  2.32* 2.37*   < > 2.55* 2.46*   CALCIUM mg/dL  --  9.0  --  8.7 8.7   < > 8.6 9.1   BILIRUBIN mg/dL  --   --   --   --   --   --  0.6 0.9   ALK PHOS U/L  --   --   --   --   --   --  91 104   ALT (SGPT) U/L  --   --   --   --   --   --  7 7   AST (SGOT) U/L  --   --   --   --   --   --  10 14   GLUCOSE mg/dL  --  159*  --  147* 156*   < > 147* 179*    < > = values in this interval not displayed.         Lab Results   Lab Value Date/Time    LIPASE 17 12/18/2020 1653    LIPASE 914 (H) 10/27/2020 0330    LIPASE 1,087 (H) 10/26/2020 0410    LIPASE 1,653 (H) 10/25/2020 0323       Radiology:  CT Head Without Contrast   Final Result   No acute intracranial findings.       Radiation dose reduction techniques were utilized, including automated   exposure control and exposure modulation based on body size.       This report was finalized on 2/5/2021 9:30 PM by Dr. Vero Hansen M.D.          CT Abdomen Pelvis Without Contrast   Final Result       1. Severely technically limited examination due to motion artifact. The   patient's sigmoid colon in particular appears thick walled, with   adjacent pericolonic soft tissue stranding. There is also involvement of   the rectum, although to a lesser extent. Appearance is suggestive of   colitis. No  pneumatosis or free air is seen. Given the patient had some   rectal wall thickening on prior CT, consideration for follow-up with   endoscopy is suggested. There is some diffuse gaseous distention of   bowel, which may reflect some ileus.   2. Thick-walled appearance to the urinary bladder, with adjacent   perivesical soft tissue stranding, concerning for cystitis.       Radiation dose reduction techniques were utilized, including automated   exposure control and exposure modulation based on body size.       This report was finalized on 2/5/2021 9:39 PM by Dr. Vero Hansen M.D.          XR Chest 1 View   Final Result          Assessment/Plan     Patient Active Problem List   Diagnosis   • Complicated UTI (urinary tract infection)   • Macrocytosis   • Sepsis secondary to UTI (CMS/HCC)   • Metabolic encephalopathy   • Hyperlipidemia   • Hypertension   • Monoclonal gammopathy   • Stage 4 chronic kidney disease (CMS/HCC)   • DM2 (diabetes mellitus, type 2) (CMS/HCC)   • Abnormal CT of the abdomen   • Normal anion gap metabolic acidosis   • Anemia, chronic disease   • Ventricular tachycardia (paroxysmal) (CMS/HCC)   • Acute metabolic encephalopathy   • UTI (urinary tract infection), bacterial   • Elevated troponin   • Hypokalemia   • SILVIA (acute kidney injury) (CMS/HCC)   • Uncontrolled hypertension   • Septicemia (CMS/HCC)     All problems are new to me today  Assessment:  1. Abnormal CT of the sigmoid colon, rectum to a lesser extent  2. Metabolic encephalopathy  3. Multiple electrolyte abnormalities including hypophosphatemia, hypokalemia  4. Diarrhea-normal C. difficile and GI PCR panel 2/5  5. Iron deficiency anemia  6. Chronic kidney disease        Plan:  Patient with significant confusion, electrolyte abnormalities-needs medical optimization prior to consideration of colonoscopy to evaluate abnormal CT findings and diarrhea     Stool output has been improving-negative infectious work-up     We will continue  to follow await recommendations from cardiology and neurology      I discussed the patients findings and my recommendations with patient and nursing staff.    Kip Wiley MD

## 2021-02-09 NOTE — THERAPY EVALUATION
Acute Care - Speech Language Pathology   Swallow Initial Evaluation [facility]     Patient Name: Gregorio Alejandro  : 1955  MRN: [id number]  Today's Date: 2021               Admit Date: 2021    Visit Dx:     ICD-10-CM ICD-9-CM   1. Acute metabolic encephalopathy  G93.41 348.31   2. Acute UTI  N39.0 599.0   3. Hypokalemia  E87.6 276.8   4. Accelerated hypertension  I10 401.0     Patient Active Problem List   Diagnosis   • Complicated UTI (urinary tract infection)   • Macrocytosis   • Sepsis secondary to UTI (CMS/HCC)   • Metabolic encephalopathy   • Hyperlipidemia   • Hypertension   • Monoclonal gammopathy   • Stage 4 chronic kidney disease (CMS/HCC)   • DM2 (diabetes mellitus, type 2) (CMS/HCC)   • Abnormal CT of the abdomen   • Normal anion gap metabolic acidosis   • Anemia, chronic disease   • Ventricular tachycardia (paroxysmal) (CMS/HCC)   • Acute metabolic encephalopathy   • UTI (urinary tract infection), bacterial   • Elevated troponin   • Hypokalemia   • SILVIA (acute kidney injury) (CMS/HCC)   • Uncontrolled hypertension   • Septicemia (CMS/HCC)     Past Medical History:   Diagnosis Date   • Back pain    • CKD (chronic kidney disease)    • DM type 2 (diabetes mellitus, type 2) (CMS/HCC)    • ED (erectile dysfunction)    • Hyperlipidemia    • Hypertension    • SCOTTY (iron deficiency anemia)    • Monoclonal gammopathy    • Osteoarthritis    Patient was not wearing a face mask during this therapy encounter. Therapist used appropriate personal protective equipment including mask, eye protection and gloves.  Mask used was [id number]/duckbill. Appropriate PPE was worn during the entire therapy session. Hand hygiene was completed before and after therapy session. Patient is not in enhanced droplet precautions.               History reviewed. No pertinent surgical history.     SWALLOW EVALUATION (last 72 hours)      SLP Adult Swallow Evaluation     Row Name 21 0914                   Rehab Evaluation     Document Type  evaluation  -OC        Subjective Information  no complaints  -OC        Patient Observations  alert;cooperative;agree to therapy  -OC        Care Plan Review  evaluation/treatment results reviewed  -OC        Patient Effort  good  -OC        Symptoms Noted During/After Treatment  none  -OC           General Information    Patient Profile Reviewed  yes  -OC        Pertinent History Of Current Problem  Patient admitted with acute metabolic encephalopathy. Patient on clears per GI.  SLP consulted secondary to reports of increased coughing with thins. When SLP entered room, patient in brief with legs hanging over railing of bed. SLP repositioned and placed blanket over patient. -OC        Current Method of Nutrition  clear liquids  -OC        Precautions/Limitations, Vision  WFL;for purposes of eval  -OC        Precautions/Limitations, Hearing  WFL;for purposes of eval  -OC        Prior Level of Function-Communication  unknown  -OC        Prior Level of Function-Swallowing  unknown  -OC        Plans/Goals Discussed with  patient  -OC        Barriers to Rehab  cognitive status  -OC        Patient's Goals for Discharge  patient did not state  -OC           Pain    Additional Documentation  Pain Scale: Numbers Pre/Post-Treatment (Group)  -OC           Pain Scale: Numbers Pre/Post-Treatment    Pretreatment Pain Rating  0/10 - no pain  -OC        Posttreatment Pain Rating  0/10 - no pain  -OC           Oral Motor Structure and Function    Dentition Assessment  natural, present and adequate  -OC        Secretion Management  WNL/WFL  -OC        Mucosal Quality  moist, healthy  -OC        Volitional Swallow  unable to elicit  -OC        Volitional Cough  unable to elicit  -OC           Oral Musculature and Cranial Nerve Assessment    Oral Motor General Assessment  unable to assess;other (see comments) unable to follow commands  -OC           Clinical Swallow Eval    Clinical Swallow Evaluation Summary  Patient  demonstrated no overt s/s aspiration with ice chips, thin via cup, and thin via straw.  Patient continuously swallow entire cup of thin liquids.  Solids not trialed secondary to  clear liquid diet per GI.   -OC           Clinical Impression    SLP Swallowing Diagnosis  functional oral phase;functional pharyngeal phase  -OC        Functional Impact  risk of aspiration/pneumonia  -OC        Rehab Potential/Prognosis, Swallowing  good, to achieve stated therapy goals  -OC        Swallow Criteria for Skilled Therapeutic Interventions Met  demonstrates skilled criteria  -OC           Recommendations    Therapy Frequency (Swallow)  PRN  -OC        Predicted Duration Therapy Intervention (Days)  until discharge  -OC        SLP Diet Recommendation  thin liquids;clear liquid diet;other (see comments) clears per GI  -OC        Recommended Precautions and Strategies  upright posture during/after eating  -OC        Oral Care Recommendations  Oral Care BID/PRN  -OC        SLP Rec. for Method of Medication Administration  meds whole;with thin liquids;with pudding or applesauce;as tolerated  -OC        Monitor for Signs of Aspiration  yes;notify SLP if any concerns  -OC        Anticipated Discharge Disposition (SLP)  unknown  -OC           Swallow Goals (SLP)    Oral Nutrition/Hydration Goal Selection (SLP)  oral nutrition/hydration, SLP goal 1  -OC           Oral Nutrition/Hydration Goal 1 (SLP)    Oral Nutrition/Hydration Goal 1, SLP  Tolerate least restrictive diet with no overt s/s aspiration.   -OC        Time Frame (Oral Nutrition/Hydration Goal 1, SLP)  by discharge  -OC          User Key  (r) = Recorded By, (t) = Taken By, (c) = Cosigned By    Initials Name Effective Dates    OC Virginia, MARCOS Brennan,CCC-SLP 06/08/18 -           EDUCATION  The patient has been educated in the following areas:   Dysphagia (Swallowing Impairment).    SLP Recommendation and Plan  SLP Swallowing Diagnosis: functional oral phase, functional  pharyngeal phase  SLP Diet Recommendation: thin liquids, clear liquid diet, other (see comments)(clears per GI)  Recommended Precautions and Strategies: upright posture during/after eating  SLP Rec. for Method of Medication Administration: meds whole, with thin liquids, with pudding or applesauce, as tolerated     Monitor for Signs of Aspiration: yes, notify SLP if any concerns     Swallow Criteria for Skilled Therapeutic Interventions Met: demonstrates skilled criteria  Anticipated Discharge Disposition (SLP): unknown  Rehab Potential/Prognosis, Swallowing: good, to achieve stated therapy goals  Therapy Frequency (Swallow): PRN  Predicted Duration Therapy Intervention (Days): until discharge                         Plan of Care Reviewed With: patient  Outcome Summary: Clinical swallow eval completed. Recommend continue with thin liquids, clears per GI. Recommend upright and alert for PO intake. SLP to follow for need for re-eval.    SLP GOALS     Row Name 02/09/21 0914             Oral Nutrition/Hydration Goal 1 (SLP)    Oral Nutrition/Hydration Goal 1, SLP  Tolerate least restrictive diet with no overt s/s aspiration.   -OC      Time Frame (Oral Nutrition/Hydration Goal 1, SLP)  by discharge  -OC        User Key  (r) = Recorded By, (t) = Taken By, (c) = Cosigned By    Initials Name Provider Type    Anu Vargas MA,RONA-SLP Speech and Language Pathologist           SLP Outcome Measures (last 72 hours)      SLP Outcome Measures     Row Name 02/09/21 0925             SLP Outcome Measures    Outcome Measure Used?  Adult NOMS  -OC         Adult FCM Scores    FCM Chosen  Swallowing  -OC      Swallowing FCM Score  6  -OC        User Key  (r) = Recorded By, (t) = Taken By, (c) = Cosigned By    Initials Name Effective Dates    Anu Vargas MA, CCC-SLP 06/08/18 -            Time Calculation:   Time Calculation- SLP     Row Name 02/09/21 0925             Time Calculation- SLP    SLP Start Time  0845  -OC      SLP  Received On  02/09/21  -OC        User Key  (r) = Recorded By, (t) = Taken By, (c) = Cosigned By    Initials Name Provider Type    Anu Vargas MA,CCC-SLP Speech and Language Pathologist          Therapy Charges for Today     Code Description Service Date Service Provider Modifiers Qty    [id number]  ST EVAL ORAL PHARYNG SWALLOW 3 2/9/2021 Anu Coleman MA,RONA-SLP GN 1               Anu Coleman MA, CCC-SLP  2/9/2021

## 2021-02-09 NOTE — PROGRESS NOTES
Gregorio Alejandro   65 y.o.  male    LOS: 5 days   Patient Care Team:  Maya Ribeiro APRN as PCP - General (Family Medicine)      Subjective    Confused  No c/o cp    Review of Systems:   Lungs: no cough, no soa  CV: no CP, no palpitations  GI: no nausea, vomiting, diarrhea     Medication Review:   Current Facility-Administered Medications:   •  acetaminophen (TYLENOL) tablet 650 mg, 650 mg, Oral, Q4H PRN **OR** acetaminophen (TYLENOL) 160 MG/5ML solution 650 mg, 650 mg, Oral, Q4H PRN **OR** acetaminophen (TYLENOL) suppository 650 mg, 650 mg, Rectal, Q4H PRN, Robert Baker MD  •  amLODIPine (NORVASC) tablet 10 mg, 10 mg, Oral, Q24H, Miles Del Toro MD, 10 mg at 02/08/21 0820  •  ammonium lactate (AMLACTIN) cream, , Topical, BID, Nixon Quinteros MD, Given at 02/08/21 2053  •  aspirin EC tablet 81 mg, 81 mg, Oral, Daily, Robert Baker MD, 81 mg at 02/08/21 0820  •  atorvastatin (LIPITOR) tablet 20 mg, 20 mg, Oral, Daily, Robert Baker MD, 20 mg at 02/08/21 0820  •  cefTRIAXone (ROCEPHIN) IVPB 1 g, 1 g, Intravenous, Q24H, Jono Montes MD, Last Rate: 100 mL/hr at 02/08/21 2101, 1 g at 02/08/21 2101  •  dextrose (D50W) 25 g/ 50mL Intravenous Solution 25 g, 25 g, Intravenous, Q15 Min PRN, Robert Baker MD  •  dextrose (GLUTOSE) oral gel 15 g, 15 g, Oral, Q15 Min PRN, Robert Baker MD  •  diphenhydrAMINE (BENADRYL) capsule 25 mg, 25 mg, Oral, Q6H PRN, Robert Baker MD  •  glucagon (human recombinant) (GLUCAGEN DIAGNOSTIC) injection 1 mg, 1 mg, Subcutaneous, Q15 Min PRN, Robert Baker MD  •  hydrALAZINE (APRESOLINE) injection 10 mg, 10 mg, Intravenous, Q4H PRN, Soy Pearl MD  •  hydrALAZINE (APRESOLINE) tablet 100 mg, 100 mg, Oral, Q8H, Soy Pearl MD, 100 mg at 02/09/21 0639  •  hydrALAZINE (APRESOLINE) tablet 25 mg, 25 mg, Oral, Q6H PRN, Nixon Quinteros MD  •  HYDROcodone-acetaminophen (NORCO)  MG per tablet 1 tablet, 1 tablet, Oral, Q6H PRN, Robert Baker MD, 1 tablet  at 02/08/21 2053  •  insulin lispro (humaLOG, ADMELOG) injection 0-9 Units, 0-9 Units, Subcutaneous, TID AC, Robert Baker MD, 2 Units at 02/08/21 1421  •  ondansetron (ZOFRAN) tablet 4 mg, 4 mg, Oral, Q6H PRN **OR** ondansetron (ZOFRAN) injection 4 mg, 4 mg, Intravenous, Q6H PRN, Robert Baker MD  •  Pharmacy to dose vancomycin, , Does not apply, Continuous PRN, Nixon Quinteros MD  •  sodium bicarbonate tablet 650 mg, 650 mg, Oral, TID, Soy Pearl MD, 650 mg at 02/08/21 2053  •  sodium chloride 0.9 % flush 10 mL, 10 mL, Intravenous, PRN, Josafat Mckeon MD  •  sodium chloride 0.9 % flush 10 mL, 10 mL, Intravenous, Q12H, Robert Baker MD, 10 mL at 02/07/21 2054  •  sodium chloride 0.9 % flush 10 mL, 10 mL, Intravenous, PRN, Robert Baker MD  •  torsemide (DEMADEX) tablet 40 mg, 40 mg, Oral, BID, KateyMiles MD, 40 mg at 02/08/21 2053  •  Vancomycin Pharmacy Intermittent Dosing, , Does not apply, Daily, Nixon Quinteros MD      Objective     Vital Sign Min/Max for last 24 hours  Temp  Min: 97.3 °F (36.3 °C)  Max: 97.6 °F (36.4 °C)   BP  Min: 119/93  Max: 156/121    Pulse  Min: 91  Max: 113         02/04/21  1250 02/04/21  2215   Weight: 114 kg (251 lb) 109 kg (240 lb 11.9 oz)        Intake/Output Summary (Last 24 hours) at 2/9/2021 0836  Last data filed at 2/9/2021 0700  Gross per 24 hour   Intake 900 ml   Output --   Net 900 ml       Physical Exam:    General Appearance:     no acute distress   Lungs:     diminished. No wheezes, rhonchi or rales.     Heart:    regular rate and irrhythm.  Normal S1 and S2. No murmur, no gallop, rub or lift. , no JVD   Abdomen:     Normal bowel sounds, soft, non-tender, non-distended,   Extremities:   No clubbing or  Cyanosis, +edema, tender LE.     Skin:    dry   Neurologic:   Arousable, confused      Monitor: afib  Results Review:     I reviewed the patient's new clinical results.    Sodium   Date Value Ref Range Status   02/08/2021 142 136 - 145  mmol/L Final   02/07/2021 145 136 - 145 mmol/L Final   02/07/2021 143 136 - 145 mmol/L Final     Potassium   Date Value Ref Range Status   02/08/2021 3.1 (L) 3.5 - 5.2 mmol/L Final   02/08/2021 3.8 3.5 - 5.2 mmol/L Final     Comment:     Slight hemolysis detected by analyzer. Results may be affected.   02/08/2021 2.9 (L) 3.5 - 5.2 mmol/L Final   02/07/2021 2.7 (L) 3.5 - 5.2 mmol/L Final   02/07/2021 2.8 (L) 3.5 - 5.2 mmol/L Final   02/06/2021 2.9 (L) 3.5 - 5.2 mmol/L Final     Chloride   Date Value Ref Range Status   02/08/2021 114 (H) 98 - 107 mmol/L Final   02/07/2021 115 (H) 98 - 107 mmol/L Final   02/07/2021 115 (H) 98 - 107 mmol/L Final     No results found for: PLASMABICARB  BUN   Date Value Ref Range Status   02/08/2021 20 8 - 23 mg/dL Final   02/07/2021 19 8 - 23 mg/dL Final   02/07/2021 20 8 - 23 mg/dL Final     Creatinine   Date Value Ref Range Status   02/08/2021 2.23 (H) 0.76 - 1.27 mg/dL Final   02/07/2021 2.32 (H) 0.76 - 1.27 mg/dL Final   02/07/2021 2.37 (H) 0.76 - 1.27 mg/dL Final     Calcium   Date Value Ref Range Status   02/08/2021 9.0 8.6 - 10.5 mg/dL Final   02/07/2021 8.7 8.6 - 10.5 mg/dL Final   02/07/2021 8.7 8.6 - 10.5 mg/dL Final     Magnesium   Date Value Ref Range Status   02/08/2021 2.0 1.6 - 2.4 mg/dL Final   02/06/2021 2.0 1.6 - 2.4 mg/dL Final       Results from last 7 days   Lab Units 02/09/21  0633   WBC 10*3/mm3 9.09   HEMOGLOBIN g/dL 10.4*   HEMATOCRIT % 32.0*   PLATELETS 10*3/mm3 287     Lab Results   Component Value Date    CHOL 108 12/22/2020     Lab Results   Component Value Date    HDL 49 12/22/2020     Lab Results   Component Value Date    LDL 42 12/22/2020     Lab Results   Component Value Date    TRIG 86 12/22/2020         Results from last 7 days   Lab Units 02/04/21  1551   TROPONIN T ng/mL 0.110*           Echocardiogram 12/21/20:  LVEF 59%, moderate septal hypertroph, grade 1 diastolic dysfunction, normal LAP  Moderate LA dilatation, mild to moderate RA  dilatation  Normal RV  Aortic valve sclerosis, MAC, trace TR, RVSP < 35 mm Hg, trivial pericardial effusion     Assessment/ Plan  1. Acute metabolic encephalopathy  2. Sepsis- UTI/ gram neg staph; infected lower extremity wound  3. Persistent afib  4. Type elevated troponin secondary to sepsis  5. Hypokalemia  6. HTN  7. SILVIA (on CKD stage 4)- stable creat  8. H/o marked first degree AV block  9. H/o junctional rhythm  10. H/o NSVT  11. DM  12.  Immobilization  13. Microcytic anemia       BP elevated last night, better this am.   Renal following for electrolytes and diuretic management.   BMP ordered for am.  Further recs per Dr. Low.      During the entire encounter, I was wearing recommended PPE including face mask and eye protection.  Hand sanitization was performed prior to entering room and upon exit.      Veronica Hayes, APRN  02/09/21  08:36 EST  Time: 25 min  Paddy Low M.D.   Reviewed our cardiology group Nurse Practitioner's note.    Pt interviewed and examined.   Findings verified.   Reviewed and agree with corrections or modifications of history, physical, and plans as indicated:    Creatinine has stayed stable with potassium supplement and loop diuretics yesterday.  Blood pressures still in the 140s at times, but down to 119/93.  Nephrology discontinued diuretics.  Potassium and phosphorus replaced.  Will add low-dose diltiazem to the amlodipine, and cut the amlodipine to 5 mg.  Often this combination helps control blood pressure better than single calcium channel blocker alone.    EMR Dragon/Transcription disclaimer:   Much of this encounter note is an electronic transcription/translation of spoken language to printed text. The electronic translation of spoken language may permit erroneous, or at times, nonsensical words or phrases to be inadvertently transcribed.  Although I have reviewed the note for such errors, some may still exist. Please contact me if needed for clarification or correction.   Paddy Low M.D.

## 2021-02-09 NOTE — PROGRESS NOTES
"  Infectious Diseases Progress Note    Tonja Azevedo MD     [facility]  [place]: 5 days  Patient Identification:  Name: Gregorio Alejandro  Age: 65 y.o.  Sex: male  :  1955  MRN: [id number]         Primary Care Physician: Maya Ribeiro APRN            Subjective: Interactive and answers questions appropriately despite his medical bedside and agrees that he is improved.  Interval History: See consultation note.    Objective:    Scheduled Meds:amLODIPine, 10 mg, Oral, Q24H  ammonium lactate, , Topical, BID  aspirin, 81 mg, Oral, Daily  atorvastatin, 20 mg, Oral, Daily  cefTRIAXone, 1 g, Intravenous, Q24H  hydrALAZINE, 100 mg, Oral, Q8H  insulin lispro, 0-9 Units, Subcutaneous, TID AC  sodium bicarbonate, 650 mg, Oral, TID  sodium chloride, 10 mL, Intravenous, Q12H  torsemide, 40 mg, Oral, BID  [START ON 2/10/2021] vancomycin, 500 mg, Intravenous, Once  Vancomycin Pharmacy Intermittent Dosing, , Does not apply, Daily      Continuous Infusions:Pharmacy to dose vancomycin,         Vital signs in last 24 hours:  Temp:  [97.3 °F (36.3 °C)-97.6 °F (36.4 °C)] 97.3 °F (36.3 °C)  Heart Rate:  [] 91  Resp:  [18-20] 20  BP: (119-156)/() 138/82    Intake/Output:    Intake/Output Summary (Last 24 hours) at 2021 1141  Last data filed at 2021 0700  Gross per 24 hour   Intake 900 ml   Output --   Net 900 ml       Exam:  /82 (BP Location: Left arm, Patient Position: Lying)   Pulse 91   Temp 97.3 °F (36.3 °C) (Axillary)   Resp 20   Ht 190.5 cm (75\")   Wt 109 kg (240 lb 11.9 oz)   SpO2 98%   BMI 30.09 kg/m²   Patient is examined using the personal protective equipment as per guidelines from infection control for this particular patient as enacted.  Hand washing was performed before and after patient interaction.  General Appearance:  Has better color and does not appear as toxic interactive and appropriate.                          Head:    Normocephalic, without obvious abnormality, " atraumatic                           Eyes:    PERRL, conjunctivae/corneas clear, EOM's intact, both eyes                         Throat:   Lips, tongue, gums normal; oral mucosa pink and moist                           Neck:   Supple, symmetrical, trachea midline, no JVD                         Lungs:    Decreased breath sounds at the base                 Chest Wall:    No tenderness or deformity                          Heart:  S1-S2 regular                  abdomen:   Soft nontender                 Extremities:   Extremities normal, atraumatic, no cyanosis or edema                        Pulses:   Pulses palpable in all extremities                            Skin: Chronic ulcerative changes involving bilateral lower extremities with no clear cellulitis                  Neurologic: Bilateral lower extremity weakness       Data Review:    I reviewed the patient's new clinical results.  Results from last 7 days   Lab Units 02/09/21  0633 02/08/21  0609 02/07/21  0458 02/06/21  0610 02/05/21  0312 02/04/21  1551   WBC 10*3/mm3 9.09 11.58* 8.61 9.52 7.69 6.57   HEMOGLOBIN g/dL 10.4* 10.7* 10.0* 10.6* 9.8* 10.7*   PLATELETS 10*3/mm3 287 209 253 280 296 300     Results from last 7 days   Lab Units 02/09/21  0918 02/08/21  2018 02/08/21  0609 02/08/21  0007 02/07/21  1557 02/07/21  0458 02/06/21 2021 02/06/21  0610  02/05/21 0312 02/04/21  1551   SODIUM mmol/L 145  --  142  --  145 143  --  142  --  142 139   POTASSIUM mmol/L 3.1* 3.1* 3.8 2.9* 2.7* 2.8* 2.9* 3.3*   < > 2.4* 2.2*   CHLORIDE mmol/L 115*  --  114*  --  115* 115*  --  115*  --  112* 106   CO2 mmol/L 17.3*  --  14.6*  --  16.7* 16.4*  --  18.9*  --  19.3* 21.4*   BUN mg/dL 22  --  20  --  19 20  --  20  --  23 23   CREATININE mg/dL 2.69*  --  2.23*  --  2.32* 2.37*  --  2.39*  --  2.55* 2.46*   CALCIUM mg/dL 9.2  --  9.0  --  8.7 8.7  --  8.5*  --  8.6 9.1   GLUCOSE mg/dL 149*  --  159*  --  147* 156*  --  164*  --  147* 179*    < > = values in this  interval not displayed.     Microbiology Results (last 10 days)     Procedure Component Value - Date/Time    Gastrointestinal Panel, PCR - Stool, Per Rectum [[id number]]  (Normal) Collected: 02/05/21 2204    Lab Status: Final result Specimen: Stool from Per Rectum Updated: 02/07/21 0950     Campylobacter Not Detected     Plesiomonas shigelloides Not Detected     Salmonella Not Detected     Vibrio Not Detected     Vibrio cholerae Not Detected     Yersinia enterocolitica Not Detected     Enteroaggregative E. coli (EAEC) Not Detected     Enteropathogenic E. coli (EPEC) Not Detected     Enterotoxigenic E. coli (ETEC) lt/st Not Detected     Shiga-like toxin-producing E. coli (STEC) stx1/stx2 Not Detected     E. coli O157 Not Detected     Shigella/Enteroinvasive E. coli (EIEC) Not Detected     Cryptosporidium Not Detected     Cyclospora cayetanensis Not Detected     Entamoeba histolytica Not Detected     Giardia lamblia Not Detected     Adenovirus F40/41 Not Detected     Astrovirus Not Detected     Norovirus GI/GII Not Detected     Rotavirus A Not Detected     Sapovirus (I, II, IV or V) Not Detected    Narrative:      If Aeromonas, Staphylococcus aureus or Bacillus cereus are suspected, please order [id number]: Stool Culture, Aeromonas, S aureus, B Cereus.    Clostridium Difficile Toxin - Stool, Per Rectum [[id number]]  (Normal) Collected: 02/05/21 2204    Lab Status: Final result Specimen: Stool from Per Rectum Updated: 02/05/21 0269    Narrative:      The following orders were created for panel order Clostridium Difficile Toxin - Stool, Per Rectum.  Procedure                               Abnormality         Status                     ---------                               -----------         ------                     Clostridium Difficile To...[[id number]]  Normal              Final result                 Please view results for these tests on the individual orders.    Clostridium Difficile Toxin, PCR - Stool, Per Rectum  [[id number]]  (Normal) Collected: 02/05/21 2204    Lab Status: Final result Specimen: Stool from Per Rectum Updated: 02/05/21 2303     C. Difficile Toxins by PCR Negative    Blood Culture - Blood, Arm, Left [[id number]] Collected: 02/05/21 1433    Lab Status: Preliminary result Specimen: Blood from Arm, Left Updated: 02/08/21 1445     Blood Culture No growth at 3 days    Blood Culture - Blood, Arm, Right [[id number]] Collected: 02/05/21 1431    Lab Status: Preliminary result Specimen: Blood from Arm, Right Updated: 02/08/21 1445     Blood Culture No growth at 3 days    COVID PRE-OP / PRE-PROCEDURE SCREENING ORDER (NO ISOLATION) - Swab, Nasopharynx [[id number]]  (Normal) Collected: 02/04/21 1810    Lab Status: Final result Specimen: Swab from Nasopharynx Updated: 02/04/21 2206    Narrative:      The following orders were created for panel order COVID PRE-OP / PRE-PROCEDURE SCREENING ORDER (NO ISOLATION) - Swab, Nasopharynx.  Procedure                               Abnormality         Status                     ---------                               -----------         ------                     COVID-19,APTIMA PANTHER,...[[id number]]  Normal              Final result                 Please view results for these tests on the individual orders.    COVID-19,APTIMA PANTHER,[facility], NP/OP SWAB IN UTM/VTM/[facility] TRANSPORT MEDIA,24 HR TAT - Swab, Nasopharynx [[id number]]  (Normal) Collected: 02/04/21 1810    Lab Status: Final result Specimen: Swab from Nasopharynx Updated: 02/04/21 2206     COVID19 Not Detected    Narrative:      Fact sheet for providers: https://www.fda.gov/media/281787/download     Fact sheet for patients: https://www.fda.gov/media/843827/download    Test performed by RT PCR.    Urine Culture - Urine, Urine, Catheter [[id number]]  (Abnormal)  (Susceptibility) Collected: 02/04/21 1612    Lab Status: Final result Specimen: Urine, Catheter Updated: 02/06/21 0940     Urine Culture 25,000 CFU/mL Proteus  mirabilis    Susceptibility      Proteus mirabilis     ISAC     Ampicillin Susceptible     Ampicillin + Sulbactam Susceptible     Cefazolin Susceptible     Cefepime Susceptible     Ceftazidime Susceptible     Ceftriaxone Susceptible     Gentamicin Susceptible     Levofloxacin Intermediate     Nitrofurantoin Resistant     Piperacillin + Tazobactam Susceptible     Tetracycline Resistant     Trimethoprim + Sulfamethoxazole Resistant                    Blood Culture - Blood, Hand, Right [[id number]]  (Abnormal) Collected: 02/04/21 1551    Lab Status: Final result Specimen: Blood from Hand, Right Updated: 02/09/21 0928     Blood Culture Staphylococcus epidermidis     Comment: Refer to previous blood culture collected on 2-4-21 for MICs        Isolated from Anaerobic Bottle     Blood Culture Aerococcus viridans     Isolated from Anaerobic Bottle     Gram Stain Anaerobic Bottle Gram positive cocci in clusters    Blood Culture - Blood, Hand, Right [[id number]]  (Abnormal)  (Susceptibility) Collected: 02/04/21 1551    Lab Status: Final result Specimen: Blood from Hand, Right Updated: 02/09/21 0633     Blood Culture Staphylococcus epidermidis     Isolated from Aerobic Bottle     Blood Culture Aerococcus viridans     Isolated from Aerobic Bottle     Blood Culture Globicatella sanguinis     Comment: Anaerobic bottle only  No CLSI guidelines and no validated ISAC testing method.          Isolated from Anaerobic Bottle     Gram Stain Aerobic Bottle Gram positive cocci in clusters      Anaerobic Bottle Gram positive cocci in clusters    Narrative:            Susceptibility      Staphylococcus epidermidis     ISAC     Oxacillin Resistant     Vancomycin Susceptible                Susceptibility      Aerococcus viridans     Not Specified     Ceftriaxone Resistant     Vancomycin Susceptible                Susceptibility Comments     Staphylococcus epidermidis    This isolate is presumed to be clindamycin resistant based on detection of  inducible clindamycin resistance.  Clindamycin may still be effective in some patients.             Blood Culture ID, PCR - Blood, Hand, Right [[id number]]  (Abnormal) Collected: 02/04/21 1551    Lab Status: Final result Specimen: Blood from Hand, Right Updated: 02/05/21 1250     BCID, PCR Staphylococcus spp, not aureus. Identification by BCID PCR.              Assessment:    Acute metabolic encephalopathy    Hyperlipidemia    Hypertension    Stage 4 chronic kidney disease (CMS/HCC)    DM2 (diabetes mellitus, type 2) (CMS/[facility])    Anemia, chronic disease    UTI (urinary tract infection), bacterial    Elevated troponin    Hypokalemia    Uncontrolled hypertension    Septicemia (CMS/HCC)  1-toxic metabolic encephalopathy secondary to combination of  2-urinary tract infection with infected lower extremity wound with possible cellulitis  3-coag negative staph bacteremia either part of the systemic sepsis originating from lower extremities or possible contaminant during the process of collection  4-embolization syndrome  5-diabetes mellitus with complications including peripheral neuropathy, diabetic nephropathy  6-stage V kidney disease  7-uncontrolled hypertension  8-anemia multifactorial.     Recommendations/Discussions:   · Continue Rocephin and vancomycin while following up on the sensitivity of coag negative staph.  · Aggressive local wound care of lower extremities is needed.    · Follow-up on repeat blood cultures that we have ordered and if  negative then it can be assessed and assumed that the pathogens in the blood culture is contaminant during the process of collection and hence would not further work-up and treatment.  · Would recommend current antibiotics for 10 to 14 days for combination of UTI and soft tissue infection of the lower extremities.    Tonja Azevedo MD  2/9/2021  11:41 EST    Much of this encounter note is an electronic transcription/translation of spoken language to printed text. The electronic  translation of spoken language may permit erroneous, or at times, nonsensical words or phrases to be inadvertently transcribed; Although I have reviewed the note for such errors, some may still exist

## 2021-02-09 NOTE — PROGRESS NOTES
"   LOS: 5 days    Patient Care Team:  Maya Ribeiro APRN as PCP - General (Family Medicine)    Chief Complaint:    Chief Complaint   Patient presents with   • Altered Mental Status     Follow UP [place] on CKD IV  Subjective     Interval History:   Confused. Repeats ok.  Anxious if trying to move him in bed.  Will not wear mask .    Review of Systems:   Unable to obtain    Objective     Vital Signs  Temp:  [97.3 °F (36.3 °C)-97.9 °F (36.6 °C)] 97.9 °F (36.6 °C)  Heart Rate:  [] 103  Resp:  [18-20] 20  BP: (119-140)/() 140/102    Flowsheet Rows      First Filed Value   Admission Height  190.5 cm (75\") Documented at 02/04/2021 1250   Admission Weight  114 kg (251 lb) Documented at 02/04/2021 1250          No intake/output data recorded.  I/O last 3 completed shifts:  In: 900 [P.O.:900]  Out: -     Intake/Output Summary (Last 24 hours) at 2/9/2021 1430  Last data filed at 2/9/2021 0700  Gross per 24 hour   Intake 900 ml   Output --   Net 900 ml       Physical Exam:  General Appearance: alert, resists mask being put on.  Holds arms stiff at sides when raised. Repeats Ok, perseverates.   Skin: warm and dry  HEENT: pupils round and reactive to light, oral mucosa normal, nonicteric sclera  Neck: supple, no JVD, trachea midline  Lungs: Clear to auscultation. No wheezing .  Heart: RRR, normal S1 and S2, no S3, no rub  Abdomen: soft, nontender, +bs  Extremities: no edema, cyanosis or clubbing  Neuro: Moves all 4 ext.  Repeats \" Ok\", perseverates. Extremities stiff .      Results Review:    Results from last 7 days   Lab Units 02/09/21  0918 02/08/21 2018 02/08/21  0609  02/07/21  1557  02/05/21  0312 02/04/21  1551   SODIUM mmol/L 145  --  142  --  145   < > 142 139   POTASSIUM mmol/L 3.1* 3.1* 3.8   < > 2.7*   < > 2.4* 2.2*   CHLORIDE mmol/L 115*  --  114*  --  115*   < > 112* 106   CO2 mmol/L 17.3*  --  14.6*  --  16.7*   < > 19.3* 21.4*   BUN mg/dL 22  --  20  --  19   < > 23 23   CREATININE mg/dL 2.69*  --  " 2.23*  --  2.32*   < > 2.55* 2.46*   CALCIUM mg/dL 9.2  --  9.0  --  8.7   < > 8.6 9.1   BILIRUBIN mg/dL  --   --   --   --   --   --  0.6 0.9   ALK PHOS U/L  --   --   --   --   --   --  91 104   ALT (SGPT) U/L  --   --   --   --   --   --  7 7   AST (SGOT) U/L  --   --   --   --   --   --  10 14   GLUCOSE mg/dL 149*  --  159*  --  147*   < > 147* 179*    < > = values in this interval not displayed.       Estimated Creatinine Clearance: 35.5 mL/min (A) (by C-G formula based on SCr of 2.69 mg/dL (H)).    Results from last 7 days   Lab Units 02/09/21  0918 02/08/21  0609 02/07/21  1557  02/06/21  2021   MAGNESIUM mg/dL 1.9 2.0  --   --  2.0   PHOSPHORUS mg/dL 2.4* 2.7 2.8   < > 2.1*    < > = values in this interval not displayed.       Results from last 7 days   Lab Units 02/09/21  0633 02/06/21  0610   URIC ACID mg/dL 7.8* 7.2*       Results from last 7 days   Lab Units 02/09/21  0633 02/08/21  0609 02/07/21  0458 02/06/21  0610 02/05/21  0312   WBC 10*3/mm3 9.09 11.58* 8.61 9.52 7.69   HEMOGLOBIN g/dL 10.4* 10.7* 10.0* 10.6* 9.8*   PLATELETS 10*3/mm3 287 209 253 280 296               Imaging Results (Last 24 Hours)     ** No results found for the last 24 hours. **        amLODIPine, 10 mg, Oral, Q24H  ammonium lactate, , Topical, BID  aspirin, 81 mg, Oral, Daily  atorvastatin, 20 mg, Oral, Daily  cefTRIAXone, 1 g, Intravenous, Q24H  hydrALAZINE, 100 mg, Oral, Q8H  insulin lispro, 0-9 Units, Subcutaneous, TID AC  sodium bicarbonate, 650 mg, Oral, TID  sodium chloride, 10 mL, Intravenous, Q12H  torsemide, 40 mg, Oral, BID  [START ON 2/10/2021] vancomycin, 500 mg, Intravenous, Once  Vancomycin Pharmacy Intermittent Dosing, , Does not apply, Daily      Pharmacy to dose vancomycin,         Medication Review:   Current Facility-Administered Medications   Medication Dose Route Frequency Provider Last Rate Last Admin   • acetaminophen (TYLENOL) tablet 650 mg  650 mg Oral Q4H PRN Robert Baker MD        Or   •  acetaminophen (TYLENOL) 160 MG/5ML solution 650 mg  650 mg Oral Q4H PRN Robert Baker MD        Or   • acetaminophen (TYLENOL) suppository 650 mg  650 mg Rectal Q4H PRN Robert Baker MD       • amLODIPine (NORVASC) tablet 10 mg  10 mg Oral Q24H Miles Del Toro MD   10 mg at 02/09/21 1030   • ammonium lactate (AMLACTIN) cream   Topical BID Nixon Quinteros MD   Given at 02/09/21 1030   • aspirin EC tablet 81 mg  81 mg Oral Daily Robert Baker MD   81 mg at 02/09/21 1029   • atorvastatin (LIPITOR) tablet 20 mg  20 mg Oral Daily Robert Baker MD   20 mg at 02/09/21 1029   • cefTRIAXone (ROCEPHIN) IVPB 1 g  1 g Intravenous Q24H Jono Montes  mL/hr at 02/08/21 2101 1 g at 02/08/21 2101   • dextrose (D50W) 25 g/ 50mL Intravenous Solution 25 g  25 g Intravenous Q15 Min PRN Robert Baker MD       • dextrose (GLUTOSE) oral gel 15 g  15 g Oral Q15 Min PRN Robert Baker MD       • diphenhydrAMINE (BENADRYL) capsule 25 mg  25 mg Oral Q6H PRN Robert Baker MD       • glucagon (human recombinant) (GLUCAGEN DIAGNOSTIC) injection 1 mg  1 mg Subcutaneous Q15 Min PRN Robert Baker MD       • hydrALAZINE (APRESOLINE) injection 10 mg  10 mg Intravenous Q4H PRN Soy Pearl MD       • hydrALAZINE (APRESOLINE) tablet 100 mg  100 mg Oral Q8H Soy Pearl MD   100 mg at 02/09/21 0639   • hydrALAZINE (APRESOLINE) tablet 25 mg  25 mg Oral Q6H PRN Nixon Quinteros MD       • HYDROcodone-acetaminophen (NORCO)  MG per tablet 1 tablet  1 tablet Oral Q6H PRN Robert Baker MD   1 tablet at 02/08/21 2053   • insulin lispro (humaLOG, ADMELOG) injection 0-9 Units  0-9 Units Subcutaneous TID AC Robert Baker MD   2 Units at 02/08/21 1421   • ondansetron (ZOFRAN) tablet 4 mg  4 mg Oral Q6H PRN Robert Baker MD        Or   • ondansetron (ZOFRAN) injection 4 mg  4 mg Intravenous Q6H PRN Robert Baker MD       • Pharmacy to dose vancomycin   Does not apply Continuous PRN Aldair,  Nixon VELIZ MD       • sodium bicarbonate tablet 650 mg  650 mg Oral TID Soy Pearl MD   650 mg at 02/09/21 1030   • sodium chloride 0.9 % flush 10 mL  10 mL Intravenous PRN Josafat Mckeon MD       • sodium chloride 0.9 % flush 10 mL  10 mL Intravenous Q12H Robert Baker MD   10 mL at 02/09/21 1030   • sodium chloride 0.9 % flush 10 mL  10 mL Intravenous PRN Robert Baker MD       • torsemide (DEMADEX) tablet 40 mg  40 mg Oral BID Miles Del Toro MD   40 mg at 02/09/21 1030   • [START ON 2/10/2021] vancomycin 500 mg/100 mL 0.9% NS IVPB (mbp)  500 mg Intravenous Once Jono Montes MD       • Vancomycin Pharmacy Intermittent Dosing   Does not apply Daily Nixon Quinteros MD           Assessment/Plan   1. SILVIA on CKD IV. Incontinent, so unable to record.   Creatinine up today. May be dry.  Dc diuretic for now .IVF today. Replace K and phos. NOngap metabolic acidosis. Better on po bicarb.   2. Acute encephalopathy, delirium superimposed on dementia.  Neuro eval  Noted.   3. HTN not controlled. Add metoprolol.   4. DM2  5. Microcytic Anemia. GI following.  Iron replete 12/20/20.         Lyndsey Escoto MD  02/09/21  14:30 EST

## 2021-02-09 NOTE — PROGRESS NOTES
"Pharmacokinetic Consult - Vancomycin Dosing (Follow-up Note)     Gregorio Alejandro is on day 5 pharmacy to dose vancomycin for bacteremia  Pharmacy dosing vancomycin per Dr. Quinteros's request.   Goal trough: <20 mcg/ml   DOT: 7 days      Relevant clinical data and objective history reviewed:  65 y.o. male 190.5 cm (75\") 109 kg (240 lb 11.9 oz)          Past Medical History:   Diagnosis Date    Back pain      CKD (chronic kidney disease)      DM type 2 (diabetes mellitus, type 2) (CMS/[facility])      ED (erectile dysfunction)      Hyperlipidemia      Hypertension      SCOTTY (iron deficiency anemia)      Monoclonal gammopathy      Osteoarthritis              Creatinine   Date Value Ref Range Status   02/08/2021 2.23 (H) 0.76 - 1.27 mg/dL Final   02/07/2021 2.32 (H) 0.76 - 1.27 mg/dL Final   02/07/2021 2.37 (H) 0.76 - 1.27 mg/dL Final   02/27/2020 6.7 (H) 0.7 - 1.5 mg/dL Final   02/27/2020 6.5 (H) 0.7 - 1.5 mg/dL Final   02/18/2020 7.2 (H) 0.7 - 1.5 mg/dL Final            BUN   Date Value Ref Range Status   02/08/2021 20 8 - 23 mg/dL Final   02/27/2020 57 (H) 7 - 20 mg/dL Final      Estimated Creatinine Clearance: 42.9 mL/min (A) (by C-G formula based on SCr of 2.23 mg/dL (H)).           Lab Results   Component Value Date     WBC 11.58 (H) 02/08/2021          Temp Readings from Last 3 Encounters:   02/08/21 97.8 °F (36.6 °C) (Oral)   12/23/20 97.3 °F (36.3 °C) (Oral)                  Anti-Infectives (From admission, onward)     Ordered     Dose/Rate Route Frequency Start Stop     02/07/21 0814   vancomycin 750 mg/250 mL 0.9% NS add-vantage     Ordering Provider: Nixon Quinteros MD    750 mg  over 45 Minutes Intravenous Once 02/07/21 1200       02/06/21 0905   vancomycin 750 mg/250 mL 0.9% NS add-vantage     Ordering Provider: Nixon Quinteros MD    7.5 mg/kg × 109 kg  over 45 Minutes Intravenous Once 02/06/21 1000 02/06/21 1631     02/04/21 2042   cefTRIAXone (ROCEPHIN) IVPB 1 g     Ordering Provider: Robert Baker MD "    1 g  100 mL/hr over 30 Minutes Intravenous Every 24 Hours 02/05/21 1800 02/09/21 1759     02/05/21 1330   vancomycin 2250 mg/500 mL 0.9% NS IVPB ([facility])     Ordering Provider: Nixon Quinteros MD    20 mg/kg × 109 kg Intravenous Once 02/05/21 1430 02/05/21 1545     02/05/21 1330   Vancomycin Pharmacy Intermittent Dosing     Ordering Provider: Nixon Quinteros MD      Does not apply Daily 02/05/21 1430 02/12/21 0859     02/05/21 1327   Pharmacy to dose vancomycin     Ordering Provider: Nixon Quinteros MD      Does not apply Continuous PRN 02/05/21 1327 02/12/21 1326     02/04/21 1744   cefTRIAXone (ROCEPHIN) IVPB 1 g     Ordering Provider: Kevan Ulloa PA    1 g  100 mL/hr over 30 Minutes Intravenous Once 02/04/21 1746 02/04/21 1845          No results found for: DOROTHY        Lab Results   Component Value Date     VANCORANDOM 17.20 02/08/2021   Dosing History  2/5 1545 vanc 2250 mg IV x1              2/6 0610 15.1 mcg/mL (~14 hr level)   2/6 1546 750 mg IV x1 (patient did not have IV access until later in day)              2/7 0458 random = 17.4 mcg/mL (~13 hr level)              750mg ordered yesterday was not given (lost iv access)              500mg at 1627 2/8/21  Assessment/Plan  Random level this am = 19.3mcg/ml (at upper end of target- He received 500mg yesterday at 1627) therefore I will not give any vancomycin today. Level would creep above 20 mcg/ml if dosed today. Will not check level in the am and just give 500mg tomorrow afternoon. Pharmacy will continue intermittent dosing and continue to follow daily.     Obed Okeefe, [facility]

## 2021-02-09 NOTE — PLAN OF CARE
Goal Outcome Evaluation:  Plan of Care Reviewed With: patient  Progress: declining  Outcome Summary: Patient very restles and confused, pulling at lines, IV's, K+ 3.1, manual BP's, q2 turns, will CTM

## 2021-02-09 NOTE — CONSULTS
"  Patient Identification:  NAME:  Gregorio Alejandro  Age:  65 y.o.   Sex:  male   :  1955   MRN:  [id number]       Chief complaint: He does not have 1, reason for consult change in mental status confusion    History of present illness: Patient is a 65-year-old right-handed black male with a history of diabetes hypertension chronic kidney disease monoclonal gammopathy who comes to the hospital at least after several days duration of weakness and confusion quality generalized weakness context is a patient who has had encephalopathy previously with sepsis and infections.  Context is a patient who quit taking his insulin about a week before he came to the hospital quality was confusion duration at least a week no other associated symptoms he is not a significant drinker of alcohol and is not on addictive medications his wife notes at home he just \"wants to sleep all the time\".    Past medical history:  Past Medical History:   Diagnosis Date   • Back pain    • CKD (chronic kidney disease)    • DM type 2 (diabetes mellitus, type 2) (CMS/[facility])    • ED (erectile dysfunction)    • Hyperlipidemia    • Hypertension    • SCOTTY (iron deficiency anemia)    • Monoclonal gammopathy    • Osteoarthritis        Past surgical history:  History reviewed. No pertinent surgical history.    Allergies:  Patient has no known allergies.    Home medications:  Medications Prior to Admission   Medication Sig Dispense Refill Last Dose   • amLODIPine (NORVASC) 5 MG tablet Take 1 tablet by mouth Daily. 30 tablet 0 2021 at Unknown time   • aspirin 81 MG EC tablet Take 1 tablet by mouth Daily. 30 tablet 0 2021 at Unknown time   • atorvastatin (LIPITOR) 20 MG tablet Take 20 mg by mouth Daily.   2021 at Unknown time   • brimonidine (ALPHAGAN P) 0.1 % solution ophthalmic solution 1 drop 2 (two) times a day.   2021 at Unknown time   • dorzolamide (TRUSOPT) 2 % ophthalmic solution Administer 1 drop to the right eye 2 (two) times a day.  "  2/4/2021 at Unknown time   • furosemide (LASIX) 80 MG tablet Take 1 tablet by mouth Daily. 30 tablet 0 2/4/2021 at Unknown time   • HUMALOG KWIKPEN 100 UNIT/ML solution pen-injector INJECT 20 UNITS INTO THE SKIN TWICE DAILY 15 mL 0 2/4/2021 at Unknown time   • sodium bicarbonate 650 MG tablet Take 1 tablet by mouth 2 (Two) Times a Day. 60 tablet 0 2/4/2021 at Unknown time   • diphenhydrAMINE (BENADRYL) 25 mg capsule Take 25 mg by mouth Every 6 (Six) Hours As Needed for Itching or Allergies.   More than a month at Unknown time   • HYDROcodone-acetaminophen (NORCO)  MG per tablet Take 1 tablet po every 4 hours PRN for moderate pain, take two tablets po every 4 hours PRN for severe pain, valid if faxed to [facility]   tablet 0 Unknown at Unknown time   • WAL-DRYL ALLERGY 25 MG Take 1 capsule by mouth Every 6 (Six) Hours As Needed for itching. 30 capsule 0 Unknown at Unknown time        Hospital medications:  amLODIPine, 10 mg, Oral, Q24H  ammonium lactate, , Topical, BID  aspirin, 81 mg, Oral, Daily  atorvastatin, 20 mg, Oral, Daily  cefTRIAXone, 1 g, Intravenous, Q24H  hydrALAZINE, 100 mg, Oral, Q8H  insulin lispro, 0-9 Units, Subcutaneous, TID AC  sodium bicarbonate, 650 mg, Oral, TID  sodium chloride, 10 mL, Intravenous, Q12H  torsemide, 40 mg, Oral, BID  [START ON 2/10/2021] vancomycin, 500 mg, Intravenous, Once  Vancomycin Pharmacy Intermittent Dosing, , Does not apply, Daily      Pharmacy to dose vancomycin,       •  acetaminophen **OR** acetaminophen **OR** acetaminophen  •  dextrose  •  dextrose  •  diphenhydrAMINE  •  glucagon (human recombinant)  •  hydrALAZINE  •  hydrALAZINE  •  HYDROcodone-acetaminophen  •  ondansetron **OR** ondansetron  •  Pharmacy to dose vancomycin  •  sodium chloride  •  sodium chloride    Family history:  History reviewed. No pertinent family history.    Social history:  Social History     Tobacco Use   • Smoking status: Never Smoker   • Smokeless tobacco: Never Used    Substance Use Topics   • Alcohol use: Yes   • Drug use: Never       Review of systems:    His wife notes that he sleeps all the time, has had history of some dementia that she has noted along with the family.  He cannot give me an adequate review of systems she really cannot either at this point but I have reviewed the chart fully he has had recurrent urinary tract infections sepsis in the past monoclonal gammopathy metabolic disease  Objective:  Vitals Ranges:   Temp:  [97.3 °F (36.3 °C)-97.6 °F (36.4 °C)] 97.3 °F (36.3 °C)  Heart Rate:  [] 91  Resp:  [18-20] 20  BP: (119-156)/() 138/82      Physical Exam:  He is awake moderately alert he does not always answer appropriately but he is able to repeat he could not name the correct number of fingers.  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 normal vision in the right extraocular movements appear to be full face appears symmetrical but he would not follow commands motor very difficult he does have spastic upper extremities but is able to lift them both up he could not wiggle his toes or would not wiggle his toes there is bradykinesia distal atrophy no fasciculations reflexes absent toes downgoing bilaterally sensation coordination station and gait completely impossible for him to follow heart regular without murmur neck supple without bruits extremities no clubbing cyanosis there is peripheral edema in both feet    Results review:   I reviewed the patient's new clinical results.    Data review:  Lab Results (last 24 hours)     Procedure Component Value Units Date/Time    Renal Function Panel [[id number]]  (Abnormal) Collected: 02/09/21 0918     Specimen: Blood from Arm, Right Updated: 02/09/21 1101     Glucose 149 mg/dL      BUN 22 mg/dL      Creatinine 2.69 mg/dL      Sodium 145 mmol/L      Potassium 3.1 mmol/L      Chloride 115 mmol/L      CO2 17.3 mmol/L      Calcium 9.2 mg/dL      Albumin 3.10 g/dL      Phosphorus 2.4 mg/dL      Anion Gap 12.7 mmol/L      BUN/Creatinine Ratio 8.2     eGFR  African Amer 29 mL/min/1.73     Narrative:      GFR Normal >60  Chronic Kidney Disease <60  Kidney Failure <15      Magnesium [[id number]]  (Normal) Collected: 02/09/21 0918    Specimen: Blood from Arm, Right Updated: 02/09/21 1100     Magnesium 1.9 mg/dL     POC Glucose Once [[id number]]  (Abnormal) Collected: 02/09/21 1059    Specimen: Blood Updated: 02/09/21 1100     Glucose 143 mg/dL     Blood Culture - Blood, Hand, Right [[id number]]  (Abnormal) Collected: 02/04/21 1551    Specimen: Blood from Hand, Right Updated: 02/09/21 0928     Blood Culture Staphylococcus epidermidis     Comment: Refer to previous blood culture collected on 2-4-21 for MICs        Isolated from Anaerobic Bottle     Blood Culture Aerococcus viridans     Isolated from Anaerobic Bottle     Gram Stain Anaerobic Bottle Gram positive cocci in clusters    CBC & Differential [[id number]]  (Abnormal) Collected: 02/09/21 0633    Specimen: Blood from Arm, Right Updated: 02/09/21 0813    Narrative:      The following orders were created for panel order CBC & Differential.  Procedure                               Abnormality         Status                     ---------                               -----------         ------                     CBC Auto Differential[[id number]]        Abnormal            Final result                 Please view results for these tests on the individual orders.    CBC Auto Differential [[id number]]  (Abnormal) Collected: 02/09/21 0633    Specimen: Blood from Arm, Right Updated: 02/09/21 0813     WBC 9.09 10*3/mm3      RBC 4.66 10*6/mm3      Hemoglobin 10.4 g/dL      Hematocrit  32.0 %      MCV 68.7 fL      MCH 22.3 pg      MCHC 32.5 g/dL      RDW 23.6 %      RDW-SD 56.0 fl      Platelets 287 10*3/mm3     Manual Differential [[id number]]  (Abnormal) Collected: 02/09/21 0633    Specimen: Blood from Arm, Right Updated: 02/09/21 0813     Neutrophil % 94.8 %      Lymphocyte % 4.1 %      Monocyte % 1.0 %      Neutrophils Absolute 8.62 10*3/mm3      Lymphocytes Absolute 0.37 10*3/mm3      Monocytes Absolute 0.09 10*3/mm3      nRBC 1.0 /100 WBC      Anisocytosis Mod/2+     Macrocytes Mod/2+     Microcytes Mod/2+     Ovalocytes Mod/2+     Poikilocytes Large/3+     Polychromasia Large/3+     Target Cells Slight/1+     Schistocytes Mod/2+     WBC Morphology Normal     Platelet Morphology Normal    Uric Acid [[id number]]  (Abnormal) Collected: 02/09/21 0633    Specimen: Blood from Arm, Right Updated: 02/09/21 0754     Uric Acid 7.8 mg/dL     Vancomycin, Random [[id number]]  (Normal) Collected: 02/09/21 0633    Specimen: Blood from Arm, Right Updated: 02/09/21 0750     Vancomycin Random 19.30 mcg/mL     Narrative:      Therapeutic Ranges for Vancomycin    Vancomycin Random   5.0-40.0 mcg/mL  Vancomycin Trough   5.0-20.0 mcg.mL  Vancomycin Peak     20.0-40.0 mcg/mL    Blood Culture - Blood, Hand, Right [[id number]]  (Abnormal)  (Susceptibility) Collected: 02/04/21 1551    Specimen: Blood from Hand, Right Updated: 02/09/21 0633     Blood Culture Staphylococcus epidermidis     Isolated from Aerobic Bottle     Blood Culture Aerococcus viridans     Isolated from Aerobic Bottle     Blood Culture Globicatella sanguinis     Comment: Anaerobic bottle only  No CLSI guidelines and no validated ISAC testing method.          Isolated from Anaerobic Bottle     Gram Stain Aerobic Bottle Gram positive cocci in clusters      Anaerobic Bottle Gram positive cocci in clusters    Narrative:            Susceptibility      Staphylococcus epidermidis     ISAC     Oxacillin Resistant     Vancomycin Susceptible                 Susceptibility      Aerococcus viridans     Not Specified     Ceftriaxone Resistant     Vancomycin Susceptible                Susceptibility Comments     Staphylococcus epidermidis    This isolate is presumed to be clindamycin resistant based on detection of inducible clindamycin resistance.  Clindamycin may still be effective in some patients.             POC Glucose Once [[id number]]  (Abnormal) Collected: 02/09/21 0609    Specimen: Blood Updated: 02/09/21 0611     Glucose 136 mg/dL     Potassium [[id number]]  (Abnormal) Collected: 02/08/21 2018    Specimen: Blood Updated: 02/08/21 2143     Potassium 3.1 mmol/L     POC Glucose Once [[id number]]  (Abnormal) Collected: 02/08/21 2100    Specimen: Blood Updated: 02/08/21 2103     Glucose 138 mg/dL     POC Glucose Once [[id number]]  (Normal) Collected: 02/08/21 1622    Specimen: Blood Updated: 02/08/21 1627     Glucose 127 mg/dL     Blood Culture - Blood, Arm, Right [[id number]] Collected: 02/05/21 1431    Specimen: Blood from Arm, Right Updated: 02/08/21 1445     Blood Culture No growth at 3 days    Blood Culture - Blood, Arm, Left [[id number]] Collected: 02/05/21 1433    Specimen: Blood from Arm, Left Updated: 02/08/21 1445     Blood Culture No growth at 3 days           Imaging:  Imaging Results (Last 24 Hours)     ** No results found for the last 24 hours. **           PPE worn at all times washed before washed up afterwards disposed of everything properly was not within 6 feet of him for more than a few minutes during my exam  Assessment and Plan:     First of all this patient definitely has vascular dementia without behavioral changes secondary to history of hypertension and diabetes.  He also has a significant superimposed metabolic encephalopathy and confusion and change in mental status was his original chief complaint.  I do not believe he is aphasic but he is significantly confused.  His sodium looks good he does not appear to have a central nervous system  infection and I am not convinced that an MRI scan is going to help us a lot in this situation.  I am not going to add any new medications and see what he looks like tomorrow    Pal Monge MD  02/09/21  12:45 EST

## 2021-02-09 NOTE — PLAN OF CARE
Goal Outcome Evaluation:  Plan of Care Reviewed With: patient     Outcome Summary: Clinical swallow eval completed. Recommend continue with thin liquids, clears per GI. Recommend upright and alert for PO intake. SLP to follow for need for re-eval.    Patient was not wearing a face mask during this therapy encounter. Therapist used appropriate personal protective equipment including mask, eye protection and gloves.  Mask used was N95/duckbill. Appropriate PPE was worn during the entire therapy session. Hand hygiene was completed before and after therapy session. Patient is not in enhanced droplet precautions.

## 2021-02-10 NOTE — PROGRESS NOTES
Continued Stay Note  [facility]     Patient Name: Gregorio Alejandro  MRN: [id number]  Today's Date: 2/10/2021    Admit Date: 2/4/2021    Discharge Plan     Row Name 02/10/21 1608       Plan    Plan  [facility]-    Patient/Family in Agreement with Plan  other (see comments)    Plan Comments  Spoke with [facility] they are out of network with pts insurance and reviewed his Medicaid, but he would need to come medicaid pending and complete that process. CCP spoke with [facility] and requests PT eval but anticipates they can accept. CCP called wife and left vm requesting call back with her decision. Packet in ccp office. juana ernst/ccp        Discharge Codes    No documentation.             Daniela Curry, RN

## 2021-02-10 NOTE — PLAN OF CARE
"Goal Outcome Evaluation:  Plan of Care Reviewed With: patient, Patient has been non verbal most of this shift, only speaking in short episodic statements, \"OK, wait, wait\" he did swing at staff during morning ADL care, requiring reassurance and then he responded approprietly by allowing staff to finish care.  Potassium 40mg per protocol per Dr. Del Toro. Resting most of the shift with eyes closed, VS and O2 have been stable, will CTM. Vancomycin and Rocephin administered this shift.          "

## 2021-02-10 NOTE — PROGRESS NOTES
Continued Stay Note  [facility]     Patient Name: Gregorio Alejandro  MRN: [id number]  Today's Date: 2/10/2021    Admit Date: 2/4/2021    Discharge Plan     Row Name 02/10/21 1055       Plan    Plan  [facility] referral, 2nd option is [facility]-will need pre-cert    Patient/Family in Agreement with Plan  yes    Plan Comments  Recieved inbound call from wife Patricia, and her friend Jana on the phone as well. They stated after further review did not want pt go to [facility]. They ask for referral to [facility]. CCP explained can make referral but may not have LTC bed available. Reviewed Other SNF facilities that accepted were [facility] and [facility]. Patricia states if [facility] unable to accept pt will go to [facility]. CCP spoke with [facility] for referral, they are not in network with [facility], but could eval his [place] Medicaid and pending his benefits see if they can accept LTC. Otherwise unable to accept. CCP spoke with [facility] wife changed her mind and to stop pre-cert process. CCP spoke with [facility] as 2nd choice, they will re-evaluate and let CCP know. Packet in ccp office. Hermes NEWELL/CCP        Discharge Codes    No documentation.             Daniela Curry RN

## 2021-02-10 NOTE — PROGRESS NOTES
"DAILY PROGRESS NOTE  [facility]    Patient Identification:  Name: Gregorio Alejandro  Age: 65 y.o.  Sex: male  :  1955  MRN: [id number]         Primary Care Physician: Maya Ribeiro APRN    Subjective:  Interval History:He is confused.     Objective:    Scheduled Meds:amLODIPine, 5 mg, Oral, Q24H  ammonium lactate, , Topical, BID  aspirin, 81 mg, Oral, Daily  atorvastatin, 20 mg, Oral, Daily  cefTRIAXone, 1 g, Intravenous, Q24H  dilTIAZem, 30 mg, Oral, Q8H  hydrALAZINE, 100 mg, Oral, Q8H  insulin lispro, 0-9 Units, Subcutaneous, TID AC  metoprolol tartrate, 12.5 mg, Oral, Q12H  potassium chloride, 40 mEq, Oral, Once  sodium chloride, 10 mL, Intravenous, Q12H  vancomycin, 500 mg, Intravenous, Once  Vancomycin Pharmacy Intermittent Dosing, , Does not apply, Daily      Continuous Infusions:Pharmacy to dose vancomycin,         Vital signs in last 24 hours:  Temp:  [97 °F (36.1 °C)-97.9 °F (36.6 °C)] 97.6 °F (36.4 °C)  Heart Rate:  [] 71  Resp:  [20] 20  BP: (115-140)/() 139/89    Intake/Output:    Intake/Output Summary (Last 24 hours) at 2/10/2021 1214  Last data filed at 2021 2125  Gross per 24 hour   Intake 800 ml   Output --   Net 800 ml       Exam:  /89 (BP Location: Left arm, Patient Position: Lying)   Pulse 71   Temp 97.6 °F (36.4 °C) (Axillary)   Resp 20   Ht 190.5 cm (75\")   Wt 105 kg (231 lb 7.7 oz)   SpO2 99%   BMI 28.93 kg/m²     General Appearance:    confused, no distress   Head:    Normocephalic, without obvious abnormality, atraumatic   Eyes:       Throat:   Lips, tongue, gums normal   Neck:   Supple, symmetrical, trachea midline, no JVD   Lungs:     Clear to auscultation bilaterally, respirations unlabored   Chest Wall:    No tenderness or deformity    Heart:    Regular rate and rhythm, S1 and S2 normal, no murmur,no  Rub or gallop   Abdomen:     Soft, nontender, bowel sounds active, no masses, no organomegaly    Extremities:   Extremities normal, " atraumatic, no cyanosis or edema   Pulses:      Skin:   Skin is warm and dry,  no rashes or palpable lesions   Neurologic:   Confused       Lab Results (last 72 hours)     Procedure Component Value Units Date/Time    Blood Culture - Blood, Arm, Right [[id number]] Collected: 02/05/21 1431    Specimen: Blood from Arm, Right Updated: 02/08/21 1445     Blood Culture No growth at 3 days    Blood Culture - Blood, Arm, Left [[id number]] Collected: 02/05/21 1433    Specimen: Blood from Arm, Left Updated: 02/08/21 1445     Blood Culture No growth at 3 days    POC Glucose Once [[id number]]  (Abnormal) Collected: 02/08/21 1150    Specimen: Blood Updated: 02/08/21 1203     Glucose 193 mg/dL     Blood Culture - Blood, Hand, Right [[id number]]  (Abnormal) Collected: 02/04/21 1551    Specimen: Blood from Hand, Right Updated: 02/08/21 0821     Blood Culture Staphylococcus epidermidis     Comment: Refer to previous blood culture collected on 2-4-21 for MICs        Isolated from Anaerobic Bottle     Blood Culture Aerococcus viridans     Isolated from Anaerobic Bottle     Gram Stain Anaerobic Bottle Gram positive cocci in clusters    Blood Culture - Blood, Hand, Right [[id number]]  (Abnormal)  (Susceptibility) Collected: 02/04/21 1551    Specimen: Blood from Hand, Right Updated: 02/08/21 0820     Blood Culture Staphylococcus epidermidis     Isolated from Aerobic Bottle     Blood Culture Aerococcus viridans     Comment: Susceptibilities for Ceftriaxone and Vancomycin to follow.        Isolated from Aerobic Bottle     Blood Culture Globicatella sanguinis     Comment: Anaerobic bottle only  No CLSI guidelines and no validated ISAC testing method.          Isolated from Anaerobic Bottle     Gram Stain Aerobic Bottle Gram positive cocci in clusters      Anaerobic Bottle Gram positive cocci in clusters    Narrative:            Susceptibility      Staphylococcus epidermidis     ISAC     Oxacillin Resistant     Vancomycin Susceptible                 Susceptibility Comments     Staphylococcus epidermidis    This isolate is presumed to be clindamycin resistant based on detection of inducible clindamycin resistance.  Clindamycin may still be effective in some patients.             CBC (No Diff) [[id number]]  (Abnormal) Collected: 02/08/21 0609    Specimen: Blood Updated: 02/08/21 0725     WBC 11.58 10*3/mm3      RBC 4.75 10*6/mm3      Hemoglobin 10.7 g/dL      Hematocrit 33.1 %      MCV 69.7 fL      MCH 22.5 pg      MCHC 32.3 g/dL      RDW 24.5 %      RDW-SD 57.6 fl      Platelets 209 10*3/mm3     Vancomycin, Random [[id number]]  (Normal) Collected: 02/08/21 0609    Specimen: Blood Updated: 02/08/21 0704     Vancomycin Random 17.20 mcg/mL     Narrative:      Therapeutic Ranges for Vancomycin    Vancomycin Random   5.0-40.0 mcg/mL  Vancomycin Trough   5.0-20.0 mcg.mL  Vancomycin Peak     20.0-40.0 mcg/mL    Renal Function Panel [[id number]]  (Abnormal) Collected: 02/08/21 0609    Specimen: Blood Updated: 02/08/21 0704     Glucose 159 mg/dL      BUN 20 mg/dL      Creatinine 2.23 mg/dL      Sodium 142 mmol/L      Potassium 3.8 mmol/L      Comment: Slight hemolysis detected by analyzer. Results may be affected.        Chloride 114 mmol/L      CO2 14.6 mmol/L      Calcium 9.0 mg/dL      Albumin 2.60 g/dL      Phosphorus 2.7 mg/dL      Anion Gap 13.4 mmol/L      BUN/Creatinine Ratio 9.0     eGFR  African Amer 36 mL/min/1.73     Narrative:      GFR Normal >60  Chronic Kidney Disease <60  Kidney Failure <15      Magnesium [[id number]]  (Normal) Collected: 02/08/21 0609    Specimen: Blood Updated: 02/08/21 0703     Magnesium 2.0 mg/dL     Ammonia [[id number]]  (Normal) Collected: 02/08/21 0609    Specimen: Blood Updated: 02/08/21 0654     Ammonia 25 umol/L     POC Glucose Once [[id number]]  (Abnormal) Collected: 02/08/21 0628    Specimen: Blood Updated: 02/08/21 0629     Glucose 138 mg/dL     Potassium, Urine, Random - Urine, Clean Catch [[id number]] Collected: 02/08/21 0588     Specimen: Urine, Clean Catch Updated: 02/08/21 0625     Potassium, Urine <3.0 mmol/L     Narrative:      Reference intervals for random urine have not been established.  Clinical usage is dependent upon physician's interpretation in combination with other laboratory tests.       Sodium, Urine, Random - Urine, Clean Catch [[id number]] Collected: 02/08/21 0550    Specimen: Urine, Clean Catch Updated: 02/08/21 0625     Sodium, Urine <20 mmol/L     Narrative:      Reference intervals for random urine have not been established.  Clinical usage is dependent upon physician's interpretation in combination with other laboratory tests.       Potassium [[id number]]  (Abnormal) Collected: 02/08/21 0007    Specimen: Blood Updated: 02/08/21 0046     Potassium 2.9 mmol/L     POC Glucose Once [[id number]]  (Abnormal) Collected: 02/07/21 2108    Specimen: Blood Updated: 02/07/21 2110     Glucose 144 mg/dL     Renal Function Panel [[id number]]  (Abnormal) Collected: 02/07/21 1557    Specimen: Blood from Arm, Right Updated: 02/07/21 1657     Glucose 147 mg/dL      BUN 19 mg/dL      Creatinine 2.32 mg/dL      Sodium 145 mmol/L      Potassium 2.7 mmol/L      Chloride 115 mmol/L      CO2 16.7 mmol/L      Calcium 8.7 mg/dL      Albumin 2.90 g/dL      Phosphorus 2.8 mg/dL      Anion Gap 13.3 mmol/L      BUN/Creatinine Ratio 8.2     eGFR  African Amer 34 mL/min/1.73     Narrative:      GFR Normal >60  Chronic Kidney Disease <60  Kidney Failure <15      POC Glucose Once [[id number]]  (Abnormal) Collected: 02/07/21 1537    Specimen: Blood Updated: 02/07/21 1539     Glucose 162 mg/dL     POC Glucose Once [[id number]]  (Abnormal) Collected: 02/07/21 1130    Specimen: Blood Updated: 02/07/21 1137     Glucose 147 mg/dL     Gastrointestinal Panel, PCR - Stool, Per Rectum [[id number]]  (Normal) Collected: 02/05/21 2204    Specimen: Stool from Per Rectum Updated: 02/07/21 0950     Campylobacter Not Detected     Plesiomonas shigelloides Not Detected      Salmonella Not Detected     Vibrio Not Detected     Vibrio cholerae Not Detected     Yersinia enterocolitica Not Detected     Enteroaggregative E. coli (EAEC) Not Detected     Enteropathogenic E. coli (EPEC) Not Detected     Enterotoxigenic E. coli (ETEC) lt/st Not Detected     Shiga-like toxin-producing E. coli (STEC) stx1/stx2 Not Detected     E. coli O157 Not Detected     Shigella/Enteroinvasive E. coli (EIEC) Not Detected     Cryptosporidium Not Detected     Cyclospora cayetanensis Not Detected     Entamoeba histolytica Not Detected     Giardia lamblia Not Detected     Adenovirus F40/41 Not Detected     Astrovirus Not Detected     Norovirus GI/GII Not Detected     Rotavirus A Not Detected     Sapovirus (I, II, IV or V) Not Detected    Narrative:      If Aeromonas, Staphylococcus aureus or Bacillus cereus are suspected, please order [id number]: Stool Culture, Aeromonas, S aureus, B Cereus.    POC Glucose Once [[id number]]  (Abnormal) Collected: 02/07/21 0616    Specimen: Blood Updated: 02/07/21 0617     Glucose 182 mg/dL     Renal Function Panel [[id number]]  (Abnormal) Collected: 02/07/21 0458    Specimen: Blood Updated: 02/07/21 0609     Glucose 156 mg/dL      BUN 20 mg/dL      Creatinine 2.37 mg/dL      Sodium 143 mmol/L      Potassium 2.8 mmol/L      Chloride 115 mmol/L      CO2 16.4 mmol/L      Calcium 8.7 mg/dL      Albumin 2.70 g/dL      Phosphorus 1.8 mg/dL      Anion Gap 11.6 mmol/L      BUN/Creatinine Ratio 8.4     eGFR  African Amer 33 mL/min/1.73     Narrative:      GFR Normal >60  Chronic Kidney Disease <60  Kidney Failure <15      Vancomycin, Random [[id number]]  (Normal) Collected: 02/07/21 0458    Specimen: Blood Updated: 02/07/21 0553     Vancomycin Random 17.40 mcg/mL     Narrative:      Therapeutic Ranges for Vancomycin    Vancomycin Random   5.0-40.0 mcg/mL  Vancomycin Trough   5.0-20.0 mcg.mL  Vancomycin Peak     20.0-40.0 mcg/mL    CBC (No Diff) [[id number]]  (Abnormal) Collected: 02/07/21 0458     Specimen: Blood Updated: 02/07/21 0535     WBC 8.61 10*3/mm3      RBC 4.32 10*6/mm3      Hemoglobin 10.0 g/dL      Hematocrit 31.0 %      MCV 71.8 fL      MCH 23.1 pg      MCHC 32.3 g/dL      RDW 23.8 %      RDW-SD 58.1 fl      MPV --     Comment: Unable to calculate        Platelets 253 10*3/mm3     Potassium [[id number]]  (Abnormal) Collected: 02/06/21 2021    Specimen: Blood Updated: 02/06/21 2059     Potassium 2.9 mmol/L     Phosphorus [[id number]]  (Abnormal) Collected: 02/06/21 2021    Specimen: Blood Updated: 02/06/21 2059     Phosphorus 2.1 mg/dL     Magnesium [[id number]]  (Normal) Collected: 02/06/21 2021    Specimen: Blood Updated: 02/06/21 2052     Magnesium 2.0 mg/dL     POC Glucose Once [[id number]]  (Abnormal) Collected: 02/06/21 2043    Specimen: Blood Updated: 02/06/21 2044     Glucose 158 mg/dL     POC Glucose Once [[id number]]  (Abnormal) Collected: 02/06/21 1706    Specimen: Blood Updated: 02/06/21 1708     Glucose 134 mg/dL     POC Glucose Once [[id number]]  (Normal) Collected: 02/06/21 1114    Specimen: Blood Updated: 02/06/21 1116     Glucose 110 mg/dL     Urine Culture - Urine, Urine, Catheter [[id number]]  (Abnormal)  (Susceptibility) Collected: 02/04/21 1612    Specimen: Urine, Catheter Updated: 02/06/21 0940     Urine Culture 25,000 CFU/mL Proteus mirabilis    Susceptibility      Proteus mirabilis     ISAC     Ampicillin Susceptible     Ampicillin + Sulbactam Susceptible     Cefazolin Susceptible     Cefepime Susceptible     Ceftazidime Susceptible     Ceftriaxone Susceptible     Gentamicin Susceptible     Levofloxacin Intermediate     Nitrofurantoin Resistant     Piperacillin + Tazobactam Susceptible     Tetracycline Resistant     Trimethoprim + Sulfamethoxazole Resistant                    Manual Differential [[id number]]  (Abnormal) Collected: 02/06/21 0610    Specimen: Blood Updated: 02/06/21 0722     Neutrophil % 93.0 %      Lymphocyte % 4.0 %      Monocyte % 1.0 %      Eosinophil % 1.0 %       Basophil % 1.0 %      Neutrophils Absolute 8.85 10*3/mm3      Lymphocytes Absolute 0.38 10*3/mm3      Monocytes Absolute 0.10 10*3/mm3      Eosinophils Absolute 0.10 10*3/mm3      Basophils Absolute 0.10 10*3/mm3      Anisocytosis Mod/2+     Liz Cells Slight/1+     Hypochromia Slight/1+     Macrocytes Slight/1+     Microcytes Slight/1+     Poikilocytes Mod/2+     Polychromasia Large/3+     RBC Fragments Slight/1+     Target Cells Slight/1+     Schistocytes Slight/1+     WBC Morphology Normal     Platelet Morphology Normal    Renal Function Panel [[id number]]  (Abnormal) Collected: 02/06/21 0610    Specimen: Blood Updated: 02/06/21 0716     Glucose 164 mg/dL      BUN 20 mg/dL      Creatinine 2.39 mg/dL      Sodium 142 mmol/L      Potassium 3.3 mmol/L      Chloride 115 mmol/L      CO2 18.9 mmol/L      Calcium 8.5 mg/dL      Albumin 2.50 g/dL      Phosphorus 1.4 mg/dL      Anion Gap 8.1 mmol/L      BUN/Creatinine Ratio 8.4     eGFR  African Amer 33 mL/min/1.73     Narrative:      GFR Normal >60  Chronic Kidney Disease <60  Kidney Failure <15      Uric Acid [[id number]]  (Abnormal) Collected: 02/06/21 0610    Specimen: Blood Updated: 02/06/21 0701     Uric Acid 7.2 mg/dL     Magnesium [[id number]]  (Normal) Collected: 02/06/21 0610    Specimen: Blood Updated: 02/06/21 0701     Magnesium 1.7 mg/dL     Vancomycin, Random [[id number]]  (Normal) Collected: 02/06/21 0610    Specimen: Blood Updated: 02/06/21 0659     Vancomycin Random 15.10 mcg/mL     Narrative:      Therapeutic Ranges for Vancomycin    Vancomycin Random   5.0-40.0 mcg/mL  Vancomycin Trough   5.0-20.0 mcg.mL  Vancomycin Peak     20.0-40.0 mcg/mL    CBC & Differential [[id number]]  (Abnormal) Collected: 02/06/21 0610    Specimen: Blood Updated: 02/06/21 0644    Narrative:      The following orders were created for panel order CBC & Differential.  Procedure                               Abnormality         Status                     ---------                                -----------         ------                     CBC Auto Differential[[id number]]        Abnormal            Final result                 Please view results for these tests on the individual orders.    CBC Auto Differential [[id number]]  (Abnormal) Collected: 02/06/21 0610    Specimen: Blood Updated: 02/06/21 0644     WBC 9.52 10*3/mm3      RBC 4.61 10*6/mm3      Hemoglobin 10.6 g/dL      Hematocrit 33.2 %      MCV 72.0 fL      MCH 23.0 pg      MCHC 31.9 g/dL      RDW 24.0 %      RDW-SD 57.9 fl      Platelets 280 10*3/mm3     POC Glucose Once [[id number]]  (Abnormal) Collected: 02/06/21 0619    Specimen: Blood Updated: 02/06/21 0623     Glucose 154 mg/dL     Clostridium Difficile Toxin - Stool, Per Rectum [[id number]]  (Normal) Collected: 02/05/21 2204    Specimen: Stool from Per Rectum Updated: 02/05/21 2303    Narrative:      The following orders were created for panel order Clostridium Difficile Toxin - Stool, Per Rectum.  Procedure                               Abnormality         Status                     ---------                               -----------         ------                     Clostridium Difficile To...[[id number]]  Normal              Final result                 Please view results for these tests on the individual orders.    Clostridium Difficile Toxin, PCR - Stool, Per Rectum [[id number]]  (Normal) Collected: 02/05/21 2204    Specimen: Stool from Per Rectum Updated: 02/05/21 2303     C. Difficile Toxins by PCR Negative    Potassium [[id number]]  (Normal) Collected: 02/05/21 2105    Specimen: Blood from Hand, Left Updated: 02/05/21 2146     Potassium 3.5 mmol/L     POC Glucose Once [[id number]]  (Abnormal) Collected: 02/05/21 2010    Specimen: Blood Updated: 02/05/21 2011     Glucose 146 mg/dL         Data Review:  Results from last 7 days   Lab Units 02/10/21  0916 02/09/21 0918 02/08/21 2018 02/08/21  0609   SODIUM mmol/L 143 145  --  142   POTASSIUM mmol/L 3.0* 3.1* 3.1* 3.8   CHLORIDE  mmol/L 113* 115*  --  114*   CO2 mmol/L 17.8* 17.3*  --  14.6*   BUN mg/dL 24* 22  --  20   CREATININE mg/dL 2.76* 2.69*  --  2.23*   GLUCOSE mg/dL 153* 149*  --  159*   CALCIUM mg/dL 8.6 9.2  --  9.0     Results from last 7 days   Lab Units 02/10/21  0916 02/09/21  0633 02/08/21  0609   WBC 10*3/mm3 7.35 9.09 11.58*   HEMOGLOBIN g/dL 10.9* 10.4* 10.7*   HEMATOCRIT % 33.9* 32.0* 33.1*   PLATELETS 10*3/mm3 306 287 209             Lab Results   Lab Value Date/Time    TROPONINT 0.110 (C) 02/04/2021 1551    TROPONINT 0.171 (C) 12/23/2020 0357    TROPONINT 0.141 (C) 12/22/2020 0549    TROPONINT 0.147 (C) 12/21/2020 1450    TROPONINT 0.128 (C) 12/21/2020 1223         Results from last 7 days   Lab Units 02/05/21  0312 02/04/21  1551   ALK PHOS U/L 91 104   BILIRUBIN mg/dL 0.6 0.9   ALT (SGPT) U/L 7 7   AST (SGOT) U/L 10 14             Glucose   Date/Time Value Ref Range Status   02/10/2021 1030 142 (H) 70 - 130 mg/dL Final   02/10/2021 0607 133 (H) 70 - 130 mg/dL Final   02/09/2021 2013 148 (H) 70 - 130 mg/dL Final   02/09/2021 1558 173 (H) 70 - 130 mg/dL Final   02/09/2021 1059 143 (H) 70 - 130 mg/dL Final   02/09/2021 0609 136 (H) 70 - 130 mg/dL Final   02/08/2021 2100 138 (H) 70 - 130 mg/dL Final   02/08/2021 1622 127 70 - 130 mg/dL Final           Past Medical History:   Diagnosis Date   • Back pain    • CKD (chronic kidney disease)    • DM type 2 (diabetes mellitus, type 2) (CMS/HCC)    • ED (erectile dysfunction)    • Hyperlipidemia    • Hypertension    • SCOTTY (iron deficiency anemia)    • Monoclonal gammopathy    • Osteoarthritis        Assessment:  Active Hospital Problems    Diagnosis  POA   • **Acute metabolic encephalopathy [G93.41]  Yes   • Vascular dementia without behavioral disturbance (CMS/HCC) [F01.50]  Unknown   • Septicemia (CMS/HCC) [A41.9]  Yes   • UTI (urinary tract infection), bacterial [N39.0, A49.9]  Yes   • Elevated troponin [R77.8]  Yes   • Hypokalemia [E87.6]  Yes   • Uncontrolled hypertension  [I10]  Yes   • Anemia, chronic disease [D63.8]  Yes   • DM2 (diabetes mellitus, type 2) (CMS/HCC) [E11.9]  Yes   • Stage 4 chronic kidney disease (CMS/HCC) [N18.4]  Yes   • Hyperlipidemia [E78.5]  Yes   • Hypertension [I10]  Yes      Resolved Hospital Problems   No resolved problems to display.       Plan:  Continue antibiotics. As per multiple consults.  neurology consult noted.  Follow lab.    Jono Montes MD  2/10/2021  12:14 EST

## 2021-02-10 NOTE — PROGRESS NOTES
Patient Identification:  NAME:  Gregorio Alejandro  Age:  65 y.o.   Sex:  male   :  1955   MRN:  [id number]       Chief complaint: Metabolic encephalopathy vascular dementia well    History of present illness: Patient is a moderately alert today still confused but is, see below still has a nonfocal exam and is able to talk with me some.      Past medical history:  Past Medical History:   Diagnosis Date   • Back pain    • CKD (chronic kidney disease)    • DM type 2 (diabetes mellitus, type 2) (CMS/[facility])    • ED (erectile dysfunction)    • Hyperlipidemia    • Hypertension    • SCOTTY (iron deficiency anemia)    • Monoclonal gammopathy    • Osteoarthritis        Allergies:  Patient has no known allergies.    Home medications:  Medications Prior to Admission   Medication Sig Dispense Refill Last Dose   • amLODIPine (NORVASC) 5 MG tablet Take 1 tablet by mouth Daily. 30 tablet 0 2021 at Unknown time   • aspirin 81 MG EC tablet Take 1 tablet by mouth Daily. 30 tablet 0 2021 at Unknown time   • atorvastatin (LIPITOR) 20 MG tablet Take 20 mg by mouth Daily.   2021 at Unknown time   • brimonidine (ALPHAGAN P) 0.1 % solution ophthalmic solution 1 drop 2 (two) times a day.   2021 at Unknown time   • dorzolamide (TRUSOPT) 2 % ophthalmic solution Administer 1 drop to the right eye 2 (two) times a day.   2021 at Unknown time   • furosemide (LASIX) 80 MG tablet Take 1 tablet by mouth Daily. 30 tablet 0 2021 at Unknown time   • HUMALOG KWIKPEN 100 UNIT/ML solution pen-injector INJECT 20 UNITS INTO THE SKIN TWICE DAILY 15 mL 0 2021 at Unknown time   • sodium bicarbonate 650 MG tablet Take 1 tablet by mouth 2 (Two) Times a Day. 60 tablet 0 2021 at Unknown time   • diphenhydrAMINE (BENADRYL) 25 mg capsule Take 25 mg by mouth Every 6 (Six) Hours As Needed for Itching or Allergies.   More than a month at Unknown time   • HYDROcodone-acetaminophen (NORCO)  MG per tablet Take 1 tablet po every 4  hours PRN for moderate pain, take two tablets po every 4 hours PRN for severe pain, valid if faxed to [facility]   tablet 0 Unknown at Unknown time   • WAL-DRYL ALLERGY 25 MG Take 1 capsule by mouth Every 6 (Six) Hours As Needed for itching. 30 capsule 0 Unknown at Unknown time        Hospital medications:  amLODIPine, 5 mg, Oral, Q24H  ammonium lactate, , Topical, BID  aspirin, 81 mg, Oral, Daily  atorvastatin, 20 mg, Oral, Daily  cefTRIAXone, 1 g, Intravenous, Q24H  dilTIAZem, 30 mg, Oral, Q8H  hydrALAZINE, 100 mg, Oral, Q8H  insulin lispro, 0-9 Units, Subcutaneous, TID AC  metoprolol tartrate, 12.5 mg, Oral, Q12H  potassium chloride, 40 mEq, Oral, Once  sodium chloride, 10 mL, Intravenous, Q12H  vancomycin, 500 mg, Intravenous, Once  Vancomycin Pharmacy Intermittent Dosing, , Does not apply, Daily      Pharmacy to dose vancomycin,       •  acetaminophen **OR** acetaminophen **OR** acetaminophen  •  dextrose  •  dextrose  •  diphenhydrAMINE  •  glucagon (human recombinant)  •  hydrALAZINE  •  hydrALAZINE  •  HYDROcodone-acetaminophen  •  ondansetron **OR** ondansetron  •  Pharmacy to dose vancomycin  •  sodium chloride  •  sodium chloride      Objective:  Vitals Ranges:   Temp:  [97 °F (36.1 °C)-97.9 °F (36.6 °C)] 97.7 °F (36.5 °C)  Heart Rate:  [] 79  Resp:  [20] 20  BP: (115-140)/() 132/98      Physical Exam:  As I entered the room he looked at me and when I held up fists like I was ready to fight he smiled broadly!  He is awake and alert he is a little confused slow to answer questions speaks with subtle dysarthria in a soft voice but is able to comprehend name and repeat he does not appear to be aphasic normal cranial nerves II through VII symmetrical smile  are good on both sides and is able to wiggle his toes reflexes trace throughout symmetrical toes downgoing    Results review:   I reviewed the patient's new clinical results.    Data review:  Lab Results (last 24 hours)     Procedure  Component Value Units Date/Time    POC Glucose Once [[id number]]  (Abnormal) Collected: 02/10/21 1030    Specimen: Blood Updated: 02/10/21 1030     Glucose 142 mg/dL     Renal Function Panel [[id number]]  (Abnormal) Collected: 02/10/21 0916    Specimen: Blood from Arm, Right Updated: 02/10/21 1021     Glucose 153 mg/dL      BUN 24 mg/dL      Creatinine 2.76 mg/dL      Sodium 143 mmol/L      Potassium 3.0 mmol/L      Chloride 113 mmol/L      CO2 17.8 mmol/L      Calcium 8.6 mg/dL      Albumin 2.80 g/dL      Phosphorus 2.3 mg/dL      Anion Gap 12.2 mmol/L      BUN/Creatinine Ratio 8.7     eGFR  African Amer 28 mL/min/1.73     Narrative:      GFR Normal >60  Chronic Kidney Disease <60  Kidney Failure <15      CBC (No Diff) [[id number]]  (Abnormal) Collected: 02/10/21 0916    Specimen: Blood Updated: 02/10/21 1002     WBC 7.35 10*3/mm3      RBC 4.92 10*6/mm3      Hemoglobin 10.9 g/dL      Hematocrit 33.9 %      MCV 68.9 fL      MCH 22.2 pg      MCHC 32.2 g/dL      RDW 23.5 %      RDW-SD 56.6 fl      Platelets 306 10*3/mm3     POC Glucose Once [[id number]]  (Abnormal) Collected: 02/10/21 0607    Specimen: Blood Updated: 02/10/21 0608     Glucose 133 mg/dL     POC Glucose Once [[id number]]  (Abnormal) Collected: 02/09/21 2013    Specimen: Blood Updated: 02/09/21 2014     Glucose 148 mg/dL     POC Glucose Once [[id number]]  (Abnormal) Collected: 02/09/21 1558    Specimen: Blood Updated: 02/09/21 1603     Glucose 173 mg/dL     Blood Culture - Blood, Arm, Right [[id number]] Collected: 02/05/21 1431    Specimen: Blood from Arm, Right Updated: 02/09/21 1446     Blood Culture No growth at 4 days    Blood Culture - Blood, Arm, Left [[id number]] Collected: 02/05/21 1433    Specimen: Blood from Arm, Left Updated: 02/09/21 1446     Blood Culture No growth at 4 days           Imaging:  Imaging Results (Last 24 Hours)     ** No results found for the last 24 hours. **         PPE worn at all times washed before washed up afterwards disposed  of everything properly was not within 6 feet of them for more than a few minutes during my exam no aerosols used    Assessment and Plan:       Acute metabolic encephalopathy    Hyperlipidemia    Hypertension    Stage 4 chronic kidney disease (CMS/[facility])    DM2 (diabetes mellitus, type 2) (CMS/[facility])    Anemia, chronic disease    UTI (urinary tract infection), bacterial    Elevated troponin    Hypokalemia    Uncontrolled hypertension    Septicemia (CMS/[facility])    Vascular dementia without behavioral disturbance (CMS/[facility])    First of all he does have metabolic encephalopathy with some confusion and lethargy but does not appear to be aphasic as he is able to comprehend name and repeat his vision does not appear to be normal but he still does not look like any acute stroke or TIA syndrome he does not appear to be having any type of seizure activity nor is there evidence of central nervous system infection.  He smiles at me today repeats what I ask him to do and follows commands.  Clearly he has had some vascular dementia prior to this with superimposed metabolic encephalopathy now.  His sodium good his peripheral white count is normal and he still has a nonlateralized exam.  I will continue to follow      Pal Monge MD  02/10/21  13:35 EST

## 2021-02-10 NOTE — PROGRESS NOTES
"   LOS: 6 days    Patient Care Team:  Maya Ribeiro APRN as PCP - General (Family Medicine)    Chief Complaint:    Chief Complaint   Patient presents with   • Altered Mental Status     Follow-up chronic kidney disease and hypokalemia  Subjective     Interval History:   Patient is confused, not answering question properly      Review of Systems:   Not obtainable    Objective     Vital Signs  Temp:  [97 °F (36.1 °C)-97.9 °F (36.6 °C)] 97.6 °F (36.4 °C)  Heart Rate:  [] 71  Resp:  [20] 20  BP: (115-140)/() 139/89    Flowsheet Rows      First Filed Value   Admission Height  190.5 cm (75\") Documented at 02/04/2021 1250   Admission Weight  114 kg (251 lb) Documented at 02/04/2021 1250          No intake/output data recorded.  I/O last 3 completed shifts:  In: 1700 [P.O.:1650; IV Piggyback:50]  Out: -     Intake/Output Summary (Last 24 hours) at 2/10/2021 0727  Last data filed at 2/9/2021 2125  Gross per 24 hour   Intake 800 ml   Output --   Net 800 ml       Physical Exam:  General Appearance: Confused, disoriented, appears to be chronically, no acute distress,   Skin: warm and dry  HEENT: Nonicteric sclerae, oral mucosa is moist  Neck: no JVD, trachea midline  Lungs: Scattered rhonchi, unlabored breathing effort  Heart: RRR, normal S1 and S2, no S3, no rub  Abdomen: soft, nontender, normoactive bowels  : no palpable bladder, external catheter in place  Extremities: no edema, cyanosis or clubbing  Neuro: Moving all extremities      Results Review:    Results from last 7 days   Lab Units 02/09/21  0918 02/08/21  2018 02/08/21  0609  02/07/21  1557  02/05/21  0312 02/04/21  1551   SODIUM mmol/L 145  --  142  --  145   < > 142 139   POTASSIUM mmol/L 3.1* 3.1* 3.8   < > 2.7*   < > 2.4* 2.2*   CHLORIDE mmol/L 115*  --  114*  --  115*   < > 112* 106   CO2 mmol/L 17.3*  --  14.6*  --  16.7*   < > 19.3* 21.4*   BUN mg/dL 22  --  20  --  19   < > 23 23   CREATININE mg/dL 2.69*  --  2.23*  --  2.32*   < > 2.55* " 2.46*   CALCIUM mg/dL 9.2  --  9.0  --  8.7   < > 8.6 9.1   BILIRUBIN mg/dL  --   --   --   --   --   --  0.6 0.9   ALK PHOS U/L  --   --   --   --   --   --  91 104   ALT (SGPT) U/L  --   --   --   --   --   --  7 7   AST (SGOT) U/L  --   --   --   --   --   --  10 14   GLUCOSE mg/dL 149*  --  159*  --  147*   < > 147* 179*    < > = values in this interval not displayed.       Estimated Creatinine Clearance: 34.9 mL/min (A) (by C-G formula based on SCr of 2.69 mg/dL (H)).    Results from last 7 days   Lab Units 02/09/21  0918 02/08/21  0609 02/07/21  1557  02/06/21  2021   MAGNESIUM mg/dL 1.9 2.0  --   --  2.0   PHOSPHORUS mg/dL 2.4* 2.7 2.8   < > 2.1*    < > = values in this interval not displayed.       Results from last 7 days   Lab Units 02/09/21  0633 02/06/21  0610   URIC ACID mg/dL 7.8* 7.2*       Results from last 7 days   Lab Units 02/09/21  0633 02/08/21  0609 02/07/21  0458 02/06/21  0610 02/05/21  0312   WBC 10*3/mm3 9.09 11.58* 8.61 9.52 7.69   HEMOGLOBIN g/dL 10.4* 10.7* 10.0* 10.6* 9.8*   PLATELETS 10*3/mm3 287 209 253 280 296               Imaging Results (Last 24 Hours)     ** No results found for the last 24 hours. **        amLODIPine, 5 mg, Oral, Q24H  ammonium lactate, , Topical, BID  aspirin, 81 mg, Oral, Daily  atorvastatin, 20 mg, Oral, Daily  cefTRIAXone, 1 g, Intravenous, Q24H  dilTIAZem, 30 mg, Oral, Q8H  hydrALAZINE, 100 mg, Oral, Q8H  insulin lispro, 0-9 Units, Subcutaneous, TID AC  metoprolol tartrate, 12.5 mg, Oral, Q12H  sodium bicarbonate, 650 mg, Oral, TID  sodium chloride, 10 mL, Intravenous, Q12H  vancomycin, 500 mg, Intravenous, Once  Vancomycin Pharmacy Intermittent Dosing, , Does not apply, Daily      Pharmacy to dose vancomycin,         Medication Review:   Current Facility-Administered Medications   Medication Dose Route Frequency Provider Last Rate Last Admin   • acetaminophen (TYLENOL) tablet 650 mg  650 mg Oral Q4H PRN Robert Baker MD        Or   • acetaminophen  (TYLENOL) 160 MG/5ML solution 650 mg  650 mg Oral Q4H PRN Robert Baker MD        Or   • acetaminophen (TYLENOL) suppository 650 mg  650 mg Rectal Q4H PRN Robert Baker MD       • amLODIPine (NORVASC) tablet 5 mg  5 mg Oral Q24H Paddy Low MD       • ammonium lactate (AMLACTIN) cream   Topical BID Nixon Quinteros MD   Given at 02/09/21 2120   • aspirin EC tablet 81 mg  81 mg Oral Daily Robert Baker MD   81 mg at 02/09/21 1029   • atorvastatin (LIPITOR) tablet 20 mg  20 mg Oral Daily Robert Baker MD   20 mg at 02/09/21 1029   • cefTRIAXone (ROCEPHIN) IVPB 1 g  1 g Intravenous Q24H Jono Montes  mL/hr at 02/09/21 1732 1 g at 02/09/21 1732   • dextrose (D50W) 25 g/ 50mL Intravenous Solution 25 g  25 g Intravenous Q15 Min PRN Robert Baker MD       • dextrose (GLUTOSE) oral gel 15 g  15 g Oral Q15 Min PRN Robert Baker MD       • dilTIAZem (CARDIZEM) tablet 30 mg  30 mg Oral Q8H Paddy Low MD   30 mg at 02/10/21 0653   • diphenhydrAMINE (BENADRYL) capsule 25 mg  25 mg Oral Q6H PRN Robert Baker MD       • glucagon (human recombinant) (GLUCAGEN DIAGNOSTIC) injection 1 mg  1 mg Subcutaneous Q15 Min PRN Robert Baker MD       • hydrALAZINE (APRESOLINE) injection 10 mg  10 mg Intravenous Q4H PRN Soy Pearl MD       • hydrALAZINE (APRESOLINE) tablet 100 mg  100 mg Oral Q8H Soy Pearl MD   100 mg at 02/10/21 0654   • hydrALAZINE (APRESOLINE) tablet 25 mg  25 mg Oral Q6H PRN Nixon Quinteros MD       • HYDROcodone-acetaminophen (NORCO)  MG per tablet 1 tablet  1 tablet Oral Q6H PRN Robert Baker MD   1 tablet at 02/08/21 2053   • insulin lispro (humaLOG, ADMELOG) injection 0-9 Units  0-9 Units Subcutaneous TID AC Robert Baker MD   2 Units at 02/09/21 1730   • metoprolol tartrate (LOPRESSOR) tablet 12.5 mg  12.5 mg Oral Q12H Lyndsey Escoto MD   12.5 mg at 02/09/21 2119   • ondansetron (ZOFRAN) tablet 4 mg  4 mg Oral Q6H PRN Samuel  MD Robert        Or   • ondansetron (ZOFRAN) injection 4 mg  4 mg Intravenous Q6H PRN Robert Baker MD       • Pharmacy to dose vancomycin   Does not apply Continuous PRN Nixon Quinteros MD       • sodium bicarbonate tablet 650 mg  650 mg Oral TID Soy Pearl MD   650 mg at 02/09/21 2118   • sodium chloride 0.9 % flush 10 mL  10 mL Intravenous PRN Josafat Mckeon MD       • sodium chloride 0.9 % flush 10 mL  10 mL Intravenous Q12H Robert Baker MD   10 mL at 02/09/21 2125   • sodium chloride 0.9 % flush 10 mL  10 mL Intravenous PRN Robert Baker MD       • vancomycin 500 mg/100 mL 0.9% NS IVPB (mbp)  500 mg Intravenous Once Jono Montes MD       • Vancomycin Pharmacy Intermittent Dosing   Does not apply Daily Nixon Quinteros MD           Assessment/Plan   1.  Chronic kidney disease second diabetic nephropathy biopsy-proven, creatinine yesterday was 2.69   2.  Hypokalemia, potassium yesterday was 3.1  3.  Metabolic acidosis none anion gap associate with chronic kidney disease total CO2 yesterday was 17.3.  4.  Anemia of chronic kidney disease, hemoglobin yesterday was 10.4, no JOSSELYN as needed.  5.  Immobility syndrome  6.  Hypertension, acceptable control today.  7.  Diabetes mellitus type 2, with diabetic nephropathy, biopsy-proven.        Plan:  1.  Check his labs today and adjust treatment as needed currently no need for diuretics  2.  Discontinue sodium bicarbonate and resume it when the potassium is corrected because treating the metabolic acidosis will worsen the hypokalemia  3.  Surveillance lab              Miles Del Toro MD  02/10/21  07:27 EST

## 2021-02-10 NOTE — PLAN OF CARE
Goal Outcome Evaluation:     No new events through shift, alert x1 only, answers minimal questions-nods head yes/no ?'s, room air, afebrile, brief for incontinence, takes medication w/out difficulty

## 2021-02-10 NOTE — PROGRESS NOTES
Gregorio Alejandro   65 y.o.  male    LOS: 6 days   Patient Care Team:  Maya Ribeiro APRN as PCP - General (Family Medicine)      Subjective   No acute complaints  arousable    Review of Systems:   Lungs: no cough, no soa  CV: no CP, no palpitations  GI: no nausea, vomiting, diarrhea     Medication Review:   Current Facility-Administered Medications:   •  acetaminophen (TYLENOL) tablet 650 mg, 650 mg, Oral, Q4H PRN **OR** acetaminophen (TYLENOL) 160 MG/5ML solution 650 mg, 650 mg, Oral, Q4H PRN **OR** acetaminophen (TYLENOL) suppository 650 mg, 650 mg, Rectal, Q4H PRN, Robert Baker MD  •  amLODIPine (NORVASC) tablet 5 mg, 5 mg, Oral, Q24H, Paddy Low MD  •  ammonium lactate (AMLACTIN) cream, , Topical, BID, Nixon Quinteros MD, Given at 02/09/21 2120  •  aspirin EC tablet 81 mg, 81 mg, Oral, Daily, Robert Baker MD, 81 mg at 02/09/21 1029  •  atorvastatin (LIPITOR) tablet 20 mg, 20 mg, Oral, Daily, Robert Baker MD, 20 mg at 02/09/21 1029  •  cefTRIAXone (ROCEPHIN) IVPB 1 g, 1 g, Intravenous, Q24H, Jono Montes MD, Last Rate: 100 mL/hr at 02/09/21 1732, 1 g at 02/09/21 1732  •  dextrose (D50W) 25 g/ 50mL Intravenous Solution 25 g, 25 g, Intravenous, Q15 Min PRN, Robert Baker MD  •  dextrose (GLUTOSE) oral gel 15 g, 15 g, Oral, Q15 Min PRN, Robert Baker MD  •  dilTIAZem (CARDIZEM) tablet 30 mg, 30 mg, Oral, Q8H, Paddy Low MD, 30 mg at 02/10/21 0653  •  diphenhydrAMINE (BENADRYL) capsule 25 mg, 25 mg, Oral, Q6H PRN, Robert Baker MD  •  glucagon (human recombinant) (GLUCAGEN DIAGNOSTIC) injection 1 mg, 1 mg, Subcutaneous, Q15 Min PRN, Robert Baker MD  •  hydrALAZINE (APRESOLINE) injection 10 mg, 10 mg, Intravenous, Q4H PRN, Soy Pearl MD  •  hydrALAZINE (APRESOLINE) tablet 100 mg, 100 mg, Oral, Q8H, Soy Pearl MD, 100 mg at 02/10/21 0654  •  hydrALAZINE (APRESOLINE) tablet 25 mg, 25 mg, Oral, Q6H PRN, Nixon Quinteros MD  •  HYDROcodone-acetaminophen  (NORCO)  MG per tablet 1 tablet, 1 tablet, Oral, Q6H PRN, Robert Baker MD, 1 tablet at 02/08/21 2053  •  insulin lispro (humaLOG, ADMELOG) injection 0-9 Units, 0-9 Units, Subcutaneous, TID AC, Robert Baker MD, 2 Units at 02/09/21 1730  •  metoprolol tartrate (LOPRESSOR) tablet 12.5 mg, 12.5 mg, Oral, Q12H, Lyndsey Escoto MD, 12.5 mg at 02/09/21 2119  •  ondansetron (ZOFRAN) tablet 4 mg, 4 mg, Oral, Q6H PRN **OR** ondansetron (ZOFRAN) injection 4 mg, 4 mg, Intravenous, Q6H PRN, Robert Baker MD  •  Pharmacy to dose vancomycin, , Does not apply, Continuous PRN, Nixon Quinteros MD  •  sodium chloride 0.9 % flush 10 mL, 10 mL, Intravenous, PRN, Josafat Mckeon MD  •  sodium chloride 0.9 % flush 10 mL, 10 mL, Intravenous, Q12H, Robert Baker MD, 10 mL at 02/09/21 2125  •  sodium chloride 0.9 % flush 10 mL, 10 mL, Intravenous, PRN, Robert Baker MD  •  vancomycin 500 mg/100 mL 0.9% NS IVPB (mbp), 500 mg, Intravenous, Once, Jono Montes MD  •  Vancomycin Pharmacy Intermittent Dosing, , Does not apply, Daily, Nixon Quinteros MD      Objective     Vital Sign Min/Max for last 24 hours  Temp  Min: 97 °F (36.1 °C)  Max: 97.9 °F (36.6 °C)   BP  Min: 115/76  Max: 140/102    Pulse  Min: 71  Max: 103         02/04/21  1250 02/04/21  2215 02/10/21  0642   Weight: 114 kg (251 lb) 109 kg (240 lb 11.9 oz) 105 kg (231 lb 7.7 oz)        Intake/Output Summary (Last 24 hours) at 2/10/2021 0818  Last data filed at 2/9/2021 2241  Gross per 24 hour   Intake 800 ml   Output --   Net 800 ml       Physical Exam:    General Appearance:     no acute distress   Lungs:     Diminished. No wheezes, rhonchi or rales.     Heart:    Regular rate and  irreg rhythm.  Normal S1 and S2. No murmur, no gallop, rub or lift. , no JVD   Abdomen:     Normal bowel sounds, soft, non-tender, non-distended,   Extremities:   No clubbing, cyanosis or +edema.     Skin:   Warm, dry   Neurologic:  confused      Monitor:  afib  Results Review:     I reviewed the patient's new clinical results.    Sodium   Date Value Ref Range Status   02/09/2021 145 136 - 145 mmol/L Final   02/08/2021 142 136 - 145 mmol/L Final   02/07/2021 145 136 - 145 mmol/L Final     Potassium   Date Value Ref Range Status   02/09/2021 3.1 (L) 3.5 - 5.2 mmol/L Final   02/08/2021 3.1 (L) 3.5 - 5.2 mmol/L Final   02/08/2021 3.8 3.5 - 5.2 mmol/L Final     Comment:     Slight hemolysis detected by analyzer. Results may be affected.   02/08/2021 2.9 (L) 3.5 - 5.2 mmol/L Final   02/07/2021 2.7 (L) 3.5 - 5.2 mmol/L Final     Chloride   Date Value Ref Range Status   02/09/2021 115 (H) 98 - 107 mmol/L Final   02/08/2021 114 (H) 98 - 107 mmol/L Final   02/07/2021 115 (H) 98 - 107 mmol/L Final     No results found for: PLASMABICARB  BUN   Date Value Ref Range Status   02/09/2021 22 8 - 23 mg/dL Final   02/08/2021 20 8 - 23 mg/dL Final   02/07/2021 19 8 - 23 mg/dL Final     Creatinine   Date Value Ref Range Status   02/09/2021 2.69 (H) 0.76 - 1.27 mg/dL Final   02/08/2021 2.23 (H) 0.76 - 1.27 mg/dL Final   02/07/2021 2.32 (H) 0.76 - 1.27 mg/dL Final     Calcium   Date Value Ref Range Status   02/09/2021 9.2 8.6 - 10.5 mg/dL Final   02/08/2021 9.0 8.6 - 10.5 mg/dL Final   02/07/2021 8.7 8.6 - 10.5 mg/dL Final     Magnesium   Date Value Ref Range Status   02/09/2021 1.9 1.6 - 2.4 mg/dL Final   02/08/2021 2.0 1.6 - 2.4 mg/dL Final       Results from last 7 days   Lab Units 02/09/21  0633   WBC 10*3/mm3 9.09   HEMOGLOBIN g/dL 10.4*   HEMATOCRIT % 32.0*   PLATELETS 10*3/mm3 287     Lab Results   Component Value Date    CHOL 108 12/22/2020     Lab Results   Component Value Date    HDL 49 12/22/2020     Lab Results   Component Value Date    LDL 42 12/22/2020     Lab Results   Component Value Date    TRIG 86 12/22/2020         Results from last 7 days   Lab Units 02/04/21  1551   TROPONIN T ng/mL 0.110*                 Echocardiogram 12/21/20:  LVEF 59%, moderate septal hypertroph,  grade 1 diastolic dysfunction, normal LAP  Moderate LA dilatation, mild to moderate RA dilatation  Normal RV  Aortic valve sclerosis, MAC, trace TR, RVSP < 35 mm Hg, trivial pericardial effusion     Assessment/ Plan  1. Acute metabolic encephalopathy  2. Sepsis- UTI/ gram neg staph; infected lower extremity wound  3. Persistent afib  4. Type elevated troponin secondary to sepsis  5. Hypokalemia  6. HTN  7. SILVIA (on CKD stage 4)- stable creat  8. H/o marked first degree AV block  9. H/o junctional rhythm  10. H/o NSVT  11. DM  12.  Immobilization  13. Microcytic anemia         Renal following for electrolytes and diuretic.  Started on low dose cardizem and norvasc yesterday.     BP better today.   Patient has multiple cardiac problems.  He is atrial fibrillation ventricular rate is under control.  We will continue with his Cardizem 30 mg every 8 hours.  And his blood pressure is also better today.  We will continue to follow.     During the entire encounter, I was wearing recommended PPE including face mask and eye protection.  Hand sanitization was performed prior to entering room and upon exit.      Veronica Hayes, APRN  02/10/21  08:18 EST      Time: 25 min

## 2021-02-10 NOTE — PROGRESS NOTES
"  Infectious Diseases Progress Note    Tonja Azevedo MD     [facility]  [place]: 6 days  Patient Identification:  Name: Gregorio Alejandro  Age: 65 y.o.  Sex: male  :  1955  MRN: [id number]         Primary Care Physician: Maya Ribeiro APRN            Subjective: Feeling better  Interval History: See consultation note.    Objective:    Scheduled Meds:amLODIPine, 5 mg, Oral, Q24H  ammonium lactate, , Topical, BID  aspirin, 81 mg, Oral, Daily  atorvastatin, 20 mg, Oral, Daily  cefTRIAXone, 1 g, Intravenous, Q24H  dilTIAZem, 30 mg, Oral, Q8H  hydrALAZINE, 100 mg, Oral, Q8H  insulin lispro, 0-9 Units, Subcutaneous, TID AC  metoprolol tartrate, 12.5 mg, Oral, Q12H  sodium chloride, 10 mL, Intravenous, Q12H  vancomycin, 500 mg, Intravenous, Once  Vancomycin Pharmacy Intermittent Dosing, , Does not apply, Daily      Continuous Infusions:Pharmacy to dose vancomycin,         Vital signs in last 24 hours:  Temp:  [97 °F (36.1 °C)-97.9 °F (36.6 °C)] 97.6 °F (36.4 °C)  Heart Rate:  [] 71  Resp:  [20] 20  BP: (115-140)/() 139/89    Intake/Output:    Intake/Output Summary (Last 24 hours) at 2/10/2021 0908  Last data filed at 2021 2125  Gross per 24 hour   Intake 800 ml   Output --   Net 800 ml       Exam:  /89 (BP Location: Left arm, Patient Position: Lying)   Pulse 71   Temp 97.6 °F (36.4 °C) (Axillary)   Resp 20   Ht 190.5 cm (75\")   Wt 105 kg (231 lb 7.7 oz)   SpO2 99%   BMI 28.93 kg/m²   Patient is examined using the personal protective equipment as per guidelines from infection control for this particular patient as enacted.  Hand washing was performed before and after patient interaction.  General Appearance:  Has better color and does not appear as toxic interactive and appropriate.                          Head:    Normocephalic, without obvious abnormality, atraumatic                           Eyes:    PERRL, conjunctivae/corneas clear, EOM's intact, both eyes               "           Throat:   Lips, tongue, gums normal; oral mucosa pink and moist                           Neck:   Supple, symmetrical, trachea midline, no JVD                         Lungs:    Decreased breath sounds at the base                 Chest Wall:    No tenderness or deformity                          Heart:  S1-S2 regular                  abdomen:   Soft nontender                 Extremities:   Extremities normal, atraumatic, no cyanosis or edema                        Pulses:   Pulses palpable in all extremities                            Skin: Chronic ulcerative changes involving bilateral lower extremities with no clear cellulitis                  Neurologic: Bilateral lower extremity weakness       Data Review:    I reviewed the patient's new clinical results.  Results from last 7 days   Lab Units 02/09/21  0633 02/08/21  0609 02/07/21  0458 02/06/21  0610 02/05/21  0312 02/04/21  1551   WBC 10*3/mm3 9.09 11.58* 8.61 9.52 7.69 6.57   HEMOGLOBIN g/dL 10.4* 10.7* 10.0* 10.6* 9.8* 10.7*   PLATELETS 10*3/mm3 287 209 253 280 296 300     Results from last 7 days   Lab Units 02/09/21  0918 02/08/21  2018 02/08/21  0609 02/08/21  0007 02/07/21  1557 02/07/21  0458 02/06/21 2021 02/06/21  0610  02/05/21 0312 02/04/21  1551   SODIUM mmol/L 145  --  142  --  145 143  --  142  --  142 139   POTASSIUM mmol/L 3.1* 3.1* 3.8 2.9* 2.7* 2.8* 2.9* 3.3*   < > 2.4* 2.2*   CHLORIDE mmol/L 115*  --  114*  --  115* 115*  --  115*  --  112* 106   CO2 mmol/L 17.3*  --  14.6*  --  16.7* 16.4*  --  18.9*  --  19.3* 21.4*   BUN mg/dL 22  --  20  --  19 20  --  20  --  23 23   CREATININE mg/dL 2.69*  --  2.23*  --  2.32* 2.37*  --  2.39*  --  2.55* 2.46*   CALCIUM mg/dL 9.2  --  9.0  --  8.7 8.7  --  8.5*  --  8.6 9.1   GLUCOSE mg/dL 149*  --  159*  --  147* 156*  --  164*  --  147* 179*    < > = values in this interval not displayed.     Microbiology Results (last 10 days)     Procedure Component Value - Date/Time    Gastrointestinal  Panel, PCR - Stool, Per Rectum [[id number]]  (Normal) Collected: 02/05/21 2204    Lab Status: Final result Specimen: Stool from Per Rectum Updated: 02/07/21 0950     Campylobacter Not Detected     Plesiomonas shigelloides Not Detected     Salmonella Not Detected     Vibrio Not Detected     Vibrio cholerae Not Detected     Yersinia enterocolitica Not Detected     Enteroaggregative E. coli (EAEC) Not Detected     Enteropathogenic E. coli (EPEC) Not Detected     Enterotoxigenic E. coli (ETEC) lt/st Not Detected     Shiga-like toxin-producing E. coli (STEC) stx1/stx2 Not Detected     E. coli O157 Not Detected     Shigella/Enteroinvasive E. coli (EIEC) Not Detected     Cryptosporidium Not Detected     Cyclospora cayetanensis Not Detected     Entamoeba histolytica Not Detected     Giardia lamblia Not Detected     Adenovirus F40/41 Not Detected     Astrovirus Not Detected     Norovirus GI/GII Not Detected     Rotavirus A Not Detected     Sapovirus (I, II, IV or V) Not Detected    Narrative:      If Aeromonas, Staphylococcus aureus or Bacillus cereus are suspected, please order [id number]: Stool Culture, Aeromonas, S aureus, B Cereus.    Clostridium Difficile Toxin - Stool, Per Rectum [[id number]]  (Normal) Collected: 02/05/21 2204    Lab Status: Final result Specimen: Stool from Per Rectum Updated: 02/05/21 2221    Narrative:      The following orders were created for panel order Clostridium Difficile Toxin - Stool, Per Rectum.  Procedure                               Abnormality         Status                     ---------                               -----------         ------                     Clostridium Difficile To...[[id number]]  Normal              Final result                 Please view results for these tests on the individual orders.    Clostridium Difficile Toxin, PCR - Stool, Per Rectum [[id number]]  (Normal) Collected: 02/05/21 2204    Lab Status: Final result Specimen: Stool from Per Rectum Updated: 02/05/21  2303     C. Difficile Toxins by PCR Negative    Blood Culture - Blood, Arm, Left [[id number]] Collected: 02/05/21 1433    Lab Status: Preliminary result Specimen: Blood from Arm, Left Updated: 02/09/21 1446     Blood Culture No growth at 4 days    Blood Culture - Blood, Arm, Right [[id number]] Collected: 02/05/21 1431    Lab Status: Preliminary result Specimen: Blood from Arm, Right Updated: 02/09/21 1446     Blood Culture No growth at 4 days    COVID PRE-OP / PRE-PROCEDURE SCREENING ORDER (NO ISOLATION) - Swab, Nasopharynx [[id number]]  (Normal) Collected: 02/04/21 1810    Lab Status: Final result Specimen: Swab from Nasopharynx Updated: 02/04/21 2206    Narrative:      The following orders were created for panel order COVID PRE-OP / PRE-PROCEDURE SCREENING ORDER (NO ISOLATION) - Swab, Nasopharynx.  Procedure                               Abnormality         Status                     ---------                               -----------         ------                     COVID-19,APTIMA PANTHER,...[[id number]]  Normal              Final result                 Please view results for these tests on the individual orders.    COVID-19,APTIMA PANTHER,[facility], NP/OP SWAB IN UTM/VTM/[facility] TRANSPORT MEDIA,24 HR TAT - Swab, Nasopharynx [[id number]]  (Normal) Collected: 02/04/21 1810    Lab Status: Final result Specimen: Swab from Nasopharynx Updated: 02/04/21 2206     COVID19 Not Detected    Narrative:      Fact sheet for providers: https://www.fda.gov/media/068903/download     Fact sheet for patients: https://www.fda.gov/media/363071/download    Test performed by RT PCR.    Urine Culture - Urine, Urine, Catheter [[id number]]  (Abnormal)  (Susceptibility) Collected: 02/04/21 1612    Lab Status: Final result Specimen: Urine, Catheter Updated: 02/06/21 0940     Urine Culture 25,000 CFU/mL Proteus mirabilis    Susceptibility      Proteus mirabilis     ISAC     Ampicillin Susceptible     Ampicillin + Sulbactam Susceptible      Cefazolin Susceptible     Cefepime Susceptible     Ceftazidime Susceptible     Ceftriaxone Susceptible     Gentamicin Susceptible     Levofloxacin Intermediate     Nitrofurantoin Resistant     Piperacillin + Tazobactam Susceptible     Tetracycline Resistant     Trimethoprim + Sulfamethoxazole Resistant                    Blood Culture - Blood, Hand, Right [[id number]]  (Abnormal) Collected: 02/04/21 1551    Lab Status: Final result Specimen: Blood from Hand, Right Updated: 02/09/21 0928     Blood Culture Staphylococcus epidermidis     Comment: Refer to previous blood culture collected on 2-4-21 for MICs        Isolated from Anaerobic Bottle     Blood Culture Aerococcus viridans     Isolated from Anaerobic Bottle     Gram Stain Anaerobic Bottle Gram positive cocci in clusters    Blood Culture - Blood, Hand, Right [[id number]]  (Abnormal)  (Susceptibility) Collected: 02/04/21 1551    Lab Status: Final result Specimen: Blood from Hand, Right Updated: 02/09/21 0633     Blood Culture Staphylococcus epidermidis     Isolated from Aerobic Bottle     Blood Culture Aerococcus viridans     Isolated from Aerobic Bottle     Blood Culture Globicatella sanguinis     Comment: Anaerobic bottle only  No CLSI guidelines and no validated ISAC testing method.          Isolated from Anaerobic Bottle     Gram Stain Aerobic Bottle Gram positive cocci in clusters      Anaerobic Bottle Gram positive cocci in clusters    Narrative:            Susceptibility      Staphylococcus epidermidis     ISAC     Oxacillin Resistant     Vancomycin Susceptible                Susceptibility      Aerococcus viridans     Not Specified     Ceftriaxone Resistant     Vancomycin Susceptible                Susceptibility Comments     Staphylococcus epidermidis    This isolate is presumed to be clindamycin resistant based on detection of inducible clindamycin resistance.  Clindamycin may still be effective in some patients.             Blood Culture ID, PCR - Blood,  Hand, Right [[id number]]  (Abnormal) Collected: 02/04/21 1551    Lab Status: Final result Specimen: Blood from Hand, Right Updated: 02/05/21 1250     BCID, PCR Staphylococcus spp, not aureus. Identification by BCID PCR.              Assessment:    Acute metabolic encephalopathy    Hyperlipidemia    Hypertension    Stage 4 chronic kidney disease (CMS/HCC)    DM2 (diabetes mellitus, type 2) (CMS/[facility])    Anemia, chronic disease    UTI (urinary tract infection), bacterial    Elevated troponin    Hypokalemia    Uncontrolled hypertension    Septicemia (CMS/HCC)    Vascular dementia without behavioral disturbance (CMS/[facility])  1-toxic metabolic encephalopathy secondary to combination of  2-urinary tract infection with infected lower extremity wound with possible cellulitis  3-coag negative staph bacteremia either part of the systemic sepsis originating from lower extremities or possible contaminant during the process of collection  4-embolization syndrome  5-diabetes mellitus with complications including peripheral neuropathy, diabetic nephropathy  6-stage V kidney disease  7-uncontrolled hypertension  8-anemia multifactorial.     Recommendations/Discussions:   · Continue Rocephin and vancomycin while following up on the sensitivity of coag negative staph.  · Aggressive local wound care of lower extremities is needed.    · Follow-up on repeat blood cultures that we have ordered and if  negative then it can be assessed and assumed that the pathogens in the blood culture is contaminant during the process of collection and hence would not further work-up and treatment.  · Would recommend current antibiotics for 10 to 14 days for combination of UTI and soft tissue infection of the lower extremities.    Tonja Azevedo MD  2/10/2021  09:08 EST    Much of this encounter note is an electronic transcription/translation of spoken language to printed text. The electronic translation of spoken language may permit erroneous, or at times,  nonsensical words or phrases to be inadvertently transcribed; Although I have reviewed the note for such errors, some may still exist

## 2021-02-10 NOTE — PROGRESS NOTES
[facility], [place], operated by [facility]  Inpatient Progress Note    Reason for Follow Up: Abnormal CT scan of the colon    Subjective     Interval History:   Discussed with patient, discussed with RN at bedside.  He appears to be answering questions appropriately, RN reports he has not communicated with her until this event.  Evidently tolerating clear liquids.  Advised he warrants eventual endoscopic evaluation of his colon when his sensorium is clear and he is able to adequately prep.  For the short-term will advance to full liquids.    Current Facility-Administered Medications:   •  acetaminophen (TYLENOL) tablet 650 mg, 650 mg, Oral, Q4H PRN **OR** acetaminophen (TYLENOL) 160 MG/5ML solution 650 mg, 650 mg, Oral, Q4H PRN **OR** acetaminophen (TYLENOL) suppository 650 mg, 650 mg, Rectal, Q4H PRN, Robert Baker MD  •  amLODIPine (NORVASC) tablet 5 mg, 5 mg, Oral, Q24H, Paddy Low MD  •  ammonium lactate (AMLACTIN) cream, , Topical, BID, Nixon Quinteros MD, Given at 02/09/21 2120  •  aspirin EC tablet 81 mg, 81 mg, Oral, Daily, Robert Baker MD, 81 mg at 02/09/21 1029  •  atorvastatin (LIPITOR) tablet 20 mg, 20 mg, Oral, Daily, Robert Baker MD, 20 mg at 02/09/21 1029  •  cefTRIAXone (ROCEPHIN) IVPB 1 g, 1 g, Intravenous, Q24H, Jono Montes MD, Last Rate: 100 mL/hr at 02/09/21 1732, 1 g at 02/09/21 1732  •  dextrose (D50W) 25 g/ 50mL Intravenous Solution 25 g, 25 g, Intravenous, Q15 Min PRN, Robert Baker MD  •  dextrose (GLUTOSE) oral gel 15 g, 15 g, Oral, Q15 Min PRN, Robert Baker MD  •  dilTIAZem (CARDIZEM) tablet 30 mg, 30 mg, Oral, Q8H, Paddy Low MD, 30 mg at 02/10/21 0653  •  diphenhydrAMINE (BENADRYL) capsule 25 mg, 25 mg, Oral, Q6H PRN, Robert Baker MD  •  glucagon (human recombinant) (GLUCAGEN DIAGNOSTIC) injection 1 mg, 1 mg, Subcutaneous, Q15 Min PRN, Robert Baker MD  •  hydrALAZINE (APRESOLINE) injection 10 mg, 10 mg, Intravenous, Q4H PRN, Soy Pearl,  MD  •  hydrALAZINE (APRESOLINE) tablet 100 mg, 100 mg, Oral, Q8H, Soy Pearl MD, 100 mg at 02/10/21 0654  •  hydrALAZINE (APRESOLINE) tablet 25 mg, 25 mg, Oral, Q6H PRN, Nixon Quinteros MD  •  HYDROcodone-acetaminophen (NORCO)  MG per tablet 1 tablet, 1 tablet, Oral, Q6H PRN, Robert Baker MD, 1 tablet at 02/08/21 2053  •  insulin lispro (humaLOG, ADMELOG) injection 0-9 Units, 0-9 Units, Subcutaneous, TID AC, Robert Baker MD, 2 Units at 02/09/21 1730  •  metoprolol tartrate (LOPRESSOR) tablet 12.5 mg, 12.5 mg, Oral, Q12H, Lyndsey Escoto MD, 12.5 mg at 02/09/21 2119  •  ondansetron (ZOFRAN) tablet 4 mg, 4 mg, Oral, Q6H PRN **OR** ondansetron (ZOFRAN) injection 4 mg, 4 mg, Intravenous, Q6H PRN, Robert Baker MD  •  Pharmacy to dose vancomycin, , Does not apply, Continuous PRN, Nixon Quinteros MD  •  sodium chloride 0.9 % flush 10 mL, 10 mL, Intravenous, PRN, Josafat Mckeon MD  •  sodium chloride 0.9 % flush 10 mL, 10 mL, Intravenous, Q12H, Robert Baker MD, 10 mL at 02/09/21 2125  •  sodium chloride 0.9 % flush 10 mL, 10 mL, Intravenous, PRN, Robert Baker MD  •  vancomycin 500 mg/100 mL 0.9% NS IVPB (mbp), 500 mg, Intravenous, Once, Jono Montes MD  •  Vancomycin Pharmacy Intermittent Dosing, , Does not apply, Daily, Nixon Quinteros MD  Review of Systems:    Review of systems could not be obtained due to  patient confusion.    Objective     Vital Signs  Temp:  [97 °F (36.1 °C)-97.9 °F (36.6 °C)] 97.6 °F (36.4 °C)  Heart Rate:  [] 71  Resp:  [20] 20  BP: (115-140)/() 139/89  Body mass index is 28.93 kg/m².    Intake/Output Summary (Last 24 hours) at 2/10/2021 1034  Last data filed at 2/9/2021 2125  Gross per 24 hour   Intake 800 ml   Output --   Net 800 ml     No intake/output data recorded.     Physical Exam:   General: patient awake, alert and cooperative   Eyes: Normal lids and lashes, no scleral icterus   Neck: supple, normal ROM   Skin: warm and dry,  not jaundiced   Cardiovascular: regular rhythm and rate, no murmurs auscultated   Pulm: clear to auscultation bilaterally, regular and unlabored   Abdomen: soft, nontender, nondistended; normal bowel sounds   Extremities: no rash or edema   Psychiatric: Normal mood and behavior; memory intact     Results Review:     I reviewed the patient's new clinical results.    Results from last 7 days   Lab Units 02/10/21  0916 02/09/21  0633 02/08/21  0609   WBC 10*3/mm3 7.35 9.09 11.58*   HEMOGLOBIN g/dL 10.9* 10.4* 10.7*   HEMATOCRIT % 33.9* 32.0* 33.1*   PLATELETS 10*3/mm3 306 287 209     Results from last 7 days   Lab Units 02/10/21  0916 02/09/21 0918 02/08/21 2018 02/08/21  0609  02/05/21 0312 02/04/21  1551   SODIUM mmol/L 143 145  --  142   < > 142 139   POTASSIUM mmol/L 3.0* 3.1* 3.1* 3.8   < > 2.4* 2.2*   CHLORIDE mmol/L 113* 115*  --  114*   < > 112* 106   CO2 mmol/L 17.8* 17.3*  --  14.6*   < > 19.3* 21.4*   BUN mg/dL 24* 22  --  20   < > 23 23   CREATININE mg/dL 2.76* 2.69*  --  2.23*   < > 2.55* 2.46*   CALCIUM mg/dL 8.6 9.2  --  9.0   < > 8.6 9.1   BILIRUBIN mg/dL  --   --   --   --   --  0.6 0.9   ALK PHOS U/L  --   --   --   --   --  91 104   ALT (SGPT) U/L  --   --   --   --   --  7 7   AST (SGOT) U/L  --   --   --   --   --  10 14   GLUCOSE mg/dL 153* 149*  --  159*   < > 147* 179*    < > = values in this interval not displayed.         Lab Results   Lab Value Date/Time    LIPASE 17 12/18/2020 1653    LIPASE 914 (H) 10/27/2020 0330    LIPASE 1,087 (H) 10/26/2020 0410    LIPASE 1,653 (H) 10/25/2020 0323       Radiology:  CT Head Without Contrast   Final Result   No acute intracranial findings.       Radiation dose reduction techniques were utilized, including automated   exposure control and exposure modulation based on body size.       This report was finalized on 2/5/2021 9:30 PM by Dr. Vero Hansen M.D.          CT Abdomen Pelvis Without Contrast   Final Result       1. Severely technically  limited examination due to motion artifact. The   patient's sigmoid colon in particular appears thick walled, with   adjacent pericolonic soft tissue stranding. There is also involvement of   the rectum, although to a lesser extent. Appearance is suggestive of   colitis. No pneumatosis or free air is seen. Given the patient had some   rectal wall thickening on prior CT, consideration for follow-up with   endoscopy is suggested. There is some diffuse gaseous distention of   bowel, which may reflect some ileus.   2. Thick-walled appearance to the urinary bladder, with adjacent   perivesical soft tissue stranding, concerning for cystitis.       Radiation dose reduction techniques were utilized, including automated   exposure control and exposure modulation based on body size.       This report was finalized on 2/5/2021 9:39 PM by Dr. Vero Hansen M.D.          XR Chest 1 View   Final Result          Assessment/Plan     Patient Active Problem List   Diagnosis   • Complicated UTI (urinary tract infection)   • Macrocytosis   • Sepsis secondary to UTI (CMS/HCC)   • Metabolic encephalopathy   • Hyperlipidemia   • Hypertension   • Monoclonal gammopathy   • Stage 4 chronic kidney disease (CMS/HCC)   • DM2 (diabetes mellitus, type 2) (CMS/HCC)   • Abnormal CT of the abdomen   • Normal anion gap metabolic acidosis   • Anemia, chronic disease   • Ventricular tachycardia (paroxysmal) (CMS/HCC)   • Acute metabolic encephalopathy   • UTI (urinary tract infection), bacterial   • Elevated troponin   • Hypokalemia   • SILVIA (acute kidney injury) (CMS/HCC)   • Uncontrolled hypertension   • Septicemia (CMS/HCC)   • Vascular dementia without behavioral disturbance (CMS/HCC)       Assessment:  1. Abnormal CT scan involving the sigmoid colon and rectum  2. Metabolic encephalopathy  3. Diarrhea: Improved per patient  4. Iron deficiency anemia  5. Chronic kidney disease  6. Electrolyte imbalance      Plan:  · Full liquid diet  · Correct  electrolytes  · Eventual endoscopic assessment when all agree stable to proceed  I discussed the patients findings and my recommendations with patient and nursing staff.    Dane Mabry MD

## 2021-02-11 NOTE — PROGRESS NOTES
"   LOS: 7 days    Patient Care Team:  Maya Ribeiro APRN as PCP - General (Family Medicine)    Chief Complaint:    Chief Complaint   Patient presents with   • Altered Mental Status     Follow-up chronic kidney disease and hypokalemia  Subjective     Interval History:   Patient is confused, not answering question properly, keeps saying okay, okay, okay      Review of Systems:   Not obtainable    Objective     Vital Signs  Temp:  [97.2 °F (36.2 °C)-97.8 °F (36.6 °C)] 97.7 °F (36.5 °C)  Heart Rate:  [67-79] 67  Resp:  [16-20] 18  BP: (109-144)/(80-98) 109/82    Flowsheet Rows      First Filed Value   Admission Height  190.5 cm (75\") Documented at 02/04/2021 1250   Admission Weight  114 kg (251 lb) Documented at 02/04/2021 1250          I/O this shift:  In: 360 [P.O.:360]  Out: -   I/O last 3 completed shifts:  In: 800 [P.O.:750; IV Piggyback:50]  Out: -     Intake/Output Summary (Last 24 hours) at 2/11/2021 1013  Last data filed at 2/11/2021 0845  Gross per 24 hour   Intake 360 ml   Output --   Net 360 ml       Physical Exam:  General Appearance: Confused, disoriented, appears to be chronically, no acute distress,   Skin: warm and dry  HEENT: Nonicteric sclerae, oral mucosa is moist  Neck: no JVD, trachea midline  Lungs: Scattered rhonchi, unlabored breathing effort  Heart: RRR, normal S1 and S2, no S3, no rub  Abdomen: soft, nontender, normoactive bowels  : no palpable bladder, external catheter in place  Extremities: no edema, cyanosis or clubbing  Neuro: Moving all extremities      Results Review:    Results from last 7 days   Lab Units 02/11/21  0545 02/10/21  0916 02/09/21  0918  02/05/21  0312 02/04/21  1551   SODIUM mmol/L 139 143 145   < > 142 139   POTASSIUM mmol/L 4.0 3.0* 3.1*   < > 2.4* 2.2*   CHLORIDE mmol/L 111* 113* 115*   < > 112* 106   CO2 mmol/L 12.7* 17.8* 17.3*   < > 19.3* 21.4*   BUN mg/dL 24* 24* 22   < > 23 23   CREATININE mg/dL 2.55* 2.76* 2.69*   < > 2.55* 2.46*   CALCIUM mg/dL 8.7 8.6 " 9.2   < > 8.6 9.1   BILIRUBIN mg/dL  --   --   --   --  0.6 0.9   ALK PHOS U/L  --   --   --   --  91 104   ALT (SGPT) U/L  --   --   --   --  7 7   AST (SGOT) U/L  --   --   --   --  10 14   GLUCOSE mg/dL 144* 153* 149*   < > 147* 179*    < > = values in this interval not displayed.       Estimated Creatinine Clearance: 37.2 mL/min (A) (by C-G formula based on SCr of 2.55 mg/dL (H)).    Results from last 7 days   Lab Units 02/11/21  0545 02/10/21  0916 02/09/21  0918 02/08/21  0609   MAGNESIUM mg/dL 2.0  --  1.9 2.0   PHOSPHORUS mg/dL 2.9 2.3* 2.4* 2.7       Results from last 7 days   Lab Units 02/11/21  0545 02/09/21  0633 02/06/21  0610   URIC ACID mg/dL 8.3* 7.8* 7.2*       Results from last 7 days   Lab Units 02/11/21  0545 02/10/21  0916 02/09/21  0633 02/08/21  0609 02/07/21  0458   WBC 10*3/mm3 7.34 7.35 9.09 11.58* 8.61   HEMOGLOBIN g/dL 9.2* 10.9* 10.4* 10.7* 10.0*   PLATELETS 10*3/mm3 85* 306 287 209 253               Imaging Results (Last 24 Hours)     ** No results found for the last 24 hours. **        amLODIPine, 5 mg, Oral, Q24H  ammonium lactate, , Topical, BID  aspirin, 81 mg, Oral, Daily  atorvastatin, 20 mg, Oral, Daily  cefTRIAXone, 1 g, Intravenous, Q24H  dilTIAZem, 30 mg, Oral, Q8H  hydrALAZINE, 100 mg, Oral, Q8H  insulin lispro, 0-9 Units, Subcutaneous, TID AC  metoprolol tartrate, 12.5 mg, Oral, Q12H  sodium chloride, 10 mL, Intravenous, Q12H  Vancomycin Pharmacy Intermittent Dosing, , Does not apply, Daily      Pharmacy to dose vancomycin,         Medication Review:   Current Facility-Administered Medications   Medication Dose Route Frequency Provider Last Rate Last Admin   • acetaminophen (TYLENOL) tablet 650 mg  650 mg Oral Q4H PRN Robert Baker MD        Or   • acetaminophen (TYLENOL) 160 MG/5ML solution 650 mg  650 mg Oral Q4H PRN Robert Baker MD        Or   • acetaminophen (TYLENOL) suppository 650 mg  650 mg Rectal Q4H PRN Robert Baker MD       • amLODIPine (NORVASC)  tablet 5 mg  5 mg Oral Q24H Paddy Low MD   5 mg at 02/11/21 1001   • ammonium lactate (AMLACTIN) cream   Topical BID Nixon Quinteros MD   Given at 02/11/21 1002   • aspirin EC tablet 81 mg  81 mg Oral Daily Robert Baker MD   81 mg at 02/11/21 1001   • atorvastatin (LIPITOR) tablet 20 mg  20 mg Oral Daily Robert Baker MD   20 mg at 02/11/21 1002   • cefTRIAXone (ROCEPHIN) IVPB 1 g  1 g Intravenous Q24H Jono Montes  mL/hr at 02/10/21 1727 1 g at 02/10/21 1727   • dextrose (D50W) 25 g/ 50mL Intravenous Solution 25 g  25 g Intravenous Q15 Min PRN Robert Baker MD       • dextrose (GLUTOSE) oral gel 15 g  15 g Oral Q15 Min PRN Robert Baker MD       • dilTIAZem (CARDIZEM) tablet 30 mg  30 mg Oral Q8H Paddy Low MD   30 mg at 02/11/21 0532   • diphenhydrAMINE (BENADRYL) capsule 25 mg  25 mg Oral Q6H PRN Robert Baker MD       • glucagon (human recombinant) (GLUCAGEN DIAGNOSTIC) injection 1 mg  1 mg Subcutaneous Q15 Min PRN Robert Baker MD       • hydrALAZINE (APRESOLINE) injection 10 mg  10 mg Intravenous Q4H PRN Soy Pearl MD       • hydrALAZINE (APRESOLINE) tablet 100 mg  100 mg Oral Q8H Soy Pearl MD   100 mg at 02/11/21 0533   • hydrALAZINE (APRESOLINE) tablet 25 mg  25 mg Oral Q6H PRN Nixon Quinteros MD       • HYDROcodone-acetaminophen (NORCO)  MG per tablet 1 tablet  1 tablet Oral Q6H PRN Robert Baker MD   1 tablet at 02/08/21 2053   • insulin lispro (humaLOG, ADMELOG) injection 0-9 Units  0-9 Units Subcutaneous TID AC Robert Baker MD   2 Units at 02/09/21 1730   • metoprolol tartrate (LOPRESSOR) tablet 12.5 mg  12.5 mg Oral Q12H Lyndsey Escoto MD   12.5 mg at 02/11/21 1001   • ondansetron (ZOFRAN) tablet 4 mg  4 mg Oral Q6H PRN Robert Baker MD        Or   • ondansetron (ZOFRAN) injection 4 mg  4 mg Intravenous Q6H PRN Robert Baker MD       • Pharmacy to dose vancomycin   Does not apply Continuous PRN Jono Montes MD        • sodium chloride 0.9 % flush 10 mL  10 mL Intravenous PRN Josafat Mckeon MD       • sodium chloride 0.9 % flush 10 mL  10 mL Intravenous Q12H Robert Baker MD   10 mL at 02/11/21 1002   • sodium chloride 0.9 % flush 10 mL  10 mL Intravenous PRN Robert Baker MD       • Vancomycin Pharmacy Intermittent Dosing   Does not apply Daily Jono Montes MD   Given at 02/10/21 1126       Assessment/Plan   1.  Chronic kidney disease second diabetic nephropathy biopsy-proven, creatinine today is 2.55, uric acid 8.3, albumin 2.4, phosphorus 2.9 magnesium 2, calcium 8.7, potassium 4, total CO2 12.7  2.  Hypokalemia, potassium yesterday was 3.1  3.  Metabolic acidosis none anion gap associate with chronic kidney disease total CO2 today is 12 point.  4.  Anemia of chronic kidney disease, hemoglobin yesterday was 10.4, no JOSSELYN as needed.  5.  Immobility syndrome  6.  Hypertension, acceptable control today.  7.  Diabetes mellitus type 2, with diabetic nephropathy, biopsy-proven.        Plan:  1.  Check his labs today and adjust treatment as needed currently no need for diuretics  2.  Restart oral sodium bicarbonate since the potassium is corrected.  3.  Surveillance lab              Miles Del Toro MD  02/11/21  10:13 EST

## 2021-02-11 NOTE — PROGRESS NOTES
"  Infectious Diseases Progress Note    Tonja Azevedo MD     [facility]  [place]: 7 days  Patient Identification:  Name: Gregorio Alejandro  Age: 65 y.o.  Sex: male  :  1955  MRN: [id number]         Primary Care Physician: Maya Ribeiro APRN            Subjective: Feeling better but appears to be more confused today.  Interval History: See consultation note.    Objective:    Scheduled Meds:amLODIPine, 5 mg, Oral, Q24H  ammonium lactate, , Topical, BID  aspirin, 81 mg, Oral, Daily  atorvastatin, 20 mg, Oral, Daily  cefTRIAXone, 1 g, Intravenous, Q24H  dilTIAZem, 30 mg, Oral, Q12H  hydrALAZINE, 100 mg, Oral, Q8H  insulin lispro, 0-9 Units, Subcutaneous, TID AC  LORazepam, 1 mg, Intravenous, Once  metoprolol tartrate, 12.5 mg, Oral, Q12H  sodium bicarbonate, 1,300 mg, Oral, TID  sodium chloride, 10 mL, Intravenous, Q12H  Vancomycin Pharmacy Intermittent Dosing, , Does not apply, Daily      Continuous Infusions:Pharmacy to dose vancomycin,         Vital signs in last 24 hours:  Temp:  [97.2 °F (36.2 °C)-97.8 °F (36.6 °C)] 97.7 °F (36.5 °C)  Heart Rate:  [67-79] 67  Resp:  [16-20] 18  BP: (109-144)/(80-98) 109/82    Intake/Output:    Intake/Output Summary (Last 24 hours) at 2021 1224  Last data filed at 2021 0845  Gross per 24 hour   Intake 360 ml   Output --   Net 360 ml       Exam:  /82 (BP Location: Left arm, Patient Position: Lying)   Pulse 67   Temp 97.7 °F (36.5 °C) (Oral)   Resp 18   Ht 190.5 cm (75\")   Wt 107 kg (236 lb 1.8 oz)   SpO2 99%   BMI 29.51 kg/m²   Patient is examined using the personal protective equipment as per guidelines from infection control for this particular patient as enacted.  Hand washing was performed before and after patient interaction.  General Appearance:  Has better color and does not appear as toxic interactive and appropriate.                          Head:    Normocephalic, without obvious abnormality, atraumatic                           " Eyes:    PERRL, conjunctivae/corneas clear, EOM's intact, both eyes                         Throat:   Lips, tongue, gums normal; oral mucosa pink and moist                           Neck:   Supple, symmetrical, trachea midline, no JVD                         Lungs:    Decreased breath sounds at the base                 Chest Wall:    No tenderness or deformity                          Heart:  S1-S2 regular                  abdomen:   Soft nontender                 Extremities:   Extremities normal, atraumatic, no cyanosis or edema                        Pulses:   Pulses palpable in all extremities                            Skin: Chronic ulcerative changes involving bilateral lower extremities with no clear cellulitis                  Neurologic: Bilateral lower extremity weakness       Data Review:    I reviewed the patient's new clinical results.  Results from last 7 days   Lab Units 02/11/21  0545 02/10/21  0916 02/09/21  0633 02/08/21  0609 02/07/21  0458 02/06/21  0610 02/05/21  0312   WBC 10*3/mm3 7.34 7.35 9.09 11.58* 8.61 9.52 7.69   HEMOGLOBIN g/dL 9.2* 10.9* 10.4* 10.7* 10.0* 10.6* 9.8*   PLATELETS 10*3/mm3 85* 306 287 209 253 280 296     Results from last 7 days   Lab Units 02/11/21  0545 02/10/21  0916 02/09/21  0918 02/08/21  2018 02/08/21  0609 02/08/21  0007 02/07/21  1557 02/07/21  0458  02/06/21  0610   SODIUM mmol/L 139 143 145  --  142  --  145 143  --  142   POTASSIUM mmol/L 4.0 3.0* 3.1* 3.1* 3.8 2.9* 2.7* 2.8*   < > 3.3*   CHLORIDE mmol/L 111* 113* 115*  --  114*  --  115* 115*  --  115*   CO2 mmol/L 12.7* 17.8* 17.3*  --  14.6*  --  16.7* 16.4*  --  18.9*   BUN mg/dL 24* 24* 22  --  20  --  19 20  --  20   CREATININE mg/dL 2.55* 2.76* 2.69*  --  2.23*  --  2.32* 2.37*  --  2.39*   CALCIUM mg/dL 8.7 8.6 9.2  --  9.0  --  8.7 8.7  --  8.5*   GLUCOSE mg/dL 144* 153* 149*  --  159*  --  147* 156*  --  164*    < > = values in this interval not displayed.     Microbiology Results (last 10 days)      Procedure Component Value - Date/Time    Gastrointestinal Panel, PCR - Stool, Per Rectum [[id number]]  (Normal) Collected: 02/05/21 2204    Lab Status: Final result Specimen: Stool from Per Rectum Updated: 02/07/21 0950     Campylobacter Not Detected     Plesiomonas shigelloides Not Detected     Salmonella Not Detected     Vibrio Not Detected     Vibrio cholerae Not Detected     Yersinia enterocolitica Not Detected     Enteroaggregative E. coli (EAEC) Not Detected     Enteropathogenic E. coli (EPEC) Not Detected     Enterotoxigenic E. coli (ETEC) lt/st Not Detected     Shiga-like toxin-producing E. coli (STEC) stx1/stx2 Not Detected     E. coli O157 Not Detected     Shigella/Enteroinvasive E. coli (EIEC) Not Detected     Cryptosporidium Not Detected     Cyclospora cayetanensis Not Detected     Entamoeba histolytica Not Detected     Giardia lamblia Not Detected     Adenovirus F40/41 Not Detected     Astrovirus Not Detected     Norovirus GI/GII Not Detected     Rotavirus A Not Detected     Sapovirus (I, II, IV or V) Not Detected    Narrative:      If Aeromonas, Staphylococcus aureus or Bacillus cereus are suspected, please order [id number]: Stool Culture, Aeromonas, S aureus, B Cereus.    Clostridium Difficile Toxin - Stool, Per Rectum [[id number]]  (Normal) Collected: 02/05/21 2204    Lab Status: Final result Specimen: Stool from Per Rectum Updated: 02/05/21 2301    Narrative:      The following orders were created for panel order Clostridium Difficile Toxin - Stool, Per Rectum.  Procedure                               Abnormality         Status                     ---------                               -----------         ------                     Clostridium Difficile To...[[id number]]  Normal              Final result                 Please view results for these tests on the individual orders.    Clostridium Difficile Toxin, PCR - Stool, Per Rectum [[id number]]  (Normal) Collected: 02/05/21 2204    Lab Status: Final  result Specimen: Stool from Per Rectum Updated: 02/05/21 2303     C. Difficile Toxins by PCR Negative    Blood Culture - Blood, Arm, Left [[id number]] Collected: 02/05/21 1433    Lab Status: Final result Specimen: Blood from Arm, Left Updated: 02/10/21 1445     Blood Culture No growth at 5 days    Blood Culture - Blood, Arm, Right [[id number]] Collected: 02/05/21 1431    Lab Status: Final result Specimen: Blood from Arm, Right Updated: 02/10/21 1445     Blood Culture No growth at 5 days    COVID PRE-OP / PRE-PROCEDURE SCREENING ORDER (NO ISOLATION) - Swab, Nasopharynx [[id number]]  (Normal) Collected: 02/04/21 1810    Lab Status: Final result Specimen: Swab from Nasopharynx Updated: 02/04/21 2206    Narrative:      The following orders were created for panel order COVID PRE-OP / PRE-PROCEDURE SCREENING ORDER (NO ISOLATION) - Swab, Nasopharynx.  Procedure                               Abnormality         Status                     ---------                               -----------         ------                     COVID-19,APTIMA PANTHER,...[[id number]]  Normal              Final result                 Please view results for these tests on the individual orders.    COVID-19,APTIMA PANTHER,[facility], NP/OP SWAB IN UTM/VTM/[facility] TRANSPORT MEDIA,24 HR TAT - Swab, Nasopharynx [[id number]]  (Normal) Collected: 02/04/21 1810    Lab Status: Final result Specimen: Swab from Nasopharynx Updated: 02/04/21 2206     COVID19 Not Detected    Narrative:      Fact sheet for providers: https://www.fda.gov/media/001421/download     Fact sheet for patients: https://www.fda.gov/media/094803/download    Test performed by RT PCR.    Urine Culture - Urine, Urine, Catheter [[id number]]  (Abnormal)  (Susceptibility) Collected: 02/04/21 1612    Lab Status: Final result Specimen: Urine, Catheter Updated: 02/06/21 0940     Urine Culture 25,000 CFU/mL Proteus mirabilis    Susceptibility      Proteus mirabilis     ISAC     Ampicillin Susceptible      Ampicillin + Sulbactam Susceptible     Cefazolin Susceptible     Cefepime Susceptible     Ceftazidime Susceptible     Ceftriaxone Susceptible     Gentamicin Susceptible     Levofloxacin Intermediate     Nitrofurantoin Resistant     Piperacillin + Tazobactam Susceptible     Tetracycline Resistant     Trimethoprim + Sulfamethoxazole Resistant                    Blood Culture - Blood, Hand, Right [[id number]]  (Abnormal) Collected: 02/04/21 1551    Lab Status: Final result Specimen: Blood from Hand, Right Updated: 02/09/21 0928     Blood Culture Staphylococcus epidermidis     Comment: Refer to previous blood culture collected on 2-4-21 for MICs        Isolated from Anaerobic Bottle     Blood Culture Aerococcus viridans     Isolated from Anaerobic Bottle     Gram Stain Anaerobic Bottle Gram positive cocci in clusters    Blood Culture - Blood, Hand, Right [[id number]]  (Abnormal)  (Susceptibility) Collected: 02/04/21 1551    Lab Status: Final result Specimen: Blood from Hand, Right Updated: 02/09/21 0633     Blood Culture Staphylococcus epidermidis     Isolated from Aerobic Bottle     Blood Culture Aerococcus viridans     Isolated from Aerobic Bottle     Blood Culture Globicatella sanguinis     Comment: Anaerobic bottle only  No CLSI guidelines and no validated ISAC testing method.          Isolated from Anaerobic Bottle     Gram Stain Aerobic Bottle Gram positive cocci in clusters      Anaerobic Bottle Gram positive cocci in clusters    Narrative:            Susceptibility      Staphylococcus epidermidis     ISAC     Oxacillin Resistant     Vancomycin Susceptible                Susceptibility      Aerococcus viridans     Not Specified     Ceftriaxone Resistant     Vancomycin Susceptible                Susceptibility Comments     Staphylococcus epidermidis    This isolate is presumed to be clindamycin resistant based on detection of inducible clindamycin resistance.  Clindamycin may still be effective in some patients.              Blood Culture ID, PCR - Blood, Hand, Right [[id number]]  (Abnormal) Collected: 02/04/21 1551    Lab Status: Final result Specimen: Blood from Hand, Right Updated: 02/05/21 1250     BCID, PCR Staphylococcus spp, not aureus. Identification by BCID PCR.              Assessment:    Acute metabolic encephalopathy    Hyperlipidemia    Hypertension    Stage 4 chronic kidney disease (CMS/HCC)    DM2 (diabetes mellitus, type 2) (CMS/[facility])    Anemia, chronic disease    UTI (urinary tract infection), bacterial    Elevated troponin    Hypokalemia    Uncontrolled hypertension    Septicemia (CMS/[facility])    Vascular dementia without behavioral disturbance (CMS/[facility])  1-toxic metabolic encephalopathy secondary to combination of  2-urinary tract infection with infected lower extremity wound with possible cellulitis  3-coag negative staph bacteremia either part of the systemic sepsis originating from lower extremities or possible contaminant during the process of collection  4-embolization syndrome  5-diabetes mellitus with complications including peripheral neuropathy, diabetic nephropathy  6-stage V kidney disease  7-uncontrolled hypertension  8-anemia multifactorial.     Recommendations/Discussions:   · Continue Rocephin and vancomycin while following up on the sensitivity of coag negative staph.  · Aggressive local wound care of lower extremities is needed.    · Follow-up on repeat blood cultures that we have ordered and if  negative then it can be assessed and assumed that the pathogens in the blood culture is contaminant during the process of collection and hence would not further work-up and treatment.  · Would recommend current antibiotics for 10 to 14 days for combination of UTI and soft tissue infection of the lower extremities.    Tonja Azevedo MD  2/11/2021  12:24 EST    Much of this encounter note is an electronic transcription/translation of spoken language to printed text. The electronic translation of spoken  language may permit erroneous, or at times, nonsensical words or phrases to be inadvertently transcribed; Although I have reviewed the note for such errors, some may still exist

## 2021-02-11 NOTE — PROGRESS NOTES
Gregorio Alejandro   65 y.o.  male    LOS: 7 days   Patient Care Team:  Maya Ribeiro APRN as PCP - General (Family Medicine)      Subjective     Interval History:     Patient Complaints: Had difficulty awakening him.  I cannot get any history from him at the present time.    Review of Systems:   Unable to obtain    Medication Review:   Current Facility-Administered Medications:   •  acetaminophen (TYLENOL) tablet 650 mg, 650 mg, Oral, Q4H PRN **OR** acetaminophen (TYLENOL) 160 MG/5ML solution 650 mg, 650 mg, Oral, Q4H PRN **OR** acetaminophen (TYLENOL) suppository 650 mg, 650 mg, Rectal, Q4H PRN, Robert Baker MD  •  amLODIPine (NORVASC) tablet 5 mg, 5 mg, Oral, Q24H, Paddy Low MD, 5 mg at 02/11/21 1001  •  ammonium lactate (AMLACTIN) cream, , Topical, BID, Nixon Quinteros MD, Given at 02/11/21 1002  •  aspirin EC tablet 81 mg, 81 mg, Oral, Daily, Robert Baker MD, 81 mg at 02/11/21 1001  •  atorvastatin (LIPITOR) tablet 20 mg, 20 mg, Oral, Daily, Robert Baker MD, 20 mg at 02/11/21 1002  •  cefTRIAXone (ROCEPHIN) IVPB 1 g, 1 g, Intravenous, Q24H, Jono Montes MD, Last Rate: 100 mL/hr at 02/10/21 1727, 1 g at 02/10/21 1727  •  dextrose (D50W) 25 g/ 50mL Intravenous Solution 25 g, 25 g, Intravenous, Q15 Min PRN, Robert Baker MD  •  dextrose (GLUTOSE) oral gel 15 g, 15 g, Oral, Q15 Min PRN, Robert Baker MD  •  dilTIAZem (CARDIZEM) tablet 30 mg, 30 mg, Oral, Q12H, Terrance Muhammad MD  •  diphenhydrAMINE (BENADRYL) capsule 25 mg, 25 mg, Oral, Q6H PRN, Robert Baker MD  •  glucagon (human recombinant) (GLUCAGEN DIAGNOSTIC) injection 1 mg, 1 mg, Subcutaneous, Q15 Min PRN, Robert Baker MD  •  hydrALAZINE (APRESOLINE) injection 10 mg, 10 mg, Intravenous, Q4H PRN, Soy Pearl MD  •  hydrALAZINE (APRESOLINE) tablet 100 mg, 100 mg, Oral, Q8H, Soy Pearl MD, 100 mg at 02/11/21 0533  •  hydrALAZINE (APRESOLINE) tablet 25 mg, 25 mg, Oral, Q6H PRN, Nixon Quinteros MD  •   HYDROcodone-acetaminophen (NORCO)  MG per tablet 1 tablet, 1 tablet, Oral, Q6H PRN, Robert Baker MD, 1 tablet at 02/08/21 2053  •  insulin lispro (humaLOG, ADMELOG) injection 0-9 Units, 0-9 Units, Subcutaneous, TID AC, Robert Baker MD, 2 Units at 02/09/21 1730  •  metoprolol tartrate (LOPRESSOR) tablet 12.5 mg, 12.5 mg, Oral, Q12H, Lyndsey Escoto MD, 12.5 mg at 02/11/21 1001  •  ondansetron (ZOFRAN) tablet 4 mg, 4 mg, Oral, Q6H PRN **OR** ondansetron (ZOFRAN) injection 4 mg, 4 mg, Intravenous, Q6H PRN, Robert Baker MD  •  Pharmacy to dose vancomycin, , Does not apply, Continuous PRN, Jono Montes MD  •  sodium bicarbonate tablet 1,300 mg, 1,300 mg, Oral, TID, Miles Del Toro MD  •  sodium chloride 0.9 % flush 10 mL, 10 mL, Intravenous, PRN, Josafat Mckeon MD  •  sodium chloride 0.9 % flush 10 mL, 10 mL, Intravenous, Q12H, Robert Baker MD, 10 mL at 02/11/21 1002  •  sodium chloride 0.9 % flush 10 mL, 10 mL, Intravenous, PRN, Robert Baker MD  •  Vancomycin Pharmacy Intermittent Dosing, , Does not apply, Daily, Jono Montes MD, Given at 02/10/21 1126      Objective     Vital Sign Min/Max for last 24 hours  Temp  Min: 97.2 °F (36.2 °C)  Max: 97.8 °F (36.6 °C)   BP  Min: 109/82  Max: 144/88    Pulse  Min: 67  Max: 79     Wt Readings from Last 3 Encounters:   02/11/21 107 kg (236 lb 1.8 oz)   12/22/20 114 kg (251 lb 8 oz)        Intake/Output Summary (Last 24 hours) at 2/11/2021 1018  Last data filed at 2/11/2021 0845  Gross per 24 hour   Intake 360 ml   Output --   Net 360 ml     Physical Exam:      General Appearance:    Well developed and well nourished in no acute distress, but not responding well to verbal stimuli at present   Head:    Normocephalic, atraumatic   Eyes:            Conjunctivae normal, non icteric, no xanthelasma   Neck:   no carotid bruit, no JVD   Lungs:    Them difficult due to poor patient cooperation    Heart:    Irregular rate and rhythm.  Normal  S1 and S2. No murmur, no gallop, rub or lift.    Chest Wall:    No abnormalities observed   Abdomen:     Normal bowel sounds, no masses, no organomegaly, soft        non-tender, non-distended, no guarding, no rebound                tenderness   Rectal:     Deferred   Extremities:   No clubbing, cyanosis or edema.         Skin:   No bleeding, bruising or rash   Neurologic:  I had difficulty awakening him and could not get any history from him      Monitor: Atrial fibrillation  Results Review:     I reviewed the patient's new clinical results.    Sodium Sodium   Date Value Ref Range Status   02/11/2021 139 136 - 145 mmol/L Final   02/10/2021 143 136 - 145 mmol/L Final   02/09/2021 145 136 - 145 mmol/L Final      Potassium Potassium   Date Value Ref Range Status   02/11/2021 4.0 3.5 - 5.2 mmol/L Final     Comment:     Specimen hemolyzed.  Results may be affected.   02/10/2021 3.0 (L) 3.5 - 5.2 mmol/L Final   02/09/2021 3.1 (L) 3.5 - 5.2 mmol/L Final   02/08/2021 3.1 (L) 3.5 - 5.2 mmol/L Final      Chloride Chloride   Date Value Ref Range Status   02/11/2021 111 (H) 98 - 107 mmol/L Final   02/10/2021 113 (H) 98 - 107 mmol/L Final   02/09/2021 115 (H) 98 - 107 mmol/L Final      Bicarbonate No results found for: PLASMABICARB   BUN BUN   Date Value Ref Range Status   02/11/2021 24 (H) 8 - 23 mg/dL Final   02/10/2021 24 (H) 8 - 23 mg/dL Final   02/09/2021 22 8 - 23 mg/dL Final      Creatinine Creatinine   Date Value Ref Range Status   02/11/2021 2.55 (H) 0.76 - 1.27 mg/dL Final   02/10/2021 2.76 (H) 0.76 - 1.27 mg/dL Final   02/09/2021 2.69 (H) 0.76 - 1.27 mg/dL Final      Calcium Calcium   Date Value Ref Range Status   02/11/2021 8.7 8.6 - 10.5 mg/dL Final   02/10/2021 8.6 8.6 - 10.5 mg/dL Final   02/09/2021 9.2 8.6 - 10.5 mg/dL Final      Magnesium Magnesium   Date Value Ref Range Status   02/11/2021 2.0 1.6 - 2.4 mg/dL Final   02/09/2021 1.9 1.6 - 2.4 mg/dL Final        Results from last 7 days   Lab Units 02/11/21  0545    WBC 10*3/mm3 7.34   HEMOGLOBIN g/dL 9.2*   HEMATOCRIT % 29.2*   PLATELETS 10*3/mm3 85*     Lab Results   Lab Value Date/Time    TROPONINT 0.110 (C) 02/04/2021 1551    TROPONINT 0.171 (C) 12/23/2020 0357    TROPONINT 0.141 (C) 12/22/2020 0549    TROPONINT 0.147 (C) 12/21/2020 1450    TROPONINT 0.128 (C) 12/21/2020 1223     Lab Results   Component Value Date    CHOL 108 12/22/2020     Lab Results   Component Value Date    HDL 49 12/22/2020     Lab Results   Component Value Date    LDL 42 12/22/2020     Lab Results   Component Value Date    TRIG 86 12/22/2020     No components found for: CHOLHDL            Echo EF Estimated  No results found for: ECHOEFEST       Assessment/ Plan  Acute metabolic encephalopathy    Hyperlipidemia    Hypertension    ESRD (end stage renal disease) (CMS/Formerly [facility])    DM2 (diabetes mellitus, type 2) (CMS/Formerly [facility])    Anemia, chronic disease    UTI (urinary tract infection), bacterial    Elevated troponin, noncardiac    Hypokalemia    Uncontrolled hypertension  History of nonsustained ventricular tachycardia  History of marked first-degree AV block and history of some junctional rhythm  History of syncope  Hypertensive cardiovascular disease, normal LVEF 59% on echo 12/20/2020  He has refused stress testing in the past.  History of monoclonal gammopathy  History of immobility  History of microcytic anemia and abnormal findings of the rectum.  Gastroenterology mentions need for EGD and colonoscopy when seen in December 2020.  Unsure if he ever had those done.  Plan 1.  Blood pressure little low.  Heart rate controlled.  I reduced the diltiazem dose slightly.  He is on diltiazem at a low dose and a very low dose of metoprolol.  Does have a history the past of some junctional rhythms and pauses.  We will have to watch his rate since he is on both of those.    Terrance Muhammad MD  02/11/21  10:18 EST      Time: 18 minutes

## 2021-02-11 NOTE — PROGRESS NOTES
"DAILY PROGRESS NOTE  [facility]    Patient Identification:  Name: Gregorio Alejandro  Age: 65 y.o.  Sex: male  :  1955  MRN: [id number]         Primary Care Physician: Maya Ribeiro APRN    Subjective:  Interval History:He is confused.     Objective:    Scheduled Meds:amLODIPine, 5 mg, Oral, Q24H  ammonium lactate, , Topical, BID  aspirin, 81 mg, Oral, Daily  atorvastatin, 20 mg, Oral, Daily  cefTRIAXone, 1 g, Intravenous, Q24H  dilTIAZem, 30 mg, Oral, Q12H  hydrALAZINE, 100 mg, Oral, Q8H  insulin lispro, 0-9 Units, Subcutaneous, TID AC  LORazepam, 1 mg, Intravenous, Once  metoprolol tartrate, 12.5 mg, Oral, Q12H  sodium bicarbonate, 1,300 mg, Oral, TID  sodium chloride, 10 mL, Intravenous, Q12H  Vancomycin Pharmacy Intermittent Dosing, , Does not apply, Daily      Continuous Infusions:Pharmacy to dose vancomycin,         Vital signs in last 24 hours:  Temp:  [97.2 °F (36.2 °C)-97.8 °F (36.6 °C)] 97.7 °F (36.5 °C)  Heart Rate:  [67-79] 67  Resp:  [16-20] 18  BP: (109-144)/(80-98) 109/82    Intake/Output:    Intake/Output Summary (Last 24 hours) at 2021 1154  Last data filed at 2021 0845  Gross per 24 hour   Intake 360 ml   Output --   Net 360 ml       Exam:  /82 (BP Location: Left arm, Patient Position: Lying)   Pulse 67   Temp 97.7 °F (36.5 °C) (Oral)   Resp 18   Ht 190.5 cm (75\")   Wt 107 kg (236 lb 1.8 oz)   SpO2 99%   BMI 29.51 kg/m²     General Appearance:    confused, no distress   Head:    Normocephalic, without obvious abnormality, atraumatic   Eyes:       Throat:   Lips, tongue, gums normal   Neck:   Supple, symmetrical, trachea midline, no JVD   Lungs:     Clear to auscultation bilaterally, respirations unlabored   Chest Wall:    No tenderness or deformity    Heart:    Regular rate and rhythm, S1 and S2 normal, no murmur,no  Rub or gallop   Abdomen:     Soft, nontender, bowel sounds active, no masses, no organomegaly    Extremities:   Extremities normal, " atraumatic, no cyanosis or edema   Pulses:      Skin:   Skin is warm and dry,  no rashes or palpable lesions   Neurologic:   Confused       Lab Results (last 72 hours)     Procedure Component Value Units Date/Time    Blood Culture - Blood, Arm, Right [[id number]] Collected: 02/05/21 1431    Specimen: Blood from Arm, Right Updated: 02/08/21 1445     Blood Culture No growth at 3 days    Blood Culture - Blood, Arm, Left [[id number]] Collected: 02/05/21 1433    Specimen: Blood from Arm, Left Updated: 02/08/21 1445     Blood Culture No growth at 3 days    POC Glucose Once [[id number]]  (Abnormal) Collected: 02/08/21 1150    Specimen: Blood Updated: 02/08/21 1203     Glucose 193 mg/dL     Blood Culture - Blood, Hand, Right [[id number]]  (Abnormal) Collected: 02/04/21 1551    Specimen: Blood from Hand, Right Updated: 02/08/21 0821     Blood Culture Staphylococcus epidermidis     Comment: Refer to previous blood culture collected on 2-4-21 for MICs        Isolated from Anaerobic Bottle     Blood Culture Aerococcus viridans     Isolated from Anaerobic Bottle     Gram Stain Anaerobic Bottle Gram positive cocci in clusters    Blood Culture - Blood, Hand, Right [[id number]]  (Abnormal)  (Susceptibility) Collected: 02/04/21 1551    Specimen: Blood from Hand, Right Updated: 02/08/21 0820     Blood Culture Staphylococcus epidermidis     Isolated from Aerobic Bottle     Blood Culture Aerococcus viridans     Comment: Susceptibilities for Ceftriaxone and Vancomycin to follow.        Isolated from Aerobic Bottle     Blood Culture Globicatella sanguinis     Comment: Anaerobic bottle only  No CLSI guidelines and no validated ISAC testing method.          Isolated from Anaerobic Bottle     Gram Stain Aerobic Bottle Gram positive cocci in clusters      Anaerobic Bottle Gram positive cocci in clusters    Narrative:            Susceptibility      Staphylococcus epidermidis     ISAC     Oxacillin Resistant     Vancomycin Susceptible                 Susceptibility Comments     Staphylococcus epidermidis    This isolate is presumed to be clindamycin resistant based on detection of inducible clindamycin resistance.  Clindamycin may still be effective in some patients.             CBC (No Diff) [[id number]]  (Abnormal) Collected: 02/08/21 0609    Specimen: Blood Updated: 02/08/21 0725     WBC 11.58 10*3/mm3      RBC 4.75 10*6/mm3      Hemoglobin 10.7 g/dL      Hematocrit 33.1 %      MCV 69.7 fL      MCH 22.5 pg      MCHC 32.3 g/dL      RDW 24.5 %      RDW-SD 57.6 fl      Platelets 209 10*3/mm3     Vancomycin, Random [[id number]]  (Normal) Collected: 02/08/21 0609    Specimen: Blood Updated: 02/08/21 0704     Vancomycin Random 17.20 mcg/mL     Narrative:      Therapeutic Ranges for Vancomycin    Vancomycin Random   5.0-40.0 mcg/mL  Vancomycin Trough   5.0-20.0 mcg.mL  Vancomycin Peak     20.0-40.0 mcg/mL    Renal Function Panel [[id number]]  (Abnormal) Collected: 02/08/21 0609    Specimen: Blood Updated: 02/08/21 0704     Glucose 159 mg/dL      BUN 20 mg/dL      Creatinine 2.23 mg/dL      Sodium 142 mmol/L      Potassium 3.8 mmol/L      Comment: Slight hemolysis detected by analyzer. Results may be affected.        Chloride 114 mmol/L      CO2 14.6 mmol/L      Calcium 9.0 mg/dL      Albumin 2.60 g/dL      Phosphorus 2.7 mg/dL      Anion Gap 13.4 mmol/L      BUN/Creatinine Ratio 9.0     eGFR  African Amer 36 mL/min/1.73     Narrative:      GFR Normal >60  Chronic Kidney Disease <60  Kidney Failure <15      Magnesium [[id number]]  (Normal) Collected: 02/08/21 0609    Specimen: Blood Updated: 02/08/21 0703     Magnesium 2.0 mg/dL     Ammonia [[id number]]  (Normal) Collected: 02/08/21 0609    Specimen: Blood Updated: 02/08/21 0654     Ammonia 25 umol/L     POC Glucose Once [[id number]]  (Abnormal) Collected: 02/08/21 0628    Specimen: Blood Updated: 02/08/21 0629     Glucose 138 mg/dL     Potassium, Urine, Random - Urine, Clean Catch [[id number]] Collected: 02/08/21 0509     Specimen: Urine, Clean Catch Updated: 02/08/21 0625     Potassium, Urine <3.0 mmol/L     Narrative:      Reference intervals for random urine have not been established.  Clinical usage is dependent upon physician's interpretation in combination with other laboratory tests.       Sodium, Urine, Random - Urine, Clean Catch [[id number]] Collected: 02/08/21 0550    Specimen: Urine, Clean Catch Updated: 02/08/21 0625     Sodium, Urine <20 mmol/L     Narrative:      Reference intervals for random urine have not been established.  Clinical usage is dependent upon physician's interpretation in combination with other laboratory tests.       Potassium [[id number]]  (Abnormal) Collected: 02/08/21 0007    Specimen: Blood Updated: 02/08/21 0046     Potassium 2.9 mmol/L     POC Glucose Once [[id number]]  (Abnormal) Collected: 02/07/21 2108    Specimen: Blood Updated: 02/07/21 2110     Glucose 144 mg/dL     Renal Function Panel [[id number]]  (Abnormal) Collected: 02/07/21 1557    Specimen: Blood from Arm, Right Updated: 02/07/21 1657     Glucose 147 mg/dL      BUN 19 mg/dL      Creatinine 2.32 mg/dL      Sodium 145 mmol/L      Potassium 2.7 mmol/L      Chloride 115 mmol/L      CO2 16.7 mmol/L      Calcium 8.7 mg/dL      Albumin 2.90 g/dL      Phosphorus 2.8 mg/dL      Anion Gap 13.3 mmol/L      BUN/Creatinine Ratio 8.2     eGFR  African Amer 34 mL/min/1.73     Narrative:      GFR Normal >60  Chronic Kidney Disease <60  Kidney Failure <15      POC Glucose Once [[id number]]  (Abnormal) Collected: 02/07/21 1537    Specimen: Blood Updated: 02/07/21 1539     Glucose 162 mg/dL     POC Glucose Once [[id number]]  (Abnormal) Collected: 02/07/21 1130    Specimen: Blood Updated: 02/07/21 1137     Glucose 147 mg/dL     Gastrointestinal Panel, PCR - Stool, Per Rectum [[id number]]  (Normal) Collected: 02/05/21 2204    Specimen: Stool from Per Rectum Updated: 02/07/21 0950     Campylobacter Not Detected     Plesiomonas shigelloides Not Detected      Salmonella Not Detected     Vibrio Not Detected     Vibrio cholerae Not Detected     Yersinia enterocolitica Not Detected     Enteroaggregative E. coli (EAEC) Not Detected     Enteropathogenic E. coli (EPEC) Not Detected     Enterotoxigenic E. coli (ETEC) lt/st Not Detected     Shiga-like toxin-producing E. coli (STEC) stx1/stx2 Not Detected     E. coli O157 Not Detected     Shigella/Enteroinvasive E. coli (EIEC) Not Detected     Cryptosporidium Not Detected     Cyclospora cayetanensis Not Detected     Entamoeba histolytica Not Detected     Giardia lamblia Not Detected     Adenovirus F40/41 Not Detected     Astrovirus Not Detected     Norovirus GI/GII Not Detected     Rotavirus A Not Detected     Sapovirus (I, II, IV or V) Not Detected    Narrative:      If Aeromonas, Staphylococcus aureus or Bacillus cereus are suspected, please order [id number]: Stool Culture, Aeromonas, S aureus, B Cereus.    POC Glucose Once [[id number]]  (Abnormal) Collected: 02/07/21 0616    Specimen: Blood Updated: 02/07/21 0617     Glucose 182 mg/dL     Renal Function Panel [[id number]]  (Abnormal) Collected: 02/07/21 0458    Specimen: Blood Updated: 02/07/21 0609     Glucose 156 mg/dL      BUN 20 mg/dL      Creatinine 2.37 mg/dL      Sodium 143 mmol/L      Potassium 2.8 mmol/L      Chloride 115 mmol/L      CO2 16.4 mmol/L      Calcium 8.7 mg/dL      Albumin 2.70 g/dL      Phosphorus 1.8 mg/dL      Anion Gap 11.6 mmol/L      BUN/Creatinine Ratio 8.4     eGFR  African Amer 33 mL/min/1.73     Narrative:      GFR Normal >60  Chronic Kidney Disease <60  Kidney Failure <15      Vancomycin, Random [[id number]]  (Normal) Collected: 02/07/21 0458    Specimen: Blood Updated: 02/07/21 0553     Vancomycin Random 17.40 mcg/mL     Narrative:      Therapeutic Ranges for Vancomycin    Vancomycin Random   5.0-40.0 mcg/mL  Vancomycin Trough   5.0-20.0 mcg.mL  Vancomycin Peak     20.0-40.0 mcg/mL    CBC (No Diff) [[id number]]  (Abnormal) Collected: 02/07/21 0458     Specimen: Blood Updated: 02/07/21 0535     WBC 8.61 10*3/mm3      RBC 4.32 10*6/mm3      Hemoglobin 10.0 g/dL      Hematocrit 31.0 %      MCV 71.8 fL      MCH 23.1 pg      MCHC 32.3 g/dL      RDW 23.8 %      RDW-SD 58.1 fl      MPV --     Comment: Unable to calculate        Platelets 253 10*3/mm3     Potassium [[id number]]  (Abnormal) Collected: 02/06/21 2021    Specimen: Blood Updated: 02/06/21 2059     Potassium 2.9 mmol/L     Phosphorus [[id number]]  (Abnormal) Collected: 02/06/21 2021    Specimen: Blood Updated: 02/06/21 2059     Phosphorus 2.1 mg/dL     Magnesium [[id number]]  (Normal) Collected: 02/06/21 2021    Specimen: Blood Updated: 02/06/21 2052     Magnesium 2.0 mg/dL     POC Glucose Once [[id number]]  (Abnormal) Collected: 02/06/21 2043    Specimen: Blood Updated: 02/06/21 2044     Glucose 158 mg/dL     POC Glucose Once [[id number]]  (Abnormal) Collected: 02/06/21 1706    Specimen: Blood Updated: 02/06/21 1708     Glucose 134 mg/dL     POC Glucose Once [[id number]]  (Normal) Collected: 02/06/21 1114    Specimen: Blood Updated: 02/06/21 1116     Glucose 110 mg/dL     Urine Culture - Urine, Urine, Catheter [[id number]]  (Abnormal)  (Susceptibility) Collected: 02/04/21 1612    Specimen: Urine, Catheter Updated: 02/06/21 0940     Urine Culture 25,000 CFU/mL Proteus mirabilis    Susceptibility      Proteus mirabilis     ISAC     Ampicillin Susceptible     Ampicillin + Sulbactam Susceptible     Cefazolin Susceptible     Cefepime Susceptible     Ceftazidime Susceptible     Ceftriaxone Susceptible     Gentamicin Susceptible     Levofloxacin Intermediate     Nitrofurantoin Resistant     Piperacillin + Tazobactam Susceptible     Tetracycline Resistant     Trimethoprim + Sulfamethoxazole Resistant                    Manual Differential [[id number]]  (Abnormal) Collected: 02/06/21 0610    Specimen: Blood Updated: 02/06/21 0722     Neutrophil % 93.0 %      Lymphocyte % 4.0 %      Monocyte % 1.0 %      Eosinophil % 1.0 %       Basophil % 1.0 %      Neutrophils Absolute 8.85 10*3/mm3      Lymphocytes Absolute 0.38 10*3/mm3      Monocytes Absolute 0.10 10*3/mm3      Eosinophils Absolute 0.10 10*3/mm3      Basophils Absolute 0.10 10*3/mm3      Anisocytosis Mod/2+     Liz Cells Slight/1+     Hypochromia Slight/1+     Macrocytes Slight/1+     Microcytes Slight/1+     Poikilocytes Mod/2+     Polychromasia Large/3+     RBC Fragments Slight/1+     Target Cells Slight/1+     Schistocytes Slight/1+     WBC Morphology Normal     Platelet Morphology Normal    Renal Function Panel [[id number]]  (Abnormal) Collected: 02/06/21 0610    Specimen: Blood Updated: 02/06/21 0716     Glucose 164 mg/dL      BUN 20 mg/dL      Creatinine 2.39 mg/dL      Sodium 142 mmol/L      Potassium 3.3 mmol/L      Chloride 115 mmol/L      CO2 18.9 mmol/L      Calcium 8.5 mg/dL      Albumin 2.50 g/dL      Phosphorus 1.4 mg/dL      Anion Gap 8.1 mmol/L      BUN/Creatinine Ratio 8.4     eGFR  African Amer 33 mL/min/1.73     Narrative:      GFR Normal >60  Chronic Kidney Disease <60  Kidney Failure <15      Uric Acid [[id number]]  (Abnormal) Collected: 02/06/21 0610    Specimen: Blood Updated: 02/06/21 0701     Uric Acid 7.2 mg/dL     Magnesium [[id number]]  (Normal) Collected: 02/06/21 0610    Specimen: Blood Updated: 02/06/21 0701     Magnesium 1.7 mg/dL     Vancomycin, Random [[id number]]  (Normal) Collected: 02/06/21 0610    Specimen: Blood Updated: 02/06/21 0659     Vancomycin Random 15.10 mcg/mL     Narrative:      Therapeutic Ranges for Vancomycin    Vancomycin Random   5.0-40.0 mcg/mL  Vancomycin Trough   5.0-20.0 mcg.mL  Vancomycin Peak     20.0-40.0 mcg/mL    CBC & Differential [[id number]]  (Abnormal) Collected: 02/06/21 0610    Specimen: Blood Updated: 02/06/21 0644    Narrative:      The following orders were created for panel order CBC & Differential.  Procedure                               Abnormality         Status                     ---------                                -----------         ------                     CBC Auto Differential[[id number]]        Abnormal            Final result                 Please view results for these tests on the individual orders.    CBC Auto Differential [[id number]]  (Abnormal) Collected: 02/06/21 0610    Specimen: Blood Updated: 02/06/21 0644     WBC 9.52 10*3/mm3      RBC 4.61 10*6/mm3      Hemoglobin 10.6 g/dL      Hematocrit 33.2 %      MCV 72.0 fL      MCH 23.0 pg      MCHC 31.9 g/dL      RDW 24.0 %      RDW-SD 57.9 fl      Platelets 280 10*3/mm3     POC Glucose Once [[id number]]  (Abnormal) Collected: 02/06/21 0619    Specimen: Blood Updated: 02/06/21 0623     Glucose 154 mg/dL     Clostridium Difficile Toxin - Stool, Per Rectum [[id number]]  (Normal) Collected: 02/05/21 2204    Specimen: Stool from Per Rectum Updated: 02/05/21 2303    Narrative:      The following orders were created for panel order Clostridium Difficile Toxin - Stool, Per Rectum.  Procedure                               Abnormality         Status                     ---------                               -----------         ------                     Clostridium Difficile To...[[id number]]  Normal              Final result                 Please view results for these tests on the individual orders.    Clostridium Difficile Toxin, PCR - Stool, Per Rectum [[id number]]  (Normal) Collected: 02/05/21 2204    Specimen: Stool from Per Rectum Updated: 02/05/21 2303     C. Difficile Toxins by PCR Negative    Potassium [[id number]]  (Normal) Collected: 02/05/21 2105    Specimen: Blood from Hand, Left Updated: 02/05/21 2146     Potassium 3.5 mmol/L     POC Glucose Once [[id number]]  (Abnormal) Collected: 02/05/21 2010    Specimen: Blood Updated: 02/05/21 2011     Glucose 146 mg/dL         Data Review:  Results from last 7 days   Lab Units 02/11/21  0545 02/10/21  0916 02/09/21  0918   SODIUM mmol/L 139 143 145   POTASSIUM mmol/L 4.0 3.0* 3.1*   CHLORIDE mmol/L 111* 113* 115*   CO2  mmol/L 12.7* 17.8* 17.3*   BUN mg/dL 24* 24* 22   CREATININE mg/dL 2.55* 2.76* 2.69*   GLUCOSE mg/dL 144* 153* 149*   CALCIUM mg/dL 8.7 8.6 9.2     Results from last 7 days   Lab Units 02/11/21  0545 02/10/21  0916 02/09/21  0633   WBC 10*3/mm3 7.34 7.35 9.09   HEMOGLOBIN g/dL 9.2* 10.9* 10.4*   HEMATOCRIT % 29.2* 33.9* 32.0*   PLATELETS 10*3/mm3 85* 306 287             Lab Results   Lab Value Date/Time    TROPONINT 0.110 (C) 02/04/2021 1551    TROPONINT 0.171 (C) 12/23/2020 0357    TROPONINT 0.141 (C) 12/22/2020 0549    TROPONINT 0.147 (C) 12/21/2020 1450    TROPONINT 0.128 (C) 12/21/2020 1223         Results from last 7 days   Lab Units 02/05/21  0312 02/04/21  1551   ALK PHOS U/L 91 104   BILIRUBIN mg/dL 0.6 0.9   ALT (SGPT) U/L 7 7   AST (SGOT) U/L 10 14             Glucose   Date/Time Value Ref Range Status   02/11/2021 1054 180 (H) 70 - 130 mg/dL Final   02/11/2021 0624 146 (H) 70 - 130 mg/dL Final   02/10/2021 2105 157 (H) 70 - 130 mg/dL Final   02/10/2021 1555 121 70 - 130 mg/dL Final   02/10/2021 1030 142 (H) 70 - 130 mg/dL Final   02/10/2021 0607 133 (H) 70 - 130 mg/dL Final   02/09/2021 2013 148 (H) 70 - 130 mg/dL Final   02/09/2021 1558 173 (H) 70 - 130 mg/dL Final           Past Medical History:   Diagnosis Date   • Back pain    • CKD (chronic kidney disease)    • DM type 2 (diabetes mellitus, type 2) (CMS/HCC)    • ED (erectile dysfunction)    • Hyperlipidemia    • Hypertension    • SCOTTY (iron deficiency anemia)    • Monoclonal gammopathy    • Osteoarthritis        Assessment:  Active Hospital Problems    Diagnosis  POA   • **Acute metabolic encephalopathy [G93.41]  Yes   • Vascular dementia without behavioral disturbance (CMS/HCC) [F01.50]  Unknown   • Septicemia (CMS/HCC) [A41.9]  Yes   • UTI (urinary tract infection), bacterial [N39.0, A49.9]  Yes   • Elevated troponin [R77.8]  Yes   • Hypokalemia [E87.6]  Yes   • Uncontrolled hypertension [I10]  Yes   • Anemia, chronic disease [D63.8]  Yes   • DM2  (diabetes mellitus, type 2) (CMS/HCC) [E11.9]  Yes   • Stage 4 chronic kidney disease (CMS/HCC) [N18.4]  Yes   • Hyperlipidemia [E78.5]  Yes   • Hypertension [I10]  Yes      Resolved Hospital Problems   No resolved problems to display.       Plan:  Continue antibiotics. As per multiple consults.  neurology consult noted.  Follow lab.  MRI brain.    Jono Montes MD  2/11/2021  11:54 EST

## 2021-02-11 NOTE — PROGRESS NOTES
Patient Identification:  NAME:  Gregorio Alejandro  Age:  65 y.o.   Sex:  male   :  1955   MRN:  [id number]       Chief complaint: Metabolic encephalopathy vascular dementia    History of present illness: He remains moderately alert but will not count fingers and remains quite confused.  He is more calm  Review of systems no headache no paresthesias no paralysis no fever chills or rash    Past medical history:  Past Medical History:   Diagnosis Date   • Back pain    • CKD (chronic kidney disease)    • DM type 2 (diabetes mellitus, type 2) (CMS/[facility])    • ED (erectile dysfunction)    • Hyperlipidemia    • Hypertension    • SCOTTY (iron deficiency anemia)    • Monoclonal gammopathy    • Osteoarthritis        Allergies:  Patient has no known allergies.    Home medications:  Medications Prior to Admission   Medication Sig Dispense Refill Last Dose   • amLODIPine (NORVASC) 5 MG tablet Take 1 tablet by mouth Daily. 30 tablet 0 2021 at Unknown time   • aspirin 81 MG EC tablet Take 1 tablet by mouth Daily. 30 tablet 0 2021 at Unknown time   • atorvastatin (LIPITOR) 20 MG tablet Take 20 mg by mouth Daily.   2021 at Unknown time   • brimonidine (ALPHAGAN P) 0.1 % solution ophthalmic solution 1 drop 2 (two) times a day.   2021 at Unknown time   • dorzolamide (TRUSOPT) 2 % ophthalmic solution Administer 1 drop to the right eye 2 (two) times a day.   2021 at Unknown time   • furosemide (LASIX) 80 MG tablet Take 1 tablet by mouth Daily. 30 tablet 0 2021 at Unknown time   • HUMALOG KWIKPEN 100 UNIT/ML solution pen-injector INJECT 20 UNITS INTO THE SKIN TWICE DAILY 15 mL 0 2021 at Unknown time   • sodium bicarbonate 650 MG tablet Take 1 tablet by mouth 2 (Two) Times a Day. 60 tablet 0 2021 at Unknown time   • diphenhydrAMINE (BENADRYL) 25 mg capsule Take 25 mg by mouth Every 6 (Six) Hours As Needed for Itching or Allergies.   More than a month at Unknown time   • HYDROcodone-acetaminophen (NORCO)   MG per tablet Take 1 tablet po every 4 hours PRN for moderate pain, take two tablets po every 4 hours PRN for severe pain, valid if faxed to [facility]   tablet 0 Unknown at Unknown time   • WAL-DRYL ALLERGY 25 MG Take 1 capsule by mouth Every 6 (Six) Hours As Needed for itching. 30 capsule 0 Unknown at Unknown time        Hospital medications:  amLODIPine, 5 mg, Oral, Q24H  ammonium lactate, , Topical, BID  aspirin, 81 mg, Oral, Daily  atorvastatin, 20 mg, Oral, Daily  cefTRIAXone, 1 g, Intravenous, Q24H  dilTIAZem, 30 mg, Oral, Q12H  hydrALAZINE, 100 mg, Oral, Q8H  insulin lispro, 0-9 Units, Subcutaneous, TID AC  metoprolol tartrate, 12.5 mg, Oral, Q12H  sodium bicarbonate, 1,300 mg, Oral, TID  sodium chloride, 10 mL, Intravenous, Q12H  Vancomycin Pharmacy Intermittent Dosing, , Does not apply, Daily      Pharmacy to dose vancomycin,       •  acetaminophen **OR** acetaminophen **OR** acetaminophen  •  dextrose  •  dextrose  •  diphenhydrAMINE  •  glucagon (human recombinant)  •  hydrALAZINE  •  hydrALAZINE  •  HYDROcodone-acetaminophen  •  ondansetron **OR** ondansetron  •  Pharmacy to dose vancomycin  •  sodium chloride  •  sodium chloride      Objective:  Vitals Ranges:   Temp:  [97.2 °F (36.2 °C)-97.8 °F (36.6 °C)] 97.7 °F (36.5 °C)  Heart Rate:  [67-79] 67  Resp:  [16-20] 18  BP: (109-144)/(80-98) 109/82      Physical Exam:  Awake and alert blinks to threat but will not count fingers he will repeat yes and no and I do not think he is aphasic face is symmetrical.  Pupils do react he raises both arms and is able to wiggle the toes reflexes absent toes downgoing    Results review:   I reviewed the patient's new clinical results.    Data review:  Lab Results (last 24 hours)     Procedure Component Value Units Date/Time    CBC & Differential [[id number]]  (Abnormal) Collected: 02/11/21 0545    Specimen: Blood Updated: 02/11/21 0711    Narrative:      The following orders were created for panel order CBC &  Differential.  Procedure                               Abnormality         Status                     ---------                               -----------         ------                     CBC Auto Differential[[id number]]        Abnormal            Final result                 Please view results for these tests on the individual orders.    CBC Auto Differential [[id number]]  (Abnormal) Collected: 02/11/21 0545    Specimen: Blood Updated: 02/11/21 0711     WBC 7.34 10*3/mm3      RBC 4.02 10*6/mm3      Hemoglobin 9.2 g/dL      Hematocrit 29.2 %      MCV 72.6 fL      MCH 22.9 pg      MCHC 31.5 g/dL      RDW 24.0 %      RDW-SD 59.1 fl      Platelets 85 10*3/mm3     Manual Differential [[id number]]  (Abnormal) Collected: 02/11/21 0545    Specimen: Blood Updated: 02/11/21 0711     Neutrophil % 96.9 %      Lymphocyte % 0.0 %      Monocyte % 2.0 %      Basophil % 1.0 %      Neutrophils Absolute 7.11 10*3/mm3      Lymphocytes Absolute 0.00 10*3/mm3      Monocytes Absolute 0.15 10*3/mm3      Basophils Absolute 0.07 10*3/mm3      Anisocytosis Mod/2+     Helmet Cells Slight/1+     Hypochromia Slight/1+     Microcytes Slight/1+     Poikilocytes Large/3+     Target Cells Slight/1+     Schistocytes Mod/2+     WBC Morphology Normal     Platelet Morphology Normal    Renal Function Panel [[id number]]  (Abnormal) Collected: 02/11/21 0545    Specimen: Blood Updated: 02/11/21 0709     Glucose 144 mg/dL      BUN 24 mg/dL      Creatinine 2.55 mg/dL      Sodium 139 mmol/L      Potassium 4.0 mmol/L      Comment: Specimen hemolyzed.  Results may be affected.        Chloride 111 mmol/L      CO2 12.7 mmol/L      Calcium 8.7 mg/dL      Albumin 2.40 g/dL      Phosphorus 2.9 mg/dL      Anion Gap 15.3 mmol/L      BUN/Creatinine Ratio 9.4     eGFR  African Amer 31 mL/min/1.73     Narrative:      GFR Normal >60  Chronic Kidney Disease <60  Kidney Failure <15      Uric Acid [[id number]]  (Abnormal) Collected: 02/11/21 0545    Specimen: Blood Updated:  02/11/21 0707     Uric Acid 8.3 mg/dL     Magnesium [[id number]]  (Normal) Collected: 02/11/21 0545    Specimen: Blood Updated: 02/11/21 0707     Magnesium 2.0 mg/dL     POC Glucose Once [[id number]]  (Abnormal) Collected: 02/11/21 0624    Specimen: Blood Updated: 02/11/21 0627     Glucose 146 mg/dL     POC Glucose Once [[id number]]  (Abnormal) Collected: 02/10/21 2105    Specimen: Blood Updated: 02/10/21 2106     Glucose 157 mg/dL     POC Glucose Once [[id number]]  (Normal) Collected: 02/10/21 1555    Specimen: Blood Updated: 02/10/21 1556     Glucose 121 mg/dL     Blood Culture - Blood, Arm, Right [[id number]] Collected: 02/05/21 1431    Specimen: Blood from Arm, Right Updated: 02/10/21 1445     Blood Culture No growth at 5 days    Blood Culture - Blood, Arm, Left [[id number]] Collected: 02/05/21 1433    Specimen: Blood from Arm, Left Updated: 02/10/21 1445     Blood Culture No growth at 5 days           Imaging:  Imaging Results (Last 24 Hours)     ** No results found for the last 24 hours. **         PPE worn at all times washed before washed up afterwards disposed of everything properly was not within 6 feet of him for more than a few minutes during my exam no aerosols used    Assessment and Plan:       Acute metabolic encephalopathy    Hyperlipidemia    Hypertension    Stage 4 chronic kidney disease (CMS/[facility])    DM2 (diabetes mellitus, type 2) (CMS/[facility])    Anemia, chronic disease    UTI (urinary tract infection), bacterial    Elevated troponin    Hypokalemia    Uncontrolled hypertension    Septicemia (CMS/[facility])    Vascular dementia without behavioral disturbance (CMS/[facility])    This patient does not appear to be aphasic and I am not convinced that he is blind, even though he will not count fingers he is severely encephalopathic/confused.  I will proceed with additional diagnostic testing in the form of an MRI of the brain without contrast given his renal failure, and we will make sure he has not had stroke involving the  occipital lobes that would affect his vision.      Pal Monge MD  02/11/21  10:55 EST

## 2021-02-11 NOTE — PLAN OF CARE
"At times patient \"starts\" to have a conversation but when asked any questions about his story he does not respond. Patient turned Q2. Lotion applied to lower extremities. Very little urine output. Will continue to monitor for the rest of my shift.    Goal Outcome Evaluation:           Problem: Fall Injury Risk  Goal: Absence of Fall and Fall-Related Injury  Outcome: Ongoing, Progressing  Intervention: Promote Injury-Free Environment  Recent Flowsheet Documentation  Taken 2/11/2021 0425 by Jana Morley RN  Safety Promotion/Fall Prevention:   safety round/check completed   room organization consistent   activity supervised   assistive device/personal items within reach   clutter free environment maintained  Taken 2/11/2021 0210 by Jana Morley RN  Safety Promotion/Fall Prevention:   safety round/check completed   room organization consistent   activity supervised   assistive device/personal items within reach   clutter free environment maintained  Taken 2/11/2021 0056 by Jana Morley RN  Safety Promotion/Fall Prevention:   safety round/check completed   room organization consistent   activity supervised   assistive device/personal items within reach   clutter free environment maintained  Taken 2/10/2021 2122 by Jana Morley RN  Safety Promotion/Fall Prevention:   safety round/check completed   room organization consistent   activity supervised   assistive device/personal items within reach   clutter free environment maintained     Problem: Adult Inpatient Plan of Care  Goal: Plan of Care Review  Outcome: Ongoing, Progressing  Flowsheets  Taken 2/10/2021 1837 by Ana Llanos, RN  Plan of Care Reviewed With: patient  Taken 2/9/2021 1850 by Rosaura Espinal, RN  Progress: improving  Goal: Patient-Specific Goal (Individualized)  Outcome: Ongoing, Progressing  Goal: Absence of Hospital-Acquired Illness or Injury  Outcome: Ongoing, Progressing  Intervention: Identify and Manage Fall Risk  Recent Flowsheet " Documentation  Taken 2/11/2021 0425 by Jana Morley RN  Safety Promotion/Fall Prevention:   safety round/check completed   room organization consistent   activity supervised   assistive device/personal items within reach   clutter free environment maintained  Taken 2/11/2021 0210 by Jana Morley RN  Safety Promotion/Fall Prevention:   safety round/check completed   room organization consistent   activity supervised   assistive device/personal items within reach   clutter free environment maintained  Taken 2/11/2021 0056 by Jana Morley RN  Safety Promotion/Fall Prevention:   safety round/check completed   room organization consistent   activity supervised   assistive device/personal items within reach   clutter free environment maintained  Taken 2/10/2021 2122 by Jana Morley RN  Safety Promotion/Fall Prevention:   safety round/check completed   room organization consistent   activity supervised   assistive device/personal items within reach   clutter free environment maintained  Intervention: Prevent Skin Injury  Recent Flowsheet Documentation  Taken 2/11/2021 0425 by Jana Morley RN  Body Position: supine  Intervention: Prevent and Manage VTE (venous thromboembolism) Risk  Recent Flowsheet Documentation  Taken 2/10/2021 2122 by Jana Morley RN  VTE Prevention/Management:   bilateral   dorsiflexion/plantar flexion performed  Goal: Optimal Comfort and Wellbeing  Outcome: Ongoing, Progressing  Intervention: Provide Person-Centered Care  Recent Flowsheet Documentation  Taken 2/10/2021 2122 by Jana Morley RN  Trust Relationship/Rapport:   care explained   reassurance provided  Goal: Readiness for Transition of Care  Outcome: Ongoing, Progressing     Problem: Skin Injury Risk Increased  Goal: Skin Health and Integrity  Outcome: Ongoing, Progressing

## 2021-02-11 NOTE — THERAPY EVALUATION
Patient Name: Gregorio Alejandro  : 1955    MRN: [id number]                              Today's Date: 2021       Admit Date: 2021    Visit Dx:     ICD-10-CM ICD-9-CM   1. Acute metabolic encephalopathy  G93.41 348.31   2. Acute UTI  N39.0 599.0   3. Hypokalemia  E87.6 276.8   4. Accelerated hypertension  I10 401.0     Patient Active Problem List   Diagnosis   • Complicated UTI (urinary tract infection)   • Macrocytosis   • Sepsis secondary to UTI (CMS/HCC)   • Metabolic encephalopathy   • Hyperlipidemia   • Hypertension   • Monoclonal gammopathy   • Stage 4 chronic kidney disease (CMS/HCC)   • DM2 (diabetes mellitus, type 2) (CMS/HCC)   • Abnormal CT of the abdomen   • Normal anion gap metabolic acidosis   • Anemia, chronic disease   • Ventricular tachycardia (paroxysmal) (CMS/HCC)   • Acute metabolic encephalopathy   • UTI (urinary tract infection), bacterial   • Elevated troponin   • Hypokalemia   • SILVIA (acute kidney injury) (CMS/HCC)   • Uncontrolled hypertension   • Septicemia (CMS/HCC)   • Vascular dementia without behavioral disturbance (CMS/HCC)     Past Medical History:   Diagnosis Date   • Back pain    • CKD (chronic kidney disease)    • DM type 2 (diabetes mellitus, type 2) (CMS/HCC)    • ED (erectile dysfunction)    • Hyperlipidemia    • Hypertension    • SCOTTY (iron deficiency anemia)    • Monoclonal gammopathy    • Osteoarthritis      History reviewed. No pertinent surgical history.  General Information     Row Name 21 1142          Physical Therapy Time and Intention    Document Type  evaluation  -EM     Mode of Treatment  individual therapy;physical therapy  -EM     Row Name 21 1142          General Information    Patient Profile Reviewed  yes  -EM     Prior Level of Function  max assist: PT notes from 2020 indicate patient returned home from previous hospitalization at [facility] level  -EM     Existing Precautions/Restrictions  fall  -EM     Barriers to Rehab  cognitive  "status;previous functional deficit  -EM     Row Name 02/11/21 1142          Living Environment    Lives With  spouse  -EM     Row Name 02/11/21 1142          Cognition    Orientation Status (Cognition)  oriented to;person pt states \"Gregorio\" when asked his name  -EM     Row Name 02/11/21 1142          Safety Issues, Functional Mobility    Safety Issues Affecting Function (Mobility)  ability to follow commands;insight into deficits/self-awareness;judgment  -EM     Impairments Affecting Function (Mobility)  balance;endurance/activity tolerance;cognition;strength  -EM       User Key  (r) = Recorded By, (t) = Taken By, (c) = Cosigned By    Initials Name Provider Type    Randee Sultana PT Physical Therapist        Mobility     Row Name 02/11/21 1145          Bed Mobility    Bed Mobility  supine-sit;rolling right  -EM     Rolling Right [place] (Bed Mobility)  maximum assist (25% patient effort)  -EM     Supine-Sit [place] (Bed Mobility)  maximum assist (25% patient effort);2 person assist  -EM     Assistive Device (Bed Mobility)  head of bed elevated  -EM     Comment (Bed Mobility)  attempted x 3 to achieve full sitting, pt unable to sit fully upright even with maxAx2, leaning heavily to R, very rigid  -EM     Row Name 02/11/21 1145          Transfers    Comment (Transfers)  unsafe to attempt since cannot even achieve full upright sitting  -EM       User Key  (r) = Recorded By, (t) = Taken By, (c) = Cosigned By    Initials Name Provider Type    Randee Sultana PT Physical Therapist        Obj/Interventions     Row Name 02/11/21 1146          Range of Motion Comprehensive    Comment, General Range of Motion  pt very rigid, unable to fully passively extend either knee  -EM     Row Name 02/11/21 1146          Strength Comprehensive (MMT)    General Manual Muscle Testing (MMT) Assessment  other (see comments)  -EM     Comment, General Manual Muscle Testing (MMT) Assessment  no formal assessment, pt " rigid and unable to follow commands  -EM     Row Name 02/11/21 1146          Balance    Balance Assessment  sitting static balance  -EM     Static Sitting Balance  severe impairment  -EM     Row Name 02/11/21 1146          Sensory Assessment (Somatosensory)    Sensory Assessment (Somatosensory)  unable/difficult to assess  -EM       User Key  (r) = Recorded By, (t) = Taken By, (c) = Cosigned By    Initials Name Provider Type    EM Randee Braden PT Physical Therapist        Goals/Plan     [place] [facility] Name 02/11/21 1149          Bed Mobility Goal 1 (PT)    Activity/Assistive Device (Bed Mobility Goal 1, PT)  bed mobility activities, all  -EM     [place] Level/Cues Needed (Bed Mobility Goal 1, PT)  maximum assist (25-49% patient effort)  -EM     Time Frame (Bed Mobility Goal 1, PT)  2 weeks  -     Row Name 02/11/21 1149          Transfer Goal 1 (PT)    Activity/Assistive Device (Transfer Goal 1, PT)  bed-to-chair/chair-to-bed  -EM     [place] Level/Cues Needed (Transfer Goal 1, PT)  maximum assist (25-49% patient effort)  -EM     Time Frame (Transfer Goal 1, PT)  2 weeks  -EM       User Key  (r) = Recorded By, (t) = Taken By, (c) = Cosigned By    Initials Name Provider Type     Randee Braden PT Physical Therapist        Clinical Impression     [place] [facility] Name 02/11/21 1147          Pain Scale: Numbers Pre/Post-Treatment    Pretreatment Pain Rating  -- unable to assess pain accurately, pt not verbalizing much, does not follow commands or answer questions directly  -     Row Name 02/11/21 1147          Plan of Care Review    Plan of Care Reviewed With  patient  -[facility] [place] Name 02/11/21 1147          Therapy Assessment/Plan (PT)    Patient/Family Therapy Goals Statement (PT)  spouse d/c plan is to NH per chart, pt unable to state  -EM     Rehab Potential (PT)  fair, will monitor progress closely  -EM     Criteria for Skilled Interventions Met (PT)  yes;skilled treatment is necessary  -     Row Name  02/11/21 1147          Positioning and Restraints    Pre-Treatment Position  in bed  -EM     Post Treatment Position  bed  -EM     In Bed  side lying right;call light within reach;exit alarm on  -EM       User Key  (r) = Recorded By, (t) = Taken By, (c) = Cosigned By    Initials Name Provider Type    EM Randee Braden PT Physical Therapist        Outcome Measures     Row Name 02/11/21 1149          How much help from another person do you currently need...    Turning from your back to your side while in flat bed without using bedrails?  1  -EM     Moving from lying on back to sitting on the side of a flat bed without bedrails?  1  -EM     Moving to and from a bed to a chair (including a wheelchair)?  1  -EM     Standing up from a chair using your arms (e.g., wheelchair, bedside chair)?  1  -EM     Climbing 3-5 steps with a railing?  1  -EM     To walk in hospital room?  1  -EM     AM-PAC 6 Clicks Score (PT)  6  -EM     Row Name 02/11/21 1149          Functional Assessment    Outcome Measure Options  AM-PAC 6 Clicks Basic Mobility (PT)  -EM       User Key  (r) = Recorded By, (t) = Taken By, (c) = Cosigned By    Initials Name Provider Type    EM Randee Braden PT Physical Therapist        Physical Therapy Education                 Title: PT OT SLP Therapies (In Progress)     Topic: Physical Therapy (In Progress)     Point: Mobility training (In Progress)     Learning Progress Summary           Patient Acceptance, E, NL by EM at 2/11/2021 1150                   Point: Home exercise program (Not Started)     Learner Progress:  Not documented in this visit.          Point: Body mechanics (Not Started)     Learner Progress:  Not documented in this visit.          Point: Precautions (Not Started)     Learner Progress:  Not documented in this visit.                      User Key     Initials Effective Dates Name Provider Type Discipline    EM 04/03/18 -  Randee Braden PT Physical Therapist PT               PT Recommendation and Plan  Planned Therapy Interventions (PT): bed mobility training, home exercise program, transfer training, balance training  Plan of Care Reviewed With: patient     Time Calculation:   PT Charges     Row Name 02/11/21 1154             Time Calculation    Start Time  0855  -EM      Stop Time  0919  -EM      Time Calculation (min)  24 min  -EM      PT Received On  02/11/21  -EM      PT - Next Appointment  02/12/21  -EM      PT Goal Re-Cert Due Date  02/25/21  -EM         Time Calculation- PT    Total Timed Code Minutes- PT  15 minute(s)  -EM        User Key  (r) = Recorded By, (t) = Taken By, (c) = Cosigned By    Initials Name Provider Type    EM Randee Braden, PT Physical Therapist        Therapy Charges for Today     Code Description Service Date Service Provider Modifiers Qty    58601739326 HC PT EVAL MOD COMPLEXITY 2 2/11/2021 Randee Braden, PT GP 1    90203540946 HC PT THER PROC EA 15 MIN 2/11/2021 Randee Braden, PT GP 1          PT G-Codes  Outcome Measure Options: AM-PAC 6 Clicks Basic Mobility (PT)  AM-PAC 6 Clicks Score (PT): 6    Randee Braden PT  2/11/2021

## 2021-02-11 NOTE — PROGRESS NOTES
"Pharmacokinetic Consult - Vancomycin Dosing (Follow-up Note)    Gregorio Alejandro is on day 7 pharmacy to dose vancomycin for bacteremia.  Pharmacy dosing vancomycin per Dr Quinteros's request.   Goal AUC= 400-600   Duration of therapy: 10 days (extended per ID's recommendations)  Is Patient on Dialysis or Renal Replacement: No but poor renal function so will dose intermittently    Relevant clinical data and objective history reviewed:  65 y.o. male 190.5 cm (75\") 107 kg (236 lb 1.8 oz)    Creatinine   Date Value Ref Range Status   02/11/2021 2.55 (H) 0.76 - 1.27 mg/dL Final   02/10/2021 2.76 (H) 0.76 - 1.27 mg/dL Final   02/09/2021 2.69 (H) 0.76 - 1.27 mg/dL Final   02/27/2020 6.7 (H) 0.7 - 1.5 mg/dL Final   02/27/2020 6.5 (H) 0.7 - 1.5 mg/dL Final   02/18/2020 7.2 (H) 0.7 - 1.5 mg/dL Final     BUN   Date Value Ref Range Status   02/11/2021 24 (H) 8 - 23 mg/dL Final   02/27/2020 57 (H) 7 - 20 mg/dL Final     Estimated Creatinine Clearance: 37.2 mL/min (A) (by C-G formula based on SCr of 2.55 mg/dL (H)).    Lab Results   Component Value Date    WBC 7.34 02/11/2021     Temp Readings from Last 3 Encounters:   02/11/21 97.7 °F (36.5 °C) (Oral)   12/23/20 97.3 °F (36.3 °C) (Oral)     Cultures:        Anti-Infectives (From admission, onward)      Ordered     Dose/Rate Route Frequency Start Stop    02/11/21 1310  vancomycin 500 mg/100 mL 0.9% NS IVPB (mbp)     Ordering Provider: Jono Montes MD    500 mg Intravenous Once 02/11/21 1400      02/09/21 1032  vancomycin 500 mg/100 mL 0.9% NS IVPB (mbp)     Ordering Provider: Jono Montes MD    500 mg  over 30 Minutes Intravenous Once 02/10/21 1400 02/10/21 1501    02/08/21 1146  vancomycin 500 mg/100 mL 0.9% NS IVPB (mbp)     Ordering Provider: Tonja Azevedo MD    500 mg  over 30 Minutes Intravenous Once 02/08/21 1300 02/08/21 1657    02/06/21 0905  vancomycin 750 mg/250 mL 0.9% NS add-vantage     Ordering Provider: Nixon Quinteros MD    7.5 mg/kg × 109 kg  over 45 Minutes " Intravenous Once 02/06/21 1000 02/06/21 1631    02/04/21 2042  cefTRIAXone (ROCEPHIN) IVPB 1 g     Jono Montes MD reviewed the order on 02/08/21 1557.   Ordering Provider: Jono Montes MD    1 g  100 mL/hr over 30 Minutes Intravenous Every 24 Hours 02/05/21 1800 02/12/21 1556    02/05/21 1330  vancomycin 2250 mg/500 mL 0.9% NS IVPB ([facility])     Ordering Provider: Nixon Quinteros MD    20 mg/kg × 109 kg Intravenous Once 02/05/21 1430 02/05/21 1545    02/05/21 1330  Vancomycin Pharmacy Intermittent Dosing     Leah Quijano [facility] reviewed the order on 02/10/21 1447.   Ordering Provider: Jono Montes MD     Does not apply Daily 02/05/21 1430 02/15/21 0859    02/05/21 1327  Pharmacy to dose vancomycin     Leah Quijano RPH reviewed the order on 02/10/21 1446.   Ordering Provider: Jono Montes MD     Does not apply Continuous PRN 02/05/21 1327 02/15/21 1326    02/04/21 1744  cefTRIAXone (ROCEPHIN) IVPB 1 g     Ordering Provider: Kevan Ulloa PA    1 g  100 mL/hr over 30 Minutes Intravenous Once 02/04/21 1746 02/04/21 1845           No results found for: DOROTHY  Lab Results   Component Value Date    VANCORANDOM 16.70 02/11/2021       Dosing History  2/5 1545 vanc 2250 mg IV x1  2/6 1546 750 mg IV x1                           2/8 500mg @ 1627  2/9 500mg @ 14:31    Assessment/Plan  Will dose by levels due to poor renal function. Will give a one time dose of 500mg today.  -Vancomycin random level tomorrow at 12:00  Pharmacy will continue to follow daily while on vancomycin and adjust as needed.     Francisco Javier Quijano PharmD

## 2021-02-11 NOTE — PLAN OF CARE
"Goal Outcome Evaluation:  Plan of Care Reviewed With: patient   Patient is a 65 y.o. male admitted to [facility] for acute metabolic encephalopathy on 2/4/2021. PMHx includes DM, CKD, ESRD on HD, vascular dementia, HTN, HLD. Patient is maxA at baseline and lives with his spouse. Per chart, pt \"bedbound\", previous PT notes from 12/23/2020 indicate patient required maxA for squat pivot tsf. Today, patient performed bed mobility with maxAx2, unable to achieve full upright sitting, very rigid and limited ROM throughout, confused and unable to follow commands or answer questions appropriately.  Patient demonstrates impairments consisting of generalized weakness, decreased ROM, decreased safety awareness and decreased activity tolerance. Patient may benefit from skilled PT services acutely to address functional deficits as well as improve level of independence prior to discharge. Anticipate SNU upon DC.    Patient was not wearing a face mask during this therapy encounter. Therapist used appropriate personal protective equipment including eye protection, mask, and gloves.  Mask used was standard procedure mask. Appropriate PPE was worn during the entire therapy session. Hand hygiene was completed before and after therapy session. Patient is not in enhanced droplet precautions.  Attempted to put mask on patient, he would not leave it on.   "

## 2021-02-11 NOTE — PROGRESS NOTES
[facility]  Inpatient Progress Note    Reason for Follow Up: Abnormal CT scan    Subjective     Interval History:   Previous work-up with consideration of both upper and lower endoscopy for iron deficiency anemia and recent findings of CT changes of the colon.  Reviewed notes from cardiology, nephrology, and neurology.  Still quite encephalopathic and work-up is underway.  Anticipate eventual endoscopic assessment when all agree stable.    Current Facility-Administered Medications:   •  acetaminophen (TYLENOL) tablet 650 mg, 650 mg, Oral, Q4H PRN **OR** acetaminophen (TYLENOL) 160 MG/5ML solution 650 mg, 650 mg, Oral, Q4H PRN **OR** acetaminophen (TYLENOL) suppository 650 mg, 650 mg, Rectal, Q4H PRN, Robert Baker MD  •  amLODIPine (NORVASC) tablet 5 mg, 5 mg, Oral, Q24H, Paddy Low MD, 5 mg at 02/11/21 1001  •  ammonium lactate (AMLACTIN) cream, , Topical, BID, Nixon Quinteros MD, Given at 02/11/21 1002  •  aspirin EC tablet 81 mg, 81 mg, Oral, Daily, Robert Baker MD, 81 mg at 02/11/21 1001  •  atorvastatin (LIPITOR) tablet 20 mg, 20 mg, Oral, Daily, Robert Baker MD, 20 mg at 02/11/21 1002  •  cefTRIAXone (ROCEPHIN) IVPB 1 g, 1 g, Intravenous, Q24H, Jono Montes MD, Last Rate: 100 mL/hr at 02/10/21 1727, 1 g at 02/10/21 1727  •  dextrose (D50W) 25 g/ 50mL Intravenous Solution 25 g, 25 g, Intravenous, Q15 Min PRN, Robert Baker MD  •  dextrose (GLUTOSE) oral gel 15 g, 15 g, Oral, Q15 Min PRN, Robert Baker MD  •  dilTIAZem (CARDIZEM) tablet 30 mg, 30 mg, Oral, Q12H, Terrance Muhammad MD  •  diphenhydrAMINE (BENADRYL) capsule 25 mg, 25 mg, Oral, Q6H PRN, Robert Baker MD  •  glucagon (human recombinant) (GLUCAGEN DIAGNOSTIC) injection 1 mg, 1 mg, Subcutaneous, Q15 Min PRN, Robert Baker MD  •  hydrALAZINE (APRESOLINE) injection 10 mg, 10 mg, Intravenous, Q4H PRN, Soy Pearl MD  •  hydrALAZINE (APRESOLINE) tablet 100 mg, 100 mg, Oral, Q8H,  Soy Pearl MD, 100 mg at 02/11/21 1331  •  hydrALAZINE (APRESOLINE) tablet 25 mg, 25 mg, Oral, Q6H PRN, Nixon Quinteros MD  •  HYDROcodone-acetaminophen (NORCO)  MG per tablet 1 tablet, 1 tablet, Oral, Q6H PRN, Robert Baker MD, 1 tablet at 02/08/21 2053  •  insulin lispro (humaLOG, ADMELOG) injection 0-9 Units, 0-9 Units, Subcutaneous, TID AC, Robert Baker MD, 2 Units at 02/11/21 1330  •  LORazepam (ATIVAN) injection 1 mg, 1 mg, Intravenous, Once, Pal Monge MD  •  LORazepam (ATIVAN) injection 1 mg, 1 mg, Intravenous, Q4H PRN, Jono Montes MD  •  metoprolol tartrate (LOPRESSOR) tablet 12.5 mg, 12.5 mg, Oral, Q12H, Lyndsey Escoto MD, 12.5 mg at 02/11/21 1001  •  ondansetron (ZOFRAN) tablet 4 mg, 4 mg, Oral, Q6H PRN **OR** ondansetron (ZOFRAN) injection 4 mg, 4 mg, Intravenous, Q6H PRN, Robert Baker MD  •  Pharmacy to dose vancomycin, , Does not apply, Continuous PRN, Jono Montes MD  •  sodium bicarbonate tablet 1,300 mg, 1,300 mg, Oral, TID, Miles Del Toro MD, 1,300 mg at 02/11/21 1331  •  sodium chloride 0.9 % flush 10 mL, 10 mL, Intravenous, PRN, Josafat Mckeon MD  •  sodium chloride 0.9 % flush 10 mL, 10 mL, Intravenous, Q12H, Robert Baker MD, 10 mL at 02/11/21 1002  •  sodium chloride 0.9 % flush 10 mL, 10 mL, Intravenous, PRN, Robert Baker MD  •  vancomycin 500 mg/100 mL 0.9% NS IVPB (mbp), 500 mg, Intravenous, Once, Jono Montes MD  •  Vancomycin Pharmacy Intermittent Dosing, , Does not apply, Daily, Jono Montes MD, Given at 02/10/21 1126  Review of Systems:    All systems were reviewed and negative except for:  Gastrointestinal: positive for  See HPI    Objective     Vital Signs  Temp:  [97.2 °F (36.2 °C)-98 °F (36.7 °C)] 98 °F (36.7 °C)  Heart Rate:  [67-79] 78  Resp:  [16-20] 20  BP: (109-144)/(80-90) 127/83  Body mass index is 29.51 kg/m².    Intake/Output Summary (Last 24 hours) at 2/11/2021 1350  Last data filed at 2/11/2021 1235  Gross  per 24 hour   Intake 720 ml   Output --   Net 720 ml     I/O this shift:  In: 720 [P.O.:720]  Out: -      Physical Exam:   General: patient awake, alert and cooperative   Eyes: Normal lids and lashes, no scleral icterus   Neck: supple, normal ROM   Skin: warm and dry, not jaundiced   Cardiovascular: regular rhythm and rate, no murmurs auscultated   Pulm: clear to auscultation bilaterally, regular and unlabored   Abdomen: soft, nontender, nondistended; normal bowel sounds   Extremities: no rash or edema   Psychiatric: Normal mood and behavior; memory intact     Results Review:     I reviewed the patient's new clinical results.    Results from last 7 days   Lab Units 02/11/21  0545 02/10/21  0916 02/09/21  0633   WBC 10*3/mm3 7.34 7.35 9.09   HEMOGLOBIN g/dL 9.2* 10.9* 10.4*   HEMATOCRIT % 29.2* 33.9* 32.0*   PLATELETS 10*3/mm3 85* 306 287     Results from last 7 days   Lab Units 02/11/21  0545 02/10/21  0916 02/09/21  0918  02/05/21  0312 02/04/21  1551   SODIUM mmol/L 139 143 145   < > 142 139   POTASSIUM mmol/L 4.0 3.0* 3.1*   < > 2.4* 2.2*   CHLORIDE mmol/L 111* 113* 115*   < > 112* 106   CO2 mmol/L 12.7* 17.8* 17.3*   < > 19.3* 21.4*   BUN mg/dL 24* 24* 22   < > 23 23   CREATININE mg/dL 2.55* 2.76* 2.69*   < > 2.55* 2.46*   CALCIUM mg/dL 8.7 8.6 9.2   < > 8.6 9.1   BILIRUBIN mg/dL  --   --   --   --  0.6 0.9   ALK PHOS U/L  --   --   --   --  91 104   ALT (SGPT) U/L  --   --   --   --  7 7   AST (SGOT) U/L  --   --   --   --  10 14   GLUCOSE mg/dL 144* 153* 149*   < > 147* 179*    < > = values in this interval not displayed.         Lab Results   Lab Value Date/Time    LIPASE 17 12/18/2020 1653    LIPASE 914 (H) 10/27/2020 0330    LIPASE 1,087 (H) 10/26/2020 0410    LIPASE 1,653 (H) 10/25/2020 0323       Radiology:  CT Head Without Contrast   Final Result   No acute intracranial findings.       Radiation dose reduction techniques were utilized, including automated   exposure control and exposure modulation based  on body size.       This report was finalized on 2/5/2021 9:30 PM by Dr. Vero Hansen M.D.          CT Abdomen Pelvis Without Contrast   Final Result       1. Severely technically limited examination due to motion artifact. The   patient's sigmoid colon in particular appears thick walled, with   adjacent pericolonic soft tissue stranding. There is also involvement of   the rectum, although to a lesser extent. Appearance is suggestive of   colitis. No pneumatosis or free air is seen. Given the patient had some   rectal wall thickening on prior CT, consideration for follow-up with   endoscopy is suggested. There is some diffuse gaseous distention of   bowel, which may reflect some ileus.   2. Thick-walled appearance to the urinary bladder, with adjacent   perivesical soft tissue stranding, concerning for cystitis.       Radiation dose reduction techniques were utilized, including automated   exposure control and exposure modulation based on body size.       This report was finalized on 2/5/2021 9:39 PM by Dr. Vero Hansen M.D.          XR Chest 1 View   Final Result      MRI Brain Without Contrast    (Results Pending)       Assessment/Plan     Patient Active Problem List   Diagnosis   • Complicated UTI (urinary tract infection)   • Macrocytosis   • Sepsis secondary to UTI (CMS/HCC)   • Metabolic encephalopathy   • Hyperlipidemia   • Hypertension   • Monoclonal gammopathy   • Stage 4 chronic kidney disease (CMS/HCC)   • DM2 (diabetes mellitus, type 2) (CMS/HCC)   • Abnormal CT of the abdomen   • Normal anion gap metabolic acidosis   • Anemia, chronic disease   • Ventricular tachycardia (paroxysmal) (CMS/HCC)   • Acute metabolic encephalopathy   • UTI (urinary tract infection), bacterial   • Elevated troponin   • Hypokalemia   • SILVIA (acute kidney injury) (CMS/HCC)   • Uncontrolled hypertension   • Septicemia (CMS/HCC)   • Vascular dementia without behavioral disturbance (CMS/HCC)        Assessment:  1. Abnormal CT scan of the sigmoid colon and rectum  2. Iron deficiency anemia  3. Electrolyte imbalance  4. Metabolic encephalopathy      Plan:  · Continue current diet  · Hold on endoscopic evaluation until all clearance has been met.  I discussed the patients findings and my recommendations with patient and nursing staff.    Dane Mabry MD

## 2021-02-12 NOTE — PLAN OF CARE
Patient has been more combative. He is having a harder time answering questions when asked. Patient had a BM this am that was clear in color and gel like. Q2 turns.    Goal Outcome Evaluation:     Progress: declining     Problem: Fall Injury Risk  Goal: Absence of Fall and Fall-Related Injury  Outcome: Ongoing, Progressing  Intervention: Promote Injury-Free Environment  Recent Flowsheet Documentation  Taken 2/12/2021 0425 by Jana Morley RN  Safety Promotion/Fall Prevention:   safety round/check completed   room organization consistent   activity supervised   assistive device/personal items within reach   clutter free environment maintained  Taken 2/12/2021 0251 by Jana Morley RN  Safety Promotion/Fall Prevention:   safety round/check completed   room organization consistent   activity supervised   assistive device/personal items within reach   clutter free environment maintained  Taken 2/12/2021 0030 by Jana Morley RN  Safety Promotion/Fall Prevention:   safety round/check completed   room organization consistent   activity supervised   assistive device/personal items within reach   clutter free environment maintained  Taken 2/11/2021 2055 by Jana Morley RN  Safety Promotion/Fall Prevention:   safety round/check completed   room organization consistent   activity supervised   assistive device/personal items within reach   clutter free environment maintained     Problem: Adult Inpatient Plan of Care  Goal: Plan of Care Review  Outcome: Ongoing, Progressing  Flowsheets  Taken 2/12/2021 0822 by Jana Morley RN  Progress: declining  Taken 2/11/2021 1147 by Randee Braden PT  Plan of Care Reviewed With: patient  Goal: Patient-Specific Goal (Individualized)  Outcome: Ongoing, Progressing  Goal: Absence of Hospital-Acquired Illness or Injury  Outcome: Ongoing, Progressing  Intervention: Identify and Manage Fall Risk  Recent Flowsheet Documentation  Taken 2/12/2021 0425 by Jana Morley RN  Safety  Promotion/Fall Prevention:   safety round/check completed   room organization consistent   activity supervised   assistive device/personal items within reach   clutter free environment maintained  Taken 2/12/2021 0251 by Jana Morley RN  Safety Promotion/Fall Prevention:   safety round/check completed   room organization consistent   activity supervised   assistive device/personal items within reach   clutter free environment maintained  Taken 2/12/2021 0030 by Jana Morley RN  Safety Promotion/Fall Prevention:   safety round/check completed   room organization consistent   activity supervised   assistive device/personal items within reach   clutter free environment maintained  Taken 2/11/2021 2055 by Jana Morley RN  Safety Promotion/Fall Prevention:   safety round/check completed   room organization consistent   activity supervised   assistive device/personal items within reach   clutter free environment maintained  Intervention: Prevent Skin Injury  Recent Flowsheet Documentation  Taken 2/12/2021 0030 by Jana Morley RN  Body Position: weight shift assistance provided  Intervention: Prevent and Manage VTE (venous thromboembolism) Risk  Recent Flowsheet Documentation  Taken 2/11/2021 2055 by Jana Morley RN  VTE Prevention/Management:   bilateral   dorsiflexion/plantar flexion performed  Goal: Optimal Comfort and Wellbeing  Outcome: Ongoing, Progressing  Goal: Readiness for Transition of Care  Outcome: Ongoing, Progressing     Problem: Skin Injury Risk Increased  Goal: Skin Health and Integrity  Outcome: Ongoing, Progressing

## 2021-02-12 NOTE — PROGRESS NOTES
"[facility] Clinical Pharmacy Services: Vancomycin Pharmacokinetic Note (Follow-Up Note)    Patient Name: Gregorio Alejandro  Indication: Bacteremia  Target AUC24 level: 400-600  Consulting Provider: Dr. Quinteros  Allergies:   Allergies as of 02/04/2021   • (No Known Allergies)     Duration of Therapy: 10-14 days (per ID recommendations)    Other Antimicrobials: Ceftriaxone 1g IV q24h    Current Vancomycin Dose: Intermittent dosing    Relevant clinical data and objective history reviewed:  65 y.o. male 190.5 cm (75\") 106 kg (233 lb 11 oz)    Vitals:    02/12/21 0500 02/12/21 0655 02/12/21 0700 02/12/21 1258   BP:  139/72  139/87   BP Location:  Right arm  Right arm   Pulse:  74  67   Resp:  18  18   Temp:   94.3 °F (34.6 °C)  Comment: rn notified Comment: will try later   TempSrc:   Rectal    SpO2:  98%  97%     Creatinine   Date Value Ref Range Status   02/12/2021 2.61 (H) 0.76 - 1.27 mg/dL Final   02/11/2021 2.55 (H) 0.76 - 1.27 mg/dL Final   02/10/2021 2.76 (H) 0.76 - 1.27 mg/dL Final     BUN   Date Value Ref Range Status   02/12/2021 25 (H) 8 - 23 mg/dL Final     Estimated Creatinine Clearance: 36.2 mL/min (A) (by C-G formula based on SCr of 2.61 mg/dL (H)).    Lab Results   Component Value Date    WBC 5.90 02/12/2021     Temp Readings from Last 3 Encounters:   02/12/21 94.3 °F (34.6 °C) (Rectal)     Baseline culture/source/susceptibility:  2/4: BCx-Staph Epidermidis (2/2 bottles), aerococcos viridans (2/2 bottles)  2/4: UCx-25K Proteus mirabilis (susceptible to CTX)  2/5: BCx-negative  2/5: C. Diff-negative    Vancomycin Dosing History:   2/5: Vancomycin 2250 mg (20 mg/kg) loading dose IV once @ 1545   2/6 @ 0610: Random vancomycin level=15.1 mcg/mL  2/6: 750 mg IV once @ 1546   2/7 @ 0458: Random vancomycin level=17.4 mcg/mL   2/8 @ 0609: Random vancomycin level=17.2 mcg/mL  2/8: 500 mg IV once @ 1627   2/9 @ 0633: Random vancomycin level=19.3 mcg/mL  2/10: 500 mg IV once @ 1431   2/11 @ 1156: Random vancomycin " level=16.7 mcg/mL   2/11: 500 mg IV once @ 1447   2/12 @ 1152: Random vancomycin level=16.5 mcg/mL    Assessment:  Bayesian analysis of the most recent level(s) using [employer] provides the following patient-specific pharmacokinetic parameters:              CL:       1.03 L/hr              Vd:       75.3 L              T1/2:    53.1 hrs  Due to unstable renal function, will dose intermittently using random levels to better direct patient care.     Recommendations/Plan:  · Based on random level of 16.5 mcg/mL, will give a vancomycin dose of 500 mg IV once today at 1600 and tomorrow at 1600 (patient does not like to be stuck for blood draws).  · Will schedule a level on Sunday afternoon 2/14 @ 1400 to determine if regimen is appropriate unless provider stops therapy at this time.  · Will monitor serum creatinine every 24 hours for the first 3 days then at least every 48 hours per dosing recommendations. SCr continues to be high at 2.61.  · Pharmacy will continue to follow daily while on vancomycin and adjust as needed.     Thank you for allowing me to participate in your patient's care. Please call with any concerns or questions.   Allyssa Nicole, Pharm.D., [facility]   Clinical Staff Pharmacist

## 2021-02-12 NOTE — PROGRESS NOTES
Patient Identification:  NAME:  Gregorio Alejandro  Age:  65 y.o.   Sex:  male   :  1955   MRN:  [id number]       Chief complaint: Metabolic encephalopathy sepsis cellulitis    History of present illness: He seems more obtunded today nurses note that his heart rate is dropping down into the 30s at times.  He has had no seizure activity since yesterday he had an MRI that by my independent eyeball review does not show any acute stroke he has been afebrile for the last 4 days infectious disease saw him today and will continue the Rocephin and vancomycin for cellulitis and sepsis (which also would absolutely treat any type of bacterial meningitis).  He has not had seizure activity he barely responds and only to pain today    Past medical history:  Past Medical History:   Diagnosis Date   • Back pain    • CKD (chronic kidney disease)    • DM type 2 (diabetes mellitus, type 2) (CMS/HCC)    • ED (erectile dysfunction)    • Hyperlipidemia    • Hypertension    • SCOTTY (iron deficiency anemia)    • Monoclonal gammopathy    • Osteoarthritis        Allergies:  Patient has no known allergies.    Home medications:  Medications Prior to Admission   Medication Sig Dispense Refill Last Dose   • amLODIPine (NORVASC) 5 MG tablet Take 1 tablet by mouth Daily. 30 tablet 0 2021 at Unknown time   • aspirin 81 MG EC tablet Take 1 tablet by mouth Daily. 30 tablet 0 2021 at Unknown time   • atorvastatin (LIPITOR) 20 MG tablet Take 20 mg by mouth Daily.   2021 at Unknown time   • brimonidine (ALPHAGAN P) 0.1 % solution ophthalmic solution 1 drop 2 (two) times a day.   2021 at Unknown time   • dorzolamide (TRUSOPT) 2 % ophthalmic solution Administer 1 drop to the right eye 2 (two) times a day.   2021 at Unknown time   • furosemide (LASIX) 80 MG tablet Take 1 tablet by mouth Daily. 30 tablet 0 2021 at Unknown time   • HUMALOG KWIKPEN 100 UNIT/ML solution pen-injector INJECT 20 UNITS INTO THE SKIN TWICE DAILY 15 mL 0  2/4/2021 at Unknown time   • sodium bicarbonate 650 MG tablet Take 1 tablet by mouth 2 (Two) Times a Day. 60 tablet 0 2/4/2021 at Unknown time   • diphenhydrAMINE (BENADRYL) 25 mg capsule Take 25 mg by mouth Every 6 (Six) Hours As Needed for Itching or Allergies.   More than a month at Unknown time   • HYDROcodone-acetaminophen (NORCO)  MG per tablet Take 1 tablet po every 4 hours PRN for moderate pain, take two tablets po every 4 hours PRN for severe pain, valid if faxed to [facility]   tablet 0 Unknown at Unknown time   • WAL-DRYL ALLERGY 25 MG Take 1 capsule by mouth Every 6 (Six) Hours As Needed for itching. 30 capsule 0 Unknown at Unknown time        Hospital medications:  amLODIPine, 5 mg, Oral, Q24H  ammonium lactate, , Topical, BID  [START ON 2/13/2021] aspirin, 81 mg, Oral, Daily  atorvastatin, 20 mg, Oral, Daily  cefTRIAXone, 1 g, Intravenous, Q24H  dilTIAZem, 30 mg, Oral, Q12H  hydrALAZINE, 100 mg, Oral, Q8H  insulin lispro, 0-9 Units, Subcutaneous, TID AC  metoprolol tartrate, 12.5 mg, Oral, Q12H  sodium chloride, 10 mL, Intravenous, Q12H  vancomycin, 500 mg, Intravenous, Q24H  Vancomycin Pharmacy Intermittent Dosing, , Does not apply, Daily      Pharmacy to dose vancomycin,       •  acetaminophen **OR** acetaminophen **OR** acetaminophen  •  dextrose  •  dextrose  •  diphenhydrAMINE  •  glucagon (human recombinant)  •  hydrALAZINE  •  hydrALAZINE  •  HYDROcodone-acetaminophen  •  LORazepam  •  ondansetron **OR** ondansetron  •  Pharmacy to dose vancomycin  •  potassium chloride **OR** potassium chloride **OR** potassium chloride  •  sodium chloride  •  sodium chloride      Objective:  Vitals Ranges:   Temp:  [94.3 °F (34.6 °C)-98.2 °F (36.8 °C)] 95.9 °F (35.5 °C)  Heart Rate:  [51-77] 51  Resp:  [18-20] 18  BP: (134-139)/(72-95) 139/87      Physical Exam: He is lethargic when I rub his sternum he opens his eyes he does not blink to threat pupils are 3 constricting to 2 corneals are present  there is no withdrawal to pain reflexes absent throughout toes downgoing bilaterally    Results review:   I reviewed the patient's new clinical results.    Data review:  Lab Results (last 24 hours)     Procedure Component Value Units Date/Time    POC Glucose Once [[id number]]  (Normal) Collected: 02/12/21 1407    Specimen: Blood Updated: 02/12/21 1408     Glucose 127 mg/dL     Vancomycin, Random [[id number]]  (Normal) Collected: 02/12/21 1152    Specimen: Blood from Arm, Right Updated: 02/12/21 1312     Vancomycin Random 16.50 mcg/mL     Narrative:      Therapeutic Ranges for Vancomycin    Vancomycin Random   5.0-40.0 mcg/mL  Vancomycin Trough   5.0-20.0 mcg.mL  Vancomycin Peak     20.0-40.0 mcg/mL    POC Glucose Once [[id number]]  (Abnormal) Collected: 02/12/21 1056    Specimen: Blood Updated: 02/12/21 1058     Glucose 140 mg/dL     Renal Function Panel [[id number]]  (Abnormal) Collected: 02/12/21 0914    Specimen: Blood Updated: 02/12/21 0955     Glucose 132 mg/dL      BUN 25 mg/dL      Creatinine 2.61 mg/dL      Sodium 140 mmol/L      Potassium 2.8 mmol/L      Comment: Slight hemolysis detected by analyzer. Results may be affected.        Chloride 112 mmol/L      CO2 16.9 mmol/L      Calcium 8.7 mg/dL      Albumin 2.50 g/dL      Phosphorus 2.7 mg/dL      Anion Gap 11.1 mmol/L      BUN/Creatinine Ratio 9.6     eGFR  African Amer 30 mL/min/1.73     Narrative:      GFR Normal >60  Chronic Kidney Disease <60  Kidney Failure <15      Uric Acid [[id number]]  (Abnormal) Collected: 02/12/21 0528    Specimen: Blood Updated: 02/12/21 0702     Uric Acid 8.5 mg/dL     Magnesium [[id number]]  (Normal) Collected: 02/12/21 0528    Specimen: Blood Updated: 02/12/21 0702     Magnesium 1.8 mg/dL     CBC (No Diff) [[id number]]  (Abnormal) Collected: 02/12/21 0528    Specimen: Blood Updated: 02/12/21 0646     WBC 5.90 10*3/mm3      RBC 4.58 10*6/mm3      Hemoglobin 10.5 g/dL      Hematocrit 32.1 %      MCV 70.1 fL      MCH 22.9 pg       MCHC 32.7 g/dL      RDW 24.0 %      RDW-SD 56.5 fl      Platelets 246 10*3/mm3     POC Glucose Once [[id number]]  (Abnormal) Collected: 02/12/21 0628    Specimen: Blood Updated: 02/12/21 0630     Glucose 150 mg/dL     POC Glucose Once [[id number]]  (Normal) Collected: 02/11/21 2143    Specimen: Blood Updated: 02/11/21 2145     Glucose 116 mg/dL     POC Glucose Once [[id number]]  (Normal) Collected: 02/11/21 1620    Specimen: Blood Updated: 02/11/21 1621     Glucose 102 mg/dL            Imaging:  Imaging Results (Last 24 Hours)     Procedure Component Value Units Date/Time    MRI Brain Without Contrast [[id number]] Collected: 02/11/21 1634     Updated: 02/12/21 0806    Narrative:      MRI EXAMINATION OF THE LUMBAR SPINE WITHOUT CONTRAST     HISTORY: Dizziness.     COMPARISON: CT head 02/05/2021. No prior MRI is available for  comparison.     TECHNIQUE: The study is limited in that only a patient degraded sagittal  T1, axial diffusion, T2 FLAIR and motion degraded axial T1 sequences  were obtained.     FINDINGS: The study is hampered by patient motion.     There is age-appropriate atrophy. There is no evidence of acute  infarction, hydrocephalus or of abnormal extra-axial fluid. Confluent  areas of increased signal intensity are noted involving the  periventricular white matter of the cerebral hemispheres bilaterally and  there is patchy increased signal intensity involving the scot centrally  on the T2 FLAIR sequence nonspecific but consistent with moderate small  vessel ischemic disease. There is no evidence of mass or of mass effect  on this noncontrasted MRI examination of the brain.       Impression:      Limited examination secondary to patient motion and the  incomplete nature of the examination. There is no evidence of acute  infarction. Atrophy and small vessel ischemic disease is noted.     This report was finalized on 2/12/2021 8:03 AM by Dr. Rashad Bajwa M.D.         PPE worn at all times washed before  washed up afterwards disposed of everything appropriately was not within 6 feet of him for more than a few minutes during my exam      Assessment and Plan:     This patient has a significant metabolic encephalopathy and is poorly responsive he is basically obtunded, responding to a sternal rub only.  He has had an MRI scan that shows no evidence of an acute stroke.  Likewise he has been afebrile for the last 4 days.  Note that he has been seen by infectious disease and he is getting Rocephin and vancomycin for the treatment of sepsis as well as cellulitis note that these are also appropriate medications for any type of bacterial meningitis which I do not think we are dealing with.  I have looked over his medications and I am not giving him any sedating medication.  He is not receiving any neuroleptics either.    Pal Monge MD  02/12/21  15:02 EST

## 2021-02-12 NOTE — PROGRESS NOTES
"DAILY PROGRESS NOTE  [facility]    Patient Identification:  Name: Gregorio Alejandro  Age: 65 y.o.  Sex: male  :  1955  MRN: [id number]         Primary Care Physician: Maya Ribeiro APRN    Subjective:  Interval History:He is confused.     Objective:    Scheduled Meds:amLODIPine, 5 mg, Oral, Q24H  ammonium lactate, , Topical, BID  [START ON 2021] aspirin, 81 mg, Oral, Daily  atorvastatin, 20 mg, Oral, Daily  cefTRIAXone, 1 g, Intravenous, Q24H  hydrALAZINE, 100 mg, Oral, Q8H  insulin lispro, 0-9 Units, Subcutaneous, TID AC  sodium chloride, 10 mL, Intravenous, Q12H  vancomycin, 500 mg, Intravenous, Q24H  Vancomycin Pharmacy Intermittent Dosing, , Does not apply, Daily      Continuous Infusions:Pharmacy to dose vancomycin,         Vital signs in last 24 hours:  Temp:  [94.3 °F (34.6 °C)-98.2 °F (36.8 °C)] 95.9 °F (35.5 °C)  Heart Rate:  [51-77] 51  Resp:  [18-20] 18  BP: (134-139)/(72-95) 139/87    Intake/Output:    Intake/Output Summary (Last 24 hours) at 2021 1610  Last data filed at 2021 1300  Gross per 24 hour   Intake 0 ml   Output --   Net 0 ml       Exam:  /87 (BP Location: Right arm, Patient Position: Lying)   Pulse 51   Temp 95.9 °F (35.5 °C) (Axillary)   Resp 18   Ht 190.5 cm (75\")   Wt 106 kg (233 lb 11 oz)   SpO2 93%   BMI 29.21 kg/m²     General Appearance:    confused, no distress   Head:    Normocephalic, without obvious abnormality, atraumatic   Eyes:       Throat:   Lips, tongue, gums normal   Neck:   Supple, symmetrical, trachea midline, no JVD   Lungs:     Clear to auscultation bilaterally, respirations unlabored   Chest Wall:    No tenderness or deformity    Heart:    Regular rate and rhythm, S1 and S2 normal, no murmur,no  Rub or gallop   Abdomen:     Soft, nontender, bowel sounds active, no masses, no organomegaly    Extremities:   Extremities normal, atraumatic, no cyanosis or edema   Pulses:      Skin:   Skin is warm and dry,  no rashes or " palpable lesions   Neurologic:   Confused       Lab Results (last 72 hours)     Procedure Component Value Units Date/Time    Blood Culture - Blood, Arm, Right [[id number]] Collected: 02/05/21 1431    Specimen: Blood from Arm, Right Updated: 02/08/21 1445     Blood Culture No growth at 3 days    Blood Culture - Blood, Arm, Left [[id number]] Collected: 02/05/21 1433    Specimen: Blood from Arm, Left Updated: 02/08/21 1445     Blood Culture No growth at 3 days    POC Glucose Once [[id number]]  (Abnormal) Collected: 02/08/21 1150    Specimen: Blood Updated: 02/08/21 1203     Glucose 193 mg/dL     Blood Culture - Blood, Hand, Right [[id number]]  (Abnormal) Collected: 02/04/21 1551    Specimen: Blood from Hand, Right Updated: 02/08/21 0821     Blood Culture Staphylococcus epidermidis     Comment: Refer to previous blood culture collected on 2-4-21 for MICs        Isolated from Anaerobic Bottle     Blood Culture Aerococcus viridans     Isolated from Anaerobic Bottle     Gram Stain Anaerobic Bottle Gram positive cocci in clusters    Blood Culture - Blood, Hand, Right [[id number]]  (Abnormal)  (Susceptibility) Collected: 02/04/21 1551    Specimen: Blood from Hand, Right Updated: 02/08/21 0820     Blood Culture Staphylococcus epidermidis     Isolated from Aerobic Bottle     Blood Culture Aerococcus viridans     Comment: Susceptibilities for Ceftriaxone and Vancomycin to follow.        Isolated from Aerobic Bottle     Blood Culture Globicatella sanguinis     Comment: Anaerobic bottle only  No CLSI guidelines and no validated ISAC testing method.          Isolated from Anaerobic Bottle     Gram Stain Aerobic Bottle Gram positive cocci in clusters      Anaerobic Bottle Gram positive cocci in clusters    Narrative:            Susceptibility      Staphylococcus epidermidis     ISAC     Oxacillin Resistant     Vancomycin Susceptible                Susceptibility Comments     Staphylococcus epidermidis    This isolate is presumed to be  clindamycin resistant based on detection of inducible clindamycin resistance.  Clindamycin may still be effective in some patients.             CBC (No Diff) [[id number]]  (Abnormal) Collected: 02/08/21 0609    Specimen: Blood Updated: 02/08/21 0725     WBC 11.58 10*3/mm3      RBC 4.75 10*6/mm3      Hemoglobin 10.7 g/dL      Hematocrit 33.1 %      MCV 69.7 fL      MCH 22.5 pg      MCHC 32.3 g/dL      RDW 24.5 %      RDW-SD 57.6 fl      Platelets 209 10*3/mm3     Vancomycin, Random [[id number]]  (Normal) Collected: 02/08/21 0609    Specimen: Blood Updated: 02/08/21 0704     Vancomycin Random 17.20 mcg/mL     Narrative:      Therapeutic Ranges for Vancomycin    Vancomycin Random   5.0-40.0 mcg/mL  Vancomycin Trough   5.0-20.0 mcg.mL  Vancomycin Peak     20.0-40.0 mcg/mL    Renal Function Panel [[id number]]  (Abnormal) Collected: 02/08/21 0609    Specimen: Blood Updated: 02/08/21 0704     Glucose 159 mg/dL      BUN 20 mg/dL      Creatinine 2.23 mg/dL      Sodium 142 mmol/L      Potassium 3.8 mmol/L      Comment: Slight hemolysis detected by analyzer. Results may be affected.        Chloride 114 mmol/L      CO2 14.6 mmol/L      Calcium 9.0 mg/dL      Albumin 2.60 g/dL      Phosphorus 2.7 mg/dL      Anion Gap 13.4 mmol/L      BUN/Creatinine Ratio 9.0     eGFR  African Amer 36 mL/min/1.73     Narrative:      GFR Normal >60  Chronic Kidney Disease <60  Kidney Failure <15      Magnesium [[id number]]  (Normal) Collected: 02/08/21 0609    Specimen: Blood Updated: 02/08/21 0703     Magnesium 2.0 mg/dL     Ammonia [[id number]]  (Normal) Collected: 02/08/21 0609    Specimen: Blood Updated: 02/08/21 0654     Ammonia 25 umol/L     POC Glucose Once [[id number]]  (Abnormal) Collected: 02/08/21 0628    Specimen: Blood Updated: 02/08/21 0629     Glucose 138 mg/dL     Potassium, Urine, Random - Urine, Clean Catch [[id number]] Collected: 02/08/21 0550    Specimen: Urine, Clean Catch Updated: 02/08/21 0625     Potassium, Urine <3.0 mmol/L      Narrative:      Reference intervals for random urine have not been established.  Clinical usage is dependent upon physician's interpretation in combination with other laboratory tests.       Sodium, Urine, Random - Urine, Clean Catch [[id number]] Collected: 02/08/21 0550    Specimen: Urine, Clean Catch Updated: 02/08/21 0625     Sodium, Urine <20 mmol/L     Narrative:      Reference intervals for random urine have not been established.  Clinical usage is dependent upon physician's interpretation in combination with other laboratory tests.       Potassium [[id number]]  (Abnormal) Collected: 02/08/21 0007    Specimen: Blood Updated: 02/08/21 0046     Potassium 2.9 mmol/L     POC Glucose Once [[id number]]  (Abnormal) Collected: 02/07/21 2108    Specimen: Blood Updated: 02/07/21 2110     Glucose 144 mg/dL     Renal Function Panel [[id number]]  (Abnormal) Collected: 02/07/21 1557    Specimen: Blood from Arm, Right Updated: 02/07/21 1657     Glucose 147 mg/dL      BUN 19 mg/dL      Creatinine 2.32 mg/dL      Sodium 145 mmol/L      Potassium 2.7 mmol/L      Chloride 115 mmol/L      CO2 16.7 mmol/L      Calcium 8.7 mg/dL      Albumin 2.90 g/dL      Phosphorus 2.8 mg/dL      Anion Gap 13.3 mmol/L      BUN/Creatinine Ratio 8.2     eGFR  African Amer 34 mL/min/1.73     Narrative:      GFR Normal >60  Chronic Kidney Disease <60  Kidney Failure <15      POC Glucose Once [[id number]]  (Abnormal) Collected: 02/07/21 1537    Specimen: Blood Updated: 02/07/21 1539     Glucose 162 mg/dL     POC Glucose Once [[id number]]  (Abnormal) Collected: 02/07/21 1130    Specimen: Blood Updated: 02/07/21 1137     Glucose 147 mg/dL     Gastrointestinal Panel, PCR - Stool, Per Rectum [[id number]]  (Normal) Collected: 02/05/21 2204    Specimen: Stool from Per Rectum Updated: 02/07/21 0950     Campylobacter Not Detected     Plesiomonas shigelloides Not Detected     Salmonella Not Detected     Vibrio Not Detected     Vibrio cholerae Not Detected      Yersinia enterocolitica Not Detected     Enteroaggregative E. coli (EAEC) Not Detected     Enteropathogenic E. coli (EPEC) Not Detected     Enterotoxigenic E. coli (ETEC) lt/st Not Detected     Shiga-like toxin-producing E. coli (STEC) stx1/stx2 Not Detected     E. coli O157 Not Detected     Shigella/Enteroinvasive E. coli (EIEC) Not Detected     Cryptosporidium Not Detected     Cyclospora cayetanensis Not Detected     Entamoeba histolytica Not Detected     Giardia lamblia Not Detected     Adenovirus F40/41 Not Detected     Astrovirus Not Detected     Norovirus GI/GII Not Detected     Rotavirus A Not Detected     Sapovirus (I, II, IV or V) Not Detected    Narrative:      If Aeromonas, Staphylococcus aureus or Bacillus cereus are suspected, please order [id number]: Stool Culture, Aeromonas, S aureus, B Cereus.    POC Glucose Once [[id number]]  (Abnormal) Collected: 02/07/21 0616    Specimen: Blood Updated: 02/07/21 0617     Glucose 182 mg/dL     Renal Function Panel [[id number]]  (Abnormal) Collected: 02/07/21 0458    Specimen: Blood Updated: 02/07/21 0609     Glucose 156 mg/dL      BUN 20 mg/dL      Creatinine 2.37 mg/dL      Sodium 143 mmol/L      Potassium 2.8 mmol/L      Chloride 115 mmol/L      CO2 16.4 mmol/L      Calcium 8.7 mg/dL      Albumin 2.70 g/dL      Phosphorus 1.8 mg/dL      Anion Gap 11.6 mmol/L      BUN/Creatinine Ratio 8.4     eGFR  African Amer 33 mL/min/1.73     Narrative:      GFR Normal >60  Chronic Kidney Disease <60  Kidney Failure <15      Vancomycin, Random [[id number]]  (Normal) Collected: 02/07/21 0458    Specimen: Blood Updated: 02/07/21 0553     Vancomycin Random 17.40 mcg/mL     Narrative:      Therapeutic Ranges for Vancomycin    Vancomycin Random   5.0-40.0 mcg/mL  Vancomycin Trough   5.0-20.0 mcg.mL  Vancomycin Peak     20.0-40.0 mcg/mL    CBC (No Diff) [[id number]]  (Abnormal) Collected: 02/07/21 0458    Specimen: Blood Updated: 02/07/21 0535     WBC 8.61 10*3/mm3      RBC 4.32 10*6/mm3       Hemoglobin 10.0 g/dL      Hematocrit 31.0 %      MCV 71.8 fL      MCH 23.1 pg      MCHC 32.3 g/dL      RDW 23.8 %      RDW-SD 58.1 fl      MPV --     Comment: Unable to calculate        Platelets 253 10*3/mm3     Potassium [[id number]]  (Abnormal) Collected: 02/06/21 2021    Specimen: Blood Updated: 02/06/21 2059     Potassium 2.9 mmol/L     Phosphorus [[id number]]  (Abnormal) Collected: 02/06/21 2021    Specimen: Blood Updated: 02/06/21 2059     Phosphorus 2.1 mg/dL     Magnesium [[id number]]  (Normal) Collected: 02/06/21 2021    Specimen: Blood Updated: 02/06/21 2052     Magnesium 2.0 mg/dL     POC Glucose Once [[id number]]  (Abnormal) Collected: 02/06/21 2043    Specimen: Blood Updated: 02/06/21 2044     Glucose 158 mg/dL     POC Glucose Once [[id number]]  (Abnormal) Collected: 02/06/21 1706    Specimen: Blood Updated: 02/06/21 1708     Glucose 134 mg/dL     POC Glucose Once [[id number]]  (Normal) Collected: 02/06/21 1114    Specimen: Blood Updated: 02/06/21 1116     Glucose 110 mg/dL     Urine Culture - Urine, Urine, Catheter [[id number]]  (Abnormal)  (Susceptibility) Collected: 02/04/21 1612    Specimen: Urine, Catheter Updated: 02/06/21 0940     Urine Culture 25,000 CFU/mL Proteus mirabilis    Susceptibility      Proteus mirabilis     ISAC     Ampicillin Susceptible     Ampicillin + Sulbactam Susceptible     Cefazolin Susceptible     Cefepime Susceptible     Ceftazidime Susceptible     Ceftriaxone Susceptible     Gentamicin Susceptible     Levofloxacin Intermediate     Nitrofurantoin Resistant     Piperacillin + Tazobactam Susceptible     Tetracycline Resistant     Trimethoprim + Sulfamethoxazole Resistant                    Manual Differential [[id number]]  (Abnormal) Collected: 02/06/21 0610    Specimen: Blood Updated: 02/06/21 0722     Neutrophil % 93.0 %      Lymphocyte % 4.0 %      Monocyte % 1.0 %      Eosinophil % 1.0 %      Basophil % 1.0 %      Neutrophils Absolute 8.85 10*3/mm3      Lymphocytes Absolute  0.38 10*3/mm3      Monocytes Absolute 0.10 10*3/mm3      Eosinophils Absolute 0.10 10*3/mm3      Basophils Absolute 0.10 10*3/mm3      Anisocytosis Mod/2+     Liz Cells Slight/1+     Hypochromia Slight/1+     Macrocytes Slight/1+     Microcytes Slight/1+     Poikilocytes Mod/2+     Polychromasia Large/3+     RBC Fragments Slight/1+     Target Cells Slight/1+     Schistocytes Slight/1+     WBC Morphology Normal     Platelet Morphology Normal    Renal Function Panel [[id number]]  (Abnormal) Collected: 02/06/21 0610    Specimen: Blood Updated: 02/06/21 0716     Glucose 164 mg/dL      BUN 20 mg/dL      Creatinine 2.39 mg/dL      Sodium 142 mmol/L      Potassium 3.3 mmol/L      Chloride 115 mmol/L      CO2 18.9 mmol/L      Calcium 8.5 mg/dL      Albumin 2.50 g/dL      Phosphorus 1.4 mg/dL      Anion Gap 8.1 mmol/L      BUN/Creatinine Ratio 8.4     eGFR  African Amer 33 mL/min/1.73     Narrative:      GFR Normal >60  Chronic Kidney Disease <60  Kidney Failure <15      Uric Acid [[id number]]  (Abnormal) Collected: 02/06/21 0610    Specimen: Blood Updated: 02/06/21 0701     Uric Acid 7.2 mg/dL     Magnesium [[id number]]  (Normal) Collected: 02/06/21 0610    Specimen: Blood Updated: 02/06/21 0701     Magnesium 1.7 mg/dL     Vancomycin, Random [[id number]]  (Normal) Collected: 02/06/21 0610    Specimen: Blood Updated: 02/06/21 0659     Vancomycin Random 15.10 mcg/mL     Narrative:      Therapeutic Ranges for Vancomycin    Vancomycin Random   5.0-40.0 mcg/mL  Vancomycin Trough   5.0-20.0 mcg.mL  Vancomycin Peak     20.0-40.0 mcg/mL    CBC & Differential [[id number]]  (Abnormal) Collected: 02/06/21 0610    Specimen: Blood Updated: 02/06/21 0644    Narrative:      The following orders were created for panel order CBC & Differential.  Procedure                               Abnormality         Status                     ---------                               -----------         ------                     CBC Auto  Differential[[id number]]        Abnormal            Final result                 Please view results for these tests on the individual orders.    CBC Auto Differential [[id number]]  (Abnormal) Collected: 02/06/21 0610    Specimen: Blood Updated: 02/06/21 0644     WBC 9.52 10*3/mm3      RBC 4.61 10*6/mm3      Hemoglobin 10.6 g/dL      Hematocrit 33.2 %      MCV 72.0 fL      MCH 23.0 pg      MCHC 31.9 g/dL      RDW 24.0 %      RDW-SD 57.9 fl      Platelets 280 10*3/mm3     POC Glucose Once [[id number]]  (Abnormal) Collected: 02/06/21 0619    Specimen: Blood Updated: 02/06/21 0623     Glucose 154 mg/dL     Clostridium Difficile Toxin - Stool, Per Rectum [[id number]]  (Normal) Collected: 02/05/21 2204    Specimen: Stool from Per Rectum Updated: 02/05/21 2303    Narrative:      The following orders were created for panel order Clostridium Difficile Toxin - Stool, Per Rectum.  Procedure                               Abnormality         Status                     ---------                               -----------         ------                     Clostridium Difficile To...[[id number]]  Normal              Final result                 Please view results for these tests on the individual orders.    Clostridium Difficile Toxin, PCR - Stool, Per Rectum [[id number]]  (Normal) Collected: 02/05/21 2204    Specimen: Stool from Per Rectum Updated: 02/05/21 2303     C. Difficile Toxins by PCR Negative    Potassium [[id number]]  (Normal) Collected: 02/05/21 2105    Specimen: Blood from Hand, Left Updated: 02/05/21 2146     Potassium 3.5 mmol/L     POC Glucose Once [[id number]]  (Abnormal) Collected: 02/05/21 2010    Specimen: Blood Updated: 02/05/21 2011     Glucose 146 mg/dL         Data Review:  Results from last 7 days   Lab Units 02/12/21  1515 02/12/21  0914 02/11/21  0545 02/10/21  0916   SODIUM mmol/L  --  140 139 143   POTASSIUM mmol/L 2.8* 2.8* 4.0 3.0*   CHLORIDE mmol/L  --  112* 111* 113*   CO2 mmol/L  --  16.9* 12.7* 17.8*    BUN mg/dL  --  25* 24* 24*   CREATININE mg/dL  --  2.61* 2.55* 2.76*   GLUCOSE mg/dL  --  132* 144* 153*   CALCIUM mg/dL  --  8.7 8.7 8.6     Results from last 7 days   Lab Units 02/12/21  0528 02/11/21  0545 02/10/21  0916   WBC 10*3/mm3 5.90 7.34 7.35   HEMOGLOBIN g/dL 10.5* 9.2* 10.9*   HEMATOCRIT % 32.1* 29.2* 33.9*   PLATELETS 10*3/mm3 246 85* 306             Lab Results   Lab Value Date/Time    TROPONINT 0.110 (C) 02/04/2021 1551    TROPONINT 0.171 (C) 12/23/2020 0357    TROPONINT 0.141 (C) 12/22/2020 0549    TROPONINT 0.147 (C) 12/21/2020 1450    TROPONINT 0.128 (C) 12/21/2020 1223               Invalid input(s): PROT, LABALBU          Glucose   Date/Time Value Ref Range Status   02/12/2021 1539 111 70 - 130 mg/dL Final   02/12/2021 1407 127 70 - 130 mg/dL Final   02/12/2021 1056 140 (H) 70 - 130 mg/dL Final   02/12/2021 0628 150 (H) 70 - 130 mg/dL Final   02/11/2021 2143 116 70 - 130 mg/dL Final   02/11/2021 1620 102 70 - 130 mg/dL Final   02/11/2021 1054 180 (H) 70 - 130 mg/dL Final   02/11/2021 0624 146 (H) 70 - 130 mg/dL Final           Past Medical History:   Diagnosis Date   • Back pain    • CKD (chronic kidney disease)    • DM type 2 (diabetes mellitus, type 2) (CMS/[facility])    • ED (erectile dysfunction)    • Hyperlipidemia    • Hypertension    • SCOTTY (iron deficiency anemia)    • Monoclonal gammopathy    • Osteoarthritis        Assessment:  Active Hospital Problems    Diagnosis  POA   • **Acute metabolic encephalopathy [G93.41]  Yes   • Vascular dementia without behavioral disturbance (CMS/[facility]) [F01.50]  Unknown   • Septicemia (CMS/[facility]) [A41.9]  Yes   • UTI (urinary tract infection), bacterial [N39.0, A49.9]  Yes   • Elevated troponin [R77.8]  Yes   • Hypokalemia [E87.6]  Yes   • Uncontrolled hypertension [I10]  Yes   • Anemia, chronic disease [D63.8]  Yes   • DM2 (diabetes mellitus, type 2) (CMS/HCC) [E11.9]  Yes   • Stage 4 chronic kidney disease (CMS/HCC) [N18.4]  Yes   • Hyperlipidemia [E78.5]  Yes    • Hypertension [I10]  Yes      Resolved Hospital Problems   No resolved problems to display.       Plan:  Continue antibiotics. As per multiple consults.  neurology consult noted.  Follow lab.  MRI brain was negative. Will ask for LP   Discussed with neurology.  Discussed with his wife. Still full code.  [place] poor.    Jono Montes MD  2/12/2021  16:10 EST

## 2021-02-12 NOTE — PROGRESS NOTES
Gregorio VIOLETTA Javon   65 y.o.  male    LOS: 8 days   Patient Care Team:  Maya Ribeiro APRN as PCP - General (Family Medicine)      Subjective     Interval History:     Patient Complaints: Not awakening.  Cannot get any verbal response from him.    Review of Systems:   Unable to obtain    Medication Review:   Current Facility-Administered Medications:   •  acetaminophen (TYLENOL) tablet 650 mg, 650 mg, Oral, Q4H PRN **OR** acetaminophen (TYLENOL) 160 MG/5ML solution 650 mg, 650 mg, Oral, Q4H PRN **OR** acetaminophen (TYLENOL) suppository 650 mg, 650 mg, Rectal, Q4H PRN, Robert Baker MD  •  amLODIPine (NORVASC) tablet 5 mg, 5 mg, Oral, Q24H, Paddy Low MD, 5 mg at 02/12/21 0858  •  ammonium lactate (AMLACTIN) cream, , Topical, BID, Nixon Quinteros MD, Given at 02/12/21 0908  •  aspirin EC tablet 81 mg, 81 mg, Oral, Daily, Robert Baker MD, 81 mg at 02/12/21 0858  •  atorvastatin (LIPITOR) tablet 20 mg, 20 mg, Oral, Daily, Robert Baker MD, 20 mg at 02/12/21 0858  •  cefTRIAXone (ROCEPHIN) IVPB 1 g, 1 g, Intravenous, Q24H, Tonja Azevedo MD  •  dextrose (D50W) 25 g/ 50mL Intravenous Solution 25 g, 25 g, Intravenous, Q15 Min PRN, Robert Baker MD  •  dextrose (GLUTOSE) oral gel 15 g, 15 g, Oral, Q15 Min PRN, Robert Baker MD  •  dilTIAZem (CARDIZEM) tablet 30 mg, 30 mg, Oral, Q12H, Terrance Muhammad MD, 30 mg at 02/12/21 0858  •  diphenhydrAMINE (BENADRYL) capsule 25 mg, 25 mg, Oral, Q6H PRN, Robert Baker MD  •  glucagon (human recombinant) (GLUCAGEN DIAGNOSTIC) injection 1 mg, 1 mg, Subcutaneous, Q15 Min PRN, Robert Baker MD  •  hydrALAZINE (APRESOLINE) injection 10 mg, 10 mg, Intravenous, Q4H PRN, Soy Pearl MD  •  hydrALAZINE (APRESOLINE) tablet 100 mg, 100 mg, Oral, Q8H, Soy Pearl MD, 100 mg at 02/12/21 0606  •  hydrALAZINE (APRESOLINE) tablet 25 mg, 25 mg, Oral, Q6H PRN, Nixon Quinteros MD  •  HYDROcodone-acetaminophen (NORCO)  MG per tablet 1 tablet, 1  tablet, Oral, Q6H PRN, Robert Baker MD, 1 tablet at 02/08/21 2053  •  insulin lispro (humaLOG, ADMELOG) injection 0-9 Units, 0-9 Units, Subcutaneous, TID AC, Robert Baker MD, 2 Units at 02/12/21 0857  •  LORazepam (ATIVAN) injection 1 mg, 1 mg, Intravenous, Q4H PRN, Jono Montes MD, 1 mg at 02/11/21 2348  •  metoprolol tartrate (LOPRESSOR) tablet 12.5 mg, 12.5 mg, Oral, Q12H, Lyndsey Escoto MD, 12.5 mg at 02/12/21 0858  •  ondansetron (ZOFRAN) tablet 4 mg, 4 mg, Oral, Q6H PRN **OR** ondansetron (ZOFRAN) injection 4 mg, 4 mg, Intravenous, Q6H PRN, Robert Baker MD  •  Pharmacy to dose vancomycin, , Does not apply, Continuous PRN, Jono Montes MD  •  potassium chloride (KLOR-CON) packet 40 mEq, 40 mEq, Oral, Q2H, Miles Del Toro MD  •  sodium chloride 0.9 % flush 10 mL, 10 mL, Intravenous, PRN, Josafat Mckeon MD  •  sodium chloride 0.9 % flush 10 mL, 10 mL, Intravenous, Q12H, Robert Baker MD, 10 mL at 02/12/21 0858  •  sodium chloride 0.9 % flush 10 mL, 10 mL, Intravenous, PRN, Robert Baker MD  •  Vancomycin Pharmacy Intermittent Dosing, , Does not apply, Daily, Jono Montes MD, Given at 02/10/21 1126      Objective     Vital Sign Min/Max for last 24 hours  Temp  Min: 94.3 °F (34.6 °C)  Max: 98.2 °F (36.8 °C)   BP  Min: 127/83  Max: 139/72    Pulse  Min: 61  Max: 78     Wt Readings from Last 3 Encounters:   02/12/21 106 kg (233 lb 11 oz)   12/22/20 114 kg (251 lb 8 oz)        Intake/Output Summary (Last 24 hours) at 2/12/2021 1110  Last data filed at 2/11/2021 1235  Gross per 24 hour   Intake 360 ml   Output --   Net 360 ml     Physical Exam:      General Appearance:    Well developed and well nourished in no acute distress   Head:    Normocephalic, atraumatic   Eyes:            Conjunctivae normal, non icteric, no xanthelasma   Neck:   no carotid bruit, no JVD   Lungs:    Exam difficult due to poor patient cooperation    Heart:    Irregular rate and rhythm.  Normal S1 and  S2. No murmur, no gallop, rub or lift.    Chest Wall:    No abnormalities observed   Abdomen:     Normal bowel sounds, no masses, no organomegaly, soft        non-tender, non-distended, no guarding, no rebound                tenderness   Rectal:     Deferred   Extremities:   No clubbing, cyanosis or edema.         Skin:   No bleeding, bruising or rash   Neurologic:  Unable to awaken him and no verbal response, no facial drooping      Monitor: Atrial fibrillation  Results Review:     I reviewed the patient's new clinical results.    Sodium Sodium   Date Value Ref Range Status   02/12/2021 140 136 - 145 mmol/L Final   02/11/2021 139 136 - 145 mmol/L Final   02/10/2021 143 136 - 145 mmol/L Final      Potassium Potassium   Date Value Ref Range Status   02/12/2021 2.8 (L) 3.5 - 5.2 mmol/L Final     Comment:     Slight hemolysis detected by analyzer. Results may be affected.   02/11/2021 4.0 3.5 - 5.2 mmol/L Final     Comment:     Specimen hemolyzed.  Results may be affected.   02/10/2021 3.0 (L) 3.5 - 5.2 mmol/L Final      Chloride Chloride   Date Value Ref Range Status   02/12/2021 112 (H) 98 - 107 mmol/L Final   02/11/2021 111 (H) 98 - 107 mmol/L Final   02/10/2021 113 (H) 98 - 107 mmol/L Final      Bicarbonate No results found for: PLASMABICARB   BUN BUN   Date Value Ref Range Status   02/12/2021 25 (H) 8 - 23 mg/dL Final   02/11/2021 24 (H) 8 - 23 mg/dL Final   02/10/2021 24 (H) 8 - 23 mg/dL Final      Creatinine Creatinine   Date Value Ref Range Status   02/12/2021 2.61 (H) 0.76 - 1.27 mg/dL Final   02/11/2021 2.55 (H) 0.76 - 1.27 mg/dL Final   02/10/2021 2.76 (H) 0.76 - 1.27 mg/dL Final      Calcium Calcium   Date Value Ref Range Status   02/12/2021 8.7 8.6 - 10.5 mg/dL Final   02/11/2021 8.7 8.6 - 10.5 mg/dL Final   02/10/2021 8.6 8.6 - 10.5 mg/dL Final      Magnesium Magnesium   Date Value Ref Range Status   02/12/2021 1.8 1.6 - 2.4 mg/dL Final   02/11/2021 2.0 1.6 - 2.4 mg/dL Final        Results from last 7  days   Lab Units 02/12/21  0528   WBC 10*3/mm3 5.90   HEMOGLOBIN g/dL 10.5*   HEMATOCRIT % 32.1*   PLATELETS 10*3/mm3 246     Lab Results   Lab Value Date/Time    TROPONINT 0.110 (C) 02/04/2021 1551    TROPONINT 0.171 (C) 12/23/2020 0357    TROPONINT 0.141 (C) 12/22/2020 0549    TROPONINT 0.147 (C) 12/21/2020 1450    TROPONINT 0.128 (C) 12/21/2020 1223     Lab Results   Component Value Date    CHOL 108 12/22/2020     Lab Results   Component Value Date    HDL 49 12/22/2020     Lab Results   Component Value Date    LDL 42 12/22/2020     Lab Results   Component Value Date    TRIG 86 12/22/2020     No components found for: CHOLHDL            Echo EF Estimated  No results found for: ECHOEFEST       Assessment/ Plan  Acute metabolic encephalopathy    Hyperlipidemia    Hypertension    ESRD (end stage renal disease) (CMS/[facility])    DM2 (diabetes mellitus, type 2) (CMS/[facility])    Anemia, chronic disease    UTI (urinary tract infection), bacterial    Elevated troponin, noncardiac    Hypokalemia    Uncontrolled hypertension  History of nonsustained ventricular tachycardia  History of marked first-degree AV block and history of some junctional rhythm  History of syncope  Hypertensive cardiovascular disease, normal LVEF 59% on echo 12/20/2020  He has refused stress testing in the past.  History of monoclonal gammopathy  History of immobility  History of microcytic anemia and abnormal findings of the rectum.  Gastroenterology mentions need for EGD and colonoscopy when seen in December 2020.  Unsure if he ever had those done.  Atrial flutter fib, has not been anticoagulated  Plan 1.  Heart rate and blood pressure look okay.  Need to watch the heart rate because as noted he has previously had some pauses and junctional rhythms.  Diltiazem dose decreased somewhat yesterday.  2.  Does not seem to be too much else going on cardiac wise at present.  Potassium only 2.8.  Nephrology managing that  3.  Mental status seems to fail to improve.   Neurology following.  Discussed with the patient's daughter who was present at the time of my visit.      Terrance Muhammad MD  02/12/21  11:10 EST      Time: 18 minutes

## 2021-02-12 NOTE — PROGRESS NOTES
[facility]  Inpatient Progress Note    Reason for Follow Up: Abnormal CT scan, iron deficiency anemia    Subjective     Interval History: Discussed with RN, discussed with family at bedside.  Patient encephalopathic and only responsive to noxious stimuli.  RN reports continued mucus per rectum but no rectal bleeding noted.  H&H is stable.  Advised we would entertain endoscopic evaluation to assess his iron deficiency anemia and abnormal CT findings when he is capable of undergoing bowel prep and all services agree he is stable to proceed.      Current Facility-Administered Medications:   •  acetaminophen (TYLENOL) tablet 650 mg, 650 mg, Oral, Q4H PRN **OR** acetaminophen (TYLENOL) 160 MG/5ML solution 650 mg, 650 mg, Oral, Q4H PRN **OR** acetaminophen (TYLENOL) suppository 650 mg, 650 mg, Rectal, Q4H PRN, Robert Baker MD  •  amLODIPine (NORVASC) tablet 5 mg, 5 mg, Oral, Q24H, Paddy Low MD, 5 mg at 02/12/21 0858  •  ammonium lactate (AMLACTIN) cream, , Topical, BID, Nixon Quinteros MD, Given at 02/12/21 0908  •  aspirin EC tablet 81 mg, 81 mg, Oral, Daily, Robert Baker MD, 81 mg at 02/12/21 0858  •  atorvastatin (LIPITOR) tablet 20 mg, 20 mg, Oral, Daily, Robert Baker MD, 20 mg at 02/12/21 0858  •  cefTRIAXone (ROCEPHIN) IVPB 1 g, 1 g, Intravenous, Q24H, Tonja Azevedo MD  •  dextrose (D50W) 25 g/ 50mL Intravenous Solution 25 g, 25 g, Intravenous, Q15 Min PRN, Robert Baker MD  •  dextrose (GLUTOSE) oral gel 15 g, 15 g, Oral, Q15 Min PRN, Robert Baker MD  •  dilTIAZem (CARDIZEM) tablet 30 mg, 30 mg, Oral, Q12H, Terrance Muhammad MD, 30 mg at 02/12/21 0858  •  diphenhydrAMINE (BENADRYL) capsule 25 mg, 25 mg, Oral, Q6H PRN, Robert Baker MD  •  glucagon (human recombinant) (GLUCAGEN DIAGNOSTIC) injection 1 mg, 1 mg, Subcutaneous, Q15 Min PRN, Robert Baker MD  •  hydrALAZINE (APRESOLINE) injection 10 mg, 10 mg, Intravenous, Q4H PRN, Soy Pearl,  MD  •  hydrALAZINE (APRESOLINE) tablet 100 mg, 100 mg, Oral, Q8H, Soy Pearl MD, 100 mg at 02/12/21 0606  •  hydrALAZINE (APRESOLINE) tablet 25 mg, 25 mg, Oral, Q6H PRN, Nixon Quinteros MD  •  HYDROcodone-acetaminophen (NORCO)  MG per tablet 1 tablet, 1 tablet, Oral, Q6H PRN, Robert Baker MD, 1 tablet at 02/08/21 2053  •  insulin lispro (humaLOG, ADMELOG) injection 0-9 Units, 0-9 Units, Subcutaneous, TID AC, Robert Baker MD, 2 Units at 02/12/21 0857  •  LORazepam (ATIVAN) injection 1 mg, 1 mg, Intravenous, Q4H PRN, Jono Montes MD, 1 mg at 02/11/21 2348  •  metoprolol tartrate (LOPRESSOR) tablet 12.5 mg, 12.5 mg, Oral, Q12H, Lyndsey Escoto MD, 12.5 mg at 02/12/21 0858  •  ondansetron (ZOFRAN) tablet 4 mg, 4 mg, Oral, Q6H PRN **OR** ondansetron (ZOFRAN) injection 4 mg, 4 mg, Intravenous, Q6H PRN, Robert Baker MD  •  Pharmacy to dose vancomycin, , Does not apply, Continuous PRN, Jono Montes MD  •  potassium chloride (K-DUR,KLOR-CON) ER tablet 40 mEq, 40 mEq, Oral, PRN **OR** potassium chloride (KLOR-CON) packet 40 mEq, 40 mEq, Oral, PRN **OR** potassium chloride 10 mEq in 100 mL IVPB, 10 mEq, Intravenous, Q1H PRN, Jono Montes MD  •  potassium chloride (KLOR-CON) packet 40 mEq, 40 mEq, Oral, Q2H, Miles Del Toro MD  •  sodium chloride 0.9 % flush 10 mL, 10 mL, Intravenous, PRN, Josafat Mckeon MD  •  sodium chloride 0.9 % flush 10 mL, 10 mL, Intravenous, Q12H, Robert Baker MD, 10 mL at 02/12/21 0858  •  sodium chloride 0.9 % flush 10 mL, 10 mL, Intravenous, PRN, Robert Baker MD  •  Vancomycin Pharmacy Intermittent Dosing, , Does not apply, Daily, Jono Montes MD, Given at 02/10/21 1126  Review of Systems: unable to assess given current encephalopathy.         Objective     Vital Signs  Temp:  [94.3 °F (34.6 °C)-98.2 °F (36.8 °C)] 94.3 °F (34.6 °C)  Heart Rate:  [61-78] 74  Resp:  [18-20] 18  BP: (127-139)/(72-95) 139/72  Body mass index is 29.21  kg/m².    Intake/Output Summary (Last 24 hours) at 2/12/2021 1150  Last data filed at 2/11/2021 1235  Gross per 24 hour   Intake 360 ml   Output --   Net 360 ml     No intake/output data recorded.     Physical Exam:   General: patient awake, alert and cooperative   Eyes: Normal lids and lashes, no scleral icterus   Neck: supple, normal ROM   Skin: warm and dry, not jaundiced   Cardiovascular: regular rhythm and rate, no murmurs auscultated   Pulm: clear to auscultation bilaterally, regular and unlabored   Abdomen: soft, nontender, nondistended; normal bowel sounds   Extremities: no rash or edema   Psychiatric: Normal mood and behavior; memory intact     Results Review:     I reviewed the patient's new clinical results.    Results from last 7 days   Lab Units 02/12/21  0528 02/11/21  0545 02/10/21  0916   WBC 10*3/mm3 5.90 7.34 7.35   HEMOGLOBIN g/dL 10.5* 9.2* 10.9*   HEMATOCRIT % 32.1* 29.2* 33.9*   PLATELETS 10*3/mm3 246 85* 306     Results from last 7 days   Lab Units 02/12/21  0914 02/11/21  0545 02/10/21  0916   SODIUM mmol/L 140 139 143   POTASSIUM mmol/L 2.8* 4.0 3.0*   CHLORIDE mmol/L 112* 111* 113*   CO2 mmol/L 16.9* 12.7* 17.8*   BUN mg/dL 25* 24* 24*   CREATININE mg/dL 2.61* 2.55* 2.76*   CALCIUM mg/dL 8.7 8.7 8.6   GLUCOSE mg/dL 132* 144* 153*         Lab Results   Lab Value Date/Time    LIPASE 17 12/18/2020 1653    LIPASE 914 (H) 10/27/2020 0330    LIPASE 1,087 (H) 10/26/2020 0410    LIPASE 1,653 (H) 10/25/2020 0323       Radiology:  MRI Brain Without Contrast   Final Result   Limited examination secondary to patient motion and the   incomplete nature of the examination. There is no evidence of acute   infarction. Atrophy and small vessel ischemic disease is noted.       This report was finalized on 2/12/2021 8:03 AM by Dr. Rashad Bajwa M.D.          CT Head Without Contrast   Final Result   No acute intracranial findings.       Radiation dose reduction techniques were utilized, including automated    exposure control and exposure modulation based on body size.       This report was finalized on 2/5/2021 9:30 PM by Dr. Vero Hansen M.D.          CT Abdomen Pelvis Without Contrast   Final Result       1. Severely technically limited examination due to motion artifact. The   patient's sigmoid colon in particular appears thick walled, with   adjacent pericolonic soft tissue stranding. There is also involvement of   the rectum, although to a lesser extent. Appearance is suggestive of   colitis. No pneumatosis or free air is seen. Given the patient had some   rectal wall thickening on prior CT, consideration for follow-up with   endoscopy is suggested. There is some diffuse gaseous distention of   bowel, which may reflect some ileus.   2. Thick-walled appearance to the urinary bladder, with adjacent   perivesical soft tissue stranding, concerning for cystitis.       Radiation dose reduction techniques were utilized, including automated   exposure control and exposure modulation based on body size.       This report was finalized on 2/5/2021 9:39 PM by Dr. Vero Hansen M.D.          XR Chest 1 View   Final Result          Assessment/Plan     Patient Active Problem List   Diagnosis   • Complicated UTI (urinary tract infection)   • Macrocytosis   • Sepsis secondary to UTI (CMS/HCC)   • Metabolic encephalopathy   • Hyperlipidemia   • Hypertension   • Monoclonal gammopathy   • Stage 4 chronic kidney disease (CMS/HCC)   • DM2 (diabetes mellitus, type 2) (CMS/HCC)   • Abnormal CT of the abdomen   • Normal anion gap metabolic acidosis   • Anemia, chronic disease   • Ventricular tachycardia (paroxysmal) (CMS/HCC)   • Acute metabolic encephalopathy   • UTI (urinary tract infection), bacterial   • Elevated troponin   • Hypokalemia   • SILVIA (acute kidney injury) (CMS/HCC)   • Uncontrolled hypertension   • Septicemia (CMS/HCC)   • Vascular dementia without behavioral disturbance (CMS/HCC)        Assessment:  1. Abnormal CT scan of the sigmoid colon and rectum  2. Iron deficiency anemia  3. Metabolic encephalopathy      Plan:  · Await clearance from other disciplines prior to any GI assessment.  I discussed the patients findings and my recommendations with family and nursing staff.    Dane Mabry MD

## 2021-02-12 NOTE — PROGRESS NOTES
"  Infectious Diseases Progress Note    Tonja Azevedo MD     [facility]  [place]: 8 days  Patient Identification:  Name: Gregorio Alejandro  Age: 65 y.o.  Sex: male  :  1955  MRN: [id number]         Primary Care Physician: Maya Ribeiro APRN            Subjective: Confused and resists examination.  Interval History: See consultation note.    Objective:    Scheduled Meds:amLODIPine, 5 mg, Oral, Q24H  ammonium lactate, , Topical, BID  aspirin, 81 mg, Oral, Daily  atorvastatin, 20 mg, Oral, Daily  dilTIAZem, 30 mg, Oral, Q12H  hydrALAZINE, 100 mg, Oral, Q8H  insulin lispro, 0-9 Units, Subcutaneous, TID AC  metoprolol tartrate, 12.5 mg, Oral, Q12H  sodium bicarbonate, 1,300 mg, Oral, TID  sodium chloride, 10 mL, Intravenous, Q12H  Vancomycin Pharmacy Intermittent Dosing, , Does not apply, Daily      Continuous Infusions:Pharmacy to dose vancomycin,         Vital signs in last 24 hours:  Temp:  [94.3 °F (34.6 °C)-98.2 °F (36.8 °C)] 94.3 °F (34.6 °C)  Heart Rate:  [61-78] 74  Resp:  [18-20] 18  BP: (127-139)/(72-95) 139/72    Intake/Output:    Intake/Output Summary (Last 24 hours) at 2021 0942  Last data filed at 2021 1235  Gross per 24 hour   Intake 360 ml   Output --   Net 360 ml       Exam:  /72 (BP Location: Right arm, Patient Position: Lying)   Pulse 74   Temp 94.3 °F (34.6 °C) (Rectal) Comment: rn notified  Resp 18   Ht 190.5 cm (75\")   Wt 106 kg (233 lb 11 oz)   SpO2 98%   BMI 29.21 kg/m²   Patient is examined using the personal protective equipment as per guidelines from infection control for this particular patient as enacted.  Hand washing was performed before and after patient interaction.  General Appearance:  Has better color and does not appear as toxic interactive and appropriate.                          Head:    Normocephalic, without obvious abnormality, atraumatic                           Eyes:    PERRL, conjunctivae/corneas clear, EOM's intact, both eyes        "                  Throat:   Lips, tongue, gums normal; oral mucosa pink and moist                           Neck:   Supple, symmetrical, trachea midline, no JVD                         Lungs:    Decreased breath sounds at the base                 Chest Wall:    No tenderness or deformity                          Heart:  S1-S2 regular                  abdomen:   Soft nontender                 Extremities:   Extremities normal, atraumatic, no cyanosis or edema                        Pulses:   Pulses palpable in all extremities                            Skin: Chronic ulcerative changes involving bilateral lower extremities with no clear cellulitis                  Neurologic: Bilateral lower extremity weakness       Data Review:    I reviewed the patient's new clinical results.  Results from last 7 days   Lab Units 02/12/21  0528 02/11/21  0545 02/10/21  0916 02/09/21  0633 02/08/21  0609 02/07/21  0458 02/06/21  0610   WBC 10*3/mm3 5.90 7.34 7.35 9.09 11.58* 8.61 9.52   HEMOGLOBIN g/dL 10.5* 9.2* 10.9* 10.4* 10.7* 10.0* 10.6*   PLATELETS 10*3/mm3 246 85* 306 287 209 253 280     Results from last 7 days   Lab Units 02/11/21  0545 02/10/21  0916 02/09/21  0918 02/08/21  2018 02/08/21  0609 02/08/21  0007 02/07/21  1557 02/07/21  0458  02/06/21  0610   SODIUM mmol/L 139 143 145  --  142  --  145 143  --  142   POTASSIUM mmol/L 4.0 3.0* 3.1* 3.1* 3.8 2.9* 2.7* 2.8*   < > 3.3*   CHLORIDE mmol/L 111* 113* 115*  --  114*  --  115* 115*  --  115*   CO2 mmol/L 12.7* 17.8* 17.3*  --  14.6*  --  16.7* 16.4*  --  18.9*   BUN mg/dL 24* 24* 22  --  20  --  19 20  --  20   CREATININE mg/dL 2.55* 2.76* 2.69*  --  2.23*  --  2.32* 2.37*  --  2.39*   CALCIUM mg/dL 8.7 8.6 9.2  --  9.0  --  8.7 8.7  --  8.5*   GLUCOSE mg/dL 144* 153* 149*  --  159*  --  147* 156*  --  164*    < > = values in this interval not displayed.     Microbiology Results (last 10 days)     Procedure Component Value - Date/Time    Gastrointestinal Panel, PCR -  Stool, Per Rectum [[id number]]  (Normal) Collected: 02/05/21 2204    Lab Status: Final result Specimen: Stool from Per Rectum Updated: 02/07/21 0950     Campylobacter Not Detected     Plesiomonas shigelloides Not Detected     Salmonella Not Detected     Vibrio Not Detected     Vibrio cholerae Not Detected     Yersinia enterocolitica Not Detected     Enteroaggregative E. coli (EAEC) Not Detected     Enteropathogenic E. coli (EPEC) Not Detected     Enterotoxigenic E. coli (ETEC) lt/st Not Detected     Shiga-like toxin-producing E. coli (STEC) stx1/stx2 Not Detected     E. coli O157 Not Detected     Shigella/Enteroinvasive E. coli (EIEC) Not Detected     Cryptosporidium Not Detected     Cyclospora cayetanensis Not Detected     Entamoeba histolytica Not Detected     Giardia lamblia Not Detected     Adenovirus F40/41 Not Detected     Astrovirus Not Detected     Norovirus GI/GII Not Detected     Rotavirus A Not Detected     Sapovirus (I, II, IV or V) Not Detected    Narrative:      If Aeromonas, Staphylococcus aureus or Bacillus cereus are suspected, please order [id number]: Stool Culture, Aeromonas, S aureus, B Cereus.    Clostridium Difficile Toxin - Stool, Per Rectum [[id number]]  (Normal) Collected: 02/05/21 2204    Lab Status: Final result Specimen: Stool from Per Rectum Updated: 02/05/21 2303    Narrative:      The following orders were created for panel order Clostridium Difficile Toxin - Stool, Per Rectum.  Procedure                               Abnormality         Status                     ---------                               -----------         ------                     Clostridium Difficile To...[[id number]]  Normal              Final result                 Please view results for these tests on the individual orders.    Clostridium Difficile Toxin, PCR - Stool, Per Rectum [[id number]]  (Normal) Collected: 02/05/21 2204    Lab Status: Final result Specimen: Stool from Per Rectum Updated: 02/05/21 2303     C.  Difficile Toxins by PCR Negative    Blood Culture - Blood, Arm, Left [[id number]] Collected: 02/05/21 1433    Lab Status: Final result Specimen: Blood from Arm, Left Updated: 02/10/21 1445     Blood Culture No growth at 5 days    Blood Culture - Blood, Arm, Right [[id number]] Collected: 02/05/21 1431    Lab Status: Final result Specimen: Blood from Arm, Right Updated: 02/10/21 1445     Blood Culture No growth at 5 days    COVID PRE-OP / PRE-PROCEDURE SCREENING ORDER (NO ISOLATION) - Swab, Nasopharynx [[id number]]  (Normal) Collected: 02/04/21 1810    Lab Status: Final result Specimen: Swab from Nasopharynx Updated: 02/04/21 2206    Narrative:      The following orders were created for panel order COVID PRE-OP / PRE-PROCEDURE SCREENING ORDER (NO ISOLATION) - Swab, Nasopharynx.  Procedure                               Abnormality         Status                     ---------                               -----------         ------                     COVID-19,APTIMA PANTHER,...[[id number]]  Normal              Final result                 Please view results for these tests on the individual orders.    COVID-19,APTIMA PANTHER,[facility], NP/OP SWAB IN UTM/VTM/[facility] TRANSPORT MEDIA,24 HR TAT - Swab, Nasopharynx [[id number]]  (Normal) Collected: 02/04/21 1810    Lab Status: Final result Specimen: Swab from Nasopharynx Updated: 02/04/21 2206     COVID19 Not Detected    Narrative:      Fact sheet for providers: https://www.fda.gov/media/730481/download     Fact sheet for patients: https://www.fda.gov/media/496730/download    Test performed by RT PCR.    Urine Culture - Urine, Urine, Catheter [[id number]]  (Abnormal)  (Susceptibility) Collected: 02/04/21 1612    Lab Status: Final result Specimen: Urine, Catheter Updated: 02/06/21 0940     Urine Culture 25,000 CFU/mL Proteus mirabilis    Susceptibility      Proteus mirabilis     ISAC     Ampicillin Susceptible     Ampicillin + Sulbactam Susceptible     Cefazolin Susceptible      Cefepime Susceptible     Ceftazidime Susceptible     Ceftriaxone Susceptible     Gentamicin Susceptible     Levofloxacin Intermediate     Nitrofurantoin Resistant     Piperacillin + Tazobactam Susceptible     Tetracycline Resistant     Trimethoprim + Sulfamethoxazole Resistant                    Blood Culture - Blood, Hand, Right [[id number]]  (Abnormal) Collected: 02/04/21 1551    Lab Status: Final result Specimen: Blood from Hand, Right Updated: 02/09/21 0928     Blood Culture Staphylococcus epidermidis     Comment: Refer to previous blood culture collected on 2-4-21 for MICs        Isolated from Anaerobic Bottle     Blood Culture Aerococcus viridans     Isolated from Anaerobic Bottle     Gram Stain Anaerobic Bottle Gram positive cocci in clusters    Blood Culture - Blood, Hand, Right [[id number]]  (Abnormal)  (Susceptibility) Collected: 02/04/21 1551    Lab Status: Final result Specimen: Blood from Hand, Right Updated: 02/09/21 0633     Blood Culture Staphylococcus epidermidis     Isolated from Aerobic Bottle     Blood Culture Aerococcus viridans     Isolated from Aerobic Bottle     Blood Culture Globicatella sanguinis     Comment: Anaerobic bottle only  No CLSI guidelines and no validated ISAC testing method.          Isolated from Anaerobic Bottle     Gram Stain Aerobic Bottle Gram positive cocci in clusters      Anaerobic Bottle Gram positive cocci in clusters    Narrative:            Susceptibility      Staphylococcus epidermidis     ISAC     Oxacillin Resistant     Vancomycin Susceptible                Susceptibility      Aerococcus viridans     Not Specified     Ceftriaxone Resistant     Vancomycin Susceptible                Susceptibility Comments     Staphylococcus epidermidis    This isolate is presumed to be clindamycin resistant based on detection of inducible clindamycin resistance.  Clindamycin may still be effective in some patients.             Blood Culture ID, PCR - Blood, Hand, Right [[id number]]   (Abnormal) Collected: 02/04/21 1551    Lab Status: Final result Specimen: Blood from Hand, Right Updated: 02/05/21 1250     BCID, PCR Staphylococcus spp, not aureus. Identification by BCID PCR.              Assessment:    Acute metabolic encephalopathy    Hyperlipidemia    Hypertension    Stage 4 chronic kidney disease (CMS/HCC)    DM2 (diabetes mellitus, type 2) (CMS/[facility])    Anemia, chronic disease    UTI (urinary tract infection), bacterial    Elevated troponin    Hypokalemia    Uncontrolled hypertension    Septicemia (CMS/HCC)    Vascular dementia without behavioral disturbance (CMS/[facility])  1-toxic metabolic encephalopathy secondary to combination of  2-urinary tract infection with infected lower extremity wound with possible cellulitis  3-coag negative staph bacteremia either part of the systemic sepsis originating from lower extremities or possible contaminant during the process of collection  4-embolization syndrome  5-diabetes mellitus with complications including peripheral neuropathy, diabetic nephropathy  6-stage V kidney disease  7-uncontrolled hypertension  8-anemia multifactorial.     Recommendations/Discussions:   · Continue Rocephin and vancomycin while following up on the sensitivity of coag negative staph.  · Aggressive local wound care of lower extremities is needed.    · Follow-up on repeat blood cultures that we have ordered and if  negative then it can be assessed and assumed that the pathogens in the blood culture is contaminant during the process of collection and hence would not further work-up and treatment.  · Would recommend current antibiotics for 10 to 14 days for combination of UTI and soft tissue infection of the lower extremities.    Tonja Azevedo MD  2/12/2021  09:42 EST    Much of this encounter note is an electronic transcription/translation of spoken language to printed text. The electronic translation of spoken language may permit erroneous, or at times, nonsensical words or phrases  to be inadvertently transcribed; Although I have reviewed the note for such errors, some may still exist

## 2021-02-12 NOTE — PROGRESS NOTES
"   LOS: 8 days    Patient Care Team:  Maya Ribeiro APRN as PCP - General (Family Medicine)    Chief Complaint:    Chief Complaint   Patient presents with   • Altered Mental Status     Follow-up chronic kidney disease and hypokalemia  Subjective     Interval History:   Patient is confused, not answering question properly, he just mumbles      Review of Systems:   Not obtainable    Objective     Vital Signs  Temp:  [94.3 °F (34.6 °C)-98.2 °F (36.8 °C)] 94.3 °F (34.6 °C)  Heart Rate:  [61-78] 74  Resp:  [18-20] 18  BP: (127-139)/(72-95) 139/72    Flowsheet Rows      First Filed Value   Admission Height  190.5 cm (75\") Documented at 02/04/2021 1250   Admission Weight  114 kg (251 lb) Documented at 02/04/2021 1250          No intake/output data recorded.  I/O last 3 completed shifts:  In: 720 [P.O.:720]  Out: -     Intake/Output Summary (Last 24 hours) at 2/12/2021 0958  Last data filed at 2/11/2021 1235  Gross per 24 hour   Intake 360 ml   Output --   Net 360 ml       Physical Exam:  General Appearance: Confused, disoriented, appears to be chronically, no acute distress,   Skin: warm and dry  HEENT: Nonicteric sclerae, oral mucosa is moist  Neck: no JVD, trachea midline  Lungs: Scattered rhonchi, unlabored breathing effort  Heart: RRR, normal S1 and S2, no S3, no rub  Abdomen: soft, nontender, normoactive bowels  : no palpable bladder, external catheter in place  Extremities: no edema, cyanosis or clubbing  Neuro: Moving all extremities      Results Review:    Results from last 7 days   Lab Units 02/12/21  0914 02/11/21  0545 02/10/21  0916   SODIUM mmol/L 140 139 143   POTASSIUM mmol/L 2.8* 4.0 3.0*   CHLORIDE mmol/L 112* 111* 113*   CO2 mmol/L 16.9* 12.7* 17.8*   BUN mg/dL 25* 24* 24*   CREATININE mg/dL 2.61* 2.55* 2.76*   CALCIUM mg/dL 8.7 8.7 8.6   GLUCOSE mg/dL 132* 144* 153*       Estimated Creatinine Clearance: 36.2 mL/min (A) (by C-G formula based on SCr of 2.61 mg/dL (H)).    Results from last 7 days "   Lab Units 02/12/21  0914 02/12/21  0528 02/11/21  0545 02/10/21  0916 02/09/21  0918   MAGNESIUM mg/dL  --  1.8 2.0  --  1.9   PHOSPHORUS mg/dL 2.7  --  2.9 2.3* 2.4*       Results from last 7 days   Lab Units 02/12/21  0528 02/11/21  0545 02/09/21  0633 02/06/21  0610   URIC ACID mg/dL 8.5* 8.3* 7.8* 7.2*       Results from last 7 days   Lab Units 02/12/21  0528 02/11/21  0545 02/10/21  0916 02/09/21  0633 02/08/21  0609   WBC 10*3/mm3 5.90 7.34 7.35 9.09 11.58*   HEMOGLOBIN g/dL 10.5* 9.2* 10.9* 10.4* 10.7*   PLATELETS 10*3/mm3 246 85* 306 287 209               Imaging Results (Last 24 Hours)     Procedure Component Value Units Date/Time    MRI Brain Without Contrast [[id number]] Collected: 02/11/21 1634     Updated: 02/12/21 0806    Narrative:      MRI EXAMINATION OF THE LUMBAR SPINE WITHOUT CONTRAST     HISTORY: Dizziness.     COMPARISON: CT head 02/05/2021. No prior MRI is available for  comparison.     TECHNIQUE: The study is limited in that only a patient degraded sagittal  T1, axial diffusion, T2 FLAIR and motion degraded axial T1 sequences  were obtained.     FINDINGS: The study is hampered by patient motion.     There is age-appropriate atrophy. There is no evidence of acute  infarction, hydrocephalus or of abnormal extra-axial fluid. Confluent  areas of increased signal intensity are noted involving the  periventricular white matter of the cerebral hemispheres bilaterally and  there is patchy increased signal intensity involving the scot centrally  on the T2 FLAIR sequence nonspecific but consistent with moderate small  vessel ischemic disease. There is no evidence of mass or of mass effect  on this noncontrasted MRI examination of the brain.       Impression:      Limited examination secondary to patient motion and the  incomplete nature of the examination. There is no evidence of acute  infarction. Atrophy and small vessel ischemic disease is noted.     This report was finalized on 2/12/2021 8:03 AM by  Dr. Rashad Bajwa M.D.           amLODIPine, 5 mg, Oral, Q24H  ammonium lactate, , Topical, BID  aspirin, 81 mg, Oral, Daily  atorvastatin, 20 mg, Oral, Daily  cefTRIAXone, 1 g, Intravenous, Q24H  dilTIAZem, 30 mg, Oral, Q12H  hydrALAZINE, 100 mg, Oral, Q8H  insulin lispro, 0-9 Units, Subcutaneous, TID AC  metoprolol tartrate, 12.5 mg, Oral, Q12H  sodium bicarbonate, 1,300 mg, Oral, TID  sodium chloride, 10 mL, Intravenous, Q12H  Vancomycin Pharmacy Intermittent Dosing, , Does not apply, Daily      Pharmacy to dose vancomycin,         Medication Review:   Current Facility-Administered Medications   Medication Dose Route Frequency Provider Last Rate Last Admin   • acetaminophen (TYLENOL) tablet 650 mg  650 mg Oral Q4H PRN Robert Baker MD        Or   • acetaminophen (TYLENOL) 160 MG/5ML solution 650 mg  650 mg Oral Q4H PRN Robert Baker MD        Or   • acetaminophen (TYLENOL) suppository 650 mg  650 mg Rectal Q4H PRN Robert Baker MD       • amLODIPine (NORVASC) tablet 5 mg  5 mg Oral Q24H Paddy Low MD   5 mg at 02/12/21 0858   • ammonium lactate (AMLACTIN) cream   Topical BID Nixon Quinteros MD   Given at 02/12/21 0908   • aspirin EC tablet 81 mg  81 mg Oral Daily Robert Baker MD   81 mg at 02/12/21 0858   • atorvastatin (LIPITOR) tablet 20 mg  20 mg Oral Daily Robert Baker MD   20 mg at 02/12/21 0858   • cefTRIAXone (ROCEPHIN) IVPB 1 g  1 g Intravenous Q24H Tonja Azevedo MD       • dextrose (D50W) 25 g/ 50mL Intravenous Solution 25 g  25 g Intravenous Q15 Min PRN Robert Baker MD       • dextrose (GLUTOSE) oral gel 15 g  15 g Oral Q15 Min PRN Robert Baker MD       • dilTIAZem (CARDIZEM) tablet 30 mg  30 mg Oral Q12H Terrance Muhammad MD   30 mg at 02/12/21 0858   • diphenhydrAMINE (BENADRYL) capsule 25 mg  25 mg Oral Q6H PRN Robert Baker MD       • glucagon (human recombinant) (GLUCAGEN DIAGNOSTIC) injection 1 mg  1 mg Subcutaneous Q15 Min PRN Samuel,  MD Robert       • hydrALAZINE (APRESOLINE) injection 10 mg  10 mg Intravenous Q4H PRN Soy Pearl MD       • hydrALAZINE (APRESOLINE) tablet 100 mg  100 mg Oral Q8H Soy Pearl MD   100 mg at 02/12/21 0606   • hydrALAZINE (APRESOLINE) tablet 25 mg  25 mg Oral Q6H PRN Nixon Quinteros MD       • HYDROcodone-acetaminophen (NORCO)  MG per tablet 1 tablet  1 tablet Oral Q6H PRN Robert Baekr MD   1 tablet at 02/08/21 2053   • insulin lispro (humaLOG, ADMELOG) injection 0-9 Units  0-9 Units Subcutaneous TID AC Robert Baker MD   2 Units at 02/12/21 0857   • LORazepam (ATIVAN) injection 1 mg  1 mg Intravenous Q4H PRN Jono Montes MD   1 mg at 02/11/21 2348   • metoprolol tartrate (LOPRESSOR) tablet 12.5 mg  12.5 mg Oral Q12H Lyndsey Escoto MD   12.5 mg at 02/12/21 0858   • ondansetron (ZOFRAN) tablet 4 mg  4 mg Oral Q6H PRN Robert Baker MD        Or   • ondansetron (ZOFRAN) injection 4 mg  4 mg Intravenous Q6H PRN Robert Baker MD       • Pharmacy to dose vancomycin   Does not apply Continuous PRN Jono Montes MD       • sodium bicarbonate tablet 1,300 mg  1,300 mg Oral TID Miles Del Toro MD   1,300 mg at 02/12/21 0858   • sodium chloride 0.9 % flush 10 mL  10 mL Intravenous PRN Josafat Mckeon MD       • sodium chloride 0.9 % flush 10 mL  10 mL Intravenous Q12H Robert Baker MD   10 mL at 02/12/21 0858   • sodium chloride 0.9 % flush 10 mL  10 mL Intravenous PRN Robert Baker MD       • Vancomycin Pharmacy Intermittent Dosing   Does not apply Daily Jono Montes MD   Given at 02/10/21 1126       Assessment/Plan   1.  Chronic kidney disease second diabetic nephropathy biopsy-proven, creatinine today is 2.61, total CO2 16.9 and potassium 2.8, albumin 2.5, magnesium 1.8, phosphorus 2.7 and uric acid 8.5.  2.  Hypokalemia, potassium is down to two-point  3.  Metabolic acidosis none anion gap associate with chronic kidney disease total CO2 today is 16.9  4.  Anemia of  chronic kidney disease, hemoglobin yesterday was 10.4, no JOSSELYN as needed.  5.  Immobility syndrome  6.  Hypertension, acceptable control today.  7.  Diabetes mellitus type 2, with diabetic nephropathy, biopsy-proven.        Plan:  1.  Check his labs today and adjust treatment as needed currently no need for diuretics  2.  I will stop the sodium bicarbonate since the potassium has declined significantly will consider treatment of the metabolic acidosis after the potassium is corrected.  3.  Replete potassium  4.  Surveillance lab              Miles Del Toro MD  02/12/21  09:58 EST

## 2021-02-13 NOTE — PROGRESS NOTES
Procedure   EEG    Date/Time: 2/13/2021 9:57 AM  Performed by: Jluis Rodríguez II, MD  Authorized by: Pal Monge MD   Comments: Reason for procedure: Altered mental status    Technical information:  This is an inpatient EEG performed using the 1020 system electrode placement photic stimulation was performed.  Hyperventilation was not performed.    Report:  Throughout the entire study the best background rhythm seen is approximately 5 to 6 Hz.  This rhythm is seen in both posterior head regions symmetrically.  The EEG does seem to attenuate appropriately.  Photic stimulation was performed which did not elicit any epileptiform activity.  Hyperventilation was not performed.  There was a significant amount of frontal artifact seen during the study however no epileptiform abnormalities were seen within that.  Throughout the entire study there were no electrographic seizures recorded nor were there any independent epileptiform abnormality seen.    Interpretation:  This is an abnormal EEG due to the presence of slowing.  Slowing can be seen in many clinical scenarios including toxic metabolic anoxic and neurodegenerative processes.  Clinical correlation is advised.  Total duration of procedure was 26 minutes and 36 seconds.

## 2021-02-13 NOTE — PROGRESS NOTES
"DAILY PROGRESS NOTE  [facility]    Patient Identification:  Name: Gregorio Alejandro  Age: 65 y.o.  Sex: male  :  1955  MRN: [id number]         Primary Care Physician: Maya Ribeiro APRN    Subjective:  Interval History:He is confused.     Objective:    Scheduled Meds:amLODIPine, 5 mg, Oral, Q24H  ammonium lactate, , Topical, BID  aspirin, 81 mg, Oral, Daily  atorvastatin, 20 mg, Oral, Daily  cefTRIAXone, 1 g, Intravenous, Q24H  hydrALAZINE, 100 mg, Oral, Q8H  insulin lispro, 0-9 Units, Subcutaneous, TID AC  levETIRAcetam, 1,000 mg, Intravenous, Q12H  sodium chloride, 10 mL, Intravenous, Q12H  vancomycin, 500 mg, Intravenous, Q24H  Vancomycin Pharmacy Intermittent Dosing, , Does not apply, Daily      Continuous Infusions:hold, 1 each  Pharmacy to dose vancomycin,         Vital signs in last 24 hours:  Temp:  [96 °F (35.6 °C)-96.4 °F (35.8 °C)] 96.4 °F (35.8 °C)  Heart Rate:  [65-91] 65  Resp:  [16-18] 16  BP: (138-169)/(78-90) 138/80    Intake/Output:  No intake or output data in the 24 hours ending 21 1607    Exam:  /80 (BP Location: Right arm, Patient Position: Lying)   Pulse 65   Temp 96.4 °F (35.8 °C) (Oral)   Resp 16   Ht 190.5 cm (75\")   Wt 107 kg (235 lb)   SpO2 97%   BMI 29.37 kg/m²     General Appearance:    confused, no distress   Head:    Normocephalic, without obvious abnormality, atraumatic   Eyes:       Throat:   Lips, tongue, gums normal   Neck:   Supple, symmetrical, trachea midline, no JVD   Lungs:     Clear to auscultation bilaterally, respirations unlabored   Chest Wall:    No tenderness or deformity    Heart:    Regular rate and rhythm, S1 and S2 normal, no murmur,no  Rub or gallop   Abdomen:     Soft, nontender, bowel sounds active, no masses, no organomegaly    Extremities:   Extremities normal, atraumatic, no cyanosis or edema   Pulses:      Skin:   Skin is warm and dry,  no rashes or palpable lesions   Neurologic:   Confused       Lab Results (last " 72 hours)     Procedure Component Value Units Date/Time    Blood Culture - Blood, Arm, Right [[id number]] Collected: 02/05/21 1431    Specimen: Blood from Arm, Right Updated: 02/08/21 1445     Blood Culture No growth at 3 days    Blood Culture - Blood, Arm, Left [[id number]] Collected: 02/05/21 1433    Specimen: Blood from Arm, Left Updated: 02/08/21 1445     Blood Culture No growth at 3 days    POC Glucose Once [[id number]]  (Abnormal) Collected: 02/08/21 1150    Specimen: Blood Updated: 02/08/21 1203     Glucose 193 mg/dL     Blood Culture - Blood, Hand, Right [[id number]]  (Abnormal) Collected: 02/04/21 1551    Specimen: Blood from Hand, Right Updated: 02/08/21 0821     Blood Culture Staphylococcus epidermidis     Comment: Refer to previous blood culture collected on 2-4-21 for MICs        Isolated from Anaerobic Bottle     Blood Culture Aerococcus viridans     Isolated from Anaerobic Bottle     Gram Stain Anaerobic Bottle Gram positive cocci in clusters    Blood Culture - Blood, Hand, Right [[id number]]  (Abnormal)  (Susceptibility) Collected: 02/04/21 1551    Specimen: Blood from Hand, Right Updated: 02/08/21 0820     Blood Culture Staphylococcus epidermidis     Isolated from Aerobic Bottle     Blood Culture Aerococcus viridans     Comment: Susceptibilities for Ceftriaxone and Vancomycin to follow.        Isolated from Aerobic Bottle     Blood Culture Globicatella sanguinis     Comment: Anaerobic bottle only  No CLSI guidelines and no validated ISAC testing method.          Isolated from Anaerobic Bottle     Gram Stain Aerobic Bottle Gram positive cocci in clusters      Anaerobic Bottle Gram positive cocci in clusters    Narrative:            Susceptibility      Staphylococcus epidermidis     ISAC     Oxacillin Resistant     Vancomycin Susceptible                Susceptibility Comments     Staphylococcus epidermidis    This isolate is presumed to be clindamycin resistant based on detection of inducible clindamycin  resistance.  Clindamycin may still be effective in some patients.             CBC (No Diff) [[id number]]  (Abnormal) Collected: 02/08/21 0609    Specimen: Blood Updated: 02/08/21 0725     WBC 11.58 10*3/mm3      RBC 4.75 10*6/mm3      Hemoglobin 10.7 g/dL      Hematocrit 33.1 %      MCV 69.7 fL      MCH 22.5 pg      MCHC 32.3 g/dL      RDW 24.5 %      RDW-SD 57.6 fl      Platelets 209 10*3/mm3     Vancomycin, Random [[id number]]  (Normal) Collected: 02/08/21 0609    Specimen: Blood Updated: 02/08/21 0704     Vancomycin Random 17.20 mcg/mL     Narrative:      Therapeutic Ranges for Vancomycin    Vancomycin Random   5.0-40.0 mcg/mL  Vancomycin Trough   5.0-20.0 mcg.mL  Vancomycin Peak     20.0-40.0 mcg/mL    Renal Function Panel [[id number]]  (Abnormal) Collected: 02/08/21 0609    Specimen: Blood Updated: 02/08/21 0704     Glucose 159 mg/dL      BUN 20 mg/dL      Creatinine 2.23 mg/dL      Sodium 142 mmol/L      Potassium 3.8 mmol/L      Comment: Slight hemolysis detected by analyzer. Results may be affected.        Chloride 114 mmol/L      CO2 14.6 mmol/L      Calcium 9.0 mg/dL      Albumin 2.60 g/dL      Phosphorus 2.7 mg/dL      Anion Gap 13.4 mmol/L      BUN/Creatinine Ratio 9.0     eGFR  African Amer 36 mL/min/1.73     Narrative:      GFR Normal >60  Chronic Kidney Disease <60  Kidney Failure <15      Magnesium [[id number]]  (Normal) Collected: 02/08/21 0609    Specimen: Blood Updated: 02/08/21 0703     Magnesium 2.0 mg/dL     Ammonia [[id number]]  (Normal) Collected: 02/08/21 0609    Specimen: Blood Updated: 02/08/21 0654     Ammonia 25 umol/L     POC Glucose Once [[id number]]  (Abnormal) Collected: 02/08/21 0628    Specimen: Blood Updated: 02/08/21 0629     Glucose 138 mg/dL     Potassium, Urine, Random - Urine, Clean Catch [[id number]] Collected: 02/08/21 0550    Specimen: Urine, Clean Catch Updated: 02/08/21 0625     Potassium, Urine <3.0 mmol/L     Narrative:      Reference intervals for random urine have not been  established.  Clinical usage is dependent upon physician's interpretation in combination with other laboratory tests.       Sodium, Urine, Random - Urine, Clean Catch [[id number]] Collected: 02/08/21 0550    Specimen: Urine, Clean Catch Updated: 02/08/21 0625     Sodium, Urine <20 mmol/L     Narrative:      Reference intervals for random urine have not been established.  Clinical usage is dependent upon physician's interpretation in combination with other laboratory tests.       Potassium [[id number]]  (Abnormal) Collected: 02/08/21 0007    Specimen: Blood Updated: 02/08/21 0046     Potassium 2.9 mmol/L     POC Glucose Once [[id number]]  (Abnormal) Collected: 02/07/21 2108    Specimen: Blood Updated: 02/07/21 2110     Glucose 144 mg/dL     Renal Function Panel [[id number]]  (Abnormal) Collected: 02/07/21 1557    Specimen: Blood from Arm, Right Updated: 02/07/21 1657     Glucose 147 mg/dL      BUN 19 mg/dL      Creatinine 2.32 mg/dL      Sodium 145 mmol/L      Potassium 2.7 mmol/L      Chloride 115 mmol/L      CO2 16.7 mmol/L      Calcium 8.7 mg/dL      Albumin 2.90 g/dL      Phosphorus 2.8 mg/dL      Anion Gap 13.3 mmol/L      BUN/Creatinine Ratio 8.2     eGFR  African Amer 34 mL/min/1.73     Narrative:      GFR Normal >60  Chronic Kidney Disease <60  Kidney Failure <15      POC Glucose Once [[id number]]  (Abnormal) Collected: 02/07/21 1537    Specimen: Blood Updated: 02/07/21 1539     Glucose 162 mg/dL     POC Glucose Once [[id number]]  (Abnormal) Collected: 02/07/21 1130    Specimen: Blood Updated: 02/07/21 1137     Glucose 147 mg/dL     Gastrointestinal Panel, PCR - Stool, Per Rectum [[id number]]  (Normal) Collected: 02/05/21 2204    Specimen: Stool from Per Rectum Updated: 02/07/21 0950     Campylobacter Not Detected     Plesiomonas shigelloides Not Detected     Salmonella Not Detected     Vibrio Not Detected     Vibrio cholerae Not Detected     Yersinia enterocolitica Not Detected     Enteroaggregative E. coli (EAEC)  Not Detected     Enteropathogenic E. coli (EPEC) Not Detected     Enterotoxigenic E. coli (ETEC) lt/st Not Detected     Shiga-like toxin-producing E. coli (STEC) stx1/stx2 Not Detected     E. coli O157 Not Detected     Shigella/Enteroinvasive E. coli (EIEC) Not Detected     Cryptosporidium Not Detected     Cyclospora cayetanensis Not Detected     Entamoeba histolytica Not Detected     Giardia lamblia Not Detected     Adenovirus F40/41 Not Detected     Astrovirus Not Detected     Norovirus GI/GII Not Detected     Rotavirus A Not Detected     Sapovirus (I, II, IV or V) Not Detected    Narrative:      If Aeromonas, Staphylococcus aureus or Bacillus cereus are suspected, please order [id number]: Stool Culture, Aeromonas, S aureus, B Cereus.    POC Glucose Once [[id number]]  (Abnormal) Collected: 02/07/21 0616    Specimen: Blood Updated: 02/07/21 0617     Glucose 182 mg/dL     Renal Function Panel [[id number]]  (Abnormal) Collected: 02/07/21 0458    Specimen: Blood Updated: 02/07/21 0609     Glucose 156 mg/dL      BUN 20 mg/dL      Creatinine 2.37 mg/dL      Sodium 143 mmol/L      Potassium 2.8 mmol/L      Chloride 115 mmol/L      CO2 16.4 mmol/L      Calcium 8.7 mg/dL      Albumin 2.70 g/dL      Phosphorus 1.8 mg/dL      Anion Gap 11.6 mmol/L      BUN/Creatinine Ratio 8.4     eGFR  African Amer 33 mL/min/1.73     Narrative:      GFR Normal >60  Chronic Kidney Disease <60  Kidney Failure <15      Vancomycin, Random [[id number]]  (Normal) Collected: 02/07/21 0458    Specimen: Blood Updated: 02/07/21 0553     Vancomycin Random 17.40 mcg/mL     Narrative:      Therapeutic Ranges for Vancomycin    Vancomycin Random   5.0-40.0 mcg/mL  Vancomycin Trough   5.0-20.0 mcg.mL  Vancomycin Peak     20.0-40.0 mcg/mL    CBC (No Diff) [[id number]]  (Abnormal) Collected: 02/07/21 0458    Specimen: Blood Updated: 02/07/21 0535     WBC 8.61 10*3/mm3      RBC 4.32 10*6/mm3      Hemoglobin 10.0 g/dL      Hematocrit 31.0 %      MCV 71.8 fL      MCH  23.1 pg      MCHC 32.3 g/dL      RDW 23.8 %      RDW-SD 58.1 fl      MPV --     Comment: Unable to calculate        Platelets 253 10*3/mm3     Potassium [[id number]]  (Abnormal) Collected: 02/06/21 2021    Specimen: Blood Updated: 02/06/21 2059     Potassium 2.9 mmol/L     Phosphorus [[id number]]  (Abnormal) Collected: 02/06/21 2021    Specimen: Blood Updated: 02/06/21 2059     Phosphorus 2.1 mg/dL     Magnesium [[id number]]  (Normal) Collected: 02/06/21 2021    Specimen: Blood Updated: 02/06/21 2052     Magnesium 2.0 mg/dL     POC Glucose Once [[id number]]  (Abnormal) Collected: 02/06/21 2043    Specimen: Blood Updated: 02/06/21 2044     Glucose 158 mg/dL     POC Glucose Once [[id number]]  (Abnormal) Collected: 02/06/21 1706    Specimen: Blood Updated: 02/06/21 1708     Glucose 134 mg/dL     POC Glucose Once [[id number]]  (Normal) Collected: 02/06/21 1114    Specimen: Blood Updated: 02/06/21 1116     Glucose 110 mg/dL     Urine Culture - Urine, Urine, Catheter [[id number]]  (Abnormal)  (Susceptibility) Collected: 02/04/21 1612    Specimen: Urine, Catheter Updated: 02/06/21 0940     Urine Culture 25,000 CFU/mL Proteus mirabilis    Susceptibility      Proteus mirabilis     ISAC     Ampicillin Susceptible     Ampicillin + Sulbactam Susceptible     Cefazolin Susceptible     Cefepime Susceptible     Ceftazidime Susceptible     Ceftriaxone Susceptible     Gentamicin Susceptible     Levofloxacin Intermediate     Nitrofurantoin Resistant     Piperacillin + Tazobactam Susceptible     Tetracycline Resistant     Trimethoprim + Sulfamethoxazole Resistant                    Manual Differential [[id number]]  (Abnormal) Collected: 02/06/21 0610    Specimen: Blood Updated: 02/06/21 0722     Neutrophil % 93.0 %      Lymphocyte % 4.0 %      Monocyte % 1.0 %      Eosinophil % 1.0 %      Basophil % 1.0 %      Neutrophils Absolute 8.85 10*3/mm3      Lymphocytes Absolute 0.38 10*3/mm3      Monocytes Absolute 0.10 10*3/mm3      Eosinophils  Absolute 0.10 10*3/mm3      Basophils Absolute 0.10 10*3/mm3      Anisocytosis Mod/2+     Penryn Cells Slight/1+     Hypochromia Slight/1+     Macrocytes Slight/1+     Microcytes Slight/1+     Poikilocytes Mod/2+     Polychromasia Large/3+     RBC Fragments Slight/1+     Target Cells Slight/1+     Schistocytes Slight/1+     WBC Morphology Normal     Platelet Morphology Normal    Renal Function Panel [[id number]]  (Abnormal) Collected: 02/06/21 0610    Specimen: Blood Updated: 02/06/21 0716     Glucose 164 mg/dL      BUN 20 mg/dL      Creatinine 2.39 mg/dL      Sodium 142 mmol/L      Potassium 3.3 mmol/L      Chloride 115 mmol/L      CO2 18.9 mmol/L      Calcium 8.5 mg/dL      Albumin 2.50 g/dL      Phosphorus 1.4 mg/dL      Anion Gap 8.1 mmol/L      BUN/Creatinine Ratio 8.4     eGFR  African Amer 33 mL/min/1.73     Narrative:      GFR Normal >60  Chronic Kidney Disease <60  Kidney Failure <15      Uric Acid [[id number]]  (Abnormal) Collected: 02/06/21 0610    Specimen: Blood Updated: 02/06/21 0701     Uric Acid 7.2 mg/dL     Magnesium [[id number]]  (Normal) Collected: 02/06/21 0610    Specimen: Blood Updated: 02/06/21 0701     Magnesium 1.7 mg/dL     Vancomycin, Random [[id number]]  (Normal) Collected: 02/06/21 0610    Specimen: Blood Updated: 02/06/21 0659     Vancomycin Random 15.10 mcg/mL     Narrative:      Therapeutic Ranges for Vancomycin    Vancomycin Random   5.0-40.0 mcg/mL  Vancomycin Trough   5.0-20.0 mcg.mL  Vancomycin Peak     20.0-40.0 mcg/mL    CBC & Differential [[id number]]  (Abnormal) Collected: 02/06/21 0610    Specimen: Blood Updated: 02/06/21 0644    Narrative:      The following orders were created for panel order CBC & Differential.  Procedure                               Abnormality         Status                     ---------                               -----------         ------                     CBC Auto Differential[[id number]]        Abnormal            Final result                 Please  view results for these tests on the individual orders.    CBC Auto Differential [[id number]]  (Abnormal) Collected: 02/06/21 0610    Specimen: Blood Updated: 02/06/21 0644     WBC 9.52 10*3/mm3      RBC 4.61 10*6/mm3      Hemoglobin 10.6 g/dL      Hematocrit 33.2 %      MCV 72.0 fL      MCH 23.0 pg      MCHC 31.9 g/dL      RDW 24.0 %      RDW-SD 57.9 fl      Platelets 280 10*3/mm3     POC Glucose Once [[id number]]  (Abnormal) Collected: 02/06/21 0619    Specimen: Blood Updated: 02/06/21 0623     Glucose 154 mg/dL     Clostridium Difficile Toxin - Stool, Per Rectum [[id number]]  (Normal) Collected: 02/05/21 2204    Specimen: Stool from Per Rectum Updated: 02/05/21 2303    Narrative:      The following orders were created for panel order Clostridium Difficile Toxin - Stool, Per Rectum.  Procedure                               Abnormality         Status                     ---------                               -----------         ------                     Clostridium Difficile To...[[id number]]  Normal              Final result                 Please view results for these tests on the individual orders.    Clostridium Difficile Toxin, PCR - Stool, Per Rectum [[id number]]  (Normal) Collected: 02/05/21 2204    Specimen: Stool from Per Rectum Updated: 02/05/21 2303     C. Difficile Toxins by PCR Negative    Potassium [[id number]]  (Normal) Collected: 02/05/21 2105    Specimen: Blood from Hand, Left Updated: 02/05/21 2146     Potassium 3.5 mmol/L     POC Glucose Once [[id number]]  (Abnormal) Collected: 02/05/21 2010    Specimen: Blood Updated: 02/05/21 2011     Glucose 146 mg/dL         Data Review:  Results from last 7 days   Lab Units 02/13/21  0216 02/12/21  1515 02/12/21  0914 02/11/21  0545   SODIUM mmol/L 146*  --  140 139   POTASSIUM mmol/L 2.7*  2.7* 2.8* 2.8* 4.0   CHLORIDE mmol/L 117*  --  112* 111*   CO2 mmol/L 17.3*  --  16.9* 12.7*   BUN mg/dL 25*  --  25* 24*   CREATININE mg/dL 2.72*  --  2.61* 2.55*   GLUCOSE  mg/dL 129*  --  132* 144*   CALCIUM mg/dL 8.6  --  8.7 8.7     Results from last 7 days   Lab Units 02/13/21  0216 02/12/21  0528 02/11/21  0545   WBC 10*3/mm3 5.66 5.90 7.34   HEMOGLOBIN g/dL 10.2* 10.5* 9.2*   HEMATOCRIT % 31.3* 32.1* 29.2*   PLATELETS 10*3/mm3 247 246 85*             Lab Results   Lab Value Date/Time    TROPONINT 0.110 (C) 02/04/2021 1551    TROPONINT 0.171 (C) 12/23/2020 0357    TROPONINT 0.141 (C) 12/22/2020 0549    TROPONINT 0.147 (C) 12/21/2020 1450    TROPONINT 0.128 (C) 12/21/2020 1223               Invalid input(s): PROT, LABALBU          Glucose   Date/Time Value Ref Range Status   02/13/2021 1125 122 70 - 130 mg/dL Final   02/13/2021 0613 122 70 - 130 mg/dL Final   02/12/2021 2027 128 70 - 130 mg/dL Final   02/12/2021 1539 111 70 - 130 mg/dL Final   02/12/2021 1407 127 70 - 130 mg/dL Final   02/12/2021 1056 140 (H) 70 - 130 mg/dL Final   02/12/2021 0628 150 (H) 70 - 130 mg/dL Final   02/11/2021 2143 116 70 - 130 mg/dL Final           Past Medical History:   Diagnosis Date   • Back pain    • CKD (chronic kidney disease)    • DM type 2 (diabetes mellitus, type 2) (CMS/[facility])    • ED (erectile dysfunction)    • Hyperlipidemia    • Hypertension    • SCOTTY (iron deficiency anemia)    • Monoclonal gammopathy    • Osteoarthritis        Assessment:  Active Hospital Problems    Diagnosis  POA   • **Acute metabolic encephalopathy [G93.41]  Yes   • Vascular dementia without behavioral disturbance (CMS/[facility]) [F01.50]  Unknown   • Septicemia (CMS/[facility]) [A41.9]  Yes   • UTI (urinary tract infection), bacterial [N39.0, A49.9]  Yes   • Elevated troponin [R77.8]  Yes   • Hypokalemia [E87.6]  Yes   • Uncontrolled hypertension [I10]  Yes   • Anemia, chronic disease [D63.8]  Yes   • DM2 (diabetes mellitus, type 2) (CMS/HCC) [E11.9]  Yes   • Stage 4 chronic kidney disease (CMS/HCC) [N18.4]  Yes   • Hyperlipidemia [E78.5]  Yes   • Hypertension [I10]  Yes      Resolved Hospital Problems   No resolved problems to  display.       Plan:  Continue antibiotics. As per multiple consults.  neurology consult noted.  Follow lab.  MRI brain was negative. IR unable to get despite multiple attempts at LP   Discussed with neurology.  Discussed with his wife. Still full code.  [place] poor.    Jono Montes MD  2/13/2021  16:07 EST

## 2021-02-13 NOTE — PROGRESS NOTES
Patient has been out of his room all day getting tests.  Nonetheless his EEG earlier today does not show any evidence of seizure activity and I will discontinue the Keppra.  \He has now gone for a lumbar puncture and we will await those results.  Note that he has been on vancomycin and Rocephin and that he has had a normal MRI on 2/11/2021.  Pla Monge MD

## 2021-02-13 NOTE — PROGRESS NOTES
"   LOS: 9 days    Patient Care Team:  Maya Ribeiro APRN as PCP - General (Family Medicine)    Chief Complaint:    Chief Complaint   Patient presents with   • Altered Mental Status     Follow-up chronic kidney disease and hypokalemia  Subjective     Interval History:   Poorly responsive; not verbal; breathing appears comfortable on room air.  He follows no commands    Review of Systems:   Not obtainable    Objective     Vital Signs  Temp:  [96 °F (35.6 °C)-96.4 °F (35.8 °C)] 96.4 °F (35.8 °C)  Heart Rate:  [65-91] 65  Resp:  [16-18] 16  BP: (138-169)/(78-90) 138/80    Flowsheet Rows      First Filed Value   Admission Height  190.5 cm (75\") Documented at 02/04/2021 1250   Admission Weight  114 kg (251 lb) Documented at 02/04/2021 1250          No intake/output data recorded.  No intake/output data recorded.  No intake or output data in the 24 hours ending 02/13/21 7497    Physical Exam:  General Appearance: NAD, nonverbal, poorly responsive  Skin: warm and dry  HEENT: Nonicteric sclerae, oral mucosa is moist  Neck: no JVD, trachea midline  Lungs: Scattered rhonchi, unlabored breathing effort on room air  Heart: RRR, normal S1 and S2, no S3, no rub  Abdomen: soft, no grimacing when abdomen palpated, BS +   Extremities: no significant edema, cyanosis or clubbing  Neuro: Moving all extremities, though not to command      Results Review:    Results from last 7 days   Lab Units 02/13/21  1652 02/13/21  0216 02/12/21  1515 02/12/21  0914 02/11/21  0545   SODIUM mmol/L  --  146*  --  140 139   POTASSIUM mmol/L 2.7* 2.7*  2.7* 2.8* 2.8* 4.0   CHLORIDE mmol/L  --  117*  --  112* 111*   CO2 mmol/L  --  17.3*  --  16.9* 12.7*   BUN mg/dL  --  25*  --  25* 24*   CREATININE mg/dL  --  2.72*  --  2.61* 2.55*   CALCIUM mg/dL  --  8.6  --  8.7 8.7   GLUCOSE mg/dL  --  129*  --  132* 144*       Estimated Creatinine Clearance: 34.9 mL/min (A) (by C-G formula based on SCr of 2.72 mg/dL (H)).    Results from last 7 days   Lab Units " 02/13/21  0216 02/12/21  0914 02/12/21  0528 02/11/21  0545   MAGNESIUM mg/dL 1.8  --  1.8 2.0   PHOSPHORUS mg/dL 2.9 2.7  --  2.9       Results from last 7 days   Lab Units 02/13/21  0216 02/12/21  0528 02/11/21  0545 02/09/21  0633   URIC ACID mg/dL 8.8* 8.5* 8.3* 7.8*       Results from last 7 days   Lab Units 02/13/21  0216 02/12/21  0528 02/11/21  0545 02/10/21  0916 02/09/21  0633   WBC 10*3/mm3 5.66 5.90 7.34 7.35 9.09   HEMOGLOBIN g/dL 10.2* 10.5* 9.2* 10.9* 10.4*   PLATELETS 10*3/mm3 247 246 85* 306 287               Imaging Results (Last 24 Hours)     Procedure Component Value Units Date/Time    IR Lumbar Puncture Diag/Thera [[id number]] Collected: 02/13/21 1429     Updated: 02/13/21 1435    Narrative:      IR LUMBAR PUNCTURE DIAG/THERA-     INDICATIONS: Altered mental status     FINDINGS:     Following detailed discussion with the patient's wife of the risks,  benefits, alternatives of the procedure, verbal and written informed  consent was obtained from the patient's wife, as the patient was unable  to provide consent.     Patient was placed in a prone position on the fluoroscopy table. A final  timeout was performed verifying patient identity and procedure.     A location was chosen over the lumbar spine, the overlying skin was  cleaned and anesthetized, and a 22-gauge needle was advanced into the  thecal sac, but no fluid would return, despite persistent attempts at  multiple lower lumbar levels. The procedure was unsuccessful. Needle was  removed intact.     Fluoroscopy time: 25 seconds, one fluoroscopic image       Impression:         Unsuccessful lumbar puncture.     Discussed by telephone with the patient's nurse, Martha, at 1430,  02/13/2021.     This report was finalized on 2/13/2021 2:32 PM by Dr. Jluis Meza M.D.           amLODIPine, 5 mg, Oral, Q24H  ammonium lactate, , Topical, BID  aspirin, 81 mg, Oral, Daily  atorvastatin, 20 mg, Oral, Daily  cefTRIAXone, 1 g, Intravenous,  Q24H  hydrALAZINE, 100 mg, Oral, Q8H  insulin lispro, 0-9 Units, Subcutaneous, TID AC  levETIRAcetam, 1,000 mg, Intravenous, Q12H  sodium chloride, 10 mL, Intravenous, Q12H  Vancomycin Pharmacy Intermittent Dosing, , Does not apply, Daily      hold, 1 each  Pharmacy to dose vancomycin,         Medication Review:   Current Facility-Administered Medications   Medication Dose Route Frequency Provider Last Rate Last Admin   • acetaminophen (TYLENOL) tablet 650 mg  650 mg Oral Q4H PRN Robert Baker MD        Or   • acetaminophen (TYLENOL) 160 MG/5ML solution 650 mg  650 mg Oral Q4H PRN Robert Baker MD        Or   • acetaminophen (TYLENOL) suppository 650 mg  650 mg Rectal Q4H PRN Robert Baker MD       • amLODIPine (NORVASC) tablet 5 mg  5 mg Oral Q24H Paddy Low MD   5 mg at 02/13/21 1054   • ammonium lactate (AMLACTIN) cream   Topical BID Nixon Quinteros MD   Given at 02/13/21 1055   • aspirin chewable tablet 81 mg  81 mg Oral Daily Robert Baker MD   81 mg at 02/13/21 1054   • atorvastatin (LIPITOR) tablet 20 mg  20 mg Oral Daily Robert Baker MD   20 mg at 02/13/21 1054   • cefTRIAXone (ROCEPHIN) IVPB 1 g  1 g Intravenous Q24H Tonja Azevedo  mL/hr at 02/13/21 1135 1 g at 02/13/21 1135   • dextrose (D50W) 25 g/ 50mL Intravenous Solution 25 g  25 g Intravenous Q15 Min PRN Robert Baker MD       • dextrose (GLUTOSE) oral gel 15 g  15 g Oral Q15 Min PRN Robert Baker MD       • diphenhydrAMINE (BENADRYL) 12.5 MG/5ML elixir 25 mg  25 mg Oral Q6H PRN Robert Baker MD       • glucagon (human recombinant) (GLUCAGEN DIAGNOSTIC) injection 1 mg  1 mg Subcutaneous Q15 Min PRN Robert Baker MD       • Hold medication  1 each Does not apply Continuous PRN Jono Montes MD       • hydrALAZINE (APRESOLINE) injection 10 mg  10 mg Intravenous Q4H PRN Soy Pearl MD   10 mg at 02/12/21 6283   • hydrALAZINE (APRESOLINE) injection 10 mg  10 mg Intravenous Q6H PRN Jono Montes MD        • hydrALAZINE (APRESOLINE) tablet 100 mg  100 mg Oral Q8H Soy Pearl MD   100 mg at 02/13/21 1448   • hydrALAZINE (APRESOLINE) tablet 25 mg  25 mg Oral Q6H PRN Nixon Quinteros MD       • HYDROcodone-acetaminophen (NORCO)  MG per tablet 1 tablet  1 tablet Oral Q6H PRN Robert Baker MD   1 tablet at 02/08/21 2053   • insulin lispro (humaLOG, ADMELOG) injection 0-9 Units  0-9 Units Subcutaneous TID AC Robert Baker MD   2 Units at 02/12/21 0857   • levETIRAcetam in NaCl 0.75% (KEPPRA) IVPB 1,000 mg  1,000 mg Intravenous Q12H Pal Monge MD   1,000 mg at 02/13/21 1054   • LORazepam (ATIVAN) injection 1 mg  1 mg Intravenous Q4H PRN Jono Montes MD   1 mg at 02/11/21 2348   • ondansetron (ZOFRAN) tablet 4 mg  4 mg Oral Q6H PRN Robert Baker MD        Or   • ondansetron (ZOFRAN) injection 4 mg  4 mg Intravenous Q6H PRN Robert Baker MD       • Pharmacy to dose vancomycin   Does not apply Continuous PRN Jono Montes MD       • potassium chloride (K-DUR,KLOR-CON) ER tablet 40 mEq  40 mEq Oral PRN Jono Montes MD        Or   • potassium chloride (KLOR-CON) packet 40 mEq  40 mEq Oral PRN Jono Montes MD        Or   • potassium chloride 10 mEq in 100 mL IVPB  10 mEq Intravenous Q1H PRN Jono Montes  mL/hr at 02/13/21 1806 10 mEq at 02/13/21 1806   • sodium chloride 0.9 % flush 10 mL  10 mL Intravenous PRN Josafat Mckeon MD       • sodium chloride 0.9 % flush 10 mL  10 mL Intravenous Q12H Robert Baker MD   10 mL at 02/13/21 1107   • sodium chloride 0.9 % flush 10 mL  10 mL Intravenous PRN Robert Baker MD       • Vancomycin Pharmacy Intermittent Dosing   Does not apply Daily Jono Montes MD   Given at 02/10/21 1126       Assessment/Plan   1.  CKD due to biopsy-proven diabetic nephropathy, without much change.  Significant hypernatremia  2.  Hypokalemia, recurrent  3.  Metabolic acidosis, likely NAGMA, due to CKD   4.  Anemia of chronic kidney disease, no JOSSELYN as  needed.  5.  Immobility syndrome  6.  Hypertension, acceptable control today.  7.  Diabetes mellitus type 2, with diabetic nephropathy, biopsy-proven.        Plan:  1.  Replace potassium (on protocol)  2.  Give 1 L D5W to help replace water deficit.  3.  Hold off giving oral alkali until potassium replaced  4.  Surveillance lab        Nixon Robertson MD  02/13/21  18:57 EST

## 2021-02-13 NOTE — THERAPY TREATMENT NOTE
Patient Name: Gregorio Alejandro  : 1955    MRN: [id number]                              Today's Date: 2021       Admit Date: 2021    Visit Dx:     ICD-10-CM ICD-9-CM   1. Acute metabolic encephalopathy  G93.41 348.31   2. Acute UTI  N39.0 599.0   3. Hypokalemia  E87.6 276.8   4. Accelerated hypertension  I10 401.0     Patient Active Problem List   Diagnosis   • Complicated UTI (urinary tract infection)   • Macrocytosis   • Sepsis secondary to UTI (CMS/HCC)   • Metabolic encephalopathy   • Hyperlipidemia   • Hypertension   • Monoclonal gammopathy   • Stage 4 chronic kidney disease (CMS/HCC)   • DM2 (diabetes mellitus, type 2) (CMS/HCC)   • Abnormal CT of the abdomen   • Normal anion gap metabolic acidosis   • Anemia, chronic disease   • Ventricular tachycardia (paroxysmal) (CMS/HCC)   • Acute metabolic encephalopathy   • UTI (urinary tract infection), bacterial   • Elevated troponin   • Hypokalemia   • SILVIA (acute kidney injury) (CMS/HCC)   • Uncontrolled hypertension   • Septicemia (CMS/HCC)   • Vascular dementia without behavioral disturbance (CMS/HCC)     Past Medical History:   Diagnosis Date   • Back pain    • CKD (chronic kidney disease)    • DM type 2 (diabetes mellitus, type 2) (CMS/HCC)    • ED (erectile dysfunction)    • Hyperlipidemia    • Hypertension    • SCOTTY (iron deficiency anemia)    • Monoclonal gammopathy    • Osteoarthritis      History reviewed. No pertinent surgical history.  General Information     Row Name 21 1127          Physical Therapy Time and Intention    Document Type  therapy note (daily note)  -     Mode of Treatment  individual therapy;physical therapy  -     Row Name 21 1127          General Information    Existing Precautions/Restrictions  fall  -     Barriers to Rehab  medically complex;previous functional deficit;physical barrier;cognitive status;contractures  -     Row Name 21 1127          Cognition    Orientation Status (Cognition)   oriented to;person  -[facility] Name 02/13/21 1127          Safety Issues, Functional Mobility    Safety Issues Affecting Function (Mobility)  ability to follow commands;insight into deficits/self-awareness;problem-solving;judgment  -     Impairments Affecting Function (Mobility)  balance;endurance/activity tolerance;cognition;strength  -       User Key  (r) = Recorded By, (t) = Taken By, (c) = Cosigned By    Initials Name Provider Type     Donovan Smiley PTA Physical Therapy Assistant        Mobility     Row Name 02/13/21 1128          Bed Mobility    Rolling Right [place] (Bed Mobility)  not tested  -     Supine-Sit [place] (Bed Mobility)  not tested  -RH     Row Name 02/13/21 1128          Bed-Chair Transfer    Bed-Chair [place] (Transfers)  not tested  -RH     Row Name 02/13/21 1128          Sit-Stand Transfer    Sit-Stand [place] (Transfers)  not tested  -RH     Row Name 02/13/21 1128          Gait/Stairs (Locomotion)    [place] Level (Gait)  not tested  -       User Key  (r) = Recorded By, (t) = Taken By, (c) = Cosigned By    Initials Name Provider Type     Donovan Smiley PTA Physical Therapy Assistant        Obj/Interventions     [place] Name 02/13/21 1128          Motor Skills    Therapeutic Exercise  -- Completed 10 reps to (B) UE to improve AROM assisted with shoulder flex, IR/ER, and ABD and assisted with BLE AROM x 10 reps with assist for hip ABD/ADD, hip/knee flex/ext, DF/PF and static stretch to heelcords and hamstrings for 30 second static hold.  -RH     Row Name 02/13/21 1128          Balance    Static Sitting Balance  not tested  -       User Key  (r) = Recorded By, (t) = Taken By, (c) = Cosigned By    Initials Name Provider Type     Donovan Smiley PTA Physical Therapy Assistant        Goals/Plan    No documentation.       Clinical Impression     [place] Name 02/13/21 1131          Pain    Additional Documentation  Pain Scale: FACES Pre/Post-Treatment  (Group)  -     Row Name 02/13/21 1131          Pain Scale: FACES Pre/Post-Treatment    Pain: FACES Scale, Pretreatment  2-->hurts little bit  -RH     Posttreatment Pain Rating  2-->hurts little bit  -RH     Pain Location - Side  -- with movement of extremities  -RH     Row Name 02/13/21 1131          Positioning and Restraints    Pre-Treatment Position  in bed  -RH     Post Treatment Position  bed  -RH     In Bed  fowlers;call light within reach;encouraged to call for assist;exit alarm on;side rails up x3;notified nsg  -       User Key  (r) = Recorded By, (t) = Taken By, (c) = Cosigned By    Initials Name Provider Type     Donovan Smiley, NATALIE Physical Therapy Assistant        Outcome Measures     Row Name 02/13/21 1133          How much help from another person do you currently need...    Turning from your back to your side while in flat bed without using bedrails?  1  -RH     Moving from lying on back to sitting on the side of a flat bed without bedrails?  1  -RH     Moving to and from a bed to a chair (including a wheelchair)?  1  -RH     Standing up from a chair using your arms (e.g., wheelchair, bedside chair)?  1  -RH     Climbing 3-5 steps with a railing?  1  -RH     To walk in hospital room?  1  -RH     AM-PAC 6 Clicks Score (PT)  6  -RH       User Key  (r) = Recorded By, (t) = Taken By, (c) = Cosigned By    Initials Name Provider Type     Donovan Smiley PTA Physical Therapy Assistant        Physical Therapy Education                 Title: PT OT SLP Therapies (In Progress)     Topic: Physical Therapy (In Progress)     Point: Mobility training (In Progress)     Learning Progress Summary           Patient Acceptance, E, NL by EM at 2/11/2021 1150                   Point: Home exercise program (Not Started)     Learner Progress:  Not documented in this visit.          Point: Body mechanics (Not Started)     Learner Progress:  Not documented in this visit.          Point: Precautions (Not  Started)     Learner Progress:  Not documented in this visit.                      User Key     Initials Effective Dates Name Provider Type Discipline    EM 04/03/18 -  Randee Braden PT Physical Therapist PT              PT Recommendation and Plan           Time Calculation:   PT Charges     Row Name 02/13/21 1133             Time Calculation    Start Time  1056  -RH      Stop Time  1112  -RH      Time Calculation (min)  16 min  -      PT Received On  02/13/21  -      PT - Next Appointment  02/14/21  -        User Key  (r) = Recorded By, (t) = Taken By, (c) = Cosigned By    Initials Name Provider Type     Donovan Smiley, PTA Physical Therapy Assistant        Therapy Charges for Today     Code Description Service Date Service Provider Modifiers Qty    [id number] HC PT THER PROC EA 15 MIN 2/13/2021 Donovan Smiley PTA GP 1          PT G-Codes  Outcome Measure Options: AM-PAC 6 Clicks Basic Mobility (PT)  AM-PAC 6 Clicks Score (PT): 6    Donovan Smiley PTA  2/13/2021

## 2021-02-13 NOTE — PROGRESS NOTES
"  Infectious Diseases Progress Note        [facility]  [place]: 9 days  Patient Identification:  Name: Gregorio Alejandro  Age: 65 y.o.  Sex: male  :  1955  MRN: [id number]         Primary Care Physician: Maya Ribeiro APRN            Subjective: Confused and resists examination.  Interval History: See consultation note.    Objective:    Scheduled Meds:amLODIPine, 5 mg, Oral, Q24H  ammonium lactate, , Topical, BID  aspirin, 81 mg, Oral, Daily  atorvastatin, 20 mg, Oral, Daily  cefTRIAXone, 1 g, Intravenous, Q24H  hydrALAZINE, 100 mg, Oral, Q8H  insulin lispro, 0-9 Units, Subcutaneous, TID AC  levETIRAcetam, 1,000 mg, Intravenous, Q12H  sodium chloride, 10 mL, Intravenous, Q12H  vancomycin, 500 mg, Intravenous, Q24H  Vancomycin Pharmacy Intermittent Dosing, , Does not apply, Daily      Continuous Infusions:hold, 1 each  Pharmacy to dose vancomycin,         Vital signs in last 24 hours:  Temp:  [96 °F (35.6 °C)-96.4 °F (35.8 °C)] 96.4 °F (35.8 °C)  Heart Rate:  [65-91] 65  Resp:  [16-18] 16  BP: (138-169)/(78-90) 138/80    Intake/Output:  No intake or output data in the 24 hours ending 21 1512    Exam:  /80 (BP Location: Right arm, Patient Position: Lying)   Pulse 65   Temp 96.4 °F (35.8 °C) (Oral)   Resp 16   Ht 190.5 cm (75\")   Wt 107 kg (235 lb)   SpO2 97%   BMI 29.37 kg/m²   Patient is examined using the personal protective equipment as per guidelines from infection control for this particular patient as enacted.  Hand washing was performed before and after patient interaction.  General Appearance:  Has better color and does not appear as toxic interactive and appropriate.                          Head:    Normocephalic, without obvious abnormality, atraumatic                           Eyes:    PERRL, conjunctivae/corneas clear, EOM's intact, both eyes                         Throat:   Lips, tongue, gums normal; oral mucosa pink and moist                           Neck:   " Supple, symmetrical, trachea midline, no JVD                         Lungs:    Decreased breath sounds at the base                 Chest Wall:    No tenderness or deformity                          Heart:  S1-S2 regular                  abdomen:   Soft nontender                 Extremities:   Extremities normal, atraumatic, no cyanosis or edema                        Pulses:   Pulses palpable in all extremities                            Skin: Chronic ulcerative changes involving bilateral lower extremities with no clear cellulitis                  Neurologic: Bilateral lower extremity weakness       Data Review:    I reviewed the patient's new clinical results.  Results from last 7 days   Lab Units 02/13/21  0216 02/12/21  0528 02/11/21  0545 02/10/21  0916 02/09/21  0633 02/08/21  0609 02/07/21  0458   WBC 10*3/mm3 5.66 5.90 7.34 7.35 9.09 11.58* 8.61   HEMOGLOBIN g/dL 10.2* 10.5* 9.2* 10.9* 10.4* 10.7* 10.0*   PLATELETS 10*3/mm3 247 246 85* 306 287 209 253     Results from last 7 days   Lab Units 02/13/21  0216 02/12/21  1515 02/12/21  0914 02/11/21  0545 02/10/21  0916 02/09/21  0918 02/08/21  2018 02/08/21  0609  02/07/21  1557   SODIUM mmol/L 146*  --  140 139 143 145  --  142  --  145   POTASSIUM mmol/L 2.7*  2.7* 2.8* 2.8* 4.0 3.0* 3.1* 3.1* 3.8   < > 2.7*   CHLORIDE mmol/L 117*  --  112* 111* 113* 115*  --  114*  --  115*   CO2 mmol/L 17.3*  --  16.9* 12.7* 17.8* 17.3*  --  14.6*  --  16.7*   BUN mg/dL 25*  --  25* 24* 24* 22  --  20  --  19   CREATININE mg/dL 2.72*  --  2.61* 2.55* 2.76* 2.69*  --  2.23*  --  2.32*   CALCIUM mg/dL 8.6  --  8.7 8.7 8.6 9.2  --  9.0  --  8.7   GLUCOSE mg/dL 129*  --  132* 144* 153* 149*  --  159*  --  147*    < > = values in this interval not displayed.     Microbiology Results (last 10 days)     Procedure Component Value - Date/Time    Gastrointestinal Panel, PCR - Stool, Per Rectum [[id number]]  (Normal) Collected: 02/05/21 2204    Lab Status: Final result Specimen: Stool  from Per Rectum Updated: 02/07/21 0950     Campylobacter Not Detected     Plesiomonas shigelloides Not Detected     Salmonella Not Detected     Vibrio Not Detected     Vibrio cholerae Not Detected     Yersinia enterocolitica Not Detected     Enteroaggregative E. coli (EAEC) Not Detected     Enteropathogenic E. coli (EPEC) Not Detected     Enterotoxigenic E. coli (ETEC) lt/st Not Detected     Shiga-like toxin-producing E. coli (STEC) stx1/stx2 Not Detected     E. coli O157 Not Detected     Shigella/Enteroinvasive E. coli (EIEC) Not Detected     Cryptosporidium Not Detected     Cyclospora cayetanensis Not Detected     Entamoeba histolytica Not Detected     Giardia lamblia Not Detected     Adenovirus F40/41 Not Detected     Astrovirus Not Detected     Norovirus GI/GII Not Detected     Rotavirus A Not Detected     Sapovirus (I, II, IV or V) Not Detected    Narrative:      If Aeromonas, Staphylococcus aureus or Bacillus cereus are suspected, please order [id number]: Stool Culture, Aeromonas, S aureus, B Cereus.    Clostridium Difficile Toxin - Stool, Per Rectum [[id number]]  (Normal) Collected: 02/05/21 2204    Lab Status: Final result Specimen: Stool from Per Rectum Updated: 02/05/21 2303    Narrative:      The following orders were created for panel order Clostridium Difficile Toxin - Stool, Per Rectum.  Procedure                               Abnormality         Status                     ---------                               -----------         ------                     Clostridium Difficile To...[[id number]]  Normal              Final result                 Please view results for these tests on the individual orders.    Clostridium Difficile Toxin, PCR - Stool, Per Rectum [[id number]]  (Normal) Collected: 02/05/21 2204    Lab Status: Final result Specimen: Stool from Per Rectum Updated: 02/05/21 2303     C. Difficile Toxins by PCR Negative    Blood Culture - Blood, Arm, Left [[id number]] Collected: 02/05/21 1433    Lab  Status: Final result Specimen: Blood from Arm, Left Updated: 02/10/21 1445     Blood Culture No growth at 5 days    Blood Culture - Blood, Arm, Right [[id number]] Collected: 02/05/21 1431    Lab Status: Final result Specimen: Blood from Arm, Right Updated: 02/10/21 1445     Blood Culture No growth at 5 days    COVID PRE-OP / PRE-PROCEDURE SCREENING ORDER (NO ISOLATION) - Swab, Nasopharynx [[id number]]  (Normal) Collected: 02/04/21 1810    Lab Status: Final result Specimen: Swab from Nasopharynx Updated: 02/04/21 2206    Narrative:      The following orders were created for panel order COVID PRE-OP / PRE-PROCEDURE SCREENING ORDER (NO ISOLATION) - Swab, Nasopharynx.  Procedure                               Abnormality         Status                     ---------                               -----------         ------                     COVID-19,APTIMA PANTHER,...[[id number]]  Normal              Final result                 Please view results for these tests on the individual orders.    COVID-19,APTIMA PANTHER,[facility], NP/OP SWAB IN UTM/VTM/[facility] TRANSPORT MEDIA,24 HR TAT - Swab, Nasopharynx [[id number]]  (Normal) Collected: 02/04/21 1810    Lab Status: Final result Specimen: Swab from Nasopharynx Updated: 02/04/21 2206     COVID19 Not Detected    Narrative:      Fact sheet for providers: https://www.fda.gov/media/243433/download     Fact sheet for patients: https://www.fda.gov/media/274952/download    Test performed by RT PCR.    Urine Culture - Urine, Urine, Catheter [[id number]]  (Abnormal)  (Susceptibility) Collected: 02/04/21 1612    Lab Status: Final result Specimen: Urine, Catheter Updated: 02/06/21 0940     Urine Culture 25,000 CFU/mL Proteus mirabilis    Susceptibility      Proteus mirabilis     ISAC     Ampicillin Susceptible     Ampicillin + Sulbactam Susceptible     Cefazolin Susceptible     Cefepime Susceptible     Ceftazidime Susceptible     Ceftriaxone Susceptible     Gentamicin Susceptible      Levofloxacin Intermediate     Nitrofurantoin Resistant     Piperacillin + Tazobactam Susceptible     Tetracycline Resistant     Trimethoprim + Sulfamethoxazole Resistant                    Blood Culture - Blood, Hand, Right [[id number]]  (Abnormal) Collected: 02/04/21 1551    Lab Status: Final result Specimen: Blood from Hand, Right Updated: 02/09/21 0928     Blood Culture Staphylococcus epidermidis     Comment: Refer to previous blood culture collected on 2-4-21 for MICs        Isolated from Anaerobic Bottle     Blood Culture Aerococcus viridans     Isolated from Anaerobic Bottle     Gram Stain Anaerobic Bottle Gram positive cocci in clusters    Blood Culture - Blood, Hand, Right [[id number]]  (Abnormal)  (Susceptibility) Collected: 02/04/21 1551    Lab Status: Final result Specimen: Blood from Hand, Right Updated: 02/09/21 0633     Blood Culture Staphylococcus epidermidis     Isolated from Aerobic Bottle     Blood Culture Aerococcus viridans     Isolated from Aerobic Bottle     Blood Culture Globicatella sanguinis     Comment: Anaerobic bottle only  No CLSI guidelines and no validated ISAC testing method.          Isolated from Anaerobic Bottle     Gram Stain Aerobic Bottle Gram positive cocci in clusters      Anaerobic Bottle Gram positive cocci in clusters    Narrative:            Susceptibility      Staphylococcus epidermidis     ISAC     Oxacillin Resistant     Vancomycin Susceptible                Susceptibility      Aerococcus viridans     Not Specified     Ceftriaxone Resistant     Vancomycin Susceptible                Susceptibility Comments     Staphylococcus epidermidis    This isolate is presumed to be clindamycin resistant based on detection of inducible clindamycin resistance.  Clindamycin may still be effective in some patients.             Blood Culture ID, PCR - Blood, Hand, Right [[id number]]  (Abnormal) Collected: 02/04/21 1551    Lab Status: Final result Specimen: Blood from Hand, Right Updated:  02/05/21 1250     BCID, PCR Staphylococcus spp, not aureus. Identification by BCID PCR.              Assessment:    Acute metabolic encephalopathy    Hyperlipidemia    Hypertension    Stage 4 chronic kidney disease (CMS/[facility])    DM2 (diabetes mellitus, type 2) (CMS/[facility])    Anemia, chronic disease    UTI (urinary tract infection), bacterial    Elevated troponin    Hypokalemia    Uncontrolled hypertension    Septicemia (CMS/[facility])    Vascular dementia without behavioral disturbance (CMS/[facility])  1-toxic metabolic encephalopathy secondary to combination of  2-urinary tract infection with infected lower extremity wound with possible cellulitis  3-coag negative staph bacteremia either part of the systemic sepsis originating from lower extremities or possible contaminant during the process of collection  4-embolization syndrome  5-diabetes mellitus with complications including peripheral neuropathy, diabetic nephropathy  6-stage V kidney disease  7-uncontrolled hypertension  8-anemia multifactorial.     Recommendations/Discussions:   · Continue Rocephin and vancomycin while following up on the sensitivity of coag negative staph.  · Aggressive local wound care of lower extremities is needed.    · Follow-up on repeat blood cultures that we have ordered and if  negative then it can be assessed and assumed that the pathogens in the blood culture is contaminant during the process of collection and hence would not further work-up and treatment.  · Would recommend current antibiotics for 10 to 14 days for combination of UTI and soft tissue infection of the lower extremities.    Yakov Becerra MD  2/13/2021  15:12 EST

## 2021-02-13 NOTE — PLAN OF CARE
Goal Outcome Evaluation:  Plan of Care Reviewed With: patient  Progress: declining  Outcome Summary: Patients blood pressure was elevated this shift and patient treated with PRN medication, all other vitals stable. Patient showed no signs of pain this shift. At start of shift patient very unresponsive and lethargic. Patient not alert enough to take medications by mouth. Patient given two runs of potassium, left from first shift and potassium recollected. Patients potassium level resulted at 2.7 and new potassium runs started. during the shift patient became more alert and opened eyes spontaneously but remains nonverbal. Will continue to monitor.

## 2021-02-13 NOTE — PROGRESS NOTES
[facility]  Inpatient Progress Note    Reason for Follow Up: Iron deficiency anemia    Subjective     Interval History:   No active GI bleeding, patient still confused, encephalopathic  He is to have a EEG today multiple consultants following including neurology, cardiology, nephrology and infectious disease    Current Facility-Administered Medications:   •  acetaminophen (TYLENOL) tablet 650 mg, 650 mg, Oral, Q4H PRN **OR** acetaminophen (TYLENOL) 160 MG/5ML solution 650 mg, 650 mg, Oral, Q4H PRN **OR** acetaminophen (TYLENOL) suppository 650 mg, 650 mg, Rectal, Q4H PRN, Robert Baker MD  •  amLODIPine (NORVASC) tablet 5 mg, 5 mg, Oral, Q24H, Paddy Low MD, 5 mg at 02/13/21 1054  •  ammonium lactate (AMLACTIN) cream, , Topical, BID, Nixon Quinteros MD, Given at 02/13/21 1055  •  aspirin chewable tablet 81 mg, 81 mg, Oral, Daily, Robert Baker MD, 81 mg at 02/13/21 1054  •  atorvastatin (LIPITOR) tablet 20 mg, 20 mg, Oral, Daily, Robert Baker MD, 20 mg at 02/13/21 1054  •  cefTRIAXone (ROCEPHIN) IVPB 1 g, 1 g, Intravenous, Q24H, Tonja Azevedo MD, Last Rate: 100 mL/hr at 02/13/21 1135, 1 g at 02/13/21 1135  •  dextrose (D50W) 25 g/ 50mL Intravenous Solution 25 g, 25 g, Intravenous, Q15 Min PRN, Robert Baker MD  •  dextrose (GLUTOSE) oral gel 15 g, 15 g, Oral, Q15 Min PRN, Robert Baker MD  •  diphenhydrAMINE (BENADRYL) 12.5 MG/5ML elixir 25 mg, 25 mg, Oral, Q6H PRN, Robert Baker MD  •  glucagon (human recombinant) (GLUCAGEN DIAGNOSTIC) injection 1 mg, 1 mg, Subcutaneous, Q15 Min PRN, Robert Baker MD  •  Hold medication, 1 each, Does not apply, Continuous PRN, Jono Montes MD  •  hydrALAZINE (APRESOLINE) injection 10 mg, 10 mg, Intravenous, Q4H PRN, Soy Pearl MD, 10 mg at 02/12/21 8626  •  hydrALAZINE (APRESOLINE) injection 10 mg, 10 mg, Intravenous, Q6H PRN, Jono Montes MD  •  hydrALAZINE (APRESOLINE) tablet 100 mg, 100 mg, Oral, Q8H, Dae  MD Soy, 100 mg at 02/13/21 1448  •  hydrALAZINE (APRESOLINE) tablet 25 mg, 25 mg, Oral, Q6H PRN, Nixon Quinteros MD  •  HYDROcodone-acetaminophen (NORCO)  MG per tablet 1 tablet, 1 tablet, Oral, Q6H PRN, Robert Baker MD, 1 tablet at 02/08/21 2053  •  insulin lispro (humaLOG, ADMELOG) injection 0-9 Units, 0-9 Units, Subcutaneous, TID AC, Robert Baker MD, 2 Units at 02/12/21 0857  •  levETIRAcetam in NaCl 0.75% (KEPPRA) IVPB 1,000 mg, 1,000 mg, Intravenous, Q12H, Pal Monge MD, 1,000 mg at 02/13/21 1054  •  LORazepam (ATIVAN) injection 1 mg, 1 mg, Intravenous, Q4H PRN, Jono Montes MD, 1 mg at 02/11/21 2348  •  ondansetron (ZOFRAN) tablet 4 mg, 4 mg, Oral, Q6H PRN **OR** ondansetron (ZOFRAN) injection 4 mg, 4 mg, Intravenous, Q6H PRN, Robert Baker MD  •  Pharmacy to dose vancomycin, , Does not apply, Continuous PRN, Jono Montes MD  •  potassium chloride (K-DUR,KLOR-CON) ER tablet 40 mEq, 40 mEq, Oral, PRN **OR** potassium chloride (KLOR-CON) packet 40 mEq, 40 mEq, Oral, PRN **OR** potassium chloride 10 mEq in 100 mL IVPB, 10 mEq, Intravenous, Q1H PRN, Jono Montes MD, Last Rate: 100 mL/hr at 02/13/21 1136, 10 mEq at 02/13/21 1136  •  sodium chloride 0.9 % flush 10 mL, 10 mL, Intravenous, PRN, Josafat Mckeon MD  •  sodium chloride 0.9 % flush 10 mL, 10 mL, Intravenous, Q12H, Robert Baker MD, 10 mL at 02/13/21 1107  •  sodium chloride 0.9 % flush 10 mL, 10 mL, Intravenous, PRN, Robert Baker MD  •  vancomycin 500 mg/100 mL 0.9% NS IVPB (mbp), 500 mg, Intravenous, Q24H, Jono Montes MD, 500 mg at 02/12/21 1652  •  Vancomycin Pharmacy Intermittent Dosing, , Does not apply, Daily, Jono Montes MD, Given at 02/10/21 1126  Review of Systems:    Review of systems could not be obtained due to  patient confusion.    Objective     Vital Signs  Temp:  [96 °F (35.6 °C)-96.4 °F (35.8 °C)] 96.4 °F (35.8 °C)  Heart Rate:  [66-91] 72  Resp:  [16-18] 16  BP: (141-169)/(78-90)  161/85  Body mass index is 29.37 kg/m².  No intake or output data in the 24 hours ending 02/13/21 1451  No intake/output data recorded.     Physical Exam:   General: patient awake, alert and cooperative   Eyes: Normal lids and lashes, no scleral icterus   Neck: supple, normal ROM   Skin: warm and dry, not jaundiced   Cardiovascular: regular rhythm and rate, no murmurs auscultated   Pulm: clear to auscultation bilaterally, regular and unlabored   Abdomen: soft, nontender, nondistended; normal bowel sounds   Extremities: no rash or edema   Psychiatric: Normal mood and behavior; memory intact     Results Review:     I reviewed the patient's new clinical results.    Results from last 7 days   Lab Units 02/13/21  0216 02/12/21  0528 02/11/21  0545   WBC 10*3/mm3 5.66 5.90 7.34   HEMOGLOBIN g/dL 10.2* 10.5* 9.2*   HEMATOCRIT % 31.3* 32.1* 29.2*   PLATELETS 10*3/mm3 247 246 85*     Results from last 7 days   Lab Units 02/13/21  0216 02/12/21  1515 02/12/21  0914 02/11/21  0545   SODIUM mmol/L 146*  --  140 139   POTASSIUM mmol/L 2.7*  2.7* 2.8* 2.8* 4.0   CHLORIDE mmol/L 117*  --  112* 111*   CO2 mmol/L 17.3*  --  16.9* 12.7*   BUN mg/dL 25*  --  25* 24*   CREATININE mg/dL 2.72*  --  2.61* 2.55*   CALCIUM mg/dL 8.6  --  8.7 8.7   GLUCOSE mg/dL 129*  --  132* 144*         Lab Results   Lab Value Date/Time    LIPASE 17 12/18/2020 1653    LIPASE 914 (H) 10/27/2020 0330    LIPASE 1,087 (H) 10/26/2020 0410    LIPASE 1,653 (H) 10/25/2020 0323       Radiology:  IR Lumbar Puncture Diag/Thera   Final Result       Unsuccessful lumbar puncture.       Discussed by telephone with the patient's nurse, Martha, at 1430,   02/13/2021.       This report was finalized on 2/13/2021 2:32 PM by Dr. Jluis Meza M.D.          MRI Brain Without Contrast   Final Result   Limited examination secondary to patient motion and the   incomplete nature of the examination. There is no evidence of acute   infarction. Atrophy and small vessel  ischemic disease is noted.       This report was finalized on 2/12/2021 8:03 AM by Dr. Rashad Bajwa M.D.          CT Head Without Contrast   Final Result   No acute intracranial findings.       Radiation dose reduction techniques were utilized, including automated   exposure control and exposure modulation based on body size.       This report was finalized on 2/5/2021 9:30 PM by Dr. Vero Hansen M.D.          CT Abdomen Pelvis Without Contrast   Final Result       1. Severely technically limited examination due to motion artifact. The   patient's sigmoid colon in particular appears thick walled, with   adjacent pericolonic soft tissue stranding. There is also involvement of   the rectum, although to a lesser extent. Appearance is suggestive of   colitis. No pneumatosis or free air is seen. Given the patient had some   rectal wall thickening on prior CT, consideration for follow-up with   endoscopy is suggested. There is some diffuse gaseous distention of   bowel, which may reflect some ileus.   2. Thick-walled appearance to the urinary bladder, with adjacent   perivesical soft tissue stranding, concerning for cystitis.       Radiation dose reduction techniques were utilized, including automated   exposure control and exposure modulation based on body size.       This report was finalized on 2/5/2021 9:39 PM by Dr. Vero Hansen M.D.          XR Chest 1 View   Final Result          Assessment/Plan     Patient Active Problem List   Diagnosis   • Complicated UTI (urinary tract infection)   • Macrocytosis   • Sepsis secondary to UTI (CMS/HCC)   • Metabolic encephalopathy   • Hyperlipidemia   • Hypertension   • Monoclonal gammopathy   • Stage 4 chronic kidney disease (CMS/HCC)   • DM2 (diabetes mellitus, type 2) (CMS/HCC)   • Abnormal CT of the abdomen   • Normal anion gap metabolic acidosis   • Anemia, chronic disease   • Ventricular tachycardia (paroxysmal) (CMS/HCC)   • Acute metabolic encephalopathy    • UTI (urinary tract infection), bacterial   • Elevated troponin   • Hypokalemia   • SILVIA (acute kidney injury) (CMS/HCC)   • Uncontrolled hypertension   • Septicemia (CMS/HCC)   • Vascular dementia without behavioral disturbance (CMS/HCC)     Assessment:  1. Abnormal CT scan of the sigmoid colon and rectum  2. Iron deficiency anemia  3. Metabolic encephalopathy        Plan:  · Await clearance from other disciplines prior to any GI assessment.  · When stable and not encephalopathic or confused I will perform EGD and colonoscopy (he will not consume a bowel prep and his current mental state, he will have to be awake oriented, following commands to take a full bowel prep)  · Follow hemoglobin  I discussed the patients findings and my recommendations with patient and nursing staff.    Kip Wiley MD

## 2021-02-13 NOTE — PROGRESS NOTES
Gregorio Alejandro   65 y.o.  male    LOS: 9 days   Patient Care Team:  Maya Ribeiro APRN as PCP - General (Family Medicine)      Subjective   Just inessa does not talk    Review of Systems:   Unable to obtain    Medication Review:   Current Facility-Administered Medications:   •  acetaminophen (TYLENOL) tablet 650 mg, 650 mg, Oral, Q4H PRN **OR** acetaminophen (TYLENOL) 160 MG/5ML solution 650 mg, 650 mg, Oral, Q4H PRN **OR** acetaminophen (TYLENOL) suppository 650 mg, 650 mg, Rectal, Q4H PRN, Robert Baker MD  •  amLODIPine (NORVASC) tablet 5 mg, 5 mg, Oral, Q24H, Paddy Low MD, 5 mg at 02/12/21 0858  •  ammonium lactate (AMLACTIN) cream, , Topical, BID, Nixon Quinteros MD, Given at 02/12/21 2000  •  aspirin chewable tablet 81 mg, 81 mg, Oral, Daily, Robert Baker MD  •  atorvastatin (LIPITOR) tablet 20 mg, 20 mg, Oral, Daily, Robert Baker MD, 20 mg at 02/12/21 0858  •  cefTRIAXone (ROCEPHIN) IVPB 1 g, 1 g, Intravenous, Q24H, Tonja Azevedo MD, Last Rate: 100 mL/hr at 02/12/21 1208, 1 g at 02/12/21 1208  •  dextrose (D50W) 25 g/ 50mL Intravenous Solution 25 g, 25 g, Intravenous, Q15 Min PRN, Robert Baker MD  •  dextrose (GLUTOSE) oral gel 15 g, 15 g, Oral, Q15 Min PRN, Robert Baker MD  •  diphenhydrAMINE (BENADRYL) 12.5 MG/5ML elixir 25 mg, 25 mg, Oral, Q6H PRN, Robert Baker MD  •  glucagon (human recombinant) (GLUCAGEN DIAGNOSTIC) injection 1 mg, 1 mg, Subcutaneous, Q15 Min PRN, Robert Baekr MD  •  Hold medication, 1 each, Does not apply, Continuous PRN, Jono Montes MD  •  hydrALAZINE (APRESOLINE) injection 10 mg, 10 mg, Intravenous, Q4H PRN, Soy Pearl MD, 10 mg at 02/12/21 2338  •  hydrALAZINE (APRESOLINE) injection 10 mg, 10 mg, Intravenous, Q6H PRN, Jono Montes MD  •  hydrALAZINE (APRESOLINE) tablet 100 mg, 100 mg, Oral, Q8H, Soy Pearl MD, 100 mg at 02/12/21 0606  •  hydrALAZINE (APRESOLINE) tablet 25 mg, 25 mg, Oral, Q6H PRN, Nixon Quinteros MD  •   HYDROcodone-acetaminophen (NORCO)  MG per tablet 1 tablet, 1 tablet, Oral, Q6H PRN, Robert Baker MD, 1 tablet at 02/08/21 2053  •  insulin lispro (humaLOG, ADMELOG) injection 0-9 Units, 0-9 Units, Subcutaneous, TID AC, Robert Baker MD, 2 Units at 02/12/21 0857  •  levETIRAcetam in NaCl 0.75% (KEPPRA) IVPB 1,000 mg, 1,000 mg, Intravenous, Q12H, Pal Monge MD, 1,000 mg at 02/12/21 2001  •  LORazepam (ATIVAN) injection 1 mg, 1 mg, Intravenous, Q4H PRN, Jono Montes MD, 1 mg at 02/11/21 2348  •  ondansetron (ZOFRAN) tablet 4 mg, 4 mg, Oral, Q6H PRN **OR** ondansetron (ZOFRAN) injection 4 mg, 4 mg, Intravenous, Q6H PRN, Robert Baker MD  •  Pharmacy to dose vancomycin, , Does not apply, Continuous PRN, Jono Montes MD  •  potassium chloride (K-DUR,KLOR-CON) ER tablet 40 mEq, 40 mEq, Oral, PRN **OR** potassium chloride (KLOR-CON) packet 40 mEq, 40 mEq, Oral, PRN **OR** potassium chloride 10 mEq in 100 mL IVPB, 10 mEq, Intravenous, Q1H PRN, Jono Montes MD, Last Rate: 100 mL/hr at 02/13/21 0633, 10 mEq at 02/13/21 0633  •  sodium chloride 0.9 % flush 10 mL, 10 mL, Intravenous, PRN, Josafat Mckeon MD  •  sodium chloride 0.9 % flush 10 mL, 10 mL, Intravenous, Q12H, Robert Baker MD, 10 mL at 02/12/21 2000  •  sodium chloride 0.9 % flush 10 mL, 10 mL, Intravenous, PRN, Robert Baker MD  •  vancomycin 500 mg/100 mL 0.9% NS IVPB (mbp), 500 mg, Intravenous, Q24H, Jono Montes MD, 500 mg at 02/12/21 1652  •  Vancomycin Pharmacy Intermittent Dosing, , Does not apply, Daily, Jono Montes MD, Given at 02/10/21 1126      Objective     Vital Sign Min/Max for last 24 hours  Temp  Min: 95.9 °F (35.5 °C)  Max: 96.4 °F (35.8 °C)   BP  Min: 139/87  Max: 169/87    Pulse  Min: 51  Max: 91     Wt Readings from Last 3 Encounters:   02/13/21 107 kg (235 lb)   12/22/20 114 kg (251 lb 8 oz)        Intake/Output Summary (Last 24 hours) at 2/13/2021 0932  Last data filed at 2/12/2021 1300  Gross per 24  hour   Intake 0 ml   Output --   Net 0 ml     Physical Exam:                  gen awake nad  Chest cta  cvs regular  abdo soft   Ext no edema    Tele afib controlled no pauses overnight                                              Monitor  Results Review:     I reviewed the patient's new clinical results.    Sodium Sodium   Date Value Ref Range Status   02/13/2021 146 (H) 136 - 145 mmol/L Final   02/12/2021 140 136 - 145 mmol/L Final   02/11/2021 139 136 - 145 mmol/L Final      Potassium Potassium   Date Value Ref Range Status   02/13/2021 2.7 (L) 3.5 - 5.2 mmol/L Final   02/13/2021 2.7 (L) 3.5 - 5.2 mmol/L Final   02/12/2021 2.8 (L) 3.5 - 5.2 mmol/L Final   02/12/2021 2.8 (L) 3.5 - 5.2 mmol/L Final     Comment:     Slight hemolysis detected by analyzer. Results may be affected.   02/11/2021 4.0 3.5 - 5.2 mmol/L Final     Comment:     Specimen hemolyzed.  Results may be affected.      Chloride Chloride   Date Value Ref Range Status   02/13/2021 117 (H) 98 - 107 mmol/L Final   02/12/2021 112 (H) 98 - 107 mmol/L Final   02/11/2021 111 (H) 98 - 107 mmol/L Final      Bicarbonate No results found for: PLASMABICARB   BUN BUN   Date Value Ref Range Status   02/13/2021 25 (H) 8 - 23 mg/dL Final   02/12/2021 25 (H) 8 - 23 mg/dL Final   02/11/2021 24 (H) 8 - 23 mg/dL Final      Creatinine Creatinine   Date Value Ref Range Status   02/13/2021 2.72 (H) 0.76 - 1.27 mg/dL Final   02/12/2021 2.61 (H) 0.76 - 1.27 mg/dL Final   02/11/2021 2.55 (H) 0.76 - 1.27 mg/dL Final      Calcium Calcium   Date Value Ref Range Status   02/13/2021 8.6 8.6 - 10.5 mg/dL Final   02/12/2021 8.7 8.6 - 10.5 mg/dL Final   02/11/2021 8.7 8.6 - 10.5 mg/dL Final      Magnesium Magnesium   Date Value Ref Range Status   02/13/2021 1.8 1.6 - 2.4 mg/dL Final   02/12/2021 1.8 1.6 - 2.4 mg/dL Final   02/11/2021 2.0 1.6 - 2.4 mg/dL Final        Results from last 7 days   Lab Units 02/13/21  0216   WBC 10*3/mm3 5.66   HEMOGLOBIN g/dL 10.2*   HEMATOCRIT % 31.3*    PLATELETS 10*3/mm3 247     Lab Results   Lab Value Date/Time    TROPONINT 0.110 (C) 02/04/2021 1551    TROPONINT 0.171 (C) 12/23/2020 0357    TROPONINT 0.141 (C) 12/22/2020 0549    TROPONINT 0.147 (C) 12/21/2020 1450    TROPONINT 0.128 (C) 12/21/2020 1223     Lab Results   Component Value Date    CHOL 108 12/22/2020     Lab Results   Component Value Date    HDL 49 12/22/2020     Lab Results   Component Value Date    LDL 42 12/22/2020     Lab Results   Component Value Date    TRIG 86 12/22/2020     No components found for: CHOLHDL            Echo EF Estimated  No results found for: ECHOEFEST       Assessment/ Plan  1.Acute metabolic encephalopathy  2.Hypertension uncontrolled  3. ESRD (end stage renal disease)   4.  DM2 (diabetes mellitus, type 2)  5.  Anemia, chronic disease  -Gastroenterology mentions need for EGD and colonoscopy when seen in December 2020.  Unsure if he ever had those done.  6.  UTI (urinary tract infection), bacterial  7.  Elevated troponin, noncardiac  8. History of nonsustained ventricular tachycardia  9. History of marked first-degree AV block and history of some junctional rhythm  10. History of syncope  11. Echo normal LVEF 59% on 12/20/2020  12. Monoclonal gammopathy  13. Immobility  14. Atrial flutter fib, has not been anticoagulated    Overnight  Tele a.fib 60s,no pauses overnight   Labs k 2.7 same as yest     Hb stable 10  Vitals 130-150s systolic bp  Had EEG done this morning     cv stable     Maite Marinelli MD  02/13/21  09:32 EST

## 2021-02-14 NOTE — PROGRESS NOTES
"  Infectious Diseases Progress Note        [facility]  Los: 10 days  Patient Identification:  Name: Gregorio Alejandro  Age: 65 y.o.  Sex: male  :  1955  MRN: [id number]         Primary Care Physician: Maya Ribeiro APRN            Subjective: Confused and resists examination.  Interval History: See consultation note.    Objective:    Scheduled Meds:amLODIPine, 5 mg, Oral, Q24H  ammonium lactate, , Topical, BID  aspirin, 81 mg, Oral, Daily  atorvastatin, 20 mg, Oral, Daily  cefTRIAXone, 1 g, Intravenous, Q24H  hydrALAZINE, 100 mg, Oral, Q8H  insulin lispro, 0-9 Units, Subcutaneous, TID AC  sodium chloride, 10 mL, Intravenous, Q12H  Vancomycin Pharmacy Intermittent Dosing, , Does not apply, Daily      Continuous Infusions:hold, 1 each  Pharmacy to dose vancomycin,         Vital signs in last 24 hours:  Temp:  [97.8 °F (36.6 °C)-98.5 °F (36.9 °C)] 97.8 °F (36.6 °C)  Heart Rate:  [] 79  Resp:  [15-16] 16  BP: (124-150)/() 124/81    Intake/Output:    Intake/Output Summary (Last 24 hours) at 2021 1407  Last data filed at 2021 1100  Gross per 24 hour   Intake 0 ml   Output --   Net 0 ml       Exam:  /81   Pulse 79   Temp 97.8 °F (36.6 °C) (Axillary)   Resp 16   Ht 190.5 cm (75\")   Wt 107 kg (236 lb)   SpO2 97%   BMI 29.50 kg/m²   Patient is examined using the personal protective equipment as per guidelines from infection control for this particular patient as enacted.  Hand washing was performed before and after patient interaction.  General Appearance:  Has better color and does not appear as toxic interactive and appropriate.                          Head:    Normocephalic, without obvious abnormality, atraumatic                           Eyes:    PERRL, conjunctivae/corneas clear, EOM's intact, both eyes                         Throat:   Lips, tongue, gums normal; oral mucosa pink and moist                           Neck:   Supple, symmetrical, trachea midline, " no JVD                         Lungs:    Decreased breath sounds at the base                 Chest Wall:    No tenderness or deformity                          Heart:  S1-S2 regular                  abdomen:   Soft nontender                 Extremities:   Extremities normal, atraumatic, no cyanosis or edema                        Pulses:   Pulses palpable in all extremities                            Skin: Chronic ulcerative changes involving bilateral lower extremities with no clear cellulitis                  Neurologic: Bilateral lower extremity weakness       Data Review:    I reviewed the patient's new clinical results.  Results from last 7 days   Lab Units 02/14/21  0940 02/13/21  0216 02/12/21  0528 02/11/21  0545 02/10/21  0916 02/09/21  0633 02/08/21  0609   WBC 10*3/mm3 5.96 5.66 5.90 7.34 7.35 9.09 11.58*   HEMOGLOBIN g/dL 9.7* 10.2* 10.5* 9.2* 10.9* 10.4* 10.7*   PLATELETS 10*3/mm3 234 247 246 85* 306 287 209     Results from last 7 days   Lab Units 02/14/21  0940 02/13/21  1652 02/13/21  0216 02/12/21  1515 02/12/21  0914 02/11/21  0545 02/10/21  0916 02/09/21  0918  02/08/21  0609   SODIUM mmol/L 143  --  146*  --  140 139 143 145  --  142   POTASSIUM mmol/L 3.3* 2.7* 2.7*  2.7* 2.8* 2.8* 4.0 3.0* 3.1*   < > 3.8   CHLORIDE mmol/L 117*  --  117*  --  112* 111* 113* 115*  --  114*   CO2 mmol/L 16.1*  --  17.3*  --  16.9* 12.7* 17.8* 17.3*  --  14.6*   BUN mg/dL 22  --  25*  --  25* 24* 24* 22  --  20   CREATININE mg/dL 2.49*  --  2.72*  --  2.61* 2.55* 2.76* 2.69*  --  2.23*   CALCIUM mg/dL 8.8  --  8.6  --  8.7 8.7 8.6 9.2  --  9.0   GLUCOSE mg/dL 123*  --  129*  --  132* 144* 153* 149*  --  159*    < > = values in this interval not displayed.     Microbiology Results (last 10 days)     Procedure Component Value - Date/Time    Gastrointestinal Panel, PCR - Stool, Per Rectum [[id number]]  (Normal) Collected: 02/05/21 2205    Lab Status: Final result Specimen: Stool from Per Rectum Updated: 02/07/21 0922      Campylobacter Not Detected     Plesiomonas shigelloides Not Detected     Salmonella Not Detected     Vibrio Not Detected     Vibrio cholerae Not Detected     Yersinia enterocolitica Not Detected     Enteroaggregative E. coli (EAEC) Not Detected     Enteropathogenic E. coli (EPEC) Not Detected     Enterotoxigenic E. coli (ETEC) lt/st Not Detected     Shiga-like toxin-producing E. coli (STEC) stx1/stx2 Not Detected     E. coli O157 Not Detected     Shigella/Enteroinvasive E. coli (EIEC) Not Detected     Cryptosporidium Not Detected     Cyclospora cayetanensis Not Detected     Entamoeba histolytica Not Detected     Giardia lamblia Not Detected     Adenovirus F40/41 Not Detected     Astrovirus Not Detected     Norovirus GI/GII Not Detected     Rotavirus A Not Detected     Sapovirus (I, II, IV or V) Not Detected    Narrative:      If Aeromonas, Staphylococcus aureus or Bacillus cereus are suspected, please order [id number]: Stool Culture, Aeromonas, S aureus, B Cereus.    Clostridium Difficile Toxin - Stool, Per Rectum [[id number]]  (Normal) Collected: 02/05/21 2204    Lab Status: Final result Specimen: Stool from Per Rectum Updated: 02/05/21 2303    Narrative:      The following orders were created for panel order Clostridium Difficile Toxin - Stool, Per Rectum.  Procedure                               Abnormality         Status                     ---------                               -----------         ------                     Clostridium Difficile To...[[id number]]  Normal              Final result                 Please view results for these tests on the individual orders.    Clostridium Difficile Toxin, PCR - Stool, Per Rectum [[id number]]  (Normal) Collected: 02/05/21 2204    Lab Status: Final result Specimen: Stool from Per Rectum Updated: 02/05/21 2303     C. Difficile Toxins by PCR Negative    Blood Culture - Blood, Arm, Left [[id number]] Collected: 02/05/21 1433    Lab Status: Final result Specimen: Blood  from Arm, Left Updated: 02/10/21 1445     Blood Culture No growth at 5 days    Blood Culture - Blood, Arm, Right [[id number]] Collected: 02/05/21 1431    Lab Status: Final result Specimen: Blood from Arm, Right Updated: 02/10/21 1445     Blood Culture No growth at 5 days    COVID PRE-OP / PRE-PROCEDURE SCREENING ORDER (NO ISOLATION) - Swab, Nasopharynx [[id number]]  (Normal) Collected: 02/04/21 1810    Lab Status: Final result Specimen: Swab from Nasopharynx Updated: 02/04/21 2206    Narrative:      The following orders were created for panel order COVID PRE-OP / PRE-PROCEDURE SCREENING ORDER (NO ISOLATION) - Swab, Nasopharynx.  Procedure                               Abnormality         Status                     ---------                               -----------         ------                     COVID-19,APTIMA PANTHER,...[[id number]]  Normal              Final result                 Please view results for these tests on the individual orders.    COVID-19,APTIMA PANTHER,[facility], NP/OP SWAB IN UTM/VTM/[facility]SPORT MEDIA,24 HR TAT - Swab, Nasopharynx [[id number]]  (Normal) Collected: 02/04/21 1810    Lab Status: Final result Specimen: Swab from Nasopharynx Updated: 02/04/21 2206     COVID19 Not Detected    Narrative:      Fact sheet for providers: https://www.fda.gov/media/532998/download     Fact sheet for patients: https://www.fda.gov/media/515386/download    Test performed by RT PCR.    Urine Culture - Urine, Urine, Catheter [[id number]]  (Abnormal)  (Susceptibility) Collected: 02/04/21 1612    Lab Status: Final result Specimen: Urine, Catheter Updated: 02/06/21 0940     Urine Culture 25,000 CFU/mL Proteus mirabilis    Susceptibility      Proteus mirabilis     ISAC     Ampicillin Susceptible     Ampicillin + Sulbactam Susceptible     Cefazolin Susceptible     Cefepime Susceptible     Ceftazidime Susceptible     Ceftriaxone Susceptible     Gentamicin Susceptible     Levofloxacin Intermediate      Nitrofurantoin Resistant     Piperacillin + Tazobactam Susceptible     Tetracycline Resistant     Trimethoprim + Sulfamethoxazole Resistant                    Blood Culture - Blood, Hand, Right [[id number]]  (Abnormal) Collected: 02/04/21 1551    Lab Status: Final result Specimen: Blood from Hand, Right Updated: 02/09/21 0928     Blood Culture Staphylococcus epidermidis     Comment: Refer to previous blood culture collected on 2-4-21 for MICs        Isolated from Anaerobic Bottle     Blood Culture Aerococcus viridans     Isolated from Anaerobic Bottle     Gram Stain Anaerobic Bottle Gram positive cocci in clusters    Blood Culture - Blood, Hand, Right [[id number]]  (Abnormal)  (Susceptibility) Collected: 02/04/21 1551    Lab Status: Final result Specimen: Blood from Hand, Right Updated: 02/09/21 0633     Blood Culture Staphylococcus epidermidis     Isolated from Aerobic Bottle     Blood Culture Aerococcus viridans     Isolated from Aerobic Bottle     Blood Culture Globicatella sanguinis     Comment: Anaerobic bottle only  No CLSI guidelines and no validated ISAC testing method.          Isolated from Anaerobic Bottle     Gram Stain Aerobic Bottle Gram positive cocci in clusters      Anaerobic Bottle Gram positive cocci in clusters    Narrative:            Susceptibility      Staphylococcus epidermidis     ISAC     Oxacillin Resistant     Vancomycin Susceptible                Susceptibility      Aerococcus viridans     Not Specified     Ceftriaxone Resistant     Vancomycin Susceptible                Susceptibility Comments     Staphylococcus epidermidis    This isolate is presumed to be clindamycin resistant based on detection of inducible clindamycin resistance.  Clindamycin may still be effective in some patients.             Blood Culture ID, PCR - Blood, Hand, Right [[id number]]  (Abnormal) Collected: 02/04/21 1551    Lab Status: Final result Specimen: Blood from Hand, Right Updated: 02/05/21 1250     BCID, PCR  Staphylococcus spp, not aureus. Identification by BCID PCR.              Assessment:    Acute metabolic encephalopathy    Hyperlipidemia    Hypertension    Stage 4 chronic kidney disease (CMS/HCC)    DM2 (diabetes mellitus, type 2) (CMS/[facility])    Anemia, chronic disease    UTI (urinary tract infection), bacterial    Elevated troponin    Hypokalemia    Uncontrolled hypertension    Septicemia (CMS/[facility])    Vascular dementia without behavioral disturbance (CMS/[facility])  1-toxic metabolic encephalopathy secondary to combination of  2-urinary tract infection with infected lower extremity wound with possible cellulitis  3-coag negative staph bacteremia either part of the systemic sepsis originating from lower extremities or possible contaminant during the process of collection  4-embolization syndrome  5-diabetes mellitus with complications including peripheral neuropathy, diabetic nephropathy  6-stage V kidney disease  7-uncontrolled hypertension  8-anemia multifactorial.     Recommendations/Discussions:   · Continue Rocephin and vancomycin while following up on the sensitivity of coag negative staph.  · Aggressive local wound care of lower extremities is needed.    · Follow-up on repeat blood cultures that we have ordered and if  negative then it can be assessed and assumed that the pathogens in the blood culture is contaminant during the process of collection and hence would not further work-up and treatment.  · Would recommend current antibiotics for 10 to 14 days for combination of UTI and soft tissue infection of the lower extremities.    Yakov Becerra MD  2/14/2021  14:07 EST

## 2021-02-14 NOTE — PROGRESS NOTES
"   LOS: 10 days    Patient Care Team:  Maya Ribeiro APRN as PCP - General (Family Medicine)    Chief Complaint:    Chief Complaint   Patient presents with   • Altered Mental Status     Follow-up chronic kidney disease and hypokalemia  Subjective     Interval History:   Poorly responsive; not verbal; breathing appears comfortable on room air.  He follows no commands    Review of Systems:   Not obtainable    Objective     Vital Signs  Temp:  [97.8 °F (36.6 °C)-98.5 °F (36.9 °C)] 97.8 °F (36.6 °C)  Heart Rate:  [] 79  Resp:  [15-16] 16  BP: (134-161)/() 134/107    Flowsheet Rows      First Filed Value   Admission Height  190.5 cm (75\") Documented at 02/04/2021 1250   Admission Weight  114 kg (251 lb) Documented at 02/04/2021 1250          No intake/output data recorded.  No intake/output data recorded.  No intake or output data in the 24 hours ending 02/14/21 0858    Physical Exam:  General Appearance: NAD, nonverbal, poorly responsive  Skin: warm and dry  HEENT: Nonicteric sclerae, oral mucosa is moist  Neck: no JVD, trachea midline  Lungs: Scattered rhonchi, unlabored breathing effort on room air  Heart: RRR, normal S1 and S2, no S3, no rub  Abdomen: soft, no grimacing when abdomen palpated, BS +   Extremities: no significant edema, cyanosis or clubbing        Results Review:    Results from last 7 days   Lab Units 02/13/21  1652 02/13/21  0216 02/12/21  1515 02/12/21  0914 02/11/21  0545   SODIUM mmol/L  --  146*  --  140 139   POTASSIUM mmol/L 2.7* 2.7*  2.7* 2.8* 2.8* 4.0   CHLORIDE mmol/L  --  117*  --  112* 111*   CO2 mmol/L  --  17.3*  --  16.9* 12.7*   BUN mg/dL  --  25*  --  25* 24*   CREATININE mg/dL  --  2.72*  --  2.61* 2.55*   CALCIUM mg/dL  --  8.6  --  8.7 8.7   GLUCOSE mg/dL  --  129*  --  132* 144*       Estimated Creatinine Clearance: 34.9 mL/min (A) (by C-G formula based on SCr of 2.72 mg/dL (H)).    Results from last 7 days   Lab Units 02/13/21 0216 02/12/21 0914 02/12/21  0528 " 02/11/21  0545   MAGNESIUM mg/dL 1.8  --  1.8 2.0   PHOSPHORUS mg/dL 2.9 2.7  --  2.9       Results from last 7 days   Lab Units 02/13/21 0216 02/12/21  0528 02/11/21  0545 02/09/21  0633   URIC ACID mg/dL 8.8* 8.5* 8.3* 7.8*       Results from last 7 days   Lab Units 02/13/21  0216 02/12/21 0528 02/11/21  0545 02/10/21  0916 02/09/21  0633   WBC 10*3/mm3 5.66 5.90 7.34 7.35 9.09   HEMOGLOBIN g/dL 10.2* 10.5* 9.2* 10.9* 10.4*   PLATELETS 10*3/mm3 247 246 85* 306 287               Imaging Results (Last 24 Hours)     Procedure Component Value Units Date/Time    IR Lumbar Puncture Diag/Thera [[id number]] Collected: 02/13/21 1429     Updated: 02/13/21 1435    Narrative:      IR LUMBAR PUNCTURE DIAG/THERA-     INDICATIONS: Altered mental status     FINDINGS:     Following detailed discussion with the patient's wife of the risks,  benefits, alternatives of the procedure, verbal and written informed  consent was obtained from the patient's wife, as the patient was unable  to provide consent.     Patient was placed in a prone position on the fluoroscopy table. A final  timeout was performed verifying patient identity and procedure.     A location was chosen over the lumbar spine, the overlying skin was  cleaned and anesthetized, and a 22-gauge needle was advanced into the  thecal sac, but no fluid would return, despite persistent attempts at  multiple lower lumbar levels. The procedure was unsuccessful. Needle was  removed intact.     Fluoroscopy time: 25 seconds, one fluoroscopic image       Impression:         Unsuccessful lumbar puncture.     Discussed by telephone with the patient's nurse, Martha, at 1430,  02/13/2021.     This report was finalized on 2/13/2021 2:32 PM by Dr. Jluis Meza M.D.           amLODIPine, 5 mg, Oral, Q24H  ammonium lactate, , Topical, BID  aspirin, 81 mg, Oral, Daily  atorvastatin, 20 mg, Oral, Daily  cefTRIAXone, 1 g, Intravenous, Q24H  hydrALAZINE, 100 mg, Oral, Q8H  insulin lispro,  0-9 Units, Subcutaneous, TID AC  levETIRAcetam, 1,000 mg, Intravenous, Q12H  sodium chloride, 10 mL, Intravenous, Q12H  Vancomycin Pharmacy Intermittent Dosing, , Does not apply, Daily      hold, 1 each  Pharmacy to dose vancomycin,         Medication Review:   Current Facility-Administered Medications   Medication Dose Route Frequency Provider Last Rate Last Admin   • acetaminophen (TYLENOL) tablet 650 mg  650 mg Oral Q4H PRN Robert Baker MD        Or   • acetaminophen (TYLENOL) 160 MG/5ML solution 650 mg  650 mg Oral Q4H PRN Robert Baker MD        Or   • acetaminophen (TYLENOL) suppository 650 mg  650 mg Rectal Q4H PRN Robert Baker MD       • amLODIPine (NORVASC) tablet 5 mg  5 mg Oral Q24H Paddy Low MD   5 mg at 02/13/21 1054   • ammonium lactate (AMLACTIN) cream   Topical BID Nixon Qiunteros MD   Given at 02/13/21 2022   • aspirin chewable tablet 81 mg  81 mg Oral Daily Robert Baker MD   81 mg at 02/13/21 1054   • atorvastatin (LIPITOR) tablet 20 mg  20 mg Oral Daily Robert Baker MD   20 mg at 02/13/21 1054   • cefTRIAXone (ROCEPHIN) IVPB 1 g  1 g Intravenous Q24H Tonja Azevedo  mL/hr at 02/13/21 1135 1 g at 02/13/21 1135   • dextrose (D50W) 25 g/ 50mL Intravenous Solution 25 g  25 g Intravenous Q15 Min PRN Robert Baker MD       • dextrose (GLUTOSE) oral gel 15 g  15 g Oral Q15 Min PRN Robert Baker MD       • diphenhydrAMINE (BENADRYL) 12.5 MG/5ML elixir 25 mg  25 mg Oral Q6H PRN Robert Baker MD       • glucagon (human recombinant) (GLUCAGEN DIAGNOSTIC) injection 1 mg  1 mg Subcutaneous Q15 Min PRN Robert Baker MD       • Hold medication  1 each Does not apply Continuous PRN Jono Montes MD       • hydrALAZINE (APRESOLINE) injection 10 mg  10 mg Intravenous Q4H PRN Soy Pearl MD   10 mg at 02/12/21 1281   • hydrALAZINE (APRESOLINE) injection 10 mg  10 mg Intravenous Q6H PRN Jono Montes MD       • hydrALAZINE (APRESOLINE) tablet 100 mg  100 mg  Oral Q8H Soy Pearl MD   100 mg at 02/13/21 1448   • hydrALAZINE (APRESOLINE) tablet 25 mg  25 mg Oral Q6H PRN Nixon Quinteros MD       • HYDROcodone-acetaminophen (NORCO)  MG per tablet 1 tablet  1 tablet Oral Q6H PRN Robert Baker MD   1 tablet at 02/08/21 2053   • insulin lispro (humaLOG, ADMELOG) injection 0-9 Units  0-9 Units Subcutaneous TID AC Robert Baker MD   2 Units at 02/12/21 0857   • levETIRAcetam in NaCl 0.75% (KEPPRA) IVPB 1,000 mg  1,000 mg Intravenous Q12H Pal Monge MD   1,000 mg at 02/13/21 2022   • LORazepam (ATIVAN) injection 1 mg  1 mg Intravenous Q4H PRN Jono Montes MD   1 mg at 02/11/21 2348   • ondansetron (ZOFRAN) tablet 4 mg  4 mg Oral Q6H PRN Robert Baker MD        Or   • ondansetron (ZOFRAN) injection 4 mg  4 mg Intravenous Q6H PRN Robert Baker MD       • Pharmacy to dose vancomycin   Does not apply Continuous PRN Jono Montes MD       • potassium chloride (K-DUR,KLOR-CON) ER tablet 40 mEq  40 mEq Oral PRN Jono Montes MD        Or   • potassium chloride (KLOR-CON) packet 40 mEq  40 mEq Oral PRN Jono Montes MD        Or   • potassium chloride 10 mEq in 100 mL IVPB  10 mEq Intravenous Q1H PRN Jono Montes  mL/hr at 02/14/21 0051 10 mEq at 02/14/21 0051   • sodium chloride 0.9 % flush 10 mL  10 mL Intravenous PRN Josafat Mckeon MD       • sodium chloride 0.9 % flush 10 mL  10 mL Intravenous Q12H Robert Baker MD   10 mL at 02/13/21 2022   • sodium chloride 0.9 % flush 10 mL  10 mL Intravenous PRN Robert Baker MD       • Vancomycin Pharmacy Intermittent Dosing   Does not apply Daily Jono Montes MD   Given at 02/10/21 1126       Assessment/Plan   1.  CKD due to biopsy-proven diabetic nephropathy, without much change.  No labs today.   2.  Hypokalemia, recurrent  3.  Metabolic acidosis, likely NAGMA, due to CKD   4.  Anemia of chronic kidney disease, no JOSSELYN as needed.  5.  Immobility syndrome  6.  Hypertension, acceptable  control today.  7.  Diabetes mellitus type 2, with diabetic nephropathy, biopsy-proven.        Plan:  1.  Replace potassium (on protocol)  2.  Reorder labs.         Rigo Rg MD  02/14/21  08:58 EST

## 2021-02-14 NOTE — PLAN OF CARE
Goal Outcome Evaluation:  Plan of Care Reviewed With: patient  Progress: no change  Outcome Summary: Patient's vitals stable this shift. Patient shows no signs of pain or discomfort this shift. Patient more alert this shift than he was last night but still not alert enough to take medications by mouth. Patient still nonverbal with nurse. Patients potassium still low so patient given 5 runs of potassium this shift and lab due to be recollected. Patient turned every 2 hours per protocol. Will continue to monitor.

## 2021-02-14 NOTE — PROGRESS NOTES
"DAILY PROGRESS NOTE  [facility]    Patient Identification:  Name: Gregorio Alejandro  Age: 65 y.o.  Sex: male  :  1955  MRN: [id number]         Primary Care Physician: Maya Ribeiro APRN    Subjective:  Interval History:He is confused.     Objective:    Scheduled Meds:amLODIPine, 5 mg, Oral, Q24H  ammonium lactate, , Topical, BID  aspirin, 81 mg, Oral, Daily  atorvastatin, 20 mg, Oral, Daily  cefTRIAXone, 1 g, Intravenous, Q24H  hydrALAZINE, 100 mg, Oral, Q8H  insulin lispro, 0-9 Units, Subcutaneous, TID AC  sodium chloride, 10 mL, Intravenous, Q12H  Vancomycin Pharmacy Intermittent Dosing, , Does not apply, Daily      Continuous Infusions:hold, 1 each  Pharmacy to dose vancomycin,         Vital signs in last 24 hours:  Temp:  [97.8 °F (36.6 °C)-98.5 °F (36.9 °C)] 97.8 °F (36.6 °C)  Heart Rate:  [] 79  Resp:  [15-16] 16  BP: (134-161)/() 134/107    Intake/Output:    Intake/Output Summary (Last 24 hours) at 2021 1150  Last data filed at 2021 1100  Gross per 24 hour   Intake 0 ml   Output --   Net 0 ml       Exam:  BP (!) 134/107 (BP Location: Right arm, Patient Position: Lying)   Pulse 79   Temp 97.8 °F (36.6 °C) (Axillary)   Resp 16   Ht 190.5 cm (75\")   Wt 107 kg (236 lb)   SpO2 99%   BMI 29.50 kg/m²     General Appearance:    confused, no distress   Head:    Normocephalic, without obvious abnormality, atraumatic   Eyes:       Throat:   Lips, tongue, gums normal   Neck:   Supple, symmetrical, trachea midline, no JVD   Lungs:     Clear to auscultation bilaterally, respirations unlabored   Chest Wall:    No tenderness or deformity    Heart:    Regular rate and rhythm, S1 and S2 normal, no murmur,no  Rub or gallop   Abdomen:     Soft, nontender, bowel sounds active, no masses, no organomegaly    Extremities:   Extremities normal, atraumatic, no cyanosis or edema   Pulses:      Skin:   Skin is warm and dry,  no rashes or palpable lesions   Neurologic:   Confused  "      Lab Results (last 72 hours)       Procedure Component Value Units Date/Time    Blood Culture - Blood, Arm, Right [[id number]] Collected: 02/05/21 1431    Specimen: Blood from Arm, Right Updated: 02/08/21 1445     Blood Culture No growth at 3 days    Blood Culture - Blood, Arm, Left [[id number]] Collected: 02/05/21 1433    Specimen: Blood from Arm, Left Updated: 02/08/21 1445     Blood Culture No growth at 3 days    POC Glucose Once [[id number]]  (Abnormal) Collected: 02/08/21 1150    Specimen: Blood Updated: 02/08/21 1203     Glucose 193 mg/dL     Blood Culture - Blood, Hand, Right [[id number]]  (Abnormal) Collected: 02/04/21 1551    Specimen: Blood from Hand, Right Updated: 02/08/21 0821     Blood Culture Staphylococcus epidermidis     Comment: Refer to previous blood culture collected on 2-4-21 for MICs        Isolated from Anaerobic Bottle     Blood Culture Aerococcus viridans     Isolated from Anaerobic Bottle     Gram Stain Anaerobic Bottle Gram positive cocci in clusters    Blood Culture - Blood, Hand, Right [[id number]]  (Abnormal)  (Susceptibility) Collected: 02/04/21 1551    Specimen: Blood from Hand, Right Updated: 02/08/21 0820     Blood Culture Staphylococcus epidermidis     Isolated from Aerobic Bottle     Blood Culture Aerococcus viridans     Comment: Susceptibilities for Ceftriaxone and Vancomycin to follow.        Isolated from Aerobic Bottle     Blood Culture Globicatella sanguinis     Comment: Anaerobic bottle only  No CLSI guidelines and no validated ISAC testing method.          Isolated from Anaerobic Bottle     Gram Stain Aerobic Bottle Gram positive cocci in clusters      Anaerobic Bottle Gram positive cocci in clusters    Narrative:            Susceptibility        Staphylococcus epidermidis     ISAC     Oxacillin Resistant     Vancomycin Susceptible                  Susceptibility Comments       Staphylococcus epidermidis    This isolate is presumed to be clindamycin resistant based on  detection of inducible clindamycin resistance.  Clindamycin may still be effective in some patients.               CBC (No Diff) [[id number]]  (Abnormal) Collected: 02/08/21 0609    Specimen: Blood Updated: 02/08/21 0725     WBC 11.58 10*3/mm3      RBC 4.75 10*6/mm3      Hemoglobin 10.7 g/dL      Hematocrit 33.1 %      MCV 69.7 fL      MCH 22.5 pg      MCHC 32.3 g/dL      RDW 24.5 %      RDW-SD 57.6 fl      Platelets 209 10*3/mm3     Vancomycin, Random [[id number]]  (Normal) Collected: 02/08/21 0609    Specimen: Blood Updated: 02/08/21 0704     Vancomycin Random 17.20 mcg/mL     Narrative:      Therapeutic Ranges for Vancomycin    Vancomycin Random   5.0-40.0 mcg/mL  Vancomycin Trough   5.0-20.0 mcg.mL  Vancomycin Peak     20.0-40.0 mcg/mL    Renal Function Panel [[id number]]  (Abnormal) Collected: 02/08/21 0609    Specimen: Blood Updated: 02/08/21 0704     Glucose 159 mg/dL      BUN 20 mg/dL      Creatinine 2.23 mg/dL      Sodium 142 mmol/L      Potassium 3.8 mmol/L      Comment: Slight hemolysis detected by analyzer. Results may be affected.        Chloride 114 mmol/L      CO2 14.6 mmol/L      Calcium 9.0 mg/dL      Albumin 2.60 g/dL      Phosphorus 2.7 mg/dL      Anion Gap 13.4 mmol/L      BUN/Creatinine Ratio 9.0     eGFR  African Amer 36 mL/min/1.73     Narrative:      GFR Normal >60  Chronic Kidney Disease <60  Kidney Failure <15      Magnesium [[id number]]  (Normal) Collected: 02/08/21 0609    Specimen: Blood Updated: 02/08/21 0703     Magnesium 2.0 mg/dL     Ammonia [[id number]]  (Normal) Collected: 02/08/21 0609    Specimen: Blood Updated: 02/08/21 0654     Ammonia 25 umol/L     POC Glucose Once [[id number]]  (Abnormal) Collected: 02/08/21 0628    Specimen: Blood Updated: 02/08/21 0629     Glucose 138 mg/dL     Potassium, Urine, Random - Urine, Clean Catch [[id number]] Collected: 02/08/21 0550    Specimen: Urine, Clean Catch Updated: 02/08/21 0625     Potassium, Urine <3.0 mmol/L     Narrative:      Reference  intervals for random urine have not been established.  Clinical usage is dependent upon physician's interpretation in combination with other laboratory tests.       Sodium, Urine, Random - Urine, Clean Catch [[id number]] Collected: 02/08/21 0550    Specimen: Urine, Clean Catch Updated: 02/08/21 0625     Sodium, Urine <20 mmol/L     Narrative:      Reference intervals for random urine have not been established.  Clinical usage is dependent upon physician's interpretation in combination with other laboratory tests.       Potassium [[id number]]  (Abnormal) Collected: 02/08/21 0007    Specimen: Blood Updated: 02/08/21 0046     Potassium 2.9 mmol/L     POC Glucose Once [[id number]]  (Abnormal) Collected: 02/07/21 2108    Specimen: Blood Updated: 02/07/21 2110     Glucose 144 mg/dL     Renal Function Panel [[id number]]  (Abnormal) Collected: 02/07/21 1557    Specimen: Blood from Arm, Right Updated: 02/07/21 1657     Glucose 147 mg/dL      BUN 19 mg/dL      Creatinine 2.32 mg/dL      Sodium 145 mmol/L      Potassium 2.7 mmol/L      Chloride 115 mmol/L      CO2 16.7 mmol/L      Calcium 8.7 mg/dL      Albumin 2.90 g/dL      Phosphorus 2.8 mg/dL      Anion Gap 13.3 mmol/L      BUN/Creatinine Ratio 8.2     eGFR  African Amer 34 mL/min/1.73     Narrative:      GFR Normal >60  Chronic Kidney Disease <60  Kidney Failure <15      POC Glucose Once [[id number]]  (Abnormal) Collected: 02/07/21 1537    Specimen: Blood Updated: 02/07/21 1539     Glucose 162 mg/dL     POC Glucose Once [[id number]]  (Abnormal) Collected: 02/07/21 1130    Specimen: Blood Updated: 02/07/21 1137     Glucose 147 mg/dL     Gastrointestinal Panel, PCR - Stool, Per Rectum [[id number]]  (Normal) Collected: 02/05/21 2204    Specimen: Stool from Per Rectum Updated: 02/07/21 0950     Campylobacter Not Detected     Plesiomonas shigelloides Not Detected     Salmonella Not Detected     Vibrio Not Detected     Vibrio cholerae Not Detected     Yersinia enterocolitica Not  Detected     Enteroaggregative E. coli (EAEC) Not Detected     Enteropathogenic E. coli (EPEC) Not Detected     Enterotoxigenic E. coli (ETEC) lt/st Not Detected     Shiga-like toxin-producing E. coli (STEC) stx1/stx2 Not Detected     E. coli O157 Not Detected     Shigella/Enteroinvasive E. coli (EIEC) Not Detected     Cryptosporidium Not Detected     Cyclospora cayetanensis Not Detected     Entamoeba histolytica Not Detected     Giardia lamblia Not Detected     Adenovirus F40/41 Not Detected     Astrovirus Not Detected     Norovirus GI/GII Not Detected     Rotavirus A Not Detected     Sapovirus (I, II, IV or V) Not Detected    Narrative:      If Aeromonas, Staphylococcus aureus or Bacillus cereus are suspected, please order [id number]: Stool Culture, Aeromonas, S aureus, B Cereus.    POC Glucose Once [[id number]]  (Abnormal) Collected: 02/07/21 0616    Specimen: Blood Updated: 02/07/21 0617     Glucose 182 mg/dL     Renal Function Panel [[id number]]  (Abnormal) Collected: 02/07/21 0458    Specimen: Blood Updated: 02/07/21 0609     Glucose 156 mg/dL      BUN 20 mg/dL      Creatinine 2.37 mg/dL      Sodium 143 mmol/L      Potassium 2.8 mmol/L      Chloride 115 mmol/L      CO2 16.4 mmol/L      Calcium 8.7 mg/dL      Albumin 2.70 g/dL      Phosphorus 1.8 mg/dL      Anion Gap 11.6 mmol/L      BUN/Creatinine Ratio 8.4     eGFR  African Amer 33 mL/min/1.73     Narrative:      GFR Normal >60  Chronic Kidney Disease <60  Kidney Failure <15      Vancomycin, Random [[id number]]  (Normal) Collected: 02/07/21 0458    Specimen: Blood Updated: 02/07/21 0553     Vancomycin Random 17.40 mcg/mL     Narrative:      Therapeutic Ranges for Vancomycin    Vancomycin Random   5.0-40.0 mcg/mL  Vancomycin Trough   5.0-20.0 mcg.mL  Vancomycin Peak     20.0-40.0 mcg/mL    CBC (No Diff) [[id number]]  (Abnormal) Collected: 02/07/21 0458    Specimen: Blood Updated: 02/07/21 0535     WBC 8.61 10*3/mm3      RBC 4.32 10*6/mm3      Hemoglobin 10.0 g/dL       Hematocrit 31.0 %      MCV 71.8 fL      MCH 23.1 pg      MCHC 32.3 g/dL      RDW 23.8 %      RDW-SD 58.1 fl      MPV --     Comment: Unable to calculate        Platelets 253 10*3/mm3     Potassium [[id number]]  (Abnormal) Collected: 02/06/21 2021    Specimen: Blood Updated: 02/06/21 2059     Potassium 2.9 mmol/L     Phosphorus [[id number]]  (Abnormal) Collected: 02/06/21 2021    Specimen: Blood Updated: 02/06/21 2059     Phosphorus 2.1 mg/dL     Magnesium [[id number]]  (Normal) Collected: 02/06/21 2021    Specimen: Blood Updated: 02/06/21 2052     Magnesium 2.0 mg/dL     POC Glucose Once [[id number]]  (Abnormal) Collected: 02/06/21 2043    Specimen: Blood Updated: 02/06/21 2044     Glucose 158 mg/dL     POC Glucose Once [[id number]]  (Abnormal) Collected: 02/06/21 1706    Specimen: Blood Updated: 02/06/21 1708     Glucose 134 mg/dL     POC Glucose Once [[id number]]  (Normal) Collected: 02/06/21 1114    Specimen: Blood Updated: 02/06/21 1116     Glucose 110 mg/dL     Urine Culture - Urine, Urine, Catheter [[id number]]  (Abnormal)  (Susceptibility) Collected: 02/04/21 1612    Specimen: Urine, Catheter Updated: 02/06/21 0940     Urine Culture 25,000 CFU/mL Proteus mirabilis    Susceptibility        Proteus mirabilis     ISAC     Ampicillin Susceptible     Ampicillin + Sulbactam Susceptible     Cefazolin Susceptible     Cefepime Susceptible     Ceftazidime Susceptible     Ceftriaxone Susceptible     Gentamicin Susceptible     Levofloxacin Intermediate     Nitrofurantoin Resistant     Piperacillin + Tazobactam Susceptible     Tetracycline Resistant     Trimethoprim + Sulfamethoxazole Resistant                      Manual Differential [[id number]]  (Abnormal) Collected: 02/06/21 0610    Specimen: Blood Updated: 02/06/21 0722     Neutrophil % 93.0 %      Lymphocyte % 4.0 %      Monocyte % 1.0 %      Eosinophil % 1.0 %      Basophil % 1.0 %      Neutrophils Absolute 8.85 10*3/mm3      Lymphocytes Absolute 0.38 10*3/mm3       Monocytes Absolute 0.10 10*3/mm3      Eosinophils Absolute 0.10 10*3/mm3      Basophils Absolute 0.10 10*3/mm3      Anisocytosis Mod/2+     Liz Cells Slight/1+     Hypochromia Slight/1+     Macrocytes Slight/1+     Microcytes Slight/1+     Poikilocytes Mod/2+     Polychromasia Large/3+     RBC Fragments Slight/1+     Target Cells Slight/1+     Schistocytes Slight/1+     WBC Morphology Normal     Platelet Morphology Normal    Renal Function Panel [[id number]]  (Abnormal) Collected: 02/06/21 0610    Specimen: Blood Updated: 02/06/21 0716     Glucose 164 mg/dL      BUN 20 mg/dL      Creatinine 2.39 mg/dL      Sodium 142 mmol/L      Potassium 3.3 mmol/L      Chloride 115 mmol/L      CO2 18.9 mmol/L      Calcium 8.5 mg/dL      Albumin 2.50 g/dL      Phosphorus 1.4 mg/dL      Anion Gap 8.1 mmol/L      BUN/Creatinine Ratio 8.4     eGFR  African Amer 33 mL/min/1.73     Narrative:      GFR Normal >60  Chronic Kidney Disease <60  Kidney Failure <15      Uric Acid [[id number]]  (Abnormal) Collected: 02/06/21 0610    Specimen: Blood Updated: 02/06/21 0701     Uric Acid 7.2 mg/dL     Magnesium [[id number]]  (Normal) Collected: 02/06/21 0610    Specimen: Blood Updated: 02/06/21 0701     Magnesium 1.7 mg/dL     Vancomycin, Random [[id number]]  (Normal) Collected: 02/06/21 0610    Specimen: Blood Updated: 02/06/21 0659     Vancomycin Random 15.10 mcg/mL     Narrative:      Therapeutic Ranges for Vancomycin    Vancomycin Random   5.0-40.0 mcg/mL  Vancomycin Trough   5.0-20.0 mcg.mL  Vancomycin Peak     20.0-40.0 mcg/mL    CBC & Differential [[id number]]  (Abnormal) Collected: 02/06/21 0610    Specimen: Blood Updated: 02/06/21 0644    Narrative:      The following orders were created for panel order CBC & Differential.  Procedure                               Abnormality         Status                     ---------                               -----------         ------                     CBC Auto Differential[[id number]]         Abnormal            Final result                 Please view results for these tests on the individual orders.    CBC Auto Differential [[id number]]  (Abnormal) Collected: 02/06/21 0610    Specimen: Blood Updated: 02/06/21 0644     WBC 9.52 10*3/mm3      RBC 4.61 10*6/mm3      Hemoglobin 10.6 g/dL      Hematocrit 33.2 %      MCV 72.0 fL      MCH 23.0 pg      MCHC 31.9 g/dL      RDW 24.0 %      RDW-SD 57.9 fl      Platelets 280 10*3/mm3     POC Glucose Once [[id number]]  (Abnormal) Collected: 02/06/21 0619    Specimen: Blood Updated: 02/06/21 0623     Glucose 154 mg/dL     Clostridium Difficile Toxin - Stool, Per Rectum [[id number]]  (Normal) Collected: 02/05/21 2204    Specimen: Stool from Per Rectum Updated: 02/05/21 2303    Narrative:      The following orders were created for panel order Clostridium Difficile Toxin - Stool, Per Rectum.  Procedure                               Abnormality         Status                     ---------                               -----------         ------                     Clostridium Difficile To...[[id number]]  Normal              Final result                 Please view results for these tests on the individual orders.    Clostridium Difficile Toxin, PCR - Stool, Per Rectum [[id number]]  (Normal) Collected: 02/05/21 2204    Specimen: Stool from Per Rectum Updated: 02/05/21 2303     C. Difficile Toxins by PCR Negative    Potassium [[id number]]  (Normal) Collected: 02/05/21 2105    Specimen: Blood from Hand, Left Updated: 02/05/21 2146     Potassium 3.5 mmol/L     POC Glucose Once [[id number]]  (Abnormal) Collected: 02/05/21 2010    Specimen: Blood Updated: 02/05/21 2011     Glucose 146 mg/dL           Data Review:  Results from last 7 days   Lab Units 02/14/21  0940 02/13/21  1652 02/13/21  0216  02/12/21  0914   SODIUM mmol/L 143  --  146*  --  140   POTASSIUM mmol/L 3.3* 2.7* 2.7*  2.7*   < > 2.8*   CHLORIDE mmol/L 117*  --  117*  --  112*   CO2 mmol/L 16.1*  --  17.3*  --  16.9*    BUN mg/dL 22  --  25*  --  25*   CREATININE mg/dL 2.49*  --  2.72*  --  2.61*   GLUCOSE mg/dL 123*  --  129*  --  132*   CALCIUM mg/dL 8.8  --  8.6  --  8.7    < > = values in this interval not displayed.     Results from last 7 days   Lab Units 02/14/21  0940 02/13/21  0216 02/12/21  0528   WBC 10*3/mm3 5.96 5.66 5.90   HEMOGLOBIN g/dL 9.7* 10.2* 10.5*   HEMATOCRIT % 30.7* 31.3* 32.1*   PLATELETS 10*3/mm3 234 247 246             Lab Results   Lab Value Date/Time    TROPONINT 0.110 (C) 02/04/2021 1551    TROPONINT 0.171 (C) 12/23/2020 0357    TROPONINT 0.141 (C) 12/22/2020 0549    TROPONINT 0.147 (C) 12/21/2020 1450    TROPONINT 0.128 (C) 12/21/2020 1223               Invalid input(s): PROT, LABALBU          Glucose   Date/Time Value Ref Range Status   02/14/2021 1058 117 70 - 130 mg/dL Final   02/14/2021 0620 129 70 - 130 mg/dL Final   02/13/2021 2025 110 70 - 130 mg/dL Final   02/13/2021 1631 121 70 - 130 mg/dL Final   02/13/2021 1125 122 70 - 130 mg/dL Final   02/13/2021 0613 122 70 - 130 mg/dL Final   02/12/2021 2027 128 70 - 130 mg/dL Final   02/12/2021 1539 111 70 - 130 mg/dL Final           Past Medical History:   Diagnosis Date    Back pain     CKD (chronic kidney disease)     DM type 2 (diabetes mellitus, type 2) (CMS/[facility])     ED (erectile dysfunction)     Hyperlipidemia     Hypertension     SCOTTY (iron deficiency anemia)     Monoclonal gammopathy     Osteoarthritis        Assessment:  Active Hospital Problems    Diagnosis  POA    **Acute metabolic encephalopathy [G93.41]  Yes    Vascular dementia without behavioral disturbance (CMS/[facility]) [F01.50]  Unknown    Septicemia (CMS/[facility]) [A41.9]  Yes    UTI (urinary tract infection), bacterial [N39.0, A49.9]  Yes    Elevated troponin [R77.8]  Yes    Hypokalemia [E87.6]  Yes    Uncontrolled hypertension [I10]  Yes    Anemia, chronic disease [D63.8]  Yes    DM2 (diabetes mellitus, type 2) (CMS/HCC) [E11.9]  Yes    Stage 4 chronic kidney disease (CMS/HCC) [N18.4]  Yes     Hyperlipidemia [E78.5]  Yes    Hypertension [I10]  Yes      Resolved Hospital Problems   No resolved problems to display.   Sepsis present on admission.    Plan:  Continue antibiotics. As per multiple consults.  neurology consult noted.  Follow lab.  MRI brain was negative. IR unable to get despite multiple attempts at LP   Discussed with neurology.  Discussed with his wife. Still full code.  [place] poor? Looks a little better today.    Jono Montes MD  2/14/2021  11:50 EST

## 2021-02-14 NOTE — PLAN OF CARE
Goal Outcome Evaluation:  Plan of Care Reviewed With: patient  Progress: no change  Outcome Summary: Pt was more alert today he is still unable to answer orientation questions. His K+ is still low he is still getiing runs of potassium. We were unable to get his tempture he was cool, warming blanket applied. We will CTM

## 2021-02-14 NOTE — PROGRESS NOTES
[facility]  Inpatient Progress Note    Reason for Follow Up: Iron deficiency anemia    Subjective     Interval History:   More alert this morning but still nonverbal    Current Facility-Administered Medications:   •  acetaminophen (TYLENOL) tablet 650 mg, 650 mg, Oral, Q4H PRN **OR** acetaminophen (TYLENOL) 160 MG/5ML solution 650 mg, 650 mg, Oral, Q4H PRN **OR** acetaminophen (TYLENOL) suppository 650 mg, 650 mg, Rectal, Q4H PRN, Robert Baker MD  •  amLODIPine (NORVASC) tablet 5 mg, 5 mg, Oral, Q24H, Paddy Low MD, 5 mg at 02/13/21 1054  •  ammonium lactate (AMLACTIN) cream, , Topical, BID, Nixon Quinteros MD, Given at 02/13/21 2022  •  aspirin chewable tablet 81 mg, 81 mg, Oral, Daily, Robert Baker MD, 81 mg at 02/13/21 1054  •  atorvastatin (LIPITOR) tablet 20 mg, 20 mg, Oral, Daily, Robert Baker MD, 20 mg at 02/13/21 1054  •  cefTRIAXone (ROCEPHIN) IVPB 1 g, 1 g, Intravenous, Q24H, Tonja Azevedo MD, Last Rate: 100 mL/hr at 02/13/21 1135, 1 g at 02/13/21 1135  •  dextrose (D50W) 25 g/ 50mL Intravenous Solution 25 g, 25 g, Intravenous, Q15 Min PRN, Robert Baker MD  •  dextrose (GLUTOSE) oral gel 15 g, 15 g, Oral, Q15 Min PRN, Robert Baker MD  •  diphenhydrAMINE (BENADRYL) 12.5 MG/5ML elixir 25 mg, 25 mg, Oral, Q6H PRN, Robert Baker MD  •  glucagon (human recombinant) (GLUCAGEN DIAGNOSTIC) injection 1 mg, 1 mg, Subcutaneous, Q15 Min PRN, Robert Baker MD  •  Hold medication, 1 each, Does not apply, Continuous PRN, Jono Montes MD  •  hydrALAZINE (APRESOLINE) injection 10 mg, 10 mg, Intravenous, Q4H PRN, Soy Pearl MD, 10 mg at 02/12/21 2338  •  hydrALAZINE (APRESOLINE) injection 10 mg, 10 mg, Intravenous, Q6H PRN, Jono Montes MD  •  hydrALAZINE (APRESOLINE) tablet 100 mg, 100 mg, Oral, Q8H, Soy Pearl MD, 100 mg at 02/13/21 1448  •  hydrALAZINE (APRESOLINE) tablet 25 mg, 25 mg, Oral, Q6H PRN, Nixon Quinteros MD  •   HYDROcodone-acetaminophen (NORCO)  MG per tablet 1 tablet, 1 tablet, Oral, Q6H PRN, Robert Baker MD, 1 tablet at 02/08/21 2053  •  insulin lispro (humaLOG, ADMELOG) injection 0-9 Units, 0-9 Units, Subcutaneous, TID AC, Robert Baker MD, 2 Units at 02/12/21 0857  •  levETIRAcetam in NaCl 0.75% (KEPPRA) IVPB 1,000 mg, 1,000 mg, Intravenous, Q12H, Pal Monge MD, 1,000 mg at 02/13/21 2022  •  LORazepam (ATIVAN) injection 1 mg, 1 mg, Intravenous, Q4H PRN, Jono Montes MD, 1 mg at 02/11/21 2348  •  ondansetron (ZOFRAN) tablet 4 mg, 4 mg, Oral, Q6H PRN **OR** ondansetron (ZOFRAN) injection 4 mg, 4 mg, Intravenous, Q6H PRN, Robert Baker MD  •  Pharmacy to dose vancomycin, , Does not apply, Continuous PRN, Jono Montes MD  •  potassium chloride (K-DUR,KLOR-CON) ER tablet 40 mEq, 40 mEq, Oral, PRN **OR** potassium chloride (KLOR-CON) packet 40 mEq, 40 mEq, Oral, PRN **OR** potassium chloride 10 mEq in 100 mL IVPB, 10 mEq, Intravenous, Q1H PRN, Jono Montes MD, Last Rate: 100 mL/hr at 02/14/21 0051, 10 mEq at 02/14/21 0051  •  sodium chloride 0.9 % flush 10 mL, 10 mL, Intravenous, PRN, Josafat Mckeon MD  •  sodium chloride 0.9 % flush 10 mL, 10 mL, Intravenous, Q12H, Robert Baker MD, 10 mL at 02/13/21 2022  •  sodium chloride 0.9 % flush 10 mL, 10 mL, Intravenous, PRN, Robert Baker MD  •  Vancomycin Pharmacy Intermittent Dosing, , Does not apply, Daily, Jono Montes MD, Given at 02/10/21 1126  Review of Systems:    Review of systems not obtainable due to patient's inability to voice complaints    Objective     Vital Signs  Temp:  [97.8 °F (36.6 °C)-98.5 °F (36.9 °C)] 97.8 °F (36.6 °C)  Heart Rate:  [] 79  Resp:  [15-16] 16  BP: (134-161)/() 134/107  Body mass index is 29.5 kg/m².  No intake or output data in the 24 hours ending 02/14/21 0847  No intake/output data recorded.     Physical Exam:   General: patient awake, alert and cooperative   Eyes: Normal lids and  lashes, no scleral icterus   Neck: supple, normal ROM   Skin: warm and dry, not jaundiced   Cardiovascular: regular rhythm and rate, no murmurs auscultated   Pulm: clear to auscultation bilaterally, regular and unlabored   Abdomen: soft, nontender, nondistended; normal bowel sounds   Extremities: no rash or edema   Psychiatric: Normal mood and behavior; memory intact     Results Review:     I reviewed the patient's new clinical results.    Results from last 7 days   Lab Units 02/13/21  0216 02/12/21  0528 02/11/21  0545   WBC 10*3/mm3 5.66 5.90 7.34   HEMOGLOBIN g/dL 10.2* 10.5* 9.2*   HEMATOCRIT % 31.3* 32.1* 29.2*   PLATELETS 10*3/mm3 247 246 85*     Results from last 7 days   Lab Units 02/13/21  1652 02/13/21  0216 02/12/21  1515 02/12/21  0914 02/11/21  0545   SODIUM mmol/L  --  146*  --  140 139   POTASSIUM mmol/L 2.7* 2.7*  2.7* 2.8* 2.8* 4.0   CHLORIDE mmol/L  --  117*  --  112* 111*   CO2 mmol/L  --  17.3*  --  16.9* 12.7*   BUN mg/dL  --  25*  --  25* 24*   CREATININE mg/dL  --  2.72*  --  2.61* 2.55*   CALCIUM mg/dL  --  8.6  --  8.7 8.7   GLUCOSE mg/dL  --  129*  --  132* 144*         Lab Results   Lab Value Date/Time    LIPASE 17 12/18/2020 1653    LIPASE 914 (H) 10/27/2020 0330    LIPASE 1,087 (H) 10/26/2020 0410    LIPASE 1,653 (H) 10/25/2020 0323       Radiology:  IR Lumbar Puncture Diag/Thera   Final Result       Unsuccessful lumbar puncture.       Discussed by telephone with the patient's nurse, Martha, at 1430,   02/13/2021.       This report was finalized on 2/13/2021 2:32 PM by Dr. Jluis Meza M.D.          MRI Brain Without Contrast   Final Result   Limited examination secondary to patient motion and the   incomplete nature of the examination. There is no evidence of acute   infarction. Atrophy and small vessel ischemic disease is noted.       This report was finalized on 2/12/2021 8:03 AM by Dr. Rashad Bajwa M.D.          CT Head Without Contrast   Final Result   No acute  intracranial findings.       Radiation dose reduction techniques were utilized, including automated   exposure control and exposure modulation based on body size.       This report was finalized on 2/5/2021 9:30 PM by Dr. Vero Hansen M.D.          CT Abdomen Pelvis Without Contrast   Final Result       1. Severely technically limited examination due to motion artifact. The   patient's sigmoid colon in particular appears thick walled, with   adjacent pericolonic soft tissue stranding. There is also involvement of   the rectum, although to a lesser extent. Appearance is suggestive of   colitis. No pneumatosis or free air is seen. Given the patient had some   rectal wall thickening on prior CT, consideration for follow-up with   endoscopy is suggested. There is some diffuse gaseous distention of   bowel, which may reflect some ileus.   2. Thick-walled appearance to the urinary bladder, with adjacent   perivesical soft tissue stranding, concerning for cystitis.       Radiation dose reduction techniques were utilized, including automated   exposure control and exposure modulation based on body size.       This report was finalized on 2/5/2021 9:39 PM by Dr. Vero Hansen M.D.          XR Chest 1 View   Final Result          Assessment/Plan     Patient Active Problem List   Diagnosis   • Complicated UTI (urinary tract infection)   • Macrocytosis   • Sepsis secondary to UTI (CMS/HCC)   • Metabolic encephalopathy   • Hyperlipidemia   • Hypertension   • Monoclonal gammopathy   • Stage 4 chronic kidney disease (CMS/HCC)   • DM2 (diabetes mellitus, type 2) (CMS/HCC)   • Abnormal CT of the abdomen   • Normal anion gap metabolic acidosis   • Anemia, chronic disease   • Ventricular tachycardia (paroxysmal) (CMS/HCC)   • Acute metabolic encephalopathy   • UTI (urinary tract infection), bacterial   • Elevated troponin   • Hypokalemia   • SILVIA (acute kidney injury) (CMS/HCC)   • Uncontrolled hypertension   • Septicemia  (CMS/HCC)   • Vascular dementia without behavioral disturbance (CMS/HCC)       Assessment:  1. Abnormal CT scan of the sigmoid colon and rectum  2. Iron deficiency anemia  3. Metabolic encephalopathy        Plan:  · Await clearance from other disciplines prior to any GI assessment.  · When stable and not encephalopathic or confused I will perform EGD and colonoscopy (he will not consume a bowel prep with his current mental state, he will have to be awake oriented, following commands to take a full bowel prep)  · Follow hemoglobin, it has been stable  I discussed the patients findings and my recommendations with patient and nursing staff.    Kip Wiley MD

## 2021-02-14 NOTE — PROGRESS NOTES
[facility]  Clinical Pharmacy Department     Vancomycin Pharmacokinetic Note    Gregorio Alejandro is a 65 y.o. male who is on day 10 of pharmacy to dose vancomycin for complicated skin and soft tissue infection/ bacteremia.    Target level: AUC24 400-600  Consulting Provider:  Dr Quinteros  Current Vancomycin Dose:   Intermittent dosing  Planned Duration of Therapy: 10-14 days  Other Antimicrobials: Ceftriaxone 1 gm IV q 24 hours    Allergies  Allergies as of 02/04/2021    (No Known Allergies)       Microbiology:  Microbiology Results (last 10 days)       Procedure Component Value - Date/Time    Gastrointestinal Panel, PCR - Stool, Per Rectum [[id number]]  (Normal) Collected: 02/05/21 2204    Lab Status: Final result Specimen: Stool from Per Rectum Updated: 02/07/21 0950     Campylobacter Not Detected     Plesiomonas shigelloides Not Detected     Salmonella Not Detected     Vibrio Not Detected     Vibrio cholerae Not Detected     Yersinia enterocolitica Not Detected     Enteroaggregative E. coli (EAEC) Not Detected     Enteropathogenic E. coli (EPEC) Not Detected     Enterotoxigenic E. coli (ETEC) lt/st Not Detected     Shiga-like toxin-producing E. coli (STEC) stx1/stx2 Not Detected     E. coli O157 Not Detected     Shigella/Enteroinvasive E. coli (EIEC) Not Detected     Cryptosporidium Not Detected     Cyclospora cayetanensis Not Detected     Entamoeba histolytica Not Detected     Giardia lamblia Not Detected     Adenovirus F40/41 Not Detected     Astrovirus Not Detected     Norovirus GI/GII Not Detected     Rotavirus A Not Detected     Sapovirus (I, II, IV or V) Not Detected    Narrative:      If Aeromonas, Staphylococcus aureus or Bacillus cereus are suspected, please order [id number]: Stool Culture, Aeromonas, S aureus, B Cereus.    Clostridium Difficile Toxin - Stool, Per Rectum [[id number]]  (Normal) Collected: 02/05/21 2204    Lab Status: Final result Specimen: Stool from Per Rectum Updated: 02/05/21 8537     Narrative:      The following orders were created for panel order Clostridium Difficile Toxin - Stool, Per Rectum.  Procedure                               Abnormality         Status                     ---------                               -----------         ------                     Clostridium Difficile To...[[id number]]  Normal              Final result                 Please view results for these tests on the individual orders.    Clostridium Difficile Toxin, PCR - Stool, Per Rectum [[id number]]  (Normal) Collected: 02/05/21 2204    Lab Status: Final result Specimen: Stool from Per Rectum Updated: 02/05/21 2303     C. Difficile Toxins by PCR Negative    Blood Culture - Blood, Arm, Left [[id number]] Collected: 02/05/21 1433    Lab Status: Final result Specimen: Blood from Arm, Left Updated: 02/10/21 1445     Blood Culture No growth at 5 days    Blood Culture - Blood, Arm, Right [[id number]] Collected: 02/05/21 1431    Lab Status: Final result Specimen: Blood from Arm, Right Updated: 02/10/21 1445     Blood Culture No growth at 5 days    COVID PRE-OP / PRE-PROCEDURE SCREENING ORDER (NO ISOLATION) - Swab, Nasopharynx [[id number]]  (Normal) Collected: 02/04/21 1810    Lab Status: Final result Specimen: Swab from Nasopharynx Updated: 02/04/21 2206    Narrative:      The following orders were created for panel order COVID PRE-OP / PRE-PROCEDURE SCREENING ORDER (NO ISOLATION) - Swab, Nasopharynx.  Procedure                               Abnormality         Status                     ---------                               -----------         ------                     COVID-19,APTIMA PANTHER,...[[id number]]  Normal              Final result                 Please view results for these tests on the individual orders.    COVID-19,APTIMA PANTHERRENEEU IN-HOUSE, NP/OP SWAB IN UTM/VTM/SALINE TRANSPORT MEDIA,24 HR TAT - Swab, Nasopharynx [[id number]]  (Normal) Collected: 02/04/21 1810    Lab Status: Final result Specimen: Swab  from Nasopharynx Updated: 02/04/21 2206     COVID19 Not Detected    Narrative:      Fact sheet for providers: https://www.fda.gov/media/005089/download     Fact sheet for patients: https://www.fda.gov/media/319899/download    Test performed by RT PCR.    Urine Culture - Urine, Urine, Catheter [[id number]]  (Abnormal)  (Susceptibility) Collected: 02/04/21 1612    Lab Status: Final result Specimen: Urine, Catheter Updated: 02/06/21 0940     Urine Culture 25,000 CFU/mL Proteus mirabilis    Susceptibility        Proteus mirabilis     ISAC     Ampicillin Susceptible     Ampicillin + Sulbactam Susceptible     Cefazolin Susceptible     Cefepime Susceptible     Ceftazidime Susceptible     Ceftriaxone Susceptible     Gentamicin Susceptible     Levofloxacin Intermediate     Nitrofurantoin Resistant     Piperacillin + Tazobactam Susceptible     Tetracycline Resistant     Trimethoprim + Sulfamethoxazole Resistant                      Blood Culture - Blood, Hand, Right [[id number]]  (Abnormal) Collected: 02/04/21 1551    Lab Status: Final result Specimen: Blood from Hand, Right Updated: 02/09/21 0928     Blood Culture Staphylococcus epidermidis     Comment: Refer to previous blood culture collected on 2-4-21 for MICs        Isolated from Anaerobic Bottle     Blood Culture Aerococcus viridans     Isolated from Anaerobic Bottle     Gram Stain Anaerobic Bottle Gram positive cocci in clusters    Blood Culture - Blood, Hand, Right [[id number]]  (Abnormal)  (Susceptibility) Collected: 02/04/21 1551    Lab Status: Final result Specimen: Blood from Hand, Right Updated: 02/09/21 0633     Blood Culture Staphylococcus epidermidis     Isolated from Aerobic Bottle     Blood Culture Aerococcus viridans     Isolated from Aerobic Bottle     Blood Culture Globicatella sanguinis     Comment: Anaerobic bottle only  No CLSI guidelines and no validated ISAC testing method.          Isolated from Anaerobic Bottle     Gram Stain Aerobic Bottle Gram  "positive cocci in clusters      Anaerobic Bottle Gram positive cocci in clusters    Narrative:            Susceptibility        Staphylococcus epidermidis     ISAC     Oxacillin Resistant     Vancomycin Susceptible                  Susceptibility        Aerococcus viridans     Not Specified     Ceftriaxone Resistant     Vancomycin Susceptible                  Susceptibility Comments       Staphylococcus epidermidis    This isolate is presumed to be clindamycin resistant based on detection of inducible clindamycin resistance.  Clindamycin may still be effective in some patients.               Blood Culture ID, PCR - Blood, Hand, Right [[id number]]  (Abnormal) Collected: 02/04/21 1551    Lab Status: Final result Specimen: Blood from Hand, Right Updated: 02/05/21 1250     BCID, PCR Staphylococcus spp, not aureus. Identification by BCID PCR.            Vitals/Labs/I&O  190.5 cm (75\")  107 kg (236 lb)  Body mass index is 29.5 kg/m².   [unfilled]    Results from last 7 days   Lab Units 02/14/21  0940 02/13/21  0216 02/12/21  0528   WBC 10*3/mm3 5.96 5.66 5.90                           Estimated Creatinine Clearance: 38.1 mL/min (A) (by C-G formula based on SCr of 2.49 mg/dL (H)).  Results from last 7 days   Lab Units 02/14/21  0940 02/13/21  0216 02/12/21  0914   BUN mg/dL 22 25* 25*   CREATININE mg/dL 2.49* 2.72* 2.61*     Intake & Output (last 3 days)         02/11 0701 - 02/12 0700 02/12 0701 - 02/13 0700 02/13 0701 - 02/14 0700 02/14 0701 - 02/15 0700    P.O. 720 0  0    Total Intake(mL/kg) 720 (6.8) 0 (0)  0 (0)    Net +720 0  0            Urine Unmeasured Occurrence 3 x 5 x 3 x     Stool Unmeasured Occurrence 1 x 2 x 1 x             Vancomycin Dosing and Level History:  2/5: Vancomycin 2250 mg (20 mg/kg) loading dose IV once @ 1545              2/6 @ 0610: Random vancomycin level=15.1 mcg/mL  2/6: 750 mg IV once @ 1546              2/7 @ 0458: Random vancomycin level=17.4 mcg/mL              2/8 @ 0609: Random " "vancomycin level=17.2 mcg/mL  2/8: 500 mg IV once @ 1627              2/9 @ 0633: Random vancomycin level=19.3 mcg/mL  2/10: 500 mg IV once @ 1431              2/11 @ 1156: Random vancomycin level=16.7 mcg/mL   2/11: 500 mg IV once @ 1447              2/12 @ 1152: Random vancomycin level=16.5 mcg/mL  2/12: 500 mg IV once @ 1652  2/13: 500 mg IV once @ 1643              2/14 @ 1336 Random vancomycin level = 16.6 mcg/mL    Results from last 7 days   Lab Units 02/14/21  1336 02/12/21  1152 02/11/21  1156 02/09/21  0633 02/08/21  0609   VANCOMYCIN RM mcg/mL 16.60 16.50 16.70 19.30 17.20               Assessment/Plan:    Assessment:  Bayesian analysis of the most recent level(s) using [employer] provides the following patient-specific pharmacokinetic parameters:   CL: 1.1 L/h   Vd: 72.5 L   T1/2: 48.1 h  If the predicted trough on this regimen is not within what was previously considered a \"target trough range\", the AUC24 (a stronger predictor of vancomycin efficacy) is predicted to achieve the accepted target of 400-600 mg*h/L. To best balance efficacy and toxicity, we will be targeting AUC24 in this patient rather than steady-state trough levels.     Continuing the regimen of 500 mg (4.67 mg/kg) IV every 24 hours gives a predicted steady-state trough of 16.3 mg/L and AUC24 of 449 mg*h/L.    Recommendations/Plan:  Begin scheduled vancomycin regimen of 500 mg (4.67 mg/kg) IV every 24 hours  Obtain vancomycin level in 3 days or sooner if acute changes  Continue to monitor SCr      Thank you for involving pharmacy in this patient's care. Please contact pharmacy with any questions or concerns.                           Iwona Conn [facility]  Clinical Pharmacist  02/14/21 15:10 EST    "

## 2021-02-14 NOTE — PROGRESS NOTES
Gregorio Alejandro   65 y.o.  male    LOS: 10 days   Patient Care Team:  Maya Ribeiro APRN as PCP - General (Family Medicine)      Subjective   Just stares does not talk  Per nursing has been quite agitated today, family not at bedside today    Review of Systems:   Unable to obtain    Medication Review:   Current Facility-Administered Medications:   •  acetaminophen (TYLENOL) tablet 650 mg, 650 mg, Oral, Q4H PRN **OR** acetaminophen (TYLENOL) 160 MG/5ML solution 650 mg, 650 mg, Oral, Q4H PRN **OR** acetaminophen (TYLENOL) suppository 650 mg, 650 mg, Rectal, Q4H PRN, Robert Baker MD  •  amLODIPine (NORVASC) tablet 5 mg, 5 mg, Oral, Q24H, Paddy Low MD, 5 mg at 02/14/21 0921  •  ammonium lactate (AMLACTIN) cream, , Topical, BID, Nixon Quinteros MD, Given at 02/14/21 0921  •  aspirin chewable tablet 81 mg, 81 mg, Oral, Daily, Robert Baker MD, 81 mg at 02/14/21 0921  •  atorvastatin (LIPITOR) tablet 20 mg, 20 mg, Oral, Daily, Robert Baker MD, 20 mg at 02/14/21 0921  •  cefTRIAXone (ROCEPHIN) IVPB 1 g, 1 g, Intravenous, Q24H, Tonja Azevedo MD, Last Rate: 100 mL/hr at 02/14/21 1102, 1 g at 02/14/21 1102  •  dextrose (D50W) 25 g/ 50mL Intravenous Solution 25 g, 25 g, Intravenous, Q15 Min PRN, Robert Baker MD  •  dextrose (GLUTOSE) oral gel 15 g, 15 g, Oral, Q15 Min PRN, Robert Baker MD  •  diphenhydrAMINE (BENADRYL) 12.5 MG/5ML elixir 25 mg, 25 mg, Oral, Q6H PRN, Robert Baker MD  •  glucagon (human recombinant) (GLUCAGEN DIAGNOSTIC) injection 1 mg, 1 mg, Subcutaneous, Q15 Min PRN, Robert Baker MD  •  Hold medication, 1 each, Does not apply, Continuous PRN, Jono Montes MD  •  hydrALAZINE (APRESOLINE) injection 10 mg, 10 mg, Intravenous, Q4H PRN, Soy Pearl MD, 10 mg at 02/12/21 2338  •  hydrALAZINE (APRESOLINE) injection 10 mg, 10 mg, Intravenous, Q6H PRN, Jono Montes MD, 10 mg at 02/14/21 1414  •  hydrALAZINE (APRESOLINE) tablet 100 mg, 100 mg, Oral, Q8H, Soy Pearl  MD, 100 mg at 02/13/21 1448  •  hydrALAZINE (APRESOLINE) tablet 25 mg, 25 mg, Oral, Q6H PRN, Nixon Quinteros MD  •  HYDROcodone-acetaminophen (NORCO)  MG per tablet 1 tablet, 1 tablet, Oral, Q6H PRN, Robert Baker MD, 1 tablet at 02/08/21 2053  •  insulin lispro (humaLOG, ADMELOG) injection 0-9 Units, 0-9 Units, Subcutaneous, TID AC, Robert Baker MD, 2 Units at 02/12/21 0857  •  LORazepam (ATIVAN) injection 0.5 mg, 0.5 mg, Intravenous, Q6H, Jono Montes MD  •  LORazepam (ATIVAN) injection 1 mg, 1 mg, Intravenous, Q4H PRN, Jono Montes MD, 1 mg at 02/11/21 2348  •  ondansetron (ZOFRAN) tablet 4 mg, 4 mg, Oral, Q6H PRN **OR** ondansetron (ZOFRAN) injection 4 mg, 4 mg, Intravenous, Q6H PRN, Robert Baker MD  •  Pharmacy to dose vancomycin, , Does not apply, Continuous PRN, Jono Montes MD  •  potassium chloride (K-DUR,KLOR-CON) ER tablet 40 mEq, 40 mEq, Oral, PRN **OR** potassium chloride (KLOR-CON) packet 40 mEq, 40 mEq, Oral, PRN **OR** potassium chloride 10 mEq in 100 mL IVPB, 10 mEq, Intravenous, Q1H PRN, Jono Montes MD, Last Rate: 100 mL/hr at 02/14/21 1336, 10 mEq at 02/14/21 1336  •  sodium chloride 0.9 % flush 10 mL, 10 mL, Intravenous, PRN, Josafat Mckeon MD  •  sodium chloride 0.9 % flush 10 mL, 10 mL, Intravenous, Q12H, Robert Baker MD, 10 mL at 02/14/21 0921  •  sodium chloride 0.9 % flush 10 mL, 10 mL, Intravenous, PRN, Robert Baker MD  •  Vancomycin Pharmacy Intermittent Dosing, , Does not apply, Daily, Jono Montes MD, Given at 02/10/21 1126      Objective     Vital Sign Min/Max for last 24 hours  Temp  Min: 97.5 °F (36.4 °C)  Max: 98.5 °F (36.9 °C)   BP  Min: 124/81  Max: 171/105    Pulse  Min: 69  Max: 104     Wt Readings from Last 3 Encounters:   02/14/21 107 kg (236 lb)   12/22/20 114 kg (251 lb 8 oz)        Intake/Output Summary (Last 24 hours) at 2/14/2021 1511  Last data filed at 2/14/2021 1100  Gross per 24 hour   Intake 0 ml   Output --   Net 0 ml      Physical Exam:                  gen awake nad  Chest cta  cvs regular  abdo soft   Ext no edema    Tele afib controlled no pauses overnight                                              Monitor  Results Review:     I reviewed the patient's new clinical results.    Sodium Sodium   Date Value Ref Range Status   02/14/2021 143 136 - 145 mmol/L Final   02/13/2021 146 (H) 136 - 145 mmol/L Final   02/12/2021 140 136 - 145 mmol/L Final      Potassium Potassium   Date Value Ref Range Status   02/14/2021 3.3 (L) 3.5 - 5.2 mmol/L Final     Comment:     Slight hemolysis detected by analyzer. Results may be affected.   02/13/2021 2.7 (L) 3.5 - 5.2 mmol/L Final   02/13/2021 2.7 (L) 3.5 - 5.2 mmol/L Final   02/13/2021 2.7 (L) 3.5 - 5.2 mmol/L Final   02/12/2021 2.8 (L) 3.5 - 5.2 mmol/L Final   02/12/2021 2.8 (L) 3.5 - 5.2 mmol/L Final     Comment:     Slight hemolysis detected by analyzer. Results may be affected.      Chloride Chloride   Date Value Ref Range Status   02/14/2021 117 (H) 98 - 107 mmol/L Final   02/13/2021 117 (H) 98 - 107 mmol/L Final   02/12/2021 112 (H) 98 - 107 mmol/L Final      Bicarbonate No results found for: PLASMABICARB   BUN BUN   Date Value Ref Range Status   02/14/2021 22 8 - 23 mg/dL Final   02/13/2021 25 (H) 8 - 23 mg/dL Final   02/12/2021 25 (H) 8 - 23 mg/dL Final      Creatinine Creatinine   Date Value Ref Range Status   02/14/2021 2.49 (H) 0.76 - 1.27 mg/dL Final   02/13/2021 2.72 (H) 0.76 - 1.27 mg/dL Final   02/12/2021 2.61 (H) 0.76 - 1.27 mg/dL Final      Calcium Calcium   Date Value Ref Range Status   02/14/2021 8.8 8.6 - 10.5 mg/dL Final   02/13/2021 8.6 8.6 - 10.5 mg/dL Final   02/12/2021 8.7 8.6 - 10.5 mg/dL Final      Magnesium Magnesium   Date Value Ref Range Status   02/14/2021 1.8 1.6 - 2.4 mg/dL Final   02/13/2021 1.8 1.6 - 2.4 mg/dL Final   02/12/2021 1.8 1.6 - 2.4 mg/dL Final        Results from last 7 days   Lab Units 02/14/21  0940   WBC 10*3/mm3 5.96   HEMOGLOBIN g/dL 9.7*    HEMATOCRIT % 30.7*   PLATELETS 10*3/mm3 234     Lab Results   Lab Value Date/Time    TROPONINT 0.110 (C) 02/04/2021 1551    TROPONINT 0.171 (C) 12/23/2020 0357    TROPONINT 0.141 (C) 12/22/2020 0549    TROPONINT 0.147 (C) 12/21/2020 1450    TROPONINT 0.128 (C) 12/21/2020 1223     Lab Results   Component Value Date    CHOL 108 12/22/2020     Lab Results   Component Value Date    HDL 49 12/22/2020     Lab Results   Component Value Date    LDL 42 12/22/2020     Lab Results   Component Value Date    TRIG 86 12/22/2020     No components found for: CHOLHDL         Assessment/ Plan  1. Acute metabolic encephalopathy  2. Hypertension uncontrolled  3. ESRD (end stage renal disease)   4.  DM2 (diabetes mellitus, type 2)  5.  Anemia, chronic disease  -Gastroenterology mentions need for EGD and colonoscopy when seen in December 2020.  Unsure if he ever had those done.  6.  UTI (urinary tract infection), bacterial  7.  Elevated troponin, noncardiac  8. History of nonsustained ventricular tachycardia  9. History of marked first-degree AV block and history of some junctional rhythm  10. History of syncope  11. Echo normal LVEF 59% on 12/20/2020  12. Monoclonal gammopathy  13. Immobility  14. Atrial flutter fib, has not been anticoagulated    Overnight  Tele a.fib 60s,no pauses  Vitals bp labile 120s and 170s no changes made as likely high bp during agitation, per nursing  Labs renal function stable   EEG unremarkable   cv stable      Maite Marinelli MD  02/14/21  15:11 EST

## 2021-02-14 NOTE — PROGRESS NOTES
Patient Identification:  NAME:  Gregoiro Alejandro  Age:  65 y.o.   Sex:  male   :  1955   MRN:  [id number]       Chief complaint: Metabolic encephalopathy    History of present illness: EEG was slow/normal multiple attempts at the LP was not successful but today he is awake alert and, see below looking very good his wife is here and I have had a long talk with her about how good he looks today and for the first time is moving everything and following commands well      Past medical history:  Past Medical History:   Diagnosis Date   • Back pain    • CKD (chronic kidney disease)    • DM type 2 (diabetes mellitus, type 2) (CMS/HCC)    • ED (erectile dysfunction)    • Hyperlipidemia    • Hypertension    • SCOTTY (iron deficiency anemia)    • Monoclonal gammopathy    • Osteoarthritis        Allergies:  Patient has no known allergies.    Home medications:  Medications Prior to Admission   Medication Sig Dispense Refill Last Dose   • amLODIPine (NORVASC) 5 MG tablet Take 1 tablet by mouth Daily. 30 tablet 0 2021 at Unknown time   • aspirin 81 MG EC tablet Take 1 tablet by mouth Daily. 30 tablet 0 2021 at Unknown time   • atorvastatin (LIPITOR) 20 MG tablet Take 20 mg by mouth Daily.   2021 at Unknown time   • brimonidine (ALPHAGAN P) 0.1 % solution ophthalmic solution 1 drop 2 (two) times a day.   2021 at Unknown time   • dorzolamide (TRUSOPT) 2 % ophthalmic solution Administer 1 drop to the right eye 2 (two) times a day.   2021 at Unknown time   • furosemide (LASIX) 80 MG tablet Take 1 tablet by mouth Daily. 30 tablet 0 2021 at Unknown time   • HUMALOG KWIKPEN 100 UNIT/ML solution pen-injector INJECT 20 UNITS INTO THE SKIN TWICE DAILY 15 mL 0 2021 at Unknown time   • sodium bicarbonate 650 MG tablet Take 1 tablet by mouth 2 (Two) Times a Day. 60 tablet 0 2021 at Unknown time   • diphenhydrAMINE (BENADRYL) 25 mg capsule Take 25 mg by mouth Every 6 (Six) Hours As Needed for Itching or  Allergies.   More than a month at Unknown time   • HYDROcodone-acetaminophen (NORCO)  MG per tablet Take 1 tablet po every 4 hours PRN for moderate pain, take two tablets po every 4 hours PRN for severe pain, valid if faxed to [facility]   tablet 0 Unknown at Unknown time   • WAL-DRYL ALLERGY 25 MG Take 1 capsule by mouth Every 6 (Six) Hours As Needed for itching. 30 capsule 0 Unknown at Unknown time        Hospital medications:  amLODIPine, 5 mg, Oral, Q24H  ammonium lactate, , Topical, BID  aspirin, 81 mg, Oral, Daily  atorvastatin, 20 mg, Oral, Daily  cefTRIAXone, 1 g, Intravenous, Q24H  hydrALAZINE, 100 mg, Oral, Q8H  insulin lispro, 0-9 Units, Subcutaneous, TID AC  levETIRAcetam, 1,000 mg, Intravenous, Q12H  sodium chloride, 10 mL, Intravenous, Q12H  Vancomycin Pharmacy Intermittent Dosing, , Does not apply, Daily      hold, 1 each  Pharmacy to dose vancomycin,       •  acetaminophen **OR** acetaminophen **OR** acetaminophen  •  dextrose  •  dextrose  •  diphenhydrAMINE  •  glucagon (human recombinant)  •  hold  •  hydrALAZINE  •  hydrALAZINE  •  hydrALAZINE  •  HYDROcodone-acetaminophen  •  LORazepam  •  ondansetron **OR** ondansetron  •  Pharmacy to dose vancomycin  •  potassium chloride **OR** potassium chloride **OR** potassium chloride  •  sodium chloride  •  sodium chloride      Objective:  Vitals Ranges:   Temp:  [97.8 °F (36.6 °C)-98.5 °F (36.9 °C)] 97.8 °F (36.6 °C)  Heart Rate:  [] 79  Resp:  [15-16] 16  BP: (134-161)/() 134/107      Physical Exam:  He is much more alert he is still lethargic but he tracks me counts fingers sticks out his tongue is midline gives me good  good toe wiggle is fully cooperating reflexes essentially absent toes mute or downgoing not upgoing    Results review:   I reviewed the patient's new clinical results.    Data review:  Lab Results (last 24 hours)     Procedure Component Value Units Date/Time    CBC (No Diff) [[id number]]  (Abnormal) Collected:  02/14/21 0940    Specimen: Blood Updated: 02/14/21 1038     WBC 5.96 10*3/mm3      RBC 4.26 10*6/mm3      Hemoglobin 9.7 g/dL      Hematocrit 30.7 %      MCV 72.1 fL      MCH 22.8 pg      MCHC 31.6 g/dL      RDW 24.5 %      RDW-SD 59.7 fl      Platelets 234 10*3/mm3     Renal Function Panel [[id number]] Collected: 02/14/21 0940    Specimen: Blood Updated: 02/14/21 1005    Magnesium [[id number]] Collected: 02/14/21 0940    Specimen: Blood Updated: 02/14/21 1005    Uric Acid [[id number]] Collected: 02/14/21 0940    Specimen: Blood Updated: 02/14/21 1005    POC Glucose Once [[id number]]  (Normal) Collected: 02/14/21 0620    Specimen: Blood Updated: 02/14/21 0622     Glucose 129 mg/dL     POC Glucose Once [[id number]]  (Normal) Collected: 02/13/21 2025    Specimen: Blood Updated: 02/13/21 2026     Glucose 110 mg/dL     Potassium [[id number]]  (Abnormal) Collected: 02/13/21 1652    Specimen: Blood from Arm, Left Updated: 02/13/21 1746     Potassium 2.7 mmol/L     POC Glucose Once [[id number]]  (Normal) Collected: 02/13/21 1631    Specimen: Blood Updated: 02/13/21 1632     Glucose 121 mg/dL     POC Glucose Once [[id number]]  (Normal) Collected: 02/13/21 1125    Specimen: Blood Updated: 02/13/21 1128     Glucose 122 mg/dL            Imaging:  Imaging Results (Last 24 Hours)     Procedure Component Value Units Date/Time    IR Lumbar Puncture Diag/Thera [[id number]] Collected: 02/13/21 1429     Updated: 02/13/21 1435    Narrative:      IR LUMBAR PUNCTURE DIAG/THERA-     INDICATIONS: Altered mental status     FINDINGS:     Following detailed discussion with the patient's wife of the risks,  benefits, alternatives of the procedure, verbal and written informed  consent was obtained from the patient's wife, as the patient was unable  to provide consent.     Patient was placed in a prone position on the fluoroscopy table. A final  timeout was performed verifying patient identity and procedure.     A location was chosen over the lumbar  spine, the overlying skin was  cleaned and anesthetized, and a 22-gauge needle was advanced into the  thecal sac, but no fluid would return, despite persistent attempts at  multiple lower lumbar levels. The procedure was unsuccessful. Needle was  removed intact.     Fluoroscopy time: 25 seconds, one fluoroscopic image       Impression:         Unsuccessful lumbar puncture.     Discussed by telephone with the patient's nurse, Martha, at 1430,  02/13/2021.     This report was finalized on 2/13/2021 2:32 PM by Dr. Jluis Meza M.D.            PPE worn at all times washed before washed up afterwards disposed of everything properly was not within 6 feet of him for more than few minutes during exam no aerosols used    Assessment and Plan:     This patient has had a magnificent waxing and waning encephalopathy but today is the best I have ever seen him he is moderately alert converses with me with single answers appropriately counts fingers  bilaterally wiggles his toes and has no gaze preference.  The EEG as expected was diffuse slowing but no seizure activity is noted LP was unsuccessful and that is okay to at this point since he is doing so very well at this moment.  His wife notes that even before all this when he slept he really slept deeply  Note that based upon the normal EEG I will discontinue the Keppra I do not believe this patient has been having seizures.  Neurology will sign off and follow-up.  Reconsult thanks      Pal Monge MD  02/14/21  10:39 EST

## 2021-02-15 NOTE — PROGRESS NOTES
"   LOS: 11 days    Patient Care Team:  Maya Ribeiro APRN as PCP - General (Family Medicine)    Chief Complaint:    Chief Complaint   Patient presents with   • Altered Mental Status     Follow-up chronic kidney disease and hypokalemia  Subjective     Interval History:   Poorly responsive; not verbal; breathing appears comfortable on room air.  He follows no commands    Review of Systems:   Not obtainable    Objective     Vital Signs  Temp:  [97.5 °F (36.4 °C)] 97.5 °F (36.4 °C)  Heart Rate:  [69-94] 84  Resp:  [18-20] 20  BP: (124-171)/() 148/101    Flowsheet Rows      First Filed Value   Admission Height  190.5 cm (75\") Documented at 02/04/2021 1250   Admission Weight  114 kg (251 lb) Documented at 02/04/2021 1250          No intake/output data recorded.  No intake/output data recorded.    Intake/Output Summary (Last 24 hours) at 2/15/2021 0857  Last data filed at 2/14/2021 1100  Gross per 24 hour   Intake 0 ml   Output --   Net 0 ml       Physical Exam:  General Appearance: NAD, nonverbal, poorly responsive  Skin: warm and dry  HEENT: Nonicteric sclerae, oral mucosa is moist  Neck: no JVD, trachea midline  Lungs: Scattered rhonchi, unlabored breathing effort on room air  Heart: RRR, normal S1 and S2, no S3, no rub  Abdomen: soft, no grimacing when abdomen palpated, BS +   Extremities: no significant edema, cyanosis or clubbing        Results Review:    Results from last 7 days   Lab Units 02/15/21  0646 02/14/21  0940 02/13/21  1652 02/13/21  0216   SODIUM mmol/L 142 143  --  146*   POTASSIUM mmol/L 3.4* 3.3* 2.7* 2.7*  2.7*   CHLORIDE mmol/L 116* 117*  --  117*   CO2 mmol/L 13.9* 16.1*  --  17.3*   BUN mg/dL 21 22  --  25*   CREATININE mg/dL 2.21* 2.49*  --  2.72*   CALCIUM mg/dL 9.3 8.8  --  8.6   GLUCOSE mg/dL 120* 123*  --  129*       Estimated Creatinine Clearance: 42.7 mL/min (A) (by GALILEA-G formula based on SCr of 2.21 mg/dL (H)).    Results from last 7 days   Lab Units 02/15/21  0646 02/14/21  0940 " 02/13/21 0216 02/12/21  0528   MAGNESIUM mg/dL  --  1.8 1.8  --  1.8   PHOSPHORUS mg/dL 2.4* 2.3* 2.9   < >  --     < > = values in this interval not displayed.       Results from last 7 days   Lab Units 02/14/21  0940 02/13/21  0216 02/12/21  0528 02/11/21  0545 02/09/21  0633   URIC ACID mg/dL 8.8* 8.8* 8.5* 8.3* 7.8*       Results from last 7 days   Lab Units 02/15/21  0646 02/14/21  0940 02/13/21 0216 02/12/21  0528 02/11/21  0545   WBC 10*3/mm3 7.48 5.96 5.66 5.90 7.34   HEMOGLOBIN g/dL 10.9* 9.7* 10.2* 10.5* 9.2*   PLATELETS 10*3/mm3 244 234 247 246 85*               Imaging Results (Last 24 Hours)     ** No results found for the last 24 hours. **        amLODIPine, 5 mg, Oral, Q24H  ammonium lactate, , Topical, BID  aspirin, 81 mg, Oral, Daily  atorvastatin, 20 mg, Oral, Daily  cefTRIAXone, 1 g, Intravenous, Q24H  hydrALAZINE, 100 mg, Oral, Q8H  insulin lispro, 0-9 Units, Subcutaneous, TID AC  sodium chloride, 10 mL, Intravenous, Q12H  vancomycin, 500 mg, Intravenous, Q24H      hold, 1 each  Pharmacy to dose vancomycin,         Medication Review:   Current Facility-Administered Medications   Medication Dose Route Frequency Provider Last Rate Last Admin   • acetaminophen (TYLENOL) tablet 650 mg  650 mg Oral Q4H PRN Robert Baker MD        Or   • acetaminophen (TYLENOL) 160 MG/5ML solution 650 mg  650 mg Oral Q4H PRN Robert Baker MD        Or   • acetaminophen (TYLENOL) suppository 650 mg  650 mg Rectal Q4H PRN Robert Baker MD       • amLODIPine (NORVASC) tablet 5 mg  5 mg Oral Q24H Paddy Low MD   5 mg at 02/14/21 0921   • ammonium lactate (AMLACTIN) cream   Topical BID Nixon Quinteros MD   Given at 02/14/21 0921   • aspirin chewable tablet 81 mg  81 mg Oral Daily Robert Baker MD   81 mg at 02/14/21 0921   • atorvastatin (LIPITOR) tablet 20 mg  20 mg Oral Daily Robert Baker MD   20 mg at 02/14/21 0921   • cefTRIAXone (ROCEPHIN) IVPB 1 g  1 g Intravenous Q24H Roberto Carlos,  MD Tonja 100 mL/hr at 02/14/21 1102 1 g at 02/14/21 1102   • dextrose (D50W) 25 g/ 50mL Intravenous Solution 25 g  25 g Intravenous Q15 Min PRN Robert Baker MD       • dextrose (GLUTOSE) oral gel 15 g  15 g Oral Q15 Min PRN Roebrt Baker MD       • diphenhydrAMINE (BENADRYL) 12.5 MG/5ML elixir 25 mg  25 mg Oral Q6H PRN Robert Baker MD       • glucagon (human recombinant) (GLUCAGEN DIAGNOSTIC) injection 1 mg  1 mg Subcutaneous Q15 Min PRN Robert Baker MD       • Hold medication  1 each Does not apply Continuous PRN Jono Montes MD       • hydrALAZINE (APRESOLINE) injection 10 mg  10 mg Intravenous Q4H PRN Soy Pearl MD   10 mg at 02/14/21 1414   • hydrALAZINE (APRESOLINE) injection 10 mg  10 mg Intravenous Q6H PRN Jono Montes MD   10 mg at 02/14/21 1414   • hydrALAZINE (APRESOLINE) tablet 100 mg  100 mg Oral Q8H Soy Pearl MD   100 mg at 02/13/21 1448   • hydrALAZINE (APRESOLINE) tablet 25 mg  25 mg Oral Q6H PRN Nixon Quinteros MD       • HYDROcodone-acetaminophen (NORCO)  MG per tablet 1 tablet  1 tablet Oral Q6H PRN Robert Baker MD   1 tablet at 02/08/21 2053   • insulin lispro (humaLOG, ADMELOG) injection 0-9 Units  0-9 Units Subcutaneous TID AC Robert Baker MD   2 Units at 02/12/21 0857   • LORazepam (ATIVAN) injection 0.5 mg  0.5 mg Intravenous Q6H PRN Jono Montes MD   0.5 mg at 02/14/21 1642   • LORazepam (ATIVAN) injection 1 mg  1 mg Intravenous Q4H PRN Jono Montes MD   1 mg at 02/11/21 2348   • ondansetron (ZOFRAN) tablet 4 mg  4 mg Oral Q6H PRN Robert Baker MD        Or   • ondansetron (ZOFRAN) injection 4 mg  4 mg Intravenous Q6H PRN Robert Baker MD       • Pharmacy to dose vancomycin   Does not apply Continuous PRN Yakov Becerra MD       • potassium chloride (K-DUR,KLOR-CON) ER tablet 40 mEq  40 mEq Oral PRN Jono Montes MD        Or   • potassium chloride (KLOR-CON) packet 40 mEq  40 mEq Oral PRN Jono Montes MD        Or   • potassium  chloride 10 mEq in 100 mL IVPB  10 mEq Intravenous Q1H PRN Jono Montes  mL/hr at 02/14/21 1336 10 mEq at 02/14/21 1336   • sodium chloride 0.9 % flush 10 mL  10 mL Intravenous PRN Josafat Mckeon MD       • sodium chloride 0.9 % flush 10 mL  10 mL Intravenous Q12H Robert Baker MD   10 mL at 02/14/21 0921   • sodium chloride 0.9 % flush 10 mL  10 mL Intravenous PRN Robert Baker MD       • vancomycin 500 mg/100 mL 0.9% NS IVPB (mbp)  500 mg Intravenous Q24H Yakov Becerra MD   500 mg at 02/14/21 1818       Assessment/Plan   1.  CKD due to biopsy-proven diabetic nephropathy, without much change.  No labs today.   2.  Hypokalemia, recurrent  3.  Metabolic acidosis, likely NAGMA, due to CKD   4.  Anemia of chronic kidney disease, no JOSSELYN as needed.  5.  Immobility syndrome  6.  Hypertension, acceptable control today.  7.  Diabetes mellitus type 2, with diabetic nephropathy, biopsy-proven.        Plan:  1.  Replace potassium and start sodium bicarb  2.  Surveillance labs labs.   3.  Okay to proceed with PICC line      Leighton Linder MD  02/15/21  08:57 EST

## 2021-02-15 NOTE — PROGRESS NOTES
Gregorio Alejandro   65 y.o.  male    LOS: 11 days   Patient Care Team:  Maya Ribeiro APRN as PCP - General (Family Medicine)      Subjective    No cv complaints    Review of Systems:   Lungs: no cough, no soa  CV: no CP, no palpitations  GI: no nausea, vomiting, diarrhea     Medication Review:   Current Facility-Administered Medications:   •  acetaminophen (TYLENOL) tablet 650 mg, 650 mg, Oral, Q4H PRN **OR** acetaminophen (TYLENOL) 160 MG/5ML solution 650 mg, 650 mg, Oral, Q4H PRN **OR** acetaminophen (TYLENOL) suppository 650 mg, 650 mg, Rectal, Q4H PRN, Robert Baker MD  •  amLODIPine (NORVASC) tablet 5 mg, 5 mg, Oral, Q24H, Paddy Low MD, 5 mg at 02/14/21 0921  •  ammonium lactate (AMLACTIN) cream, , Topical, BID, Nixon Quinteros MD, Given at 02/14/21 0921  •  aspirin chewable tablet 81 mg, 81 mg, Oral, Daily, Robert Baker MD, 81 mg at 02/14/21 0921  •  atorvastatin (LIPITOR) tablet 20 mg, 20 mg, Oral, Daily, Robert Baker MD, 20 mg at 02/14/21 0921  •  cefTRIAXone (ROCEPHIN) IVPB 1 g, 1 g, Intravenous, Q24H, Tonja Azevedo MD, Last Rate: 100 mL/hr at 02/14/21 1102, 1 g at 02/14/21 1102  •  dextrose (D50W) 25 g/ 50mL Intravenous Solution 25 g, 25 g, Intravenous, Q15 Min PRN, Robert Baker MD  •  dextrose (GLUTOSE) oral gel 15 g, 15 g, Oral, Q15 Min PRN, Robert Baker MD  •  diphenhydrAMINE (BENADRYL) 12.5 MG/5ML elixir 25 mg, 25 mg, Oral, Q6H PRN, Robert Baker MD  •  glucagon (human recombinant) (GLUCAGEN DIAGNOSTIC) injection 1 mg, 1 mg, Subcutaneous, Q15 Min PRN, Robert Baker MD  •  Hold medication, 1 each, Does not apply, Continuous PRN, Jono Montes MD  •  hydrALAZINE (APRESOLINE) injection 10 mg, 10 mg, Intravenous, Q4H PRN, Soy Pearl MD, 10 mg at 02/14/21 1414  •  hydrALAZINE (APRESOLINE) injection 10 mg, 10 mg, Intravenous, Q6H PRN, Jono Montes MD, 10 mg at 02/14/21 1414  •  hydrALAZINE (APRESOLINE) tablet 100 mg, 100 mg, Oral, Q8H, Soy Pearl MD, 100 mg  at 02/13/21 1448  •  hydrALAZINE (APRESOLINE) tablet 25 mg, 25 mg, Oral, Q6H PRN, Nixon Quinteros MD  •  HYDROcodone-acetaminophen (NORCO)  MG per tablet 1 tablet, 1 tablet, Oral, Q6H PRN, Robert Baker MD, 1 tablet at 02/08/21 2053  •  insulin lispro (humaLOG, ADMELOG) injection 0-9 Units, 0-9 Units, Subcutaneous, TID AC, Robert Baker MD, 2 Units at 02/12/21 0857  •  LORazepam (ATIVAN) injection 0.5 mg, 0.5 mg, Intravenous, Q6H PRN, Jono Montes MD, 0.5 mg at 02/14/21 1642  •  LORazepam (ATIVAN) injection 1 mg, 1 mg, Intravenous, Q4H PRN, Jono Montes MD, 1 mg at 02/11/21 2348  •  ondansetron (ZOFRAN) tablet 4 mg, 4 mg, Oral, Q6H PRN **OR** ondansetron (ZOFRAN) injection 4 mg, 4 mg, Intravenous, Q6H PRN, Robert Baker MD  •  Pharmacy to dose vancomycin, , Does not apply, Continuous PRN, Yakov Becerra MD  •  potassium chloride (K-DUR,KLOR-CON) ER tablet 40 mEq, 40 mEq, Oral, PRN **OR** potassium chloride (KLOR-CON) packet 40 mEq, 40 mEq, Oral, PRN **OR** potassium chloride 10 mEq in 100 mL IVPB, 10 mEq, Intravenous, Q1H PRN, Jono Montes MD, Last Rate: 100 mL/hr at 02/14/21 1336, 10 mEq at 02/14/21 1336  •  sodium chloride 0.9 % flush 10 mL, 10 mL, Intravenous, PRN, Josafat Mckeon MD  •  sodium chloride 0.9 % flush 10 mL, 10 mL, Intravenous, Q12H, Robert Baker MD, 10 mL at 02/14/21 0921  •  sodium chloride 0.9 % flush 10 mL, 10 mL, Intravenous, PRN, Robert Baker MD  •  vancomycin 500 mg/100 mL 0.9% NS IVPB (mbp), 500 mg, Intravenous, Q24H, Yakov Becerra MD, 500 mg at 02/14/21 1818      Objective     Vital Sign Min/Max for last 24 hours  Temp  Min: 97.5 °F (36.4 °C)  Max: 97.5 °F (36.4 °C)   BP  Min: 124/81  Max: 171/105    Pulse  Min: 69  Max: 94         02/13/21  0617 02/14/21  0648 02/15/21  0530   Weight: 107 kg (235 lb) 107 kg (236 lb) 105 kg (232 lb 9.4 oz)        Intake/Output Summary (Last 24 hours) at 2/15/2021 0838  Last data filed at 2/14/2021 1100  Gross per 24  hour   Intake 0 ml   Output --   Net 0 ml       Physical Exam:    General Appearance:     no acute distress   Lungs:     Decreased.  No wheezes, rhonchi or rales.     Heart:    Regular rate and irrhythm.  Normal S1 and S2. No murmur, no gallop, rub or lift. , no JVD   Abdomen:     Normal bowel sounds, soft, non-tender, non-distended,   Extremities:   No clubbing, cyanosis or edema ble wounds     Skin:   Warm, dry   Neurologic:   awake alert      Monitor: afib  Results Review:     I reviewed the patient's new clinical results.    Sodium   Date Value Ref Range Status   02/15/2021 142 136 - 145 mmol/L Final   02/14/2021 143 136 - 145 mmol/L Final   02/13/2021 146 (H) 136 - 145 mmol/L Final   02/12/2021 140 136 - 145 mmol/L Final     Potassium   Date Value Ref Range Status   02/15/2021 3.4 (L) 3.5 - 5.2 mmol/L Final     Comment:     Slight hemolysis detected by analyzer. Results may be affected.   02/14/2021 3.3 (L) 3.5 - 5.2 mmol/L Final     Comment:     Slight hemolysis detected by analyzer. Results may be affected.   02/13/2021 2.7 (L) 3.5 - 5.2 mmol/L Final   02/13/2021 2.7 (L) 3.5 - 5.2 mmol/L Final   02/13/2021 2.7 (L) 3.5 - 5.2 mmol/L Final   02/12/2021 2.8 (L) 3.5 - 5.2 mmol/L Final   02/12/2021 2.8 (L) 3.5 - 5.2 mmol/L Final     Comment:     Slight hemolysis detected by analyzer. Results may be affected.     Chloride   Date Value Ref Range Status   02/15/2021 116 (H) 98 - 107 mmol/L Final   02/14/2021 117 (H) 98 - 107 mmol/L Final   02/13/2021 117 (H) 98 - 107 mmol/L Final   02/12/2021 112 (H) 98 - 107 mmol/L Final     No results found for: PLASMABICARB  BUN   Date Value Ref Range Status   02/15/2021 21 8 - 23 mg/dL Final   02/14/2021 22 8 - 23 mg/dL Final   02/13/2021 25 (H) 8 - 23 mg/dL Final   02/12/2021 25 (H) 8 - 23 mg/dL Final     Creatinine   Date Value Ref Range Status   02/15/2021 2.21 (H) 0.76 - 1.27 mg/dL Final   02/14/2021 2.49 (H) 0.76 - 1.27 mg/dL Final   02/13/2021 2.72 (H) 0.76 - 1.27 mg/dL  Final   02/12/2021 2.61 (H) 0.76 - 1.27 mg/dL Final     Calcium   Date Value Ref Range Status   02/15/2021 9.3 8.6 - 10.5 mg/dL Final   02/14/2021 8.8 8.6 - 10.5 mg/dL Final   02/13/2021 8.6 8.6 - 10.5 mg/dL Final   02/12/2021 8.7 8.6 - 10.5 mg/dL Final     Magnesium   Date Value Ref Range Status   02/14/2021 1.8 1.6 - 2.4 mg/dL Final   02/13/2021 1.8 1.6 - 2.4 mg/dL Final       Results from last 7 days   Lab Units 02/15/21  0646   WBC 10*3/mm3 7.48   HEMOGLOBIN g/dL 10.9*   HEMATOCRIT % 34.5*   PLATELETS 10*3/mm3 244     Lab Results   Component Value Date    CHOL 108 12/22/2020     Lab Results   Component Value Date    HDL 49 12/22/2020     Lab Results   Component Value Date    LDL 42 12/22/2020     Lab Results   Component Value Date    TRIG 86 12/22/2020           Echo 12/21/2020:   · Calculated left ventricular EF = 59% Estimated left ventricular EF was in agreement with the calculated left ventricular EF.  · Left ventricular diastolic function is consistent with (grade I) impaired relaxation.   Biatrial enlargement, with asymmetric LVH.  Speckled type pattern.  Consider amyloid.  No significant valve disease.                     Assessment/ Plan    1. Acute metabolic encephalopathy    A. EEG neg for seizure    B. IR unable to obtain LP  2. Hypertension   3. ESRD  4.  DM2   5.  Anemia, chronic disease  6.  UTI  7.  Elevated troponin, noncardiac  8. History of nonsustained ventricular tachycardia  9. History of marked first-degree AV block and history of some junctional rhythm  10. History of syncope  11. Echo normal LVEF 59% on 12/20/2020  12. Monoclonal gammopathy  13. Immobility  14. Atrial flutter fib, has not been anticoagulated  15. BLEwounds    Tele shows afib with 2.0 sec pause.  BP labile appears to correlate with agitation.  MRI brain negative. Neurology following. EEG negative for seizure, IR unable to complete LP.  ID following for abx management.   No AC at this time due to anemia. GI  following.  Defer to MD regarding BB.      During the entire encounter, I was wearing recommended PPE including face mask and eye protection.  Hand sanitization was performed prior to entering room and upon exit.      Veronica SHIPLEY Freddy, APRN  02/15/21  08:38 EST    Time: 25 min    Paddy Low M.D.   Reviewed our cardiology group Nurse Practitioner's note.    Pt interviewed and examined.   Findings verified.   Reviewed and agree with corrections or modifications of history, physical, and plans as indicated:     Still has pauses.  Some involuntary movements, but no seizures as noted above.  Holding anticoagulation due to anemia.  2-second pause; pacemaker if greater than 3 seconds.  No CV changes at this time.  Is anemic.  Patient needs anticoagulation long-term, but risk of bleed.  Try low-dose Eliquis, and follow hemoglobin.    EMR Dragon/Transcription disclaimer:   Much of this encounter note is an electronic transcription/translation of spoken language to printed text. The electronic translation of spoken language may permit erroneous, or at times, nonsensical words or phrases to be inadvertently transcribed.  Although I have reviewed the note for such errors, some may still exist. Please contact me if needed for clarification or correction.  Paddy Low M.D.

## 2021-02-15 NOTE — PLAN OF CARE
Goal Outcome Evaluation:  Plan of Care Reviewed With: patient     Outcome Summary: Pt. requires Dep. assist x 1 for attempts at sitting upright in bed and on EOB. Pt. barely able to clear his shoulder blades off of the bed and pt. did not assist with this mobility. Pt. received BUE/LE (AA/PROM) x 10 reps ther. ex. program while supine in bed for general strengthening. Pt. would assist in movement intermittently throughout but was not consistent in following ROM commands.    Patient was wearing a face mask during this therapy encounter. Therapist used appropriate personal protective equipment including eye protection, mask, and gloves.  Mask used was standard procedure mask. Appropriate PPE was worn during the entire therapy session. Hand hygiene was completed before and after therapy session. Patient is not in enhanced droplet precautions.

## 2021-02-15 NOTE — PLAN OF CARE
Goal Outcome Evaluation:  Plan of Care Reviewed With: patient  Progress: no change  Outcome Summary: NAD NOTED. ALERT TO SELF ONLY. DOES NOT FOLLOW COMMANDS AND WAS RESTLESS ALL NIGHT. REFUSED WOUND CARE AND REFUSED TO HAVE TEMP TAKEN. BECAME ANGRY, YELLED AND GRABBED AT THIS RN AND THE NURSING ASSISTANT. 3RD SIDE RAIL RAISED ON BED D/T PT ATTEMPTING TO GET OOB. BED ALARM ON FOR SAFETY. WILL CTM.

## 2021-02-15 NOTE — PROGRESS NOTES
"  Infectious Diseases Progress Note    Tonja Azevedo MD     [facility]  Los: 11 days  Patient Identification:  Name: Gregorio Alejandro  Age: 65 y.o.  Sex: male  :  1955  MRN: [id number]         Primary Care Physician: Maya Ribeiro APRN            Subjective: Confused does not want to engage.  Interval History: See consultation note.  Over the course of last 48 hours he has a neurology evaluation attempted LP and an MRI and EEG and it was concluded at his reflection of toxic metabolic encephalopathy rather than focal neurological event causing this change in mental status.  Neurology service has signed off on 2021.  Objective:    Scheduled Meds:amLODIPine, 5 mg, Oral, Q24H  ammonium lactate, , Topical, BID  aspirin, 81 mg, Oral, Daily  atorvastatin, 20 mg, Oral, Daily  cefTRIAXone, 1 g, Intravenous, Q24H  hydrALAZINE, 100 mg, Oral, Q8H  insulin lispro, 0-9 Units, Subcutaneous, TID AC  sodium bicarbonate, 1,300 mg, Oral, BID  sodium chloride, 10 mL, Intravenous, Q12H  vancomycin, 500 mg, Intravenous, Q24H      Continuous Infusions:hold, 1 each  Pharmacy to dose vancomycin,         Vital signs in last 24 hours:  Temp:  [97.5 °F (36.4 °C)] 97.5 °F (36.4 °C)  Heart Rate:  [69-94] 84  Resp:  [18-20] 20  BP: (124-171)/() 148/101    Intake/Output:    Intake/Output Summary (Last 24 hours) at 2/15/2021 0923  Last data filed at 2021 1100  Gross per 24 hour   Intake 0 ml   Output --   Net 0 ml       Exam:  BP (!) 148/101 (BP Location: Right arm, Patient Position: Lying)   Pulse 84   Temp 97.5 °F (36.4 °C) (Axillary)   Resp 20   Ht 190.5 cm (75\")   Wt 105 kg (232 lb 9.4 oz)   SpO2 91%   BMI 29.07 kg/m²   Patient is examined using the personal protective equipment as per guidelines from infection control for this particular patient as enacted.  Hand washing was performed before and after patient interaction.  General Appearance:  Somnolent and does not want to engage.                "           Head:    Normocephalic, without obvious abnormality, atraumatic                           Eyes:    PERRL, conjunctivae/corneas clear, EOM's intact, both eyes                         Throat:   Lips, tongue, gums normal; oral mucosa pink and moist                           Neck:   Supple, symmetrical, trachea midline, no JVD                         Lungs:    Decreased breath sounds at the base                 Chest Wall:    No tenderness or deformity                          Heart:  S1-S2 regular                  abdomen:   Soft nontender                 Extremities:   Extremities normal, atraumatic, no cyanosis or edema                        Pulses:   Pulses palpable in all extremities                            Skin: Chronic ulcerative changes involving bilateral lower extremities with no clear cellulitis                  Neurologic: Bilateral lower extremity weakness       Data Review:    I reviewed the patient's new clinical results.  Results from last 7 days   Lab Units 02/15/21  0646 02/14/21  0940 02/13/21  0216 02/12/21  0528 02/11/21  0545 02/10/21  0916 02/09/21  0633   WBC 10*3/mm3 7.48 5.96 5.66 5.90 7.34 7.35 9.09   HEMOGLOBIN g/dL 10.9* 9.7* 10.2* 10.5* 9.2* 10.9* 10.4*   PLATELETS 10*3/mm3 244 234 247 246 85* 306 287     Results from last 7 days   Lab Units 02/15/21  0646 02/14/21  0940 02/13/21  1652 02/13/21  0216 02/12/21  1515 02/12/21  0914 02/11/21  0545 02/10/21  0916 02/09/21  0918   SODIUM mmol/L 142 143  --  146*  --  140 139 143 145   POTASSIUM mmol/L 3.4* 3.3* 2.7* 2.7*  2.7* 2.8* 2.8* 4.0 3.0* 3.1*   CHLORIDE mmol/L 116* 117*  --  117*  --  112* 111* 113* 115*   CO2 mmol/L 13.9* 16.1*  --  17.3*  --  16.9* 12.7* 17.8* 17.3*   BUN mg/dL 21 22  --  25*  --  25* 24* 24* 22   CREATININE mg/dL 2.21* 2.49*  --  2.72*  --  2.61* 2.55* 2.76* 2.69*   CALCIUM mg/dL 9.3 8.8  --  8.6  --  8.7 8.7 8.6 9.2   GLUCOSE mg/dL 120* 123*  --  129*  --  132* 144* 153* 149*     Microbiology Results  (last 10 days)     Procedure Component Value - Date/Time    Gastrointestinal Panel, PCR - Stool, Per Rectum [[id number]]  (Normal) Collected: 02/05/21 2204    Lab Status: Final result Specimen: Stool from Per Rectum Updated: 02/07/21 0950     Campylobacter Not Detected     Plesiomonas shigelloides Not Detected     Salmonella Not Detected     Vibrio Not Detected     Vibrio cholerae Not Detected     Yersinia enterocolitica Not Detected     Enteroaggregative E. coli (EAEC) Not Detected     Enteropathogenic E. coli (EPEC) Not Detected     Enterotoxigenic E. coli (ETEC) lt/st Not Detected     Shiga-like toxin-producing E. coli (STEC) stx1/stx2 Not Detected     E. coli O157 Not Detected     Shigella/Enteroinvasive E. coli (EIEC) Not Detected     Cryptosporidium Not Detected     Cyclospora cayetanensis Not Detected     Entamoeba histolytica Not Detected     Giardia lamblia Not Detected     Adenovirus F40/41 Not Detected     Astrovirus Not Detected     Norovirus GI/GII Not Detected     Rotavirus A Not Detected     Sapovirus (I, II, IV or V) Not Detected    Narrative:      If Aeromonas, Staphylococcus aureus or Bacillus cereus are suspected, please order [id number]: Stool Culture, Aeromonas, S aureus, B Cereus.    Clostridium Difficile Toxin - Stool, Per Rectum [[id number]]  (Normal) Collected: 02/05/21 2204    Lab Status: Final result Specimen: Stool from Per Rectum Updated: 02/05/21 2303    Narrative:      The following orders were created for panel order Clostridium Difficile Toxin - Stool, Per Rectum.  Procedure                               Abnormality         Status                     ---------                               -----------         ------                     Clostridium Difficile To...[[id number]]  Normal              Final result                 Please view results for these tests on the individual orders.    Clostridium Difficile Toxin, PCR - Stool, Per Rectum [[id number]]  (Normal) Collected: 02/05/21 2204     Lab Status: Final result Specimen: Stool from Per Rectum Updated: 02/05/21 2303     C. Difficile Toxins by PCR Negative    Blood Culture - Blood, Arm, Left [[id number]] Collected: 02/05/21 1433    Lab Status: Final result Specimen: Blood from Arm, Left Updated: 02/10/21 1445     Blood Culture No growth at 5 days    Blood Culture - Blood, Arm, Right [[id number]] Collected: 02/05/21 1431    Lab Status: Final result Specimen: Blood from Arm, Right Updated: 02/10/21 1445     Blood Culture No growth at 5 days              Assessment:    Acute metabolic encephalopathy    Hyperlipidemia    Hypertension    Stage 4 chronic kidney disease (CMS/[facility])    DM2 (diabetes mellitus, type 2) (CMS/[facility])    Anemia, chronic disease    UTI (urinary tract infection), bacterial    Elevated troponin    Hypokalemia    Uncontrolled hypertension    Septicemia (CMS/[facility])    Vascular dementia without behavioral disturbance (CMS/[facility])  1-toxic metabolic encephalopathy secondary to combination of  2-urinary tract infection with infected lower extremity wound with possible cellulitis  3-coag negative staph bacteremia either part of the systemic sepsis originating from lower extremities or possible contaminant during the process of collection  4-embolization syndrome  5-diabetes mellitus with complications including peripheral neuropathy, diabetic nephropathy  6-stage V kidney disease  7-uncontrolled hypertension  8-anemia multifactorial.     Recommendations/Discussions:   · Continue Rocephin and vancomycin while following up on the sensitivity of coag negative staph.  · Aggressive local wound care of lower extremities is needed.    · Follow-up on repeat blood cultures that we have ordered and if  negative then it can be assessed and assumed that the pathogens in the blood culture is contaminant during the process of collection and hence would not further work-up and treatment.  · Would recommend current antibiotics for 10 to 14 days for combination of  UTI and soft tissue infection of the lower extremities.  · Discussed with Dr. Moreno and the team at the time of rounds.    Tonja Azevedo MD  2/15/2021  09:23 EST    Much of this encounter note is an electronic transcription/translation of spoken language to printed text. The electronic translation of spoken language may permit erroneous, or at times, nonsensical words or phrases to be inadvertently transcribed; Although I have reviewed the note for such errors, some may still exist

## 2021-02-15 NOTE — PROGRESS NOTES
" LOS: 11 days     Name: Gregorio Alejandro  Age: 65 y.o.  Sex: male  :  1955  MRN: [id number]         Primary Care Physician: Maya Ribeiro APRN    Subjective   Subjective  Mentation remains very poor.  He is quite drowsy today.  He will wake up for brief moments and attempt to answer some simple questions.  Dressed back off to sleep easily.  Per his nurse he has not been awake enough to take any oral medications today and is not eating or drinking anything.    Objective   Vital Signs  Heart Rate:  [69-84] 72  Resp:  [18-20] 20  BP: (133-163)/() 134/87  Body mass index is 29.07 kg/m².    Objective:  General Appearance:  Comfortable, in no acute distress and ill-appearing (Acutely and chronically ill-appearing).    Vital signs: (most recent): Blood pressure 134/87, pulse 72, temperature 97.5 °F (36.4 °C), temperature source Axillary, resp. rate 20, height 190.5 cm (75\"), weight 105 kg (232 lb 9.4 oz), SpO2 91 %.    Lungs:  Normal effort and normal respiratory rate.    Heart: Normal rate.  Regular rhythm.    Abdomen: Abdomen is soft.  Bowel sounds are normal.   There is no abdominal tenderness.     Extremities: There is no dependent edema or local swelling.    Neurological: (Drowsy and disoriented).    Skin:  Warm and dry.  (Chronic wounds to the bilateral lower extremities are dressed)            Results Review:       I reviewed the patient's new clinical results.    Results from last 7 days   Lab Units 02/15/21  0646 21  0940 216 21  0528 21  0545 02/10/21  0916 21  0633   WBC 10*3/mm3 7.48 5.96 5.66 5.90 7.34 7.35 9.09   HEMOGLOBIN g/dL 10.9* 9.7* 10.2* 10.5* 9.2* 10.9* 10.4*   PLATELETS 10*3/mm3 244 234 247 246 85* 306 287     Results from last 7 days   Lab Units 02/15/21  0646 21  0940 21  1652 21  0216 21  1515 21  0914 21  0545 02/10/21  0916 21  0918   SODIUM mmol/L 142 143  --  146*  --  140 139 143 145   POTASSIUM " mmol/L 3.4* 3.3* 2.7* 2.7*  2.7* 2.8* 2.8* 4.0 3.0* 3.1*   CHLORIDE mmol/L 116* 117*  --  117*  --  112* 111* 113* 115*   CO2 mmol/L 13.9* 16.1*  --  17.3*  --  16.9* 12.7* 17.8* 17.3*   BUN mg/dL 21 22  --  25*  --  25* 24* 24* 22   CREATININE mg/dL 2.21* 2.49*  --  2.72*  --  2.61* 2.55* 2.76* 2.69*   CALCIUM mg/dL 9.3 8.8  --  8.6  --  8.7 8.7 8.6 9.2   GLUCOSE mg/dL 120* 123*  --  129*  --  132* 144* 153* 149*                 Scheduled Meds:   amLODIPine, 5 mg, Oral, Q24H  ammonium lactate, , Topical, BID  apixaban, 2.5 mg, Oral, Q12H  atorvastatin, 20 mg, Oral, Daily  cefTRIAXone, 1 g, Intravenous, Q24H  hydrALAZINE, 100 mg, Oral, Q8H  insulin lispro, 0-9 Units, Subcutaneous, TID AC  sodium bicarbonate, 1,300 mg, Oral, BID  sodium chloride, 10 mL, Intravenous, Q12H  vancomycin, 500 mg, Intravenous, Q24H      PRN Meds:   •  acetaminophen **OR** acetaminophen **OR** acetaminophen  •  dextrose  •  dextrose  •  diphenhydrAMINE  •  glucagon (human recombinant)  •  hold  •  hydrALAZINE  •  hydrALAZINE  •  hydrALAZINE  •  HYDROcodone-acetaminophen  •  LORazepam  •  LORazepam  •  ondansetron **OR** ondansetron  •  Pharmacy to dose vancomycin  •  potassium chloride **OR** potassium chloride **OR** potassium chloride  •  sodium chloride  •  sodium chloride  Continuous Infusions:  hold, 1 each  Pharmacy to dose vancomycin,   sodium chloride, 75 mL/hr        Assessment/Plan   Active Hospital Problems    Diagnosis  POA   • **Acute metabolic encephalopathy [G93.41]  Yes   • Vascular dementia without behavioral disturbance (CMS/HCC) [F01.50]  Unknown   • Septicemia (CMS/HCC) [A41.9]  Yes   • UTI (urinary tract infection), bacterial [N39.0, A49.9]  Yes   • Elevated troponin [R77.8]  Yes   • Hypokalemia [E87.6]  Yes   • Uncontrolled hypertension [I10]  Yes   • Anemia, chronic disease [D63.8]  Yes   • DM2 (diabetes mellitus, type 2) (CMS/HCC) [E11.9]  Yes   • Stage 4 chronic kidney disease (CMS/HCC) [N18.4]  Yes   •  Hyperlipidemia [E78.5]  Yes   • Hypertension [I10]  Yes      Resolved Hospital Problems   No resolved problems to display.       Assessment & Plan    -His encephalopathy persists.  He is on day 11 of antibiotics and given ID recommendations of 10 to 14 days I am going to go ahead and stop them now and we will monitor.  I do not see that he has had a TSH, B12, folate, or ammonia level checked and will order.  Brain MRI was unremarkable.  Lumbar puncture under fluoroscopy 2/13/2021 was unsuccessful  -Given lack of oral intake, start him on some gentle IV fluids  -Nephrology is replacing potassium and starting sodium bicarbonate  -Still with 2-second pauses which is being valuated by cardiology.  Started on low-dose Eliquis but will have to hold off on this until his mentation and oral intake is better.  -I will stop the Ativan.  Avoid any sedating medications  -Prognosis appears guarded at this time.        I wore full protective equipment throughout the patient encounter including eye protection and facemask.  Hand hygiene was performed before donning protective equipment and after removal when leaving the room.    Alexander Moreno MD  [facility]  02/15/21  13:56 EST

## 2021-02-15 NOTE — PROGRESS NOTES
Continued Stay Note  [facility]     Patient Name: Gregorio Alejandro  MRN: [id number]  Today's Date: 2/15/2021    Admit Date: 2/4/2021    Discharge Plan     Row Name 02/15/21 1247       Plan    Plan  Awaiting medical readiness- Signature [facility] -will need pre-cert    Patient/Family in Agreement with Plan  yes    Plan Comments  Recieved vm from wife Patricia, she confirms [facility] at [place]. CCP left vm Arielle/Signature to update plan is [facility] once medically ready. Pt will need [facility] pre-cert. Packet in ccp office. Hermes NEWELL/CCP        Discharge Codes    No documentation.             Daniela Curry, RN

## 2021-02-15 NOTE — PROGRESS NOTES
[facility]  Inpatient Progress Note    Reason for Follow Up:   Iron deficiency anemia    Subjective     Interval History:   He opens eyes and answers questions but not appropriately.     Current Facility-Administered Medications:   •  acetaminophen (TYLENOL) tablet 650 mg, 650 mg, Oral, Q4H PRN **OR** acetaminophen (TYLENOL) 160 MG/5ML solution 650 mg, 650 mg, Oral, Q4H PRN **OR** acetaminophen (TYLENOL) suppository 650 mg, 650 mg, Rectal, Q4H PRN, Robert Baker MD  •  amLODIPine (NORVASC) tablet 5 mg, 5 mg, Oral, Q24H, Paddy Low MD, 5 mg at 02/14/21 0921  •  ammonium lactate (AMLACTIN) cream, , Topical, BID, Nixon Quinteros MD, Given at 02/15/21 0946  •  aspirin chewable tablet 81 mg, 81 mg, Oral, Daily, Robert Baker MD, 81 mg at 02/14/21 0921  •  atorvastatin (LIPITOR) tablet 20 mg, 20 mg, Oral, Daily, Robert Baker MD, 20 mg at 02/14/21 0921  •  cefTRIAXone (ROCEPHIN) IVPB 1 g, 1 g, Intravenous, Q24H, Tonja Azevedo MD, Last Rate: 100 mL/hr at 02/14/21 1102, 1 g at 02/14/21 1102  •  dextrose (D50W) 25 g/ 50mL Intravenous Solution 25 g, 25 g, Intravenous, Q15 Min PRN, Robert Baker MD  •  dextrose (GLUTOSE) oral gel 15 g, 15 g, Oral, Q15 Min PRN, Robert Baker MD  •  diphenhydrAMINE (BENADRYL) 12.5 MG/5ML elixir 25 mg, 25 mg, Oral, Q6H PRN, Robert Baker MD  •  glucagon (human recombinant) (GLUCAGEN DIAGNOSTIC) injection 1 mg, 1 mg, Subcutaneous, Q15 Min PRN, Robert Baker MD  •  Hold medication, 1 each, Does not apply, Continuous PRN, Jono Montes MD  •  hydrALAZINE (APRESOLINE) injection 10 mg, 10 mg, Intravenous, Q4H PRN, Soy Pearl MD, 10 mg at 02/14/21 1414  •  hydrALAZINE (APRESOLINE) injection 10 mg, 10 mg, Intravenous, Q6H PRN, Jono Montes MD, 10 mg at 02/14/21 1414  •  hydrALAZINE (APRESOLINE) tablet 100 mg, 100 mg, Oral, Q8H, Soy Pearl MD, 100 mg at 02/13/21 1448  •  hydrALAZINE (APRESOLINE) tablet 25 mg, 25 mg, Oral, Q6H PRN,  Nixno Quinteros MD  •  HYDROcodone-acetaminophen (NORCO)  MG per tablet 1 tablet, 1 tablet, Oral, Q6H PRN, Robert Baker MD, 1 tablet at 02/08/21 2053  •  insulin lispro (humaLOG, ADMELOG) injection 0-9 Units, 0-9 Units, Subcutaneous, TID AC, Robert Baker MD, 2 Units at 02/12/21 0857  •  LORazepam (ATIVAN) injection 0.5 mg, 0.5 mg, Intravenous, Q6H PRN, Jono Montes MD, 0.5 mg at 02/14/21 1642  •  LORazepam (ATIVAN) injection 1 mg, 1 mg, Intravenous, Q4H PRN, Jono Montes MD, 1 mg at 02/11/21 2348  •  ondansetron (ZOFRAN) tablet 4 mg, 4 mg, Oral, Q6H PRN **OR** ondansetron (ZOFRAN) injection 4 mg, 4 mg, Intravenous, Q6H PRN, Robert Baker MD  •  Pharmacy to dose vancomycin, , Does not apply, Continuous PRN, Yakov Becerra MD  •  potassium chloride (K-DUR,KLOR-CON) ER tablet 40 mEq, 40 mEq, Oral, PRN **OR** potassium chloride (KLOR-CON) packet 40 mEq, 40 mEq, Oral, PRN **OR** potassium chloride 10 mEq in 100 mL IVPB, 10 mEq, Intravenous, Q1H PRN, Jono Montes MD, Last Rate: 100 mL/hr at 02/14/21 1336, 10 mEq at 02/14/21 1336  •  sodium bicarbonate tablet 1,300 mg, 1,300 mg, Oral, BID, Leighton Linder MD  •  sodium chloride 0.9 % flush 10 mL, 10 mL, Intravenous, PRN, Josafat Mckeon MD  •  sodium chloride 0.9 % flush 10 mL, 10 mL, Intravenous, Q12H, Robert Baker MD, 10 mL at 02/14/21 0921  •  sodium chloride 0.9 % flush 10 mL, 10 mL, Intravenous, PRN, Robert Baker MD  •  sodium chloride 0.9 % flush 10 mL, 10 mL, Intravenous, Q12H, Jono Montes MD  •  sodium chloride 0.9 % flush 10 mL, 10 mL, Intravenous, Q12H, Jono Montes MD  •  sodium chloride 0.9 % flush 10 mL, 10 mL, Intravenous, PRN, Jono Montes MD  •  sodium chloride 0.9 % flush 20 mL, 20 mL, Intravenous, PRNSimon Lyle E, MD  •  vancomycin 500 mg/100 mL 0.9% NS IVPB (mbp), 500 mg, Intravenous, Q24H, Yakov Becerra MD, 500 mg at 02/14/21 1818  Review of Systems:    Review of systems could not be obtained due  to  patient confusion.    Objective     Vital Signs  Temp:  [97.5 °F (36.4 °C)] 97.5 °F (36.4 °C)  Heart Rate:  [69-94] 84  Resp:  [18-20] 20  BP: (133-171)/() 148/101  Body mass index is 29.07 kg/m².    Intake/Output Summary (Last 24 hours) at 2/15/2021 1232  Last data filed at 2/15/2021 0900  Gross per 24 hour   Intake 0 ml   Output --   Net 0 ml     No intake/output data recorded.     Physical Exam:   General: patient awake, alert and cooperative   Eyes: Normal lids and lashes, no scleral icterus   Neck: supple, normal ROM   Skin: warm and dry, not jaundiced   Cardiovascular: regular rhythm and rate, no murmurs auscultated   Pulm: clear to auscultation bilaterally, regular and unlabored   Abdomen: soft, nontender, nondistended; normal bowel sounds   Extremities: no rash or edema   Psychiatric: Normal mood and behavior; memory intact     Results Review:     I reviewed the patient's new clinical results.    Results from last 7 days   Lab Units 02/15/21  0646 02/14/21  0940 02/13/21  0216   WBC 10*3/mm3 7.48 5.96 5.66   HEMOGLOBIN g/dL 10.9* 9.7* 10.2*   HEMATOCRIT % 34.5* 30.7* 31.3*   PLATELETS 10*3/mm3 244 234 247     Results from last 7 days   Lab Units 02/15/21  0646 02/14/21  0940 02/13/21  1652 02/13/21  0216   SODIUM mmol/L 142 143  --  146*   POTASSIUM mmol/L 3.4* 3.3* 2.7* 2.7*  2.7*   CHLORIDE mmol/L 116* 117*  --  117*   CO2 mmol/L 13.9* 16.1*  --  17.3*   BUN mg/dL 21 22  --  25*   CREATININE mg/dL 2.21* 2.49*  --  2.72*   CALCIUM mg/dL 9.3 8.8  --  8.6   GLUCOSE mg/dL 120* 123*  --  129*         Lab Results   Lab Value Date/Time    LIPASE 17 12/18/2020 1653    LIPASE 914 (H) 10/27/2020 0330    LIPASE 1,087 (H) 10/26/2020 0410    LIPASE 1,653 (H) 10/25/2020 0323       Radiology:  IR Lumbar Puncture Diag/Thera   Final Result       Unsuccessful lumbar puncture.       Discussed by telephone with the patient's nurse, Martha, at 1430,   02/13/2021.       This report was finalized on 2/13/2021 2:32 PM  by Dr. Jluis Meza M.D.          MRI Brain Without Contrast   Final Result   Limited examination secondary to patient motion and the   incomplete nature of the examination. There is no evidence of acute   infarction. Atrophy and small vessel ischemic disease is noted.       This report was finalized on 2/12/2021 8:03 AM by Dr. Rashad Bajwa M.D.          CT Head Without Contrast   Final Result   No acute intracranial findings.       Radiation dose reduction techniques were utilized, including automated   exposure control and exposure modulation based on body size.       This report was finalized on 2/5/2021 9:30 PM by Dr. Vero Hansen M.D.          CT Abdomen Pelvis Without Contrast   Final Result       1. Severely technically limited examination due to motion artifact. The   patient's sigmoid colon in particular appears thick walled, with   adjacent pericolonic soft tissue stranding. There is also involvement of   the rectum, although to a lesser extent. Appearance is suggestive of   colitis. No pneumatosis or free air is seen. Given the patient had some   rectal wall thickening on prior CT, consideration for follow-up with   endoscopy is suggested. There is some diffuse gaseous distention of   bowel, which may reflect some ileus.   2. Thick-walled appearance to the urinary bladder, with adjacent   perivesical soft tissue stranding, concerning for cystitis.       Radiation dose reduction techniques were utilized, including automated   exposure control and exposure modulation based on body size.       This report was finalized on 2/5/2021 9:39 PM by Dr. Vero Hansen M.D.          XR Chest 1 View   Final Result          Assessment/Plan     Patient Active Problem List   Diagnosis   • Complicated UTI (urinary tract infection)   • Macrocytosis   • Sepsis secondary to UTI (CMS/HCC)   • Metabolic encephalopathy   • Hyperlipidemia   • Hypertension   • Monoclonal gammopathy   • Stage 4 chronic  kidney disease (CMS/HCC)   • DM2 (diabetes mellitus, type 2) (CMS/HCC)   • Abnormal CT of the abdomen   • Normal anion gap metabolic acidosis   • Anemia, chronic disease   • Ventricular tachycardia (paroxysmal) (CMS/HCC)   • Acute metabolic encephalopathy   • UTI (urinary tract infection), bacterial   • Elevated troponin   • Hypokalemia   • SILVIA (acute kidney injury) (CMS/HCC)   • Uncontrolled hypertension   • Septicemia (CMS/HCC)   • Vascular dementia without behavioral disturbance (CMS/HCC)       Assessment/Recommendations:    Assessment:  1. Abnormal CT scan of the sigmoid colon and rectum  2. Iron deficiency anemia  3. Metabolic encephalopathy     Plan:  · Await clearance from other disciplines prior to any GI assessment.  · When stable and not encephalopathic or confused I will perform EGD and colonoscopy (he will not consume a bowel prep with his current mental state, he will have to be awake oriented, following commands to take a full bowel prep)  · Follow hemoglobin, it has been stable    I discussed the patients findings and my recommendations with patient.    Alessandro Welsh MD

## 2021-02-15 NOTE — SIGNIFICANT NOTE
02/15/21 1443   OTHER   Discipline speech language pathologist   Rehab Time/Intention   Session Not Performed other (see comments)  (SLP Consult: Patient was unarousable at this time. Patient apparently was agitated earlier, now too sleepy to participate. will re-attempt when able.)

## 2021-02-15 NOTE — THERAPY TREATMENT NOTE
Patient Name: Gregorio Alejandro  : 1955    MRN: [id number]                              Today's Date: 2/15/2021       Admit Date: 2021    Visit Dx:     ICD-10-CM ICD-9-CM   1. Acute metabolic encephalopathy  G93.41 348.31   2. Acute UTI  N39.0 599.0   3. Hypokalemia  E87.6 276.8   4. Accelerated hypertension  I10 401.0     Patient Active Problem List   Diagnosis   • Complicated UTI (urinary tract infection)   • Macrocytosis   • Sepsis secondary to UTI (CMS/HCC)   • Metabolic encephalopathy   • Hyperlipidemia   • Hypertension   • Monoclonal gammopathy   • Stage 4 chronic kidney disease (CMS/HCC)   • DM2 (diabetes mellitus, type 2) (CMS/HCC)   • Abnormal CT of the abdomen   • Normal anion gap metabolic acidosis   • Anemia, chronic disease   • Ventricular tachycardia (paroxysmal) (CMS/HCC)   • Acute metabolic encephalopathy   • UTI (urinary tract infection), bacterial   • Elevated troponin   • Hypokalemia   • SILVIA (acute kidney injury) (CMS/[facility])   • Uncontrolled hypertension   • Septicemia (CMS/HCC)   • Vascular dementia without behavioral disturbance (CMS/HCC)     Past Medical History:   Diagnosis Date   • Back pain    • CKD (chronic kidney disease)    • DM type 2 (diabetes mellitus, type 2) (CMS/HCC)    • ED (erectile dysfunction)    • Hyperlipidemia    • Hypertension    • SCOTTY (iron deficiency anemia)    • Monoclonal gammopathy    • Osteoarthritis      History reviewed. No pertinent surgical history.  General Information     Row Name 02/15/21 1233          Physical Therapy Time and Intention    Document Type  therapy note (daily note) Pt. non-verbal and does not follow commands consistently during therapy session this date.  -MS     Mode of Treatment  physical therapy;individual therapy  -MS     Row Name 02/15/21 1233          General Information    Patient Profile Reviewed  yes  -MS     Existing Precautions/Restrictions  (S) fall Exit alarm; Dec. skin integrity  -MS       User Key  (r) = Recorded By, (t) =  Taken By, (c) = Cosigned By    Initials Name Provider Type    MS WalsherHowie, PT Physical Therapist        Mobility     Row Name 02/15/21 1234          Bed Mobility    Rolling Right [place] (Bed Mobility)  dependent (less than 25% patient effort)  -MS     Comment (Bed Mobility)  Attempted to sit upright in bed and EOB but pt. would not assist and required Dep. assist x 1.  Pt. barely able to clear his shoulder blades off of the bed.  -MS       User Key  (r) = Recorded By, (t) = Taken By, (c) = Cosigned By    Initials Name Provider Type    Howie Gilman, PT Physical Therapist        Obj/Interventions     Row Name 02/15/21 1235          Motor Skills    Therapeutic Exercise  -- BUE/LE ther. ex. program (AA/PROM) x 10 reps completed (Ankle pumps, Heel slides, Hip Abduction, Shld Flexion, Elbow Flex/Extension);  At times pt. would assist with movement but this was intermittent.  -MS       User Key  (r) = Recorded By, (t) = Taken By, (c) = Cosigned By    Initials Name Provider Type    Howie Gilman, PT Physical Therapist        Goals/Plan    No documentation.       Clinical Impression     Row Name 02/15/21 1236          Pain Scale: FACES Pre/Post-Treatment    Pain: FACES Scale, Pretreatment  0-->no hurt  -MS     Posttreatment Pain Rating  0-->no hurt  -MS     Row Name 02/15/21 1236          Positioning and Restraints    Pre-Treatment Position  in bed  -MS     Post Treatment Position  bed  -MS     In Bed  notified nsg;supine;call light within reach;encouraged to call for assist;exit alarm on;R waffle boot;L waffle boot All lines intact.  -MS       User Key  (r) = Recorded By, (t) = Taken By, (c) = Cosigned By    Initials Name Provider Type    Howie Gilman, PT Physical Therapist        Outcome Measures     Row Name 02/15/21 1237          How much help from another person do you currently need...    Turning from your back to your side while in flat bed without using bedrails?  1  -MS      Moving from lying on back to sitting on the side of a flat bed without bedrails?  1  -MS     Moving to and from a bed to a chair (including a wheelchair)?  1  -MS     Standing up from a chair using your arms (e.g., wheelchair, bedside chair)?  1  -MS     Climbing 3-5 steps with a railing?  1  -MS     To walk in hospital room?  1  -MS     AM-PAC 6 Clicks Score (PT)  6  -MS       User Key  (r) = Recorded By, (t) = Taken By, (c) = Cosigned By    Initials Name Provider Type    MS Howie Ramirez, PT Physical Therapist        Physical Therapy Education                 Title: PT OT SLP Therapies (In Progress)     Topic: Physical Therapy (In Progress)     Point: Mobility training (In Progress)     Learning Progress Summary           Patient Nonacceptance, E,D, NL by MS at 2/15/2021 1237    Acceptance, E, NL by  at 2/11/2021 1150                   Point: Home exercise program (In Progress)     Learning Progress Summary           Patient Nonacceptance, E,D, NL by MS at 2/15/2021 1237                   Point: Body mechanics (In Progress)     Learning Progress Summary           Patient Nonacceptance, E,D, NL by MS at 2/15/2021 1237                   Point: Precautions (In Progress)     Learning Progress Summary           Patient Nonacceptance, E,D, NL by MS at 2/15/2021 1237                               User Key     Initials Effective Dates Name Provider Type Discipline     04/03/18 -  Randee Braden, PT Physical Therapist PT    MS 04/03/18 -  Howie Ramirez, PT Physical Therapist PT              PT Recommendation and Plan     Plan of Care Reviewed With: patient  Outcome Summary: Pt. requires Dep. assist x 1 for attempts at sitting upright in bed and on EOB. Pt. barely able to clear his shoulder blades off of the bed and pt. did not assist with this mobility. Pt. received BUE/LE (AA/PROM) x 10 reps ther. ex. program while supine in bed for general strengthening. Pt. would assist in movement intermittently  throughout but was not consistent in following ROM commands.     Time Calculation:   PT Charges     Row Name 02/15/21 1239             Time Calculation    Start Time  1200  -MS      Stop Time  1215  -MS      Time Calculation (min)  15 min  -MS      PT Received On  02/15/21  -MS      PT - Next Appointment  02/17/21  -MS         Time Calculation- PT    Total Timed Code Minutes- PT  14 minute(s)  -MS        User Key  (r) = Recorded By, (t) = Taken By, (c) = Cosigned By    Initials Name Provider Type    Howie Gilman, PT Physical Therapist        Therapy Charges for Today     Code Description Service Date Service Provider Modifiers Qty    58699895046 HC PT THER PROC EA 15 MIN 2/15/2021 Howie Ramirez, PT GP 1          PT G-Codes  Outcome Measure Options: AM-PAC 6 Clicks Basic Mobility (PT)  AM-PAC 6 Clicks Score (PT): 6    Howie Ramirez, PT  2/15/2021

## 2021-02-15 NOTE — PROGRESS NOTES
"Adult Nutrition  Assessment/PES    Patient Name:  Gregorio Alejandro  YOB: 1955  MRN: [id number]  Admit Date:  2/4/2021    Assessment Date:  2/15/2021    Comments:   Nutrition screen completed for LOS. Pt with altered mental status with confused, CKD, DM, Hx of skin issues noted. Pt with poor po intake since admission. Will continue to follow.    Reason for Assessment     Row Name 02/15/21 1217          Reason for Assessment    Reason For Assessment  other (see comments) LOS     Diagnosis  -- Acute metabolic encephalopathy, UTI, DM, CKD, HTN         Nutrition/Diet History     Row Name 02/15/21 1225          Nutrition/Diet History    Typical Food/Fluid Intake  not following commands today per RN, trays held         Anthropometrics     Row Name 02/15/21 1218 02/15/21 0530       Anthropometrics    Height  190.5 cm (75\")  --    Weight  105 kg (232 lb 9.4 oz) not weighed by RD  105 kg (232 lb 9.4 oz)       Ideal Body Weight (IBW)    Ideal Body Weight (IBW) (kg)  90.45  --    % Ideal Body Weight  116.64  --       Body Mass Index (BMI)    BMI (kg/m2)  29.13  --    BMI Assessment  BMI 25-29.9: overweight  --        Labs/Tests/Procedures/Meds     Row Name 02/15/21 1219          Labs/Procedures/Meds    Lab Results Reviewed  reviewed     Lab Results Comments  k, cr, phos, alb, h/h        Diagnostic Tests/Procedures    Diagnostic Test/Procedure Reviewed  reviewed        Medications    Pertinent Medications Reviewed  reviewed     Pertinent Medications Comments  abx, insulin, vanc         Physical Findings     Row Name 02/15/21 1220          Physical Findings    Skin  poor skin integrity/turgor;non-healing wound(s);other (see comments) cynthia 13         Estimated/Assessed Needs     Row Name 02/15/21 1218          Calculation Measurements    Height  190.5 cm (75\")         Nutrition Prescription Ordered     Row Name 02/15/21 1221          Nutrition Prescription PO    Current PO Diet  Clear Liquid     "             Problem/Interventions:  Problem 1     Row Name 02/15/21 1228          Nutrition Diagnoses Problem 1    Problem 1  Inadequate Nutrient Intake     Etiology (related to)  Medical Diagnosis               Intervention Goal     Row Name 02/15/21 1228          Intervention Goal    General  Maintain nutrition;Disease management/therapy;Reduce/improve symptoms     PO  Advance diet;PO intake (%)     PO Intake %  75 %         Nutrition Intervention     Row Name 02/15/21 1229          Nutrition Intervention    RD/Tech Action  Follow Tx progress;Care plan reviewd         Nutrition Prescription     Row Name 02/15/21 1229          Nutrition Prescription PO    PO Prescription  Begin/change supplement         Education/Evaluation     Row Name 02/15/21 1229          Education    Education  Education not appropriate at this time     Please explain  Patient confusion        Monitor/Evaluation    Monitor  Per protocol           Electronically signed by:  Lakeshia Patel RD  02/15/21 12:44 EST

## 2021-02-15 NOTE — NURSING NOTE
This patient has had three PIV placed on today, he does have signed consent forms for a PICC line this is pending Nephrology approval. His last RW IV infiltrated and was removed.

## 2021-02-16 NOTE — CONSULTS
Purpose of the visit was to evaluate for: goals of care/advanced care planning, support for patient/family and comfort care. Spoke with MD and RN as well as family and discussed palliative care, goals of care, care options, resuscitation status and [facility].      Assessment:  Patient is palliative care appropriate for inpatient care given (list diagnosis/symptoms):  History of dementia, diabetes mellitus, stage 4 kidney disease, hypertension, and osteoarthritis. Patient admitted with altered mental status. Patient still encephalopathic after antibiotic regimen, minimally interactive, not eating or drinking. PPS 20%      Recommendations/Plan: transfer to [facility] for palliative care.    Other Comments: Spoke with patient's spouse, Patricia. She voiced understanding of patient condition and stated her goal was for patient to be comfortable. Explained palliative care unit, symptom management, and current visitation policy. Spouse stated patient has multiple adult children that live in town who would like to come see patient.

## 2021-02-16 NOTE — NURSING NOTE
Patient received as transfer from [address] [place] [address]. He is alert and oriented times four, cooperative and conversing with myself and his family.His daughters at the bedside are voicing opposition to him being admitted to the palliative care unit. Call placed to Dr. Moreno and new orders were received. Patient has tolerated clear liquids without coughing and I have advanced his diet to full liquid for dinner. Will continue to monitor.

## 2021-02-16 NOTE — PROGRESS NOTES
Gregorio Alejandro   65 y.o.  male    LOS: 12 days   Patient Care Team:  Maya Ribeiro APRN as PCP - General (Family Medicine)      Subjective    drowsy    Review of Systems:   Lungs: no cough, no soa  CV: no CP, no palpitations  GI: no nausea, vomiting, diarrhea     Medication Review:   Current Facility-Administered Medications:   •  acetaminophen (TYLENOL) tablet 650 mg, 650 mg, Oral, Q4H PRN **OR** acetaminophen (TYLENOL) 160 MG/5ML solution 650 mg, 650 mg, Oral, Q4H PRN **OR** acetaminophen (TYLENOL) suppository 650 mg, 650 mg, Rectal, Q4H PRN, Robert Baker MD  •  amLODIPine (NORVASC) tablet 5 mg, 5 mg, Oral, Q24H, Paddy Low MD, 5 mg at 02/14/21 0921  •  ammonium lactate (AMLACTIN) cream, , Topical, BID, Nixon Quinteros MD, 1 application at 02/15/21 2055  •  apixaban (ELIQUIS) tablet 2.5 mg, 2.5 mg, Oral, Q12H, Paddy Low MD, 2.5 mg at 02/15/21 2051  •  atorvastatin (LIPITOR) tablet 20 mg, 20 mg, Oral, Daily, Robert Baker MD, 20 mg at 02/14/21 0921  •  dextrose (D50W) 25 g/ 50mL Intravenous Solution 25 g, 25 g, Intravenous, Q15 Min PRN, Robert Baker MD  •  dextrose (GLUTOSE) oral gel 15 g, 15 g, Oral, Q15 Min PRN, Robert Baker MD  •  diphenhydrAMINE (BENADRYL) 12.5 MG/5ML elixir 25 mg, 25 mg, Oral, Q6H PRN, Robert Baker MD  •  glucagon (human recombinant) (GLUCAGEN DIAGNOSTIC) injection 1 mg, 1 mg, Subcutaneous, Q15 Min PRN, Robert Baker MD  •  Hold medication, 1 each, Does not apply, Continuous PRN, Jono Montes MD  •  hydrALAZINE (APRESOLINE) tablet 100 mg, 100 mg, Oral, Q8H, Soy Pearl MD, 100 mg at 02/16/21 0611  •  insulin lispro (humaLOG, ADMELOG) injection 0-9 Units, 0-9 Units, Subcutaneous, TID AC, Robert Baker MD, 2 Units at 02/12/21 0857  •  ondansetron (ZOFRAN) tablet 4 mg, 4 mg, Oral, Q6H PRN **OR** ondansetron (ZOFRAN) injection 4 mg, 4 mg, Intravenous, Q6H PRN, Robert Baker MD  •  potassium chloride (K-DUR,KLOR-CON) ER tablet 40  mEq, 40 mEq, Oral, PRN **OR** potassium chloride (KLOR-CON) packet 40 mEq, 40 mEq, Oral, PRN **OR** potassium chloride 10 mEq in 100 mL IVPB, 10 mEq, Intravenous, Q1H PRN, Jono Montes MD, Last Rate: 100 mL/hr at 02/14/21 1336, 10 mEq at 02/14/21 1336  •  sodium bicarbonate tablet 1,300 mg, 1,300 mg, Oral, BID, Leighton Linder MD, 1,300 mg at 02/15/21 2051  •  sodium chloride 0.9 % flush 10 mL, 10 mL, Intravenous, PRN, Josafat Mckeon MD  •  sodium chloride 0.9 % flush 10 mL, 10 mL, Intravenous, Q12H, Robert Baker MD, 10 mL at 02/14/21 0921  •  sodium chloride 0.9 % flush 10 mL, 10 mL, Intravenous, PRN, Robert Baker MD  •  sodium chloride 0.9 % infusion, 75 mL/hr, Intravenous, Continuous, Alexander Moreno MD, Stopped at 02/15/21 1623      Objective     Vital Sign Min/Max for last 24 hours  Temp  Min: 97.6 °F (36.4 °C)  Max: 97.8 °F (36.6 °C)   BP  Min: 134/86  Max: 148/101    Pulse  Min: 72  Max: 85         02/15/21  0530 02/15/21  1218 02/16/21  0640   Weight: 105 kg (232 lb 9.4 oz) 105 kg (232 lb 9.4 oz) (not weighed by RD) 106 kg (233 lb)        Intake/Output Summary (Last 24 hours) at 2/16/2021 0727  Last data filed at 2/15/2021 1700  Gross per 24 hour   Intake 0 ml   Output --   Net 0 ml       Physical Exam:    General Appearance:     no acute distress   Lungs:     Clear to auscultation bilaterally. No wheezes, rhonchi or rales.     Heart:    iregular rate and irreg rhythm.  Normal S1 and S2. No murmur, no gallop, rub or lift. , no JVD   Abdomen:     Normal bowel sounds, soft, non-tender, non-distended,   Extremities:   No clubbing, cyanosis or edema.     Skin:   Warm, dry   Neurologic:   awake alert and oriented x3      Monitor: afib  Results Review:     I reviewed the patient's new clinical results.    Sodium   Date Value Ref Range Status   02/15/2021 142 136 - 145 mmol/L Final   02/14/2021 143 136 - 145 mmol/L Final     Potassium   Date Value Ref Range Status   02/15/2021 3.4 (L)  3.5 - 5.2 mmol/L Final     Comment:     Slight hemolysis detected by analyzer. Results may be affected.   02/14/2021 3.3 (L) 3.5 - 5.2 mmol/L Final     Comment:     Slight hemolysis detected by analyzer. Results may be affected.   02/13/2021 2.7 (L) 3.5 - 5.2 mmol/L Final     Chloride   Date Value Ref Range Status   02/15/2021 116 (H) 98 - 107 mmol/L Final   02/14/2021 117 (H) 98 - 107 mmol/L Final     No results found for: PLASMABICARB  BUN   Date Value Ref Range Status   02/15/2021 21 8 - 23 mg/dL Final   02/14/2021 22 8 - 23 mg/dL Final     Creatinine   Date Value Ref Range Status   02/15/2021 2.21 (H) 0.76 - 1.27 mg/dL Final   02/14/2021 2.49 (H) 0.76 - 1.27 mg/dL Final     Calcium   Date Value Ref Range Status   02/15/2021 9.3 8.6 - 10.5 mg/dL Final   02/14/2021 8.8 8.6 - 10.5 mg/dL Final     Magnesium   Date Value Ref Range Status   02/14/2021 1.8 1.6 - 2.4 mg/dL Final       Results from last 7 days   Lab Units 02/15/21  0646   WBC 10*3/mm3 7.48   HEMOGLOBIN g/dL 10.9*   HEMATOCRIT % 34.5*   PLATELETS 10*3/mm3 244     Lab Results   Component Value Date    CHOL 108 12/22/2020     Lab Results   Component Value Date    HDL 49 12/22/2020     Lab Results   Component Value Date    LDL 42 12/22/2020     Lab Results   Component Value Date    TRIG 86 12/22/2020                           Echo 12/21/2020:   · Calculated left ventricular EF = 59% Estimated left ventricular EF was in agreement with the calculated left ventricular EF.  · Left ventricular diastolic function is consistent with (grade I) impaired relaxation.   Biatrial enlargement, with asymmetric LVH.  Speckled type pattern.  Consider amyloid.  No significant valve disease.               Assessment/ Plan     1. Acute metabolic encephalopathy    A. EEG neg for seizure    B. IR unable to obtain LP    C. MRI negative  2. Hypertension   3. ESRD  4.  DM2   5.  Anemia, chronic disease  6.  UTI  7.  Elevated troponin, noncardiac  8. History of nonsustained  ventricular tachycardia  9. History of marked first-degree AV block and history of some junctional rhythm  10. History of syncope  11. Echo normal LVEF 59% on 12/20/2020  12. Monoclonal gammopathy  13. Immobility  14. Atrial flutter fib, has not been anticoagulated  15. BLE wounds  16. Dysphagia      Trouble swallowing last night.   Tele shows afib with 2.0 sec pause. No indication for PPM at this time.   BP labile appears to correlate with agitation.  MRI brain negative. Neurology following. EEG negative for seizure, IR unable to complete LP.  ID following for abx management.   GI following. Hgb 10.9 yesterday. Pending for today.   Started on Eliquis yesterday.   Defer to MD regarding BB.         During the entire encounter, I was wearing recommended PPE including face mask and eye protection.  Hand sanitization was performed prior to entering room and upon exit.      Veronica Hayes, APRN  02/16/21  07:27 EST      Time: 20 min    Paddy Low M.D.   Reviewed our cardiology group Nurse Practitioner's note.    Pt interviewed and examined.   Findings verified.   Reviewed and agree with corrections or modifications of history, physical, and plans as indicated:     When visiting with him his afternoon, very alert, states he is in [facility], asks what happened?  Cardiac perspective, still in atrial fib, rates controlled.  Would not change medications.  Pauses over the last 30 hours in the 1.5-1.7-second range.  Would continue Eliquis for the atrial fibrillation, follow hemoglobin.  ECG in the morning to document IN interval.    EMR Dragon/Transcription disclaimer:   Much of this encounter note is an electronic transcription/translation of spoken language to printed text. The electronic translation of spoken language may permit erroneous, or at times, nonsensical words or phrases to be inadvertently transcribed.  Although I have reviewed the note for such errors, some may still exist. Please contact me if needed  for clarification or correction.  Paddy Low M.D.

## 2021-02-16 NOTE — PLAN OF CARE
Goal Outcome Evaluation:  Plan of Care Reviewed With: patient  Progress: improving  Outcome Summary: NO SIGNIFICANT CHANGES ON MY SHIFT OTHER THAN PT WAS ABLE TO SWALLOW WATER. OCCASIONALLY GOT STRANGLED AND COUGHED BUT WAS ABLE TO SWALLOW HIS NIGHT TIME PILLS. MUST BE SITTING UP VERY HIGH TO DO SO. DID NOT ATTEMPT ANYTHING OTHER THAN WATER, NOT UNTIL SEEN BY SPEECH. BP BETTER THIS SHIFT. WILL CTM.

## 2021-02-16 NOTE — NURSING NOTE
"Update provided to wife re: patient assessment.  Patient unable to safely intake water or oral medications etc.  Speech therapy to re evaluate today.  Patients wife concerned re: his quality of life and comfort.  She continues to request he be moved to palliative care and be kept comfortable per her conversation with Joellen NEWELL last night, asking if this is still an option?  This RN informed her that the consult had been placed that the Palliative care team would be in contact with her and so will Dr Moreno.  Wife, Patricia, states \"I just want him to be made comfortable, that's all I want.\"    *Dr Moreno on unit. Updated re: conversation    *Call placed to Stephen- Palliative Care updated and aware.  "

## 2021-02-16 NOTE — PROGRESS NOTES
Continued Stay Note  [facility]     Patient Name: Gregorio Alejandro  MRN: [id number]  Today's Date: 2/16/2021    Admit Date: 2/4/2021    Discharge Plan     Row Name 02/16/21 1345       Plan    Plan  Transfer to - Palliative care    Patient/Family in Agreement with Plan  yes    Plan Comments  Reviewed clinicals, pt to be transferred to 4 for comfort care. Updated Hanna/Signature Avila Bajwa pt to transfer to  comfort care. CCP to follow. Hermes NEWELL/CCP        Discharge Codes    No documentation.             Daniela Curry RN

## 2021-02-16 NOTE — PROGRESS NOTES
" LOS: 12 days     Name: Gregorio Alejandro  Age: 65 y.o.  Sex: male  :  1955  MRN: [id number]         Primary Care Physician: Maya Ribeiro APRN    Subjective   Subjective  No significant clinical improvement.  Patient remains very drowsy, minimally interactive, confused.  Not eating or drinking.  Now starting to show signs of aspiration with any attempt to swallow.  Discussed with his wife over the phone who requests palliative care and comfort only at this point.    Objective   Vital Signs  Temp:  [97.6 °F (36.4 °C)-97.8 °F (36.6 °C)] 97.6 °F (36.4 °C)  Heart Rate:  [67-90] 67  Resp:  [16] 16  BP: (134-153)/() 153/106  Body mass index is 29.12 kg/m².    Objective:  General Appearance:  Comfortable and in no acute distress (Very ill, looking more terminal).    Vital signs: (most recent): Blood pressure (!) 153/106, pulse 67, temperature 97.6 °F (36.4 °C), temperature source Oral, resp. rate 16, height 190.5 cm (75\"), weight 106 kg (233 lb), SpO2 100 %.    Lungs:  Normal effort and normal respiratory rate.  He is not in respiratory distress.  There are decreased breath sounds.    Heart: Normal rate.  Regular rhythm.    Abdomen: Abdomen is soft.  Bowel sounds are normal.   There is no abdominal tenderness.     Extremities: There is no dependent edema or local swelling.    Neurological: (Minimal awakeness.  Briefly will open his eyes when I call his name.  He is incoherent and falls back to sleep easily.).    Skin:  Warm and dry.              Results Review:       I reviewed the patient's new clinical results.    Results from last 7 days   Lab Units 21  0721 02/15/21  0646 21  0940 21  0216 21  0528 21  0545 02/10/21  0916   WBC 10*3/mm3 4.83 7.48 5.96 5.66 5.90 7.34 7.35   HEMOGLOBIN g/dL 11.0* 10.9* 9.7* 10.2* 10.5* 9.2* 10.9*   PLATELETS 10*3/mm3 198 244 234 247 246 85* 306     Results from last 7 days   Lab Units 21  0721 02/15/21  0646 21  0940 " 02/13/21  1652 02/13/21  0216 02/12/21  1515 02/12/21  0914 02/11/21  0545 02/10/21  0916   SODIUM mmol/L 146* 142 143  --  146*  --  140 139 143   POTASSIUM mmol/L 3.3* 3.4* 3.3* 2.7* 2.7*  2.7* 2.8* 2.8* 4.0 3.0*   CHLORIDE mmol/L 117* 116* 117*  --  117*  --  112* 111* 113*   CO2 mmol/L 14.7* 13.9* 16.1*  --  17.3*  --  16.9* 12.7* 17.8*   BUN mg/dL 20 21 22  --  25*  --  25* 24* 24*   CREATININE mg/dL 2.06* 2.21* 2.49*  --  2.72*  --  2.61* 2.55* 2.76*   CALCIUM mg/dL 9.1 9.3 8.8  --  8.6  --  8.7 8.7 8.6   GLUCOSE mg/dL 113* 120* 123*  --  129*  --  132* 144* 153*                 Scheduled Meds:   amLODIPine, 5 mg, Oral, Q24H  ammonium lactate, , Topical, BID  apixaban, 2.5 mg, Oral, Q12H  atorvastatin, 20 mg, Oral, Daily  hydrALAZINE, 100 mg, Oral, Q8H  insulin lispro, 0-9 Units, Subcutaneous, TID AC  sodium bicarbonate, 1,300 mg, Oral, BID  sodium chloride, 10 mL, Intravenous, Q12H      PRN Meds:   •  acetaminophen **OR** acetaminophen **OR** acetaminophen  •  dextrose  •  dextrose  •  diphenhydrAMINE  •  glucagon (human recombinant)  •  hold  •  ondansetron **OR** ondansetron  •  potassium chloride **OR** potassium chloride **OR** potassium chloride  •  sodium chloride  •  sodium chloride  Continuous Infusions:  hold, 1 each  sodium chloride, 75 mL/hr, Last Rate: Stopped (02/15/21 1623)        Assessment/Plan   Active Hospital Problems    Diagnosis  POA   • **Acute metabolic encephalopathy [G93.41]  Yes   • Vascular dementia without behavioral disturbance (CMS/HCC) [F01.50]  Unknown   • Septicemia (CMS/HCC) [A41.9]  Yes   • UTI (urinary tract infection), bacterial [N39.0, A49.9]  Yes   • Elevated troponin [R77.8]  Yes   • Hypokalemia [E87.6]  Yes   • Uncontrolled hypertension [I10]  Yes   • Anemia, chronic disease [D63.8]  Yes   • DM2 (diabetes mellitus, type 2) (CMS/HCC) [E11.9]  Yes   • Stage 4 chronic kidney disease (CMS/HCC) [N18.4]  Yes   • Hyperlipidemia [E78.5]  Yes   • Hypertension [I10]  Yes     "  Resolved Hospital Problems   No resolved problems to display.       Assessment & Plan    -Last night his wife requested comfort care when speaking with his overnight nurse.  I have talked with his wife, Patricia Alejandro, on the phone just now and she confirms this with me.  She wants him to \"just be made comfortable\".  She agrees that his overall quality of life is extremely poor along with his prognosis.  We discussed transfer to the palliative care floor today and starting comfort measures.  She requests this be done.  All questions were answered to her satisfaction.  -I will place orders to enact palliative/comfort care order set and transfer him to [address] [place] [address].  - Palliative care service has been consulted to assist      I wore full protective equipment throughout the patient encounter including eye protection and facemask.  Hand hygiene was performed before donning protective equipment and after removal when leaving the room.    Alexander Moreno MD  [facility]  02/16/21  13:03 EST    Addendum:  After transfer to [facility] and visit from his daughters, the patient's nurse now informs me he is now sitting up in bed having appropriate conversation.  This is a 180 degree difference from the past many days at least.  Family now wants to hold off on full blown comfort care.  I will scale back those orders, continue his oral medications and see how he does eating a drinking while checking labs in the AM, awaiting additional input from specialists and observing to see if he maintains better cognition or if this is a transient improvement.  "

## 2021-02-16 NOTE — PROGRESS NOTES
[facility]  Inpatient Progress Note    Reason for Follow Up: Iron deficiency anemia    Subjective     Interval History:   Cardiology restarted Eliquis yesterday    Current Facility-Administered Medications:   •  acetaminophen (TYLENOL) tablet 650 mg, 650 mg, Oral, Q4H PRN **OR** acetaminophen (TYLENOL) 160 MG/5ML solution 650 mg, 650 mg, Oral, Q4H PRN **OR** acetaminophen (TYLENOL) suppository 650 mg, 650 mg, Rectal, Q4H PRN, Robert Baker MD  •  amLODIPine (NORVASC) tablet 5 mg, 5 mg, Oral, Q24H, Paddy Low MD, 5 mg at 02/14/21 0921  •  ammonium lactate (AMLACTIN) cream, , Topical, BID, Nixon Qiunteros MD, 1 application at 02/15/21 2055  •  apixaban (ELIQUIS) tablet 2.5 mg, 2.5 mg, Oral, Q12H, Paddy Low MD, 2.5 mg at 02/15/21 2051  •  atorvastatin (LIPITOR) tablet 20 mg, 20 mg, Oral, Daily, Robert Baker MD, 20 mg at 02/14/21 0921  •  dextrose (D50W) 25 g/ 50mL Intravenous Solution 25 g, 25 g, Intravenous, Q15 Min PRN, Robert Baker MD  •  dextrose (GLUTOSE) oral gel 15 g, 15 g, Oral, Q15 Min PRN, Robert Baker MD  •  diphenhydrAMINE (BENADRYL) 12.5 MG/5ML elixir 25 mg, 25 mg, Oral, Q6H PRN, Robert Baker MD  •  glucagon (human recombinant) (GLUCAGEN DIAGNOSTIC) injection 1 mg, 1 mg, Subcutaneous, Q15 Min PRN, Robert Baker MD  •  Hold medication, 1 each, Does not apply, Continuous PRN, Jono Montes MD  •  hydrALAZINE (APRESOLINE) tablet 100 mg, 100 mg, Oral, Q8H, Soy Pearl MD, 100 mg at 02/16/21 0611  •  insulin lispro (humaLOG, ADMELOG) injection 0-9 Units, 0-9 Units, Subcutaneous, TID AC, Robert Baker MD, 2 Units at 02/12/21 0857  •  ondansetron (ZOFRAN) tablet 4 mg, 4 mg, Oral, Q6H PRN **OR** ondansetron (ZOFRAN) injection 4 mg, 4 mg, Intravenous, Q6H PRN, Robert Baker MD  •  potassium chloride (K-DUR,KLOR-CON) ER tablet 40 mEq, 40 mEq, Oral, PRN **OR** potassium chloride (KLOR-CON) packet 40 mEq, 40 mEq, Oral, PRN **OR**  potassium chloride 10 mEq in 100 mL IVPB, 10 mEq, Intravenous, Q1H PRN, Jono Montes MD, Last Rate: 100 mL/hr at 02/14/21 1336, 10 mEq at 02/14/21 1336  •  sodium bicarbonate tablet 1,300 mg, 1,300 mg, Oral, BID, Leighton Linder MD, 1,300 mg at 02/15/21 2051  •  sodium chloride 0.9 % flush 10 mL, 10 mL, Intravenous, PRN, Josafat Mckeon MD  •  sodium chloride 0.9 % flush 10 mL, 10 mL, Intravenous, Q12H, Robert Baker MD, 10 mL at 02/14/21 0921  •  sodium chloride 0.9 % flush 10 mL, 10 mL, Intravenous, PRN, Robert Baker MD  •  sodium chloride 0.9 % infusion, 75 mL/hr, Intravenous, Continuous, Alexander Moreno MD, Stopped at 02/15/21 1623  Review of Systems:    He has weakness of fatigue all other systems reviewed and negative    Objective     Vital Signs  Temp:  [97.6 °F (36.4 °C)-97.8 °F (36.6 °C)] 97.6 °F (36.4 °C)  Heart Rate:  [72-90] 90  Resp:  [16-20] 16  BP: (134-143)/() 143/93  Body mass index is 29.12 kg/m².    Intake/Output Summary (Last 24 hours) at 2/16/2021 1056  Last data filed at 2/16/2021 0900  Gross per 24 hour   Intake 240 ml   Output --   Net 240 ml     I/O this shift:  In: 240 [P.O.:240]  Out: -      Physical Exam:   General: patient awake, alert and cooperative   Eyes: Normal lids and lashes, no scleral icterus   Neck: supple, normal ROM   Skin: warm and dry, not jaundiced   Cardiovascular: regular rhythm and rate, no murmurs auscultated   Pulm: clear to auscultation bilaterally, regular and unlabored   Abdomen: soft, nontender, nondistended; normal bowel sounds   Extremities: no rash or edema   Psychiatric: Normal mood and behavior; memory intact     Results Review:     I reviewed the patient's new clinical results.    Results from last 7 days   Lab Units 02/16/21  0721 02/15/21  0646 02/14/21  0940   WBC 10*3/mm3 4.83 7.48 5.96   HEMOGLOBIN g/dL 11.0* 10.9* 9.7*   HEMATOCRIT % 33.6* 34.5* 30.7*   PLATELETS 10*3/mm3 198 244 234     Results from last 7 days   Lab  Units 02/16/21  0721 02/15/21  0646 02/14/21  0940   SODIUM mmol/L 146* 142 143   POTASSIUM mmol/L 3.3* 3.4* 3.3*   CHLORIDE mmol/L 117* 116* 117*   CO2 mmol/L 14.7* 13.9* 16.1*   BUN mg/dL 20 21 22   CREATININE mg/dL 2.06* 2.21* 2.49*   CALCIUM mg/dL 9.1 9.3 8.8   GLUCOSE mg/dL 113* 120* 123*         Lab Results   Lab Value Date/Time    LIPASE 17 12/18/2020 1653    LIPASE 914 (H) 10/27/2020 0330    LIPASE 1,087 (H) 10/26/2020 0410    LIPASE 1,653 (H) 10/25/2020 0323       Radiology:  IR Lumbar Puncture Diag/Thera   Final Result       Unsuccessful lumbar puncture.       Discussed by telephone with the patient's nurse, Martha, at 1430,   02/13/2021.       This report was finalized on 2/13/2021 2:32 PM by Dr. Jluis Meza M.D.          MRI Brain Without Contrast   Final Result   Limited examination secondary to patient motion and the   incomplete nature of the examination. There is no evidence of acute   infarction. Atrophy and small vessel ischemic disease is noted.       This report was finalized on 2/12/2021 8:03 AM by Dr. Rashad Bajwa M.D.          CT Head Without Contrast   Final Result   No acute intracranial findings.       Radiation dose reduction techniques were utilized, including automated   exposure control and exposure modulation based on body size.       This report was finalized on 2/5/2021 9:30 PM by Dr. Vero Hansen M.D.          CT Abdomen Pelvis Without Contrast   Final Result       1. Severely technically limited examination due to motion artifact. The   patient's sigmoid colon in particular appears thick walled, with   adjacent pericolonic soft tissue stranding. There is also involvement of   the rectum, although to a lesser extent. Appearance is suggestive of   colitis. No pneumatosis or free air is seen. Given the patient had some   rectal wall thickening on prior CT, consideration for follow-up with   endoscopy is suggested. There is some diffuse gaseous distention of    bowel, which may reflect some ileus.   2. Thick-walled appearance to the urinary bladder, with adjacent   perivesical soft tissue stranding, concerning for cystitis.       Radiation dose reduction techniques were utilized, including automated   exposure control and exposure modulation based on body size.       This report was finalized on 2/5/2021 9:39 PM by Dr. Vero Hansen M.D.          XR Chest 1 View   Final Result          Assessment/Plan     Patient Active Problem List   Diagnosis   • Complicated UTI (urinary tract infection)   • Macrocytosis   • Sepsis secondary to UTI (CMS/HCC)   • Metabolic encephalopathy   • Hyperlipidemia   • Hypertension   • Monoclonal gammopathy   • Stage 4 chronic kidney disease (CMS/HCC)   • DM2 (diabetes mellitus, type 2) (CMS/HCC)   • Abnormal CT of the abdomen   • Normal anion gap metabolic acidosis   • Anemia, chronic disease   • Ventricular tachycardia (paroxysmal) (CMS/HCC)   • Acute metabolic encephalopathy   • UTI (urinary tract infection), bacterial   • Elevated troponin   • Hypokalemia   • SILVIA (acute kidney injury) (CMS/HCC)   • Uncontrolled hypertension   • Septicemia (CMS/HCC)   • Vascular dementia without behavioral disturbance (CMS/HCC)       Assessment:  1. Abnormal CT scan of the sigmoid colon and rectum  2. Iron deficiency anemia  3. Metabolic encephalopathy     Plan:  · Await clearance from other disciplines prior to any GI assessment.  · When stable and not encephalopathic or confused I will perform EGD and colonoscopy (he will not consume a bowel prep with his current mental state, he will have to be awake oriented, following commands to take a full bowel prep)  · Follow hemoglobin, it has been stable  · Eliquis will need to be held for any endoscopic procedures and with his creatinine clearance I would need guidance from nephrology on how long to hold the Eliquis, I am leaning towards 3 to 5 days  I discussed the patients findings and my recommendations  with patient and nursing staff.    Kip Wiley MD

## 2021-02-16 NOTE — PLAN OF CARE
Goal Outcome Evaluation:   Patient is alert and oriented times four.He is conversing with his family. Patient was bladder scanned and it showed 600 cc of urine in his bladder. Wright catheter placed for urinary retention. No complaints of pain or discomfort. Will monitor.

## 2021-02-16 NOTE — PLAN OF CARE
Goal Outcome Evaluation:  Plan of Care Reviewed With: patient  Progress: declining  Outcome Summary: Patient seen for clinical swallow assessment d/t report of coughing with thins. Dr. Welsh approved solid trials for assessment. Pt confused when SLP arrived. Wet voice on secretions. Intermittent wet voice and cough with ice, thins via cup, and straw. No overt s/s of aspiration with nectar via spoon or cup. Delayed cough with nectar via straw. Pt adamantly refused solid trials. SLP recs nectar clears, no straws. Pt is requesting thins and his new POC is comfort measures. Therefore, it is appropriate to continue thin clears at this time per pt request and for his comfort. SLP to sign off.

## 2021-02-16 NOTE — THERAPY TREATMENT NOTE
Acute Care - IRF Speech Language Pathology   Swallow Treatment Note/Discharge   [facility]     Patient Name: Gregorio Alejandro  : 1955  MRN: [id number]  Today's Date: 2021               Admit Date: 2021    Visit Dx:      ICD-10-CM ICD-9-CM   1. Acute metabolic encephalopathy  G93.41 348.31   2. Acute UTI  N39.0 599.0   3. Hypokalemia  E87.6 276.8   4. Accelerated hypertension  I10 401.0     Patient Active Problem List   Diagnosis   • Complicated UTI (urinary tract infection)   • Macrocytosis   • Sepsis secondary to UTI (CMS/HCC)   • Metabolic encephalopathy   • Hyperlipidemia   • Hypertension   • Monoclonal gammopathy   • Stage 4 chronic kidney disease (CMS/HCC)   • DM2 (diabetes mellitus, type 2) (CMS/HCC)   • Abnormal CT of the abdomen   • Normal anion gap metabolic acidosis   • Anemia, chronic disease   • Ventricular tachycardia (paroxysmal) (CMS/HCC)   • Acute metabolic encephalopathy   • UTI (urinary tract infection), bacterial   • Elevated troponin   • Hypokalemia   • SILVIA (acute kidney injury) (CMS/HCC)   • Uncontrolled hypertension   • Septicemia (CMS/HCC)   • Vascular dementia without behavioral disturbance (CMS/HCC)       Therapy Treatment    Patient was not wearing a face mask during this therapy encounter. Therapist used appropriate personal protective equipment including mask, eye protection and gloves.  Mask used was N95/duckbill. Appropriate PPE was worn during the entire therapy session. Hand hygiene was completed before and after therapy session. Patient is not in enhanced droplet precautions.          SLP GOALS     Row Name 21 1230             Oral Nutrition/Hydration Goal 1 (SLP)    Oral Nutrition/Hydration Goal 1, SLP  Tolerate least restrictive diet with no overt s/s aspiration.   -SH      Time Frame (Oral Nutrition/Hydration Goal 1, SLP)  by discharge  -SH      Barriers (Oral Nutrition/Hydration Goal 1, SLP)  Progress: Patient seen for clinical swallow assessment d/t  report of coughing with thins. Dr. Welsh approved solid trials for assessment. Pt confused when SLP arrived. Wet voice on secretions. Intermittent wet voice and cough with ice, thins via cup, and straw. No overt s/s of aspiration with nectar via spoon or cup. Delayed cough with nectar via straw. Pt adamantly refused solid trials. SLP recs nectar clears, no straws. Pt is requesting thins and his new POC is comfort measures. Therefore, it is appropriate to continue thin clears at this time per pt request and for his comfort. SLP to sign off.  No report of pain  -      Progress/Outcomes (Oral Nutrition/Hydration Goal 1, SLP)  progress slower than expected;goal no longer appropriate  -        User Key  (r) = Recorded By, (t) = Taken By, (c) = Cosigned By    Initials Name Provider Type    Nisreen Aaron MS CCC-SLP Speech and Language Pathologist          EDUCATION  The patient has been educated in the following areas:   Dysphagia (Swallowing Impairment).    SLP Recommendation and Plan                                     Plan of Care Reviewed With: patient  Progress: declining           Time Calculation:   Time Calculation- SLP     Row Name 02/16/21 1424             Time Calculation- [facility]    SLP Start Time  1000  -      SLP Received On  02/16/21  -        User Key  (r) = Recorded By, (t) = Taken By, (c) = Cosigned By    Initials Name Provider Type    Nisreen Aaron MS CCC-SLP Speech and Language Pathologist          Therapy Charges for Today     Code Description Service Date Service Provider Modifiers Qty    [id number]  ST TREATMENT SWALLOW 4 2/16/2021 Nisreen Alcantara MS CCC-SLP GN 1                    Nisreen Alcantara MS CCC-MAUREEN  2/16/2021

## 2021-02-16 NOTE — PROGRESS NOTES
"  Infectious Diseases Progress Note    Tonja Azevedo MD     [facility]  [place]: 12 days  Patient Identification:  Name: Gregorio Alejandro  Age: 65 y.o.  Sex: male  :  1955  MRN: [id number]         Primary Care Physician: Maya Ribeiro APRN            Subjective: Essentially unchanged.  Interval History: See consultation note.  Over the course of last 48 hours he has a neurology evaluation attempted LP and an MRI and EEG and it was concluded at his reflection of toxic metabolic encephalopathy rather than focal neurological event causing this change in mental status.  Neurology service has signed off on 2021.  Objective:    Scheduled Meds:amLODIPine, 5 mg, Oral, Q24H  ammonium lactate, , Topical, BID  apixaban, 2.5 mg, Oral, Q12H  atorvastatin, 20 mg, Oral, Daily  hydrALAZINE, 100 mg, Oral, Q8H  insulin lispro, 0-9 Units, Subcutaneous, TID AC  sodium bicarbonate, 1,300 mg, Oral, BID  sodium chloride, 10 mL, Intravenous, Q12H      Continuous Infusions:hold, 1 each  sodium chloride, 75 mL/hr, Last Rate: Stopped (02/15/21 1623)        Vital signs in last 24 hours:  Temp:  [97.6 °F (36.4 °C)-97.8 °F (36.6 °C)] 97.6 °F (36.4 °C)  Heart Rate:  [72-90] 90  Resp:  [16-20] 16  BP: (134-143)/() 143/93    Intake/Output:    Intake/Output Summary (Last 24 hours) at 2021 1235  Last data filed at 2021 0900  Gross per 24 hour   Intake 240 ml   Output --   Net 240 ml       Exam:  /93 (BP Location: Right arm, Patient Position: Sitting)   Pulse 90   Temp 97.6 °F (36.4 °C) (Oral)   Resp 16   Ht 190.5 cm (75\")   Wt 106 kg (233 lb)   SpO2 99%   BMI 29.12 kg/m²   Patient is examined using the personal protective equipment as per guidelines from infection control for this particular patient as enacted.  Hand washing was performed before and after patient interaction.  General Appearance:  Somnolent and does not want to engage.                          Head:    Normocephalic, without " obvious abnormality, atraumatic                           Eyes:    PERRL, conjunctivae/corneas clear, EOM's intact, both eyes                         Throat:   Lips, tongue, gums normal; oral mucosa pink and moist                           Neck:   Supple, symmetrical, trachea midline, no JVD                         Lungs:    Decreased breath sounds at the base                 Chest Wall:    No tenderness or deformity                          Heart:  S1-S2 regular                  abdomen:   Soft nontender                 Extremities:   Extremities normal, atraumatic, no cyanosis or edema                        Pulses:   Pulses palpable in all extremities                            Skin: Chronic ulcerative changes involving bilateral lower extremities with no clear cellulitis                  Neurologic: Bilateral lower extremity weakness       Data Review:    I reviewed the patient's new clinical results.  Results from last 7 days   Lab Units 02/16/21  0721 02/15/21  0646 02/14/21  0940 02/13/21  0216 02/12/21  0528 02/11/21  0545 02/10/21  0916   WBC 10*3/mm3 4.83 7.48 5.96 5.66 5.90 7.34 7.35   HEMOGLOBIN g/dL 11.0* 10.9* 9.7* 10.2* 10.5* 9.2* 10.9*   PLATELETS 10*3/mm3 198 244 234 247 246 85* 306     Results from last 7 days   Lab Units 02/16/21  0721 02/15/21  0646 02/14/21  0940 02/13/21  1652 02/13/21  0216 02/12/21  1515 02/12/21  0914 02/11/21  0545 02/10/21  0916   SODIUM mmol/L 146* 142 143  --  146*  --  140 139 143   POTASSIUM mmol/L 3.3* 3.4* 3.3* 2.7* 2.7*  2.7* 2.8* 2.8* 4.0 3.0*   CHLORIDE mmol/L 117* 116* 117*  --  117*  --  112* 111* 113*   CO2 mmol/L 14.7* 13.9* 16.1*  --  17.3*  --  16.9* 12.7* 17.8*   BUN mg/dL 20 21 22  --  25*  --  25* 24* 24*   CREATININE mg/dL 2.06* 2.21* 2.49*  --  2.72*  --  2.61* 2.55* 2.76*   CALCIUM mg/dL 9.1 9.3 8.8  --  8.6  --  8.7 8.7 8.6   GLUCOSE mg/dL 113* 120* 123*  --  129*  --  132* 144* 153*     Microbiology Results (last 10 days)     ** No results found for  the last 240 hours. **              Assessment:    Acute metabolic encephalopathy    Hyperlipidemia    Hypertension    Stage 4 chronic kidney disease (CMS/[facility])    DM2 (diabetes mellitus, type 2) (CMS/[facility])    Anemia, chronic disease    UTI (urinary tract infection), bacterial    Elevated troponin    Hypokalemia    Uncontrolled hypertension    Septicemia (CMS/[facility])    Vascular dementia without behavioral disturbance (CMS/[facility])  1-toxic metabolic encephalopathy secondary to combination of  2-urinary tract infection with infected lower extremity wound with possible cellulitis  3-coag negative staph bacteremia either part of the systemic sepsis originating from lower extremities or possible contaminant during the process of collection  4-embolization syndrome  5-diabetes mellitus with complications including peripheral neuropathy, diabetic nephropathy  6-stage V kidney disease  7-uncontrolled hypertension  8-anemia multifactorial.     Recommendations/Discussions:   Discussed with primary team.  It appears that the care structure is being changed to comfort and palliation patient is being moved to palliative care unit.  Nothing further to add will sign off.  Tonja Azevedo MD  2/16/2021  12:35 EST    Much of this encounter note is an electronic transcription/translation of spoken language to printed text. The electronic translation of spoken language may permit erroneous, or at times, nonsensical words or phrases to be inadvertently transcribed; Although I have reviewed the note for such errors, some may still exist

## 2021-02-16 NOTE — PROGRESS NOTES
"   LOS: 12 days    Patient Care Team:  Maya Ribeiro APRN as PCP - General (Family Medicine)    Chief Complaint:    Chief Complaint   Patient presents with   • Altered Mental Status     Follow-up chronic kidney disease and hypokalemia  Subjective     Interval History:   Seen and examined.  More alert and answered questions.  Breathing appears comfortable on room air.   Review of Systems:   Not obtainable    Objective     Vital Signs  Temp:  [97.6 °F (36.4 °C)-97.8 °F (36.6 °C)] 97.6 °F (36.4 °C)  Heart Rate:  [67-90] 67  Resp:  [16] 16  BP: (134-153)/() 153/106    Flowsheet Rows      First Filed Value   Admission Height  190.5 cm (75\") Documented at 02/04/2021 1250   Admission Weight  114 kg (251 lb) Documented at 02/04/2021 1250          I/O this shift:  In: 240 [P.O.:240]  Out: -   No intake/output data recorded.    Intake/Output Summary (Last 24 hours) at 2/16/2021 1429  Last data filed at 2/16/2021 1300  Gross per 24 hour   Intake 240 ml   Output --   Net 240 ml       Physical Exam:  General Appearance: NAD, nonverbal, poorly responsive  Skin: warm and dry  HEENT: Nonicteric sclerae, oral mucosa is moist  Neck: no JVD, trachea midline  Lungs: Scattered rhonchi, unlabored breathing effort on room air  Heart: RRR, normal S1 and S2, no S3, no rub  Abdomen: soft, no grimacing when abdomen palpated, BS +   Extremities: no significant edema, cyanosis or clubbing        Results Review:    Results from last 7 days   Lab Units 02/16/21  0721 02/15/21  0646 02/14/21  0940   SODIUM mmol/L 146* 142 143   POTASSIUM mmol/L 3.3* 3.4* 3.3*   CHLORIDE mmol/L 117* 116* 117*   CO2 mmol/L 14.7* 13.9* 16.1*   BUN mg/dL 20 21 22   CREATININE mg/dL 2.06* 2.21* 2.49*   CALCIUM mg/dL 9.1 9.3 8.8   GLUCOSE mg/dL 113* 120* 123*       Estimated Creatinine Clearance: 45.8 mL/min (A) (by C-G formula based on SCr of 2.06 mg/dL (H)).    Results from last 7 days   Lab Units 02/16/21  0721 02/15/21  0646 02/14/21  0940 02/13/21  0216  " 02/12/21  0528   MAGNESIUM mg/dL  --   --  1.8 1.8  --  1.8   PHOSPHORUS mg/dL 2.6 2.4* 2.3* 2.9   < >  --     < > = values in this interval not displayed.       Results from last 7 days   Lab Units 02/14/21  0940 02/13/21  0216 02/12/21  0528 02/11/21  0545   URIC ACID mg/dL 8.8* 8.8* 8.5* 8.3*       Results from last 7 days   Lab Units 02/16/21  0721 02/15/21  0646 02/14/21  0940 02/13/21  0216 02/12/21  0528   WBC 10*3/mm3 4.83 7.48 5.96 5.66 5.90   HEMOGLOBIN g/dL 11.0* 10.9* 9.7* 10.2* 10.5*   PLATELETS 10*3/mm3 198 244 234 247 246               Imaging Results (Last 24 Hours)     ** No results found for the last 24 hours. **        ammonium lactate, , Topical, BID  sodium chloride, 10 mL, Intravenous, Q12H           Medication Review:   Current Facility-Administered Medications   Medication Dose Route Frequency Provider Last Rate Last Admin   • acetaminophen (TYLENOL) tablet 650 mg  650 mg Oral Q4H PRN Alexander Moreno MD        Or   • acetaminophen (TYLENOL) 160 MG/5ML solution 650 mg  650 mg Oral Q4H PRN Alexander Moreno MD        Or   • acetaminophen (TYLENOL) suppository 650 mg  650 mg Rectal Q4H PRN Alexander Moreno MD       • ammonium lactate (AMLACTIN) cream   Topical BID Nixon Quinteros MD   1 application at 02/15/21 2055   • diphenoxylate-atropine (LOMOTIL) 2.5-0.025 MG per tablet 1 tablet  1 tablet Oral Q2H PRN Alexander Moreno MD       • Glycerin-Hypromellose- (ARTIFICIAL TEARS) 0.2-0.2-1 % ophthalmic solution solution 1 drop  1 drop Both Eyes Q30 Min PRN Alexander Moreno MD       • glycopyrrolate (ROBINUL) injection 0.2 mg  0.2 mg Intravenous Q2H PRN Alexander Moreno MD        Or   • glycopyrrolate (ROBINUL) injection 0.2 mg  0.2 mg Subcutaneous Q2H PRN Alexander Moreno MD        Or   • glycopyrrolate (ROBINUL) injection 0.4 mg  0.4 mg Intravenous Q2H PRN Alexander Moreno MD        Or   • glycopyrrolate  (ROBINUL) injection 0.4 mg  0.4 mg Subcutaneous Q2H PRN Alexander Moreno MD       • HYDROmorphone (DILAUDID) injection 0.5 mg  0.5 mg Intravenous Q1H PRN Alexander Moreno MD        Or   • morphine injection 2 mg  2 mg Intravenous Q1H PRN Alexander Moreno MD        Or   • morphine concentrated solution solution 5 mg  5 mg Sublingual Q1H PRN Alexander Moreno MD        Or   • ketorolac (TORADOL) injection 15 mg  15 mg Intravenous Q6H PRN Alexander Moreno MD       • HYDROmorphone (DILAUDID) injection 1 mg  1 mg Intravenous Q1H PRN Alexander Moreno MD        Or   • morphine injection 4 mg  4 mg Intravenous Q1H PRN Alexander Moreno MD        Or   • morphine concentrated solution solution 10 mg  10 mg Sublingual Q1H PRN Alexander Moreno MD       • HYDROmorphone (DILAUDID) injection 1.5 mg  1.5 mg Intravenous Q1H PRN Alexander Moreno MD        Or   • Morphine sulfate (PF) injection 6 mg  6 mg Intravenous Q1H PRN Alexander Moreno MD        Or   • morphine concentrated solution solution 20 mg  20 mg Sublingual Q1H PRN Alexander Moreno MD       • Lidocaine Viscous HCl (XYLOCAINE) 2 % mouth solution 5 mL  5 mL Mouth/Throat Q4H PRN Alexander Moreno MD       • LORazepam (ATIVAN) injection 0.5 mg  0.5 mg Intravenous Q1H PRN Alexander Moreno MD        Or   • LORazepam (ATIVAN) injection 0.5 mg  0.5 mg Subcutaneous Q1H PRN Alexander Moreno MD        Or   • LORazepam (ATIVAN) 2 MG/ML concentrated solution 0.5 mg  0.5 mg Sublingual Q1H PRN Alexander Moreno MD       • LORazepam (ATIVAN) injection 1 mg  1 mg Intravenous Q1H PRN Alexander Moreno MD        Or   • LORazepam (ATIVAN) injection 1 mg  1 mg Subcutaneous Q1H PRN Alexander Moreno MD        Or   • LORazepam (ATIVAN) 2 MG/ML concentrated solution 1 mg  1 mg Sublingual Q1H PRN Alexander Moreno MD       •  LORazepam (ATIVAN) injection 2 mg  2 mg Intravenous Q1H PRN Alexander Moreno MD        Or   • LORazepam (ATIVAN) injection 2 mg  2 mg Subcutaneous Q1H PRN Alexander Moreon MD        Or   • LORazepam (ATIVAN) 2 MG/ML concentrated solution 2 mg  2 mg Sublingual Q1H PRN Alexander Moreno MD       • sodium chloride 0.9 % flush 10 mL  10 mL Intravenous Q12H Robert Baker MD   10 mL at 02/14/21 0921   • sodium chloride 0.9 % flush 10 mL  10 mL Intravenous PRN Robert Baker MD           Assessment/Plan   1.  CKD due to biopsy-proven diabetic nephropathy  2.  Hypokalemia, recurrent  3.  Metabolic acidosis, likely NAGMA, due to CKD   4.  Anemia of chronic kidney disease, no JOSSELYN as needed.  5.  Immobility syndrome  6.  Hypertension, acceptable control today.  7.  Diabetes mellitus type 2, with diabetic nephropathy, biopsy-proven.    Plan:    Patient is moving to palliative care so we will sign off.  Please call with questions    Leighton Linder MD  02/16/21  14:29 EST

## 2021-02-17 NOTE — PROGRESS NOTES
Adult Nutrition  Assessment/PES    Patient Name:  Gregorio Alejandro  YOB: 1955  MRN: [id number]  Admit Date:  2/4/2021    Assessment Date:  2/17/2021    Comments:  Transitioned to palliative care. Will sign off.     Reason for Assessment     Row Name 02/17/21 0812          Reason for Assessment    Reason For Assessment  follow-up protocol         Nutrition/Diet History     Row Name 02/17/21 0812          Nutrition/Diet History    Typical Food/Fluid Intake  Transfer to palliative unit for comfort care; diet advanced to full liquids.           Labs/Tests/Procedures/Meds     Row Name 02/17/21 0813          Labs/Procedures/Meds    Lab Results Reviewed  reviewed        Diagnostic Tests/Procedures    Diagnostic Test/Procedure Reviewed  reviewed        Medications    Pertinent Medications Reviewed  reviewed         Physical Findings     Row Name 02/17/21 0813          Physical Findings    Oral/Mouth Cavity  poor dentition     Skin  poor skin integrity/turgor;non-healing wound(s);other (see comments) cynthia 13; LE venous ulcers           Nutrition Prescription Ordered     Row Name 02/17/21 0814          Nutrition Prescription PO    Current PO Diet  Full Liquid                 Problem/Interventions:  Problem 1     Row Name 02/17/21 0814          Nutrition Diagnoses Problem 1    Etiology (related to)  Goals of Care     Resolved?  Yes               Intervention Goal     Row Name 02/17/21 0815          Intervention Goal    General  Palliative Care                 Electronically signed by:  Anamaria Mireles RD  02/17/21 08:15 EST

## 2021-02-17 NOTE — PLAN OF CARE
Goal Outcome Evaluation:  Plan of Care Reviewed With: patient  Progress: no change  Outcome Summary: Pt is alert to self and place; pt had 2 large liquid BM's; F/C in place; BLE lotion applied and mepilex changed on lower leg; No complaints noted; will continue to monitor

## 2021-02-17 NOTE — PROGRESS NOTES
[facility]  Inpatient Progress Note    Reason for Follow Up: Iron deficiency anemia    Subjective     Interval History:   Patient somnolent, discussed with RN at bedside.  Evidently had a period of clear sensorium per RN and had requested hospice.  Hospitalist notified and communication with family pending.  Pending his disposition we will continue to monitor for the present.    Current Facility-Administered Medications:   •  acetaminophen (TYLENOL) tablet 650 mg, 650 mg, Oral, Q4H PRN **OR** acetaminophen (TYLENOL) 160 MG/5ML solution 650 mg, 650 mg, Oral, Q4H PRN **OR** acetaminophen (TYLENOL) suppository 650 mg, 650 mg, Rectal, Q4H PRN, Alexander Moreno MD  •  amLODIPine (NORVASC) tablet 5 mg, 5 mg, Oral, Q24H, Alexander Moreno MD  •  ammonium lactate (AMLACTIN) cream, , Topical, BID, Nixon Quinteros MD, Given at 02/17/21 0948  •  apixaban (ELIQUIS) tablet 2.5 mg, 2.5 mg, Oral, Q12H, Alexander Moreno MD, 2.5 mg at 02/16/21 1940  •  atorvastatin (LIPITOR) tablet 20 mg, 20 mg, Oral, Nightly, Alexander Moreno MD, 20 mg at 02/16/21 1941  •  diphenoxylate-atropine (LOMOTIL) 2.5-0.025 MG per tablet 1 tablet, 1 tablet, Oral, Q2H PRN, Alexander Moreno MD  •  Glycerin-Hypromellose- (ARTIFICIAL TEARS) 0.2-0.2-1 % ophthalmic solution solution 1 drop, 1 drop, Both Eyes, Q30 Min PRN, Alexander Moreno MD, 1 drop at 02/16/21 2126  •  sodium chloride 0.9 % flush 10 mL, 10 mL, Intravenous, Q12H, Robert Baker MD, 10 mL at 02/17/21 0948  •  sodium chloride 0.9 % flush 10 mL, 10 mL, Intravenous, PRN, Robert Baker MD  Review of Systems:    Review of systems could not be obtained due to  patient sedation status.    Objective     Vital Signs  Temp:  [94.3 °F (34.6 °C)-95.8 °F (35.4 °C)] 95.8 °F (35.4 °C)  Heart Rate:  [41-70] 70  Resp:  [16-18] 16  BP: (116-153)/() 137/78  Body mass index is 29.12 kg/m².    Intake/Output  Summary (Last 24 hours) at 2/17/2021 1006  Last data filed at 2/17/2021 0542  Gross per 24 hour   Intake 340 ml   Output 700 ml   Net -360 ml     No intake/output data recorded.     Physical Exam:   General: patient awake, alert and cooperative   Eyes: Normal lids and lashes, no scleral icterus   Neck: supple, normal ROM   Skin: warm and dry, not jaundiced   Cardiovascular: regular rhythm and rate, no murmurs auscultated   Pulm: clear to auscultation bilaterally, regular and unlabored   Abdomen: soft, nontender, nondistended; normal bowel sounds   Extremities: no rash or edema   Psychiatric: Normal mood and behavior; memory intact     Results Review:     I reviewed the patient's new clinical results.    Results from last 7 days   Lab Units 02/17/21  0737 02/16/21  0721 02/15/21  0646   WBC 10*3/mm3 6.50 4.83 7.48   HEMOGLOBIN g/dL 9.8* 11.0* 10.9*   HEMATOCRIT % 29.4* 33.6* 34.5*   PLATELETS 10*3/mm3 185 198 244     Results from last 7 days   Lab Units 02/17/21  0737 02/16/21  0721 02/15/21  0646   SODIUM mmol/L 141 146* 142   POTASSIUM mmol/L 2.6* 3.3* 3.4*   CHLORIDE mmol/L 115* 117* 116*   CO2 mmol/L 16.0* 14.7* 13.9*   BUN mg/dL 19 20 21   CREATININE mg/dL 2.02* 2.06* 2.21*   CALCIUM mg/dL 8.5* 9.1 9.3   GLUCOSE mg/dL 117* 113* 120*         Lab Results   Lab Value Date/Time    LIPASE 17 12/18/2020 1653    LIPASE 914 (H) 10/27/2020 0330    LIPASE 1,087 (H) 10/26/2020 0410    LIPASE 1,653 (H) 10/25/2020 0323       Radiology:  IR Lumbar Puncture Diag/Thera   Final Result       Unsuccessful lumbar puncture.       Discussed by telephone with the patient's nurse, Martha, at 1430,   02/13/2021.       This report was finalized on 2/13/2021 2:32 PM by Dr. Jluis Meza M.D.          MRI Brain Without Contrast   Final Result   Limited examination secondary to patient motion and the   incomplete nature of the examination. There is no evidence of acute   infarction. Atrophy and small vessel ischemic disease is noted.        This report was finalized on 2/12/2021 8:03 AM by Dr. Rashad Bajwa M.D.          CT Head Without Contrast   Final Result   No acute intracranial findings.       Radiation dose reduction techniques were utilized, including automated   exposure control and exposure modulation based on body size.       This report was finalized on 2/5/2021 9:30 PM by Dr. Vero Hansen M.D.          CT Abdomen Pelvis Without Contrast   Final Result       1. Severely technically limited examination due to motion artifact. The   patient's sigmoid colon in particular appears thick walled, with   adjacent pericolonic soft tissue stranding. There is also involvement of   the rectum, although to a lesser extent. Appearance is suggestive of   colitis. No pneumatosis or free air is seen. Given the patient had some   rectal wall thickening on prior CT, consideration for follow-up with   endoscopy is suggested. There is some diffuse gaseous distention of   bowel, which may reflect some ileus.   2. Thick-walled appearance to the urinary bladder, with adjacent   perivesical soft tissue stranding, concerning for cystitis.       Radiation dose reduction techniques were utilized, including automated   exposure control and exposure modulation based on body size.       This report was finalized on 2/5/2021 9:39 PM by Dr. Vero Hansen M.D.          XR Chest 1 View   Final Result          Assessment/Plan     Patient Active Problem List   Diagnosis   • Complicated UTI (urinary tract infection)   • Macrocytosis   • Sepsis secondary to UTI (CMS/HCC)   • Metabolic encephalopathy   • Hyperlipidemia   • Hypertension   • Monoclonal gammopathy   • Stage 4 chronic kidney disease (CMS/HCC)   • DM2 (diabetes mellitus, type 2) (CMS/HCC)   • Abnormal CT of the abdomen   • Normal anion gap metabolic acidosis   • Anemia, chronic disease   • Ventricular tachycardia (paroxysmal) (CMS/HCC)   • Acute metabolic encephalopathy   • UTI (urinary tract  infection), bacterial   • Elevated troponin   • Hypokalemia   • SILVIA (acute kidney injury) (CMS/HCC)   • Uncontrolled hypertension   • Septicemia (CMS/HCC)   • Vascular dementia without behavioral disturbance (CMS/HCC)       Assessment:  1. Iron deficiency anemia: Drop in hemoglobin but no report of active GI bleeding  2. Metabolic encephalopathy  3. Abnormal CT scan of sigmoid colon and rectum      Plan:  · Await disposition prior to decision of endoscopic evaluation if and when sensorium clears and patient able to adequately prep.  I discussed the patients findings and my recommendations with nursing staff.    Dane Mabry MD

## 2021-02-17 NOTE — PROGRESS NOTES
" LOS: 13 days     Name: Gregorio Alejandro  Age: 65 y.o.  Sex: male  :  1955  MRN: [id number]         Primary Care Physician: Maya Ribeiro APRN    Subjective   Subjective  After transfer to the palliative care floor and visit from his daughter and niece the patient became more awake and alert yesterday evening.  This was a 180 degree change from the past week or so.  This morning for me he is again more drowsy, oriented only to person, can answer a few simple questions but dressed back off to sleep easily which is a slight improvement from when I saw him yesterday but not dramatic.    Objective   Vital Signs  Temp:  [94.3 °F (34.6 °C)-95.8 °F (35.4 °C)] 95.8 °F (35.4 °C)  Heart Rate:  [41-70] 70  Resp:  [16-18] 16  BP: (116-153)/() 137/78  Body mass index is 29.12 kg/m².    Objective:  General Appearance:  Comfortable, in no acute distress and ill-appearing.    Vital signs: (most recent): Blood pressure 137/78, pulse 70, temperature 95.8 °F (35.4 °C), temperature source Oral, resp. rate 16, height 190.5 cm (75\"), weight 106 kg (233 lb), SpO2 100 %.    Lungs:  Normal effort and normal respiratory rate.  He is not in respiratory distress.  There are decreased breath sounds.    Heart: Normal rate.  Regular rhythm.    Abdomen: Abdomen is soft.  Bowel sounds are normal.   There is no abdominal tenderness.     Extremities: There is no dependent edema or local swelling.    Neurological: Patient is alert and oriented to person, place and time.    Skin:  Warm and dry.  (Chronic skin changes of the bilateral distal lower extremities)            Results Review:       I reviewed the patient's new clinical results.    Results from last 7 days   Lab Units 21  0737 21  0721 02/15/21  0646 21  0940 21  0216 21  0528 21  0545   WBC 10*3/mm3 6.50 4.83 7.48 5.96 5.66 5.90 7.34   HEMOGLOBIN g/dL 9.8* 11.0* 10.9* 9.7* 10.2* 10.5* 9.2*   PLATELETS 10*3/mm3 185 198 244 234 247 246 85* "     Results from last 7 days   Lab Units 02/17/21  0737 02/16/21  0721 02/15/21  0646 02/14/21  0940 02/13/21  1652 02/13/21  0216 02/12/21  1515 02/12/21  0914 02/11/21  0545   SODIUM mmol/L 141 146* 142 143  --  146*  --  140 139   POTASSIUM mmol/L 2.6* 3.3* 3.4* 3.3* 2.7* 2.7*  2.7* 2.8* 2.8* 4.0   CHLORIDE mmol/L 115* 117* 116* 117*  --  117*  --  112* 111*   CO2 mmol/L 16.0* 14.7* 13.9* 16.1*  --  17.3*  --  16.9* 12.7*   BUN mg/dL 19 20 21 22  --  25*  --  25* 24*   CREATININE mg/dL 2.02* 2.06* 2.21* 2.49*  --  2.72*  --  2.61* 2.55*   CALCIUM mg/dL 8.5* 9.1 9.3 8.8  --  8.6  --  8.7 8.7   GLUCOSE mg/dL 117* 113* 120* 123*  --  129*  --  132* 144*           Scheduled Meds:   amLODIPine, 5 mg, Oral, Q24H  ammonium lactate, , Topical, BID  apixaban, 2.5 mg, Oral, Q12H  atorvastatin, 20 mg, Oral, Nightly  sodium chloride, 10 mL, Intravenous, Q12H      PRN Meds:   •  acetaminophen **OR** acetaminophen **OR** acetaminophen  •  diphenoxylate-atropine  •  Glycerin-Hypromellose-  •  sodium chloride  Continuous Infusions:       Assessment/Plan   Active Hospital Problems    Diagnosis  POA   • **Acute metabolic encephalopathy [G93.41]  Yes   • Vascular dementia without behavioral disturbance (CMS/HCC) [F01.50]  Unknown   • Septicemia (CMS/HCC) [A41.9]  Yes   • UTI (urinary tract infection), bacterial [N39.0, A49.9]  Yes   • Elevated troponin [R77.8]  Yes   • Hypokalemia [E87.6]  Yes   • Uncontrolled hypertension [I10]  Yes   • Anemia, chronic disease [D63.8]  Yes   • DM2 (diabetes mellitus, type 2) (CMS/HCC) [E11.9]  Yes   • Stage 4 chronic kidney disease (CMS/HCC) [N18.4]  Yes   • Hyperlipidemia [E78.5]  Yes   • Hypertension [I10]  Yes      Resolved Hospital Problems   No resolved problems to display.       Assessment & Plan    -Noted is the patient's improved mentation last night.  He is drowsy and oriented only to person for me this morning.  I had another discussion with his wife, Patricia, today.  For now we  are going to allow him to advance diet as tolerated and will keep some of his oral medications in place while looking out for any need for comfort medications.  Despite the improvement of his mentation, his overall clinical condition is poor along with his long-term prognosis.  She expresses understanding and agreement with this.  His nurse notified me that the patient was able to express to her this morning that he wishes for hospice care.  I informed his wife of this.  She is in agreement.  She wishes to maintain current level of care but without escalation.  She does not want him to go undergo any aggressive/invasive procedures.  Her desires for him to get back to the nursing home with hospice.  Will consult hospice today.  In-house palliative care service is also following.  -Yesterday when his daughter and niece arrived to see him there appeared to be some misunderstanding and possibly disagreement with the care structure.  I addressed this with his wife who assures me that all family members are on board at this time with current plan.    Dispo  -To the nursing home with hospice unless he shows decline.    I wore full protective equipment throughout the patient encounter including eye protection and facemask.  Hand hygiene was performed before donning protective equipment and after removal when leaving the room.    Alexander Moreno MD  [facility]  02/17/21  10:13 EST

## 2021-02-17 NOTE — PROGRESS NOTES
Discharge Planning Assessment  [facility]     Patient Name: Gregorio Alejandro  MRN: [id number]  Today's Date: 2/17/2021    Admit Date: 2/4/2021    Discharge Needs Assessment    No documentation.       Discharge Plan     Row Name 02/17/21 1619       Plan    Plan Comments  The patient transferred to [facility] from from [address] [place] [address] on 2/17/21. The patient is palliative. [facility] to evaluate. CCP will follow for any needs that may arise. PARVEEN Rob RN, CCP        Continued Care and Services - Admitted Since 2/4/2021     Destination Coordination complete    Service Provider Request Status Selected Services Address Phone Fax Patient Preferred    [facility]   Selected Skilled Nursing [address] [facility] [address] [phone] --    [facility]  [facility]ed N/A [address] [facility] [id number] [phone] --    [facility]  Pending - Request Sent N/A [address] [facility] [id number] [phone] --    [facility]  Pending - Request Sent N/A [address] [facility] [id number] [phone] --    [facility]  Pending - Request Sent N/A [address] [facility] [phone] --    [facility]  Pending - Request Sent N/A [address] [facility] [id number] [phone] --    [facility]  Pending - Request Sent N/A [address] [facility] [id number] [phone] --    [facility]  Pending - Request Sent N/A [address] [facility] [phone] --    [facility]  Pending - Request Sent N/A [address] [facility] [phone] --    [facility]  Patient Insurance Not Accepted N/A [address] [facility] [address] [phone] --    [facility]  [facility]  Declined  Patient Insurance Not Accepted N/A [address] [facility] [address] [phone] --    [facility] RE[facility]  Declined  wife declined facility N/A [address], [facility] [id number] [phone] --    [facility]  Declined  Patient Insurance Not Accepted N/A [address] [facility] [facility] [address] [phone] --            Selected Continued Care - Prior Encounters Includes selections from prior encounters from 11/6/2020 to 2/17/2021    Discharged on 12/23/2020 Admission date: 12/18/2020 - Discharge disposition: Home-Health Care Svc    Dialysis/Infusion     Service Provider Selected Services Address Phone Fax Patient Preferred    [facility]  Infusion and IV Therapy [address], [facility] [address] [phone] --          Home Medical Care     Service Provider Selected Services Address Phone Fax Patient Preferred    [facility] [place]  Home Health Services [address] [address] [phone] --                      Demographic Summary    No documentation.       Functional Status    No documentation.       Psychosocial    No documentation.       Abuse/Neglect    No documentation.       Legal    No documentation.       Substance Abuse    No documentation.       Patient Forms    No documentation.           Linda Rob RN

## 2021-02-17 NOTE — PLAN OF CARE
Goal Outcome Evaluation:     Progress: declining  Outcome Summary: Pt alert early this morning-- able to request eye drops and also stated he would like hospice care, MD and wife notified. Since then, patient has been lethargic and minimally waking up to sternal rub. Wife is at bedside and updated on care. Incontinence care provided, F/C intact. Hospice is consulted to hopefully D/C to NH. Will cont to monitor

## 2021-02-18 NOTE — PLAN OF CARE
Goal Outcome Evaluation:  Plan of Care Reviewed With: patient  Progress: no change  Outcome Summary: Pt alert, but confused; Family at bedside at the beginning of shift; Niece requesting that pt be put on something for depression; Note left in chart; Pt turned Q4; No complaints of pain noted; Possible discharge soon with hospice; will continue to monitor

## 2021-02-18 NOTE — CONSULTS
Met with patient and spouse at bedside and provided explanation of services. Patient was alert with some confusion. Plan is to go to [facility] and have hospice there. [facility] to follow up after discharge. Written material provided.    Thank you for the referral.    Ciara Casiano RN  [facility]  [phone]

## 2021-02-18 NOTE — PROGRESS NOTES
Gastroenterology   Inpatient Progress Note    Reason for Follow Up:  Iron deficiency anemia    Subjective  Interval History:   Patient oriented to the name of his wife but otherwise confused speech and thought process.    He denies abdominal pain and is nontender during physical exam.  He is eating his breakfast without difficulty.    No evidence of GI blood loss via GI tract overnight.        Current Facility-Administered Medications:   •  acetaminophen (TYLENOL) tablet 650 mg, 650 mg, Oral, Q4H PRN **OR** acetaminophen (TYLENOL) 160 MG/5ML solution 650 mg, 650 mg, Oral, Q4H PRN **OR** acetaminophen (TYLENOL) suppository 650 mg, 650 mg, Rectal, Q4H PRN, Alexander Moreno MD  •  amLODIPine (NORVASC) tablet 5 mg, 5 mg, Oral, Q24H, Alexander Moreno MD  •  ammonium lactate (AMLACTIN) cream, , Topical, BID, Nixon Quinteros MD, 1 application at 02/18/21 0818  •  apixaban (ELIQUIS) tablet 2.5 mg, 2.5 mg, Oral, Q12H, Alexander Moreno MD, 2.5 mg at 02/17/21 2017  •  atorvastatin (LIPITOR) tablet 20 mg, 20 mg, Oral, Nightly, Alexander Moreno MD, 20 mg at 02/17/21 2017  •  diphenoxylate-atropine (LOMOTIL) 2.5-0.025 MG per tablet 1 tablet, 1 tablet, Oral, Q2H PRN, Alexander Moreno MD  •  Glycerin-Hypromellose- (ARTIFICIAL TEARS) 0.2-0.2-1 % ophthalmic solution solution 1 drop, 1 drop, Both Eyes, Q30 Min PRN, Alexander Moreno MD, 1 drop at 02/16/21 2126  •  sodium chloride 0.9 % flush 10 mL, 10 mL, Intravenous, Q12H, Robert Baker MD, 10 mL at 02/18/21 0818  •  sodium chloride 0.9 % flush 10 mL, 10 mL, Intravenous, PRN, Robert Baker MD  Review of Systems:               The following systems were reviewed and negative;  gastrointestinal    Objective     Vital Signs  Temp:  [95.5 °F (35.3 °C)] 95.5 °F (35.3 °C)  Heart Rate:  [59-77] 69  Resp:  [16-18] 18  BP: (141-149)/(80-92) 141/92  Body mass index is 29.12 kg/m².                  Physical Exam:               General: patient awake, cooperative and confused              Eyes: no scleral icterus              Skin: warm and dry, not jaundiced              Abdomen: soft, nontender, nondistended; normal bowel sounds              Psychiatric: Alert to his wife's name only.                Results Review:                I reviewed the patient's new clinical results.    Results from last 7 days   Lab Units 02/17/21  0737 02/16/21  0721 02/15/21  0646   WBC 10*3/mm3 6.50 4.83 7.48   HEMOGLOBIN g/dL 9.8* 11.0* 10.9*   HEMATOCRIT % 29.4* 33.6* 34.5*   PLATELETS 10*3/mm3 185 198 244     Results from last 7 days   Lab Units 02/17/21  0737 02/16/21  0721 02/15/21  0646   SODIUM mmol/L 141 146* 142   POTASSIUM mmol/L 2.6* 3.3* 3.4*   CHLORIDE mmol/L 115* 117* 116*   CO2 mmol/L 16.0* 14.7* 13.9*   BUN mg/dL 19 20 21   CREATININE mg/dL 2.02* 2.06* 2.21*   CALCIUM mg/dL 8.5* 9.1 9.3   GLUCOSE mg/dL 117* 113* 120*         Lab Results   Lab Value Date/Time    LIPASE 17 12/18/2020 1653    LIPASE 914 (H) 10/27/2020 0330    LIPASE 1,087 (H) 10/26/2020 0410    LIPASE 1,653 (H) 10/25/2020 0323       Radiology:  IR Lumbar Puncture Diag/Thera   Final Result       Unsuccessful lumbar puncture.       Discussed by telephone with the patient's nurse, Martha, at 1430,   02/13/2021.       This report was finalized on 2/13/2021 2:32 PM by Dr. Jluis Meza M.D.          MRI Brain Without Contrast   Final Result   Limited examination secondary to patient motion and the   incomplete nature of the examination. There is no evidence of acute   infarction. Atrophy and small vessel ischemic disease is noted.       This report was finalized on 2/12/2021 8:03 AM by Dr. Rashad Bajwa M.D.          CT Head Without Contrast   Final Result   No acute intracranial findings.       Radiation dose reduction techniques were utilized, including automated   exposure control and exposure modulation based on body size.       This report was finalized on  2/5/2021 9:30 PM by Dr. Vero Hansen M.D.          CT Abdomen Pelvis Without Contrast   Final Result       1. Severely technically limited examination due to motion artifact. The   patient's sigmoid colon in particular appears thick walled, with   adjacent pericolonic soft tissue stranding. There is also involvement of   the rectum, although to a lesser extent. Appearance is suggestive of   colitis. No pneumatosis or free air is seen. Given the patient had some   rectal wall thickening on prior CT, consideration for follow-up with   endoscopy is suggested. There is some diffuse gaseous distention of   bowel, which may reflect some ileus.   2. Thick-walled appearance to the urinary bladder, with adjacent   perivesical soft tissue stranding, concerning for cystitis.       Radiation dose reduction techniques were utilized, including automated   exposure control and exposure modulation based on body size.       This report was finalized on 2/5/2021 9:39 PM by Dr. Vero Hansen M.D.          XR Chest 1 View   Final Result          Assessment/Plan     Patient Active Problem List   Diagnosis   • Complicated UTI (urinary tract infection)   • Macrocytosis   • Sepsis secondary to UTI (CMS/HCC)   • Metabolic encephalopathy   • Hyperlipidemia   • Hypertension   • Monoclonal gammopathy   • Stage 4 chronic kidney disease (CMS/HCC)   • DM2 (diabetes mellitus, type 2) (CMS/HCC)   • Abnormal CT of the abdomen   • Normal anion gap metabolic acidosis   • Anemia, chronic disease   • Ventricular tachycardia (paroxysmal) (CMS/HCC)   • Acute metabolic encephalopathy   • UTI (urinary tract infection), bacterial   • Elevated troponin   • Hypokalemia   • SILVIA (acute kidney injury) (CMS/HCC)   • Uncontrolled hypertension   • Septicemia (CMS/HCC)   • Vascular dementia without behavioral disturbance (CMS/HCC)       Assessment:  1. Abnormal CT scan of the sigmoid colon and rectum.  2. Iron deficiency anemia with no evidence of blood  loss via GI tract overnight      Plan:  Endoscopic evaluation has been deferred at this time given patient's mental status and difficulty with compliance with bowel prep that would be necessary prior to endoscopic evaluation for abnormal CT scan.  At this time, no plans for invasive procedures and GI will continue to follow if patient does not transition to hospice. Will check on his status tomorrow. There is ongoing discussion with primary team and patient as well as his family regarding long-term prognosis and possible transition to hospice with consult pending.      I discussed the patients findings and my recommendations with patient and nursing staff.           Irma MONCADA  [facility] [place] [id number]  Office: [phone]

## 2021-02-18 NOTE — PLAN OF CARE
Goal Outcome Evaluation:  Plan of Care Reviewed With: patient, spouse  Progress: no change  Outcome Summary: Pt alert but remains confused, wife at bedside, slept well during shift, denies pain, plan to DC to [facility]/ hospice, CTM

## 2021-02-18 NOTE — PROGRESS NOTES
" LOS: 14 days     Name: Gregorio Alejandro  Age: 65 y.o.  Sex: male  :  1955  MRN: [id number]         Primary Care Physician: Maya Ribeiro APRN    Subjective   Subjective  Mentation continues to wax and wane.  He is more awake this morning than I have seen him but extremely confused.  Repeatedly asking me whether he is in the hospital or in hospice.  Attempted to explain to him the difference in what these terms meant but he could not display any comprehension of such.    Objective   Vital Signs  Temp:  [95.5 °F (35.3 °C)] 95.5 °F (35.3 °C)  Heart Rate:  [59-77] 69  Resp:  [16-18] 18  BP: (141-149)/(80-92) 141/92  Body mass index is 29.12 kg/m².    Objective:  General Appearance:  Comfortable, in no acute distress and ill-appearing.    Vital signs: (most recent): Blood pressure 141/92, pulse 69, temperature 95.5 °F (35.3 °C), temperature source Oral, resp. rate 18, height 190.5 cm (75\"), weight 106 kg (233 lb), SpO2 96 %.    Lungs:  Normal effort and normal respiratory rate.  He is not in respiratory distress.  There are decreased breath sounds.    Heart: Normal rate.  Regular rhythm.    Abdomen: Abdomen is soft.  Bowel sounds are normal.   There is no abdominal tenderness.     Extremities: There is no dependent edema or local swelling.    Neurological: (More awake at present but only oriented to person).    Skin:  Warm and dry.              Results Review:       I reviewed the patient's new clinical results.    Results from last 7 days   Lab Units 21  0721  0721 02/15/21  0646 21  0940 21  0216 21  0528   WBC 10*3/mm3 6.50 4.83 7.48 5.96 5.66 5.90   HEMOGLOBIN g/dL 9.8* 11.0* 10.9* 9.7* 10.2* 10.5*   PLATELETS 10*3/mm3 185 198 244 234 247 246     Results from last 7 days   Lab Units 21  0737 02/16/21  0721 02/15/21  0646 21  0940 21  1652 21  0216 21  1515 21  0914   SODIUM mmol/L 141 146* 142 143  --  146*  --  140   POTASSIUM mmol/L " 2.6* 3.3* 3.4* 3.3* 2.7* 2.7*  2.7* 2.8* 2.8*   CHLORIDE mmol/L 115* 117* 116* 117*  --  117*  --  112*   CO2 mmol/L 16.0* 14.7* 13.9* 16.1*  --  17.3*  --  16.9*   BUN mg/dL 19 20 21 22  --  25*  --  25*   CREATININE mg/dL 2.02* 2.06* 2.21* 2.49*  --  2.72*  --  2.61*   CALCIUM mg/dL 8.5* 9.1 9.3 8.8  --  8.6  --  8.7   GLUCOSE mg/dL 117* 113* 120* 123*  --  129*  --  132*                 Scheduled Meds:   amLODIPine, 5 mg, Oral, Q24H  ammonium lactate, , Topical, BID  apixaban, 2.5 mg, Oral, Q12H  atorvastatin, 20 mg, Oral, Nightly  sodium chloride, 10 mL, Intravenous, Q12H      PRN Meds:   •  acetaminophen **OR** acetaminophen **OR** acetaminophen  •  diphenoxylate-atropine  •  Glycerin-Hypromellose-  •  sodium chloride  Continuous Infusions:       Assessment/Plan   Active Hospital Problems    Diagnosis  POA   • **Acute metabolic encephalopathy [G93.41]  Yes   • Vascular dementia without behavioral disturbance (CMS/HCC) [F01.50]  Unknown   • Septicemia (CMS/[facility]) [A41.9]  Yes   • UTI (urinary tract infection), bacterial [N39.0, A49.9]  Yes   • Elevated troponin [R77.8]  Yes   • Hypokalemia [E87.6]  Yes   • Uncontrolled hypertension [I10]  Yes   • Anemia, chronic disease [D63.8]  Yes   • DM2 (diabetes mellitus, type 2) (CMS/HCC) [E11.9]  Yes   • Stage 4 chronic kidney disease (CMS/HCC) [N18.4]  Yes   • Hyperlipidemia [E78.5]  Yes   • Hypertension [I10]  Yes      Resolved Hospital Problems   No resolved problems to display.       Assessment & Plan    -Mentation continues to wax and wane.  He goes through prolonged episodes where he is minimally arousable.  When he is awake he appears confused.  Overall prognosis remains poor.  Will continue current level of care with plans not to escalate.  He is a DNR.  Hospice has been consulted.  Likely appropriate to go to the nursing home with hospice care which his wife states would be in line with his and her wishes.  Yesterday he was lucid enough to tell us that he  wants to be in hospice.    Dispo  Likely to nursing home with hospice    I wore full protective equipment throughout the patient encounter including eye protection and facemask.  Hand hygiene was performed before donning protective equipment and after removal when leaving the room.    Alexander Moreno MD  [facility]  02/18/21  09:49 EST

## 2021-02-19 NOTE — PROGRESS NOTES
Case Management Discharge Note      Final Note: Discharged back to [facility] medicaid bed and [facility] will follow and evaluate at the facility. [facility] provided the transportation on 2/19/20. PARVEEN Rob RN, CCP.    Provided Post Acute Provider List?: N/A  N/A Provider List Comment: The patient's wife was not provided with a HH/SNF list nor a print out of the HH/nursing home compare list from Medicare.gov as she is agreeable to local referrals being made and discuss accepting facilities with her.  Provided Post Acute Provider Quality & Resource List?: N/A  N/A Quality & Resource List Comment: The patient's wife was not provided with a HH/SNF list nor a print out of the HH/nursing home compare list from Medicare.gov as she is agreeable to local referrals being made and discuss accepting facilities with her.    Selected Continued Care - Admitted Since 2/4/2021     Destination Coordination complete    Service Provider Selected Services Address Phone Fax Patient Preferred    [facility] AT [address] [address] [phone] --          Durable Medical Equipment    No services have been selected for the patient.              Dialysis/Infusion    No services have been selected for the patient.              Home Medical Care    No services have been selected for the patient.              Therapy    No services have been selected for the patient.              Community Resources    No services have been selected for the patient.                Selected Continued Care - Prior Encounters Includes selections from prior encounters from 11/6/2020 to 2/19/2021    Discharged on 12/23/2020 Admission date: 12/18/2020 - Discharge disposition: Home-Health Care Svc    Dialysis/Infusion     Service Provider Selected Services Address Phone Fax Patient Preferred    [facility] HOME INFUSION  Infusion and IV Therapy [address], [place]  [place] [address] [phone] --          Home Medical Care     Service Provider Selected Services Address Phone Fax Patient Preferred    [facility] [place]  Home Health Services [address] [address] [phone] --                    Transportation Services  Ambulance: [facility]    Final Discharge Disposition Code: 04 - intermediate care facility

## 2021-02-19 NOTE — PROGRESS NOTES
"Physicians Statement of Medical Necessity for  Ambulance Transportation    GENERAL INFORMATION     Name: Gregorio Alejandro  YOB: 1955  Medicare #: [employer] [place] medicare replacement ID# [id number] and [place] medicaid ID#[id number]  Transport Date: 2/19/21 (Valid for round trips this date, or for scheduled repetitive trips for 60 days from the date signed below.)  Origin: [address]  [place] [place] [address]  Is the Patient's stay covered under Medicare Part A (PPS/DRG?)Yes  Closest appropriate facility? Yes  If this a hosp-hosp transfer? No  Is this a hospice patient? No    MEDICAL NECESSITY QUESTIONAIRE    Ambulance Transportation is medically necessary only if other means of transportation are contraindicated or would be potentially harmful to the patient.  To meet this requirement, the patient must be either \"bed confined\" or suffer from a condition such that transport by means other than an ambulance is contraindicated by the patient's condition.  The following questions must be answered by the healthcare professional signing below for this form to be valid:     1) Describe the MEDICAL CONDITION (physical and/or mental) of this patient AT THE TIME OF AMBULANCE TRANSPORT that requires the patient to be transported in an ambulance, and why transport by other means is contraindicated by the patient's condition:   Past Medical History:   Diagnosis Date   • Back pain    • CKD (chronic kidney disease)    • DM type 2 (diabetes mellitus, type 2) (CMS/[facility])    • ED (erectile dysfunction)    • Hyperlipidemia    • Hypertension    • SCOTTY (iron deficiency anemia)    • Monoclonal gammopathy    • Osteoarthritis       History reviewed. No pertinent surgical history.   2) Is this patient \"bed confined\" as defined below?Yes   To be \"bed confined\" the patient must satisfy all three of the following criteria:  (1) unable to get up from bed without assistance; AND (2) unable " to ambulate;  AND (3) unable to sit in a chair or wheelchair.  3) Can this patient safely be transported by car or wheelchair van (I.e., may safely sit during transport, without an attendant or monitoring?)No   4. In addition to completing questions 1-3 above, please check any of the following conditions that apply*:          *Note: supporting documentation for any boxes checked must be maintained in the patient's medical records Patient is confused      SIGNATURE OF PHYSICIAN OR OTHER AUTHORIZED HEALTHCARE PROFESSIONAL    I certify that the above information is true and correct based on my evaluation of this patient, and represent that the patient requires transport by ambulance and that other forms of transport are contraindicated.  I understand that this information will be used by the Centers for Medicare and Medicaid Services (CMS) to support the determiniation of medical necessity for ambulance services, and I represent that I have personal knowledge of the patient's condition at the time of transport.       If this box is checked, I also certify that the patient is physically or mentally incapable of signing the ambulance service's claim form and that the institution with which I am affiliated has furnished care, services or assistance to the patient.  My signature below is made on behalf of the patient pursuant to 42 .36(b)(4). In accordance with 42 .37, the specific reason(s) that the patient is physically or mentally incapable of signing the claim for is as follows:     Signature of Physician or Healthcare Professional  Date/Time: 2/19/21     (For Scheduled repetitive transport, this form is not valid for transports performed more than 60 days after this date).                                                                                                                                             --------------------------------------------------------------------------------------------  Printed Name and Credentials of Physician or Authorized Healthcare Professional     Form must be signed by patient's attending physician for scheduled, repetitive transports,.  For non-repetitive ambulance transports, if unable to obtain the signature of the attending physician, any of the following may sign (please select below):     Physician  Clinical Nurse Specialist  x Registered Nurse     Physician Assistant  Discharge Planner  Licensed Practical Nurse     Nurse Practitioner

## 2021-02-19 NOTE — PROGRESS NOTES
Discharge Planning Assessment  [facility]     Patient Name: Gregorio Alejandro  MRN: [id number]  Today's Date: 2/19/2021    Admit Date: 2/4/2021    Discharge Needs Assessment    No documentation.       Discharge Plan     Row Name 02/19/21 1229       Plan    Plan Comments  The patient is being discharged to [facility], medicaid bed with [facility] to evaluate and follow at nursing home. Placed call to the patient's spouse to verify discharge plans. [facility] states they can accept today back to his medicaid bed and will get [facility] order on the patient's chart at facility. [facility] set up for today at 18:00. PARVEEN Rob Rn, CCP.    Final Discharge Disposition Code  04 - intermediate [facility] facility    Final Note  Discharged back to [facility], [facility] medicaid bed and [facility] will follow and evaluate at the facility. [facility] provided the transportation on 2/19/20. PARVEEN Rob RN, CCP.        Continued Care and Services - Admitted Since 2/4/2021     Destination Coordination complete    Service Provider Request Status Selected Services Address Phone Fax Patient Preferred    [facility]   [facility] [address] [facility] [address] [phone] --    [facility]  Accepted N/A [address] [facility] [address] [phone] --    [facility]  Pending - Request Sent N/A [address] [facility] [address] [phone] --    [facility]  Pending - Request Sent N/A [address] [facility] [id number] [phone] --    [facility]  Pending - Request Sent N/A [address] [facility] [address] [phone] --    [facility]  Pending - Request Sent N/A [address] [facility] [address] [phone] --    [facility]  Pending - Request Sent N/A [address],  Psychiatric [id number] [phone] --    [facility]  Pending - Request Sent N/A [address] [facility] [id number] [phone] --    [facility]  Pending - Request Sent N/A [address] [facility] [id number] [phone] --    [facility]  Declined  Patient Insurance Not Accepted N/A [address] [facility] [address] [phone] --    [facility]  Declined  Patient Insurance Not Accepted N/A [address] [facility] [address] [phone] --    TONYA REYEZ  Declined  wife declined facility N/A [address] [facility] [address] [phone] --    [facility]  Declined  Patient Insurance Not Accepted N/A [address] [facility] [facility] [address] [phone] --            Selected Continued Care - Prior Encounters Includes selections from prior encounters from 11/6/2020 to 2/19/2021    Discharged on 12/23/2020 Admission date: 12/18/2020 - Discharge disposition: Home-Health Care Sv    Dialysis/Infusion     Service Provider Selected Services Address Phone Fax Patient Preferred    [facility]  [facility]fusion [facility] IV Therapy [address] [facility] [facility] [place] [address] [phone] --          Home Medical Care     Service Provider Selected Services Address Phone Fax Patient Preferred    [facility] [place]  Home Health Services [address] [address] [phone] --                    Expected Discharge Date and Time     Expected Discharge Date Expected Discharge Time    Feb 19, 2021         Demographic Summary    No documentation.       Functional Status    No documentation.       Psychosocial    No documentation.       Abuse/Neglect    No documentation.       Legal    No documentation.       Substance Abuse    No documentation.       Patient Forms     No documentation.           Linda Rob RN

## 2021-02-19 NOTE — PLAN OF CARE
Problem: Fall Injury Risk  Goal: Absence of Fall and Fall-Related Injury  Outcome: Met  Intervention: Identify and Manage Contributors to Fall Injury Risk  Description: Reassess fall risk frequently and with change in status or transfer to another level of care.  Communicate fall injury risk to all healthcare team members (e.g., rounds, change of shift/provider, patient transport).  Anticipate needs; perform regular intentional rounding to assess need for position change, pain assessment, personal needs (e.g., toileting) and placement of necessary items.  Provide reorientation, appropriate sensory stimulation and routines with changes in mental status to decrease risk of fall.  Promote use of personal vision and auditory aids (e.g., glasses, hearing aids).  Assess assistance level required for safe and effective care; provide support as needed (e.g., toileting, bathing, mobilization).  Define behavior and activity limits to patient and family.  If fall occurs, assess for and treat injury; determine cause; revise fall injury prevention plan.  Regularly review medication contribution to fall risk; adjust medication administration times to minimize risk of falling.  Consider risk related to polypharmacy and age.  Balance adequate pain management with potential for oversedation.  Recent Flowsheet Documentation  Taken 2/18/2021 2130 by Caridad Robison, RN  Medication Review/Management: medications reviewed  Intervention: Promote Injury-Free Environment  Description: Provide a safe, barrier-free environment that encourages independent activity.  Keep care area uncluttered and well-lighted.  Determine need for increased observation or auditory alerts (e.g., bed or chair alarm).  Assess equipment and environmental modification needs (e.g., low bed, signage, nonskid footwear, grab bars).  Avoid use of restraints.  Recent Flowsheet Documentation  Taken 2/19/2021 0015 by Caridad Robison, RN  Safety Promotion/Fall Prevention:    assistive device/personal items within reach   safety round/check completed  Taken 2/18/2021 2130 by Caridad Robison, RN  Safety Promotion/Fall Prevention:   assistive device/personal items within reach   clutter free environment maintained   fall prevention program maintained   lighting adjusted   room organization consistent   safety round/check completed  Taken 2/18/2021 1930 by Caridad Robison, RN  Safety Promotion/Fall Prevention:   assistive device/personal items within reach   clutter free environment maintained   fall prevention program maintained   lighting adjusted   room organization consistent   safety round/check completed   Goal Outcome Evaluation:  Plan of Care Reviewed With: patient     Outcome Summary: ALERT AND ORIENTED X 1 TO SELF, X 2 TO SELF AND PLACE AT TIMES AND OCC X 3 TO SELF, PLACE AND SITUATION. ASSIST OF TWO FOR REPOSITIONING. RESTING QUIETLY WITH EYES CLOSED AT THIS TIME. WILL MONITOR.

## 2021-02-19 NOTE — DISCHARGE SUMMARY
Date of Admission: 2/4/2021  Date of Discharge:  2/19/2021  Primary Care Physician: Maya Ribeiro, APRN     Discharge Diagnosis:  Active Hospital Problems    Diagnosis  POA   • **Acute metabolic encephalopathy [G93.41]  Yes   • Vascular dementia without behavioral disturbance (CMS/HCC) [F01.50]  Unknown   • Septicemia (CMS/HCC) [A41.9]  Yes   • UTI (urinary tract infection), bacterial [N39.0, A49.9]  Yes   • Elevated troponin [R77.8]  Yes   • Hypokalemia [E87.6]  Yes   • Uncontrolled hypertension [I10]  Yes   • Anemia, chronic disease [D63.8]  Yes   • DM2 (diabetes mellitus, type 2) (CMS/HCC) [E11.9]  Yes   • Stage 4 chronic kidney disease (CMS/HCC) [N18.4]  Yes   • Hyperlipidemia [E78.5]  Yes   • Hypertension [I10]  Yes      Resolved Hospital Problems   No resolved problems to display.       DETAILS OF HOSPITAL STAY     Pertinent Test Results and Procedures Performed    Chest x-ray showed no acute cardiopulmonary changes    CT scan of the abdomen and pelvis:  1. Severely technically limited examination due to motion artifact. The   patient's sigmoid colon in particular appears thick walled, with   adjacent pericolonic soft tissue stranding. There is also involvement of   the rectum, although to a lesser extent. Appearance is suggestive of   colitis. No pneumatosis or free air is seen. Given the patient had some   rectal wall thickening on prior CT, consideration for follow-up with   endoscopy is suggested. There is some diffuse gaseous distention of   bowel, which may reflect some ileus.   2. Thick-walled appearance to the urinary bladder, with adjacent   perivesical soft tissue stranding, concerning for cystitis.     Head CT showed no acute intracranial abnormality    Brain MRI:  Limited examination secondary to patient motion and the   incomplete nature of the examination. There is no evidence of acute   infarction. Atrophy and small vessel ischemic disease is noted.     EEG:  This is an abnormal EEG due to  the presence of slowing.  Slowing can be   seen in many clinical scenarios including toxic metabolic anoxic and   neurodegenerative processes.  Clinical correlation is advised.  Total   duration of procedure was 26 minutes and 36 seconds.     Hospital Course  This is a 65-year-old male who is chronically ill with history of CKD, diabetes, hypertension, hyperlipidemia who is bedbound at home and has had prior issues with metabolic encephalopathy secondary to urinary tract infection and cellulitis stemming from bilateral lower extremity leg wounds requiring a course of outpatient IV antibiotics who presented back to the emergency room on 2/4/2021 with recurrence of altered mental status, increasing weakness, further clinical decline and Proteus UTI.  He also had 2 out of 2 positive blood cultures for staph epidermis.  Repeat blood cultures showed no growth to date.  ID was consulted and the patient remained on Rocephin and vancomycin for a total of 12 days.  He had further imaging work-up including abdominal CT showing abnormal colonic thickening as described above. This was present during previous admission and it looks like he never got an outpatient colonoscopy which was set up at last discharge.  GI followed him for this but due to his continued encephalopathy he was never able to undergo endoscopy.  He also had electrolyte discrepancies and was followed by nephrology.  Despite full treatment with antibiotics his encephalopathy never fully improved.  Neurology was consulted and he had work-up with head CT and brain MRI that were unremarkable.  Lumbar puncture was attempted but unsuccessful in radiology under fluoroscopic guidance.  Cardiology followed for atrial fibrillation/flutter.  Rate is controlled and they have put him on a low-dose of anticoagulant which I think is fine for now as he is tolerating this but can consider discontinuing given his clinical course of the next couple of weeks.  Given his lack of  significant clinical improvement, prior bedbound state, poor overall health with further decline over the past several months and poor quality of life his wife ultimately decided to pursue hospice care for him.  During some periods of more lucid thought the patient also confirmed that he would like hospice at this point.  He was transitioned to the palliative care floor and hospice met with the patient and his wife yesterday.  He is not actively dying at this time and looks stable albeit with a very poor prognosis.  He is suitable to go to the nursing home and hospice will follow him there.      Physical Exam at Discharge:  General: No acute distress, AAOx3, terminally ill-appearing  HEENT: EOMI, PERRL  Cardiovascular: +s1 and s2, RRR  Lungs: No rhonchi or wheezing  Abdomen: soft, nontender  Neuro: Waxing and waning mental state.  Only has brief periods where he is fully lucid but these are uncommon.  When he is awake and alert he is mainly confused.    Consults:   Consults     Date and Time Order Name Status Description    2/8/2021 1555 Inpatient Neurology Consult General Completed     2/6/2021 1228 Inpatient Gastroenterology Consult Completed     2/5/2021 1437 Inpatient Infectious Diseases Consult Completed     2/4/2021 2042 Inpatient Nephrology Consult Completed     2/4/2021 1748 [facility] (on-call MD unless specified) Details Completed     2/4/2021 1651 Nephrology (on -call MD unless specified) Completed             Condition on Discharge: Poor overall prognosis, he is in poor health but stable and not actively dying at this time    Discharge Disposition  Skilled Nursing Facility (DC - External)    Discharge Medications     Discharge Medications      New Medications      Instructions Start Date   ammonium lactate 12 % cream  Commonly known as: AMLACTIN   Topical, 2 Times Daily      apixaban 2.5 MG tablet tablet  Commonly known as: ELIQUIS   2.5 mg, Oral, Every 12 Hours Scheduled         Continue These Medications       Instructions Start Date   amLODIPine 5 MG tablet  Commonly known as: NORVASC   5 mg, Oral, Every 24 Hours Scheduled      atorvastatin 20 MG tablet  Commonly known as: LIPITOR   20 mg, Oral, Daily      brimonidine 0.1 % solution ophthalmic solution  Commonly known as: ALPHAGAN P   1 drop, 2 times daily      dorzolamide 2 % ophthalmic solution  Commonly known as: TRUSOPT   1 drop, Right Eye, 2 times daily         Stop These Medications    aspirin 81 MG EC tablet     diphenhydrAMINE 25 mg capsule  Commonly known as: BENADRYL     furosemide 80 MG tablet  Commonly known as: LASIX     HumaLOG KwikPen 100 UNIT/ML solution pen-injector  Generic drug: Insulin Lispro (1 Unit Dial)     HYDROcodone-acetaminophen  MG per tablet  Commonly known as: NORCO     sodium bicarbonate 650 MG tablet     [employer] Allergy 25 mg capsule  Generic drug: diphenhydrAMINE            Discharge Diet:   Diet Instructions     Advance Diet As Tolerated      Diet: Full Liquid; Thin Liquids, No Restrictions      Discharge Diet: Full Liquid    Fluid Consistency: Thin Liquids, No Restrictions          Activity at Discharge:   Activity Instructions     Activity as Tolerated            Follow-up Appointments  No future appointments.  Additional Instructions for the Follow-ups that You Need to Schedule     Discharge Follow-up with Specialty: Hospice at skilled nursing facility   As directed      Specialty: Hospice at [facility] nursing [facility]               I have examined and discussed discharge planning with the patient today.    I wore full protective equipment throughout the patient encounter including eye protection and facemask.  Hand hygiene was performed before donning protective equipment and after removal when leaving the room.     Alexander Moreno MD  02/19/21  08:12 EST    Time: Discharge greater than 30 min

## 2021-02-22 NOTE — PAYOR COMM NOTE
"DISCHARGED    REF #[id number]        Mor Alejandro (65 y.o. Male)     Date of Birth Social Security Number Address Home Phone MRN    1955  [id number]  [address] [place] [address] [phone] [id number]    Mandaeism Marital Status          Jainism        Admission Date Admission Type Admitting Provider Attending Provider Department, Room/Bed    2/4/21 Emergency Robert Baker MD  [facility] [address] [place], P490/1    Discharge Date Discharge Disposition Discharge Destination        2/19/2021 Skilled Nursing Facility (DC - External)              Attending Provider: (none)   Allergies: No Known Allergies    Isolation: None   Infection: None   Code Status: Prior    Ht: 190.5 cm (75\")   Wt: 106 kg (233 lb)    Admission Cmt: None   Principal Problem: Acute metabolic encephalopathy [G93.41]                 Active Insurance as of 2/4/2021     Primary Coverage     Payor Plan Insurance Group Employer/Plan Group    [employer] [place] MED[place]ARE REPLACEMENT WELL[employer] MEDICARE REPLACEMENT [id number]     Payor Plan Address Payor Plan Phone Number Payor Plan Fax Number Effective Dates    [address] [address] [phone]  8/13/2020 - None Entered    [facility] [id number]       Subscriber Name Subscriber Birth Date Member ID       Mor Alejandro 1955 [id number]           Secondary Coverage     Payor Plan Insurance Group Employer/Plan Group    [place]      Payor Plan Address Payor Plan Phone Number Payor Plan Fax Number Effective Dates    [address] [phone]  8/13/2020 - None Entered    [facility] [id number]       Subscriber Name Subscriber Birth Date Member ID       MOR ALEJANDRO VIOLETTA 1955 [id number]                 Emergency Contacts      (Rel.) Home Phone Work Phone Mobile Phone    Patricia Alonzo (Spouse) [phone] -- [phone]    Celia Eastman (Daughter) [phone] -- --    Zeenat Wang (Daughter) -- -- [phone]            Discharge " Order (From admission, onward)     Start     Ordered    02/19/21 0811  Discharge patient  Once     Expected Discharge Date: 02/19/21    Discharge Disposition: Skilled Nursing Facility (DC - External)    Physician of Record for Attribution - Please select from Treatment Team: EMIL BARRETT [[id number]]    Review needed by CMO to determine Physician of Record: No       Question Answer Comment   Physician of Record for Attribution - Please select from Treatment Team EMIL BARRETT    Review needed by CMO to determine Physician of Record No        02/19/21 0812

## 2021-03-22 NOTE — CONSULTS
Referring Provider: Dr. Baker  Reason for Consultation: Evaluation patient with chronic kidney disease and the hypokalemia    Subjective     Chief complaint   Chief Complaint   Patient presents with   • Altered Mental Status       History of present illness:    Patient was admitted and he presented with confusion he was found to have hypokalemia he has advanced chronic kidney disease, the patient is unable to tell me any information about his medical condition and why he was admitted.  He has chronic kidney disease stage IV, followed by [facility] nephrology, his creatinine on 12/23/2020 was 3.26 yesterday on admission 2.46 and today 2.55.  He was noted to have severe hypokalemia potassium was 2.2 and improved after 160 mEq of potassium replacement to 2.4 and he is continuing to receive potassium supplement.  Patient has chronic back pain, diabetes mellitus type 2, erectile dysfunction, dyslipidemia, hypertension, iron deficiency anemia, monoclonal gammopathy and degenerative joint disease.  The patient is not answering questions appropriately  Past Medical History:   Diagnosis Date   • Back pain    • CKD (chronic kidney disease)    • DM type 2 (diabetes mellitus, type 2) (CMS/[facility])    • ED (erectile dysfunction)    • Hyperlipidemia    • Hypertension    • SCOTTY (iron deficiency anemia)    • Monoclonal gammopathy    • Osteoarthritis      History reviewed. No pertinent surgical history.  History reviewed. No pertinent family history.  Not obtainable from the  Social History     Tobacco Use   • Smoking status: Never Smoker   • Smokeless tobacco: Never Used   Substance Use Topics   • Alcohol use: Yes   • Drug use: Never     Medications Prior to Admission   Medication Sig Dispense Refill Last Dose   • amLODIPine (NORVASC) 5 MG tablet Take 1 tablet by mouth Daily. 30 tablet 0 2/4/2021 at Unknown time   • aspirin 81 MG EC tablet Take 1 tablet by mouth Daily. 30 tablet 0 2/4/2021 at Unknown time   •  "atorvastatin (LIPITOR) 20 MG tablet Take 20 mg by mouth Daily.   2/4/2021 at Unknown time   • brimonidine (ALPHAGAN P) 0.1 % solution ophthalmic solution 1 drop 2 (two) times a day.   2/4/2021 at Unknown time   • dorzolamide (TRUSOPT) 2 % ophthalmic solution Administer 1 drop to the right eye 2 (two) times a day.   2/4/2021 at Unknown time   • furosemide (LASIX) 80 MG tablet Take 1 tablet by mouth Daily. 30 tablet 0 2/4/2021 at Unknown time   • HUMALOG KWIKPEN 100 UNIT/ML solution pen-injector INJECT 20 UNITS INTO THE SKIN TWICE DAILY 15 mL 0 2/4/2021 at Unknown time   • sodium bicarbonate 650 MG tablet Take 1 tablet by mouth 2 (Two) Times a Day. 60 tablet 0 2/4/2021 at Unknown time   • diphenhydrAMINE (BENADRYL) 25 mg capsule Take 25 mg by mouth Every 6 (Six) Hours As Needed for Itching or Allergies.   More than a month at Unknown time   • HYDROcodone-acetaminophen (NORCO)  MG per tablet Take 1 tablet po every 4 hours PRN for moderate pain, take two tablets po every 4 hours PRN for severe pain, valid if faxed to [facility]   tablet 0 Unknown at Unknown time   • WAL-DRYL ALLERGY 25 MG Take 1 capsule by mouth Every 6 (Six) Hours As Needed for itching. 30 capsule 0 Unknown at Unknown time     Allergies:  Patient has no known allergies.    Review of Systems  Not obtainable    Objective     Vital Signs  Temp:  [96.7 °F (35.9 °C)-99.9 °F (37.7 °C)] 98.9 °F (37.2 °C)  Heart Rate:  [67-91] 80  Resp:  [16-18] 18  BP: (164-204)/() 180/113    Flowsheet Rows      First Filed Value   Admission Height  190.5 cm (75\") Documented at 02/04/2021 1250   Admission Weight  114 kg (251 lb) Documented at 02/04/2021 1250           No intake/output data recorded.  I/O last 3 completed shifts:  In: 200 [IV Piggyback:200]  Out: 1100 [Urine:1100]    Intake/Output Summary (Last 24 hours) at 2/5/2021 0916  Last data filed at 2/5/2021 0620  Gross per 24 hour   Intake 200 ml   Output 1100 ml   Net -900 ml       Physical " Exam:  General Appearance: Awake, disoriented, not answering question properly, appears to be chronically ill, no acute distress,   Skin: warm and dry  HEENT: pupils round and reactive to light, oral mucosa dry, nonicteric sclera  Neck: No JVD, trachea midline, no cervical or supraclavicular lymphadenopathy, no carotid bruit  Lungs: CTA, unlabored breathing effort  Heart: RRR, normal S1 and S2, no S3, no rub  Abdomen: soft, nontender, normoactive bowels  : Bladder was palpable and urine volume by bladder scan was 300 cc  Extremities: no edema, cyanosis or clubbing  Neuro: Moving all extremities    Results Review:  Results for orders placed or performed during the hospital encounter of 02/04/21   Urine Culture - Urine, Urine, Catheter    Specimen: Urine, Catheter   Result Value Ref Range    Urine Culture No growth    COVID-19,[facility], NP/OP SWAB IN UTM/VTM/[facility]SP[facility] MEDIA,24 HR TAT - Swab, Nasopharynx    Specimen: Nasopharynx; Swab   Result Value Ref Range    COVID19 Not Detected Not Detected - Ref. Range   Comprehensive Metabolic Panel    Specimen: Blood   Result Value Ref Range    Glucose 179 (H) 65 - 99 mg/dL    BUN 23 8 - 23 mg/dL    Creatinine 2.46 (H) 0.76 - 1.27 mg/dL    Sodium 139 136 - 145 mmol/L    Potassium 2.2 (C) 3.5 - 5.2 mmol/L    Chloride 106 98 - 107 mmol/L    CO2 21.4 (L) 22.0 - 29.0 mmol/L    Calcium 9.1 8.6 - 10.5 mg/dL    Total Protein 7.1 6.0 - 8.5 g/dL    Albumin 3.20 (L) 3.50 - 5.20 g/dL    ALT (SGPT) 7 1 - 41 U/L    AST (SGOT) 14 1 - 40 U/L    Alkaline Phosphatase 104 39 - 117 U/L    Total Bilirubin 0.9 0.0 - 1.2 mg/dL    eGFR  African Amer 32 (L) >60 mL/min/1.73    Globulin 3.9 gm/dL    A/G Ratio 0.8 g/dL    BUN/Creatinine Ratio 9.3 7.0 - 25.0    Anion Gap 11.6 5.0 - 15.0 mmol/L   Troponin    Specimen: Blood   Result Value Ref Range    Troponin T 0.110 (C) 0.000 - 0.030 ng/mL   Magnesium    Specimen: Blood   Result Value Ref Range    Magnesium 1.9 1.6 - 2.4 mg/dL    Urinalysis With Microscopic If Indicated (No Culture) - Urine, Catheter    Specimen: Urine, Catheter   Result Value Ref Range    Color, UA Yellow Yellow, Straw    Appearance, UA Clear Clear    pH, UA 6.5 5.0 - 8.0    Specific Gravity, UA 1.008 1.005 - 1.030    Glucose,  mg/dL (1+) (A) Negative    Ketones, UA Negative Negative    Bilirubin, UA Negative Negative    Blood, UA Small (1+) (A) Negative    Protein, UA >=300 mg/dL (3+) (A) Negative    Leuk Esterase, UA Small (1+) (A) Negative    Nitrite, UA Negative Negative    Urobilinogen, UA 0.2 E.U./dL 0.2 - 1.0 E.U./dL   CBC Auto Differential    Specimen: Blood   Result Value Ref Range    WBC 6.57 3.40 - 10.80 10*3/mm3    RBC 4.64 4.14 - 5.80 10*6/mm3    Hemoglobin 10.7 (L) 13.0 - 17.7 g/dL    Hematocrit 34.7 (L) 37.5 - 51.0 %    MCV 74.8 (L) 79.0 - 97.0 fL    MCH 23.1 (L) 26.6 - 33.0 pg    MCHC 30.8 (L) 31.5 - 35.7 g/dL    RDW 24.2 (H) 12.3 - 15.4 %    RDW-SD 61.3 (H) 37.0 - 54.0 fl    Platelets 300 140 - 450 10*3/mm3    Neutrophil % 74.8 42.7 - 76.0 %    Lymphocyte % 17.0 (L) 19.6 - 45.3 %    Monocyte % 5.8 5.0 - 12.0 %    Eosinophil % 1.5 0.3 - 6.2 %    Basophil % 0.6 0.0 - 1.5 %    Neutrophils, Absolute 4.91 1.70 - 7.00 10*3/mm3    Lymphocytes, Absolute 1.12 0.70 - 3.10 10*3/mm3    Monocytes, Absolute 0.38 0.10 - 0.90 10*3/mm3    Eosinophils, Absolute 0.10 0.00 - 0.40 10*3/mm3    Basophils, Absolute 0.04 0.00 - 0.20 10*3/mm3   Procalcitonin    Specimen: Blood   Result Value Ref Range    Procalcitonin 0.15 0.00 - 0.25 ng/mL   Lactic Acid, Plasma    Specimen: Blood   Result Value Ref Range    Lactate 1.3 0.5 - 2.0 mmol/L   Urinalysis, Microscopic Only - Urine, Catheter    Specimen: Urine, Catheter   Result Value Ref Range    RBC, UA 0-2 None Seen, 0-2 /HPF    WBC, UA Too Numerous to Count (A) None Seen, 0-2 /HPF    Bacteria, UA None Seen None Seen /HPF    Squamous Epithelial Cells, UA 0-2 None Seen, 0-2 /HPF    Hyaline Casts, UA 0-2 None Seen /LPF     Methodology Automated Microscopy    CBC Auto Differential    Specimen: Blood   Result Value Ref Range    WBC 7.69 3.40 - 10.80 10*3/mm3    RBC 4.38 4.14 - 5.80 10*6/mm3    Hemoglobin 9.8 (L) 13.0 - 17.7 g/dL    Hematocrit 31.5 (L) 37.5 - 51.0 %    MCV 71.9 (L) 79.0 - 97.0 fL    MCH 22.4 (L) 26.6 - 33.0 pg    MCHC 31.1 (L) 31.5 - 35.7 g/dL    RDW 24.0 (H) 12.3 - 15.4 %    RDW-SD 58.9 (H) 37.0 - 54.0 fl    Platelets 296 140 - 450 10*3/mm3   Comprehensive Metabolic Panel    Specimen: Blood   Result Value Ref Range    Glucose 147 (H) 65 - 99 mg/dL    BUN 23 8 - 23 mg/dL    Creatinine 2.55 (H) 0.76 - 1.27 mg/dL    Sodium 142 136 - 145 mmol/L    Potassium 2.4 (C) 3.5 - 5.2 mmol/L    Chloride 112 (H) 98 - 107 mmol/L    CO2 19.3 (L) 22.0 - 29.0 mmol/L    Calcium 8.6 8.6 - 10.5 mg/dL    Total Protein 6.2 6.0 - 8.5 g/dL    Albumin 2.50 (L) 3.50 - 5.20 g/dL    ALT (SGPT) 7 1 - 41 U/L    AST (SGOT) 10 1 - 40 U/L    Alkaline Phosphatase 91 39 - 117 U/L    Total Bilirubin 0.6 0.0 - 1.2 mg/dL    eGFR  African Amer 31 (L) >60 mL/min/1.73    Globulin 3.7 gm/dL    A/G Ratio 0.7 g/dL    BUN/Creatinine Ratio 9.0 7.0 - 25.0    Anion Gap 10.7 5.0 - 15.0 mmol/L   Magnesium    Specimen: Blood   Result Value Ref Range    Magnesium 1.8 1.6 - 2.4 mg/dL   Phosphorus    Specimen: Blood   Result Value Ref Range    Phosphorus 2.1 (L) 2.5 - 4.5 mg/dL   Manual Differential    Specimen: Blood   Result Value Ref Range    Neutrophil % 88.0 (H) 42.7 - 76.0 %    Lymphocyte % 9.0 (L) 19.6 - 45.3 %    Monocyte % 2.0 (L) 5.0 - 12.0 %    Eosinophil % 1.0 0.3 - 6.2 %    Neutrophils Absolute 6.77 1.70 - 7.00 10*3/mm3    Lymphocytes Absolute 0.69 (L) 0.70 - 3.10 10*3/mm3    Monocytes Absolute 0.15 0.10 - 0.90 10*3/mm3    Eosinophils Absolute 0.08 0.00 - 0.40 10*3/mm3    Poikilocytes Mod/2+ None Seen    Schistocytes Large/3+ None Seen    WBC Morphology Normal Normal    Platelet Morphology Normal Normal   POC Glucose Once    Specimen: Blood   Result Value Ref  Range    Glucose 167 (H) 70 - 130 mg/dL   POC Glucose Once    Specimen: Blood   Result Value Ref Range    Glucose 137 (H) 70 - 130 mg/dL   ECG 12 Lead   Result Value Ref Range    QT Interval 445 ms   ECG 12 Lead   Result Value Ref Range    QT Interval 482 ms   Light Blue Top   Result Value Ref Range    Extra Tube hold for add-on    Green Top (Gel)   Result Value Ref Range    Extra Tube Hold for add-ons.    Lavender Top   Result Value Ref Range    Extra Tube hold for add-on    Gold Top - SST   Result Value Ref Range    Extra Tube Hold for add-ons.      Imaging Results (Last 72 Hours)     Procedure Component Value Units Date/Time    XR Chest 1 View [[id number]] Collected: 02/04/21 1452     Updated: 02/04/21 1500    Narrative:      ONE VIEW PORTABLE CHEST     HISTORY: Weakness and confusion. Hypertension.     FINDINGS: The lungs are well-expanded and clear and unchanged from  12/18/2020. The heart remains slightly enlarged.     This report was finalized on 2/4/2021 2:57 PM by Dr. Celso Clifford M.D.               amLODIPine, 5 mg, Oral, Q24H  aspirin, 81 mg, Oral, Daily  atorvastatin, 20 mg, Oral, Daily  cefTRIAXone, 1 g, Intravenous, Q24H  insulin lispro, 0-9 Units, Subcutaneous, TID AC  potassium chloride, 40 mEq, Oral, Q2H  sodium bicarbonate, 650 mg, Oral, BID  sodium chloride, 10 mL, Intravenous, Q12H           Assessment/Plan   1.  Chronic kidney disease stage IV has biopsy-proven diabetic nephropathy, appears to be euvolemic, potassium is 2.4, creatinine 2.55.  And he has none anion gap metabolic acidosis with total CO2 19.3.  2.  Altered mental  3.  Anemia, hemoglobin 9.8 and he has MCV of 71.9 and history of iron deficiency also the patient has monoclonal myopathy  4.  Uncontrolled hypertension  5.  Immobility syndrome  6.  Diabetes mellitus type 2 with diabetic nephropathy biopsy-proven.        Plan:  1.  Replete potassium  2.  Check random urine for protein to creatinine ratio, check urine electrolytes  3.   Increase amlodipine to 10 mg  4.  I will discontinue the sodium bicarbonate until the potassium is repleted  5.  Surveillance labs    Thank you for asking me to see this patient in consultation    I discussed the patient's findings and my recommendations with the patient's    Miles Del Toro MD  02/05/21  09:16 EST      Much of this encounter note is an electronic transcription/translation of spoken language to printed text. The electronic translation of spoken language may permit erroneous, or at times, nonsensical words or phrases to be inadvertently transcribed; Although I have reviewed the note for such errors, some may still exist       [] : The components of the vaccine(s) to be administered today are listed in the plan of care. The disease(s) for which the vaccine(s) are intended to prevent and the risks have been discussed with the caretaker.  The risks are also included in the appropriate vaccination information statements which have been provided to the patient's caregiver.  The caregiver has given consent to vaccinate. [Normal Growth] : growth [Normal Development] : development [No Elimination Concerns] : elimination [No Medications] : ~He/She~ is not on any medications [FreeTextEntry1] : 14 month old male here for 12 month WCC. No concerns per father. Gaining weight and developing appropriately. Discussed transitioning from bottle. \par \par #Anticipatory guidance\par - Transition to cow's milk via sippy cup. Discontinue bottle\par - Continue table foods, 3 meals with 2-3 snacks per day. \par - Incorporate up to 6 oz of flourinated water daily in a sippy cup. \par - Brush teeth twice a day with soft toothbrush. Recommend visit to dentist. Referral given\par - When in car, keep child in rear-facing car seats until age 2, or until  the maximum height and weight for seat is reached.\par -  Help baby to maintain consistent daily routines and sleep schedule.\par - Use consistent, positive discipline. Avoid screen time. \par - Read aloud to baby. RoR book given\par - MMR, VZV, Hep A and PCV given\par - Flu vaccine deferred\par - RTC in 2 months\par

## 2021-06-03 PROBLEM — K92.2 GASTROINTESTINAL HEMORRHAGE: Status: ACTIVE | Noted: 2021-01-01

## 2021-06-03 PROBLEM — Z74.09 IMMOBILITY: Status: ACTIVE | Noted: 2021-01-01

## 2021-06-03 NOTE — SIGNIFICANT NOTE
06/03/21 1459   OTHER   Discipline occupational therapist   Rehab Time/Intention   Session Not Performed other (see comments)  (Pt is dependent at baseline. Will s/o. Please re-consult as needed.)

## 2021-06-03 NOTE — PAYOR COMM NOTE
"Mor Alejandro (65 y.o. Male)     PLEASE ATTACHED FOR INPT AUTH AND DAYS     PLEASE CALL   OR  [phone] WITH INPT AUTH.     THANK YOU    CHADD VARMA LPN CCP    Date of Birth Social Security Number Address Home Phone MRN    [id number]  [id number] [facility]  [address] [place] [address] [phone] [id number]    Voodoo Marital Status          Yarsanism        Admission Date Admission Type Admitting Provider Attending Provider Department, Room/Bed    6/2/21 Emergency Rashad Ayala MD Edling, Stephen A, MD [address], N630/1    Discharge Date Discharge Disposition Discharge Destination                       Attending Provider: Rashad Ayala MD    Allergies: No Known Allergies    Isolation: None   Infection: None   Code Status: CPR    Ht: 190.5 cm (75\")   Wt: 103 kg (227 lb 12.8 oz)    Admission Cmt: None   Principal Problem: None                Active Insurance as of 6/2/2021     Primary Coverage     Payor Plan Insurance Group Employer/Plan Group    [facility] WELL[employer]ARE MEDICARE REPLACEMENT [id number]     Payor Plan Address Payor Plan Phone Number Payor Plan Fax Number Effective Dates    [address] [address] [phone]  8/13/2020 - None Entered    [facility] [id number]       Subscriber Name Subscriber Birth Date Member ID       Mor Alejandro 1955 [id number]           Secondary Coverage     Payor Plan Insurance Group Employer/Plan Group    [id number]      Payor Plan Address Payor Plan Phone Number Payor Plan Fax Number Effective Dates    [address] 2/1/2021 - None Entered    [facility] [id number]       Subscriber Name Subscriber Birth Date Member ID       MOR ALEJANDRO 1955 [id number]                 Emergency Contacts      (Rel.) Home Phone Work Phone Mobile Phone    Patricia Alonzo (Spouse) [phone] -- [phone]    Celia Eastman (Daughter) [phone] -- " --    Zeenat Wang (Daughter) -- -- [phone]            History & Physical    No notes of this type exist for this encounter.            Emergency Department Notes      Apoorva Dallas RN at 06/02/21 2131        Pt to triage from home via [facility] e [phone] with c/o abdominal pain since Monday with black tarry stools.  Pt states pain to RLQ and LLQ.  Pt denies n/v.  Pt provided with mask in triage.  Triage personnel wore appropriate PPE       Apoorva Dallas RN  06/02/21 2136      Electronically signed by Apoorva Dallas RN at 06/02/21 2136     Alicia Harris APRN at 06/02/21 7153     Attestation signed by Kolby Gasca MD at 06/03/21 8166    MD ATTESTATION NOTE    The MOUSTAPHA and I have discussed this patient's history, physical exam, and treatment plan.  I have reviewed the documentation and personally had a face to face interaction with the patient. I affirm the documentation and agree with the treatment and plan.  The attached note describes my personal findings.    I agree with the PA's or NP's findings and plan.  I reviewed the PA's or NP's note.  Kolby Gasca MD                   EMERGENCY DEPARTMENT ENCOUNTER    Room Number:  N630/1  Date of encounter:  6/3/2021  PCP: Maya Ribeiro APRN  Historian: Patient      PPE    Patient was placed in face mask in first look. Patient was wearing facemask when I entered the room and throughout our encounter. I wore full protective equipment throughout this patient encounter including a face mask, and gloves. Hand hygiene was performed before donning protective equipment and after removal when leaving the room.        HPI:  Chief Complaint: black stools  A complete HPI/ROS/PMH/PSH/SH/FH are unobtainable due to: nothing    Context: Gregorio Alejandro is a 65 y.o. male who arrives to the ED via EMS from home where he lives with his wife.  Patient presents with c/o several episodes of black tarry stools since Monday.    Patient also complains of nausea, vomiting, fever, chills, diffuse abdominal pain that is constant in nature also since Monday.  States that he has a nurse that comes to the house and when he was telling her about this and she was cleaning him up and noticed the black stools she recommended that he come to the ER on Monday.  Patient states that he is on Eliquis but is unsure why he takes that.  Patient also complains of multiple open sores to his bilateral lower extremities status post being in a nursing home earlier this year.  Patient denies chest pain, shortness of breath, headache.  Patient states that nothing makes the symptoms better and nothing worsens symptoms.          PAST MEDICAL HISTORY  Active Ambulatory Problems     Diagnosis Date Noted   • Complicated UTI (urinary tract infection) 12/18/2020   • Macrocytosis 12/19/2020   • Sepsis secondary to UTI (CMS/[facility]) 12/19/2020   • Metabolic encephalopathy 12/19/2020   • Hyperlipidemia    • Hypertension    • Monoclonal gammopathy    • Stage 4 chronic kidney disease (CMS/[facility])    • DM2 (diabetes mellitus, type 2) (CMS/[facility])    • Abnormal CT of the abdomen    • Normal anion gap metabolic acidosis    • Anemia, chronic disease 12/19/2020   • Ventricular tachycardia (paroxysmal) (CMS/[facility]) 12/21/2020   • Acute metabolic encephalopathy 02/04/2021   • UTI (urinary tract infection), bacterial 02/04/2021   • Elevated troponin 02/04/2021   • Hypokalemia 02/04/2021   • SILVIA (acute kidney injury) (CMS/[facility]) 02/04/2021   • Uncontrolled hypertension 02/04/2021   • Septicemia (CMS/H[facility]) 02/06/2021   • Vascular dementia without behavioral disturbance (CMS/HCC) 02/09/2021     Resolved Ambulatory Problems     Diagnosis Date Noted   • No Resolved Ambulatory Problems     Past Medical History:   Diagnosis Date   • Back pain    • CKD (chronic kidney disease)    • DM type 2 (diabetes mellitus, type 2) (CMS/[facility])    • ED (erectile dysfunction)    • SCOTTY (iron deficiency anemia)    •  Osteoarthritis          PAST SURGICAL HISTORY  No past surgical history on file.      FAMILY HISTORY  No family history on file.      SOCIAL HISTORY  Social History     Socioeconomic History   • Marital status:      Spouse name: Not on file   • Number of children: Not on file   • Years of education: Not on file   • Highest education level: Not on file   Tobacco Use   • Smoking status: Never Smoker   • Smokeless tobacco: Never Used   Vaping Use   • Vaping Use: Never used   Substance and Sexual Activity   • Alcohol use: Yes   • Drug use: Never   • Sexual activity: Defer         ALLERGIES  Patient has no known allergies.        REVIEW OF SYSTEMS  Review of Systems     All systems reviewed and negative except for those discussed in HPI.        PHYSICAL EXAM    ED Triage Vitals [06/02/21 2133]   Temp Heart Rate Resp BP SpO2   96.7 °F (35.9 °C) 84 18 134/82 99 %       Physical Exam  GENERAL: Chronically ill-appearing, non-toxic appearing, not distressed  HENT: normocephalic, atraumatic, dry mucous membranes  EYES: no scleral icterus, PERRL, pale sclera  CV: regular rhythm, regular rate, no murmur  RESPIRATORY: normal effort, CTAB  ABDOMEN: soft, normal bowel sounds, diffuse tenderness, no rebound, guarding or rigidity  Rectal exam performed.  Chaperone present throughout exam, OSIRIS Perez  External exam normal.  No visualized external hemorrhoids, no palpated internal hemorrhoids.  No visible anal fissures.  Heme positive, black stool noted.  passed.  No gross blood noted  MUSCULOSKELETAL: no deformity  NEURO: alert, moves all extremities, follows commands, mental status normal/baseline  SKIN: warm, dry, patient has multiple open sores to his bilateral lower extremities with mild surrounding erythema.  Psych: Appropriate mood and affect  Nursing notes and vital signs reviewed      LAB RESULTS  Recent Results (from the past 24 hour(s))   Type & Screen    Collection Time: 06/02/21 10:38 PM    Specimen:  Blood   Result Value Ref Range    ABO Type B     RH type Positive     Antibody Screen Negative     T&S Expiration Date 6/5/2021 11:59:59 PM    Light Blue Top    Collection Time: 06/02/21 10:38 PM   Result Value Ref Range    Extra Tube hold for add-on    Green Top (Gel)    Collection Time: 06/02/21 10:38 PM   Result Value Ref Range    Extra Tube Hold for add-ons.    Lavender Top    Collection Time: 06/02/21 10:38 PM   Result Value Ref Range    Extra Tube hold for add-on    Gold Top - SST    Collection Time: 06/02/21 10:38 PM   Result Value Ref Range    Extra Tube Hold for add-ons.    Comprehensive Metabolic Panel    Collection Time: 06/02/21 10:38 PM    Specimen: Blood   Result Value Ref Range    Glucose 233 (H) 65 - 99 mg/dL    BUN 68 (H) 8 - 23 mg/dL    Creatinine 3.25 (H) 0.76 - 1.27 mg/dL    Sodium 138 136 - 145 mmol/L    Potassium 3.9 3.5 - 5.2 mmol/L    Chloride 111 (H) 98 - 107 mmol/L    CO2 15.2 (L) 22.0 - 29.0 mmol/L    Calcium 8.6 8.6 - 10.5 mg/dL    Total Protein 6.6 6.0 - 8.5 g/dL    Albumin 3.10 (L) 3.50 - 5.20 g/dL    ALT (SGPT) 7 1 - 41 U/L    AST (SGOT) 7 1 - 40 U/L    Alkaline Phosphatase 181 (H) 39 - 117 U/L    Total Bilirubin 0.3 0.0 - 1.2 mg/dL    eGFR  African Amer 23 (L) >60 mL/min/1.73    Globulin 3.5 gm/dL    A/G Ratio 0.9 g/dL    BUN/Creatinine Ratio 20.9 7.0 - 25.0    Anion Gap 11.8 5.0 - 15.0 mmol/L   CBC Auto Differential    Collection Time: 06/02/21 10:38 PM    Specimen: Blood   Result Value Ref Range    WBC 5.95 3.40 - 10.80 10*3/mm3    RBC 4.16 4.14 - 5.80 10*6/mm3    Hemoglobin 9.7 (L) 13.0 - 17.7 g/dL    Hematocrit 31.2 (L) 37.5 - 51.0 %    MCV 75.0 (L) 79.0 - 97.0 fL    MCH 23.3 (L) 26.6 - 33.0 pg    MCHC 31.1 (L) 31.5 - 35.7 g/dL    RDW 21.8 (H) 12.3 - 15.4 %    RDW-SD 55.4 (H) 37.0 - 54.0 fl    Platelets 201 140 - 450 10*3/mm3    Neutrophil % 71.4 42.7 - 76.0 %    Lymphocyte % 18.7 (L) 19.6 - 45.3 %    Monocyte % 6.4 5.0 - 12.0 %    Eosinophil % 2.7 0.3 - 6.2 %    Basophil % 0.5  0.0 - 1.5 %    Immature Grans % 0.3 0.0 - 0.5 %    Neutrophils, Absolute 4.25 1.70 - 7.00 10*3/mm3    Lymphocytes, Absolute 1.11 0.70 - 3.10 10*3/mm3    Monocytes, Absolute 0.38 0.10 - 0.90 10*3/mm3    Eosinophils, Absolute 0.16 0.00 - 0.40 10*3/mm3    Basophils, Absolute 0.03 0.00 - 0.20 10*3/mm3    Immature Grans, Absolute 0.02 0.00 - 0.05 10*3/mm3    nRBC 0.2 0.0 - 0.2 /100 WBC   Urinalysis With Microscopic If Indicated (No Culture) - Urine, Catheter    Collection Time: 06/02/21 11:14 PM    Specimen: Urine, Catheter   Result Value Ref Range    Color, UA Yellow Yellow, Straw    Appearance, UA Turbid (A) Clear    pH, UA 6.5 5.0 - 8.0    Specific Gravity, UA 1.011 1.005 - 1.030    Glucose, UA Negative Negative    Ketones, UA Negative Negative    Bilirubin, UA Negative Negative    Blood, UA Small (1+) (A) Negative    Protein,  mg/dL (2+) (A) Negative    Leuk Esterase, UA Large (3+) (A) Negative    Nitrite, UA Negative Negative    Urobilinogen, UA 0.2 E.U./dL 0.2 - 1.0 E.U./dL   Urinalysis, Microscopic Only - Urine, Catheter    Collection Time: 06/02/21 11:14 PM    Specimen: Urine, Catheter   Result Value Ref Range    RBC, UA 0-2 None Seen, 0-2 /HPF    WBC, UA Too Numerous to Count (A) None Seen, 0-2 /HPF    Bacteria, UA 4+ (A) None Seen /HPF    Squamous Epithelial Cells, UA 0-2 None Seen, 0-2 /HPF    Hyaline Casts, UA 3-6 None Seen /LPF    Methodology Automated Microscopy    Lactic Acid, Plasma    Collection Time: 06/02/21 11:15 PM    Specimen: Blood   Result Value Ref Range    Lactate 1.5 0.5 - 2.0 mmol/L   POCT Occult Blood Stool    Collection Time: 06/02/21 11:58 PM    Specimen: Per Rectum; Stool   Result Value Ref Range    Fecal Occult Blood Positive (A) Negative    Lot Number [id number]     Expiration Date 11/30/2021     Positive Control Positive Positive    Negative Control Negative Negative       Ordered the above labs and independently reviewed the results.      RADIOLOGY  CT Abdomen Pelvis Without  Contrast    Result Date: 6/3/2021  CT OF THE ABDOMEN AND PELVIS WITHOUT CONTRAST  HISTORY: Diffuse abdominal pain. 11 stool.  COMPARISON: 02/05/2021  TECHNIQUE: Axial CT imaging was obtained through the abdomen and pelvis. IV contrast was administered.  FINDINGS: Images are degraded by motion artifact. There is bibasilar scarring. Similar findings were present on prior study. The patient's stomach is distended and fluid-filled. Duodenum appears relatively decompressed. Correlation with any history of gastroparesis or gastric outlet obstruction is suggested. No suspicious hepatic lesions are seen. Gallbladder is normal. Calcified granulomata are seen within the bleeding. Adrenal glands are within normal limits. Pancreas is markedly atrophic. There are dense vascular calcifications. There is an 8 mm nonobstructing stone identified within the right kidney. Full assessment of structures within the pelvis is degraded by streak artifact from bilateral hip arthroplasties. The patient does have distention of the urinary bladder. Correlation with any symptoms of urinary retention is suggested. There is also air noted within the urinary bladder and correlation with any history of instrumentation is suggested. The patient's rectosigmoid appears diffusely thick-walled. Similar findings were present on prior exam. Appearance may reflect colitis. There is colonic diverticulosis. There is no diverticulitis. There is no bowel obstruction. Appendix is not clearly seen, although I see no evidence of appendicitis. There is extensive calcification of the aorta. There is body wall edema. Patient is osteoporotic. Calcifications within the bladder wall are again seen.       1. Thick-walled appearance to the rectosigmoid. Similar findings were present on prior exam. Colitis is not excluded. 2. The stomach is markedly distended and fluid-filled. Duodenum appears relatively decompressed. Appearance may reflect some gastroparesis or gastric  outlet obstruction. 2. Distention of the urinary bladder. This may reflect some urinary retention. There is air seen within the bladder, and correlation with recent instrumentation is suggested.  Radiation dose reduction techniques were utilized, including automated exposure control and exposure modulation based on body size.  This report was finalized on 6/3/2021 1:03 AM by Dr. Vero Hansen M.D.        I ordered the above noted radiological studies and viewed the images on the PACS system.         MEDICAL RECORD REVIEW  Medical records reviewed in epic, patient was last admitted to this hospital in February 2021 for acute metabolic encephalopathy, vascular dementia without behavioral disturbance, septicemia, UTI.  Hospital Course  This is a 65-year-old male who is chronically ill with history of CKD, diabetes, hypertension, hyperlipidemia who is bedbound at home and has had prior issues with metabolic encephalopathy secondary to urinary tract infection and cellulitis stemming from bilateral lower extremity leg wounds requiring a course of outpatient IV antibiotics who presented back to the emergency room on 2/4/2021 with recurrence of altered mental status, increasing weakness, further clinical decline and Proteus UTI.  He also had 2 out of 2 positive blood cultures for staph epidermis.  Repeat blood cultures showed no growth to date.  ID was consulted and the patient remained on Rocephin and vancomycin for a total of 12 days.  He had further imaging work-up including abdominal CT showing abnormal colonic thickening as described above. This was present during previous admission and it looks like he never got an outpatient colonoscopy which was set up at last discharge.  GI followed him for this but due to his continued encephalopathy he was never able to undergo endoscopy.  He also had electrolyte discrepancies and was followed by nephrology.  Despite full treatment with antibiotics his encephalopathy never  fully improved.  Neurology was consulted and he had work-up with head CT and brain MRI that were unremarkable.  Lumbar puncture was attempted but unsuccessful in radiology under fluoroscopic guidance.  Cardiology followed for atrial fibrillation/flutter.  Rate is controlled and they have put him on a low-dose of anticoagulant which I think is fine for now as he is tolerating this but can consider discontinuing given his clinical course of the next couple of weeks.  Given his lack of significant clinical improvement, prior bedbound state, poor overall health with further decline over the past several months and poor quality of life his wife ultimately decided to pursue hospice care for him.  During some periods of more lucid thought the patient also confirmed that he would like hospice at this point.  He was transitioned to the palliative care floor and hospice met with the patient and his wife yesterday.  He is not actively dying at this time and looks stable albeit with a very poor prognosis.  He is suitable to go to the nursing home and hospice will follow him there.    PROCEDURES    Procedures        DIFFERENTIAL DIAGNOSIS  Differential diagnosis include but are not limited to the following: GI bleed, electrolyte abnormality, anemia, urinary tract infection, SILVIA      PROGRESS, DATA ANALYSIS, CONSULTS, AND MEDICAL DECISION MAKING        ED Course as of Jun 03 0311 Wed Jun 02, 2021   2324 Discussed pertinent information from history and physical exam with patient.  Discussed differential diagnosis and plan for ED evaluation/work-up and treatment including labs, CT abdomen and pelvis, pain control and IV fluids.  All questions answered.  Patient is agreeable with this plan.        [MS]   2325 Hemoglobin(!): 9.7 [MS]   2356 BUN(!): 68 [MS]   2356 Creatinine(!): 3.25 [MS]   2356 CO2(!): 15.2 [MS]   2356 Alkaline Phosphatase(!): 181 [MS]   Thu Jun 03, 2021   0035 3 months ago patient's BUN and creatinine were 18 and  1.99.  Patient has urinary tract infection currently, urine was sent for culture in 1 g of IV Rocephin ordered.    [MS]   0135 Consult Note    Discussed care with MARSHA Bernardo with [facility]  Reviewed patient's history, exam, results and need for admission secondary to urinary tract infection, SILVIA, GI bleed, leg sores  Dr. Smith accepts the patient to be admitted to telemetry inpatient bed.        [MS]   0137 Reviewed pt's history and workup with Dr. Gasca.  After a bedside evaluation, they agree with the plan of care.          [MS]      ED Course User Index  [MS] Alicia Harris, ANTWAN     ADMISSION    Discussed treatment plan and reason for admission with pt/family and admitting physician.  Pt/family voiced understanding of the plan for admission for further testing/treatment as needed.      DIAGNOSIS  Final diagnoses:   Gastrointestinal hemorrhage, unspecified gastrointestinal hemorrhage type   Acute UTI   SILVIA (acute kidney injury) (CMS/[facility])   Sore on leg           MEDICATIONS GIVEN IN ED    Medications   sodium chloride 0.9 % bolus 1,000 mL (0 mL Intravenous Stopped 6/3/21 0134)   morphine injection 4 mg (4 mg Intravenous Given 6/2/21 2357)   ondansetron (ZOFRAN) injection 4 mg (4 mg Intravenous Given 6/2/21 2355)   cefTRIAXone (ROCEPHIN) IVPB 1 g (0 g Intravenous Stopped 6/3/21 0134)           COURSE & MEDICAL DECISION MAKING  Any/All labs and Any/All Imaging studies that were ordered were reviewed and are noted above.  Results were reviewed/discussed with the patient and they were also made aware of online access.    Pt also made aware that some labs, such as cultures, will not be resulted during ER visit and follow up with PMD is necessary.        Alicia Harris APRN  06/03/21 0319      Electronically signed by Kolby Gasca MD at 06/03/21 0652     Kolby Gasca MD at 06/03/21 0206        The MOUSTAPHA and I have discussed this patients history, physical exam, and treatment plan. I  have reviewed the documentation and personally had a face to face interaction with the patient. I affirm the documentation and agree with the treatment and plan.  The following note describes my personal findings    This patient is a 65-year-old male presents to the ED from home complaining of generalized abdominal discomfort, nausea/vomiting, as well as dark stools that has been present for the past 2 days.    Exam: This patient is resting comfortably in mild distress.  He is without gross neurological deficit.  He is afebrile with stable vital signs and nontoxic in appearance.  Abdomen is soft with mild diffuse tenderness to palpation.  There is no rebound or guarding present.    Plan: We will do a Hemoccult in the emergency room.  We will obtain labs as well as a CT scan of the abdomen and pelvis.  We will reassess following.      The patient was wearing a facemask upon entrance into the room and remained in such throughout their visit.  I was wearing PPE including a facemask, eye protection, as well as gloves at any point entering the room and throughout the visit     Kolby Gasca MD  06/03/21 0659      Electronically signed by Kolby Gasca MD at 06/03/21 0659     Karis Elkins, RN at 06/03/21 0206        Attempted to call report. RN unavailable at this time. She will call back in about 10 minutes.     Karis Elkins RN  06/03/21 0207      Electronically signed by Karis Elkins RN at 06/03/21 0207     Carolyn Villanueva, RN at 06/03/21 0225          ..  Nursing report ED to floor  Gregorio Alejandro  65 y.o.  male    HPI (triage note):   Chief Complaint   Patient presents with   • Black or Bloody Stool   • Abdominal Pain       Admitting doctor:   Parker Smith MD    Admitting diagnosis:   The primary encounter diagnosis was Gastrointestinal hemorrhage, unspecified gastrointestinal hemorrhage type. Diagnoses of Acute UTI, SILVIA (acute kidney injury) (CMS/HCC), and Sore on leg were also pertinent to this  "visit.    Code status:   Current Code Status     Date Active Code Status Order ID Comments User Context       6/3/2021 0152 CPR [id number]  Kaylynn Bennett APRN ED     Advance Care Planning Activity      Questions for Current Code Status     Question Answer Comment    Code Status CPR     Medical Interventions (Level of Support Prior to Arrest) Full           Allergies:   Patient has no known allergies.    Weight:   There were no vitals filed for this visit.    Most recent vitals:   Vitals:    06/02/21 2133 06/03/21 0107 06/03/21 0130 06/03/21 0200   BP: 134/82 132/78 157/96 134/82   Pulse: 84 77 78 73   Resp: 18 17 16 16   Temp: 96.7 °F (35.9 °C)      TempSrc: Tympanic      SpO2: 99% 98% 99% 98%   Height: 190.5 cm (75\")          Active LDAs/IV Access:   Lines, Drains & Airways    Active LDAs     Name:   Placement date:   Placement time:   Site:   Days:    Peripheral IV 06/02/21 2243 Right Antecubital   06/02/21 2243    Antecubital   less than 1    Urethral Catheter Silicone 16 Fr.   02/16/21    1750     106                Labs (abnormal labs have a star):   Labs Reviewed   COMPREHENSIVE METABOLIC PANEL - Abnormal; Notable for the following components:       Result Value    Glucose 233 (*)     BUN 68 (*)     Creatinine 3.25 (*)     Chloride 111 (*)     CO2 15.2 (*)     Albumin 3.10 (*)     Alkaline Phosphatase 181 (*)     eGFR   Amer 23 (*)     All other components within normal limits    Narrative:     GFR Normal >60  Chronic Kidney Disease <60  Kidney Failure <15     URINALYSIS W/ MICROSCOPIC IF INDICATED (NO CULTURE) - Abnormal; Notable for the following components:    Appearance, UA Turbid (*)     Blood, UA Small (1+) (*)     Protein,  mg/dL (2+) (*)     Leuk Esterase, UA Large (3+) (*)     All other components within normal limits   CBC WITH AUTO DIFFERENTIAL - Abnormal; Notable for the following components:    Hemoglobin 9.7 (*)     Hematocrit 31.2 (*)     MCV 75.0 (*)     MCH 23.3 (*)     " MCHC 31.1 (*)     RDW 21.8 (*)     RDW-SD 55.4 (*)     Lymphocyte % 18.7 (*)     All other components within normal limits   URINALYSIS, MICROSCOPIC ONLY - Abnormal; Notable for the following components:    WBC, UA Too Numerous to Count (*)     Bacteria, UA 4+ (*)     All other components within normal limits   POCT OCCULT BLOOD STOOL (ED ONLY) - Abnormal; Notable for the following components:    Fecal Occult Blood Positive (*)     All other components within normal limits   LACTIC ACID, PLASMA - Normal   COVID PRE-OP / PRE-PROCEDURE SCREENING ORDER (NO ISOLATION)    Narrative:     The following orders were created for panel order COVID PRE-OP / PRE-PROCEDURE SCREENING ORDER (NO ISOLATION) - Swab, Nasopharynx.  Procedure                               Abnormality         Status                     ---------                               -----------         ------                     COVID-19,APTIMA PANTHER,...[[id number]]                      In process                   Please view results for these tests on the individual orders.   COVID-19,[facility]THER[facility] IN-HOUSE,NP/OP SWAB IN UTM/VTM/SALINE TRANSPORT MEDIA,24 HR TAT   URINE CULTURE   RAINBOW DRAW    Narrative:     The following orders were created for panel order Procious Draw.  Procedure                               Abnormality         Status                     ---------                               -----------         ------                     Light Blue Top[[id number]]                                   Final result               Green Top (Gel)[[id number]]                                  Final result               Lavender Top[[id number]]                                     Final result               Gold Top - SST[[id number]]                                   Final result                 Please view results for these tests on the individual orders.   BASIC METABOLIC PANEL   HEMOGLOBIN A1C   HEMOGLOBIN AND HEMATOCRIT, BLOOD   POCT GLUCOSE FINGERSTICK   POCT  GLUCOSE FINGERSTICK   POCT GLUCOSE FINGERSTICK   POCT GLUCOSE FINGERSTICK   TYPE AND SCREEN   LIGHT BLUE TOP   GREEN TOP   LAVENDER TOP   GOLD TOP - SST   CBC AND DIFFERENTIAL    Narrative:     The following orders were created for panel order CBC & Differential.  Procedure                               Abnormality         Status                     ---------                               -----------         ------                     CBC Auto Differential[[id number]]        Abnormal            Final result                 Please view results for these tests on the individual orders.       EKG:   No orders to display       Meds given in ED:   Medications   sodium chloride 0.9 % flush 10 mL (has no administration in time range)   sodium chloride 0.9 % flush 10 mL (has no administration in time range)   sodium chloride 0.9 % flush 10 mL (has no administration in time range)   nitroglycerin (NITROSTAT) SL tablet 0.4 mg (has no administration in time range)   sodium chloride 0.9 % infusion (has no administration in time range)   acetaminophen (TYLENOL) tablet 650 mg (has no administration in time range)     Or   acetaminophen (TYLENOL) 160 MG/5ML solution 650 mg (has no administration in time range)     Or   acetaminophen (TYLENOL) suppository 650 mg (has no administration in time range)   ondansetron (ZOFRAN) tablet 4 mg (has no administration in time range)     Or   ondansetron (ZOFRAN) injection 4 mg (has no administration in time range)   calcium carbonate (TUMS) chewable tablet 500 mg (200 mg elemental) (has no administration in time range)   dextrose (GLUTOSE) oral gel 15 g (has no administration in time range)   dextrose (D50W) 25 g/ 50mL Intravenous Solution 25 g (has no administration in time range)   glucagon (human recombinant) (GLUCAGEN DIAGNOSTIC) injection 1 mg (has no administration in time range)   insulin lispro (ADMELOG) injection 0-9 Units (has no administration in time range)   sodium chloride 0.9 %  bolus 1,000 mL (0 mL Intravenous Stopped 6/3/21 0134)   morphine injection 4 mg (4 mg Intravenous Given 6/2/21 2357)   ondansetron (ZOFRAN) injection 4 mg (4 mg Intravenous Given 6/2/21 2355)   cefTRIAXone (ROCEPHIN) IVPB 1 g (0 g Intravenous Stopped 6/3/21 0134)       Imaging results:  CT Abdomen Pelvis Without Contrast    Result Date: 6/3/2021   1. Thick-walled appearance to the rectosigmoid. Similar findings were present on prior exam. Colitis is not excluded. 2. The stomach is markedly distended and fluid-filled. Duodenum appears relatively decompressed. Appearance may reflect some gastroparesis or gastric outlet obstruction. 2. Distention of the urinary bladder. This may reflect some urinary retention. There is air seen within the bladder, and correlation with recent instrumentation is suggested.  Radiation dose reduction techniques were utilized, including automated exposure control and exposure modulation based on body size.  This report was finalized on 6/3/2021 1:03 AM by Dr. Vero Hansen M.D.        Ambulatory status:   -     Social issues:   Social History     Socioeconomic History   • Marital status:      Spouse name: Not on file   • Number of children: Not on file   • Years of education: Not on file   • Highest education level: Not on file   Tobacco Use   • Smoking status: Never Smoker   • Smokeless tobacco: Never Used   Vaping Use   • Vaping Use: Never used   Substance and Sexual Activity   • Alcohol use: Yes   • Drug use: Never   • Sexual activity: Defer    Nursing report ED to floor       Carolyn Villanueva RN  06/03/21 0225      Electronically signed by Carolyn Villanueva RN at 06/03/21 0225

## 2021-06-03 NOTE — PROGRESS NOTES
Discharge Planning Assessment  [facility]     Patient Name: Gregorio Alejandro  MRN: [id number]  Today's Date: 6/3/2021    Admit Date: 6/2/2021    Discharge Needs Assessment     Row Name 06/03/21 3840       Living Environment    Lives With  spouse    Name(s) of Who Lives With Patient  Patricia Alonzo [phone]    Current Living Arrangements  home/apartment/condo    Primary Care Provided by  spouse/significant other    Provides Primary Care For  no one, unable/limited ability to care for self    Family Caregiver if Needed  none    Quality of Family Relationships  involved    Able to Return to Prior Arrangements  no       Transition Planning    Patient/Family Anticipates Transition to  long-term care facility    Transportation Anticipated  health plan transportation       Discharge Needs Assessment    Concerns to be Addressed  discharge planning;home safety    Provided Post Acute Provider List?  Yes    Post Acute Provider List  Nursing Home    Delivered To  Support Person    Support Person  spouse    Method of Delivery  Telephone        Discharge Plan     Row Name 06/03/21 1839       Plan    Plan  will need long term care; referrals placed in Epic    Plan Comments  Spoke with spouse Patricia Wang [phone] regarding discharge planning. Spouse stated patient is not able to care for himself at home and she is not able to care for him she stated she is in a wheelchair her self. Spouse stated patient will not be able to return home and will need long term care. Spouse gave [facility] permission to make referrals [place] since she has to take a tarc 3 to travel. Spouse does not want [facility] or [facility]. Patient will need transportation when discharged. Ivone Brito RN        Continued Care and Services - Admitted Since 6/2/2021     Destination     Service Provider Request Status Selected Services Address Phone Fax Patient Preferred    [facility]  P[facility] - Request  [facility] [address] [facility] [address] [id number] [phone] --    [facility]  Pending - Request Sent N/A [address] [facility] [id number] [phone] --    [facility]  Pending - Request Sent N/A [address] [facility] [id number] [phone] --    [facility]  Pending - Request Sent N/A [address], [facility] [phone] --          Home Medical Care     Service Provider Request Status Selected Services Address Phone Fax Patient Preferred    [facility]  Accepted N/A [email or web address] [address] [phone] --                Demographic Summary     Row Name 06/03/21 1519       General Information    Admission Type  inpatient    Arrived From  emergency department    Referral Source  admission list    Reason for Consult  discharge planning    Preferred Language  English     Used During This Interaction  no        Functional Status     Row Name 06/03/21 1519       Functional Status    Usual Activity Tolerance  poor    Current Activity Tolerance  poor       Functional Status, IADL    Medications  completely dependent    Meal Preparation  completely dependent    Housekeeping  completely dependent    Laundry  completely dependent    Shopping  completely dependent        Psychosocial    No documentation.       Abuse/Neglect    No documentation.       Legal    No documentation.       Substance Abuse    No documentation.       Patient Forms    No documentation.           Ivone Brito, RN

## 2021-06-03 NOTE — ED PROVIDER NOTES
The MOUSTAPHA and I have discussed this patients history, physical exam, and treatment plan. I have reviewed the documentation and personally had a face to face interaction with the patient. I affirm the documentation and agree with the treatment and plan.  The following note describes my personal findings    This patient is a 65-year-old male presents to the ED from home complaining of generalized abdominal discomfort, nausea/vomiting, as well as dark stools that has been present for the past 2 days.    Exam: This patient is resting comfortably in mild distress.  He is without gross neurological deficit.  He is afebrile with stable vital signs and nontoxic in appearance.  Abdomen is soft with mild diffuse tenderness to palpation.  There is no rebound or guarding present.    Plan: We will do a Hemoccult in the emergency room.  We will obtain labs as well as a CT scan of the abdomen and pelvis.  We will reassess following.      The patient was wearing a facemask upon entrance into the room and remained in such throughout their visit.  I was wearing PPE including a facemask, eye protection, as well as gloves at any point entering the room and throughout the visit     Kolby Gasca MD  06/03/21 2763

## 2021-06-03 NOTE — H&P
Patient Name:  Gregorio Alejandro  YOB: 1955  MRN:  [id number]  Admit Date:  6/2/2021  Patient Care Team:  Maya Ribeiro APRN as PCP - General (Family Medicine)      Subjective   History Present Illness     Chief Complaint   Patient presents with   • Black or Bloody Stool   • Abdominal Pain       Mr. Alejandro is a 65 y.o. male with a history of multiple medical issues including CKD, DM, HTN, HLD, SCOTTY, monoclonal gammopathy, vascular dementia, chronic immobility, documented afib/flutter that presents to [facility] complaining of black tarry stools. Pt is sleeping on exam, unable to stay awake. He is chronically ill appearing. According to ED records and prior hospitalization notes, pt is bedbound at baseline. He was hospitalized in February where he was transferred to  palliative unit where he was evaluated by Hospice. He was discharged to NH following that hospitalization with plan for Hospice to continue to follow. He presented to ED with several episodes of black tarry stools since Monday. He has a visiting nurse who witnessed the stools and advised pt to be seen in ED as he takes eliquis. It is documented that he was also c/o n/v, chills, fever, abd pain. He was Heme positive on exam.    Afebrile. HR controlled. BP stable. No hypoxia. WBC 5.95, Hgb 9.7, MCV 75.0. Gluc 233, BUN/Cr 68/3.25, Cl 111, CO2 15.2, Alb 3.10, Alk phos 181. UA 1+ blood, 3+ leuk, neg nitrite, TNTC WBC, 4+ bact. Lactate 1.5. Covid neg. CT A/P:  Similar findings of thick walled rectosigmoid, colitis not excluded; stomach distended and fluid-filled; distention of urinary bladder.       Consulted   Cardiology  Nephrology  Infectious disease  Edited by: Rashad Ayala MD at 6/3/2021 0934  Review of Systems   Unable to perform ROS: Other        Personal History     Past Medical History:   Diagnosis Date   • Back pain    • CKD (chronic kidney disease)    • DM type 2 (diabetes mellitus, type 2) (CMS/[facility])    •  ED (erectile dysfunction)    • Hyperlipidemia    • Hypertension    • SCOTTY (iron deficiency anemia)    • Monoclonal gammopathy    • Osteoarthritis      Past Surgical History:   Procedure Laterality Date   • ABDOMINAL SURGERY     • EYE SURGERY     • JOINT REPLACEMENT     • TONSILLECTOMY       History reviewed. No pertinent family history.  Social History     Tobacco Use   • Smoking status: Never Smoker   • Smokeless tobacco: Never Used   Vaping Use   • Vaping Use: Never used   Substance Use Topics   • Alcohol use: Yes     Comment: 1 pint   • Drug use: Never     No current facility-administered medications on file prior to encounter.     Current Outpatient Medications on File Prior to Encounter   Medication Sig Dispense Refill   • amLODIPine (NORVASC) 5 MG tablet Take 1 tablet by mouth Daily. 30 tablet 0   • apixaban (ELIQUIS) 2.5 MG tablet tablet Take 1 tablet by mouth Every 12 (Twelve) Hours. Indications: Atrial Fibrillation 60 tablet    • atorvastatin (LIPITOR) 20 MG tablet Take 20 mg by mouth Daily.     • brimonidine (ALPHAGAN P) 0.1 % solution ophthalmic solution 1 drop 2 (two) times a day.     • dorzolamide (TRUSOPT) 2 % ophthalmic solution Administer 1 drop to the right eye 2 (two) times a day.     • ammonium lactate (AMLACTIN) 12 % cream Apply  topically to the appropriate area as directed 2 (Two) Times a Day.       No Known Allergies    Objective    Objective     Vital Signs  Temp:  [96.7 °F (35.9 °C)-97.8 °F (36.6 °C)] 97 °F (36.1 °C)  Heart Rate:  [70-84] 83  Resp:  [16-18] 16  BP: (132-157)/(78-96) 136/81  SpO2:  [93 %-99 %] 93 %  on   ;   Device (Oxygen Therapy): room air  Body mass index is 28.47 kg/m².    Physical Exam  Vitals and nursing note reviewed.   Constitutional:       Appearance: He is ill-appearing.   HENT:      Head: Normocephalic.   Eyes:      General: Lids are normal.   Cardiovascular:      Rate and Rhythm: Normal rate. Rhythm irregular.   Pulmonary:      Effort: Pulmonary effort is normal.  No respiratory distress.      Comments: Coarse breath sounds elenita bases  Abdominal:      General: There is distension.   Musculoskeletal:      Cervical back: Neck supple.      Right lower leg: Edema present.      Left lower leg: Edema present.   Skin:     General: Skin is warm and dry.      Comments: Difficult to assess legs due to pt positioning; superficial wounds noted LLE   Neurological:      Mental Status: He is lethargic.         Results Review:  I reviewed the patient's new clinical results.  I reviewed the patient's new imaging results and agree with the interpretation.  I reviewed the patient's other test results and agree with the interpretation  I personally viewed and interpreted the patient's EKG/Telemetry data    Lab Results (last 24 hours)     Procedure Component Value Units Date/Time    CBC & Differential [[id number]]  (Abnormal) Collected: 06/02/21 2238    Specimen: Blood Updated: 06/02/21 2318    Narrative:      The following orders were created for panel order CBC & Differential.  Procedure                               Abnormality         Status                     ---------                               -----------         ------                     CBC Auto Differential[[id number]]        Abnormal            Final result                 Please view results for these tests on the individual orders.    Comprehensive Metabolic Panel [[id number]]  (Abnormal) Collected: 06/02/21 2238    Specimen: Blood Updated: 06/02/21 2345     Glucose 233 mg/dL      BUN 68 mg/dL      Creatinine 3.25 mg/dL      Sodium 138 mmol/L      Potassium 3.9 mmol/L      Chloride 111 mmol/L      CO2 15.2 mmol/L      Calcium 8.6 mg/dL      Total Protein 6.6 g/dL      Albumin 3.10 g/dL      ALT (SGPT) 7 U/L      AST (SGOT) 7 U/L      Alkaline Phosphatase 181 U/L      Total Bilirubin 0.3 mg/dL      eGFR  African Amer 23 mL/min/1.73      Globulin 3.5 gm/dL      A/G Ratio 0.9 g/dL      BUN/Creatinine Ratio 20.9     Anion Gap 11.8  mmol/L     Narrative:      GFR Normal >60  Chronic Kidney Disease <60  Kidney Failure <15      CBC Auto Differential [[id number]]  (Abnormal) Collected: 06/02/21 2238    Specimen: Blood Updated: 06/02/21 2318     WBC 5.95 10*3/mm3      RBC 4.16 10*6/mm3      Hemoglobin 9.7 g/dL      Hematocrit 31.2 %      MCV 75.0 fL      MCH 23.3 pg      MCHC 31.1 g/dL      RDW 21.8 %      RDW-SD 55.4 fl      Platelets 201 10*3/mm3      Neutrophil % 71.4 %      Lymphocyte % 18.7 %      Monocyte % 6.4 %      Eosinophil % 2.7 %      Basophil % 0.5 %      Immature Grans % 0.3 %      Neutrophils, Absolute 4.25 10*3/mm3      Lymphocytes, Absolute 1.11 10*3/mm3      Monocytes, Absolute 0.38 10*3/mm3      Eosinophils, Absolute 0.16 10*3/mm3      Basophils, Absolute 0.03 10*3/mm3      Immature Grans, Absolute 0.02 10*3/mm3      nRBC 0.2 /100 WBC     Urinalysis With Microscopic If Indicated (No Culture) - Urine, Catheter [[id number]]  (Abnormal) Collected: 06/02/21 2314    Specimen: Urine, Catheter Updated: 06/03/21 0003     Color, UA Yellow     Appearance, UA Turbid     pH, UA 6.5     Specific Gravity, UA 1.011     Glucose, UA Negative     Ketones, UA Negative     Bilirubin, UA Negative     Blood, UA Small (1+)     Protein,  mg/dL (2+)     Leuk Esterase, UA Large (3+)     Nitrite, UA Negative     Urobilinogen, UA 0.2 E.U./dL    Urinalysis, Microscopic Only - Urine, Catheter [[id number]]  (Abnormal) Collected: 06/02/21 2314    Specimen: Urine, Catheter Updated: 06/03/21 0003     RBC, UA 0-2 /HPF      WBC, UA Too Numerous to Count /HPF      Bacteria, UA 4+ /HPF      Squamous Epithelial Cells, UA 0-2 /HPF      Hyaline Casts, UA 3-6 /LPF      Methodology Automated Microscopy    Urine Culture - Urine, Urine, Catheter [[id number]] Collected: 06/02/21 2314    Specimen: Urine, Catheter Updated: 06/03/21 0111    Lactic Acid, Plasma [[id number]]  (Normal) Collected: 06/02/21 2315    Specimen: Blood Updated: 06/02/21 4075     Lactate 1.5 mmol/L      COVID PRE-OP / PRE-PROCEDURE SCREENING ORDER (NO ISOLATION) - Swab, Nasopharynx [[id number]]  (Normal) Collected: 06/02/21 2318    Specimen: Swab from Nasopharynx Updated: 06/03/21 0330    Narrative:      The following orders were created for panel order COVID PRE-OP / PRE-PROCEDURE SCREENING ORDER (NO ISOLATION) - Swab, Nasopharynx.  Procedure                               Abnormality         Status                     ---------                               -----------         ------                     COVID-19,APTIMA PANTHER,...[[id number]]  Normal              Final result                 Please view results for these tests on the individual orders.    COVID-19,APTIMA PANTHER,[facility], NP/OP SWAB IN UTM/VTM/[facility]SPORT MEDIA,24 HR TAT - Swab, Nasopharynx [[id number]]  (Normal) Collected: 06/02/21 2318    Specimen: Swab from Nasopharynx Updated: 06/03/21 0330     COVID19 Not Detected    Narrative:      Fact sheet for providers: https://www.fda.gov/media/065420/download     Fact sheet for patients: https://www.fda.gov/media/696799/download    Test performed by RT PCR.    POCT Occult Blood Stool [[id number]]  (Abnormal) Collected: 06/02/21 2358    Specimen: Stool from Per Rectum Updated: 06/02/21 2358     Fecal Occult Blood Positive     Lot Number [id number]     Expiration Date 11/30/2021     Positive Control Positive     Negative Control Negative    POC Glucose Once [[id number]]  (Abnormal) Collected: 06/03/21 0331    Specimen: Blood Updated: 06/03/21 0332     Glucose 147 mg/dL     POC Glucose Once [[id number]]  (Abnormal) Collected: 06/03/21 0609    Specimen: Blood Updated: 06/03/21 0611     Glucose 133 mg/dL     Hemoglobin A1c [[id number]]  (Abnormal) Collected: 06/03/21 0922    Specimen: Blood Updated: 06/03/21 1034     Hemoglobin A1C 7.18 %     Narrative:      Hemoglobin A1C Ranges:    Increased Risk for Diabetes  5.7% to 6.4%  Diabetes                     >= 6.5%  Diabetic Goal                < 7.0%    Hemoglobin  & Hematocrit, Blood [[id number]]  (Abnormal) Collected: 06/03/21 0922    Specimen: Blood from Arm, Left Updated: 06/03/21 0955     Hemoglobin 9.3 g/dL      Hematocrit 31.0 %     POC Glucose Once [[id number]]  (Abnormal) Collected: 06/03/21 1131    Specimen: Blood Updated: 06/03/21 1134     Glucose 237 mg/dL           Imaging Results (Last 24 Hours)     Procedure Component Value Units Date/Time    CT Abdomen Pelvis Without Contrast [[id number]] Collected: 06/03/21 0053     Updated: 06/03/21 0106    Narrative:      CT OF THE ABDOMEN AND PELVIS WITHOUT CONTRAST     HISTORY: Diffuse abdominal pain. 11 stool.     COMPARISON: 02/05/2021     TECHNIQUE: Axial CT imaging was obtained through the abdomen and pelvis.  IV contrast was administered.     FINDINGS:  Images are degraded by motion artifact. There is bibasilar scarring.  Similar findings were present on prior study. The patient's stomach is  distended and fluid-filled. Duodenum appears relatively decompressed.  Correlation with any history of gastroparesis or gastric outlet  obstruction is suggested. No suspicious hepatic lesions are seen.  Gallbladder is normal. Calcified granulomata are seen within the  bleeding. Adrenal glands are within normal limits. Pancreas is markedly  atrophic. There are dense vascular calcifications. There is an 8 mm  nonobstructing stone identified within the right kidney. Full assessment  of structures within the pelvis is degraded by streak artifact from  bilateral hip arthroplasties. The patient does have distention of the  urinary bladder. Correlation with any symptoms of urinary retention is  suggested. There is also air noted within the urinary bladder and  correlation with any history of instrumentation is suggested. The  patient's rectosigmoid appears diffusely thick-walled. Similar findings  were present on prior exam. Appearance may reflect colitis. There is  colonic diverticulosis. There is no diverticulitis. There is no  bowel  obstruction. Appendix is not clearly seen, although I see no evidence of  appendicitis. There is extensive calcification of the aorta. There is  body wall edema. Patient is osteoporotic. Calcifications within the  bladder wall are again seen.       Impression:         1. Thick-walled appearance to the rectosigmoid. Similar findings were  present on prior exam. Colitis is not excluded.  2. The stomach is markedly distended and fluid-filled. Duodenum appears  relatively decompressed. Appearance may reflect some gastroparesis or  gastric outlet obstruction.  2. Distention of the urinary bladder. This may reflect some urinary  retention. There is air seen within the bladder, and correlation with  recent instrumentation is suggested.     Radiation dose reduction techniques were utilized, including automated  exposure control and exposure modulation based on body size.     This report was finalized on 6/3/2021 1:03 AM by Dr. Vero Hansen M.D.             Results for orders placed during the hospital encounter of 12/18/20    Adult Transthoracic Echo Complete W/ Cont if Necessary Per Protocol    Interpretation Summary  · Calculated left ventricular EF = 59% Estimated left ventricular EF was in agreement with the calculated left ventricular EF.  · Left ventricular diastolic function is consistent with (grade I) impaired relaxation.    Biatrial enlargement, with asymmetric LVH.  Speckled type pattern.  Consider amyloid.  No significant valve disease.      ECG 12 Lead   Preliminary Result   HEART RATE= 81  bpm   RR Interval= 736  ms   SD Interval=   ms   P Horizontal Axis=   deg   P Front Axis=   deg   QRSD Interval= 109  ms   QT Interval= 454  ms   QRS Axis= 35  deg   T Wave Axis= -88  deg   - ABNORMAL ECG -   Atrial fibrillation   Abnormal lateral Q waves   Minimal ST depression, anterolateral leads   Prolonged QT interval   Electronically Signed By:    Date and Time of Study: 2021-06-03 08:53:20            Assessment/Plan     Active Hospital Problems    Diagnosis  POA   • **Gastrointestinal hemorrhage [K92.2]  Yes   • Immobility [Z74.09]  Yes   • SILVIA (acute kidney injury) (CMS/[facility]) [N17.9]  Yes   • Hypertension [I10]  Yes   • DM2 (diabetes mellitus, type 2) (CMS/[facility]) [E11.9]  Yes   • Abnormal CT of the abdomen [R93.5]  Yes   • Acute UTI (urinary tract infection) [N39.0]  Yes      Resolved Hospital Problems   No resolved problems to display.       Mr. Alejandro is a 65 y.o. male with a history of multiple medical issues including CKD, DM, HTN, HLD, SCOTTY, monoclonal gammopathy, vascular dementia, chronic immobility, documented afib/flutter  who is admitted for GIB, UTI, SILVIA    GI bleed:  -Hold AC   -GI consult appreciated; likely endoscopy in the next couple of days  -KUB to follow up stomach distention on CT  -Serial H&H  -Transfuse as needed  - IV reglan    UTI:  -Continue rocephin  -Follow urine culture  -ID consulted to evaluate colitis/cystitis on CT    SILVIA:  -Baseline Cr ~2 per Nephrology  -IVF's  -Monitor PVR    Chronic LE wounds:  -Wound RN consult    Afib/flutter/AC:  -Cardiology following  -HR controlled; beta blockers/dilitazem avoided due to hx of pauses  -AC on hold until GI workup complete    DM:  -Monitor BG trends  -Correctional scale insulin  -A1C 7.18%      I discussed the patient's findings and my recommendations with nursing staff.    VTE Prophylaxis - Eliquis (home med), on hold.  Code Status - Full code, will attempt to verify.       ANTWAN Aguilar  [facility]  06/03/21  12:57 EDT

## 2021-06-03 NOTE — CONSULTS
[facility]  Initial Inpatient Consult Note    Referring Provider: EMORY    Reason for Consultation: GI bleed    Subjective     History of present illness:    65 y.o. male, known to our service from previous evaluations that was admitted from home complaining of abdominal discomfort, nausea vomiting and dark stools that been present for 2 days.  She has a complicated past medical history significant for chronic kidney disease.  Is anticoagulated on Eliquis.  He has a history of abnormal appearance of the rectosigmoid colon which is chronic dating back to December 2020.  CT this admission was reviewed by me.  His stomach appears distended and fluid-filled with decompressed duodenum.  He was noted to be heme positive this admission.    Patient is been previously seen by our service earlier this year as well as late last year for iron deficiency anemia and abnormal imaging of the rectum.  He was discharged on February 18 and endoscopic evaluation was not performed due to his difficulty complying with the bowel prep due to his mental status.  He was discharged to hospice at that time.    Hemoglobin on admission was 9.7.  He is partially blind but reports that he was told his stools been dark for 2 days.  Been having a lot of abdominal discomfort and distention.  Bowel movements otherwise have been normal per the patient.  He did have nausea and vomiting prior to admission.  He reports that his goals have changed and he wants to live.  He is alert and oriented today.  He no longer desires hospice care.  He is willing to undergo endoscopic evaluation for further evaluation of his anemia, heme positive stool and abnormal CT scan.  He is sitting in his bed eating a regular diet.  He denies nausea at this time.    Past Medical History:  Past Medical History:   Diagnosis Date   • Back pain    • CKD (chronic kidney disease)    • DM type 2 (diabetes mellitus, type 2) (CMS/[facility])    • ED (erectile  dysfunction)    • Hyperlipidemia    • Hypertension    • SCOTTY (iron deficiency anemia)    • Monoclonal gammopathy    • Osteoarthritis      Past Surgical History:  Past Surgical History:   Procedure Laterality Date   • ABDOMINAL SURGERY     • EYE SURGERY     • JOINT REPLACEMENT     • TONSILLECTOMY        Social History:   Social History     Tobacco Use   • Smoking status: Never Smoker   • Smokeless tobacco: Never Used   Substance Use Topics   • Alcohol use: Yes     Comment: 1 pint      Family History:  History reviewed. No pertinent family history.    Home Meds:  Medications Prior to Admission   Medication Sig Dispense Refill Last Dose   • amLODIPine (NORVASC) 5 MG tablet Take 1 tablet by mouth Daily. 30 tablet 0 6/2/2021 at Unknown time   • apixaban (ELIQUIS) 2.5 MG tablet tablet Take 1 tablet by mouth Every 12 (Twelve) Hours. Indications: Atrial Fibrillation 60 tablet  6/2/2021 at Unknown time   • atorvastatin (LIPITOR) 20 MG tablet Take 20 mg by mouth Daily.   6/2/2021 at Unknown time   • brimonidine (ALPHAGAN P) 0.1 % solution ophthalmic solution 1 drop 2 (two) times a day.   6/2/2021 at Unknown time   • dorzolamide (TRUSOPT) 2 % ophthalmic solution Administer 1 drop to the right eye 2 (two) times a day.   6/2/2021 at Unknown time   • ammonium lactate (AMLACTIN) 12 % cream Apply  topically to the appropriate area as directed 2 (Two) Times a Day.   Unknown at Unknown time     Current Meds:   insulin lispro, 0-9 Units, Subcutaneous, 4x Daily With Meals & Nightly  sodium chloride, 10 mL, Intravenous, Q12H      Allergies:  No Known Allergies  Review of Systems  Pertinent items are noted in HPI, all other systems reviewed and negative     Objective     Vital Signs  Temp:  [96.7 °F (35.9 °C)-97.8 °F (36.6 °C)] 97 °F (36.1 °C)  Heart Rate:  [70-84] 83  Resp:  [16-18] 16  BP: (132-157)/(78-96) 136/81  Physical Exam:  General Appearance:    Alert, cooperative, in no acute distress   Head:    Normocephalic, without obvious  abnormality, atraumatic   Eyes:          conjunctivae and sclerae normal, no   icterus   Throat:   no thrush, oral mucosa moist   Neck:   Supple, no adenopathy   Lungs:     Clear to auscultation bilaterally    Heart:    Regular rhythm and normal rate    Chest Wall:    No abnormalities observed   Abdomen:     Soft, distended, generalized tenderness without rebound or guarding; normal bowel sounds   Extremities:  Lower extremities wrapped bilaterally   Skin:   No bruising or rash   Psychiatric:  normal mood and insight     Results Review:   I reviewed the patient's new clinical results.    Results from last 7 days   Lab Units 06/02/21  2238   WBC 10*3/mm3 5.95   HEMOGLOBIN g/dL 9.7*   HEMATOCRIT % 31.2*   PLATELETS 10*3/mm3 201     Results from last 7 days   Lab Units 06/02/21  2238   SODIUM mmol/L 138   POTASSIUM mmol/L 3.9   CHLORIDE mmol/L 111*   CO2 mmol/L 15.2*   BUN mg/dL 68*   CREATININE mg/dL 3.25*   CALCIUM mg/dL 8.6   BILIRUBIN mg/dL 0.3   ALK PHOS U/L 181*   ALT (SGPT) U/L 7   AST (SGOT) U/L 7   GLUCOSE mg/dL 233*         Lab Results   Lab Value Date/Time    LIPASE 17 12/18/2020 1653    LIPASE 914 (H) 10/27/2020 0330    LIPASE 1,087 (H) 10/26/2020 0410    LIPASE 1,653 (H) 10/25/2020 0323       Radiology:  CT Abdomen Pelvis Without Contrast   Final Result       1. Thick-walled appearance to the rectosigmoid. Similar findings were   present on prior exam. Colitis is not excluded.   2. The stomach is markedly distended and fluid-filled. Duodenum appears   relatively decompressed. Appearance may reflect some gastroparesis or   gastric outlet obstruction.   2. Distention of the urinary bladder. This may reflect some urinary   retention. There is air seen within the bladder, and correlation with   recent instrumentation is suggested.       Radiation dose reduction techniques were utilized, including automated   exposure control and exposure modulation based on body size.       This report was finalized on  6/3/2021 1:03 AM by Dr. Vero Hansen M.D.              Assessment/Plan   Patient Active Problem List   Diagnosis   • Complicated UTI (urinary tract infection)   • Macrocytosis   • Sepsis secondary to UTI (CMS/HCC)   • Metabolic encephalopathy   • Hyperlipidemia   • Hypertension   • Monoclonal gammopathy   • Stage 4 chronic kidney disease (CMS/HCC)   • DM2 (diabetes mellitus, type 2) (CMS/HCC)   • Abnormal CT of the abdomen   • Normal anion gap metabolic acidosis   • Anemia, chronic disease   • Ventricular tachycardia (paroxysmal) (CMS/HCC)   • Acute metabolic encephalopathy   • UTI (urinary tract infection), bacterial   • Elevated troponin   • Hypokalemia   • SILVIA (acute kidney injury) (CMS/HCC)   • Uncontrolled hypertension   • Septicemia (CMS/HCC)   • Vascular dementia without behavioral disturbance (CMS/HCC)   • Gastrointestinal hemorrhage       Assessment:  1. Iron deficiency anemia, heme positive stools-presented with dark stools from home  2. History of abnormal CT of the rectosigmoid colon  3. Gastric distention on CT this admission, nausea and vomiting  4. Anticoagulated on Eliquis-last dose presumably 6/2 although patient cannot confirm.  5. Chronic kidney disease    Plan:  · Morning labs are pending  · Patient unsure of last Eliquis dose, therefore we will assume it was yesterday 6/2  · We will check KUB today to follow-up on abdominal distention, gastric distention on CT.  He tolerated a regular diet for breakfast this morning but he is considerably distended on exam.  · Will give Reglan today to help expedite clearance of his stomach  · Start clear liquids tomorrow with consideration of EGD and colonoscopy over the weekend.  If he is unable to prep completely for colonoscopy he will need a flexible sigmoidoscopy at minimum to follow-up on this persistent abnormality in his rectum.  · We will start bowel regimen after reviewing KUB to ensure that he is safe to continue p.o.      I discussed the  patients findings and my recommendations with patient, nursing staff and Dr Reeves.    Chrissy Gordon MD

## 2021-06-03 NOTE — ED NOTES
Pt to triage from home via [facility] e [id number] with c/o abdominal pain since Monday with black tarry stools.  Pt states pain to RLQ and LLQ.  Pt denies n/v.  Pt provided with mask in triage.  Triage personnel wore appropriate PPE       Apoorva Dallas RN  06/02/21 3437

## 2021-06-03 NOTE — ED NOTES
Attempted to call report. RN unavailable at this time. She will call back in about 10 minutes.     Karis Elkins RN  06/03/21 0205

## 2021-06-03 NOTE — ED PROVIDER NOTES
EMERGENCY DEPARTMENT ENCOUNTER    Room Number:  N630/1  Date of encounter:  6/3/2021  PCP: Maya Ribeiro APRN  Historian: Patient      PPE    Patient was placed in face mask in first look. Patient was wearing facemask when I entered the room and throughout our encounter. I wore full protective equipment throughout this patient encounter including a face mask, and gloves. Hand hygiene was performed before donning protective equipment and after removal when leaving the room.        HPI:  Chief Complaint: black stools  A complete HPI/ROS/PMH/PSH/SH/FH are unobtainable due to: nothing    Context: Gregorio Alejandro is a 65 y.o. male who arrives to the ED via EMS from home where he lives with his wife.  Patient presents with c/o several episodes of black tarry stools since Monday.   Patient also complains of nausea, vomiting, fever, chills, diffuse abdominal pain that is constant in nature also since Monday.  States that he has a nurse that comes to the house and when he was telling her about this and she was cleaning him up and noticed the black stools she recommended that he come to the ER on Monday.  Patient states that he is on Eliquis but is unsure why he takes that.  Patient also complains of multiple open sores to his bilateral lower extremities status post being in a nursing home earlier this year.  Patient denies chest pain, shortness of breath, headache.  Patient states that nothing makes the symptoms better and nothing worsens symptoms.          PAST MEDICAL HISTORY  Active Ambulatory Problems     Diagnosis Date Noted   • Complicated UTI (urinary tract infection) 12/18/2020   • Macrocytosis 12/19/2020   • Sepsis secondary to UTI (CMS/HCC) 12/19/2020   • Metabolic encephalopathy 12/19/2020   • Hyperlipidemia    • Hypertension    • Monoclonal gammopathy    • Stage 4 chronic kidney disease (CMS/HCC)    • DM2 (diabetes mellitus, type 2) (CMS/HCC)    • Abnormal CT of the abdomen    • Normal anion gap metabolic  acidosis    • Anemia, chronic disease 12/19/2020   • Ventricular tachycardia (paroxysmal) (CMS/HCC) 12/21/2020   • Acute metabolic encephalopathy 02/04/2021   • UTI (urinary tract infection), bacterial 02/04/2021   • Elevated troponin 02/04/2021   • Hypokalemia 02/04/2021   • SILVIA (acute kidney injury) (CMS/HCC) 02/04/2021   • Uncontrolled hypertension 02/04/2021   • Septicemia (CMS/HCC) 02/06/2021   • Vascular dementia without behavioral disturbance (CMS/HCC) 02/09/2021     Resolved Ambulatory Problems     Diagnosis Date Noted   • No Resolved Ambulatory Problems     Past Medical History:   Diagnosis Date   • Back pain    • CKD (chronic kidney disease)    • DM type 2 (diabetes mellitus, type 2) (CMS/HCC)    • ED (erectile dysfunction)    • SCOTTY (iron deficiency anemia)    • Osteoarthritis          PAST SURGICAL HISTORY  No past surgical history on file.      FAMILY HISTORY  No family history on file.      SOCIAL HISTORY  Social History     Socioeconomic History   • Marital status:      Spouse name: Not on file   • Number of children: Not on file   • Years of education: Not on file   • Highest education level: Not on file   Tobacco Use   • Smoking status: Never Smoker   • Smokeless tobacco: Never Used   Vaping Use   • Vaping Use: Never used   Substance and Sexual Activity   • Alcohol use: Yes   • Drug use: Never   • Sexual activity: Defer         ALLERGIES  Patient has no known allergies.        REVIEW OF SYSTEMS  Review of Systems     All systems reviewed and negative except for those discussed in HPI.        PHYSICAL EXAM    ED Triage Vitals [06/02/21 2133]   Temp Heart Rate Resp BP SpO2   96.7 °F (35.9 °C) 84 18 134/82 99 %       Physical Exam  GENERAL: Chronically ill-appearing, non-toxic appearing, not distressed  HENT: normocephalic, atraumatic, dry mucous membranes  EYES: no scleral icterus, PERRL, pale sclera  CV: regular rhythm, regular rate, no murmur  RESPIRATORY: normal effort, CTAB  ABDOMEN: soft,  normal bowel sounds, diffuse tenderness, no rebound, guarding or rigidity  Rectal exam performed.  Chaperone present throughout exam, OSIRIS Perez  External exam normal.  No visualized external hemorrhoids, no palpated internal hemorrhoids.  No visible anal fissures.  Heme positive, black stool noted.  passed.  No gross blood noted  MUSCULOSKELETAL: no deformity  NEURO: alert, moves all extremities, follows commands, mental status normal/baseline  SKIN: warm, dry, patient has multiple open sores to his bilateral lower extremities with mild surrounding erythema.  Psych: Appropriate mood and affect  Nursing notes and vital signs reviewed      LAB RESULTS  Recent Results (from the past 24 hour(s))   Type & Screen    Collection Time: 06/02/21 10:38 PM    Specimen: Blood   Result Value Ref Range    ABO Type B     RH type Positive     Antibody Screen Negative     T&S Expiration Date 6/5/2021 11:59:59 PM    Light Blue Top    Collection Time: 06/02/21 10:38 PM   Result Value Ref Range    Extra Tube hold for add-on    Green Top (Gel)    Collection Time: 06/02/21 10:38 PM   Result Value Ref Range    Extra Tube Hold for add-ons.    Lavender Top    Collection Time: 06/02/21 10:38 PM   Result Value Ref Range    Extra Tube hold for add-on    Gold Top - SST    Collection Time: 06/02/21 10:38 PM   Result Value Ref Range    Extra Tube Hold for add-ons.    Comprehensive Metabolic Panel    Collection Time: 06/02/21 10:38 PM    Specimen: Blood   Result Value Ref Range    Glucose 233 (H) 65 - 99 mg/dL    BUN 68 (H) 8 - 23 mg/dL    Creatinine 3.25 (H) 0.76 - 1.27 mg/dL    Sodium 138 136 - 145 mmol/L    Potassium 3.9 3.5 - 5.2 mmol/L    Chloride 111 (H) 98 - 107 mmol/L    CO2 15.2 (L) 22.0 - 29.0 mmol/L    Calcium 8.6 8.6 - 10.5 mg/dL    Total Protein 6.6 6.0 - 8.5 g/dL    Albumin 3.10 (L) 3.50 - 5.20 g/dL    ALT (SGPT) 7 1 - 41 U/L    AST (SGOT) 7 1 - 40 U/L    Alkaline Phosphatase 181 (H) 39 - 117 U/L    Total Bilirubin 0.3  0.0 - 1.2 mg/dL    eGFR  African Amer 23 (L) >60 mL/min/1.73    Globulin 3.5 gm/dL    A/G Ratio 0.9 g/dL    BUN/Creatinine Ratio 20.9 7.0 - 25.0    Anion Gap 11.8 5.0 - 15.0 mmol/L   CBC Auto Differential    Collection Time: 06/02/21 10:38 PM    Specimen: Blood   Result Value Ref Range    WBC 5.95 3.40 - 10.80 10*3/mm3    RBC 4.16 4.14 - 5.80 10*6/mm3    Hemoglobin 9.7 (L) 13.0 - 17.7 g/dL    Hematocrit 31.2 (L) 37.5 - 51.0 %    MCV 75.0 (L) 79.0 - 97.0 fL    MCH 23.3 (L) 26.6 - 33.0 pg    MCHC 31.1 (L) 31.5 - 35.7 g/dL    RDW 21.8 (H) 12.3 - 15.4 %    RDW-SD 55.4 (H) 37.0 - 54.0 fl    Platelets 201 140 - 450 10*3/mm3    Neutrophil % 71.4 42.7 - 76.0 %    Lymphocyte % 18.7 (L) 19.6 - 45.3 %    Monocyte % 6.4 5.0 - 12.0 %    Eosinophil % 2.7 0.3 - 6.2 %    Basophil % 0.5 0.0 - 1.5 %    Immature Grans % 0.3 0.0 - 0.5 %    Neutrophils, Absolute 4.25 1.70 - 7.00 10*3/mm3    Lymphocytes, Absolute 1.11 0.70 - 3.10 10*3/mm3    Monocytes, Absolute 0.38 0.10 - 0.90 10*3/mm3    Eosinophils, Absolute 0.16 0.00 - 0.40 10*3/mm3    Basophils, Absolute 0.03 0.00 - 0.20 10*3/mm3    Immature Grans, Absolute 0.02 0.00 - 0.05 10*3/mm3    nRBC 0.2 0.0 - 0.2 /100 WBC   Urinalysis With Microscopic If Indicated (No Culture) - Urine, Catheter    Collection Time: 06/02/21 11:14 PM    Specimen: Urine, Catheter   Result Value Ref Range    Color, UA Yellow Yellow, Straw    Appearance, UA Turbid (A) Clear    pH, UA 6.5 5.0 - 8.0    Specific Gravity, UA 1.011 1.005 - 1.030    Glucose, UA Negative Negative    Ketones, UA Negative Negative    Bilirubin, UA Negative Negative    Blood, UA Small (1+) (A) Negative    Protein,  mg/dL (2+) (A) Negative    Leuk Esterase, UA Large (3+) (A) Negative    Nitrite, UA Negative Negative    Urobilinogen, UA 0.2 E.U./dL 0.2 - 1.0 E.U./dL   Urinalysis, Microscopic Only - Urine, Catheter    Collection Time: 06/02/21 11:14 PM    Specimen: Urine, Catheter   Result Value Ref Range    RBC, UA 0-2 None Seen, 0-2  /HPF    WBC, UA Too Numerous to Count (A) None Seen, 0-2 /HPF    Bacteria, UA 4+ (A) None Seen /HPF    Squamous Epithelial Cells, UA 0-2 None Seen, 0-2 /HPF    Hyaline Casts, UA 3-6 None Seen /LPF    Methodology Automated Microscopy    Lactic Acid, Plasma    Collection Time: 06/02/21 11:15 PM    Specimen: Blood   Result Value Ref Range    Lactate 1.5 0.5 - 2.0 mmol/L   POCT Occult Blood Stool    Collection Time: 06/02/21 11:58 PM    Specimen: Per Rectum; Stool   Result Value Ref Range    Fecal Occult Blood Positive (A) Negative    Lot Number [id number]     Expiration Date 11/30/2021     Positive Control Positive Positive    Negative Control Negative Negative       Ordered the above labs and independently reviewed the results.      RADIOLOGY  CT Abdomen Pelvis Without Contrast    Result Date: 6/3/2021  CT OF THE ABDOMEN AND PELVIS WITHOUT CONTRAST  HISTORY: Diffuse abdominal pain. 11 stool.  COMPARISON: 02/05/2021  TECHNIQUE: Axial CT imaging was obtained through the abdomen and pelvis. IV contrast was administered.  FINDINGS: Images are degraded by motion artifact. There is bibasilar scarring. Similar findings were present on prior study. The patient's stomach is distended and fluid-filled. Duodenum appears relatively decompressed. Correlation with any history of gastroparesis or gastric outlet obstruction is suggested. No suspicious hepatic lesions are seen. Gallbladder is normal. Calcified granulomata are seen within the bleeding. Adrenal glands are within normal limits. Pancreas is markedly atrophic. There are dense vascular calcifications. There is an 8 mm nonobstructing stone identified within the right kidney. Full assessment of structures within the pelvis is degraded by streak artifact from bilateral hip arthroplasties. The patient does have distention of the urinary bladder. Correlation with any symptoms of urinary retention is suggested. There is also air noted within the urinary bladder and correlation with any  history of instrumentation is suggested. The patient's rectosigmoid appears diffusely thick-walled. Similar findings were present on prior exam. Appearance may reflect colitis. There is colonic diverticulosis. There is no diverticulitis. There is no bowel obstruction. Appendix is not clearly seen, although I see no evidence of appendicitis. There is extensive calcification of the aorta. There is body wall edema. Patient is osteoporotic. Calcifications within the bladder wall are again seen.       1. Thick-walled appearance to the rectosigmoid. Similar findings were present on prior exam. Colitis is not excluded. 2. The stomach is markedly distended and fluid-filled. Duodenum appears relatively decompressed. Appearance may reflect some gastroparesis or gastric outlet obstruction. 2. Distention of the urinary bladder. This may reflect some urinary retention. There is air seen within the bladder, and correlation with recent instrumentation is suggested.  Radiation dose reduction techniques were utilized, including automated exposure control and exposure modulation based on body size.  This report was finalized on 6/3/2021 1:03 AM by Dr. Vero Hansen M.D.        I ordered the above noted radiological studies and viewed the images on the PACS system.         MEDICAL RECORD REVIEW  Medical records reviewed in epic, patient was last admitted to this hospital in February 2021 for acute metabolic encephalopathy, vascular dementia without behavioral disturbance, septicemia, UTI.  Hospital Course  This is a 65-year-old male who is chronically ill with history of CKD, diabetes, hypertension, hyperlipidemia who is bedbound at home and has had prior issues with metabolic encephalopathy secondary to urinary tract infection and cellulitis stemming from bilateral lower extremity leg wounds requiring a course of outpatient IV antibiotics who presented back to the emergency room on 2/4/2021 with recurrence of altered mental  status, increasing weakness, further clinical decline and Proteus UTI.  He also had 2 out of 2 positive blood cultures for staph epidermis.  Repeat blood cultures showed no growth to date.  ID was consulted and the patient remained on Rocephin and vancomycin for a total of 12 days.  He had further imaging work-up including abdominal CT showing abnormal colonic thickening as described above. This was present during previous admission and it looks like he never got an outpatient colonoscopy which was set up at last discharge.  GI followed him for this but due to his continued encephalopathy he was never able to undergo endoscopy.  He also had electrolyte discrepancies and was followed by nephrology.  Despite full treatment with antibiotics his encephalopathy never fully improved.  Neurology was consulted and he had work-up with head CT and brain MRI that were unremarkable.  Lumbar puncture was attempted but unsuccessful in radiology under fluoroscopic guidance.  Cardiology followed for atrial fibrillation/flutter.  Rate is controlled and they have put him on a low-dose of anticoagulant which I think is fine for now as he is tolerating this but can consider discontinuing given his clinical course of the next couple of weeks.  Given his lack of significant clinical improvement, prior bedbound state, poor overall health with further decline over the past several months and poor quality of life his wife ultimately decided to pursue hospice care for him.  During some periods of more lucid thought the patient also confirmed that he would like hospice at this point.  He was transitioned to the palliative care floor and hospice met with the patient and his wife yesterday.  He is not actively dying at this time and looks stable albeit with a very poor prognosis.  He is suitable to go to the nursing home and hospice will follow him there.    PROCEDURES    Procedures        DIFFERENTIAL DIAGNOSIS  Differential diagnosis include  but are not limited to the following: GI bleed, electrolyte abnormality, anemia, urinary tract infection, SILVIA      PROGRESS, DATA ANALYSIS, CONSULTS, AND MEDICAL DECISION MAKING        ED Course as of Jun 03 0311   Wed Jun 02, 2021 2324 Discussed pertinent information from history and physical exam with patient.  Discussed differential diagnosis and plan for ED evaluation/work-up and treatment including labs, CT abdomen and pelvis, pain control and IV fluids.  All questions answered.  Patient is agreeable with this plan.        [MS]   2325 Hemoglobin(!): 9.7 [MS]   2356 BUN(!): 68 [MS]   2356 Creatinine(!): 3.25 [MS]   2356 CO2(!): 15.2 [MS]   2356 Alkaline Phosphatase(!): 181 [MS]   Thu Jun 03, 2021   0035 3 months ago patient's BUN and creatinine were 18 and 1.99.  Patient has urinary tract infection currently, urine was sent for culture in 1 g of IV Rocephin ordered.    [MS]   0135 Consult Note    Discussed care with MARSHA Bernardo with [facility]  Reviewed patient's history, exam, results and need for admission secondary to urinary tract infection, SILVIA, GI bleed, leg sores  Dr. Smith accepts the patient to be admitted to telemetry inpatient bed.        [MS]   0137 Reviewed pt's history and workup with Dr. Gasca.  After a bedside evaluation, they agree with the plan of care.          [MS]      ED Course User Index  [MS] Alicia Harris, ANTWAN     ADMISSION    Discussed treatment plan and reason for admission with pt/family and admitting physician.  Pt/family voiced understanding of the plan for admission for further testing/treatment as needed.      DIAGNOSIS  Final diagnoses:   Gastrointestinal hemorrhage, unspecified gastrointestinal hemorrhage type   Acute UTI   SILVIA (acute kidney injury) (CMS/[facility])   Sore on leg           MEDICATIONS GIVEN IN ED    Medications   sodium chloride 0.9 % bolus 1,000 mL (0 mL Intravenous Stopped 6/3/21 0134)   morphine injection 4 mg (4 mg Intravenous Given 6/2/21 2357)    ondansetron (ZOFRAN) injection 4 mg (4 mg Intravenous Given 6/2/21 8579)   cefTRIAXone (ROCEPHIN) IVPB 1 g (0 g Intravenous Stopped 6/3/21 6428)           COURSE & MEDICAL DECISION MAKING  Any/All labs and Any/All Imaging studies that were ordered were reviewed and are noted above.  Results were reviewed/discussed with the patient and they were also made aware of online access.    Pt also made aware that some labs, such as cultures, will not be resulted during ER visit and follow up with PMD is necessary.        Alicia Harris, APRN  06/03/21 0316

## 2021-06-03 NOTE — PLAN OF CARE
Goal Outcome Evaluation:  Plan of Care Reviewed With: patient  Progress: no change  Outcome Summary: Pt admit from ED, A&Ox4, reg CC renal diet, iv fluids continuous, purewick inplace, room air, accu checks, BLE edema +4 with wounds cleaned and dressed, wound consult, nephro and gastro consults called, negative covid, Q8 H&H, patient states constant pain BLE, prn tylenol given

## 2021-06-03 NOTE — NURSING NOTE
CWON consult received. Patient with chronic venous disease. He used to be followed in our [facility] but has not been back since December. BLE chronic edema. Moderate pitting + 2 today.  Skin is tight and shiny. RLE with scattered scarring and dry scabs.  LLE also with scarring and a resolving venous ulcer to anterior lower leg. Wounds stable with very little drainage.     Will recommend Hydrofera Blue to left leg wound and 2 layer compression wraps to BLE. CWON washed legs with cleansing foam and water. Moisturizer applied followed by LLE Hydrofera Blue and BLE compression wraps, toes to knees.  Patient tolerated well.     Will plan to change wraps twice daily.  Will continue to follow.

## 2021-06-03 NOTE — CONSULTS
Referring Provider: Dr Ayala   Reason for Consultation: SILVIA CKD    Subjective     Chief complaint   Chief Complaint   Patient presents with   • Black or Bloody Stool   • Abdominal Pain       History of present illness:  66 yo AAM with h/o CKD stage 3 due to biopsy-proven diabetic nephropathy, HTN who presented with abd pain, n/v, and dark stool.  He's on eliquis for AFIB.  Found to have SILVIA with BUN/Cr 68/3.2.  BL Cr ~ 2 mg/dL.  Also acidotic, HCo3 15 with normal AG.  Denies nsaid use.  No hypotension or contrast exposure.  CT abd shows poss colitis and markedly distended stomatch, as well as distention of bladder.  He's on NS IVF.  Phlebotomist unable to draw labs this AM.  No dyspnea. Does have chronic BLE swelling.  Patient was under hospice care recently.      Past Medical History:   Diagnosis Date   • Back pain    • CKD (chronic kidney disease)    • DM type 2 (diabetes mellitus, type 2) (CMS/HCC)    • ED (erectile dysfunction)    • Hyperlipidemia    • Hypertension    • SCOTTY (iron deficiency anemia)    • Monoclonal gammopathy    • Osteoarthritis      Past Surgical History:   Procedure Laterality Date   • ABDOMINAL SURGERY     • EYE SURGERY     • JOINT REPLACEMENT     • TONSILLECTOMY       History reviewed. No pertinent family history.    Social History     Tobacco Use   • Smoking status: Never Smoker   • Smokeless tobacco: Never Used   Vaping Use   • Vaping Use: Never used   Substance Use Topics   • Alcohol use: Yes     Comment: 1 pint   • Drug use: Never     Medications Prior to Admission   Medication Sig Dispense Refill Last Dose   • amLODIPine (NORVASC) 5 MG tablet Take 1 tablet by mouth Daily. 30 tablet 0 6/2/2021 at Unknown time   • apixaban (ELIQUIS) 2.5 MG tablet tablet Take 1 tablet by mouth Every 12 (Twelve) Hours. Indications: Atrial Fibrillation 60 tablet  6/2/2021 at Unknown time   • atorvastatin (LIPITOR) 20 MG tablet Take 20 mg by mouth Daily.   6/2/2021 at Unknown time   • brimonidine (ALPHAGAN  "P) 0.1 % solution ophthalmic solution 1 drop 2 (two) times a day.   6/2/2021 at Unknown time   • dorzolamide (TRUSOPT) 2 % ophthalmic solution Administer 1 drop to the right eye 2 (two) times a day.   6/2/2021 at Unknown time   • ammonium lactate (AMLACTIN) 12 % cream Apply  topically to the appropriate area as directed 2 (Two) Times a Day.   Unknown at Unknown time     Allergies:  Patient has no known allergies.    Review of Systems  14 points review of system was performed and it was negative other than what noted above in the HPI    Objective     Vital Signs  Temp:  [96.7 °F (35.9 °C)-97.8 °F (36.6 °C)] 97 °F (36.1 °C)  Heart Rate:  [70-84] 83  Resp:  [16-18] 16  BP: (132-157)/(78-96) 136/81    Flowsheet Rows      First Filed Value   Admission Height  190.5 cm (75\") Documented at 06/02/2021 2133   Admission Weight  103 kg (227 lb 12.8 oz) Documented at 06/03/2021 0253           No intake/output data recorded.  I/O last 3 completed shifts:  In: 210 [P.O.:210]  Out: 300 [Urine:300]    Intake/Output Summary (Last 24 hours) at 6/3/2021 1207  Last data filed at 6/3/2021 0658  Gross per 24 hour   Intake 210 ml   Output 300 ml   Net -90 ml       Physical Exam:  General Appearance: frail chronically ill appearing AAM in no acute distress, conversant  HEENT NC/AT OP clear  Neck supple no JVD  Lungs CTA bilat no rales   CV RRR no M/G  abd soft NT/ND  vasc 1+ BLE pedal/ankle edema 2+ radial pulses  MS no joint warmth or erythema  Derm normal turgor, no rash  Neuro speech intact & follows commands     Results Review:  Results for orders placed or performed during the hospital encounter of 06/02/21   COVID-19,[facility], NP/OP SWAB IN UTM/VTM/[facility] TRANSPORT MEDIA,24 HR TAT - Swab, Nasopharynx    Specimen: Nasopharynx; Swab   Result Value Ref Range    COVID19 Not Detected Not Detected - Ref. Range   Comprehensive Metabolic Panel    Specimen: Blood   Result Value Ref Range    Glucose 233 (H) 65 - 99 mg/dL    " BUN 68 (H) 8 - 23 mg/dL    Creatinine 3.25 (H) 0.76 - 1.27 mg/dL    Sodium 138 136 - 145 mmol/L    Potassium 3.9 3.5 - 5.2 mmol/L    Chloride 111 (H) 98 - 107 mmol/L    CO2 15.2 (L) 22.0 - 29.0 mmol/L    Calcium 8.6 8.6 - 10.5 mg/dL    Total Protein 6.6 6.0 - 8.5 g/dL    Albumin 3.10 (L) 3.50 - 5.20 g/dL    ALT (SGPT) 7 1 - 41 U/L    AST (SGOT) 7 1 - 40 U/L    Alkaline Phosphatase 181 (H) 39 - 117 U/L    Total Bilirubin 0.3 0.0 - 1.2 mg/dL    eGFR  African Amer 23 (L) >60 mL/min/1.73    Globulin 3.5 gm/dL    A/G Ratio 0.9 g/dL    BUN/Creatinine Ratio 20.9 7.0 - 25.0    Anion Gap 11.8 5.0 - 15.0 mmol/L   Urinalysis With Microscopic If Indicated (No Culture) - Urine, Catheter    Specimen: Urine, Catheter   Result Value Ref Range    Color, UA Yellow Yellow, Straw    Appearance, UA Turbid (A) Clear    pH, UA 6.5 5.0 - 8.0    Specific Gravity, UA 1.011 1.005 - 1.030    Glucose, UA Negative Negative    Ketones, UA Negative Negative    Bilirubin, UA Negative Negative    Blood, UA Small (1+) (A) Negative    Protein,  mg/dL (2+) (A) Negative    Leuk Esterase, UA Large (3+) (A) Negative    Nitrite, UA Negative Negative    Urobilinogen, UA 0.2 E.U./dL 0.2 - 1.0 E.U./dL   Lactic Acid, Plasma    Specimen: Blood   Result Value Ref Range    Lactate 1.5 0.5 - 2.0 mmol/L   CBC Auto Differential    Specimen: Blood   Result Value Ref Range    WBC 5.95 3.40 - 10.80 10*3/mm3    RBC 4.16 4.14 - 5.80 10*6/mm3    Hemoglobin 9.7 (L) 13.0 - 17.7 g/dL    Hematocrit 31.2 (L) 37.5 - 51.0 %    MCV 75.0 (L) 79.0 - 97.0 fL    MCH 23.3 (L) 26.6 - 33.0 pg    MCHC 31.1 (L) 31.5 - 35.7 g/dL    RDW 21.8 (H) 12.3 - 15.4 %    RDW-SD 55.4 (H) 37.0 - 54.0 fl    Platelets 201 140 - 450 10*3/mm3    Neutrophil % 71.4 42.7 - 76.0 %    Lymphocyte % 18.7 (L) 19.6 - 45.3 %    Monocyte % 6.4 5.0 - 12.0 %    Eosinophil % 2.7 0.3 - 6.2 %    Basophil % 0.5 0.0 - 1.5 %    Immature Grans % 0.3 0.0 - 0.5 %    Neutrophils, Absolute 4.25 1.70 - 7.00 10*3/mm3     Lymphocytes, Absolute 1.11 0.70 - 3.10 10*3/mm3    Monocytes, Absolute 0.38 0.10 - 0.90 10*3/mm3    Eosinophils, Absolute 0.16 0.00 - 0.40 10*3/mm3    Basophils, Absolute 0.03 0.00 - 0.20 10*3/mm3    Immature Grans, Absolute 0.02 0.00 - 0.05 10*3/mm3    nRBC 0.2 0.0 - 0.2 /100 WBC   Urinalysis, Microscopic Only - Urine, Catheter    Specimen: Urine, Catheter   Result Value Ref Range    RBC, UA 0-2 None Seen, 0-2 /HPF    WBC, UA Too Numerous to Count (A) None Seen, 0-2 /HPF    Bacteria, UA 4+ (A) None Seen /HPF    Squamous Epithelial Cells, UA 0-2 None Seen, 0-2 /HPF    Hyaline Casts, UA 3-6 None Seen /LPF    Methodology Automated Microscopy    Hemoglobin A1c    Specimen: Blood   Result Value Ref Range    Hemoglobin A1C 7.18 (H) 4.80 - 5.60 %   Hemoglobin & Hematocrit, Blood    Specimen: Arm, Left; Blood   Result Value Ref Range    Hemoglobin 9.3 (L) 13.0 - 17.7 g/dL    Hematocrit 31.0 (L) 37.5 - 51.0 %   POCT Occult Blood Stool    Specimen: Per Rectum; Stool   Result Value Ref Range    Fecal Occult Blood Positive (A) Negative    Lot Number [id number]     Expiration Date 11/30/2021     Positive Control Positive Positive    Negative Control Negative Negative   POC Glucose Once    Specimen: Blood   Result Value Ref Range    Glucose 147 (H) 70 - 130 mg/dL   POC Glucose Once    Specimen: Blood   Result Value Ref Range    Glucose 133 (H) 70 - 130 mg/dL   POC Glucose Once    Specimen: Blood   Result Value Ref Range    Glucose 237 (H) 70 - 130 mg/dL   ECG 12 Lead   Result Value Ref Range    QT Interval 454 ms   Type & Screen    Specimen: Blood   Result Value Ref Range    ABO Type B     RH type Positive     Antibody Screen Negative     T&S Expiration Date 6/5/2021 11:59:59 PM    Light Blue Top   Result Value Ref Range    Extra Tube hold for add-on    Green Top (Gel)   Result Value Ref Range    Extra Tube Hold for add-ons.    Lavender Top   Result Value Ref Range    Extra Tube hold for add-on    Gold Top - SST   Result Value Ref  Range    Extra Tube Hold for add-ons.      Imaging Results (Last 72 Hours)     Procedure Component Value Units Date/Time    CT Abdomen Pelvis Without Contrast [[id number]] Collected: 06/03/21 0053     Updated: 06/03/21 0106    Narrative:      CT OF THE ABDOMEN AND PELVIS WITHOUT CONTRAST     HISTORY: Diffuse abdominal pain. 11 stool.     COMPARISON: 02/05/2021     TECHNIQUE: Axial CT imaging was obtained through the abdomen and pelvis.  IV contrast was administered.     FINDINGS:  Images are degraded by motion artifact. There is bibasilar scarring.  Similar findings were present on prior study. The patient's stomach is  distended and fluid-filled. Duodenum appears relatively decompressed.  Correlation with any history of gastroparesis or gastric outlet  obstruction is suggested. No suspicious hepatic lesions are seen.  Gallbladder is normal. Calcified granulomata are seen within the  bleeding. Adrenal glands are within normal limits. Pancreas is markedly  atrophic. There are dense vascular calcifications. There is an 8 mm  nonobstructing stone identified within the right kidney. Full assessment  of structures within the pelvis is degraded by streak artifact from  bilateral hip arthroplasties. The patient does have distention of the  urinary bladder. Correlation with any symptoms of urinary retention is  suggested. There is also air noted within the urinary bladder and  correlation with any history of instrumentation is suggested. The  patient's rectosigmoid appears diffusely thick-walled. Similar findings  were present on prior exam. Appearance may reflect colitis. There is  colonic diverticulosis. There is no diverticulitis. There is no bowel  obstruction. Appendix is not clearly seen, although I see no evidence of  appendicitis. There is extensive calcification of the aorta. There is  body wall edema. Patient is osteoporotic. Calcifications within the  bladder wall are again seen.       Impression:         1.  Thick-walled appearance to the rectosigmoid. Similar findings were  present on prior exam. Colitis is not excluded.  2. The stomach is markedly distended and fluid-filled. Duodenum appears  relatively decompressed. Appearance may reflect some gastroparesis or  gastric outlet obstruction.  2. Distention of the urinary bladder. This may reflect some urinary  retention. There is air seen within the bladder, and correlation with  recent instrumentation is suggested.     Radiation dose reduction techniques were utilized, including automated  exposure control and exposure modulation based on body size.     This report was finalized on 6/3/2021 1:03 AM by Dr. Vero Hansen M.D.               atorvastatin, 20 mg, Oral, Daily  brimonidine, 1 drop, Both Eyes, BID  [START ON 6/4/2021] cefTRIAXone, 1 g, Intravenous, Q24H  dorzolamide, 1 drop, Right Eye, TID  insulin lispro, 0-9 Units, Subcutaneous, 4x Daily With Meals & Nightly  metoclopramide, 10 mg, Intravenous, Q6H  sodium chloride, 10 mL, Intravenous, Q12H      sodium chloride, 100 mL/hr, Last Rate: 100 mL/hr (06/03/21 0346)        Assessment/Plan   Non olig SILVIA - suspect prerenal azotemia from vol depletion due to current GI issues.  Cr 3.2 yesterday.  Await labs today to see if improved with IVF.  Need to r/o retention also based on CT scan - no hydronephrosis but bladder distended.  Has periph edema but very low albumin (& norvasc use) and no dyspnea.      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 The electronic translation of spoken language may permit erroneous, or at times, nonsensical words or phrases to be inadvertently transcribed; Although I have reviewed the note for such errors, some may still exist

## 2021-06-03 NOTE — PROGRESS NOTES
Clinical Pharmacy Services: Medication History    Gregorio Alejandro is a 65 y.o. male presenting to [facility] for Acute UTI [N39.0]  SILVIA (acute kidney injury) (CMS/[facility]) [N17.9]  Sore on leg [L98.9]  Gastrointestinal hemorrhage, unspecified gastrointestinal hemorrhage type [K92.2]     He  has a past medical history of Back pain, CKD (chronic kidney disease), DM type 2 (diabetes mellitus, type 2) (CMS/[facility]), ED (erectile dysfunction), Hyperlipidemia, Hypertension, SCOTTY (iron deficiency anemia), Monoclonal gammopathy, and Osteoarthritis.    Allergies as of 06/02/2021   • (No Known Allergies)       Medication information was obtained from: Pharmacy and Spouse  Pharmacy and Phone Number:     [id number] - [place], [place] - [address] [facility] - [phone] [facility] [phone]   [address]  [facility] [address]  Phone: [phone] Fax: [phone]    [facility]  [address] [id number]  Phone: [phone] Fax: [phone]        Prior to Admission Medications     Prescriptions Last Dose Informant Patient Reported? Taking?    acetaminophen (TYLENOL) 325 MG tablet  Spouse/Significant Other Yes Yes    Take 325 mg by mouth Every 6 (Six) Hours As Needed for Mild Pain .    amLODIPine (NORVASC) 5 MG tablet 6/2/2021 Pharmacy No Yes    Take 1 tablet by mouth Daily.    Patient taking differently:  Take 5 mg by mouth Daily.    aspirin 81 MG EC tablet  Spouse/Significant Other Yes Yes    Take 81 mg by mouth Daily.    atorvastatin (LIPITOR) 20 MG tablet 6/2/2021 Pharmacy Yes Yes    Take 20 mg by mouth Daily.    cyclobenzaprine (FLEXERIL) 10 MG tablet  Pharmacy Yes Yes    Take 10 mg by mouth Every 8 (Eight) Hours As Needed for Muscle Spasms.    dorzolamide (TRUSOPT) 2 % ophthalmic solution 6/2/2021 Pharmacy Yes Yes    Administer 1 drop to the right eye 2 (two) times a day.    gabapentin (NEURONTIN) 300 MG capsule  Pharmacy Yes Yes    Take  300 mg by mouth 3 (Three) Times a Day. At 0800, 1400, 2000    hydrALAZINE (APRESOLINE) 50 MG tablet  Pharmacy Yes Yes    Take 50 mg by mouth 2 (two) times a day.    HYDROcodone-acetaminophen (NORCO)  MG per tablet  Pharmacy Yes Yes    Take 1 tablet by mouth Every 4 (Four) Hours.    loperamide (IMODIUM) 2 MG capsule  Spouse/Significant Other Yes Yes    Take 2 mg by mouth As Needed for Diarrhea.    losartan (COZAAR) 25 MG tablet  Pharmacy Yes Yes    Take 25 mg by mouth 2 (Two) Times a Day.    Netarsudil-Latanoprost (Rocklatan) 0.02-0.005 % solution  Spouse/Significant Other Yes Yes    Administer 1 drop to both eyes Every Night.    olopatadine (PATANOL) 0.1 % ophthalmic solution  Spouse/Significant Other Yes Yes    Administer 1 drop to both eyes 2 (Two) Times a Day.    potassium chloride 10 MEQ CR tablet  Spouse/Significant Other Yes Yes    Take 20 mEq by mouth Daily.    tamsulosin (FLOMAX) 0.4 MG capsule 24 hr capsule  Pharmacy Yes Yes    Take 1 capsule by mouth Daily.            Medication notes: Medication list was obtained from pharmacy and wife. Wife listed off medications from a list provided by the patient's caregiver.  · The following medications were provided by the pharmacy, however, were unable to be confirmed with the wife: hydralazine, losartan, cyclobenzaprine, tamsulosin   · [facility], patient's wife, and the list provided by caregiver denied that the patient was currently receiving Eliquis     This medication list is complete to the best of my knowledge as of 6/3/2021    Please call if questions.    Anupama Barton, PharmD  Pharmacy Resident  6/3/2021 14:54 EDT

## 2021-06-03 NOTE — PAYOR COMM NOTE
"Mor Alejandro (65 y.o. Male)     Date of Birth Social Security Number Address Home Phone MRN    [id number]  [id number] [facility]  [address] [place] [address] [phone] [id number]    Sikh Marital Status          Taoism        Admission Date Admission Type Admitting Provider Attending Provider Department, Room/Bed    6/2/21 Emergency Rashad Ayala MD Edling, Stephen A, MD [address], [address]1    Discharge Date Discharge Disposition Discharge Destination                       Attending Provider: Rashad Ayala MD    Allergies: No Known Allergies    Isolation: None   Infection: None   Code Status: CPR    Ht: 190.5 cm (75\")   Wt: 103 kg (227 lb 12.8 oz)    Admission Cmt: None   Principal Problem: None                Active Insurance as of 6/2/2021     Primary Coverage     Payor Plan Insurance Group Employer/Plan Group    [employer] [facility] MEDICARE REPLACEMENT WE[employer] MEDICARE REPLACEMENT [id number]     Payor Plan Address Payor Plan Phone Number Payor Plan Fax Number Effective Dates    [address] [address] [phone]  8/13/2020 - None Entered    [facility] [address]       Subscriber Name Subscriber Birth Date Member ID       Mor Alejandro 1955 [id number]           Secondary Coverage     Payor Plan Insurance Group Employer/Plan Group    [id number]      Payor Plan Address Payor Plan Phone Number Payor Plan Fax Number Effective Dates    [address]   2/1/2021 - None Entered    [facility] [id number]       Subscriber Name Subscriber Birth Date Member ID       MOR ALEJANDRO 1955 [id number]                 Emergency Contacts      (Rel.) Home Phone Work Phone Mobile Phone    Patricia Alonzo (Spouse) [phone] -- [phone]    Celia Eastman (Daughter) [phone] -- --    Zeenat Wang (Daughter) -- -- [phone]              "

## 2021-06-03 NOTE — ED NOTES
"..  Nursing report ED to floor  Gregorio Alejandro  65 y.o.  male    HPI (triage note):   Chief Complaint   Patient presents with   • Black or Bloody Stool   • Abdominal Pain       Admitting doctor:   Parker Smith MD    Admitting diagnosis:   The primary encounter diagnosis was Gastrointestinal hemorrhage, unspecified gastrointestinal hemorrhage type. Diagnoses of Acute UTI, SILVIA (acute kidney injury) (CMS/HCC), and Sore on leg were also pertinent to this visit.    Code status:   Current Code Status     Date Active Code Status Order ID Comments User Context       6/3/2021 0152 CPR [id number]  Kaylynn Bennett APRN ED     Advance Care Planning Activity      Questions for Current Code Status     Question Answer Comment    Code Status CPR     Medical Interventions (Level of Support Prior to Arrest) Full           Allergies:   Patient has no known allergies.    Weight:   There were no vitals filed for this visit.    Most recent vitals:   Vitals:    06/02/21 2133 06/03/21 0107 06/03/21 0130 06/03/21 0200   BP: 134/82 132/78 157/96 134/82   Pulse: 84 77 78 73   Resp: 18 17 16 16   Temp: 96.7 °F (35.9 °C)      TempSrc: Tympanic      SpO2: 99% 98% 99% 98%   Height: 190.5 cm (75\")          Active LDAs/IV Access:   Lines, Drains & Airways    Active LDAs     Name:   Placement date:   Placement time:   Site:   Days:    Peripheral IV 06/02/21 2243 Right Antecubital   06/02/21 2243    Antecubital   less than 1    Urethral Catheter Silicone 16 Fr.   02/16/21    1750     106                Labs (abnormal labs have a star):   Labs Reviewed   COMPREHENSIVE METABOLIC PANEL - Abnormal; Notable for the following components:       Result Value    Glucose 233 (*)     BUN 68 (*)     Creatinine 3.25 (*)     Chloride 111 (*)     CO2 15.2 (*)     Albumin 3.10 (*)     Alkaline Phosphatase 181 (*)     eGFR   Amer 23 (*)     All other components within normal limits    Narrative:     GFR Normal >60  Chronic Kidney Disease <60  Kidney " Failure <15     URINALYSIS W/ MICROSCOPIC IF INDICATED (NO CULTURE) - Abnormal; Notable for the following components:    Appearance, UA Turbid (*)     Blood, UA Small (1+) (*)     Protein,  mg/dL (2+) (*)     Leuk Esterase, UA Large (3+) (*)     All other components within normal limits   CBC WITH AUTO DIFFERENTIAL - Abnormal; Notable for the following components:    Hemoglobin 9.7 (*)     Hematocrit 31.2 (*)     MCV 75.0 (*)     MCH 23.3 (*)     MCHC 31.1 (*)     RDW 21.8 (*)     RDW-SD 55.4 (*)     Lymphocyte % 18.7 (*)     All other components within normal limits   URINALYSIS, MICROSCOPIC ONLY - Abnormal; Notable for the following components:    WBC, UA Too Numerous to Count (*)     Bacteria, UA 4+ (*)     All other components within normal limits   POCT OCCULT BLOOD STOOL (ED ONLY) - Abnormal; Notable for the following components:    Fecal Occult Blood Positive (*)     All other components within normal limits   LACTIC ACID, PLASMA - Normal   COVID PRE-OP / PRE-PROCEDURE SCREENING ORDER (NO ISOLATION)    Narrative:     The following orders were created for panel order COVID PRE-OP / PRE-PROCEDURE SCREENING ORDER (NO ISOLATION) - Swab, Nasopharynx.  Procedure                               Abnormality         Status                     ---------                               -----------         ------                     COVID-19,APTIMA PANTHER,...[[id number]]                      In process                   Please view results for these tests on the individual orders.   COVID-19,[facility],NP/OP SWAB IN UTM/VTM/SALINE TRANSPORT MEDIA,24 HR TAT   URINE CULTURE   RAINBOW DRAW    Narrative:     The following orders were created for panel order [place] Draw.  Procedure                               Abnormality         Status                     ---------                               -----------         ------                     Light Blue Top[[id number]]                                    Final result               Green Top (Gel)[[id number]]                                  Final result               Lavender Top[[id number]]                                     Final result               Gold Top - SST[[id number]]                                   Final result                 Please view results for these tests on the individual orders.   BASIC METABOLIC PANEL   HEMOGLOBIN A1C   HEMOGLOBIN AND HEMATOCRIT, BLOOD   POCT GLUCOSE FINGERSTICK   POCT GLUCOSE FINGERSTICK   POCT GLUCOSE FINGERSTICK   POCT GLUCOSE FINGERSTICK   TYPE AND SCREEN   LIGHT BLUE TOP   GREEN TOP   LAVENDER TOP   GOLD TOP - SST   CBC AND DIFFERENTIAL    Narrative:     The following orders were created for panel order CBC & Differential.  Procedure                               Abnormality         Status                     ---------                               -----------         ------                     CBC Auto Differential[[id number]]        Abnormal            Final result                 Please view results for these tests on the individual orders.       EKG:   No orders to display       Meds given in ED:   Medications   sodium chloride 0.9 % flush 10 mL (has no administration in time range)   sodium chloride 0.9 % flush 10 mL (has no administration in time range)   sodium chloride 0.9 % flush 10 mL (has no administration in time range)   nitroglycerin (NITROSTAT) SL tablet 0.4 mg (has no administration in time range)   sodium chloride 0.9 % infusion (has no administration in time range)   acetaminophen (TYLENOL) tablet 650 mg (has no administration in time range)     Or   acetaminophen (TYLENOL) 160 MG/5ML solution 650 mg (has no administration in time range)     Or   acetaminophen (TYLENOL) suppository 650 mg (has no administration in time range)   ondansetron (ZOFRAN) tablet 4 mg (has no administration in time range)     Or   ondansetron (ZOFRAN) injection 4 mg (has no administration in time range)   calcium carbonate (TUMS)  chewable tablet 500 mg (200 mg elemental) (has no administration in time range)   dextrose (GLUTOSE) oral gel 15 g (has no administration in time range)   dextrose (D50W) 25 g/ 50mL Intravenous Solution 25 g (has no administration in time range)   glucagon (human recombinant) (GLUCAGEN DIAGNOSTIC) injection 1 mg (has no administration in time range)   insulin lispro (ADMELOG) injection 0-9 Units (has no administration in time range)   sodium chloride 0.9 % bolus 1,000 mL (0 mL Intravenous Stopped 6/3/21 0134)   morphine injection 4 mg (4 mg Intravenous Given 6/2/21 2357)   ondansetron (ZOFRAN) injection 4 mg (4 mg Intravenous Given 6/2/21 2355)   cefTRIAXone (ROCEPHIN) IVPB 1 g (0 g Intravenous Stopped 6/3/21 0134)       Imaging results:  CT Abdomen Pelvis Without Contrast    Result Date: 6/3/2021   1. Thick-walled appearance to the rectosigmoid. Similar findings were present on prior exam. Colitis is not excluded. 2. The stomach is markedly distended and fluid-filled. Duodenum appears relatively decompressed. Appearance may reflect some gastroparesis or gastric outlet obstruction. 2. Distention of the urinary bladder. This may reflect some urinary retention. There is air seen within the bladder, and correlation with recent instrumentation is suggested.  Radiation dose reduction techniques were utilized, including automated exposure control and exposure modulation based on body size.  This report was finalized on 6/3/2021 1:03 AM by Dr. Vero Hansen M.D.        Ambulatory status:   -     Social issues:   Social History     Socioeconomic History   • Marital status:      Spouse name: Not on file   • Number of children: Not on file   • Years of education: Not on file   • Highest education level: Not on file   Tobacco Use   • Smoking status: Never Smoker   • Smokeless tobacco: Never Used   Vaping Use   • Vaping Use: Never used   Substance and Sexual Activity   • Alcohol use: Yes   • Drug use: Never   •  Sexual activity: Defer    Nursing report ED to floor       Carolyn Villanueva RN  06/03/21 7947

## 2021-06-03 NOTE — CONSULTS
Gregorio Alejandro   65 y.o.  male    LOS: 0 days   Patient Care Team:  Maya Ribeiro APRN as PCP - General (Family Medicine)      Subjective     Patient Complaints: Abdominal discomfort, nausea and vomiting, dark stools    History of Present Illness: As above the patient presented with abdominal symptoms of discomfort nausea vomiting and is also noted dark stools the past 2 days.  In the emergency room he was found to have occult positive stool.  He was admitted for further evaluation.  I saw him in February 2021.  His mental status at that time was very poor.  He was discharged to hospice but since apparently is not in hospice at this time.  Is much more alert this admission.  He is still not very verbal today and says he is to get some rest and does not feel like talking much.  He does deny any shortness of air and denies any chest pain.  Apparently not feeling any palpitations and I cannot get him to admit to any lightheadedness.  He still not very verbal at this time.  He has a history of multiple medical issues and these are detailed in the assessment below.  We have been seeing him for atrial fibrillation as well as other issues mentioned in the assessment below.  He may have been on Eliquis 2.5 mg every 12 hours according to the outpatient med list but apparently the patient is unable to confirm that..  He has not been on any rate control medicines for atrial fibrillation.  He has a history of pauses and a marked first-degree AV block when in sinus rhythm.  His rate is controlled on the EKG without any rate control medicines.  The EKG does demonstrate atrial fibrillation.  He says his feet are chronically swollen.  He has some type of sores on his legs and both lower legs have been wrapped.      Review of Systems:   Presented with GI symptoms abdominal discomfort, nausea vomiting and dark stools.  Does not seem to admit to any fever or chills  As noted denies any chest pain or shortness of air.  Much more  alert than he was during his last admission here last saw him in February.  However he still not very verbal and says he needs to rest and does not feel like talking much at the present time.    Medication Review:   Current Facility-Administered Medications:   •  acetaminophen (TYLENOL) tablet 650 mg, 650 mg, Oral, Q4H PRN, 650 mg at 06/03/21 0653 **OR** acetaminophen (TYLENOL) 160 MG/5ML solution 650 mg, 650 mg, Oral, Q4H PRN **OR** acetaminophen (TYLENOL) suppository 650 mg, 650 mg, Rectal, Q4H PRN, Kaylynn Bennett APRN  •  ammonium lactate (AMLACTIN) cream, , Topical, Q12H PRN, Rashad Ayala MD  •  atorvastatin (LIPITOR) tablet 20 mg, 20 mg, Oral, Daily, Rashad Ayala MD  •  brimonidine (ALPHAGAN P) 0.1 % ophthalmic solution 1 drop, 1 drop, Both Eyes, BID, Rashad Ayala MD  •  calcium carbonate (TUMS) chewable tablet 500 mg (200 mg elemental), 2 tablet, Oral, BID PRN, Kaylynn Bennett APRN  •  dextrose (D50W) 25 g/ 50mL Intravenous Solution 25 g, 25 g, Intravenous, Q15 Min PRN, Kaylynn Bennett APRN  •  dextrose (GLUTOSE) oral gel 15 g, 15 g, Oral, Q15 Min PRN, Kaylynn Bennett APRN  •  dorzolamide (TRUSOPT) 2 % ophthalmic solution 1 drop, 1 drop, Right Eye, TID, Rashad Ayala MD  •  glucagon (human recombinant) (GLUCAGEN DIAGNOSTIC) injection 1 mg, 1 mg, Subcutaneous, PRN, Kaylynn Bennett APRN  •  insulin lispro (ADMELOG) injection 0-9 Units, 0-9 Units, Subcutaneous, 4x Daily With Meals & Nightly, Kaylynn Bennett APRN  •  metoclopramide (REGLAN) injection 10 mg, 10 mg, Intravenous, Q6H, Chrissy Gordon MD  •  nitroglycerin (NITROSTAT) SL tablet 0.4 mg, 0.4 mg, Sublingual, Q5 Min PRN, Kaylynn Bennett APRN  •  ondansetron (ZOFRAN) tablet 4 mg, 4 mg, Oral, Q6H PRN **OR** ondansetron (ZOFRAN) injection 4 mg, 4 mg, Intravenous, Q6H PRN, Kaylynn Bennett APRN  •  [COMPLETED] Insert peripheral IV, , , Once **AND** sodium chloride 0.9 % flush 10 mL, 10  mL, Intravenous, PRN, Alicia Harris APRN  •  sodium chloride 0.9 % flush 10 mL, 10 mL, Intravenous, Q12H, Kaylynn Bennett APRN, 10 mL at 06/03/21 0155  •  sodium chloride 0.9 % flush 10 mL, 10 mL, Intravenous, PRN, Kaylynn Bennett APRN  •  sodium chloride 0.9 % infusion, 100 mL/hr, Intravenous, Continuous, Kaylynn Bennett APRN, Last Rate: 100 mL/hr at 06/03/21 0346, 100 mL/hr at 06/03/21 0346      History reviewed. No pertinent family history.  Social History     Socioeconomic History   • Marital status:      Spouse name: Not on file   • Number of children: Not on file   • Years of education: Not on file   • Highest education level: Not on file   Tobacco Use   • Smoking status: Never Smoker   • Smokeless tobacco: Never Used   Vaping Use   • Vaping Use: Never used   Substance and Sexual Activity   • Alcohol use: Yes     Comment: 1 pint   • Drug use: Never   • Sexual activity: Defer     Objective   Past Surgical History:   Procedure Laterality Date   • ABDOMINAL SURGERY     • EYE SURGERY     • JOINT REPLACEMENT     • TONSILLECTOMY       Past Medical History:   Diagnosis Date   • Back pain    • CKD (chronic kidney disease)    • DM type 2 (diabetes mellitus, type 2) (CMS/HCC)    • ED (erectile dysfunction)    • Hyperlipidemia    • Hypertension    • SCOTTY (iron deficiency anemia)    • Monoclonal gammopathy    • Osteoarthritis        Vital Sign Min/Max for last 24 hours  Temp  Min: 96.7 °F (35.9 °C)  Max: 97.8 °F (36.6 °C)   BP  Min: 132/78  Max: 157/96    Pulse  Min: 70  Max: 84     Wt Readings from Last 3 Encounters:   06/03/21 103 kg (227 lb 12.8 oz)   02/16/21 106 kg (233 lb)   12/22/20 114 kg (251 lb 8 oz)        Physical Exam:      General Appearance:    Alert, cooperative, in no acute distress   Head:    Normocephalic, without obvious abnormality, atraumatic   Eyes:            Conjunctivae normal, no   icterus   Neck:   No adenopathy, supple, trachea midline, no thyromegaly, no    carotid bruit,  JVD difficult to evaluate   Lungs:     Clear to auscultation,respirations regular, even and                  unlabored    Heart:    Irregular rhythm and normal rate, normal S1 and S2,            No murmur, no gallop, no rub, no click   Chest Wall:    No abnormalities observed   Abdomen:    Distended   Rectal:     Deferred   Extremities:  Both lower legs are wrapped.  Feet are edematous       Skin:   No bleeding, bruising or rash   Neurologic:   Cranial nerves 2 - 12 grossly intact, sensation intact, DTR       present and equal bilaterally        Results Review:     I reviewed the patient's new clinical results.  Potassium   Date Value Ref Range Status   06/02/2021 3.9 3.5 - 5.2 mmol/L Final     Creatinine   Date Value Ref Range Status   06/02/2021 3.25 (H) 0.76 - 1.27 mg/dL Final      Echo EF Estimated  )No results found for: ECHOEFEST    Sodium Sodium   Date Value Ref Range Status   06/02/2021 138 136 - 145 mmol/L Final      Potassium Potassium   Date Value Ref Range Status   06/02/2021 3.9 3.5 - 5.2 mmol/L Final      Chloride Chloride   Date Value Ref Range Status   06/02/2021 111 (H) 98 - 107 mmol/L Final      Bicarbonate No results found for: PLASMABICARB   BUN BUN   Date Value Ref Range Status   06/02/2021 68 (H) 8 - 23 mg/dL Final      Creatinine Creatinine   Date Value Ref Range Status   06/02/2021 3.25 (H) 0.76 - 1.27 mg/dL Final      Calcium Calcium   Date Value Ref Range Status   06/02/2021 8.6 8.6 - 10.5 mg/dL Final      Magnesium No results found for: MG     Results from last 7 days   Lab Units 06/03/21  0922 06/02/21  2238   WBC 10*3/mm3  --  5.95   HEMOGLOBIN g/dL 9.3* 9.7*   HEMATOCRIT % 31.0* 31.2*   PLATELETS 10*3/mm3  --  201     Lab Results   Lab Value Date/Time    TROPONINT 0.110 (C) 02/04/2021 1551    TROPONINT 0.171 (C) 12/23/2020 0357    TROPONINT 0.141 (C) 12/22/2020 0549    TROPONINT 0.147 (C) 12/21/2020 1450    TROPONINT 0.128 (C) 12/21/2020 1223     Lab Results   Component  Value Date    CHOL 108 12/22/2020     Lab Results   Component Value Date    HDL 49 12/22/2020     Lab Results   Component Value Date    LDL 42 12/22/2020     Lab Results   Component Value Date    TRIG 86 12/22/2020     No components found for: CHOLHDL       Assessment/ Plan      Gastrointestinal hemorrhage  Heme positive stool  Hypertension, hyperlipidemia  Stage renal disease  Diabetes mellitus  History of nonsustained ventricular tachycardia  History of marked first-degree AV block and history of some junctional rhythm, history of pauses.  We have been avoiding any beta-blockers or diltiazem.  History of syncope  Hypertensive cardiovascular disease, normal LVEF 59% on echo 12/20/2020  He has refused stress testing in the past.  History of monoclonal gammopathy  History of immobility  History of microcytic anemia and abnormal findings of the rectum  Atrial fibrillation, was on 2.5 of Eliquis 12 hours  History of chronically elevated troponin felt to be due to the chronic renal failure  Plan #1 his current EKG demonstrates atrial fibrillation with controlled rate in the absence of any rate control medications.  As noted above he has had a history of pauses and we have been avoiding any beta-blockers or diltiazem.  2.  Blood pressure reasonable.  Otherwise he appears fairly stable cardiac wise.  He may have been on Eliquis 2.5 every 12 hours as an outpatient but he cannot confirm that.  In any event Eliquis on hold.  GI consult noted.  They are evaluating him and plan on possible scopes 2 days.  Depending upon the GI work-up will discuss whether we can resume any anticoagulation.  Terrance Muhammad MD  06/03/21  10:04 EDT      Time: 43 minutes

## 2021-06-04 PROBLEM — N18.30 CKD (CHRONIC KIDNEY DISEASE) STAGE 3, GFR 30-59 ML/MIN (HCC): Status: ACTIVE | Noted: 2021-01-01

## 2021-06-04 PROBLEM — J18.9 RIGHT UPPER LOBE PNEUMONIA: Status: ACTIVE | Noted: 2021-01-01

## 2021-06-04 NOTE — THERAPY EVALUATION
Patient Name: Gregorio Alejandro  : 1955    MRN: [id number]                              Today's Date: 2021       Admit Date: 2021    Visit Dx:     ICD-10-CM ICD-9-CM   1. Gastrointestinal hemorrhage, unspecified gastrointestinal hemorrhage type  K92.2 578.9   2. Acute UTI  N39.0 599.0   3. SILVIA (acute kidney injury) (CMS/[facility])  N17.9 584.9   4. Sore on leg  L98.9 709.9     Patient Active Problem List   Diagnosis   • Acute UTI (urinary tract infection)   • Macrocytosis   • Sepsis secondary to UTI (CMS/[facility])   • Metabolic encephalopathy   • Hyperlipidemia   • Hypertension   • Monoclonal gammopathy   • Stage 4 chronic kidney disease (CMS/[facility])   • DM2 (diabetes mellitus, type 2) (CMS/[facility])   • Abnormal CT of the abdomen   • Normal anion gap metabolic acidosis   • Anemia, chronic disease   • Ventricular tachycardia (paroxysmal) (CMS/[facility])   • Acute metabolic encephalopathy   • UTI (urinary tract infection), bacterial   • Elevated troponin   • Hypokalemia   • SILVIA (acute kidney injury) (CMS/[facility])   • Uncontrolled hypertension   • Septicemia (CMS/[facility])   • Vascular dementia without behavioral disturbance (CMS/[facility])   • Gastrointestinal hemorrhage   • Immobility   • Right upper lobe pneumonia   • CKD (chronic kidney disease) stage 3, GFR 30-59 ml/min (CMS/[facility])     Past Medical History:   Diagnosis Date   • Back pain    • CKD (chronic kidney disease)    • DM type 2 (diabetes mellitus, type 2) (CMS/[facility])    • ED (erectile dysfunction)    • Hyperlipidemia    • Hypertension    • SCOTTY (iron deficiency anemia)    • Monoclonal gammopathy    • Osteoarthritis      Past Surgical History:   Procedure Laterality Date   • ABDOMINAL SURGERY     • EYE SURGERY     • JOINT REPLACEMENT     • TONSILLECTOMY       General Information     Row Name 21 1527          Physical Therapy Time and Intention    Document Type  evaluation;discharge evaluation/summary  -     Mode of Treatment  individual therapy;physical therapy  -     Row Name  06/04/21 1527          General Information    Prior Level of Function  dependent: Per pt he was bed bound/ wheelchair bound PTA. A family friend lifts pt to chair if he gets OOB.  -     Barriers to Rehab  previous functional deficit  -[facility] Name 06/04/21 1527          Living Environment    Lives With  spouse  -KH     Row Name 06/04/21 1527          Cognition    Orientation Status (Cognition)  oriented x 3  -       User Key  (r) = Recorded By, (t) = Taken By, (c) = Cosigned By    Initials Name Provider Type    Daniela Arnold, MAHNAZ Physical Therapist        Mobility     [facility] Name 06/04/21 1528          Bed Mobility    Bed Mobility  bed mobility (all) activities  -     All Activities, [place] (Bed Mobility)  dependent (less than 25% patient effort)  -       User Key  (r) = Recorded By, (t) = Taken By, (c) = Cosigned By    Initials Name Provider Type    Daniela Arnold PT Physical Therapist        Obj/Interventions     [facility] Name 06/04/21 1529          Range of Motion Comprehensive    Comment, General Range of Motion  AAROM limited 50% in BLE  -KH     Row Name 06/04/21 1529          Strength Comprehensive (MMT)    Comment, General Manual Muscle Testing (MMT) Assessment  2/5 BLE  -KH     Row Name 06/04/21 1529          Motor Skills    Therapeutic Exercise  -- AAROM x 10 reps in BLE  -KH     Row Name 06/04/21 1529          Balance    Balance Assessment  sitting static balance  -     Static Sitting Balance  severe impairment  -       User Key  (r) = Recorded By, (t) = Taken By, (c) = Cosigned By    Initials Name Provider Type    Daniela Arnold PT Physical Therapist        Goals/Plan    No documentation.       Clinical Impression     [facility] Name 06/04/21 1534          Pain    Additional Documentation  Pain Scale: Numbers Pre/Post-Treatment (Group)  -KH     Row Name 06/04/21 1534          Pain Scale: Numbers Pre/Post-Treatment    Pretreatment Pain Rating  3/10  -     Pain  Location - Side  Left  -     Pain Location - Orientation  lower  -     Pain Location  extremity  -     Pain Intervention(s)  Repositioned  -     Row Name 06/04/21 2133          Plan of Care Review    Plan of Care Reviewed With  patient  -     Outcome Summary  Pt was sleeping when PT arrived. he awoke easily, but remained lethargic. Pt was slow to respond but answered questions. Pt reports he was primarly bed bound nd unable to stand. He reports a family friend would lift him to a wheelchair if he got OOB. Pt was able to assist with ROm in bed, but was unable to lift BLE against gravity. He was dependent for bed mobility and unable to safety attain sitting at EOB with therapist. Pt's mobility appears to be at baseline level. Therefore PT will sign off. Pt would benefit from LTC placement.  -     Row Name 06/04/21 1633          Therapy Assessment/Plan (PT)    Patient/Family Therapy Goals Statement (PT)  per chart, wife wants LTC  -     Criteria for Skilled Interventions Met (PT)  does not meet criteria for skilled intervention  -     Row Name 06/04/21 8097          Positioning and Restraints    Pre-Treatment Position  in bed  -     Post Treatment Position  bed  -KH     In Bed  side lying right;call light within reach;encouraged to call for assist;exit alarm on  -       User Key  (r) = Recorded By, (t) = Taken By, (c) = Cosigned By    Initials Name Provider Type    Daniela Arnold, PT Physical Therapist        Outcome Measures     Row Name 06/04/21 9826          How much help from another person do you currently need...    Turning from your back to your side while in flat bed without using bedrails?  1  -KH     Moving from lying on back to sitting on the side of a flat bed without bedrails?  1  -KH     Moving to and from a bed to a chair (including a wheelchair)?  1  -KH     Standing up from a chair using your arms (e.g., wheelchair, bedside chair)?  1  -KH     Climbing 3-5 steps with a  railing?  1  -KH     To walk in hospital room?  1  -KH     AM-PAC 6 Clicks Score (PT)  6  -KH     Row Name 06/04/21 1540          Functional Assessment    Outcome Measure Options  AM-PAC 6 Clicks Basic Mobility (PT)  -KH       User Key  (r) = Recorded By, (t) = Taken By, (c) = Cosigned By    Initials Name Provider Type    Daniela Arnold, PT Physical Therapist        Physical Therapy Education                 Title: PT OT SLP Therapies (Not Started)     Topic: Physical Therapy (Not Started)     Point: Mobility training (Not Started)     Learner Progress:  Not documented in this visit.          Point: Home exercise program (Not Started)     Learner Progress:  Not documented in this visit.          Point: Body mechanics (Not Started)     Learner Progress:  Not documented in this visit.          Point: Precautions (Not Started)     Learner Progress:  Not documented in this visit.                          PT Recommendation and Plan     Plan of Care Reviewed With: patient  Outcome Summary: Pt was sleeping when PT arrived. he awoke easily, but remained lethargic. Pt was slow to respond but answered questions. Pt reports he was primarly bed bound nd unable to stand. He reports a family friend would lift him to a wheelchair if he got OOB. Pt was able to assist with ROm in bed, but was unable to lift BLE against gravity. He was dependent for bed mobility and unable to safety attain sitting at EOB with therapist. Pt's mobility appears to be at baseline level. Therefore PT will sign off. Pt would benefit from LTC placement.     Time Calculation:   PT Charges     Row Name 06/04/21 1541             Time Calculation    Start Time  1515  -KH      Stop Time  1528  -KH      Time Calculation (min)  13 min  -KH         Untimed Charges    PT Eval/Re-eval Minutes  13  -KH         Total Minutes    Untimed Charges Total Minutes  13  -KH       Total Minutes  13  -KH        User Key  (r) = Recorded By, (t) = Taken By, (c) =  Cosigned By    Initials Name Provider Type     Daniela Rich, PT Physical Therapist        Therapy Charges for Today     Code Description Service Date Service Provider Modifiers Qty    83661721765 HC PT EVAL MOD COMPLEXITY 1 6/4/2021 Daniela Rich, PT GP 1          PT G-Codes  Outcome Measure Options: AM-PAC 6 Clicks Basic Mobility (PT)  AM-PAC 6 Clicks Score (PT): 6    Daniela Rich, PT  6/4/2021

## 2021-06-04 NOTE — PROGRESS NOTES
Name: Gregorio Alejandro ADMIT: 2021   : 1955  PCP: Maya Ribeiro APRN    MRN: [id number] LOS: 1 days   AGE/SEX: 65 y.o. male  ROOM: [facility]     Subjective   Subjective   Not sleeping well due to required medical care. Reports nausea and abd discomfort. Denies chest pain, shortness of breath, fever. He denies additional black stools.     Review of Systems   Constitutional: Negative for fever.   HENT: Negative for congestion.    Respiratory: Negative for shortness of breath.    Cardiovascular: Negative for chest pain.   Gastrointestinal: Positive for abdominal pain and nausea.   Genitourinary: Negative for dysuria.   Musculoskeletal: Negative for arthralgias and myalgias.   Skin: Negative for rash.   Neurological: Positive for weakness. Negative for headaches.   Psychiatric/Behavioral: Positive for sleep disturbance.        Objective   Objective   Vital Signs  Temp:  [97.8 °F (36.6 °C)-100.2 °F (37.9 °C)] 100.2 °F (37.9 °C)  Heart Rate:  [] 103  Resp:  [16-18] 16  BP: (100-146)/(67-75) 113/68  SpO2:  [89 %-94 %] 93 %  on  Flow (L/min):  [4] 4;   Device (Oxygen Therapy): nasal cannula  Body mass index is 28.47 kg/m².  Physical Exam  Vitals and nursing note reviewed.   Constitutional:       General: He is not in acute distress.     Appearance: He is ill-appearing.   HENT:      Head: Normocephalic.      Mouth/Throat:      Mouth: Mucous membranes are moist.   Eyes:      Conjunctiva/sclera: Conjunctivae normal.   Cardiovascular:      Rate and Rhythm: Tachycardia present. Rhythm irregular.      Comments: HR low 100s  Pulmonary:      Effort: Pulmonary effort is normal. No respiratory distress.      Comments: Faintly coarse breath sounds bases  Abdominal:      General: Bowel sounds are normal. There is distension.      Tenderness: There is abdominal tenderness.   Musculoskeletal:      Cervical back: Neck supple.      Right lower leg: Edema present.      Left lower leg: Edema present.      Comments: Talat  pedal edema  BLE wrapped   Skin:     General: Skin is warm and dry.   Neurological:      Mental Status: He is alert.   Psychiatric:         Mood and Affect: Mood normal.         Behavior: Behavior normal.         Results Review     I reviewed the patient's new clinical results.  Results from last 7 days   Lab Units 06/04/21  0928 06/04/21  0006 06/03/21  1756 06/03/21  0922 06/02/21  2238   WBC 10*3/mm3 7.29  --   --   --  5.95   HEMOGLOBIN g/dL 8.6*  8.6* 9.6* 9.4* 9.3* 9.7*   PLATELETS 10*3/mm3 161  --   --   --  201     Results from last 7 days   Lab Units 06/04/21  0928 06/03/21  1756 06/02/21  2238   SODIUM mmol/L 136 134* 138   POTASSIUM mmol/L 4.1 4.1 3.9   CHLORIDE mmol/L 113* 114* 111*   CO2 mmol/L 12.0* 11.3* 15.2*   BUN mg/dL 56* 63* 68*   CREATININE mg/dL 2.81* 2.64* 3.25*   GLUCOSE mg/dL 188* 98 233*   Estimated Creatinine Clearance: 33.2 mL/min (A) (by C-G formula based on SCr of 2.81 mg/dL (H)).  Results from last 7 days   Lab Units 06/04/21  0928 06/02/21  2238   ALBUMIN g/dL 2.00* 3.10*   BILIRUBIN mg/dL  --  0.3   ALK PHOS U/L  --  181*   AST (SGOT) U/L  --  7   ALT (SGPT) U/L  --  7     Results from last 7 days   Lab Units 06/04/21  0928 06/03/21  1756 06/02/21  2238   CALCIUM mg/dL 8.0* 8.0* 8.6   ALBUMIN g/dL 2.00*  --  3.10*   PHOSPHORUS mg/dL 3.4  --   --      Results from last 7 days   Lab Units 06/02/21  2315   LACTATE mmol/L 1.5     COVID19   Date Value Ref Range Status   06/02/2021 Not Detected Not Detected - Ref. Range Final   02/19/2021 Not Detected Not Detected - Ref. Range Final     Hemoglobin A1C   Date/Time Value Ref Range Status   06/03/2021 0922 7.18 (H) 4.80 - 5.60 % Final     Glucose   Date/Time Value Ref Range Status   06/04/2021 1118 183 (H) 70 - 130 mg/dL Final   06/04/2021 0558 141 (H) 70 - 130 mg/dL Final   06/03/2021 2008 102 70 - 130 mg/dL Final   06/03/2021 1648 116 70 - 130 mg/dL Final   06/03/2021 1131 237 (H) 70 - 130 mg/dL Final   06/03/2021 0609 133 (H) 70 - 130  mg/dL Final   06/03/2021 0331 147 (H) 70 - 130 mg/dL Final       XR Abdomen KUB  ABDOMEN KUB     CLINICAL HISTORY: Abdominal distention     2 supine views were obtained.     Compared to the CT scan of the abdomen and pelvis performed earlier  today at 1230 hours.     There is mild gaseous distention of the stomach that appears essentially  unchanged. The bowel gas pattern is otherwise unremarkable. There is no  evidence of obstruction. A small amount of formed stool is noted within  the colon. There is no obvious free air on these supine views. The  inferior half of the lungs were also visualized. There is dense  consolidation in the right upper lobe consistent with pneumonia.     IMPRESSIONS: Right upper lobe pneumonia. Mild gaseous distention of the  stomach that is unchanged since the CT performed earlier today. There is  no definite evidence of obstruction.     This report was finalized on 6/3/2021 4:52 PM by Dr. Gregorio Deshpande M.D.     CT Abdomen Pelvis Without Contrast  Narrative: CT OF THE ABDOMEN AND PELVIS WITHOUT CONTRAST     HISTORY: Diffuse abdominal pain. 11 stool.     COMPARISON: 02/05/2021     TECHNIQUE: Axial CT imaging was obtained through the abdomen and pelvis.  IV contrast was administered.     FINDINGS:  Images are degraded by motion artifact. There is bibasilar scarring.  Similar findings were present on prior study. The patient's stomach is  distended and fluid-filled. Duodenum appears relatively decompressed.  Correlation with any history of gastroparesis or gastric outlet  obstruction is suggested. No suspicious hepatic lesions are seen.  Gallbladder is normal. Calcified granulomata are seen within the  bleeding. Adrenal glands are within normal limits. Pancreas is markedly  atrophic. There are dense vascular calcifications. There is an 8 mm  nonobstructing stone identified within the right kidney. Full assessment  of structures within the pelvis is degraded by streak artifact  from  bilateral hip arthroplasties. The patient does have distention of the  urinary bladder. Correlation with any symptoms of urinary retention is  suggested. There is also air noted within the urinary bladder and  correlation with any history of instrumentation is suggested. The  patient's rectosigmoid appears diffusely thick-walled. Similar findings  were present on prior exam. Appearance may reflect colitis. There is  colonic diverticulosis. There is no diverticulitis. There is no bowel  obstruction. Appendix is not clearly seen, although I see no evidence of  appendicitis. There is extensive calcification of the aorta. There is  body wall edema. Patient is osteoporotic. Calcifications within the  bladder wall are again seen.     Impression:    1. Thick-walled appearance to the rectosigmoid. Similar findings were  present on prior exam. Colitis is not excluded.  2. The stomach is markedly distended and fluid-filled. Duodenum appears  relatively decompressed. Appearance may reflect some gastroparesis or  gastric outlet obstruction.  2. Distention of the urinary bladder. This may reflect some urinary  retention. There is air seen within the bladder, and correlation with  recent instrumentation is suggested.     Radiation dose reduction techniques were utilized, including automated  exposure control and exposure modulation based on body size.     This report was finalized on 6/3/2021 1:03 AM by Dr. Vero Hansen M.D.       Scheduled Medications  [START ON 6/5/2021] amLODIPine, 2.5 mg, Oral, Q24H  atorvastatin, 20 mg, Oral, Daily  brimonidine, 1 drop, Both Eyes, BID  cefTRIAXone, 1 g, Intravenous, Q24H  dorzolamide, 1 drop, Right Eye, TID  insulin lispro, 0-9 Units, Subcutaneous, 4x Daily With Meals & Nightly  metroNIDAZOLE, 500 mg, Intravenous, Q8H  senna-docusate sodium, 1 tablet, Oral, Nightly  sodium bicarbonate, 1,300 mg, Oral, TID  sodium chloride, 10 mL, Intravenous, Q12H  tamsulosin, 0.4 mg, Oral,  Daily    Infusions   Diet  Diet Clear Liquid       Assessment/Plan     Active Hospital Problems    Diagnosis  POA   • **Gastrointestinal hemorrhage [K92.2]  Yes   • Right upper lobe pneumonia [J18.9]  Yes   • CKD (chronic kidney disease) stage 3, GFR 30-59 ml/min (CMS/[facility]) [N18.30]  Yes   • Immobility [Z74.09]  Yes   • SILVIA (acute kidney injury) (CMS/[facility]) [N17.9]  Yes   • Hypertension [I10]  Yes   • DM2 (diabetes mellitus, type 2) (CMS/[facility]) [E11.9]  Yes   • Abnormal CT of the abdomen [R93.5]  Yes   • Acute UTI (urinary tract infection) [N39.0]  Yes      Resolved Hospital Problems   No resolved problems to display.       65 y.o. male admitted with Gastrointestinal hemorrhage.    GI bleed/Colitis:  -AC on hold  -GI consult appreciated; plan EGD and sigmoidoscopy tomorrow  -Hgb a little lower today  -Transfuse as needed  - IV reglan  -KUB unchanged c/t CT but evidence of RUL pna present  -Flagyl added per ID     UTI:  -Continue rocephin  -Culture > 100,000 GN bacilli  -ID following     SILVIA:  -Baseline Cr ~2   -Nephrology following  -Amlodipine decreased      Chronic LE wounds:  -Continue LWC per Wound RN recommendations     Afib/flutter/AC:  -Cardiology following  -HR controlled; beta blockers/dilitazem avoided due to hx of pauses  -AC on hold until GI workup complete     DM:  -Monitor BG trends  -Correctional scale insulin  -A1C 7.18%    RUL pneumonia:  -Seen on KUB  -Check procal  -ID on board         SCDs for DVT prophylaxis.  Full code.  Discussed with patient and consulting provider.  Anticipate discharge plan long term care timing yet to be determined.      ANTWAN Aguilar  [facility]  06/04/21  13:42 EDT

## 2021-06-04 NOTE — PROGRESS NOTES
[facility]  Inpatient Progress Note    Reason for Follow Up:  Melena, anemia    Subjective     Interval History:   C/O nausea, gurgling in stomach.  No vomiting.      Current Facility-Administered Medications:   •  acetaminophen (TYLENOL) tablet 650 mg, 650 mg, Oral, Q4H PRN, 650 mg at 06/03/21 0653 **OR** acetaminophen (TYLENOL) 160 MG/5ML solution 650 mg, 650 mg, Oral, Q4H PRN **OR** acetaminophen (TYLENOL) suppository 650 mg, 650 mg, Rectal, Q4H PRN, Kaylynn Bennett APRN  •  [START ON 6/5/2021] amLODIPine (NORVASC) tablet 2.5 mg, 2.5 mg, Oral, Q24H, Dane Reeves MD  •  ammonium lactate (AMLACTIN) cream, , Topical, Q12H PRN, Rashad Ayala MD  •  atorvastatin (LIPITOR) tablet 20 mg, 20 mg, Oral, Daily, Rashad Ayala MD, 20 mg at 06/04/21 0825  •  brimonidine (ALPHAGAN P) 0.1 % ophthalmic solution 1 drop, 1 drop, Both Eyes, BID, Rashad Ayala MD, 1 drop at 06/04/21 0826  •  calcium carbonate (TUMS) chewable tablet 500 mg (200 mg elemental), 2 tablet, Oral, BID PRN, Kaylynn Bennett APRN  •  cefTRIAXone (ROCEPHIN) IVPB 1 g, 1 g, Intravenous, Q24H, Leah Winslow APRN, Last Rate: 100 mL/hr at 06/04/21 0034, 1 g at 06/04/21 0034  •  dextrose (D50W) 25 g/ 50mL Intravenous Solution 25 g, 25 g, Intravenous, Q15 Min PRN, Kaylynn Bennett APRN  •  dextrose (GLUTOSE) oral gel 15 g, 15 g, Oral, Q15 Min PRN, Kaylynn Bennett APRN  •  dorzolamide (TRUSOPT) 2 % ophthalmic solution 1 drop, 1 drop, Right Eye, TID, Rashad Ayala MD, 1 drop at 06/04/21 0827  •  glucagon (human recombinant) (GLUCAGEN DIAGNOSTIC) injection 1 mg, 1 mg, Subcutaneous, PRN, Kaylynn Bennett, APRN  •  insulin lispro (ADMELOG) injection 0-9 Units, 0-9 Units, Subcutaneous, 4x Daily With Meals & Nightly, Kaylynn Bennett, ANTWAN, 4 Units at 06/03/21 1202  •  nitroglycerin (NITROSTAT) SL tablet 0.4 mg, 0.4 mg, Sublingual, Q5 Min PRN, Kaylynn Bennett, APRN  •   ondansetron (ZOFRAN) tablet 4 mg, 4 mg, Oral, Q6H PRN **OR** ondansetron (ZOFRAN) injection 4 mg, 4 mg, Intravenous, Q6H PRN, Kaylynn Bennett APRN  •  sennosides-docusate (PERICOLACE) 8.6-50 MG per tablet 1 tablet, 1 tablet, Oral, Nightly, Chrissy Gordon MD, 1 tablet at 06/03/21 2016  •  sodium bicarbonate tablet 650 mg, 650 mg, Oral, TID, Dane Reeves MD, 650 mg at 06/04/21 0826  •  [COMPLETED] Insert peripheral IV, , , Once **AND** sodium chloride 0.9 % flush 10 mL, 10 mL, Intravenous, PRN, Alicia Harris APRN  •  sodium chloride 0.9 % flush 10 mL, 10 mL, Intravenous, Q12H, Kaylynn Bennett APRN, 10 mL at 06/04/21 0827  •  sodium chloride 0.9 % flush 10 mL, 10 mL, Intravenous, PRN, Kaylynn Bennett APRN  Review of Systems:    All systems were reviewed and negative except for:  Gastrointestinal: positive for  nausea    Objective     Vital Signs  Temp:  [97.8 °F (36.6 °C)-100.2 °F (37.9 °C)] 100.2 °F (37.9 °C)  Heart Rate:  [] 103  Resp:  [16-18] 16  BP: (100-146)/(67-75) 113/68  Body mass index is 28.47 kg/m².    Intake/Output Summary (Last 24 hours) at 6/4/2021 1131  Last data filed at 6/4/2021 0825  Gross per 24 hour   Intake 930 ml   Output 550 ml   Net 380 ml     I/O this shift:  In: 720 [P.O.:720]  Out: -      Physical Exam:   General: patient awake, alert and cooperative   Eyes: Normal lids and lashes, no scleral icterus   Neck: supple, normal ROM   Skin: warm and dry, not jaundiced   Cardiovascular: regular rhythm and rate, no murmurs auscultated   Pulm: clear to auscultation bilaterally, regular and unlabored   Abdomen: soft, nontender, nondistended; normal bowel sounds   Rectal: deferred   Extremities: no rash or edema   Psychiatric: Normal mood and behavior; memory intact     Results Review:     I reviewed the patient's new clinical results.  I reviewed the patient's new imaging results and agree with the interpretation.    Results from last 7 days   Lab  Units 06/04/21  0928 06/04/21  0006 06/03/21  1756 06/02/21  2238   WBC 10*3/mm3 7.29  --   --  5.95   HEMOGLOBIN g/dL 8.6*  8.6* 9.6* 9.4* 9.7*   HEMATOCRIT % 27.2*  27.2* 30.8* 29.3* 31.2*   PLATELETS 10*3/mm3 161  --   --  201     Results from last 7 days   Lab Units 06/04/21  0928 06/03/21  1756 06/02/21  2238   SODIUM mmol/L 136 134* 138   POTASSIUM mmol/L 4.1 4.1 3.9   CHLORIDE mmol/L 113* 114* 111*   CO2 mmol/L 12.0* 11.3* 15.2*   BUN mg/dL 56* 63* 68*   CREATININE mg/dL 2.81* 2.64* 3.25*   CALCIUM mg/dL 8.0* 8.0* 8.6   BILIRUBIN mg/dL  --   --  0.3   ALK PHOS U/L  --   --  181*   ALT (SGPT) U/L  --   --  7   AST (SGOT) U/L  --   --  7   GLUCOSE mg/dL 188* 98 233*         Lab Results   Lab Value Date/Time    LIPASE 17 12/18/2020 1653    LIPASE 914 (H) 10/27/2020 0330    LIPASE 1,087 (H) 10/26/2020 0410    LIPASE 1,653 (H) 10/25/2020 0323       Radiology:  [unfilled]      Assessment/Plan   Assessment:   1.  Diabetic gastroparesis  2.  Melena  3.  Abnormal CT scan of rectosigmoid colon  4.  Heme positive stool    Plan:   The patient has expressed desire for aggressive workup for his anemia and abnormal CT scan of rectosigmoid colon.  His KUB demonstrates some gastric distention in keeping with his hx of gastroparesis.  He has significant nausea but no vomiting.  I do not think he will tolerate an oral bowel prep.  We will give clear liquid diet and plan for EGD and sigmoidoscopy in the am with tap water enemas.  Continue current dose Reglan.      I discussed the patients findings and my recommendations with patient.         Kip Dumas M.D.  [facility]  [address] [place] [id number]  Office: [phone]

## 2021-06-04 NOTE — PROGRESS NOTES
"  Infectious Diseases Progress Note    Tonja Azevedo MD     [facility]  [place]: 1 day  Patient Identification:  Name: Gregorio Alejandro  Age: 65 y.o.  Sex: male  :  1955  MRN: [id number]         Primary Care Physician: Maya Ribeiro APRN            Subjective: Still having some abdominal pain.  Denies any fever and chills.  More interactive compared to last night.  Interval History: See consultation note.    Objective:    Scheduled Meds:[START ON 2021] amLODIPine, 2.5 mg, Oral, Q24H  atorvastatin, 20 mg, Oral, Daily  brimonidine, 1 drop, Both Eyes, BID  cefTRIAXone, 1 g, Intravenous, Q24H  dorzolamide, 1 drop, Right Eye, TID  insulin lispro, 0-9 Units, Subcutaneous, 4x Daily With Meals & Nightly  senna-docusate sodium, 1 tablet, Oral, Nightly  sodium bicarbonate, 650 mg, Oral, TID  sodium chloride, 10 mL, Intravenous, Q12H      Continuous Infusions:     Vital signs in last 24 hours:  Temp:  [97.8 °F (36.6 °C)-100.2 °F (37.9 °C)] 100.2 °F (37.9 °C)  Heart Rate:  [] 103  Resp:  [16-18] 16  BP: (100-146)/(67-75) 113/68    Intake/Output:    Intake/Output Summary (Last 24 hours) at 2021 1020  Last data filed at 2021 0825  Gross per 24 hour   Intake 930 ml   Output 550 ml   Net 380 ml       Exam:  /68 (BP Location: Left arm, Patient Position: Lying)   Pulse 103   Temp 100.2 °F (37.9 °C) (Oral)   Resp 16   Ht 190.5 cm (75\")   Wt 103 kg (227 lb 12.8 oz)   SpO2 93%   BMI 28.47 kg/m²   Patient is examined using the personal protective equipment as per guidelines from infection control for this particular patient as enacted.  Hand washing was performed before and after patient interaction.  General Appearance:    More interactive and feeling better.   Head:    Normocephalic, without obvious abnormality, atraumatic   Eyes:    PERRL, conjunctivae/corneas clear, EOM's intact, both eyes   Ears:    Normal external ear canals, both ears   Nose:   Nares normal, septum midline, " mucosa normal, no drainage    or sinus tenderness   Throat:   Lips, tongue, gums normal; oral mucosa pink and moist   Neck:   Supple, symmetrical, trachea midline, no adenopathy;     thyroid:  no enlargement/tenderness/nodules; no carotid    bruit or JVD   Back:     Symmetric, no curvature, ROM normal, no CVA tenderness   Lungs:     Clear to auscultation bilaterally, respirations unlabored   Chest Wall:    No tenderness or deformity    Heart:   Irregularly irregular   Abdomen:    Soft mild generalized tenderness,distended   Extremities:  Contractures and diffuse edema of the lower extremities noted lower extremities are dressed.   Pulses:   Pulses palpable in all extremities; symmetric all extremities   Skin:  Superficial wounds noted   Neurologic:   Not as lethargic and somnolent as well last night..            Data Review:    I reviewed the patient's new clinical results.  Results from last 7 days   Lab Units 06/04/21  0928 06/04/21  0006 06/03/21  1756 06/03/21  0922 06/02/21  2238   WBC 10*3/mm3 7.29  --   --   --  5.95   HEMOGLOBIN g/dL 8.6*  8.6* 9.6* 9.4* 9.3* 9.7*   PLATELETS 10*3/mm3 161  --   --   --  201     Results from last 7 days   Lab Units 06/03/21  1756 06/02/21  2238   SODIUM mmol/L 134* 138   POTASSIUM mmol/L 4.1 3.9   CHLORIDE mmol/L 114* 111*   CO2 mmol/L 11.3* 15.2*   BUN mg/dL 63* 68*   CREATININE mg/dL 2.64* 3.25*   CALCIUM mg/dL 8.0* 8.6   GLUCOSE mg/dL 98 233*     Microbiology Results (last 10 days)     Procedure Component Value - Date/Time    COVID PRE-OP / PRE-PROCEDURE SCREENING ORDER (NO ISOLATION) - Swab, Nasopharynx [[id number]]  (Normal) Collected: 06/02/21 2318    Lab Status: Final result Specimen: Swab from Nasopharynx Updated: 06/03/21 0330    Narrative:      The following orders were created for panel order COVID PRE-OP / PRE-PROCEDURE SCREENING ORDER (NO ISOLATION) - Swab, Nasopharynx.  Procedure                               Abnormality         Status                      ---------                               -----------         ------                     COVID-19,APTIMA PANTHER,...[[id number]]  Normal              Final result                 Please view results for these tests on the individual orders.    COVID-19,APTIMA PANTHER,[facility], NP/OP SWAB IN UTM/VTM/[facility] TRANSPORT MEDIA,24 HR TAT - Swab, Nasopharynx [[id number]]  (Normal) Collected: 06/02/21 2318    Lab Status: Final result Specimen: Swab from Nasopharynx Updated: 06/03/21 0330     COVID19 Not Detected    Narrative:      Fact sheet for providers: https://www.fda.gov/media/099961/download     Fact sheet for patients: https://www.fda.gov/media/829335/download    Test performed by RT PCR.    Urine Culture - Urine, Urine, Catheter [[id number]]  (Abnormal) Collected: 06/02/21 2314    Lab Status: Preliminary result Specimen: Urine, Catheter Updated: 06/04/21 0956     Urine Culture >100,000 CFU/mL Gram Negative Bacilli            Assessment:    Gastrointestinal hemorrhage    Acute UTI (urinary tract infection)    Hypertension    DM2 (diabetes mellitus, type 2) (CMS/[facility])    Abnormal CT of the abdomen    SILVIA (acute kidney injury) (CMS/[facility])    Immobility  1-GI bleed unclear whether it is upper GI bleed or due to colitis involving the rectosigmoid junction.  Rule out infectious causes such as E. coli Shigella infection versus C. difficile infection.  2-probable UTI with urinary retention likely chronic recurrent cystitis  3-diabetes mellitus with neuropathy and possible gastroparesis  4-immobilization syndrome  5-vascular dementia  6-other diagnosis per primary team.     Recommendations/Discussions:  At this juncture I agree with the care plan consisting of empiric IV Rocephin while following up on the urine culture results and periodically assessing him for development of new signs and symptoms of infection while supportive care is being provided for his renal insufficiency anemia and presenting symptoms of black tarry  stools.  Given the CT scan findings I think adding Flagyl is not unreasonable.  Would recommend short course of antibiotic treatment for urinary tract infection and low threshold to check for C. difficile infection and search for enteric pathogens causing gastrointestinal infection.    Tonja Azevedo MD  6/4/2021  10:20 EDT    Much of this encounter note is an electronic transcription/translation of spoken language to printed text. The electronic translation of spoken language may permit erroneous, or at times, nonsensical words or phrases to be inadvertently transcribed; Although I have reviewed the note for such errors, some may still exist

## 2021-06-04 NOTE — PLAN OF CARE
Goal Outcome Evaluation:  Plan of Care Reviewed With: patient     Outcome Summary: Pt was sleeping when PT arrived. he awoke easily, but remained lethargic. Pt was slow to respond but answered questions. Pt reports he was primarly bed bound nd unable to stand. He reports a family friend would lift him to a wheelchair if he got OOB. Pt was able to assist with ROm in bed, but was unable to lift BLE against gravity. He was dependent for bed mobility and unable to safety attain sitting at EOB with therapist. Pt's mobility appears to be at baseline level. Therefore PT will sign off. Pt would benefit from LTC placement.  Patient was not wearing a face mask during this therapy encounter. Therapist used appropriate personal protective equipment including eye protection, mask, and gloves.  Mask used was standard procedure mask. Appropriate PPE was worn during the entire therapy session. Hand hygiene was completed before and after therapy session. Patient is not in enhanced droplet precautions.

## 2021-06-04 NOTE — PROGRESS NOTES
Gregorio Alejandro   65 y.o.  male    LOS: 1 day   Patient Care Team:  Maya Ribeiro APRN as PCP - General (Family Medicine)      Subjective     Interval History:     Patient Complaints: Not very responsive verbally  when I saw him today.  Seems to deny any chest pain.  Did not admit to any definite shortness of air.    Review of Systems:   Complains of abdominal pain in the lower abdomen    Medication Review:   Current Facility-Administered Medications:   •  acetaminophen (TYLENOL) tablet 650 mg, 650 mg, Oral, Q4H PRN, 650 mg at 06/03/21 0653 **OR** acetaminophen (TYLENOL) 160 MG/5ML solution 650 mg, 650 mg, Oral, Q4H PRN **OR** acetaminophen (TYLENOL) suppository 650 mg, 650 mg, Rectal, Q4H PRN, Kaylynn Bennett APRN  •  [START ON 6/5/2021] amLODIPine (NORVASC) tablet 2.5 mg, 2.5 mg, Oral, Q24H, Dane Reeves MD  •  ammonium lactate (AMLACTIN) cream, , Topical, Q12H PRN, Rashad Ayala MD  •  atorvastatin (LIPITOR) tablet 20 mg, 20 mg, Oral, Daily, Rashad Ayala MD, 20 mg at 06/04/21 0825  •  brimonidine (ALPHAGAN P) 0.1 % ophthalmic solution 1 drop, 1 drop, Both Eyes, BID, Rashad Ayala MD, 1 drop at 06/04/21 0826  •  calcium carbonate (TUMS) chewable tablet 500 mg (200 mg elemental), 2 tablet, Oral, BID PRN, Kaylynn Bennett APRN  •  cefTRIAXone (ROCEPHIN) IVPB 1 g, 1 g, Intravenous, Q24H, Leah Winslow APRN, Last Rate: 100 mL/hr at 06/04/21 0034, 1 g at 06/04/21 0034  •  dextrose (D50W) 25 g/ 50mL Intravenous Solution 25 g, 25 g, Intravenous, Q15 Min PRN, Kaylynn Bennett APRN  •  dextrose (GLUTOSE) oral gel 15 g, 15 g, Oral, Q15 Min PRN, Kaylynn Bennett APRN  •  dorzolamide (TRUSOPT) 2 % ophthalmic solution 1 drop, 1 drop, Right Eye, TID, Rashad Ayala MD, 1 drop at 06/04/21 0827  •  glucagon (human recombinant) (GLUCAGEN DIAGNOSTIC) injection 1 mg, 1 mg, Subcutaneous, PRN, Kaylynn Bennett APRN  •  insulin lispro (ADMELOG) injection 0-9 Units,  0-9 Units, Subcutaneous, 4x Daily With Meals & Nightly, Kaylynn Bennett APRN, 4 Units at 06/03/21 1202  •  metroNIDAZOLE (FLAGYL) 500 mg/100mL IVPB, 500 mg, Intravenous, Q8H, Tonja Azevedo MD  •  nitroglycerin (NITROSTAT) SL tablet 0.4 mg, 0.4 mg, Sublingual, Q5 Min PRN, Kaylynn Bennett APRN  •  ondansetron (ZOFRAN) tablet 4 mg, 4 mg, Oral, Q6H PRN **OR** ondansetron (ZOFRAN) injection 4 mg, 4 mg, Intravenous, Q6H PRN, Kaylynn Bennett APRN  •  sennosides-docusate (PERICOLACE) 8.6-50 MG per tablet 1 tablet, 1 tablet, Oral, Nightly, Chrissy Gordon MD, 1 tablet at 06/03/21 2016  •  sodium bicarbonate tablet 650 mg, 650 mg, Oral, TID, Dane Reeves MD, 650 mg at 06/04/21 0826  •  [COMPLETED] Insert peripheral IV, , , Once **AND** sodium chloride 0.9 % flush 10 mL, 10 mL, Intravenous, PRN, Alicia Harris APRN  •  sodium chloride 0.9 % flush 10 mL, 10 mL, Intravenous, Q12H, Kaylynn Bennett APRN, 10 mL at 06/04/21 0827  •  sodium chloride 0.9 % flush 10 mL, 10 mL, Intravenous, PRN, Kaylynn Bennett APRN      Objective     Vital Sign Min/Max for last 24 hours  Temp  Min: 97.8 °F (36.6 °C)  Max: 100.2 °F (37.9 °C)   BP  Min: 100/69  Max: 146/75    Pulse  Min: 84  Max: 103     Wt Readings from Last 3 Encounters:   06/03/21 103 kg (227 lb 12.8 oz)   02/16/21 106 kg (233 lb)   12/22/20 114 kg (251 lb 8 oz)        Intake/Output Summary (Last 24 hours) at 6/4/2021 1224  Last data filed at 6/4/2021 0825  Gross per 24 hour   Intake 930 ml   Output 550 ml   Net 380 ml     Physical Exam:      General Appearance:    Well developed and well nourished in no acute distress   Head:    Normocephalic, atraumatic   Eyes:            Conjunctivae normal, non icteric, no xanthelasma   Neck:   no carotid bruit, no JVD   Lungs:     Clear to auscultation bilaterally. No wheezes, rhonchi or rales. No accessory muscle use.     Heart:    Irregular rate and rhythm.  Normal S1 and S2. No murmur, no  gallop, rub or lift.    Chest Wall:    No abnormalities observed   Abdomen:     Normal bowel sounds, no masses, no organomegaly, soft        non-tender, non-distended, no guarding, no rebound                tenderness   Rectal:     Deferred   Extremities:  Lower legs and feet wrapped. Feet feel puffy       Skin:   No bleeding, bruising or rash   Neurologic:   Awake, not very verbal at present but did respond to some questions, no facial drooping      Monitor: Atrial fibrillation  Results Review:     I reviewed the patient's new clinical results.    Sodium Sodium   Date Value Ref Range Status   06/04/2021 136 136 - 145 mmol/L Final   06/03/2021 134 (L) 136 - 145 mmol/L Final   06/02/2021 138 136 - 145 mmol/L Final      Potassium Potassium   Date Value Ref Range Status   06/04/2021 4.1 3.5 - 5.2 mmol/L Final   06/03/2021 4.1 3.5 - 5.2 mmol/L Final   06/02/2021 3.9 3.5 - 5.2 mmol/L Final      Chloride Chloride   Date Value Ref Range Status   06/04/2021 113 (H) 98 - 107 mmol/L Final   06/03/2021 114 (H) 98 - 107 mmol/L Final   06/02/2021 111 (H) 98 - 107 mmol/L Final      Bicarbonate No results found for: PLASMABICARB   BUN BUN   Date Value Ref Range Status   06/04/2021 56 (H) 8 - 23 mg/dL Final   06/03/2021 63 (H) 8 - 23 mg/dL Final   06/02/2021 68 (H) 8 - 23 mg/dL Final      Creatinine Creatinine   Date Value Ref Range Status   06/04/2021 2.81 (H) 0.76 - 1.27 mg/dL Final   06/03/2021 2.64 (H) 0.76 - 1.27 mg/dL Final   06/02/2021 3.25 (H) 0.76 - 1.27 mg/dL Final      Calcium Calcium   Date Value Ref Range Status   06/04/2021 8.0 (L) 8.6 - 10.5 mg/dL Final   06/03/2021 8.0 (L) 8.6 - 10.5 mg/dL Final   06/02/2021 8.6 8.6 - 10.5 mg/dL Final      Magnesium No results found for: MG     Results from last 7 days   Lab Units 06/04/21  0928   WBC 10*3/mm3 7.29   HEMOGLOBIN g/dL 8.6*  8.6*   HEMATOCRIT % 27.2*  27.2*   PLATELETS 10*3/mm3 161     Lab Results   Lab Value Date/Time    TROPONINT 0.110 (C) 02/04/2021 3926     TROPONINT 0.171 (C) 12/23/2020 0357    TROPONINT 0.141 (C) 12/22/2020 0549    TROPONINT 0.147 (C) 12/21/2020 1450    TROPONINT 0.128 (C) 12/21/2020 1223     Lab Results   Component Value Date    CHOL 108 12/22/2020     Lab Results   Component Value Date    HDL 49 12/22/2020     Lab Results   Component Value Date    LDL 42 12/22/2020     Lab Results   Component Value Date    TRIG 86 12/22/2020     No components found for: CHOLHDL            Echo EF Estimated  No results found for: ECHOEFEST       Assessment/ Plan  Gastrointestinal hemorrhage  Heme positive stool  Hypertension, hyperlipidemia  Stage renal disease  Diabetes mellitus  History of nonsustained ventricular tachycardia  History of marked first-degree AV block and history of some junctional rhythm, history of pauses.  We have been avoiding any beta-blockers or diltiazem.  History of syncope  Hypertensive cardiovascular disease, normal LVEF 59% on echo 12/20/2020  He has refused stress testing in the past.  History of monoclonal gammopathy  History of immobility  History of microcytic anemia and abnormal findings of the rectum  Atrial fibrillation, was on 2.5 of Eliquis 12 hours  History of chronically elevated troponin felt to be due to the chronic renal failure  Plan #1 ventricular rate with atrial fibrillation a little higher but still tolerable.  As noted we are avoiding any beta-blockers or diltiazem type calcium blockers in view of history of significant pauses.  Rate is probably up a little now with his acute issues.  At admission his rate was controlled without any rate control medications.  Eliquis still on hold of course.  H&H a little lower.  GI plans EGD and sigmoidoscopy in a.m. After those we can decide whether he is a candidate to continue any anticoagulation.  #2 blood pressure low but stable.  Amlodipine down to 2.5 mg daily.  #3 has history in the past of some nonsustained VT but we have not seen any of that thus far this  hospitalization.  #4 creatinine a little higher today than yesterday.  Nephrology following for the renal issues.  5 temp 100.2.  Infectious disease following  Terrance Muhammad MD  06/04/21  12:24 EDT      Time: 18 minutes

## 2021-06-04 NOTE — PLAN OF CARE
Goal Outcome Evaluation:  Plan of Care Reviewed With: patient  Progress: no change  Outcome Summary: Patient has been sleeping off and on. Makes needs known. Patient is scheduled for EGD tomorrow. Consent signed and in chart.  Patient continues with IV atb therapy.  Repositioned by staff.  No c/o pain. No s/s of distress noted.

## 2021-06-04 NOTE — PROGRESS NOTES
"   LOS: 1 day    Patient Care Team:  Maya Ribeiro APRN as PCP - General (Family Medicine)    Chief Complaint:    Chief Complaint   Patient presents with   • Black or Bloody Stool   • Abdominal Pain     Follow UP SILVIA CKD3  Subjective     Interval History:   UOP NR but has purewick approx 200 cc in canister now.  Bladder scan 0 yest  Denies dyspnea or nausea     Objective     Vital Signs  Temp:  [97.8 °F (36.6 °C)-100.2 °F (37.9 °C)] 100.2 °F (37.9 °C)  Heart Rate:  [] 103  Resp:  [16-18] 16  BP: (100-146)/(67-75) 113/68    Flowsheet Rows      First Filed Value   Admission Height  190.5 cm (75\") Documented at 06/02/2021 2133   Admission Weight  103 kg (227 lb 12.8 oz) Documented at 06/03/2021 0253          I/O this shift:  In: 720 [P.O.:720]  Out: -   I/O last 3 completed shifts:  In: 420 [P.O.:420]  Out: 850 [Urine:850]    Intake/Output Summary (Last 24 hours) at 6/4/2021 0828  Last data filed at 6/4/2021 0825  Gross per 24 hour   Intake 930 ml   Output 550 ml   Net 380 ml       Physical Exam:  General Appearance: frail AAM in no distress, alert  Neck supple no JVD  Lungs CTA bilat no rales  CV RRR no m/g  abd soft NT/ND  vasc +edema obscured by BLE leg wraps   + pure wick     Results Review:    Results from last 7 days   Lab Units 06/03/21  1756 06/02/21  2238   SODIUM mmol/L 134* 138   POTASSIUM mmol/L 4.1 3.9   CHLORIDE mmol/L 114* 111*   CO2 mmol/L 11.3* 15.2*   BUN mg/dL 63* 68*   CREATININE mg/dL 2.64* 3.25*   CALCIUM mg/dL 8.0* 8.6   BILIRUBIN mg/dL  --  0.3   ALK PHOS U/L  --  181*   ALT (SGPT) U/L  --  7   AST (SGOT) U/L  --  7   GLUCOSE mg/dL 98 233*       Estimated Creatinine Clearance: 35.3 mL/min (A) (by C-G formula based on SCr of 2.64 mg/dL (H)).                Results from last 7 days   Lab Units 06/04/21  0006 06/03/21  1756 06/03/21  0922 06/02/21  2238   WBC 10*3/mm3  --   --   --  5.95   HEMOGLOBIN g/dL 9.6* 9.4* 9.3* 9.7*   PLATELETS 10*3/mm3  --   --   --  201               Imaging " Results (Last 24 Hours)     Procedure Component Value Units Date/Time    XR Abdomen KUB [[id number]] Collected: 06/03/21 1649     Updated: 06/03/21 1655    Narrative:      ABDOMEN KUB     CLINICAL HISTORY: Abdominal distention     2 supine views were obtained.     Compared to the CT scan of the abdomen and pelvis performed earlier  today at 1230 hours.     There is mild gaseous distention of the stomach that appears essentially  unchanged. The bowel gas pattern is otherwise unremarkable. There is no  evidence of obstruction. A small amount of formed stool is noted within  the colon. There is no obvious free air on these supine views. The  inferior half of the lungs were also visualized. There is dense  consolidation in the right upper lobe consistent with pneumonia.     IMPRESSIONS: Right upper lobe pneumonia. Mild gaseous distention of the  stomach that is unchanged since the CT performed earlier today. There is  no definite evidence of obstruction.     This report was finalized on 6/3/2021 4:52 PM by Dr. Gregorio Deshpande M.D.           amLODIPine, 5 mg, Oral, Q24H  atorvastatin, 20 mg, Oral, Daily  brimonidine, 1 drop, Both Eyes, BID  cefTRIAXone, 1 g, Intravenous, Q24H  dorzolamide, 1 drop, Right Eye, TID  insulin lispro, 0-9 Units, Subcutaneous, 4x Daily With Meals & Nightly  senna-docusate sodium, 1 tablet, Oral, Nightly  sodium bicarbonate, 650 mg, Oral, TID  sodium chloride, 10 mL, Intravenous, Q12H           Medication Review:   Current Facility-Administered Medications   Medication Dose Route Frequency Provider Last Rate Last Admin   • acetaminophen (TYLENOL) tablet 650 mg  650 mg Oral Q4H PRN Kaylynn Bennett APRN   650 mg at 06/03/21 0653    Or   • acetaminophen (TYLENOL) 160 MG/5ML solution 650 mg  650 mg Oral Q4H PRN Kaylynn Bennett APRN        Or   • acetaminophen (TYLENOL) suppository 650 mg  650 mg Rectal Q4H PRN Kaylynn Bennett APRN       • amLODIPine (NORVASC) tablet 5 mg  5 mg Oral  Q24H Dane Reeves MD   5 mg at 06/04/21 0825   • ammonium lactate (AMLACTIN) cream   Topical Q12H PRN Rashad Ayala MD       • atorvastatin (LIPITOR) tablet 20 mg  20 mg Oral Daily Rashad Ayala MD   20 mg at 06/04/21 0825   • brimonidine (ALPHAGAN P) 0.1 % ophthalmic solution 1 drop  1 drop Both Eyes BID Rashad Ayala MD   1 drop at 06/04/21 0826   • calcium carbonate (TUMS) chewable tablet 500 mg (200 mg elemental)  2 tablet Oral BID PRN Kaylynn Bennett APRN       • cefTRIAXone (ROCEPHIN) IVPB 1 g  1 g Intravenous Q24H Leah Winslow APRN 100 mL/hr at 06/04/21 0034 1 g at 06/04/21 0034   • dextrose (D50W) 25 g/ 50mL Intravenous Solution 25 g  25 g Intravenous Q15 Min PRN Kaylynn Bennett APRN       • dextrose (GLUTOSE) oral gel 15 g  15 g Oral Q15 Min PRN Kaylynn Bennett APRN       • dorzolamide (TRUSOPT) 2 % ophthalmic solution 1 drop  1 drop Right Eye TID Rashad Ayala MD   1 drop at 06/04/21 0827   • glucagon (human recombinant) (GLUCAGEN DIAGNOSTIC) injection 1 mg  1 mg Subcutaneous PRN Kaylynn Bennett APRN       • insulin lispro (ADMELOG) injection 0-9 Units  0-9 Units Subcutaneous 4x Daily With Meals & Nightly Kaylynn Bennett APRN   4 Units at 06/03/21 1202   • nitroglycerin (NITROSTAT) SL tablet 0.4 mg  0.4 mg Sublingual Q5 Min PRN Kaylynn Bennett APRN       • ondansetron (ZOFRAN) tablet 4 mg  4 mg Oral Q6H PRN Kaylynn Bennett APRN        Or   • ondansetron (ZOFRAN) injection 4 mg  4 mg Intravenous Q6H PRN Kaylynn Bennett APRN       • sennosides-docusate (PERICOLACE) 8.6-50 MG per tablet 1 tablet  1 tablet Oral Nightly Chrissy Gordon MD   1 tablet at 06/03/21 2016   • sodium bicarbonate tablet 650 mg  650 mg Oral TID aDne Reeves MD   650 mg at 06/04/21 0826   • sodium chloride 0.9 % flush 10 mL  10 mL Intravenous PRN Alicia Harris, APRN       • sodium chloride 0.9 % flush 10 mL  10 mL  Intravenous Q12H Kaylynn Bennett APRN   10 mL at 06/04/21 0827   • sodium chloride 0.9 % flush 10 mL  10 mL Intravenous PRN Kaylynn Bennett APRN           Assessment/Plan   SILVIA - ? Olig (UOP NR); suspect prerenal azotemia from vol depletion.  Cr 3.2 -> 2.6 yest PM with IVF.  Await labs today.  Bladder distended on CT but bladder scan 0 cc and no hydronephrosis.  Has fair amt periph edema with low albumin but central vol stable, no dyspnea.  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)    Abnormal CT of the abdomen    SILVIA (acute kidney injury) (CMS/[facility])    Immobility              Dane Reeves MD  06/04/21  08:28 EDT

## 2021-06-04 NOTE — CONSULTS
Patient expressed grief of the loss of his son in October. Was not concerned with his own health as much as the sadness he experiences. Prayed with patient.

## 2021-06-04 NOTE — CONSULTS
CONSULT NOTE    Infectious Diseases - Tonja Cruz MD  [facility]       Patient Identification:  Name: Gregorio Alejandro  Age: 65 y.o.  Sex: male  :  1955  MRN: [id number]             Date of Consultation: 6/3/2021.      Primary Care Physician: Maya Ribeiro APRN                               Requesting Physician: Dr. Ayala  Reason for Consultation: Abnormal CT scan with evidence of colitis and cystitis.    Impression: 65-year-old male with complicated past medical history consisting of immobility, chronic kidney disease diabetes hypertension dyslipidemia as well as vascular dementia and history of atrial fibrillation presented from nursing home with complaints of having black tarry stools and not feeling very well.  Patient himself is not able to give much account of his symptoms but work-up in the emergency room revealed distended stomach concerning for gastroparesis or gastric outlet obstruction, thickened colonic wall raising possibility of colitis and thickened bladder wall suggesting possible cystitis.  Patient was noted to have mildly anemic with normal white blood cell count and abnormal urinalysis concerning for urinary tract infection.  Patient has been empirically started on ceftriaxone after blood cultures and urine cultures have been sent.  Infectious disease service is consulted.  Patient himself is unable to give much account of his symptoms and is very vague and somnolent during conversation.  This presentation in the above context is concerning for:  1-GI bleed unclear whether it is upper GI bleed or due to colitis involving the rectosigmoid junction.  Rule out infectious causes such as E. coli Shigella infection versus C. difficile infection.  2-probable UTI with urinary retention likely chronic recurrent cystitis  3-diabetes mellitus with neuropathy and possible gastroparesis  4-immobilization syndrome  5-vascular dementia  6-other diagnosis per primary  team.    Recommendations/Discussions:  At this juncture I agree with the care plan consisting of empiric IV Rocephin while following up on the urine culture results and periodically assessing him for development of new signs and symptoms of infection while supportive care is being provided for his renal insufficiency anemia and presenting symptoms of black tarry stools.  Would recommend short course of antibiotic treatment for urinary tract infection and low threshold to check for C. difficile infection and search for enteric pathogens causing gastrointestinal infection.  Thank you Dr. Ayala for letting me be the part of your patient care please see above impression and recommendations      History of Present Illness:    65-year-old male with complicated past medical history consisting of immobility, chronic kidney disease diabetes hypertension dyslipidemia as well as vascular dementia and history of atrial fibrillation presented from nursing home with complaints of having black tarry stools and not feeling very well.  Patient himself is not able to give much account of his symptoms but work-up in the emergency room revealed distended stomach concerning for gastroparesis or gastric outlet obstruction, thickened colonic wall raising possibility of colitis and thickened bladder wall suggesting possible cystitis.  Patient was noted to have mildly anemic with normal white blood cell count and abnormal urinalysis concerning for urinary tract infection.  Patient has been empirically started on ceftriaxone after blood cultures and urine cultures have been sent.  Infectious disease service is consulted.  Patient himself is unable to give much account of his symptoms and is very vague and somnolent during conversation.      Past Medical History:  Past Medical History:   Diagnosis Date   • Back pain    • CKD (chronic kidney disease)    • DM type 2 (diabetes mellitus, type 2) (CMS/[facility])    • ED (erectile dysfunction)    •  Hyperlipidemia    • Hypertension    • SCOTTY (iron deficiency anemia)    • Monoclonal gammopathy    • Osteoarthritis      Past Surgical History:  Past Surgical History:   Procedure Laterality Date   • ABDOMINAL SURGERY     • EYE SURGERY     • JOINT REPLACEMENT     • TONSILLECTOMY        Home Meds:  Medications Prior to Admission   Medication Sig Dispense Refill Last Dose   • acetaminophen (TYLENOL) 325 MG tablet Take 325 mg by mouth Every 6 (Six) Hours As Needed for Mild Pain .      • amLODIPine (NORVASC) 5 MG tablet Take 1 tablet by mouth Daily. (Patient taking differently: Take 5 mg by mouth Daily.) 30 tablet 0 6/2/2021 at Unknown time   • aspirin 81 MG EC tablet Take 81 mg by mouth Daily.      • atorvastatin (LIPITOR) 20 MG tablet Take 20 mg by mouth Daily.   6/2/2021 at Unknown time   • cyclobenzaprine (FLEXERIL) 10 MG tablet Take 10 mg by mouth Every 8 (Eight) Hours As Needed for Muscle Spasms.      • dorzolamide (TRUSOPT) 2 % ophthalmic solution Administer 1 drop to the right eye 2 (two) times a day.   6/2/2021 at Unknown time   • gabapentin (NEURONTIN) 300 MG capsule Take 300 mg by mouth 3 (Three) Times a Day. At 0800, 1400, 2000      • hydrALAZINE (APRESOLINE) 50 MG tablet Take 50 mg by mouth 2 (two) times a day.      • HYDROcodone-acetaminophen (NORCO)  MG per tablet Take 1 tablet by mouth Every 4 (Four) Hours.      • loperamide (IMODIUM) 2 MG capsule Take 2 mg by mouth As Needed for Diarrhea.      • losartan (COZAAR) 25 MG tablet Take 25 mg by mouth 2 (Two) Times a Day.      • Netarsudil-Latanoprost (Rocklatan) 0.02-0.005 % solution Administer 1 drop to both eyes Every Night.      • olopatadine (PATANOL) 0.1 % ophthalmic solution Administer 1 drop to both eyes 2 (Two) Times a Day.      • potassium chloride 10 MEQ CR tablet Take 20 mEq by mouth Daily.      • tamsulosin (FLOMAX) 0.4 MG capsule 24 hr capsule Take 1 capsule by mouth Daily.        Current Meds:     Current Facility-Administered Medications:    •  acetaminophen (TYLENOL) tablet 650 mg, 650 mg, Oral, Q4H PRN, 650 mg at 06/03/21 0653 **OR** acetaminophen (TYLENOL) 160 MG/5ML solution 650 mg, 650 mg, Oral, Q4H PRN **OR** acetaminophen (TYLENOL) suppository 650 mg, 650 mg, Rectal, Q4H PRN, Kaylynn Bennett APRN  •  amLODIPine (NORVASC) tablet 5 mg, 5 mg, Oral, Q24H, Dane Reeves MD, 5 mg at 06/03/21 1457  •  ammonium lactate (AMLACTIN) cream, , Topical, Q12H PRN, Rashad Ayala MD  •  atorvastatin (LIPITOR) tablet 20 mg, 20 mg, Oral, Daily, Rashad Ayala MD, 20 mg at 06/03/21 1201  •  brimonidine (ALPHAGAN P) 0.1 % ophthalmic solution 1 drop, 1 drop, Both Eyes, BID, Rashad Ayala MD, 1 drop at 06/03/21 2016  •  calcium carbonate (TUMS) chewable tablet 500 mg (200 mg elemental), 2 tablet, Oral, BID PRN, Kaylynn Bennett APRN  •  [START ON 6/4/2021] cefTRIAXone (ROCEPHIN) IVPB 1 g, 1 g, Intravenous, Q24H, Leah Winslow, ANTWAN  •  dextrose (D50W) 25 g/ 50mL Intravenous Solution 25 g, 25 g, Intravenous, Q15 Min PRN, Kaylynn Bennett APRN  •  dextrose (GLUTOSE) oral gel 15 g, 15 g, Oral, Q15 Min PRN, Kaylynn Bennett APRN  •  dorzolamide (TRUSOPT) 2 % ophthalmic solution 1 drop, 1 drop, Right Eye, TID, Rashad Ayala MD, 1 drop at 06/03/21 2016  •  glucagon (human recombinant) (GLUCAGEN DIAGNOSTIC) injection 1 mg, 1 mg, Subcutaneous, PRN, Kaylynn Bennett APRN  •  insulin lispro (ADMELOG) injection 0-9 Units, 0-9 Units, Subcutaneous, 4x Daily With Meals & Nightly, Kaylynn Bennett APRN, 4 Units at 06/03/21 1202  •  metoclopramide (REGLAN) injection 10 mg, 10 mg, Intravenous, Q6H, Chrissy Gordon MD, 10 mg at 06/03/21 2016  •  nitroglycerin (NITROSTAT) SL tablet 0.4 mg, 0.4 mg, Sublingual, Q5 Min PRN, Kaylynn Bennett APRN  •  ondansetron (ZOFRAN) tablet 4 mg, 4 mg, Oral, Q6H PRN **OR** ondansetron (ZOFRAN) injection 4 mg, 4 mg, Intravenous, Q6H PRN, Kaylynn Bennett APRN  •   "sennosides-docusate (PERICOLACE) 8.6-50 MG per tablet 1 tablet, 1 tablet, Oral, Nightly, Chrissy Gordon MD, 1 tablet at 06/03/21 2016  •  sodium bicarbonate 150 mEq/1000 mL dextrose infusion, 150 mEq, Intravenous, Continuous, Dane Reeves MD, Last Rate: 100 mL/hr at 06/03/21 2052, 150 mEq at 06/03/21 2052  •  sodium bicarbonate tablet 650 mg, 650 mg, Oral, TID, Dane Reeves MD, 650 mg at 06/03/21 2016  •  [COMPLETED] Insert peripheral IV, , , Once **AND** sodium chloride 0.9 % flush 10 mL, 10 mL, Intravenous, PRN, Alicia Harris APRN  •  sodium chloride 0.9 % flush 10 mL, 10 mL, Intravenous, Q12H, Kaylynn Bennett APRN, 10 mL at 06/03/21 2016  •  sodium chloride 0.9 % flush 10 mL, 10 mL, Intravenous, PRN, Kaylynn Bennett APRN  Allergies:  No Known Allergies  Social History:   Social History     Tobacco Use   • Smoking status: Never Smoker   • Smokeless tobacco: Never Used   Substance Use Topics   • Alcohol use: Yes     Comment: 1 pint      Family History:  History reviewed. No pertinent family history.       Review of Systems  See history of present illness and past medical history.  Could not be obtained from the patient in great detail.      Vitals:   /67 (BP Location: Left arm, Patient Position: Lying)   Pulse 84   Temp 97.8 °F (36.6 °C) (Oral)   Resp 18   Ht 190.5 cm (75\")   Wt 103 kg (227 lb 12.8 oz)   SpO2 94%   BMI 28.47 kg/m²   I/O:     Intake/Output Summary (Last 24 hours) at 6/3/2021 2104  Last data filed at 6/3/2021 1835  Gross per 24 hour   Intake 210 ml   Output 850 ml   Net -640 ml     Exam:  Patient is examined using the personal protective equipment as per guidelines from infection control for this particular patient as enacted.  Hand washing was performed before and after patient interaction.  General Appearance:   Somnolent ill-appearing male who engages in information exchange but does not go into the details.  Claims that he is " feeling much better now compared to couple days ago prior to coming to the hospital.   Head:    Normocephalic, without obvious abnormality, atraumatic   Eyes:    PERRL, conjunctivae/corneas clear, EOM's intact, both eyes   Ears:    Normal external ear canals, both ears   Nose:   Nares normal, septum midline, mucosa normal, no drainage    or sinus tenderness   Throat:   Lips, tongue, gums normal; oral mucosa pink and moist   Neck:   Supple, symmetrical, trachea midline, no adenopathy;     thyroid:  no enlargement/tenderness/nodules; no carotid    bruit or JVD   Back:     Symmetric, no curvature, ROM normal, no CVA tenderness   Lungs:     Clear to auscultation bilaterally, respirations unlabored   Chest Wall:    No tenderness or deformity    Heart:   Irregularly irregular   Abdomen:    Soft mild generalized tenderness,distended   Extremities:  Contractures and diffuse edema of the lower extremities noted   Pulses:   Pulses palpable in all extremities; symmetric all extremities   Skin:  Superficial wounds noted   Neurologic:  Lethargic and somnolent but has preserved renal extremity movement.       Data Review:    I reviewed the patient's new clinical results.  Results from last 7 days   Lab Units 06/03/21  1756 06/03/21  0922 06/02/21  2238   WBC 10*3/mm3  --   --  5.95   HEMOGLOBIN g/dL 9.4* 9.3* 9.7*   PLATELETS 10*3/mm3  --   --  201     Results from last 7 days   Lab Units 06/03/21  1756 06/02/21  2238   SODIUM mmol/L 134* 138   POTASSIUM mmol/L 4.1 3.9   CHLORIDE mmol/L 114* 111*   CO2 mmol/L 11.3* 15.2*   BUN mg/dL 63* 68*   CREATININE mg/dL 2.64* 3.25*   CALCIUM mg/dL 8.0* 8.6   GLUCOSE mg/dL 98 233*     Microbiology Results (last 10 days)     Procedure Component Value - Date/Time    COVID PRE-OP / PRE-PROCEDURE SCREENING ORDER (NO ISOLATION) - Swab, Nasopharynx [[id number]]  (Normal) Collected: 06/02/21 7205    Lab Status: Final result Specimen: Swab from Nasopharynx Updated: 06/03/21 5090    Narrative:       The following orders were created for panel order COVID PRE-OP / PRE-PROCEDURE SCREENING ORDER (NO ISOLATION) - Swab, Nasopharynx.  Procedure                               Abnormality         Status                     ---------                               -----------         ------                     COVID-19,APTIMA PANTHER,...[[id number]]  Normal              Final result                 Please view results for these tests on the individual orders.    COVID-19,APTIMA PANTHER,[facility], NP/OP SWAB IN UTM/VTM/[facility] TRANSPORT MEDIA,24 HR TAT - Swab, Nasopharynx [[id number]]  (Normal) Collected: 06/02/21 2318    Lab Status: Final result Specimen: Swab from Nasopharynx Updated: 06/03/21 0330     COVID19 Not Detected    Narrative:      Fact sheet for providers: https://www.fda.gov/media/761985/download     Fact sheet for patients: https://www.fda.gov/media/329876/download    Test performed by RT PCR.        CT Abdomen Pelvis Without Contrast    Result Date: 6/3/2021   1. Thick-walled appearance to the rectosigmoid. Similar findings were present on prior exam. Colitis is not excluded. 2. The stomach is markedly distended and fluid-filled. Duodenum appears relatively decompressed. Appearance may reflect some gastroparesis or gastric outlet obstruction. 2. Distention of the urinary bladder. This may reflect some urinary retention. There is air seen within the bladder, and correlation with recent instrumentation is suggested.  Radiation dose reduction techniques were utilized, including automated exposure control and exposure modulation based on body size.  This report was finalized on 6/3/2021 1:03 AM by Dr. Vero Hansen M.D.          Assessment:  Active Hospital Problems    Diagnosis  POA   • **Gastrointestinal hemorrhage [K92.2]  Yes   • Immobility [Z74.09]  Yes   • SILVIA (acute kidney injury) (CMS/HCC) [N17.9]  Yes   • Hypertension [I10]  Yes   • DM2 (diabetes mellitus, type 2) (CMS/HCC) [E11.9]  Yes   •  Abnormal CT of the abdomen [R93.5]  Yes   • Acute UTI (urinary tract infection) [N39.0]  Yes      Resolved Hospital Problems   No resolved problems to display.         Plan:  See above.  Tonja Azevedo MD   6/3/2021  21:04 EDT    Much of this encounter note is an electronic transcription/translation of spoken language to printed text. The electronic translation of spoken language may permit erroneous, or at times, nonsensical words or phrases to be inadvertently transcribed; Although I have reviewed the note for such errors, some may still exist

## 2021-06-04 NOTE — PLAN OF CARE
"Goal Outcome Evaluation:  Plan of Care Reviewed With: patient  Progress: no change  Outcome Summary: Pt A&Ox4 with some forgetfulness, clear liquid diet at MN, iv fluids and iv antibiotics, purewick in use with skincare as needed, accu checks ac/hs, 4L NC, BLE wrapped by wound care nurse, skin on toes some better with care, Q8 H&H are stable, vitals as charted, no pain/soa/nausea reported this shift, irritable about \"being woken for so many things\", explaination given for need of care and procedures  "

## 2021-06-05 NOTE — PLAN OF CARE
Goal Outcome Evaluation:  Plan of Care Reviewed With: patient  Progress: no change  Outcome Summary: Patient resting in room. Makes needs known. Adls with asst of 2. F/C draining clear yellow urine at the bedside. Patient had EGD this morning. EGD showed gastritis, duodenitis, brushing candida. No c/o pain. No s/s of distress noted.

## 2021-06-05 NOTE — PROGRESS NOTES
"   LOS: 2 days    Patient Care Team:  Maya Ribeiro APRN as PCP - General (Family Medicine)    Chief Complaint:    Chief Complaint   Patient presents with   • Black or Bloody Stool   • Abdominal Pain     Follow UP SILVIA CKD3  Subjective     Interval History:   Lopez placed yesterday for retention  I/O 1.8/2500   EGD today: gastritis & duodenitis, esoph plaque  Flex-sig: inadequate prep  No dyspnea or nausea but having sig diarrhea    Objective     Vital Signs  Temp:  [98.8 °F (37.1 °C)-99.2 °F (37.3 °C)] 98.8 °F (37.1 °C)  Heart Rate:  [58-96] 81  Resp:  [16-20] 20  BP: ()/(63-91) 137/91    Flowsheet Rows      First Filed Value   Admission Height  190.5 cm (75\") Documented at 06/02/2021 2133   Admission Weight  103 kg (227 lb 12.8 oz) Documented at 06/03/2021 0253          I/O this shift:  In: 710 [P.O.:360; I.V.:350]  Out: -   I/O last 3 completed shifts:  In: 2050 [P.O.:1750; Other:300]  Out: 2900 [Urine:2900]    Intake/Output Summary (Last 24 hours) at 6/5/2021 1255  Last data filed at 6/5/2021 1246  Gross per 24 hour   Intake 1590 ml   Output 2050 ml   Net -460 ml       Physical Exam:  General Appearance: frail AAM in no distress, alert  Neck supple no JVD  Lungs CTA bilat no rales  CV RRR nom/g  abd soft NT/nD  +lopez copious clear urine  vasc +BLE edema with legs wrapped       Results Review:    Results from last 7 days   Lab Units 06/05/21  0800 06/04/21  0928 06/03/21  1756 06/02/21  2238   SODIUM mmol/L 137 136 134* 138   POTASSIUM mmol/L 4.7 4.1 4.1 3.9   CHLORIDE mmol/L 114* 113* 114* 111*   CO2 mmol/L 13.5* 12.0* 11.3* 15.2*   BUN mg/dL 54* 56* 63* 68*   CREATININE mg/dL 2.54* 2.81* 2.64* 3.25*   CALCIUM mg/dL 8.3* 8.0* 8.0* 8.6   BILIRUBIN mg/dL  --   --   --  0.3   ALK PHOS U/L  --   --   --  181*   ALT (SGPT) U/L  --   --   --  7   AST (SGOT) U/L  --   --   --  7   GLUCOSE mg/dL 98 188* 98 233*       Estimated Creatinine Clearance: 36.7 mL/min (A) (by C-G formula based on SCr of 2.54 mg/dL " (H)).    Results from last 7 days   Lab Units 06/05/21  0800 06/04/21  0928   PHOSPHORUS mg/dL 3.6 3.4             Results from last 7 days   Lab Units 06/05/21  0010 06/04/21  0928 06/04/21  0006 06/03/21  1756 06/03/21  0922 06/02/21  2238   WBC 10*3/mm3  --  7.29  --   --   --  5.95   HEMOGLOBIN g/dL 9.5* 8.6*  8.6* 9.6* 9.4* 9.3* 9.7*   PLATELETS 10*3/mm3  --  161  --   --   --  201               Imaging Results (Last 24 Hours)     ** No results found for the last 24 hours. **        amLODIPine, 2.5 mg, Oral, Q24H  atorvastatin, 20 mg, Oral, Daily  brimonidine, 1 drop, Both Eyes, BID  cefTRIAXone, 1 g, Intravenous, Q24H  dorzolamide, 1 drop, Right Eye, TID  insulin lispro, 0-9 Units, Subcutaneous, 4x Daily With Meals & Nightly  metroNIDAZOLE, 500 mg, Intravenous, Q8H  senna-docusate sodium, 1 tablet, Oral, Nightly  sodium bicarbonate, 1,300 mg, Oral, TID  sodium chloride, 10 mL, Intravenous, Q12H  tamsulosin, 0.4 mg, Oral, Daily      sodium chloride, 1,000 mL, Last Rate: 1,000 mL (06/05/21 0850)        Medication Review:   Current Facility-Administered Medications   Medication Dose Route Frequency Provider Last Rate Last Admin   • acetaminophen (TYLENOL) tablet 650 mg  650 mg Oral Q4H PRN Kaylynn Bennett APRN   650 mg at 06/03/21 0653    Or   • acetaminophen (TYLENOL) 160 MG/5ML solution 650 mg  650 mg Oral Q4H PRN Kaylynn Bennett APRN        Or   • acetaminophen (TYLENOL) suppository 650 mg  650 mg Rectal Q4H PRN Kaylynn Bennett APRN       • amLODIPine (NORVASC) tablet 2.5 mg  2.5 mg Oral Q24H Dane Reeves MD   2.5 mg at 06/05/21 1047   • ammonium lactate (AMLACTIN) cream   Topical Q12H PRN Rashad Ayala MD       • atorvastatin (LIPITOR) tablet 20 mg  20 mg Oral Daily Rashad Ayala MD   20 mg at 06/05/21 1047   • brimonidine (ALPHAGAN P) 0.1 % ophthalmic solution 1 drop  1 drop Both Eyes BID Rashad Ayala MD   1 drop at 06/05/21 1048   • calcium carbonate (TUMS)  chewable tablet 500 mg (200 mg elemental)  2 tablet Oral BID PRN Kaylynn Bennett APRN       • cefTRIAXone (ROCEPHIN) IVPB 1 g  1 g Intravenous Q24H Leah Winslow APRN 100 mL/hr at 06/05/21 0144 1 g at 06/05/21 0144   • dextrose (D50W) 25 g/ 50mL Intravenous Solution 25 g  25 g Intravenous Q15 Min PRN Kaylynn Bennett APRN       • dextrose (GLUTOSE) oral gel 15 g  15 g Oral Q15 Min PRN Kaylynn Bennett APRN       • dorzolamide (TRUSOPT) 2 % ophthalmic solution 1 drop  1 drop Right Eye TID Rashad Ayala MD   1 drop at 06/05/21 1048   • glucagon (human recombinant) (GLUCAGEN DIAGNOSTIC) injection 1 mg  1 mg Subcutaneous PRN Kaylynn Bennett APRN       • insulin lispro (ADMELOG) injection 0-9 Units  0-9 Units Subcutaneous 4x Daily With Meals & Nightly Kaylynn Bennett APRN   2 Units at 06/04/21 1225   • metroNIDAZOLE (FLAGYL) 500 mg/100mL IVPB  500 mg Intravenous Q8H Tonja Azevedo MD   500 mg at 06/05/21 1219   • nitroglycerin (NITROSTAT) SL tablet 0.4 mg  0.4 mg Sublingual Q5 Min PRN Kaylynn Bennett APRN       • ondansetron (ZOFRAN) tablet 4 mg  4 mg Oral Q6H PRN Kaylynn Bennett APRN        Or   • ondansetron (ZOFRAN) injection 4 mg  4 mg Intravenous Q6H PRN Kaylynn Bennett APRN       • sennosides-docusate (PERICOLACE) 8.6-50 MG per tablet 1 tablet  1 tablet Oral Nightly Chrissy Gordon MD   1 tablet at 06/04/21 2029   • sodium bicarbonate tablet 1,300 mg  1,300 mg Oral TID Dane Reeves MD   1,300 mg at 06/05/21 1047   • sodium chloride 0.9 % flush 10 mL  10 mL Intravenous PRN Alicia Harris APRN       • sodium chloride 0.9 % flush 10 mL  10 mL Intravenous Q12H Kaylynn Bennett APRN   10 mL at 06/05/21 1048   • sodium chloride 0.9 % flush 10 mL  10 mL Intravenous PRN Kaylynn Bennett APRN       • sodium chloride 0.9 % infusion 1,000 mL  1,000 mL Intravenous Continuous Pérez Gonzalez MD 25 mL/hr at 06/05/21 0850 Restarted  at 06/05/21 0918   • tamsulosin (FLOMAX) 24 hr capsule 0.4 mg  0.4 mg Oral Daily Dane Reeves MD   0.4 mg at 06/05/21 1047       Assessment/Plan   Non olig SILVIA - initial picture c/w prerenal azotemia from vol depletion/diarrhea, now with element of obstructive uropathy from retention; Cr down to 2.5 (peak 3.2).  K normal.  Has periph edema with very low albumin but central vol ok, no dyspnea.  Ongoing diarrhea.    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  AFIB - rate controlled w/ meds; cardiology following; eliquis on hold  UTI - rocephin per ID   Urinary retention - lopez placed 6/4; distended bladder on CT; started flomax     Plan  - inc [id number] TID  - consider UROL involvement MON as may need to keep lopez at DC and have VT later in office      Gastrointestinal hemorrhage    Acute UTI (urinary tract infection)    Hypertension    DM2 (diabetes mellitus, type 2) (CMS/[facility])    Abnormal CT of the abdomen    SILVIA (acute kidney injury) (CMS/[facility])    Immobility    Right upper lobe pneumonia    CKD (chronic kidney disease) stage 3, GFR 30-59 ml/min (CMS/[facility])              Dane Reeves MD  06/05/21  12:55 EDT

## 2021-06-05 NOTE — PROGRESS NOTES
Cardiology/Electrophysiology Progress Note      Patient Name: Gregorio Alejandro  Age/Sex: 65 y.o. male  : 1955  MRN: [id number]      Day of Service: 21       Chief Complaint/Follow-up:     Interval History:     S: Patient seen and examined. Tele Reviewed. AFIB - no significant events. Not very responsive.      Temp:  [98.8 °F (37.1 °C)-99.2 °F (37.3 °C)] 98.8 °F (37.1 °C)  Heart Rate:  [58-96] 81  Resp:  [16-20] 20  BP: ()/(63-91) 137/91     NAD  Eyes PERRL  Neck supple. No JVD  CTA  Irregular  BS intact. No r/g/peritoneal signs.  No edema  Skin warm and dry      ECG/TELE: Reviewed.      Results from last 7 days   Lab Units 21  0800 21  0928 21  1756   SODIUM mmol/L 137 136 134*   POTASSIUM mmol/L 4.7 4.1 4.1   CHLORIDE mmol/L 114* 113* 114*   CO2 mmol/L 13.5* 12.0* 11.3*   BUN mg/dL 54* 56* 63*   CREATININE mg/dL 2.54* 2.81* 2.64*   GLUCOSE mg/dL 98 188* 98   CALCIUM mg/dL 8.3* 8.0* 8.0*     Results from last 7 days   Lab Units 21  0010 21  0928 21  0006 21  2238   WBC 10*3/mm3  --  7.29  --  5.95   HEMOGLOBIN g/dL 9.5* 8.6*  8.6* 9.6* 9.7*   HEMATOCRIT % 30.5* 27.2*  27.2* 30.8* 31.2*   PLATELETS 10*3/mm3  --  161  --  201           Current Medications:   Scheduled Meds:amLODIPine, 2.5 mg, Oral, Q24H  atorvastatin, 20 mg, Oral, Daily  brimonidine, 1 drop, Both Eyes, BID  cefTRIAXone, 1 g, Intravenous, Q24H  dorzolamide, 1 drop, Right Eye, TID  insulin lispro, 0-9 Units, Subcutaneous, 4x Daily With Meals & Nightly  metroNIDAZOLE, 500 mg, Intravenous, Q8H  senna-docusate sodium, 1 tablet, Oral, Nightly  sodium bicarbonate, 1,300 mg, Oral, TID  sodium chloride, 10 mL, Intravenous, Q12H  tamsulosin, 0.4 mg, Oral, Daily      Continuous Infusions:sodium chloride, 1,000 mL, Last Rate: 1,000 mL (21 0850)            Gastrointestinal hemorrhage    Acute UTI (urinary tract infection)    Hypertension    DM2 (diabetes mellitus, type 2) (CMS/HCC)    Abnormal CT  of the abdomen    SILVIA (acute kidney injury) (CMS/[facility])    Immobility    Right upper lobe pneumonia    CKD (chronic kidney disease) stage 3, GFR 30-59 ml/min (CMS/[facility])       A/P: 64 yo male with h/o Atrial Fibrillation with GI Bleed.    1. Atrial Fibrillation - permanent, asymptomatic - elevated [id number] score of at least 3 - was on Eliquis 2.5 mg bid - unclear why on that does as not 80 and not less than 60 kg - inappropriate dosing for patient profile - regardless being held due to GI w/u. Rates controlled - off of any AVNodal agents - ?h/o Syncope - hard to get history - if in fact this is true - would consider Loop Recorder for longer term monitoring.    2. HTN - controlled.    3. H/o NSVT - no events this hospitalization.      Dax Byrd DO  06/05/21  10:50 EDT

## 2021-06-05 NOTE — PLAN OF CARE
Goal Outcome Evaluation:  Plan of Care Reviewed With: patient  Progress: no change  Outcome Summary: Pt A&Ox4, lopez cath to bedside, 4L NC unable to wean, Q2 turns with pt sometimes refusing, NPO @ MN, enemia completed with bowel movements, BLE wraps in place,  Q8 H&H, accu checks stable, vitals as charted, heel boots used

## 2021-06-05 NOTE — PROGRESS NOTES
"DAILY PROGRESS NOTE  [facility]    Patient Identification:  Name: Gregorio Alejandro  Age: 65 y.o.  Sex: male  :  1955  MRN: [id number]         Primary Care Physician: Maya Ribeiro APRN    Subjective:  Interval History:No new complaints.    Objective:    Scheduled Meds:amLODIPine, 2.5 mg, Oral, Q24H  atorvastatin, 20 mg, Oral, Daily  brimonidine, 1 drop, Both Eyes, BID  cefTRIAXone, 1 g, Intravenous, Q24H  dorzolamide, 1 drop, Right Eye, TID  insulin lispro, 0-9 Units, Subcutaneous, 4x Daily With Meals & Nightly  metroNIDAZOLE, 500 mg, Intravenous, Q8H  senna-docusate sodium, 1 tablet, Oral, Nightly  sodium bicarbonate, 1,300 mg, Oral, TID  sodium chloride, 10 mL, Intravenous, Q12H  tamsulosin, 0.4 mg, Oral, Daily      Continuous Infusions:     Vital signs in last 24 hours:  Temp:  [98.8 °F (37.1 °C)-99.2 °F (37.3 °C)] 98.8 °F (37.1 °C)  Heart Rate:  [58-96] 81  Resp:  [16-20] 20  BP: ()/(63-91) 137/91    Intake/Output:    Intake/Output Summary (Last 24 hours) at 2021 1438  Last data filed at 2021 1246  Gross per 24 hour   Intake 1590 ml   Output 2050 ml   Net -460 ml       Exam:  /91   Pulse 81   Temp 98.8 °F (37.1 °C) (Oral)   Resp 20   Ht 190.5 cm (75\")   Wt 103 kg (227 lb 12.8 oz)   SpO2 94%   BMI 28.47 kg/m²     General Appearance:    Alert, cooperative, no distress   Head:    Normocephalic, without obvious abnormality, atraumatic   Eyes:       Throat:   Lips, tongue, gums normal   Neck:   Supple, symmetrical, trachea midline, no JVD   Lungs:     Clear to auscultation bilaterally, respirations unlabored   Chest Wall:    No tenderness or deformity    Heart:    Regular rate and rhythm, S1 and S2 normal, no murmur,no  Rub or gallop   Abdomen:     Soft, nontender, bowel sounds active, no masses, no organomegaly    Extremities:   Extremities normal, atraumatic, no cyanosis or edema   Pulses:      Skin:   Skin is warm and dry,  no rashes or palpable lesions " "  Neurologic:   no focal deficits noted      Lab Results (last 72 hours)     Procedure Component Value Units Date/Time    Fungus Smear - Brushing, Esophagus [[id number]] Collected: 06/05/21 0933    Specimen: Brushing from Esophagus Updated: 06/05/21 1136     Fungal Stain No yeast or hyphal elements seen    POC Glucose Once [[id number]]  (Normal) Collected: 06/05/21 1131    Specimen: Blood Updated: 06/05/21 1133     Glucose 99 mg/dL     Procalcitonin [[id number]]  (Abnormal) Collected: 06/05/21 0800    Specimen: Blood Updated: 06/05/21 1122     Procalcitonin 0.66 ng/mL     Narrative:      As a Marker for Sepsis (Non-Neonates):     1. <0.5 ng/mL represents a low risk of severe sepsis and/or septic shock.  2. >2 ng/mL represents a high risk of severe sepsis and/or septic shock.    As a Marker for Lower Respiratory Tract Infections that require antibiotic therapy:  PCT on Admission     Antibiotic Therapy             6-12 Hrs later  >0.5                          Strongly Recommended            >0.25 - <0.5             Recommended  0.1 - 0.25                  Discouraged                       Remeasure/reassess PCT  <0.1                         Strongly Discouraged         Remeasure/reassess PCT      As 28 day mortality risk marker: \"Change in Procalcitonin Result\" (>80% or <=80%) if Day 0 (or Day 1) and Day 4 values are available. Refer to http://www.Ctraxs-pct-calculator.com/    Change in PCT <=80 %   A decrease of PCT levels below or equal to 80% defines a positive change in PCT test result representing a higher risk for 28-day all-cause mortality of patients diagnosed with severe sepsis or septic shock.    Change in PCT >80 %   A decrease of PCT levels of more than 80% defines a negative change in PCT result representing a lower risk for 28-day all-cause mortality of patients diagnosed with severe sepsis or septic shock.              Results may be falsely decreased if patient taking Biotin.     CBC & Differential " [[id number]] Updated: 06/05/21 0943    Specimen: Blood     Narrative:      The following orders were created for panel order CBC & Differential.  Procedure                               Abnormality         Status                     ---------                               -----------         ------                     CBC Auto Differential[[id number]]                                                         Please view results for these tests on the individual orders.    CBC Auto Differential [[id number]] Updated: 06/05/21 0943    Specimen: Blood     Fungus Culture - Brushing, Esophagus [[id number]] Collected: 06/05/21 0933    Specimen: Brushing from Esophagus Updated: 06/05/21 0941    Renal Function Panel [[id number]]  (Abnormal) Collected: 06/05/21 0800    Specimen: Blood Updated: 06/05/21 0908     Glucose 98 mg/dL      BUN 54 mg/dL      Creatinine 2.54 mg/dL      Sodium 137 mmol/L      Potassium 4.7 mmol/L      Comment: Slight hemolysis detected by analyzer. Results may be affected.        Chloride 114 mmol/L      CO2 13.5 mmol/L      Calcium 8.3 mg/dL      Albumin 2.20 g/dL      Phosphorus 3.6 mg/dL      Anion Gap 9.5 mmol/L      BUN/Creatinine Ratio 21.3     eGFR  African Amer 31 mL/min/1.73     Narrative:      GFR Normal >60  Chronic Kidney Disease <60  Kidney Failure <15      POC Glucose Once [[id number]]  (Normal) Collected: 06/05/21 0613    Specimen: Blood Updated: 06/05/21 0614     Glucose 110 mg/dL     Hemoglobin & Hematocrit, Blood [[id number]]  (Abnormal) Collected: 06/05/21 0010    Specimen: Blood Updated: 06/05/21 0056     Hemoglobin 9.5 g/dL      Hematocrit 30.5 %     Urine Culture - Urine, Urine, Catheter [[id number]]  (Abnormal)  (Susceptibility) Collected: 06/02/21 2314    Specimen: Urine, Catheter Updated: 06/05/21 0029     Urine Culture >100,000 CFU/mL Klebsiella pneumoniae ssp pneumoniae    Susceptibility      Klebsiella pneumoniae ssp pneumoniae      ISAC      Ampicillin Resistant      Ampicillin +  Sulbactam Susceptible      Cefazolin Susceptible      Cefepime Susceptible      Ceftazidime Susceptible      Ceftriaxone Susceptible      Gentamicin Susceptible      Levofloxacin Susceptible      Nitrofurantoin Susceptible      Piperacillin + Tazobactam Susceptible      Tetracycline Susceptible      Trimethoprim + Sulfamethoxazole Susceptible               Linear View                   Gastrointestinal Panel, PCR - Stool, Per Rectum [[id number]]  (Normal) Collected: 06/04/21 2223    Specimen: Stool from Per Rectum Updated: 06/05/21 0017     Campylobacter Not Detected     Plesiomonas shigelloides Not Detected     Salmonella Not Detected     Vibrio Not Detected     Vibrio cholerae Not Detected     Yersinia enterocolitica Not Detected     Enteroaggregative E. coli (EAEC) Not Detected     Enteropathogenic E. coli (EPEC) Not Detected     Enterotoxigenic E. coli (ETEC) lt/st Not Detected     Shiga-like toxin-producing E. coli (STEC) stx1/stx2 Not Detected     Shigella/Enteroinvasive E. coli (EIEC) Not Detected     Cryptosporidium Not Detected     Cyclospora cayetanensis Not Detected     Entamoeba histolytica Not Detected     Giardia lamblia Not Detected     Adenovirus F40/41 Not Detected     Astrovirus Not Detected     Norovirus GI/GII Not Detected     Rotavirus A Not Detected     Sapovirus (I, II, IV or V) Not Detected    Narrative:      If Aeromonas, Staphylococcus aureus or Bacillus cereus are suspected, please order [id number]: Stool Culture, Aeromonas, S aureus, B Cereus.    POC Glucose Once [[id number]]  (Normal) Collected: 06/04/21 2035    Specimen: Blood Updated: 06/04/21 2037     Glucose 120 mg/dL     POC Glucose Once [[id number]]  (Normal) Collected: 06/04/21 1612    Specimen: Blood Updated: 06/04/21 1613     Glucose 96 mg/dL     POC Glucose Once [[id number]]  (Abnormal) Collected: 06/04/21 1118    Specimen: Blood Updated: 06/04/21 1120     Glucose 183 mg/dL     Renal Function Panel [[id number]]  (Abnormal) Collected:  06/04/21 0928    Specimen: Blood Updated: 06/04/21 1041     Glucose 188 mg/dL      BUN 56 mg/dL      Creatinine 2.81 mg/dL      Sodium 136 mmol/L      Potassium 4.1 mmol/L      Chloride 113 mmol/L      CO2 12.0 mmol/L      Calcium 8.0 mg/dL      Albumin 2.00 g/dL      Phosphorus 3.4 mg/dL      Anion Gap 11.0 mmol/L      BUN/Creatinine Ratio 19.9     eGFR  African Amer 27 mL/min/1.73     Narrative:      GFR Normal >60  Chronic Kidney Disease <60  Kidney Failure <15      Hemoglobin & Hematocrit, Blood [[id number]]  (Abnormal) Collected: 06/04/21 0928    Specimen: Blood Updated: 06/04/21 1019     Hemoglobin 8.6 g/dL      Hematocrit 27.2 %     CBC & Differential [[id number]]  (Abnormal) Collected: 06/04/21 0928    Specimen: Blood Updated: 06/04/21 1019    Narrative:      The following orders were created for panel order CBC & Differential.  Procedure                               Abnormality         Status                     ---------                               -----------         ------                     CBC Auto Differential[[id number]]        Abnormal            Final result                 Please view results for these tests on the individual orders.    CBC Auto Differential [[id number]]  (Abnormal) Collected: 06/04/21 0928    Specimen: Blood Updated: 06/04/21 1019     WBC 7.29 10*3/mm3      RBC 3.71 10*6/mm3      Hemoglobin 8.6 g/dL      Hematocrit 27.2 %      MCV 73.3 fL      MCH 23.2 pg      MCHC 31.6 g/dL      RDW 21.1 %      RDW-SD 53.8 fl      Platelets 161 10*3/mm3      Neutrophil % 79.9 %      Lymphocyte % 14.4 %      Monocyte % 4.1 %      Eosinophil % 0.8 %      Basophil % 0.4 %      Neutrophils, Absolute 5.82 10*3/mm3      Lymphocytes, Absolute 1.05 10*3/mm3      Monocytes, Absolute 0.30 10*3/mm3      Eosinophils, Absolute 0.06 10*3/mm3      Basophils, Absolute 0.03 10*3/mm3     POC Glucose Once [[id number]]  (Abnormal) Collected: 06/04/21 0558    Specimen: Blood Updated: 06/04/21 0600     Glucose 141  mg/dL     Hemoglobin & Hematocrit, Blood [[id number]]  (Abnormal) Collected: 06/04/21 0006    Specimen: Blood Updated: 06/04/21 0034     Hemoglobin 9.6 g/dL      Hematocrit 30.8 %     POC Glucose Once [[id number]]  (Normal) Collected: 06/03/21 2008    Specimen: Blood Updated: 06/03/21 2011     Glucose 102 mg/dL     Basic Metabolic Panel [[id number]]  (Abnormal) Collected: 06/03/21 1756    Specimen: Blood Updated: 06/03/21 1823     Glucose 98 mg/dL      BUN 63 mg/dL      Creatinine 2.64 mg/dL      Sodium 134 mmol/L      Potassium 4.1 mmol/L      Chloride 114 mmol/L      CO2 11.3 mmol/L      Calcium 8.0 mg/dL      eGFR  African Amer 29 mL/min/1.73      BUN/Creatinine Ratio 23.9     Anion Gap 8.7 mmol/L     Narrative:      GFR Normal >60  Chronic Kidney Disease <60  Kidney Failure <15      Hemoglobin & Hematocrit, Blood [[id number]]  (Abnormal) Collected: 06/03/21 1756    Specimen: Blood Updated: 06/03/21 1809     Hemoglobin 9.4 g/dL      Hematocrit 29.3 %     POC Glucose Once [[id number]]  (Normal) Collected: 06/03/21 1648    Specimen: Blood Updated: 06/03/21 1649     Glucose 116 mg/dL     POC Glucose Once [[id number]]  (Abnormal) Collected: 06/03/21 1131    Specimen: Blood Updated: 06/03/21 1134     Glucose 237 mg/dL     Hemoglobin A1c [[id number]]  (Abnormal) Collected: 06/03/21 0922    Specimen: Blood Updated: 06/03/21 1034     Hemoglobin A1C 7.18 %     Narrative:      Hemoglobin A1C Ranges:    Increased Risk for Diabetes  5.7% to 6.4%  Diabetes                     >= 6.5%  Diabetic Goal                < 7.0%    Hemoglobin & Hematocrit, Blood [[id number]]  (Abnormal) Collected: 06/03/21 0922    Specimen: Blood from Arm, Left Updated: 06/03/21 0955     Hemoglobin 9.3 g/dL      Hematocrit 31.0 %     POC Glucose Once [[id number]]  (Abnormal) Collected: 06/03/21 0609    Specimen: Blood Updated: 06/03/21 0611     Glucose 133 mg/dL     POC Glucose Once [[id number]]  (Abnormal) Collected: 06/03/21 0331    Specimen: Blood Updated:  06/03/21 0332     Glucose 147 mg/dL     COVID PRE-OP / PRE-PROCEDURE SCREENING ORDER (NO ISOLATION) - Swab, Nasopharynx [[id number]]  (Normal) Collected: 06/02/21 2318    Specimen: Swab from Nasopharynx Updated: 06/03/21 0330    Narrative:      The following orders were created for panel order COVID PRE-OP / PRE-PROCEDURE SCREENING ORDER (NO ISOLATION) - Swab, Nasopharynx.  Procedure                               Abnormality         Status                     ---------                               -----------         ------                     COVID-19,APTIMA PANTHER,...[[id number]]  Normal              Final result                 Please view results for these tests on the individual orders.    COVID-19,APTIMA PANTHER,[facility], NP/OP SWAB IN UTM/VTM/[facility] TRANSPORT MEDIA,24 HR TAT - Swab, Nasopharynx [[id number]]  (Normal) Collected: 06/02/21 2318    Specimen: Swab from Nasopharynx Updated: 06/03/21 0330     COVID19 Not Detected    Narrative:      Fact sheet for providers: https://www.fda.gov/media/316975/download     Fact sheet for patients: https://www.fda.gov/media/863802/download    Test performed by RT PCR.    Urinalysis, Microscopic Only - Urine, Catheter [[id number]]  (Abnormal) Collected: 06/02/21 2314    Specimen: Urine, Catheter Updated: 06/03/21 0003     RBC, UA 0-2 /HPF      WBC, UA Too Numerous to Count /HPF      Bacteria, UA 4+ /HPF      Squamous Epithelial Cells, UA 0-2 /HPF      Hyaline Casts, UA 3-6 /LPF      Methodology Automated Microscopy    Urinalysis With Microscopic If Indicated (No Culture) - Urine, Catheter [[id number]]  (Abnormal) Collected: 06/02/21 2314    Specimen: Urine, Catheter Updated: 06/03/21 0003     Color, UA Yellow     Appearance, UA Turbid     pH, UA 6.5     Specific Gravity, UA 1.011     Glucose, UA Negative     Ketones, UA Negative     Bilirubin, UA Negative     Blood, UA Small (1+)     Protein,  mg/dL (2+)     Leuk Esterase, UA Large (3+)     Nitrite, UA Negative      Urobilinogen, UA 0.2 E.U./dL    POCT Occult Blood Stool [[id number]]  (Abnormal) Collected: 06/02/21 2358    Specimen: Stool from Per Rectum Updated: 06/02/21 2358     Fecal Occult Blood Positive     Lot Number [id number]     Expiration Date 11/30/2021     Positive Control Positive     Negative Control Negative    Lactic Acid, Plasma [[id number]]  (Normal) Collected: 06/02/21 2315    Specimen: Blood Updated: 06/02/21 2358     Lactate 1.5 mmol/L     Comprehensive Metabolic Panel [[id number]]  (Abnormal) Collected: 06/02/21 2238    Specimen: Blood Updated: 06/02/21 2345     Glucose 233 mg/dL      BUN 68 mg/dL      Creatinine 3.25 mg/dL      Sodium 138 mmol/L      Potassium 3.9 mmol/L      Chloride 111 mmol/L      CO2 15.2 mmol/L      Calcium 8.6 mg/dL      Total Protein 6.6 g/dL      Albumin 3.10 g/dL      ALT (SGPT) 7 U/L      AST (SGOT) 7 U/L      Alkaline Phosphatase 181 U/L      Total Bilirubin 0.3 mg/dL      eGFR  African Amer 23 mL/min/1.73      Globulin 3.5 gm/dL      A/G Ratio 0.9 g/dL      BUN/Creatinine Ratio 20.9     Anion Gap 11.8 mmol/L     Narrative:      GFR Normal >60  Chronic Kidney Disease <60  Kidney Failure <15      [place] Draw [[id number]] Collected: 06/02/21 2238    Specimen: Blood Updated: 06/02/21 2345    Narrative:      The following orders were created for panel order [place] Draw.  Procedure                               Abnormality         Status                     ---------                               -----------         ------                     Light Blue Top[[id number]]                                   Final result               Green Top (Gel)[[id number]]                                  Final result               Lavender Top[[id number]]                                     Final result               Gold Top - SST[[id number]]                                   Final result                 Please view results for these tests on the individual orders.    Light Blue Top [[id number]] Collected:  06/02/21 2238    Specimen: Blood Updated: 06/02/21 2345     Extra Tube hold for add-on     Comment: Auto resulted       Green Top (Gel) [[id number]] Collected: 06/02/21 2238    Specimen: Blood Updated: 06/02/21 2345     Extra Tube Hold for add-ons.     Comment: Auto resulted.       Lavender Top [[id number]] Collected: 06/02/21 2238    Specimen: Blood Updated: 06/02/21 2345     Extra Tube hold for add-on     Comment: Auto resulted       Gold Top - SST [[id number]] Collected: 06/02/21 2238    Specimen: Blood Updated: 06/02/21 2345     Extra Tube Hold for add-ons.     Comment: Auto resulted.       CBC & Differential [[id number]]  (Abnormal) Collected: 06/02/21 2238    Specimen: Blood Updated: 06/02/21 2318    Narrative:      The following orders were created for panel order CBC & Differential.  Procedure                               Abnormality         Status                     ---------                               -----------         ------                     CBC Auto Differential[[id number]]        Abnormal            Final result                 Please view results for these tests on the individual orders.    CBC Auto Differential [[id number]]  (Abnormal) Collected: 06/02/21 2238    Specimen: Blood Updated: 06/02/21 2318     WBC 5.95 10*3/mm3      RBC 4.16 10*6/mm3      Hemoglobin 9.7 g/dL      Hematocrit 31.2 %      MCV 75.0 fL      MCH 23.3 pg      MCHC 31.1 g/dL      RDW 21.8 %      RDW-SD 55.4 fl      Platelets 201 10*3/mm3      Neutrophil % 71.4 %      Lymphocyte % 18.7 %      Monocyte % 6.4 %      Eosinophil % 2.7 %      Basophil % 0.5 %      Immature Grans % 0.3 %      Neutrophils, Absolute 4.25 10*3/mm3      Lymphocytes, Absolute 1.11 10*3/mm3      Monocytes, Absolute 0.38 10*3/mm3      Eosinophils, Absolute 0.16 10*3/mm3      Basophils, Absolute 0.03 10*3/mm3      Immature Grans, Absolute 0.02 10*3/mm3      nRBC 0.2 /100 WBC         Data Review:  Results from last 7 days   Lab Units 06/05/21  0800  06/04/21  0928 06/03/21  1756   SODIUM mmol/L 137 136 134*   POTASSIUM mmol/L 4.7 4.1 4.1   CHLORIDE mmol/L 114* 113* 114*   CO2 mmol/L 13.5* 12.0* 11.3*   BUN mg/dL 54* 56* 63*   CREATININE mg/dL 2.54* 2.81* 2.64*   GLUCOSE mg/dL 98 188* 98   CALCIUM mg/dL 8.3* 8.0* 8.0*     Results from last 7 days   Lab Units 06/05/21  0010 06/04/21  0928 06/04/21  0006 06/02/21  2238   WBC 10*3/mm3  --  7.29  --  5.95   HEMOGLOBIN g/dL 9.5* 8.6*  8.6* 9.6* 9.7*   HEMATOCRIT % 30.5* 27.2*  27.2* 30.8* 31.2*   PLATELETS 10*3/mm3  --  161  --  201         Results from last 7 days   Lab Units 06/03/21  0922   HEMOGLOBIN A1C % 7.18*     Lab Results   Lab Value Date/Time    TROPONINT 0.110 (C) 02/04/2021 1551    TROPONINT 0.171 (C) 12/23/2020 0357    TROPONINT 0.141 (C) 12/22/2020 0549    TROPONINT 0.147 (C) 12/21/2020 1450    TROPONINT 0.128 (C) 12/21/2020 1223         Results from last 7 days   Lab Units 06/02/21  2238   ALK PHOS U/L 181*   BILIRUBIN mg/dL 0.3   ALT (SGPT) U/L 7   AST (SGOT) U/L 7         Results from last 7 days   Lab Units 06/03/21  0922   HEMOGLOBIN A1C % 7.18*     Glucose   Date/Time Value Ref Range Status   06/05/2021 1131 99 70 - 130 mg/dL Final   06/05/2021 0613 110 70 - 130 mg/dL Final   06/04/2021 2035 120 70 - 130 mg/dL Final   06/04/2021 1612 96 70 - 130 mg/dL Final   06/04/2021 1118 183 (H) 70 - 130 mg/dL Final   06/04/2021 0558 141 (H) 70 - 130 mg/dL Final   06/03/2021 2008 102 70 - 130 mg/dL Final   06/03/2021 1648 116 70 - 130 mg/dL Final           Past Medical History:   Diagnosis Date   • Back pain    • CKD (chronic kidney disease)    • DM type 2 (diabetes mellitus, type 2) (CMS/HCC)    • ED (erectile dysfunction)    • Hyperlipidemia    • Hypertension    • SCOTTY (iron deficiency anemia)    • Monoclonal gammopathy    • Osteoarthritis        Assessment:  Active Hospital Problems    Diagnosis  POA   • **Gastrointestinal hemorrhage [K92.2]  Yes   • Right upper lobe pneumonia [J18.9]  Yes   • CKD  (chronic kidney disease) stage 3, GFR 30-59 ml/min (CMS/[facility]) [N18.30]  Yes   • Immobility [Z74.09]  Yes   • SILVIA (acute kidney injury) (CMS/[facility]) [N17.9]  Yes   • Hypertension [I10]  Yes   • DM2 (diabetes mellitus, type 2) (CMS/[facility]) [E11.9]  Yes   • Abnormal CT of the abdomen [R93.5]  Yes   • Acute UTI (urinary tract infection) [N39.0]  Yes      Resolved Hospital Problems   No resolved problems to display.       Plan:  Continue current RX. Follow lab. Antibiotics. As per multiple consults.    Jono Montes MD  6/5/2021  14:38 EDT

## 2021-06-05 NOTE — ANESTHESIA POSTPROCEDURE EVALUATION
"Patient: Gregorio Alejandro    Procedure Summary     Date: 06/05/21 Room / Location:  [facility]    Anesthesia Start: 0918 Anesthesia Stop: 1001    Procedures:       ESOPHAGOGASTRODUODENOSCOPY with biopsies and brushing (N/A Esophagus)      FLEXIBLE SIGMOIDOSCOPY to 40cm (N/A ) Diagnosis:       Gastrointestinal hemorrhage, unspecified gastrointestinal hemorrhage type      (Gastrointestinal hemorrhage, unspecified gastrointestinal hemorrhage type [K92.2])    Surgeons: Pérez Gonzalez MD Provider: Michael Figueredo MD    Anesthesia Type: MAC ASA Status: 4          Anesthesia Type: MAC    Vitals  Vitals Value Taken Time   /80 06/05/21 1009   Temp     Pulse 73 06/05/21 1009   Resp 20 06/05/21 1009   SpO2 93 % 06/05/21 1009           Post Anesthesia Care and Evaluation    Patient location during evaluation: bedside  Patient participation: complete - patient participated  Level of consciousness: sleepy but conscious  Pain management: adequate  Airway patency: patent  Anesthetic complications: No anesthetic complications    Cardiovascular status: acceptable  Respiratory status: acceptable  Hydration status: acceptable    Comments: */80   Pulse 73   Temp 37.1 °C (98.8 °F) (Oral)   Resp 20   Ht 190.5 cm (75\")   Wt 103 kg (227 lb 12.8 oz)   SpO2 93%   BMI 28.47 kg/m²         "

## 2021-06-05 NOTE — ANESTHESIA PREPROCEDURE EVALUATION
Anesthesia Evaluation     NPO Solid Status: > 8 hours  NPO Liquid Status: > 2 hours           Airway   Mallampati: II  no difficulty expected  Dental - normal exam     Pulmonary - normal exam   (+) pneumonia ,     PE comment: nonlabored  Cardiovascular - normal exam    Rhythm: regular  Rate: normal    (+) hypertension poorly controlled, dysrhythmias (paroxysmal VT), hyperlipidemia,       Neuro/Psych  (+) dementia (vascular dementia),     GI/Hepatic/Renal/Endo    (+)  GI bleeding , renal disease (SILVIA, CKD--stage 4), diabetes mellitus type 2,     ROS Comment: Distended stomach on imaging    Musculoskeletal     (+) arthralgias, back pain,   Abdominal    Substance History      OB/GYN          Other   arthritis, blood dyscrasia (monoclonal gammopathy) anemia,                   Anesthesia Plan    ASA 4     MAC   (Pt has No CPR order but discussed Code Status and pt wants to be Full Code for costa-operative/costa-procedure course)    Anesthetic plan, all risks, benefits, and alternatives have been provided, discussed and informed consent has been obtained with: patient.

## 2021-06-05 NOTE — PROGRESS NOTES
"  Infectious Diseases Progress Note    Tonja Azevedo MD     [facility]  [place]: 2 days  Patient Identification:  Name: Gregorio Alejandro  Age: 65 y.o.  Sex: male  :  1955  MRN: [id number]         Primary Care Physician: Maya Ribeiro APRN            Subjective: Feeling better than yesterday.  Abdominal pain is improved.  Denies any fever and chills..  Interval History: See consultation note.    Objective:    Scheduled Meds:amLODIPine, 2.5 mg, Oral, Q24H  atorvastatin, 20 mg, Oral, Daily  brimonidine, 1 drop, Both Eyes, BID  cefTRIAXone, 1 g, Intravenous, Q24H  dorzolamide, 1 drop, Right Eye, TID  insulin lispro, 0-9 Units, Subcutaneous, 4x Daily With Meals & Nightly  metroNIDAZOLE, 500 mg, Intravenous, Q8H  senna-docusate sodium, 1 tablet, Oral, Nightly  sodium bicarbonate, 1,300 mg, Oral, TID  sodium chloride, 10 mL, Intravenous, Q12H  tamsulosin, 0.4 mg, Oral, Daily      Continuous Infusions:     Vital signs in last 24 hours:  Temp:  [99 °F (37.2 °C)-100.2 °F (37.9 °C)] 99.2 °F (37.3 °C)  Heart Rate:  [] 96  Resp:  [16-18] 18  BP: (113-122)/(68-76) 122/76    Intake/Output:    Intake/Output Summary (Last 24 hours) at 2021 0706  Last data filed at 2021 0517  Gross per 24 hour   Intake 1840 ml   Output 2050 ml   Net -210 ml       Exam:  /76 (BP Location: Left arm, Patient Position: Lying)   Pulse 96   Temp 99.2 °F (37.3 °C) (Oral)   Resp 18   Ht 190.5 cm (75\")   Wt 103 kg (227 lb 12.8 oz)   SpO2 93%   BMI 28.47 kg/m²   Patient is examined using the personal protective equipment as per guidelines from infection control for this particular patient as enacted.  Hand washing was performed before and after patient interaction.  General Appearance:    More interactive and feeling better.   Head:    Normocephalic, without obvious abnormality, atraumatic   Eyes:    PERRL, conjunctivae/corneas clear, EOM's intact, both eyes   Ears:    Normal external ear canals, both ears "   Nose:   Nares normal, septum midline, mucosa normal, no drainage    or sinus tenderness   Throat:   Lips, tongue, gums normal; oral mucosa pink and moist   Neck:   Supple, symmetrical, trachea midline, no adenopathy;     thyroid:  no enlargement/tenderness/nodules; no carotid    bruit or JVD   Back:     Symmetric, no curvature, ROM normal, no CVA tenderness   Lungs:     Clear to auscultation bilaterally, respirations unlabored   Chest Wall:    No tenderness or deformity    Heart:   Irregularly irregular   Abdomen:    Soft mild generalized tenderness,distended   Extremities:  Contractures and diffuse edema of the lower extremities noted lower extremities are dressed.   Pulses:   Pulses palpable in all extremities; symmetric all extremities   Skin:  Superficial wounds noted   Neurologic:   Not as lethargic and somnolent as well last night..            Data Review:    I reviewed the patient's new clinical results.  Results from last 7 days   Lab Units 06/05/21  0010 06/04/21 0928 06/04/21  0006 06/03/21  1756 06/03/21  0922 06/02/21  2238   WBC 10*3/mm3  --  7.29  --   --   --  5.95   HEMOGLOBIN g/dL 9.5* 8.6*  8.6* 9.6* 9.4* 9.3* 9.7*   PLATELETS 10*3/mm3  --  161  --   --   --  201     Results from last 7 days   Lab Units 06/04/21 0928 06/03/21 1756 06/02/21  2238   SODIUM mmol/L 136 134* 138   POTASSIUM mmol/L 4.1 4.1 3.9   CHLORIDE mmol/L 113* 114* 111*   CO2 mmol/L 12.0* 11.3* 15.2*   BUN mg/dL 56* 63* 68*   CREATININE mg/dL 2.81* 2.64* 3.25*   CALCIUM mg/dL 8.0* 8.0* 8.6   GLUCOSE mg/dL 188* 98 233*     Microbiology Results (last 10 days)     Procedure Component Value - Date/Time    Gastrointestinal Panel, PCR - Stool, Per Rectum [[id number]]  (Normal) Collected: 06/04/21 2223    Lab Status: Final result Specimen: Stool from Per Rectum Updated: 06/05/21 0017     Campylobacter Not Detected     Plesiomonas shigelloides Not Detected     Salmonella Not Detected     Vibrio Not Detected     Vibrio cholerae Not  Detected     Yersinia enterocolitica Not Detected     Enteroaggregative E. coli (EAEC) Not Detected     Enteropathogenic E. coli (EPEC) Not Detected     Enterotoxigenic E. coli (ETEC) lt/st Not Detected     Shiga-like toxin-producing E. coli (STEC) stx1/stx2 Not Detected     Shigella/Enteroinvasive E. coli (EIEC) Not Detected     Cryptosporidium Not Detected     Cyclospora cayetanensis Not Detected     Entamoeba histolytica Not Detected     Giardia lamblia Not Detected     Adenovirus F40/41 Not Detected     Astrovirus Not Detected     Norovirus GI/GII Not Detected     Rotavirus A Not Detected     Sapovirus (I, II, IV or V) Not Detected    Narrative:      If Aeromonas, Staphylococcus aureus or Bacillus cereus are suspected, please order [id number]: Stool Culture, Aeromonas, S aureus, B Cereus.    COVID PRE-OP / PRE-PROCEDURE SCREENING ORDER (NO ISOLATION) - Swab, Nasopharynx [[id number]]  (Normal) Collected: 06/02/21 2318    Lab Status: Final result Specimen: Swab from Nasopharynx Updated: 06/03/21 0330    Narrative:      The following orders were created for panel order COVID PRE-OP / PRE-PROCEDURE SCREENING ORDER (NO ISOLATION) - Swab, Nasopharynx.  Procedure                               Abnormality         Status                     ---------                               -----------         ------                     COVID-19,APTIMA PANTHER,...[[id number]]  Normal              Final result                 Please view results for these tests on the individual orders.    [facility]/OP SWAB IN UTM/VTM/SALINE TRANSPORT MEDIA,24 HR TAT - Swab, Nasopharynx [[id number]]  (Normal) Collected: 06/02/21 2318    Lab Status: Final result Specimen: Swab from Nasopharynx Updated: 06/03/21 0330     COVID19 Not Detected    Narrative:      Fact sheet for providers: https://www.fda.gov/media/619301/download     Fact sheet for patients: https://www.fda.gov/media/696000/download    Test performed by RT PCR.     Urine Culture - Urine, Urine, Catheter [[id number]]  (Abnormal)  (Susceptibility) Collected: 06/02/21 4115    Lab Status: Final result Specimen: Urine, Catheter Updated: 06/05/21 0029     Urine Culture >100,000 CFU/mL Klebsiella pneumoniae ssp pneumoniae    Susceptibility      Klebsiella pneumoniae ssp pneumoniae      ISAC      Ampicillin Resistant      Ampicillin + Sulbactam Susceptible      Cefazolin Susceptible      Cefepime Susceptible      Ceftazidime Susceptible      Ceftriaxone Susceptible      Gentamicin Susceptible      Levofloxacin Susceptible      Nitrofurantoin Susceptible      Piperacillin + Tazobactam Susceptible      Tetracycline Susceptible      Trimethoprim + Sulfamethoxazole Susceptible               Linear View                           Assessment:    Gastrointestinal hemorrhage    Acute UTI (urinary tract infection)    Hypertension    DM2 (diabetes mellitus, type 2) (CMS/[facility])    Abnormal CT of the abdomen    SILVIA (acute kidney injury) (CMS/[facility])    Immobility    Right upper lobe pneumonia    CKD (chronic kidney disease) stage 3, GFR 30-59 ml/min (CMS/[facility])  1-GI bleed unclear whether it is upper GI bleed or due to colitis involving the rectosigmoid junction.  Rule out infectious causes such as E. coli Shigella infection versus C. difficile infection.  2-probable UTI with urinary retention likely chronic recurrent cystitis  3-diabetes mellitus with neuropathy and possible gastroparesis  4-immobilization syndrome  5-vascular dementia  6-other diagnosis per primary team.     Recommendations/Discussions:  · Continue IV Flagyl and IV Rocephin to address UTI due to Klebsiella pneumonia and intra-abdominal process with focal colitis involving the rectosigmoid junction.  · Would recommend short course of antibiotic treatment for urinary tract infection and low threshold to check for C. difficile infection and search for enteric pathogens causing gastrointestinal infection.    Tonja Azevedo,  MD  6/5/2021  07:06 EDT    Much of this encounter note is an electronic transcription/translation of spoken language to printed text. The electronic translation of spoken language may permit erroneous, or at times, nonsensical words or phrases to be inadvertently transcribed; Although I have reviewed the note for such errors, some may still exist

## 2021-06-06 NOTE — PROGRESS NOTES
[facility]  Inpatient Progress Note    Reason for Follow Up:  Melena, anemia    Subjective     Interval History:   No new issues.  EGD/flex sig yesterday.  EGD was bland.  Flex sig with poor prep to 40cm, no obvious large polyps or mass lesions identified.  Still nauseated.    Current Facility-Administered Medications:   •  acetaminophen (TYLENOL) tablet 650 mg, 650 mg, Oral, Q4H PRN, 650 mg at 06/06/21 0320 **OR** acetaminophen (TYLENOL) 160 MG/5ML solution 650 mg, 650 mg, Oral, Q4H PRN **OR** acetaminophen (TYLENOL) suppository 650 mg, 650 mg, Rectal, Q4H PRN, Kaylynn Bennett APRN  •  amLODIPine (NORVASC) tablet 2.5 mg, 2.5 mg, Oral, Q24H, Dane Reeves MD, 2.5 mg at 06/05/21 1047  •  ammonium lactate (AMLACTIN) cream, , Topical, Q12H PRN, Rashad Ayala MD  •  atorvastatin (LIPITOR) tablet 20 mg, 20 mg, Oral, Daily, Rashad Ayala MD, 20 mg at 06/05/21 1047  •  brimonidine (ALPHAGAN P) 0.1 % ophthalmic solution 1 drop, 1 drop, Both Eyes, BID, Rashad Ayala MD, 1 drop at 06/05/21 2123  •  calcium carbonate (TUMS) chewable tablet 500 mg (200 mg elemental), 2 tablet, Oral, BID PRN, Kaylynn Bennett APRN  •  cefTRIAXone (ROCEPHIN) IVPB 1 g, 1 g, Intravenous, Q24H, Leah Winslow APRN, Last Rate: 100 mL/hr at 06/06/21 0313, 1 g at 06/06/21 0313  •  dextrose (D50W) 25 g/ 50mL Intravenous Solution 25 g, 25 g, Intravenous, Q15 Min PRN, Kaylynn Bennett APRN  •  dextrose (GLUTOSE) oral gel 15 g, 15 g, Oral, Q15 Min PRN, Kaylynn Bennett APRN  •  dorzolamide (TRUSOPT) 2 % ophthalmic solution 1 drop, 1 drop, Right Eye, TID, Rashad Ayala MD, 1 drop at 06/05/21 2123  •  glucagon (human recombinant) (GLUCAGEN DIAGNOSTIC) injection 1 mg, 1 mg, Subcutaneous, PRN, Kaylynn Bennett APRN  •  insulin lispro (ADMELOG) injection 0-9 Units, 0-9 Units, Subcutaneous, 4x Daily With Meals & Nightly, Kaylynn Bennett APRN, 2 Units at 06/05/21  1738  •  metroNIDAZOLE (FLAGYL) 500 mg/100mL IVPB, 500 mg, Intravenous, Q8H, Tonja Azevedo MD, 500 mg at 06/06/21 0402  •  nitroglycerin (NITROSTAT) SL tablet 0.4 mg, 0.4 mg, Sublingual, Q5 Min PRN, Kaylynn Bennett APRN  •  ondansetron (ZOFRAN) tablet 4 mg, 4 mg, Oral, Q6H PRN **OR** ondansetron (ZOFRAN) injection 4 mg, 4 mg, Intravenous, Q6H PRN, Kaylynn Bennett APRN  •  sennosides-docusate (PERICOLACE) 8.6-50 MG per tablet 1 tablet, 1 tablet, Oral, Nightly, Chrissy Gordon MD, 1 tablet at 06/04/21 2029  •  sodium bicarbonate tablet 1,300 mg, 1,300 mg, Oral, TID, Dane Reeves MD, 1,300 mg at 06/05/21 2123  •  [COMPLETED] Insert peripheral IV, , , Once **AND** sodium chloride 0.9 % flush 10 mL, 10 mL, Intravenous, PRN, Alicia Harris APRN  •  sodium chloride 0.9 % flush 10 mL, 10 mL, Intravenous, Q12H, Kaylynn Bennett APRN, 10 mL at 06/05/21 1950  •  sodium chloride 0.9 % flush 10 mL, 10 mL, Intravenous, PRN, Kaylynn Bennett APRN  •  tamsulosin (FLOMAX) 24 hr capsule 0.4 mg, 0.4 mg, Oral, Daily, Dane Reeves MD, 0.4 mg at 06/05/21 1047  Review of Systems:   GI: nausea    Objective     Vital Signs  Temp:  [97.8 °F (36.6 °C)-98.9 °F (37.2 °C)] 98.3 °F (36.8 °C)  Heart Rate:  [68-92] 77  Resp:  [18-20] 18  BP: ()/(63-91) 127/77  Body mass index is 28.47 kg/m².    Intake/Output Summary (Last 24 hours) at 6/6/2021 0844  Last data filed at 6/6/2021 0524  Gross per 24 hour   Intake 1450 ml   Output 1750 ml   Net -300 ml     No intake/output data recorded.     Physical Exam:   General: patient awake, alert and cooperative   Abdomen: soft, nontender, nondistended; normal bowel sounds   Rectal: deferred   Extremities: no rash or edema   Psychiatric: Normal mood and behavior; memory intact     Results Review:     I reviewed the patient's new clinical results.  I reviewed the patient's new imaging results and agree with the interpretation.    Results from last  7 days   Lab Units 06/05/21  0010 06/04/21  0928 06/04/21  0006 06/02/21  2238   WBC 10*3/mm3  --  7.29  --  5.95   HEMOGLOBIN g/dL 9.5* 8.6*  8.6* 9.6* 9.7*   HEMATOCRIT % 30.5* 27.2*  27.2* 30.8* 31.2*   PLATELETS 10*3/mm3  --  161  --  201     Results from last 7 days   Lab Units 06/05/21  0800 06/04/21  0928 06/03/21  1756 06/02/21  2238   SODIUM mmol/L 137 136 134* 138   POTASSIUM mmol/L 4.7 4.1 4.1 3.9   CHLORIDE mmol/L 114* 113* 114* 111*   CO2 mmol/L 13.5* 12.0* 11.3* 15.2*   BUN mg/dL 54* 56* 63* 68*   CREATININE mg/dL 2.54* 2.81* 2.64* 3.25*   CALCIUM mg/dL 8.3* 8.0* 8.0* 8.6   BILIRUBIN mg/dL  --   --   --  0.3   ALK PHOS U/L  --   --   --  181*   ALT (SGPT) U/L  --   --   --  7   AST (SGOT) U/L  --   --   --  7   GLUCOSE mg/dL 98 188* 98 233*         Lab Results   Lab Value Date/Time    LIPASE 17 12/18/2020 1653    LIPASE 914 (H) 10/27/2020 0330    LIPASE 1,087 (H) 10/26/2020 0410    LIPASE 1,653 (H) 10/25/2020 0323       Assessment/Plan   Assessment:   1.  Diabetic gastroparesis  2.  Melena  3.  Abnormal CT scan of rectosigmoid colon  4.  Heme positive stool    Plan:   EGD/flex sig yesterday reviewed, no obvious source for anemia identified.  Flex sig overall poor preparation but no large polyps/masses identified.   Continue treatment for his gastroparesis  Monitor H/H  Await biopsies from EGD  If he has further gross GI bleeding, would have to consider attempt at colonoscopy vs tagged RBC scan    I discussed the patients findings and my recommendations with patient.         Kip Dumas M.D.  [facility]  [address] [place] [address]  Office: [phone]

## 2021-06-06 NOTE — PROGRESS NOTES
"DAILY PROGRESS NOTE  [facility]    Patient Identification:  Name: Gregorio Alejandro  Age: 65 y.o.  Sex: male  :  1955  MRN: [id number]         Primary Care Physician: Maya Ribeiro APRN    Subjective:  Interval History:No new complaints.    Objective:    Scheduled Meds:amLODIPine, 2.5 mg, Oral, Q24H  atorvastatin, 20 mg, Oral, Daily  brimonidine, 1 drop, Both Eyes, BID  cefTRIAXone, 1 g, Intravenous, Q24H  dorzolamide, 1 drop, Right Eye, TID  insulin lispro, 0-9 Units, Subcutaneous, 4x Daily With Meals & Nightly  metroNIDAZOLE, 500 mg, Intravenous, Q8H  senna-docusate sodium, 1 tablet, Oral, Nightly  sodium bicarbonate, 1,300 mg, Oral, TID  sodium chloride, 10 mL, Intravenous, Q12H  tamsulosin, 0.4 mg, Oral, Daily      Continuous Infusions:     Vital signs in last 24 hours:  Temp:  [97.8 °F (36.6 °C)-98.9 °F (37.2 °C)] 98.3 °F (36.8 °C)  Heart Rate:  [75-92] 77  Resp:  [18-20] 18  BP: (119-132)/(73-86) 127/77    Intake/Output:    Intake/Output Summary (Last 24 hours) at 2021 1246  Last data filed at 2021 0844  Gross per 24 hour   Intake 1220 ml   Output 1750 ml   Net -530 ml       Exam:  /77 (BP Location: Left arm, Patient Position: Lying)   Pulse 77   Temp 98.3 °F (36.8 °C) (Oral)   Resp 18   Ht 190.5 cm (75\")   Wt 103 kg (227 lb 12.8 oz)   SpO2 92%   BMI 28.47 kg/m²     General Appearance:    Alert, cooperative, no distress   Head:    Normocephalic, without obvious abnormality, atraumatic   Eyes:       Throat:   Lips, tongue, gums normal   Neck:   Supple, symmetrical, trachea midline, no JVD   Lungs:     Clear to auscultation bilaterally, respirations unlabored   Chest Wall:    No tenderness or deformity    Heart:    Regular rate and rhythm, S1 and S2 normal, no murmur,no  Rub or gallop   Abdomen:     Soft, nontender, bowel sounds active, no masses, no organomegaly    Extremities:   Extremities normal, atraumatic, no cyanosis or edema   Pulses:      Skin:   Skin is " "warm and dry,  no rashes or palpable lesions   Neurologic:   no focal deficits noted      Lab Results (last 72 hours)     Procedure Component Value Units Date/Time    Fungus Smear - Brushing, Esophagus [[id number]] Collected: 06/05/21 0933    Specimen: Brushing from Esophagus Updated: 06/05/21 1136     Fungal Stain No yeast or hyphal elements seen    POC Glucose Once [[id number]]  (Normal) Collected: 06/05/21 1131    Specimen: Blood Updated: 06/05/21 1133     Glucose 99 mg/dL     Procalcitonin [[id number]]  (Abnormal) Collected: 06/05/21 0800    Specimen: Blood Updated: 06/05/21 1122     Procalcitonin 0.66 ng/mL     Narrative:      As a Marker for Sepsis (Non-Neonates):     1. <0.5 ng/mL represents a low risk of severe sepsis and/or septic shock.  2. >2 ng/mL represents a high risk of severe sepsis and/or septic shock.    As a Marker for Lower Respiratory Tract Infections that require antibiotic therapy:  PCT on Admission     Antibiotic Therapy             6-12 Hrs later  >0.5                          Strongly Recommended            >0.25 - <0.5             Recommended  0.1 - 0.25                  Discouraged                       Remeasure/reassess PCT  <0.1                         Strongly Discouraged         Remeasure/reassess PCT      As 28 day mortality risk marker: \"Change in Procalcitonin Result\" (>80% or <=80%) if Day 0 (or Day 1) and Day 4 values are available. Refer to http://www.[facility]-pct-calculator.com/    Change in PCT <=80 %   A decrease of PCT levels below or equal to 80% defines a positive change in PCT test result representing a higher risk for 28-day all-cause mortality of patients diagnosed with severe sepsis or septic shock.    Change in PCT >80 %   A decrease of PCT levels of more than 80% defines a negative change in PCT result representing a lower risk for 28-day all-cause mortality of patients diagnosed with severe sepsis or septic shock.              Results may be falsely decreased if patient " taking Biotin.     CBC & Differential [[id number]] Updated: 06/05/21 0943    Specimen: Blood     Narrative:      The following orders were created for panel order CBC & Differential.  Procedure                               Abnormality         Status                     ---------                               -----------         ------                     CBC Auto Differential[[id number]]                                                         Please view results for these tests on the individual orders.    CBC Auto Differential [[id number]] Updated: 06/05/21 0943    Specimen: Blood     Fungus Culture - Brushing, Esophagus [[id number]] Collected: 06/05/21 0933    Specimen: Brushing from Esophagus Updated: 06/05/21 0941    Renal Function Panel [[id number]]  (Abnormal) Collected: 06/05/21 0800    Specimen: Blood Updated: 06/05/21 0908     Glucose 98 mg/dL      BUN 54 mg/dL      Creatinine 2.54 mg/dL      Sodium 137 mmol/L      Potassium 4.7 mmol/L      Comment: Slight hemolysis detected by analyzer. Results may be affected.        Chloride 114 mmol/L      CO2 13.5 mmol/L      Calcium 8.3 mg/dL      Albumin 2.20 g/dL      Phosphorus 3.6 mg/dL      Anion Gap 9.5 mmol/L      BUN/Creatinine Ratio 21.3     eGFR  African Amer 31 mL/min/1.73     Narrative:      GFR Normal >60  Chronic Kidney Disease <60  Kidney Failure <15      POC Glucose Once [[id number]]  (Normal) Collected: 06/05/21 0613    Specimen: Blood Updated: 06/05/21 0614     Glucose 110 mg/dL     Hemoglobin & Hematocrit, Blood [[id number]]  (Abnormal) Collected: 06/05/21 0010    Specimen: Blood Updated: 06/05/21 0056     Hemoglobin 9.5 g/dL      Hematocrit 30.5 %     Urine Culture - Urine, Urine, Catheter [[id number]]  (Abnormal)  (Susceptibility) Collected: 06/02/21 2314    Specimen: Urine, Catheter Updated: 06/05/21 0029     Urine Culture >100,000 CFU/mL Klebsiella pneumoniae ssp pneumoniae    Susceptibility      Klebsiella pneumoniae ssp pneumoniae      ISAC       Ampicillin Resistant      Ampicillin + Sulbactam Susceptible      Cefazolin Susceptible      Cefepime Susceptible      Ceftazidime Susceptible      Ceftriaxone Susceptible      Gentamicin Susceptible      Levofloxacin Susceptible      Nitrofurantoin Susceptible      Piperacillin + Tazobactam Susceptible      Tetracycline Susceptible      Trimethoprim + Sulfamethoxazole Susceptible               Linear View                   Gastrointestinal Panel, PCR - Stool, Per Rectum [[id number]]  (Normal) Collected: 06/04/21 2223    Specimen: Stool from Per Rectum Updated: 06/05/21 0017     Campylobacter Not Detected     Plesiomonas shigelloides Not Detected     Salmonella Not Detected     Vibrio Not Detected     Vibrio cholerae Not Detected     Yersinia enterocolitica Not Detected     Enteroaggregative E. coli (EAEC) Not Detected     Enteropathogenic E. coli (EPEC) Not Detected     Enterotoxigenic E. coli (ETEC) lt/st Not Detected     Shiga-like toxin-producing E. coli (STEC) stx1/stx2 Not Detected     Shigella/Enteroinvasive E. coli (EIEC) Not Detected     Cryptosporidium Not Detected     Cyclospora cayetanensis Not Detected     Entamoeba histolytica Not Detected     Giardia lamblia Not Detected     Adenovirus F40/41 Not Detected     Astrovirus Not Detected     Norovirus GI/GII Not Detected     Rotavirus A Not Detected     Sapovirus (I, II, IV or V) Not Detected    Narrative:      If Aeromonas, Staphylococcus aureus or Bacillus cereus are suspected, please order [id number]: Stool Culture, Aeromonas, S aureus, B Cereus.    POC Glucose Once [[id number]]  (Normal) Collected: 06/04/21 2035    Specimen: Blood Updated: 06/04/21 2037     Glucose 120 mg/dL     POC Glucose Once [[id number]]  (Normal) Collected: 06/04/21 1612    Specimen: Blood Updated: 06/04/21 1613     Glucose 96 mg/dL     POC Glucose Once [[id number]]  (Abnormal) Collected: 06/04/21 1118    Specimen: Blood Updated: 06/04/21 1120     Glucose 183 mg/dL     Renal Function  Panel [[id number]]  (Abnormal) Collected: 06/04/21 0928    Specimen: Blood Updated: 06/04/21 1041     Glucose 188 mg/dL      BUN 56 mg/dL      Creatinine 2.81 mg/dL      Sodium 136 mmol/L      Potassium 4.1 mmol/L      Chloride 113 mmol/L      CO2 12.0 mmol/L      Calcium 8.0 mg/dL      Albumin 2.00 g/dL      Phosphorus 3.4 mg/dL      Anion Gap 11.0 mmol/L      BUN/Creatinine Ratio 19.9     eGFR  African Amer 27 mL/min/1.73     Narrative:      GFR Normal >60  Chronic Kidney Disease <60  Kidney Failure <15      Hemoglobin & Hematocrit, Blood [[id number]]  (Abnormal) Collected: 06/04/21 0928    Specimen: Blood Updated: 06/04/21 1019     Hemoglobin 8.6 g/dL      Hematocrit 27.2 %     CBC & Differential [[id number]]  (Abnormal) Collected: 06/04/21 0928    Specimen: Blood Updated: 06/04/21 1019    Narrative:      The following orders were created for panel order CBC & Differential.  Procedure                               Abnormality         Status                     ---------                               -----------         ------                     CBC Auto Differential[[id number]]        Abnormal            Final result                 Please view results for these tests on the individual orders.    CBC Auto Differential [[id number]]  (Abnormal) Collected: 06/04/21 0928    Specimen: Blood Updated: 06/04/21 1019     WBC 7.29 10*3/mm3      RBC 3.71 10*6/mm3      Hemoglobin 8.6 g/dL      Hematocrit 27.2 %      MCV 73.3 fL      MCH 23.2 pg      MCHC 31.6 g/dL      RDW 21.1 %      RDW-SD 53.8 fl      Platelets 161 10*3/mm3      Neutrophil % 79.9 %      Lymphocyte % 14.4 %      Monocyte % 4.1 %      Eosinophil % 0.8 %      Basophil % 0.4 %      Neutrophils, Absolute 5.82 10*3/mm3      Lymphocytes, Absolute 1.05 10*3/mm3      Monocytes, Absolute 0.30 10*3/mm3      Eosinophils, Absolute 0.06 10*3/mm3      Basophils, Absolute 0.03 10*3/mm3     POC Glucose Once [[id number]]  (Abnormal) Collected: 06/04/21 0558    Specimen: Blood  Updated: 06/04/21 0600     Glucose 141 mg/dL     Hemoglobin & Hematocrit, Blood [[id number]]  (Abnormal) Collected: 06/04/21 0006    Specimen: Blood Updated: 06/04/21 0034     Hemoglobin 9.6 g/dL      Hematocrit 30.8 %     POC Glucose Once [[id number]]  (Normal) Collected: 06/03/21 2008    Specimen: Blood Updated: 06/03/21 2011     Glucose 102 mg/dL     Basic Metabolic Panel [[id number]]  (Abnormal) Collected: 06/03/21 1756    Specimen: Blood Updated: 06/03/21 1823     Glucose 98 mg/dL      BUN 63 mg/dL      Creatinine 2.64 mg/dL      Sodium 134 mmol/L      Potassium 4.1 mmol/L      Chloride 114 mmol/L      CO2 11.3 mmol/L      Calcium 8.0 mg/dL      eGFR  African Amer 29 mL/min/1.73      BUN/Creatinine Ratio 23.9     Anion Gap 8.7 mmol/L     Narrative:      GFR Normal >60  Chronic Kidney Disease <60  Kidney Failure <15      Hemoglobin & Hematocrit, Blood [[id number]]  (Abnormal) Collected: 06/03/21 1756    Specimen: Blood Updated: 06/03/21 1809     Hemoglobin 9.4 g/dL      Hematocrit 29.3 %     POC Glucose Once [[id number]]  (Normal) Collected: 06/03/21 1648    Specimen: Blood Updated: 06/03/21 1649     Glucose 116 mg/dL     POC Glucose Once [[id number]]  (Abnormal) Collected: 06/03/21 1131    Specimen: Blood Updated: 06/03/21 1134     Glucose 237 mg/dL     Hemoglobin A1c [[id number]]  (Abnormal) Collected: 06/03/21 0922    Specimen: Blood Updated: 06/03/21 1034     Hemoglobin A1C 7.18 %     Narrative:      Hemoglobin A1C Ranges:    Increased Risk for Diabetes  5.7% to 6.4%  Diabetes                     >= 6.5%  Diabetic Goal                < 7.0%    Hemoglobin & Hematocrit, Blood [[id number]]  (Abnormal) Collected: 06/03/21 0922    Specimen: Blood from Arm, Left Updated: 06/03/21 0955     Hemoglobin 9.3 g/dL      Hematocrit 31.0 %     POC Glucose Once [[id number]]  (Abnormal) Collected: 06/03/21 0609    Specimen: Blood Updated: 06/03/21 0611     Glucose 133 mg/dL     POC Glucose Once [[id number]]  (Abnormal) Collected:  06/03/21 0331    Specimen: Blood Updated: 06/03/21 0332     Glucose 147 mg/dL     COVID PRE-OP / PRE-PROCEDURE SCREENING ORDER (NO ISOLATION) - Swab, Nasopharynx [[id number]]  (Normal) Collected: 06/02/21 2318    Specimen: Swab from Nasopharynx Updated: 06/03/21 0330    Narrative:      The following orders were created for panel order COVID PRE-OP / PRE-PROCEDURE SCREENING ORDER (NO ISOLATION) - Swab, Nasopharynx.  Procedure                               Abnormality         Status                     ---------                               -----------         ------                     COVID-19,APTIMA PANTHER,...[[id number]]  Normal              Final result                 Please view results for these tests on the individual orders.    COVID-19,APTIMA PANTHER,[facility], NP/OP SWAB IN UTM/VTM/[facility]SPORT MEDIA,24 HR TAT - Swab, Nasopharynx [[id number]]  (Normal) Collected: 06/02/21 2318    Specimen: Swab from Nasopharynx Updated: 06/03/21 0330     COVID19 Not Detected    Narrative:      Fact sheet for providers: https://www.fda.gov/media/348477/download     Fact sheet for patients: https://www.fda.gov/media/993130/download    Test performed by RT PCR.    Urinalysis, Microscopic Only - Urine, Catheter [[id number]]  (Abnormal) Collected: 06/02/21 2314    Specimen: Urine, Catheter Updated: 06/03/21 0003     RBC, UA 0-2 /HPF      WBC, UA Too Numerous to Count /HPF      Bacteria, UA 4+ /HPF      Squamous Epithelial Cells, UA 0-2 /HPF      Hyaline Casts, UA 3-6 /LPF      Methodology Automated Microscopy    Urinalysis With Microscopic If Indicated (No Culture) - Urine, Catheter [[id number]]  (Abnormal) Collected: 06/02/21 2314    Specimen: Urine, Catheter Updated: 06/03/21 0003     Color, UA Yellow     Appearance, UA Turbid     pH, UA 6.5     Specific Gravity, UA 1.011     Glucose, UA Negative     Ketones, UA Negative     Bilirubin, UA Negative     Blood, UA Small (1+)     Protein,  mg/dL (2+)     Leuk Esterase,  UA Large (3+)     Nitrite, UA Negative     Urobilinogen, UA 0.2 E.U./dL    POCT Occult Blood Stool [[id number]]  (Abnormal) Collected: 06/02/21 2358    Specimen: Stool from Per Rectum Updated: 06/02/21 2358     Fecal Occult Blood Positive     Lot Number [id number]     Expiration Date 11/30/2021     Positive Control Positive     Negative Control Negative    Lactic Acid, Plasma [[id number]]  (Normal) Collected: 06/02/21 2315    Specimen: Blood Updated: 06/02/21 2358     Lactate 1.5 mmol/L     Comprehensive Metabolic Panel [[id number]]  (Abnormal) Collected: 06/02/21 2238    Specimen: Blood Updated: 06/02/21 2345     Glucose 233 mg/dL      BUN 68 mg/dL      Creatinine 3.25 mg/dL      Sodium 138 mmol/L      Potassium 3.9 mmol/L      Chloride 111 mmol/L      CO2 15.2 mmol/L      Calcium 8.6 mg/dL      Total Protein 6.6 g/dL      Albumin 3.10 g/dL      ALT (SGPT) 7 U/L      AST (SGOT) 7 U/L      Alkaline Phosphatase 181 U/L      Total Bilirubin 0.3 mg/dL      eGFR  African Amer 23 mL/min/1.73      Globulin 3.5 gm/dL      A/G Ratio 0.9 g/dL      BUN/Creatinine Ratio 20.9     Anion Gap 11.8 mmol/L     Narrative:      GFR Normal >60  Chronic Kidney Disease <60  Kidney Failure <15      [place] Draw [[id number]] Collected: 06/02/21 2238    Specimen: Blood Updated: 06/02/21 2345    Narrative:      The following orders were created for panel order [place] Draw.  Procedure                               Abnormality         Status                     ---------                               -----------         ------                     Light Blue Top[[id number]]                                   Final result               Green Top (Gel)[[id number]]                                  Final result               Lavender Top[[id number]]                                     Final result               Gold Top - SST[[id number]]                                   Final result                 Please view results for these tests on the individual orders.     Light Blue Top [[id number]] Collected: 06/02/21 2238    Specimen: Blood Updated: 06/02/21 2345     Extra Tube hold for add-on     Comment: Auto resulted       Green Top (Gel) [[id number]] Collected: 06/02/21 2238    Specimen: Blood Updated: 06/02/21 2345     Extra Tube Hold for add-ons.     Comment: Auto resulted.       Lavender Top [[id number]] Collected: 06/02/21 2238    Specimen: Blood Updated: 06/02/21 2345     Extra Tube hold for add-on     Comment: Auto resulted       Gold Top - SST [[id number]] Collected: 06/02/21 2238    Specimen: Blood Updated: 06/02/21 2345     Extra Tube Hold for add-ons.     Comment: Auto resulted.       CBC & Differential [[id number]]  (Abnormal) Collected: 06/02/21 2238    Specimen: Blood Updated: 06/02/21 2318    Narrative:      The following orders were created for panel order CBC & Differential.  Procedure                               Abnormality         Status                     ---------                               -----------         ------                     CBC Auto Differential[[id number]]        Abnormal            Final result                 Please view results for these tests on the individual orders.    CBC Auto Differential [[id number]]  (Abnormal) Collected: 06/02/21 2238    Specimen: Blood Updated: 06/02/21 2318     WBC 5.95 10*3/mm3      RBC 4.16 10*6/mm3      Hemoglobin 9.7 g/dL      Hematocrit 31.2 %      MCV 75.0 fL      MCH 23.3 pg      MCHC 31.1 g/dL      RDW 21.8 %      RDW-SD 55.4 fl      Platelets 201 10*3/mm3      Neutrophil % 71.4 %      Lymphocyte % 18.7 %      Monocyte % 6.4 %      Eosinophil % 2.7 %      Basophil % 0.5 %      Immature Grans % 0.3 %      Neutrophils, Absolute 4.25 10*3/mm3      Lymphocytes, Absolute 1.11 10*3/mm3      Monocytes, Absolute 0.38 10*3/mm3      Eosinophils, Absolute 0.16 10*3/mm3      Basophils, Absolute 0.03 10*3/mm3      Immature Grans, Absolute 0.02 10*3/mm3      nRBC 0.2 /100 WBC         Data Review:  Results from last 7 days    Lab Units 06/05/21  0800 06/04/21  0928 06/03/21  1756   SODIUM mmol/L 137 136 134*   POTASSIUM mmol/L 4.7 4.1 4.1   CHLORIDE mmol/L 114* 113* 114*   CO2 mmol/L 13.5* 12.0* 11.3*   BUN mg/dL 54* 56* 63*   CREATININE mg/dL 2.54* 2.81* 2.64*   GLUCOSE mg/dL 98 188* 98   CALCIUM mg/dL 8.3* 8.0* 8.0*     Results from last 7 days   Lab Units 06/05/21  0010 06/04/21  0928 06/04/21  0006 06/02/21  2238   WBC 10*3/mm3  --  7.29  --  5.95   HEMOGLOBIN g/dL 9.5* 8.6*  8.6* 9.6* 9.7*   HEMATOCRIT % 30.5* 27.2*  27.2* 30.8* 31.2*   PLATELETS 10*3/mm3  --  161  --  201         Results from last 7 days   Lab Units 06/03/21  0922   HEMOGLOBIN A1C % 7.18*     Lab Results   Lab Value Date/Time    TROPONINT 0.110 (C) 02/04/2021 1551    TROPONINT 0.171 (C) 12/23/2020 0357    TROPONINT 0.141 (C) 12/22/2020 0549    TROPONINT 0.147 (C) 12/21/2020 1450    TROPONINT 0.128 (C) 12/21/2020 1223         Results from last 7 days   Lab Units 06/02/21  2238   ALK PHOS U/L 181*   BILIRUBIN mg/dL 0.3   ALT (SGPT) U/L 7   AST (SGOT) U/L 7         Results from last 7 days   Lab Units 06/03/21  0922   HEMOGLOBIN A1C % 7.18*     Glucose   Date/Time Value Ref Range Status   06/06/2021 1111 133 (H) 70 - 130 mg/dL Final   06/06/2021 0531 125 70 - 130 mg/dL Final   06/05/2021 1953 141 (H) 70 - 130 mg/dL Final   06/05/2021 1650 161 (H) 70 - 130 mg/dL Final   06/05/2021 1131 99 70 - 130 mg/dL Final   06/05/2021 0613 110 70 - 130 mg/dL Final   06/04/2021 2035 120 70 - 130 mg/dL Final   06/04/2021 1612 96 70 - 130 mg/dL Final           Past Medical History:   Diagnosis Date   • Back pain    • CKD (chronic kidney disease)    • DM type 2 (diabetes mellitus, type 2) (CMS/HCC)    • ED (erectile dysfunction)    • Hyperlipidemia    • Hypertension    • SCOTTY (iron deficiency anemia)    • Monoclonal gammopathy    • Osteoarthritis        Assessment:  Active Hospital Problems    Diagnosis  POA   • **Gastrointestinal hemorrhage [K92.2]  Yes   • Right upper lobe  pneumonia [J18.9]  Yes   • CKD (chronic kidney disease) stage 3, GFR 30-59 ml/min (CMS/[facility]) [N18.30]  Yes   • Immobility [Z74.09]  Yes   • SILVIA (acute kidney injury) (CMS/[facility]) [N17.9]  Yes   • Hypertension [I10]  Yes   • DM2 (diabetes mellitus, type 2) (CMS/[facility]) [E11.9]  Yes   • Abnormal CT of the abdomen [R93.5]  Yes   • Acute UTI (urinary tract infection) [N39.0]  Yes      Resolved Hospital Problems   No resolved problems to display.       Plan:  Continue current RX. Follow lab. Antibiotics. As per multiple consults.    Jono Montes MD  6/6/2021  12:46 EDT

## 2021-06-06 NOTE — PROGRESS NOTES
"   LOS: 3 days    Patient Care Team:  Maya Ribeiro APRN as PCP - General (Family Medicine)    Chief Complaint:    Chief Complaint   Patient presents with   • Black or Bloody Stool   • Abdominal Pain     Follow UP SILVIA CKD3  Subjective     Interval History:   I/O 1.5/1.7; no new complaints  Difficult stick for lab per RN and refuses at times, to reattempt this afternoon     Objective     Vital Signs  Temp:  [98.3 °F (36.8 °C)-98.9 °F (37.2 °C)] 98.3 °F (36.8 °C)  Heart Rate:  [77-92] 77  Resp:  [18-20] 18  BP: (127-132)/(77-86) 127/77    Flowsheet Rows      First Filed Value   Admission Height  190.5 cm (75\") Documented at 06/02/2021 2133   Admission Weight  103 kg (227 lb 12.8 oz) Documented at 06/03/2021 0253          I/O this shift:  In: 480 [P.O.:480]  Out: -   I/O last 3 completed shifts:  In: 1850 [P.O.:1200; I.V.:350; Other:300]  Out: 4100 [Urine:4100]    Intake/Output Summary (Last 24 hours) at 6/6/2021 1304  Last data filed at 6/6/2021 0844  Gross per 24 hour   Intake 1220 ml   Output 1750 ml   Net -530 ml       Physical Exam:  Frail AAM in no acute distress, comfortable  Neck supple no JVD  Lungs CTA bilat no rales  CV RRR no m/g  abd soft NT/ND  vasc 1+ BLE hip edema, legs wrapped     Results Review:    Results from last 7 days   Lab Units 06/05/21  0800 06/04/21  0928 06/03/21  1756 06/02/21  2238   SODIUM mmol/L 137 136 134* 138   POTASSIUM mmol/L 4.7 4.1 4.1 3.9   CHLORIDE mmol/L 114* 113* 114* 111*   CO2 mmol/L 13.5* 12.0* 11.3* 15.2*   BUN mg/dL 54* 56* 63* 68*   CREATININE mg/dL 2.54* 2.81* 2.64* 3.25*   CALCIUM mg/dL 8.3* 8.0* 8.0* 8.6   BILIRUBIN mg/dL  --   --   --  0.3   ALK PHOS U/L  --   --   --  181*   ALT (SGPT) U/L  --   --   --  7   AST (SGOT) U/L  --   --   --  7   GLUCOSE mg/dL 98 188* 98 233*       Estimated Creatinine Clearance: 36.7 mL/min (A) (by C-G formula based on SCr of 2.54 mg/dL (H)).    Results from last 7 days   Lab Units 06/05/21  0800 06/04/21  0928   PHOSPHORUS mg/dL " 3.6 3.4             Results from last 7 days   Lab Units 06/05/21  0010 06/04/21  0928 06/04/21  0006 06/03/21  1756 06/03/21  0922 06/02/21  2238   WBC 10*3/mm3  --  7.29  --   --   --  5.95   HEMOGLOBIN g/dL 9.5* 8.6*  8.6* 9.6* 9.4* 9.3* 9.7*   PLATELETS 10*3/mm3  --  161  --   --   --  201               Imaging Results (Last 24 Hours)     ** No results found for the last 24 hours. **        amLODIPine, 2.5 mg, Oral, Q24H  atorvastatin, 20 mg, Oral, Daily  brimonidine, 1 drop, Both Eyes, BID  cefTRIAXone, 1 g, Intravenous, Q24H  dorzolamide, 1 drop, Right Eye, TID  insulin lispro, 0-9 Units, Subcutaneous, 4x Daily With Meals & Nightly  metroNIDAZOLE, 500 mg, Intravenous, Q8H  senna-docusate sodium, 1 tablet, Oral, Nightly  sodium bicarbonate, 1,300 mg, Oral, TID  sodium chloride, 10 mL, Intravenous, Q12H  tamsulosin, 0.4 mg, Oral, Daily           Medication Review:   Current Facility-Administered Medications   Medication Dose Route Frequency Provider Last Rate Last Admin   • acetaminophen (TYLENOL) tablet 650 mg  650 mg Oral Q4H PRN Kaylynn Bennett APRN   650 mg at 06/06/21 0320    Or   • acetaminophen (TYLENOL) 160 MG/5ML solution 650 mg  650 mg Oral Q4H PRN Kaylynn Bennett APRN        Or   • acetaminophen (TYLENOL) suppository 650 mg  650 mg Rectal Q4H PRN Kaylynn Bennett APRN       • amLODIPine (NORVASC) tablet 2.5 mg  2.5 mg Oral Q24H Dane Reeves MD   2.5 mg at 06/06/21 0929   • ammonium lactate (AMLACTIN) cream   Topical Q12H PRN Rashad Ayala MD       • atorvastatin (LIPITOR) tablet 20 mg  20 mg Oral Daily Rashad Ayala MD   20 mg at 06/06/21 0929   • brimonidine (ALPHAGAN P) 0.1 % ophthalmic solution 1 drop  1 drop Both Eyes BID Rashad Ayala MD   1 drop at 06/06/21 0929   • calcium carbonate (TUMS) chewable tablet 500 mg (200 mg elemental)  2 tablet Oral BID PRN Kaylynn Bennett APRN       • cefTRIAXone (ROCEPHIN) IVPB 1 g  1 g Intravenous Q24H Goldy  ANTWAN Chavez 100 mL/hr at 06/06/21 0313 1 g at 06/06/21 0313   • dextrose (D50W) 25 g/ 50mL Intravenous Solution 25 g  25 g Intravenous Q15 Min PRN Kaylynn Bennett APRN       • dextrose (GLUTOSE) oral gel 15 g  15 g Oral Q15 Min PRN Kaylynn Bennett APRN       • dorzolamide (TRUSOPT) 2 % ophthalmic solution 1 drop  1 drop Right Eye TID Rashad Ayala MD   1 drop at 06/06/21 0929   • glucagon (human recombinant) (GLUCAGEN DIAGNOSTIC) injection 1 mg  1 mg Subcutaneous PRN Kaylynn Bennett APRN       • insulin lispro (ADMELOG) injection 0-9 Units  0-9 Units Subcutaneous 4x Daily With Meals & Nightly Kaylynn Bennett APRN   2 Units at 06/05/21 1738   • metroNIDAZOLE (FLAGYL) 500 mg/100mL IVPB  500 mg Intravenous Q8H Tonja Azevedo MD   500 mg at 06/06/21 1219   • nitroglycerin (NITROSTAT) SL tablet 0.4 mg  0.4 mg Sublingual Q5 Min PRN Kaylynn Bennett APRN       • ondansetron (ZOFRAN) tablet 4 mg  4 mg Oral Q6H PRN Kaylynn Bennett APRN        Or   • ondansetron (ZOFRAN) injection 4 mg  4 mg Intravenous Q6H PRN Kaylynn Bennett APRN       • sennosides-docusate (PERICOLACE) 8.6-50 MG per tablet 1 tablet  1 tablet Oral Nightly Chrissy Gordon MD   1 tablet at 06/04/21 2029   • sodium bicarbonate tablet 1,300 mg  1,300 mg Oral TID Dane Reeves MD   1,300 mg at 06/06/21 0929   • sodium chloride 0.9 % flush 10 mL  10 mL Intravenous PRN Alicia Harris APRN       • sodium chloride 0.9 % flush 10 mL  10 mL Intravenous Q12H Kaylynn Bennett APRN   10 mL at 06/06/21 0930   • sodium chloride 0.9 % flush 10 mL  10 mL Intravenous PRN Kaylynn Bennett APRN       • tamsulosin (FLOMAX) 24 hr capsule 0.4 mg  0.4 mg Oral Daily Dane Reeves MD   0.4 mg at 06/06/21 0905       Assessment/Plan   Non olig SILVIA - initial picture c/w prerenal azotemia from vol depletion/diarrhea, now with element of obstructive uropathy from retention; Cr down to 2.5  (yesterday); peak 3.2.  UOP.  Periph edema multifactorial: very low albumin, norvasc (reduced), gabapentin (on hold), sodium bicarb.  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  AFIB - rate controlled w/o AV blockers, EP following; eliquis on hold  UTI - rocephin per ID   Urinary retention - lopez placed 6/4; distended bladder on CT; started flomax; immobile      Plan  - follow up labs, d/w RN  - d/c serial H&H if ok with GI (difficult stick and hgb been stable)  - consider urology eval before discharge as may need to keep lopez in place and VT later  - cont nahco3 1300 TID pending lab review      Gastrointestinal hemorrhage    Acute UTI (urinary tract infection)    Hypertension    DM2 (diabetes mellitus, type 2) (CMS/[facility])    Abnormal CT of the abdomen    SILVIA (acute kidney injury) (CMS/[facility])    Immobility    Right upper lobe pneumonia    CKD (chronic kidney disease) stage 3, GFR 30-59 ml/min (CMS/[facility])              Dane Reeves MD  06/06/21  13:04 EDT

## 2021-06-06 NOTE — PLAN OF CARE
Goal Outcome Evaluation:  Plan of Care Reviewed With: patient  Progress: no change  Outcome Summary: Patient resting in room.  Makes needs known. Continues with atb therapy.  Turned and repositioned by staff.  F/C draining clear yellow urine at bedside.  No c/o pain. No s/s of distress noted.

## 2021-06-06 NOTE — PLAN OF CARE
Goal Outcome Evaluation:  Plan of Care Reviewed With: patient  Progress: no change  Outcome Summary: A/Ox4, made neds knwn, tunred as needed, repositioned frequently, lopez care done, IV anitbx, on 3L NC, no signs of bleeding, did not sleep too much today, tolerating diet, will monitor

## 2021-06-06 NOTE — NURSING NOTE
Told in report that patient refused 12 am labs.   on unit to draw labs around 0800 and patient refusing.  Patient does not want to have labs drawn from hand.   here again around 10am to draw labs, patient stuck and no blood detected.    will come back around 2p to draw labs.

## 2021-06-07 PROBLEM — R33.9 URINE RETENTION: Status: ACTIVE | Noted: 2021-01-01

## 2021-06-07 NOTE — PROGRESS NOTES
"  Infectious Diseases Progress Note    Tonja Azevedo MD     [facility]  [place]: 3 days  Patient Identification:  Name: Gregorio Alejandro  Age: 65 y.o.  Sex: male  :  1955  MRN: [id number]         Primary Care Physician: Maya Ribeiro APRN            Subjective: Feeling better and wants to know when can he go home..  Interval History: See consultation note.    Objective:    Scheduled Meds:amLODIPine, 2.5 mg, Oral, Q24H  atorvastatin, 20 mg, Oral, Daily  brimonidine, 1 drop, Both Eyes, BID  dorzolamide, 1 drop, Right Eye, TID  insulin lispro, 0-9 Units, Subcutaneous, 4x Daily With Meals & Nightly  metroNIDAZOLE, 500 mg, Intravenous, Q8H  senna-docusate sodium, 1 tablet, Oral, Nightly  sodium bicarbonate, 1,300 mg, Oral, TID  sodium chloride, 10 mL, Intravenous, Q12H  tamsulosin, 0.4 mg, Oral, Daily      Continuous Infusions:     Vital signs in last 24 hours:  Temp:  [97.3 °F (36.3 °C)-98.3 °F (36.8 °C)] 97.5 °F (36.4 °C)  Heart Rate:  [67-89] 67  Resp:  [16-20] 16  BP: (113-164)/(75-94) 149/94    Intake/Output:    Intake/Output Summary (Last 24 hours) at 2021 0735  Last data filed at 2021 2321  Gross per 24 hour   Intake 960 ml   Output 1450 ml   Net -490 ml       Exam:  /94 (BP Location: Left arm, Patient Position: Lying)   Pulse 67   Temp 97.5 °F (36.4 °C) (Oral)   Resp 16   Ht 190.5 cm (75\")   Wt 103 kg (227 lb 12.8 oz)   SpO2 94%   BMI 28.47 kg/m²   Patient is examined using the personal protective equipment as per guidelines from infection control for this particular patient as enacted.  Hand washing was performed before and after patient interaction.  General Appearance:    More interactive and feeling better.   Head:    Normocephalic, without obvious abnormality, atraumatic   Eyes:    PERRL, conjunctivae/corneas clear, EOM's intact, both eyes   Ears:    Normal external ear canals, both ears   Nose:   Nares normal, septum midline, mucosa normal, no drainage    or sinus " tenderness   Throat:   Lips, tongue, gums normal; oral mucosa pink and moist   Neck:   Supple, symmetrical, trachea midline, no adenopathy;     thyroid:  no enlargement/tenderness/nodules; no carotid    bruit or JVD   Back:     Symmetric, no curvature, ROM normal, no CVA tenderness   Lungs:     Clear to auscultation bilaterally, respirations unlabored   Chest Wall:    No tenderness or deformity    Heart:   Irregularly irregular   Abdomen:    Soft mild generalized tenderness,distended   Extremities:  Contractures and diffuse edema of the lower extremities noted lower extremities are dressed.   Pulses:   Pulses palpable in all extremities; symmetric all extremities   Skin:  Superficial wounds noted   Neurologic:   Not as lethargic and somnolent as well last night..            Data Review:    I reviewed the patient's new clinical results.  Results from last 7 days   Lab Units 06/06/21  2359 06/06/21  1516 06/05/21  0010 06/04/21  0928 06/04/21  0006 06/03/21  1756 06/03/21  0922 06/02/21  2238   WBC 10*3/mm3  --  7.90  --  7.29  --   --   --  5.95   HEMOGLOBIN g/dL 8.2* 8.4* 9.5* 8.6*  8.6* 9.6* 9.4* 9.3* 9.7*   PLATELETS 10*3/mm3  --  170  --  161  --   --   --  201     Results from last 7 days   Lab Units 06/06/21  1516 06/05/21  0800 06/04/21  0928 06/03/21  1756 06/02/21  2238   SODIUM mmol/L 138 137 136 134* 138   POTASSIUM mmol/L 3.7 4.7 4.1 4.1 3.9   CHLORIDE mmol/L 112* 114* 113* 114* 111*   CO2 mmol/L 16.2* 13.5* 12.0* 11.3* 15.2*   BUN mg/dL 48* 54* 56* 63* 68*   CREATININE mg/dL 2.37* 2.54* 2.81* 2.64* 3.25*   CALCIUM mg/dL 8.2* 8.3* 8.0* 8.0* 8.6   GLUCOSE mg/dL 152* 98 188* 98 233*     Microbiology Results (last 10 days)     Procedure Component Value - Date/Time    Fungus Smear - Brushing, Esophagus [[id number]] Collected: 06/05/21 4476    Lab Status: Final result Specimen: Brushing from Esophagus Updated: 06/05/21 1136     Fungal Stain No yeast or hyphal elements seen    Gastrointestinal Panel, PCR -  Stool, Per Rectum [[id number]]  (Normal) Collected: 06/04/21 2223    Lab Status: Final result Specimen: Stool from Per Rectum Updated: 06/05/21 0017     Campylobacter Not Detected     Plesiomonas shigelloides Not Detected     Salmonella Not Detected     Vibrio Not Detected     Vibrio cholerae Not Detected     Yersinia enterocolitica Not Detected     Enteroaggregative E. coli (EAEC) Not Detected     Enteropathogenic E. coli (EPEC) Not Detected     Enterotoxigenic E. coli (ETEC) lt/st Not Detected     Shiga-like toxin-producing E. coli (STEC) stx1/stx2 Not Detected     Shigella/Enteroinvasive E. coli (EIEC) Not Detected     Cryptosporidium Not Detected     Cyclospora cayetanensis Not Detected     Entamoeba histolytica Not Detected     Giardia lamblia Not Detected     Adenovirus F40/41 Not Detected     Astrovirus Not Detected     Norovirus GI/GII Not Detected     Rotavirus A Not Detected     Sapovirus (I, II, IV or V) Not Detected    Narrative:      If Aeromonas, Staphylococcus aureus or Bacillus cereus are suspected, please order [id number]: Stool Culture, Aeromonas, S aureus, B Cereus.    COVID PRE-OP / PRE-PROCEDURE SCREENING ORDER (NO ISOLATION) - Swab, Nasopharynx [[id number]]  (Normal) Collected: 06/02/21 2318    Lab Status: Final result Specimen: Swab from Nasopharynx Updated: 06/03/21 0330    Narrative:      The following orders were created for panel order COVID PRE-OP / PRE-PROCEDURE SCREENING ORDER (NO ISOLATION) - Swab, Nasopharynx.  Procedure                               Abnormality         Status                     ---------                               -----------         ------                     COVID-19,APTIMA PANTHER,...[[id number]]  Normal              Final result                 Please view results for these tests on the individual orders.    COVID-19,[facility], NP/OP SWAB IN UTM/VTM/[facility]ALINE TRANSPORT MEDIA,24 HR TAT - Swab, Nasopharynx [[id number]]  (Normal) Collected: 06/02/21 3811     Lab Status: Final result Specimen: Swab from Nasopharynx Updated: 06/03/21 0330     COVID19 Not Detected    Narrative:      Fact sheet for providers: https://www.fda.gov/media/937430/download     Fact sheet for patients: https://www.fda.gov/media/687337/download    Test performed by RT PCR.    Urine Culture - Urine, Urine, Catheter [[id number]]  (Abnormal)  (Susceptibility) Collected: 06/02/21 2314    Lab Status: Final result Specimen: Urine, Catheter Updated: 06/05/21 0029     Urine Culture >100,000 CFU/mL Klebsiella pneumoniae ssp pneumoniae    Susceptibility      Klebsiella pneumoniae ssp pneumoniae      ISAC      Ampicillin Resistant      Ampicillin + Sulbactam Susceptible      Cefazolin Susceptible      Cefepime Susceptible      Ceftazidime Susceptible      Ceftriaxone Susceptible      Gentamicin Susceptible      Levofloxacin Susceptible      Nitrofurantoin Susceptible      Piperacillin + Tazobactam Susceptible      Tetracycline Susceptible      Trimethoprim + Sulfamethoxazole Susceptible               Linear View                           Assessment:    Gastrointestinal hemorrhage    Acute UTI (urinary tract infection)    Hypertension    DM2 (diabetes mellitus, type 2) (CMS/[facility])    Abnormal CT of the abdomen    SILVIA (acute kidney injury) (CMS/[facility])    Immobility    Right upper lobe pneumonia    CKD (chronic kidney disease) stage 3, GFR 30-59 ml/min (CMS/[facility])  1-GI bleed unclear whether it is upper GI bleed or due to colitis involving the rectosigmoid junction.  Rule out infectious causes such as E. coli Shigella infection versus C. difficile infection.  2-probable UTI with urinary retention likely chronic recurrent cystitis  3-diabetes mellitus with neuropathy and possible gastroparesis  4-immobilization syndrome  5-vascular dementia  6-other diagnosis per primary team.     Recommendations/Discussions:  · Continue IV Flagyl and IV Rocephin to address UTI due to Klebsiella pneumonia and intra-abdominal process  with focal colitis involving the rectosigmoid junction-5 to 7 days of treatment should be plenty.  · Would recommend short course of antibiotic treatment for urinary tract infection and low threshold to check for C. difficile infection and search for enteric pathogens causing gastrointestinal infection.    Tonja Azevedo MD  6/7/2021  07:35 EDT    Much of this encounter note is an electronic transcription/translation of spoken language to printed text. The electronic translation of spoken language may permit erroneous, or at times, nonsensical words or phrases to be inadvertently transcribed; Although I have reviewed the note for such errors, some may still exist

## 2021-06-07 NOTE — CONSULTS
FIRST UROLOGY CONSULT      Patient Identification:  NAME:  Gregorio Alejandro  Age:  65 y.o.   Sex:  male   :  1955   MRN:  [id number]       Chief complaint: Urinary retention    History of present illness:  This is a 65 year old man with a history of monoclonal gammopathy and chronic immobility who was admitted for black stools. During his admission, he was noted to have a UCx significant for Klebsiella, and subsequently his PVR was found to be 609 and 304. An indwelling lopez was placed. Of note, he had a previous episode of urinary retention in 2016. CT demonstrates a distended bladder with a small amount of intravesical gas. His prostate did not appear markedly enlarged, though a hip prosthesis made interpretation challenging. He denies pelvic or flank pain, dysuria, GH, or prior  surgery. He takes tamsulosin at his LTC facility.      Past medical history:  Past Medical History:   Diagnosis Date   • Back pain    • CKD (chronic kidney disease)    • DM type 2 (diabetes mellitus, type 2) (CMS/[facility])    • ED (erectile dysfunction)    • Hyperlipidemia    • Hypertension    • SCOTTY (iron deficiency anemia)    • Monoclonal gammopathy    • Osteoarthritis        Past surgical history:  Past Surgical History:   Procedure Laterality Date   • ABDOMINAL SURGERY     • EYE SURGERY     • JOINT REPLACEMENT     • TONSILLECTOMY         Allergies:  Patient has no known allergies.    Home medications:  Medications Prior to Admission   Medication Sig Dispense Refill Last Dose   • acetaminophen (TYLENOL) 325 MG tablet Take 325 mg by mouth Every 6 (Six) Hours As Needed for Mild Pain .      • amLODIPine (NORVASC) 5 MG tablet Take 1 tablet by mouth Daily. (Patient taking differently: Take 5 mg by mouth Daily.) 30 tablet 0 2021 at Unknown time   • aspirin 81 MG EC tablet Take 81 mg by mouth Daily.      • atorvastatin (LIPITOR) 20 MG tablet Take 20 mg by mouth Daily.   2021 at Unknown time   • cyclobenzaprine (FLEXERIL) 10  MG tablet Take 10 mg by mouth Every 8 (Eight) Hours As Needed for Muscle Spasms.      • dorzolamide (TRUSOPT) 2 % ophthalmic solution Administer 1 drop to the right eye 2 (two) times a day.   6/2/2021 at Unknown time   • gabapentin (NEURONTIN) 300 MG capsule Take 300 mg by mouth 3 (Three) Times a Day. At 0800, 1400, 2000      • hydrALAZINE (APRESOLINE) 50 MG tablet Take 50 mg by mouth 2 (two) times a day.      • HYDROcodone-acetaminophen (NORCO)  MG per tablet Take 1 tablet by mouth Every 4 (Four) Hours.      • loperamide (IMODIUM) 2 MG capsule Take 2 mg by mouth As Needed for Diarrhea.      • losartan (COZAAR) 25 MG tablet Take 25 mg by mouth 2 (Two) Times a Day.      • Netarsudil-Latanoprost (Rocklatan) 0.02-0.005 % solution Administer 1 drop to both eyes Every Night.      • olopatadine (PATANOL) 0.1 % ophthalmic solution Administer 1 drop to both eyes 2 (Two) Times a Day.      • potassium chloride 10 MEQ CR tablet Take 20 mEq by mouth Daily.      • tamsulosin (FLOMAX) 0.4 MG capsule 24 hr capsule Take 1 capsule by mouth Daily.           Hospital medications:  amLODIPine, 2.5 mg, Oral, Q24H  atorvastatin, 20 mg, Oral, Daily  brimonidine, 1 drop, Both Eyes, BID  cefTRIAXone, 1 g, Intravenous, Q24H  dorzolamide, 1 drop, Right Eye, TID  famotidine, 20 mg, Oral, BID AC  insulin lispro, 0-9 Units, Subcutaneous, 4x Daily With Meals & Nightly  metroNIDAZOLE, 500 mg, Intravenous, Q8H  senna-docusate sodium, 1 tablet, Oral, Nightly  sodium bicarbonate, 1,300 mg, Oral, TID  sodium chloride, 10 mL, Intravenous, Q12H  tamsulosin, 0.4 mg, Oral, Daily         •  acetaminophen **OR** acetaminophen **OR** acetaminophen  •  ammonium lactate  •  calcium carbonate  •  dextrose  •  dextrose  •  glucagon (human recombinant)  •  nitroglycerin  •  ondansetron **OR** ondansetron  •  [COMPLETED] Insert peripheral IV **AND** sodium chloride  •  sodium chloride    Family history:  Family History   Family history unknown: Yes        Social history:  Social History     Tobacco Use   • Smoking status: Never Smoker   • Smokeless tobacco: Never Used   Vaping Use   • Vaping Use: Never used   Substance Use Topics   • Alcohol use: Yes     Comment: 1 pint   • Drug use: Never       Review of systems:      Positive for:  As per HPI  Negative for:  As per HPI    Objective:  TMax 24 hours:   Temp (24hrs), Av.7 °F (36.5 °C), Min:97.3 °F (36.3 °C), Max:98.1 °F (36.7 °C)      Vitals Ranges:   Temp:  [97.3 °F (36.3 °C)-98.1 °F (36.7 °C)] 97.5 °F (36.4 °C)  Heart Rate:  [67-89] 73  Resp:  [16-20] 16  BP: (113-164)/(75-94) 149/94    Intake/Output Last 3 shifts:  I/O last 3 completed shifts:  In: 1360 [P.O.:1360]  Out: 2450 [Urine:2450]     Physical Exam:    General Appearance:    Alert, cooperative, NAD   HEENT:    No trauma, pupils reactive, hearing intact   Back:     No CVA tenderness   Lungs:     Respirations unlabored, no wheezing    Heart:    RRR, intact peripheral pulses   Abdomen:     Soft, NDNT, no masses, no guarding. Large midline incision well healed   :    Testes descended bilaterally, no nodules.  Penis normal.  No scrotal or penile rashes noted. Wright intact, urine yellow and clear   Extremities:   No edema           Neuro/Psych:   Orientation intact, mood/affect pleasant, no focal findings       Results review:   I reviewed the patient's new clinical results.    Data review:  Lab Results (last 24 hours)     Procedure Component Value Units Date/Time    Renal Function Panel [[id number]]  (Abnormal) Collected: 21 1106    Specimen: Blood from Arm, Left Updated: 21 1152     Glucose 161 mg/dL      BUN 40 mg/dL      Creatinine 2.60 mg/dL      Sodium 140 mmol/L      Potassium 3.3 mmol/L      Chloride 114 mmol/L      CO2 16.0 mmol/L      Calcium 8.0 mg/dL      Albumin 1.90 g/dL      Phosphorus 2.9 mg/dL      Anion Gap 10.0 mmol/L      BUN/Creatinine Ratio 15.4     eGFR  African Amer 30 mL/min/1.73     Narrative:      GFR Normal  >60  Chronic Kidney Disease <60  Kidney Failure <15      POC Glucose Once [[id number]]  (Abnormal) Collected: 06/07/21 1132    Specimen: Blood Updated: 06/07/21 1134     Glucose 169 mg/dL     Manual Differential [[id number]] Collected: 06/07/21 1106    Specimen: Blood Updated: 06/07/21 1123    CBC & Differential [[id number]] Collected: 06/07/21 1106    Specimen: Blood Updated: 06/07/21 1112    Narrative:      The following orders were created for panel order CBC & Differential.  Procedure                               Abnormality         Status                     ---------                               -----------         ------                     CBC Auto Differential[[id number]]                            In process                   Please view results for these tests on the individual orders.    CBC Auto Differential [[id number]] Collected: 06/07/21 1106    Specimen: Blood Updated: 06/07/21 1112    Tissue Pathology Exam [[id number]] Collected: 06/05/21 0932    Specimen: Tissue from Small Intestine; Tissue from Gastric, Antrum; Tissue from Gastric, Fundus Updated: 06/07/21 0756    POC Glucose Once [[id number]]  (Abnormal) Collected: 06/07/21 0539    Specimen: Blood Updated: 06/07/21 0540     Glucose 133 mg/dL     Hemoglobin & Hematocrit, Blood [[id number]]  (Abnormal) Collected: 06/06/21 2359    Specimen: Blood Updated: 06/07/21 0009     Hemoglobin 8.2 g/dL      Hematocrit 26.0 %     POC Glucose Once [[id number]]  (Abnormal) Collected: 06/06/21 1955    Specimen: Blood Updated: 06/06/21 2006     Glucose 141 mg/dL     Manual Differential [[id number]]  (Abnormal) Collected: 06/06/21 1516    Specimen: Blood Updated: 06/06/21 1700     Neutrophil % 79.0 %      Lymphocyte % 15.0 %      Monocyte % 2.0 %      Eosinophil % 3.0 %      Basophil % 1.0 %      Neutrophils Absolute 6.24 10*3/mm3      Lymphocytes Absolute 1.19 10*3/mm3      Monocytes Absolute 0.16 10*3/mm3      Eosinophils Absolute 0.24 10*3/mm3      Basophils Absolute  0.08 10*3/mm3      Microcytes Slight/1+     WBC Morphology Normal     Platelet Morphology Normal    Renal Function Panel [[id number]]  (Abnormal) Collected: 06/06/21 1516    Specimen: Blood Updated: 06/06/21 1649     Glucose 152 mg/dL      BUN 48 mg/dL      Creatinine 2.37 mg/dL      Sodium 138 mmol/L      Potassium 3.7 mmol/L      Chloride 112 mmol/L      CO2 16.2 mmol/L      Calcium 8.2 mg/dL      Albumin 2.10 g/dL      Phosphorus 3.1 mg/dL      Anion Gap 9.8 mmol/L      BUN/Creatinine Ratio 20.3     eGFR  African Amer 33 mL/min/1.73     Narrative:      GFR Normal >60  Chronic Kidney Disease <60  Kidney Failure <15      CBC & Differential [[id number]]  (Abnormal) Collected: 06/06/21 1516    Specimen: Blood Updated: 06/06/21 1638    Narrative:      The following orders were created for panel order CBC & Differential.  Procedure                               Abnormality         Status                     ---------                               -----------         ------                     CBC Auto Differential[[id number]]        Abnormal            Final result                 Please view results for these tests on the individual orders.    CBC Auto Differential [[id number]]  (Abnormal) Collected: 06/06/21 1516    Specimen: Blood Updated: 06/06/21 1638     WBC 7.90 10*3/mm3      RBC 3.71 10*6/mm3      Hemoglobin 8.4 g/dL      Hematocrit 25.9 %      MCV 69.8 fL      MCH 22.6 pg      MCHC 32.4 g/dL      RDW 20.7 %      RDW-SD 50.6 fl      Platelets 170 10*3/mm3            Imaging:  Imaging Results (Last 24 Hours)     ** No results found for the last 24 hours. **             Assessment:       Gastrointestinal hemorrhage    Acute UTI (urinary tract infection)    Hypertension    DM2 (diabetes mellitus, type 2) (CMS/[facility])    Abnormal CT of the abdomen    SILVIA (acute kidney injury) (CMS/[facility])    Immobility    Right upper lobe pneumonia    CKD (chronic kidney disease) stage 3, GFR 30-59 ml/min (CMS/[facility])    Urine  retention        Plan:     - Likely chronic urinary retention  - Continue indwelling catheter  - antibiotics per ID  - continue tamsulosis  - Will arrange follow up in 2-3 weeks to discuss voiding trial. My office will call to arrange the appointment.  - Will sign off. Please call with questions or concerns.    Pal Don Jr., MD  06/07/21  12:09 EDT

## 2021-06-07 NOTE — PROGRESS NOTES
"DAILY PROGRESS NOTE  [facility]    Patient Identification:  Name: Gregorio Alejandro  Age: 65 y.o.  Sex: male  :  1955  MRN: [id number]         Primary Care Physician: Maya Ribeiro APRN    Subjective:  Interval History:No new complaints.    Objective:    Scheduled Meds:amLODIPine, 2.5 mg, Oral, Q24H  atorvastatin, 20 mg, Oral, Daily  brimonidine, 1 drop, Both Eyes, BID  cefTRIAXone, 1 g, Intravenous, Q24H  dorzolamide, 1 drop, Right Eye, TID  famotidine, 20 mg, Oral, BID AC  insulin lispro, 0-9 Units, Subcutaneous, 4x Daily With Meals & Nightly  metroNIDAZOLE, 500 mg, Intravenous, Q8H  senna-docusate sodium, 1 tablet, Oral, Nightly  sodium bicarbonate, 1,300 mg, Oral, TID  sodium chloride, 10 mL, Intravenous, Q12H  tamsulosin, 0.4 mg, Oral, Daily      Continuous Infusions:     Vital signs in last 24 hours:  Temp:  [97.3 °F (36.3 °C)-98.1 °F (36.7 °C)] 97.5 °F (36.4 °C)  Heart Rate:  [67-89] 73  Resp:  [16-20] 16  BP: (113-164)/(75-94) 149/94    Intake/Output:    Intake/Output Summary (Last 24 hours) at 2021 1103  Last data filed at 2021 0900  Gross per 24 hour   Intake 480 ml   Output 2150 ml   Net -1670 ml       Exam:  /94   Pulse 73   Temp 97.5 °F (36.4 °C) (Oral)   Resp 16   Ht 190.5 cm (75\")   Wt 103 kg (227 lb 12.8 oz)   SpO2 94%   BMI 28.47 kg/m²     General Appearance:    Alert, cooperative, no distress   Head:    Normocephalic, without obvious abnormality, atraumatic   Eyes:       Throat:   Lips, tongue, gums normal   Neck:   Supple, symmetrical, trachea midline, no JVD   Lungs:     Clear to auscultation bilaterally, respirations unlabored   Chest Wall:    No tenderness or deformity    Heart:    Regular rate and rhythm, S1 and S2 normal, no murmur,no  Rub or gallop   Abdomen:     Soft, nontender, bowel sounds active, no masses, no organomegaly    Extremities:   Extremities normal, atraumatic, no cyanosis or edema   Pulses:      Skin:   Skin is warm and dry,  no " "rashes or palpable lesions   Neurologic:   no focal deficits noted      Lab Results (last 72 hours)     Procedure Component Value Units Date/Time    Fungus Smear - Brushing, Esophagus [[id number]] Collected: 06/05/21 0933    Specimen: Brushing from Esophagus Updated: 06/05/21 1136     Fungal Stain No yeast or hyphal elements seen    POC Glucose Once [[id number]]  (Normal) Collected: 06/05/21 1131    Specimen: Blood Updated: 06/05/21 1133     Glucose 99 mg/dL     Procalcitonin [[id number]]  (Abnormal) Collected: 06/05/21 0800    Specimen: Blood Updated: 06/05/21 1122     Procalcitonin 0.66 ng/mL     Narrative:      As a Marker for Sepsis (Non-Neonates):     1. <0.5 ng/mL represents a low risk of severe sepsis and/or septic shock.  2. >2 ng/mL represents a high risk of severe sepsis and/or septic shock.    As a Marker for Lower Respiratory Tract Infections that require antibiotic therapy:  PCT on Admission     Antibiotic Therapy             6-12 Hrs later  >0.5                          Strongly Recommended            >0.25 - <0.5             Recommended  0.1 - 0.25                  Discouraged                       Remeasure/reassess PCT  <0.1                         Strongly Discouraged         Remeasure/reassess PCT      As 28 day mortality risk marker: \"Change in Procalcitonin Result\" (>80% or <=80%) if Day 0 (or Day 1) and Day 4 values are available. Refer to http://www.[employer]-pct-calculator.com/    Change in PCT <=80 %   A decrease of PCT levels below or equal to 80% defines a positive change in PCT test result representing a higher risk for 28-day all-cause mortality of patients diagnosed with severe sepsis or septic shock.    Change in PCT >80 %   A decrease of PCT levels of more than 80% defines a negative change in PCT result representing a lower risk for 28-day all-cause mortality of patients diagnosed with severe sepsis or septic shock.              Results may be falsely decreased if patient taking Biotin.  "    CBC & Differential [[id number]] Updated: 06/05/21 0943    Specimen: Blood     Narrative:      The following orders were created for panel order CBC & Differential.  Procedure                               Abnormality         Status                     ---------                               -----------         ------                     CBC Auto Differential[[id number]]                                                         Please view results for these tests on the individual orders.    CBC Auto Differential [[id number]] Updated: 06/05/21 0943    Specimen: Blood     Fungus Culture - Brushing, Esophagus [[id number]] Collected: 06/05/21 0933    Specimen: Brushing from Esophagus Updated: 06/05/21 0941    Renal Function Panel [[id number]]  (Abnormal) Collected: 06/05/21 0800    Specimen: Blood Updated: 06/05/21 0908     Glucose 98 mg/dL      BUN 54 mg/dL      Creatinine 2.54 mg/dL      Sodium 137 mmol/L      Potassium 4.7 mmol/L      Comment: Slight hemolysis detected by analyzer. Results may be affected.        Chloride 114 mmol/L      CO2 13.5 mmol/L      Calcium 8.3 mg/dL      Albumin 2.20 g/dL      Phosphorus 3.6 mg/dL      Anion Gap 9.5 mmol/L      BUN/Creatinine Ratio 21.3     eGFR  African Amer 31 mL/min/1.73     Narrative:      GFR Normal >60  Chronic Kidney Disease <60  Kidney Failure <15      POC Glucose Once [[id number]]  (Normal) Collected: 06/05/21 0613    Specimen: Blood Updated: 06/05/21 0614     Glucose 110 mg/dL     Hemoglobin & Hematocrit, Blood [[id number]]  (Abnormal) Collected: 06/05/21 0010    Specimen: Blood Updated: 06/05/21 0056     Hemoglobin 9.5 g/dL      Hematocrit 30.5 %     Urine Culture - Urine, Urine, Catheter [[id number]]  (Abnormal)  (Susceptibility) Collected: 06/02/21 2314    Specimen: Urine, Catheter Updated: 06/05/21 0029     Urine Culture >100,000 CFU/mL Klebsiella pneumoniae ssp pneumoniae    Susceptibility      Klebsiella pneumoniae ssp pneumoniae      ISAC      Ampicillin Resistant       Ampicillin + Sulbactam Susceptible      Cefazolin Susceptible      Cefepime Susceptible      Ceftazidime Susceptible      Ceftriaxone Susceptible      Gentamicin Susceptible      Levofloxacin Susceptible      Nitrofurantoin Susceptible      Piperacillin + Tazobactam Susceptible      Tetracycline Susceptible      Trimethoprim + Sulfamethoxazole Susceptible               Linear View                   Gastrointestinal Panel, PCR - Stool, Per Rectum [[id number]]  (Normal) Collected: 06/04/21 2223    Specimen: Stool from Per Rectum Updated: 06/05/21 0017     Campylobacter Not Detected     Plesiomonas shigelloides Not Detected     Salmonella Not Detected     Vibrio Not Detected     Vibrio cholerae Not Detected     Yersinia enterocolitica Not Detected     Enteroaggregative E. coli (EAEC) Not Detected     Enteropathogenic E. coli (EPEC) Not Detected     Enterotoxigenic E. coli (ETEC) lt/st Not Detected     Shiga-like toxin-producing E. coli (STEC) stx1/stx2 Not Detected     Shigella/Enteroinvasive E. coli (EIEC) Not Detected     Cryptosporidium Not Detected     Cyclospora cayetanensis Not Detected     Entamoeba histolytica Not Detected     Giardia lamblia Not Detected     Adenovirus F40/41 Not Detected     Astrovirus Not Detected     Norovirus GI/GII Not Detected     Rotavirus A Not Detected     Sapovirus (I, II, IV or V) Not Detected    Narrative:      If Aeromonas, Staphylococcus aureus or Bacillus cereus are suspected, please order [id number]: Stool Culture, Aeromonas, S aureus, B Cereus.    POC Glucose Once [[id number]]  (Normal) Collected: 06/04/21 2035    Specimen: Blood Updated: 06/04/21 2037     Glucose 120 mg/dL     POC Glucose Once [[id number]]  (Normal) Collected: 06/04/21 1612    Specimen: Blood Updated: 06/04/21 1613     Glucose 96 mg/dL     POC Glucose Once [[id number]]  (Abnormal) Collected: 06/04/21 1118    Specimen: Blood Updated: 06/04/21 1120     Glucose 183 mg/dL     Renal Function Panel [[id number]]   (Abnormal) Collected: 06/04/21 0928    Specimen: Blood Updated: 06/04/21 1041     Glucose 188 mg/dL      BUN 56 mg/dL      Creatinine 2.81 mg/dL      Sodium 136 mmol/L      Potassium 4.1 mmol/L      Chloride 113 mmol/L      CO2 12.0 mmol/L      Calcium 8.0 mg/dL      Albumin 2.00 g/dL      Phosphorus 3.4 mg/dL      Anion Gap 11.0 mmol/L      BUN/Creatinine Ratio 19.9     eGFR  African Amer 27 mL/min/1.73     Narrative:      GFR Normal >60  Chronic Kidney Disease <60  Kidney Failure <15      Hemoglobin & Hematocrit, Blood [[id number]]  (Abnormal) Collected: 06/04/21 0928    Specimen: Blood Updated: 06/04/21 1019     Hemoglobin 8.6 g/dL      Hematocrit 27.2 %     CBC & Differential [[id number]]  (Abnormal) Collected: 06/04/21 0928    Specimen: Blood Updated: 06/04/21 1019    Narrative:      The following orders were created for panel order CBC & Differential.  Procedure                               Abnormality         Status                     ---------                               -----------         ------                     CBC Auto Differential[[id number]]        Abnormal            Final result                 Please view results for these tests on the individual orders.    CBC Auto Differential [[id number]]  (Abnormal) Collected: 06/04/21 0928    Specimen: Blood Updated: 06/04/21 1019     WBC 7.29 10*3/mm3      RBC 3.71 10*6/mm3      Hemoglobin 8.6 g/dL      Hematocrit 27.2 %      MCV 73.3 fL      MCH 23.2 pg      MCHC 31.6 g/dL      RDW 21.1 %      RDW-SD 53.8 fl      Platelets 161 10*3/mm3      Neutrophil % 79.9 %      Lymphocyte % 14.4 %      Monocyte % 4.1 %      Eosinophil % 0.8 %      Basophil % 0.4 %      Neutrophils, Absolute 5.82 10*3/mm3      Lymphocytes, Absolute 1.05 10*3/mm3      Monocytes, Absolute 0.30 10*3/mm3      Eosinophils, Absolute 0.06 10*3/mm3      Basophils, Absolute 0.03 10*3/mm3     POC Glucose Once [[id number]]  (Abnormal) Collected: 06/04/21 0558    Specimen: Blood Updated: 06/04/21  0600     Glucose 141 mg/dL     Hemoglobin & Hematocrit, Blood [[id number]]  (Abnormal) Collected: 06/04/21 0006    Specimen: Blood Updated: 06/04/21 0034     Hemoglobin 9.6 g/dL      Hematocrit 30.8 %     POC Glucose Once [[id number]]  (Normal) Collected: 06/03/21 2008    Specimen: Blood Updated: 06/03/21 2011     Glucose 102 mg/dL     Basic Metabolic Panel [[id number]]  (Abnormal) Collected: 06/03/21 1756    Specimen: Blood Updated: 06/03/21 1823     Glucose 98 mg/dL      BUN 63 mg/dL      Creatinine 2.64 mg/dL      Sodium 134 mmol/L      Potassium 4.1 mmol/L      Chloride 114 mmol/L      CO2 11.3 mmol/L      Calcium 8.0 mg/dL      eGFR  African Amer 29 mL/min/1.73      BUN/Creatinine Ratio 23.9     Anion Gap 8.7 mmol/L     Narrative:      GFR Normal >60  Chronic Kidney Disease <60  Kidney Failure <15      Hemoglobin & Hematocrit, Blood [[id number]]  (Abnormal) Collected: 06/03/21 1756    Specimen: Blood Updated: 06/03/21 1809     Hemoglobin 9.4 g/dL      Hematocrit 29.3 %     POC Glucose Once [[id number]]  (Normal) Collected: 06/03/21 1648    Specimen: Blood Updated: 06/03/21 1649     Glucose 116 mg/dL     POC Glucose Once [[id number]]  (Abnormal) Collected: 06/03/21 1131    Specimen: Blood Updated: 06/03/21 1134     Glucose 237 mg/dL     Hemoglobin A1c [[id number]]  (Abnormal) Collected: 06/03/21 0922    Specimen: Blood Updated: 06/03/21 1034     Hemoglobin A1C 7.18 %     Narrative:      Hemoglobin A1C Ranges:    Increased Risk for Diabetes  5.7% to 6.4%  Diabetes                     >= 6.5%  Diabetic Goal                < 7.0%    Hemoglobin & Hematocrit, Blood [[id number]]  (Abnormal) Collected: 06/03/21 0922    Specimen: Blood from Arm, Left Updated: 06/03/21 0955     Hemoglobin 9.3 g/dL      Hematocrit 31.0 %     POC Glucose Once [[id number]]  (Abnormal) Collected: 06/03/21 0609    Specimen: Blood Updated: 06/03/21 0611     Glucose 133 mg/dL     POC Glucose Once [[id number]]  (Abnormal) Collected: 06/03/21 0330     Specimen: Blood Updated: 06/03/21 0332     Glucose 147 mg/dL     COVID PRE-OP / PRE-PROCEDURE SCREENING ORDER (NO ISOLATION) - Swab, Nasopharynx [[id number]]  (Normal) Collected: 06/02/21 2318    Specimen: Swab from Nasopharynx Updated: 06/03/21 0330    Narrative:      The following orders were created for panel order COVID PRE-OP / PRE-PROCEDURE SCREENING ORDER (NO ISOLATION) - Swab, Nasopharynx.  Procedure                               Abnormality         Status                     ---------                               -----------         ------                     COVID-19,APTIMA PANTHER,...[[id number]]  Normal              Final result                 Please view results for these tests on the individual orders.    COVID-19,APTIMA PANTHER,[facility], NP/OP SWAB IN UTM/VTM/[facility] TRANSPORT MEDIA,24 HR TAT - Swab, Nasopharynx [[id number]]  (Normal) Collected: 06/02/21 2318    Specimen: Swab from Nasopharynx Updated: 06/03/21 0330     COVID19 Not Detected    Narrative:      Fact sheet for providers: https://www.fda.gov/media/211029/download     Fact sheet for patients: https://www.fda.gov/media/596695/download    Test performed by RT PCR.    Urinalysis, Microscopic Only - Urine, Catheter [[id number]]  (Abnormal) Collected: 06/02/21 2314    Specimen: Urine, Catheter Updated: 06/03/21 0003     RBC, UA 0-2 /HPF      WBC, UA Too Numerous to Count /HPF      Bacteria, UA 4+ /HPF      Squamous Epithelial Cells, UA 0-2 /HPF      Hyaline Casts, UA 3-6 /LPF      Methodology Automated Microscopy    Urinalysis With Microscopic If Indicated (No Culture) - Urine, Catheter [[id number]]  (Abnormal) Collected: 06/02/21 2314    Specimen: Urine, Catheter Updated: 06/03/21 0003     Color, UA Yellow     Appearance, UA Turbid     pH, UA 6.5     Specific Gravity, UA 1.011     Glucose, UA Negative     Ketones, UA Negative     Bilirubin, UA Negative     Blood, UA Small (1+)     Protein,  mg/dL (2+)     Leuk Esterase, UA Large (3+)      Nitrite, UA Negative     Urobilinogen, UA 0.2 E.U./dL    POCT Occult Blood Stool [[id number]]  (Abnormal) Collected: 06/02/21 2358    Specimen: Stool from Per Rectum Updated: 06/02/21 2358     Fecal Occult Blood Positive     Lot Number [id number]     Expiration Date 11/30/2021     Positive Control Positive     Negative Control Negative    Lactic Acid, Plasma [[id number]]  (Normal) Collected: 06/02/21 2315    Specimen: Blood Updated: 06/02/21 2358     Lactate 1.5 mmol/L     Comprehensive Metabolic Panel [[id number]]  (Abnormal) Collected: 06/02/21 2238    Specimen: Blood Updated: 06/02/21 2345     Glucose 233 mg/dL      BUN 68 mg/dL      Creatinine 3.25 mg/dL      Sodium 138 mmol/L      Potassium 3.9 mmol/L      Chloride 111 mmol/L      CO2 15.2 mmol/L      Calcium 8.6 mg/dL      Total Protein 6.6 g/dL      Albumin 3.10 g/dL      ALT (SGPT) 7 U/L      AST (SGOT) 7 U/L      Alkaline Phosphatase 181 U/L      Total Bilirubin 0.3 mg/dL      eGFR  African Amer 23 mL/min/1.73      Globulin 3.5 gm/dL      A/G Ratio 0.9 g/dL      BUN/Creatinine Ratio 20.9     Anion Gap 11.8 mmol/L     Narrative:      GFR Normal >60  Chronic Kidney Disease <60  Kidney Failure <15      Balsam Draw [[id number]] Collected: 06/02/21 2238    Specimen: Blood Updated: 06/02/21 2345    Narrative:      The following orders were created for panel order Balsam Draw.  Procedure                               Abnormality         Status                     ---------                               -----------         ------                     Light Blue Top[[id number]]                                   Final result               Green Top (Gel)[[id number]]                                  Final result               Lavender Top[[id number]]                                     Final result               Gold Top - SST[[id number]]                                   Final result                 Please view results for these tests on the individual orders.    Light Blue Top  [[id number]] Collected: 06/02/21 2238    Specimen: Blood Updated: 06/02/21 2345     Extra Tube hold for add-on     Comment: Auto resulted       Green Top (Gel) [[id number]] Collected: 06/02/21 2238    Specimen: Blood Updated: 06/02/21 2345     Extra Tube Hold for add-ons.     Comment: Auto resulted.       Lavender Top [[id number]] Collected: 06/02/21 2238    Specimen: Blood Updated: 06/02/21 2345     Extra Tube hold for add-on     Comment: Auto resulted       Gold Top - SST [[id number]] Collected: 06/02/21 2238    Specimen: Blood Updated: 06/02/21 2345     Extra Tube Hold for add-ons.     Comment: Auto resulted.       CBC & Differential [[id number]]  (Abnormal) Collected: 06/02/21 2238    Specimen: Blood Updated: 06/02/21 2318    Narrative:      The following orders were created for panel order CBC & Differential.  Procedure                               Abnormality         Status                     ---------                               -----------         ------                     CBC Auto Differential[[id number]]        Abnormal            Final result                 Please view results for these tests on the individual orders.    CBC Auto Differential [[id number]]  (Abnormal) Collected: 06/02/21 2238    Specimen: Blood Updated: 06/02/21 2318     WBC 5.95 10*3/mm3      RBC 4.16 10*6/mm3      Hemoglobin 9.7 g/dL      Hematocrit 31.2 %      MCV 75.0 fL      MCH 23.3 pg      MCHC 31.1 g/dL      RDW 21.8 %      RDW-SD 55.4 fl      Platelets 201 10*3/mm3      Neutrophil % 71.4 %      Lymphocyte % 18.7 %      Monocyte % 6.4 %      Eosinophil % 2.7 %      Basophil % 0.5 %      Immature Grans % 0.3 %      Neutrophils, Absolute 4.25 10*3/mm3      Lymphocytes, Absolute 1.11 10*3/mm3      Monocytes, Absolute 0.38 10*3/mm3      Eosinophils, Absolute 0.16 10*3/mm3      Basophils, Absolute 0.03 10*3/mm3      Immature Grans, Absolute 0.02 10*3/mm3      nRBC 0.2 /100 WBC         Data Review:  Results from last 7 days   Lab Units  06/06/21  1516 06/05/21  0800 06/04/21  0928   SODIUM mmol/L 138 137 136   POTASSIUM mmol/L 3.7 4.7 4.1   CHLORIDE mmol/L 112* 114* 113*   CO2 mmol/L 16.2* 13.5* 12.0*   BUN mg/dL 48* 54* 56*   CREATININE mg/dL 2.37* 2.54* 2.81*   GLUCOSE mg/dL 152* 98 188*   CALCIUM mg/dL 8.2* 8.3* 8.0*     Results from last 7 days   Lab Units 06/06/21  2359 06/06/21  1516 06/05/21  0010 06/04/21  0928 06/02/21  2238   WBC 10*3/mm3  --  7.90  --  7.29 5.95   HEMOGLOBIN g/dL 8.2* 8.4* 9.5* 8.6*  8.6* 9.7*   HEMATOCRIT % 26.0* 25.9* 30.5* 27.2*  27.2* 31.2*   PLATELETS 10*3/mm3  --  170  --  161 201         Results from last 7 days   Lab Units 06/03/21  0922   HEMOGLOBIN A1C % 7.18*     Lab Results   Lab Value Date/Time    TROPONINT 0.110 (C) 02/04/2021 1551    TROPONINT 0.171 (C) 12/23/2020 0357    TROPONINT 0.141 (C) 12/22/2020 0549    TROPONINT 0.147 (C) 12/21/2020 1450    TROPONINT 0.128 (C) 12/21/2020 1223         Results from last 7 days   Lab Units 06/02/21  2238   ALK PHOS U/L 181*   BILIRUBIN mg/dL 0.3   ALT (SGPT) U/L 7   AST (SGOT) U/L 7         Results from last 7 days   Lab Units 06/03/21  0922   HEMOGLOBIN A1C % 7.18*     Glucose   Date/Time Value Ref Range Status   06/07/2021 0539 133 (H) 70 - 130 mg/dL Final   06/06/2021 1955 141 (H) 70 - 130 mg/dL Final   06/06/2021 1600 145 (H) 70 - 130 mg/dL Final   06/06/2021 1111 133 (H) 70 - 130 mg/dL Final   06/06/2021 0531 125 70 - 130 mg/dL Final   06/05/2021 1953 141 (H) 70 - 130 mg/dL Final   06/05/2021 1650 161 (H) 70 - 130 mg/dL Final   06/05/2021 1131 99 70 - 130 mg/dL Final           Past Medical History:   Diagnosis Date   • Back pain    • CKD (chronic kidney disease)    • DM type 2 (diabetes mellitus, type 2) (CMS/HCC)    • ED (erectile dysfunction)    • Hyperlipidemia    • Hypertension    • SCOTTY (iron deficiency anemia)    • Monoclonal gammopathy    • Osteoarthritis        Assessment:  Active Hospital Problems    Diagnosis  POA   • **Gastrointestinal hemorrhage  [K92.2]  Yes   • Urine retention [R33.9]  Unknown   • Right upper lobe pneumonia [J18.9]  Yes   • CKD (chronic kidney disease) stage 3, GFR 30-59 ml/min (CMS/[facility]) [N18.30]  Yes   • Immobility [Z74.09]  Yes   • SILVIA (acute kidney injury) (CMS/[facility]) [N17.9]  Yes   • Hypertension [I10]  Yes   • DM2 (diabetes mellitus, type 2) (CMS/[facility]) [E11.9]  Yes   • Abnormal CT of the abdomen [R93.5]  Yes   • Acute UTI (urinary tract infection) [N39.0]  Yes      Resolved Hospital Problems   No resolved problems to display.       Plan:  Continue current RX. Follow lab. Antibiotics. As per multiple consults.  Will need long term care.   Urology consult for retention.    Jono Montes MD  6/7/2021  11:03 EDT

## 2021-06-07 NOTE — NURSING NOTE
WOCN : Chronic venous disease. Was seen [facility] 2020 for venous wounds. No edema noted today. RLE scattered scarring and dry scabs. Legs cleanse and moisturizer applied. Placed in 2 layer compression wrap.  Tolerate well . Will plan to change 2 times per week.

## 2021-06-07 NOTE — PAYOR COMM NOTE
"Mor Alejandro (65 y.o. Male)     PLEASE SEE ATTACHED FOR OBSERVATION AUTH PLEASE    PLEASE CALL   OR  [phone] WITH OBSERVATION AUTH.     THANK YOU    CHADD VARMA LPN CCP    Date of Birth Social Security Number Address Home Phone MRN    [id number]  [id number] [facility]  [address] [place] [address] [phone] [id number]    Episcopal Marital Status          Evangelical        Admission Date Admission Type Admitting Provider Attending Provider Department, Room/Bed    6/2/21 Emergency Parker Smith MD Beard, Lyle E, MD [address], N[address]/1    Discharge Date Discharge Disposition Discharge Destination                       Attending Provider: Jono Montes MD    Allergies: No Known Allergies    Isolation: None   Infection: None   Code Status: No CPR    Ht: 190.5 cm (75\")   Wt: 103 kg (227 lb 12.8 oz)    Admission Cmt: None   Principal Problem: Gastrointestinal hemorrhage [K92.2]                 Active Insurance as of 6/2/2021     Primary Coverage     Payor Plan Insurance Group Employer/Plan Group    [facility] [employer] MEDICARE REPLACEMENT [id number]     Payor Plan Address Payor Plan Phone Number Payor Plan Fax Number Effective Dates    [address] [address] [phone]  8/13/2020 - None Entered    [facility] [id number]       Subscriber Name Subscriber Birth Date Member ID       Mor Alejandro 1955 [id number]           Secondary Coverage     Payor Plan Insurance Group Employer/Plan Group    [id number]      Payor Plan Address Payor Plan Phone Number Payor Plan Fax Number Effective Dates    [address]   2/1/2021 - None Entered    [facility] [id number]       Subscriber Name Subscriber Birth Date Member ID       MOR ALEJANDRO 1955 [id number]                 Emergency Contacts      (Rel.) Home Phone Work Phone Mobile Phone    Patricia Alonzo (Spouse) [phone] -- [phone]    " Celia Eastman (Daughter) [phone] -- --    Zeenat Wang (Daughter) -- -- [phone]               History & Physical      Leah Winslow APRN at 21 1227     Attestation signed by Rashad Ayala MD at 21 8111    Addendum: I have reviewed the history and plan as obtained by ANTWAN Fenton. I have personally examined the patient. My exam confirms her physical findings and I agree with the plan as listed above, with the addition/exception of the followin-year-old gentleman who has very complicated past medical history including monoclonal gammopathy, chronic immobility for which she is been wheelchair-bound for couple years.  Patient comes to [facility] because of black stools.  Patient been started on Eliquis for atrial fibrillation least for couple months.    Patient is awake answering questions leaning to the right side.  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  Normocephalic atraumatic  Heart is regularly irregular  Lungs are coarse breath sounds bilaterally but normal effort  Abdomen is slightly distended, nontender  Extremities with edema    Rashad Ayala MD  17:21 EDT  21                        Patient Name:  Gregorio Alejandro  YOB: 1955  MRN:  [id number]  Admit Date:  2021  Patient Care Team:  Maya Ribeiro APRN as PCP - General (Family Medicine)      Subjective   History Present Illness     Chief Complaint   Patient presents with   • Black or Bloody Stool   • Abdominal Pain       Mr. Alejandro is a 65 y.o. male with a history of multiple medical issues including CKD, DM, HTN, HLD, SCOTTY, monoclonal gammopathy, vascular dementia, chronic immobility, documented afib/flutter that presents to [facility] complaining of black tarry stools. Pt is sleeping on exam, unable to stay awake. He is chronically ill appearing. According to ED records and prior hospitalization notes, pt is bedbound at baseline. He was hospitalized  in February where he was transferred to  palliative unit where he was evaluated by Hospice. He was discharged to NH following that hospitalization with plan for Hospice to continue to follow. He presented to ED with several episodes of black tarry stools since Monday. He has a visiting nurse who witnessed the stools and advised pt to be seen in ED as he takes eliquis. It is documented that he was also c/o n/v, chills, fever, abd pain. He was Heme positive on exam.    Afebrile. HR controlled. BP stable. No hypoxia. WBC 5.95, Hgb 9.7, MCV 75.0. Gluc 233, BUN/Cr 68/3.25, Cl 111, CO2 15.2, Alb 3.10, Alk phos 181. UA 1+ blood, 3+ leuk, neg nitrite, TNTC WBC, 4+ bact. Lactate 1.5. Covid neg. CT A/P:  Similar findings of thick walled rectosigmoid, colitis not excluded; stomach distended and fluid-filled; distention of urinary bladder.       Consulted   Cardiology  Nephrology  Infectious disease  Edited by: Rashad Ayala MD at 6/3/2021 0931  Review of Systems   Unable to perform ROS: Other        Personal History     Past Medical History:   Diagnosis Date   • Back pain    • CKD (chronic kidney disease)    • DM type 2 (diabetes mellitus, type 2) (CMS/[facility])    • ED (erectile dysfunction)    • Hyperlipidemia    • Hypertension    • SCOTTY (iron deficiency anemia)    • Monoclonal gammopathy    • Osteoarthritis      Past Surgical History:   Procedure Laterality Date   • ABDOMINAL SURGERY     • EYE SURGERY     • JOINT REPLACEMENT     • TONSILLECTOMY       History reviewed. No pertinent family history.  Social History     Tobacco Use   • Smoking status: Never Smoker   • Smokeless tobacco: Never Used   Vaping Use   • Vaping Use: Never used   Substance Use Topics   • Alcohol use: Yes     Comment: 1 pint   • Drug use: Never     No current facility-administered medications on file prior to encounter.     Current Outpatient Medications on File Prior to Encounter   Medication Sig Dispense Refill   • amLODIPine (NORVASC) 5 MG tablet  Take 1 tablet by mouth Daily. 30 tablet 0   • apixaban (ELIQUIS) 2.5 MG tablet tablet Take 1 tablet by mouth Every 12 (Twelve) Hours. Indications: Atrial Fibrillation 60 tablet    • atorvastatin (LIPITOR) 20 MG tablet Take 20 mg by mouth Daily.     • brimonidine (ALPHAGAN P) 0.1 % solution ophthalmic solution 1 drop 2 (two) times a day.     • dorzolamide (TRUSOPT) 2 % ophthalmic solution Administer 1 drop to the right eye 2 (two) times a day.     • ammonium lactate (AMLACTIN) 12 % cream Apply  topically to the appropriate area as directed 2 (Two) Times a Day.       No Known Allergies    Objective    Objective     Vital Signs  Temp:  [96.7 °F (35.9 °C)-97.8 °F (36.6 °C)] 97 °F (36.1 °C)  Heart Rate:  [70-84] 83  Resp:  [16-18] 16  BP: (132-157)/(78-96) 136/81  SpO2:  [93 %-99 %] 93 %  on   ;   Device (Oxygen Therapy): room air  Body mass index is 28.47 kg/m².    Physical Exam  Vitals and nursing note reviewed.   Constitutional:       Appearance: He is ill-appearing.   HENT:      Head: Normocephalic.   Eyes:      General: Lids are normal.   Cardiovascular:      Rate and Rhythm: Normal rate. Rhythm irregular.   Pulmonary:      Effort: Pulmonary effort is normal. No respiratory distress.      Comments: Coarse breath sounds elenita bases  Abdominal:      General: There is distension.   Musculoskeletal:      Cervical back: Neck supple.      Right lower leg: Edema present.      Left lower leg: Edema present.   Skin:     General: Skin is warm and dry.      Comments: Difficult to assess legs due to pt positioning; superficial wounds noted LLE   Neurological:      Mental Status: He is lethargic.         Results Review:  I reviewed the patient's new clinical results.  I reviewed the patient's new imaging results and agree with the interpretation.  I reviewed the patient's other test results and agree with the interpretation  I personally viewed and interpreted the patient's EKG/Telemetry data    Lab Results (last 24 hours)      Procedure Component Value Units Date/Time    CBC & Differential [[id number]]  (Abnormal) Collected: 06/02/21 2238    Specimen: Blood Updated: 06/02/21 2318    Narrative:      The following orders were created for panel order CBC & Differential.  Procedure                               Abnormality         Status                     ---------                               -----------         ------                     CBC Auto Differential[[id number]]        Abnormal            Final result                 Please view results for these tests on the individual orders.    Comprehensive Metabolic Panel [[id number]]  (Abnormal) Collected: 06/02/21 2238    Specimen: Blood Updated: 06/02/21 2345     Glucose 233 mg/dL      BUN 68 mg/dL      Creatinine 3.25 mg/dL      Sodium 138 mmol/L      Potassium 3.9 mmol/L      Chloride 111 mmol/L      CO2 15.2 mmol/L      Calcium 8.6 mg/dL      Total Protein 6.6 g/dL      Albumin 3.10 g/dL      ALT (SGPT) 7 U/L      AST (SGOT) 7 U/L      Alkaline Phosphatase 181 U/L      Total Bilirubin 0.3 mg/dL      eGFR  African Amer 23 mL/min/1.73      Globulin 3.5 gm/dL      A/G Ratio 0.9 g/dL      BUN/Creatinine Ratio 20.9     Anion Gap 11.8 mmol/L     Narrative:      GFR Normal >60  Chronic Kidney Disease <60  Kidney Failure <15      CBC Auto Differential [[id number]]  (Abnormal) Collected: 06/02/21 2238    Specimen: Blood Updated: 06/02/21 2318     WBC 5.95 10*3/mm3      RBC 4.16 10*6/mm3      Hemoglobin 9.7 g/dL      Hematocrit 31.2 %      MCV 75.0 fL      MCH 23.3 pg      MCHC 31.1 g/dL      RDW 21.8 %      RDW-SD 55.4 fl      Platelets 201 10*3/mm3      Neutrophil % 71.4 %      Lymphocyte % 18.7 %      Monocyte % 6.4 %      Eosinophil % 2.7 %      Basophil % 0.5 %      Immature Grans % 0.3 %      Neutrophils, Absolute 4.25 10*3/mm3      Lymphocytes, Absolute 1.11 10*3/mm3      Monocytes, Absolute 0.38 10*3/mm3      Eosinophils, Absolute 0.16 10*3/mm3      Basophils, Absolute 0.03 10*3/mm3       Immature Grans, Absolute 0.02 10*3/mm3      nRBC 0.2 /100 WBC     Urinalysis With Microscopic If Indicated (No Culture) - Urine, Catheter [[id number]]  (Abnormal) Collected: 06/02/21 2314    Specimen: Urine, Catheter Updated: 06/03/21 0003     Color, UA Yellow     Appearance, UA Turbid     pH, UA 6.5     Specific Gravity, UA 1.011     Glucose, UA Negative     Ketones, UA Negative     Bilirubin, UA Negative     Blood, UA Small (1+)     Protein,  mg/dL (2+)     Leuk Esterase, UA Large (3+)     Nitrite, UA Negative     Urobilinogen, UA 0.2 E.U./dL    Urinalysis, Microscopic Only - Urine, Catheter [[id number]]  (Abnormal) Collected: 06/02/21 2314    Specimen: Urine, Catheter Updated: 06/03/21 0003     RBC, UA 0-2 /HPF      WBC, UA Too Numerous to Count /HPF      Bacteria, UA 4+ /HPF      Squamous Epithelial Cells, UA 0-2 /HPF      Hyaline Casts, UA 3-6 /LPF      Methodology Automated Microscopy    Urine Culture - Urine, Urine, Catheter [[id number]] Collected: 06/02/21 2314    Specimen: Urine, Catheter Updated: 06/03/21 0111    Lactic Acid, Plasma [[id number]]  (Normal) Collected: 06/02/21 2315    Specimen: Blood Updated: 06/02/21 2358     Lactate 1.5 mmol/L     COVID PRE-OP / PRE-PROCEDURE SCREENING ORDER (NO ISOLATION) - Swab, Nasopharynx [[id number]]  (Normal) Collected: 06/02/21 2318    Specimen: Swab from Nasopharynx Updated: 06/03/21 0330    Narrative:      The following orders were created for panel order COVID PRE-OP / PRE-PROCEDURE SCREENING ORDER (NO ISOLATION) - Swab, Nasopharynx.  Procedure                               Abnormality         Status                     ---------                               -----------         ------                     COVID-19,APTIMA PANTHER,...[[id number]]  Normal              Final result                 Please view results for these tests on the individual orders.    COVID-19,[facility], NP/OP SWAB IN UTM/VTM/[facility] TRANSPORT MEDIA,24 HR TAT - Swab,  Nasopharynx [[id number]]  (Normal) Collected: 06/02/21 2318    Specimen: Swab from Nasopharynx Updated: 06/03/21 0330     COVID19 Not Detected    Narrative:      Fact sheet for providers: https://www.fda.gov/media/896600/download     Fact sheet for patients: https://www.fda.gov/media/763141/download    Test performed by RT PCR.    POCT Occult Blood Stool [[id number]]  (Abnormal) Collected: 06/02/21 2358    Specimen: Stool from Per Rectum Updated: 06/02/21 2358     Fecal Occult Blood Positive     Lot Number [id number]     Expiration Date 11/30/2021     Positive Control Positive     Negative Control Negative    POC Glucose Once [[id number]]  (Abnormal) Collected: 06/03/21 0331    Specimen: Blood Updated: 06/03/21 0332     Glucose 147 mg/dL     POC Glucose Once [[id number]]  (Abnormal) Collected: 06/03/21 0609    Specimen: Blood Updated: 06/03/21 0611     Glucose 133 mg/dL     Hemoglobin A1c [[id number]]  (Abnormal) Collected: 06/03/21 0922    Specimen: Blood Updated: 06/03/21 1034     Hemoglobin A1C 7.18 %     Narrative:      Hemoglobin A1C Ranges:    Increased Risk for Diabetes  5.7% to 6.4%  Diabetes                     >= 6.5%  Diabetic Goal                < 7.0%    Hemoglobin & Hematocrit, Blood [[id number]]  (Abnormal) Collected: 06/03/21 0922    Specimen: Blood from Arm, Left Updated: 06/03/21 0955     Hemoglobin 9.3 g/dL      Hematocrit 31.0 %     POC Glucose Once [[id number]]  (Abnormal) Collected: 06/03/21 1131    Specimen: Blood Updated: 06/03/21 1134     Glucose 237 mg/dL           Imaging Results (Last 24 Hours)     Procedure Component Value Units Date/Time    CT Abdomen Pelvis Without Contrast [[id number]] Collected: 06/03/21 0053     Updated: 06/03/21 0106    Narrative:      CT OF THE ABDOMEN AND PELVIS WITHOUT CONTRAST     HISTORY: Diffuse abdominal pain. 11 stool.     COMPARISON: 02/05/2021     TECHNIQUE: Axial CT imaging was obtained through the abdomen and pelvis.  IV contrast was administered.      FINDINGS:  Images are degraded by motion artifact. There is bibasilar scarring.  Similar findings were present on prior study. The patient's stomach is  distended and fluid-filled. Duodenum appears relatively decompressed.  Correlation with any history of gastroparesis or gastric outlet  obstruction is suggested. No suspicious hepatic lesions are seen.  Gallbladder is normal. Calcified granulomata are seen within the  bleeding. Adrenal glands are within normal limits. Pancreas is markedly  atrophic. There are dense vascular calcifications. There is an 8 mm  nonobstructing stone identified within the right kidney. Full assessment  of structures within the pelvis is degraded by streak artifact from  bilateral hip arthroplasties. The patient does have distention of the  urinary bladder. Correlation with any symptoms of urinary retention is  suggested. There is also air noted within the urinary bladder and  correlation with any history of instrumentation is suggested. The  patient's rectosigmoid appears diffusely thick-walled. Similar findings  were present on prior exam. Appearance may reflect colitis. There is  colonic diverticulosis. There is no diverticulitis. There is no bowel  obstruction. Appendix is not clearly seen, although I see no evidence of  appendicitis. There is extensive calcification of the aorta. There is  body wall edema. Patient is osteoporotic. Calcifications within the  bladder wall are again seen.       Impression:         1. Thick-walled appearance to the rectosigmoid. Similar findings were  present on prior exam. Colitis is not excluded.  2. The stomach is markedly distended and fluid-filled. Duodenum appears  relatively decompressed. Appearance may reflect some gastroparesis or  gastric outlet obstruction.  2. Distention of the urinary bladder. This may reflect some urinary  retention. There is air seen within the bladder, and correlation with  recent instrumentation is suggested.      Radiation dose reduction techniques were utilized, including automated  exposure control and exposure modulation based on body size.     This report was finalized on 6/3/2021 1:03 AM by Dr. Vero Hansen M.D.             Results for orders placed during the hospital encounter of 12/18/20    Adult Transthoracic Echo Complete W/ Cont if Necessary Per Protocol    Interpretation Summary  · Calculated left ventricular EF = 59% Estimated left ventricular EF was in agreement with the calculated left ventricular EF.  · Left ventricular diastolic function is consistent with (grade I) impaired relaxation.    Biatrial enlargement, with asymmetric LVH.  Speckled type pattern.  Consider amyloid.  No significant valve disease.      ECG 12 Lead   Preliminary Result   HEART RATE= 81  bpm   RR Interval= 736  ms   HI Interval=   ms   P Horizontal Axis=   deg   P Front Axis=   deg   QRSD Interval= 109  ms   QT Interval= 454  ms   QRS Axis= 35  deg   T Wave Axis= -88  deg   - ABNORMAL ECG -   Atrial fibrillation   Abnormal lateral Q waves   Minimal ST depression, anterolateral leads   Prolonged QT interval   Electronically Signed By:    Date and Time of Study: 2021-06-03 08:53:20           Assessment/Plan     Active Hospital Problems    Diagnosis  POA   • **Gastrointestinal hemorrhage [K92.2]  Yes   • Immobility [Z74.09]  Yes   • SILVIA (acute kidney injury) (CMS/HCC) [N17.9]  Yes   • Hypertension [I10]  Yes   • DM2 (diabetes mellitus, type 2) (CMS/HCC) [E11.9]  Yes   • Abnormal CT of the abdomen [R93.5]  Yes   • Acute UTI (urinary tract infection) [N39.0]  Yes      Resolved Hospital Problems   No resolved problems to display.       Mr. Alejandro is a 65 y.o. male with a history of multiple medical issues including CKD, DM, HTN, HLD, SCOTTY, monoclonal gammopathy, vascular dementia, chronic immobility, documented afib/flutter  who is admitted for GIB, UTI, SILVIA    GI bleed:  -Hold AC   -GI consult appreciated; likely endoscopy in the next  couple of days  -KUB to follow up stomach distention on CT  -Serial H&H  -Transfuse as needed  - IV reglan    UTI:  -Continue rocephin  -Follow urine culture  -ID consulted to evaluate colitis/cystitis on CT    SILVIA:  -Baseline Cr ~2 per Nephrology  -IVF's  -Monitor PVR    Chronic LE wounds:  -Wound RN consult    Afib/flutter/AC:  -Cardiology following  -HR controlled; beta blockers/dilitazem avoided due to hx of pauses  -AC on hold until GI workup complete    DM:  -Monitor BG trends  -Correctional scale insulin  -A1C 7.18%      · I discussed the patient's findings and my recommendations with nursing staff.    VTE Prophylaxis - Eliquis (home med), on hold.  Code Status - Full code, will attempt to verify.       ANTWAN Augilar  [facility]  06/03/21  12:57 EDT      Electronically signed by Rashad Ayala MD at 06/03/21 1723          Emergency Department Notes      Apoorva Dallas, RN at 06/02/21 2131        Pt to triage from home via [employer] e [id number] with c/o abdominal pain since Monday with black tarry stools.  Pt states pain to RLQ and LLQ.  Pt denies n/v.  Pt provided with mask in triage.  Triage personnel wore appropriate PPE       Apoorva Dallas RN  06/02/21 2136      Electronically signed by Apoorva Dallas RN at 06/02/21 2136     Alicia Harris APRN at 06/02/21 2316     Attestation signed by Kolby Gasca MD at 06/03/21 0659    MD ATTESTATION NOTE    The MOUSTAPHA and I have discussed this patient's history, physical exam, and treatment plan.  I have reviewed the documentation and personally had a face to face interaction with the patient. I affirm the documentation and agree with the treatment and plan.  The attached note describes my personal findings.    I agree with the PA's or NP's findings and plan.  I reviewed the PA's or NP's note.  Kolby Gasca MD                   EMERGENCY DEPARTMENT ENCOUNTER    Room Number:   N630/1  Date of encounter:  6/3/2021  PCP: Maya Ribeiro APRN  Historian: Patient      PPE    Patient was placed in face mask in first look. Patient was wearing facemask when I entered the room and throughout our encounter. I wore full protective equipment throughout this patient encounter including a face mask, and gloves. Hand hygiene was performed before donning protective equipment and after removal when leaving the room.        HPI:  Chief Complaint: black stools  A complete HPI/ROS/PMH/PSH/SH/FH are unobtainable due to: nothing    Context: Gregorio Alejandro is a 65 y.o. male who arrives to the ED via EMS from home where he lives with his wife.  Patient presents with c/o several episodes of black tarry stools since Monday.   Patient also complains of nausea, vomiting, fever, chills, diffuse abdominal pain that is constant in nature also since Monday.  States that he has a nurse that comes to the house and when he was telling her about this and she was cleaning him up and noticed the black stools she recommended that he come to the ER on Monday.  Patient states that he is on Eliquis but is unsure why he takes that.  Patient also complains of multiple open sores to his bilateral lower extremities status post being in a nursing home earlier this year.  Patient denies chest pain, shortness of breath, headache.  Patient states that nothing makes the symptoms better and nothing worsens symptoms.          PAST MEDICAL HISTORY  Active Ambulatory Problems     Diagnosis Date Noted   • Complicated UTI (urinary tract infection) 12/18/2020   • Macrocytosis 12/19/2020   • Sepsis secondary to UTI (CMS/HCC) 12/19/2020   • Metabolic encephalopathy 12/19/2020   • Hyperlipidemia    • Hypertension    • Monoclonal gammopathy    • Stage 4 chronic kidney disease (CMS/HCC)    • DM2 (diabetes mellitus, type 2) (CMS/[facility])    • Abnormal CT of the abdomen    • Normal anion gap metabolic acidosis    • Anemia, chronic disease 12/19/2020   •  Ventricular tachycardia (paroxysmal) (CMS/HCC) 12/21/2020   • Acute metabolic encephalopathy 02/04/2021   • UTI (urinary tract infection), bacterial 02/04/2021   • Elevated troponin 02/04/2021   • Hypokalemia 02/04/2021   • SILVIA (acute kidney injury) (CMS/HCC) 02/04/2021   • Uncontrolled hypertension 02/04/2021   • Septicemia (CMS/HCC) 02/06/2021   • Vascular dementia without behavioral disturbance (CMS/HCC) 02/09/2021     Resolved Ambulatory Problems     Diagnosis Date Noted   • No Resolved Ambulatory Problems     Past Medical History:   Diagnosis Date   • Back pain    • CKD (chronic kidney disease)    • DM type 2 (diabetes mellitus, type 2) (CMS/HCC)    • ED (erectile dysfunction)    • SCOTTY (iron deficiency anemia)    • Osteoarthritis          PAST SURGICAL HISTORY  No past surgical history on file.      FAMILY HISTORY  No family history on file.      SOCIAL HISTORY  Social History     Socioeconomic History   • Marital status:      Spouse name: Not on file   • Number of children: Not on file   • Years of education: Not on file   • Highest education level: Not on file   Tobacco Use   • Smoking status: Never Smoker   • Smokeless tobacco: Never Used   Vaping Use   • Vaping Use: Never used   Substance and Sexual Activity   • Alcohol use: Yes   • Drug use: Never   • Sexual activity: Defer         ALLERGIES  Patient has no known allergies.        REVIEW OF SYSTEMS  Review of Systems     All systems reviewed and negative except for those discussed in HPI.        PHYSICAL EXAM    ED Triage Vitals [06/02/21 2133]   Temp Heart Rate Resp BP SpO2   96.7 °F (35.9 °C) 84 18 134/82 99 %       Physical Exam  GENERAL: Chronically ill-appearing, non-toxic appearing, not distressed  HENT: normocephalic, atraumatic, dry mucous membranes  EYES: no scleral icterus, PERRL, pale sclera  CV: regular rhythm, regular rate, no murmur  RESPIRATORY: normal effort, CTAB  ABDOMEN: soft, normal bowel sounds, diffuse tenderness, no rebound,  guarding or rigidity  Rectal exam performed.  Chaperone present throughout exam, OSIRIS Perez  External exam normal.  No visualized external hemorrhoids, no palpated internal hemorrhoids.  No visible anal fissures.  Heme positive, black stool noted.  passed.  No gross blood noted  MUSCULOSKELETAL: no deformity  NEURO: alert, moves all extremities, follows commands, mental status normal/baseline  SKIN: warm, dry, patient has multiple open sores to his bilateral lower extremities with mild surrounding erythema.  Psych: Appropriate mood and affect  Nursing notes and vital signs reviewed      LAB RESULTS  Recent Results (from the past 24 hour(s))   Type & Screen    Collection Time: 06/02/21 10:38 PM    Specimen: Blood   Result Value Ref Range    ABO Type B     RH type Positive     Antibody Screen Negative     T&S Expiration Date 6/5/2021 11:59:59 PM    Light Blue Top    Collection Time: 06/02/21 10:38 PM   Result Value Ref Range    Extra Tube hold for add-on    Green Top (Gel)    Collection Time: 06/02/21 10:38 PM   Result Value Ref Range    Extra Tube Hold for add-ons.    Lavender Top    Collection Time: 06/02/21 10:38 PM   Result Value Ref Range    Extra Tube hold for add-on    Gold Top - SST    Collection Time: 06/02/21 10:38 PM   Result Value Ref Range    Extra Tube Hold for add-ons.    Comprehensive Metabolic Panel    Collection Time: 06/02/21 10:38 PM    Specimen: Blood   Result Value Ref Range    Glucose 233 (H) 65 - 99 mg/dL    BUN 68 (H) 8 - 23 mg/dL    Creatinine 3.25 (H) 0.76 - 1.27 mg/dL    Sodium 138 136 - 145 mmol/L    Potassium 3.9 3.5 - 5.2 mmol/L    Chloride 111 (H) 98 - 107 mmol/L    CO2 15.2 (L) 22.0 - 29.0 mmol/L    Calcium 8.6 8.6 - 10.5 mg/dL    Total Protein 6.6 6.0 - 8.5 g/dL    Albumin 3.10 (L) 3.50 - 5.20 g/dL    ALT (SGPT) 7 1 - 41 U/L    AST (SGOT) 7 1 - 40 U/L    Alkaline Phosphatase 181 (H) 39 - 117 U/L    Total Bilirubin 0.3 0.0 - 1.2 mg/dL    eGFR  African Amer 23 (L) >60  mL/min/1.73    Globulin 3.5 gm/dL    A/G Ratio 0.9 g/dL    BUN/Creatinine Ratio 20.9 7.0 - 25.0    Anion Gap 11.8 5.0 - 15.0 mmol/L   CBC Auto Differential    Collection Time: 06/02/21 10:38 PM    Specimen: Blood   Result Value Ref Range    WBC 5.95 3.40 - 10.80 10*3/mm3    RBC 4.16 4.14 - 5.80 10*6/mm3    Hemoglobin 9.7 (L) 13.0 - 17.7 g/dL    Hematocrit 31.2 (L) 37.5 - 51.0 %    MCV 75.0 (L) 79.0 - 97.0 fL    MCH 23.3 (L) 26.6 - 33.0 pg    MCHC 31.1 (L) 31.5 - 35.7 g/dL    RDW 21.8 (H) 12.3 - 15.4 %    RDW-SD 55.4 (H) 37.0 - 54.0 fl    Platelets 201 140 - 450 10*3/mm3    Neutrophil % 71.4 42.7 - 76.0 %    Lymphocyte % 18.7 (L) 19.6 - 45.3 %    Monocyte % 6.4 5.0 - 12.0 %    Eosinophil % 2.7 0.3 - 6.2 %    Basophil % 0.5 0.0 - 1.5 %    Immature Grans % 0.3 0.0 - 0.5 %    Neutrophils, Absolute 4.25 1.70 - 7.00 10*3/mm3    Lymphocytes, Absolute 1.11 0.70 - 3.10 10*3/mm3    Monocytes, Absolute 0.38 0.10 - 0.90 10*3/mm3    Eosinophils, Absolute 0.16 0.00 - 0.40 10*3/mm3    Basophils, Absolute 0.03 0.00 - 0.20 10*3/mm3    Immature Grans, Absolute 0.02 0.00 - 0.05 10*3/mm3    nRBC 0.2 0.0 - 0.2 /100 WBC   Urinalysis With Microscopic If Indicated (No Culture) - Urine, Catheter    Collection Time: 06/02/21 11:14 PM    Specimen: Urine, Catheter   Result Value Ref Range    Color, UA Yellow Yellow, Straw    Appearance, UA Turbid (A) Clear    pH, UA 6.5 5.0 - 8.0    Specific Gravity, UA 1.011 1.005 - 1.030    Glucose, UA Negative Negative    Ketones, UA Negative Negative    Bilirubin, UA Negative Negative    Blood, UA Small (1+) (A) Negative    Protein,  mg/dL (2+) (A) Negative    Leuk Esterase, UA Large (3+) (A) Negative    Nitrite, UA Negative Negative    Urobilinogen, UA 0.2 E.U./dL 0.2 - 1.0 E.U./dL   Urinalysis, Microscopic Only - Urine, Catheter    Collection Time: 06/02/21 11:14 PM    Specimen: Urine, Catheter   Result Value Ref Range    RBC, UA 0-2 None Seen, 0-2 /HPF    WBC, UA Too Numerous to Count (A) None  Seen, 0-2 /HPF    Bacteria, UA 4+ (A) None Seen /HPF    Squamous Epithelial Cells, UA 0-2 None Seen, 0-2 /HPF    Hyaline Casts, UA 3-6 None Seen /LPF    Methodology Automated Microscopy    Lactic Acid, Plasma    Collection Time: 06/02/21 11:15 PM    Specimen: Blood   Result Value Ref Range    Lactate 1.5 0.5 - 2.0 mmol/L   POCT Occult Blood Stool    Collection Time: 06/02/21 11:58 PM    Specimen: Per Rectum; Stool   Result Value Ref Range    Fecal Occult Blood Positive (A) Negative    Lot Number [id number]     Expiration Date 11/30/2021     Positive Control Positive Positive    Negative Control Negative Negative       Ordered the above labs and independently reviewed the results.      RADIOLOGY  CT Abdomen Pelvis Without Contrast    Result Date: 6/3/2021  CT OF THE ABDOMEN AND PELVIS WITHOUT CONTRAST  HISTORY: Diffuse abdominal pain. 11 stool.  COMPARISON: 02/05/2021  TECHNIQUE: Axial CT imaging was obtained through the abdomen and pelvis. IV contrast was administered.  FINDINGS: Images are degraded by motion artifact. There is bibasilar scarring. Similar findings were present on prior study. The patient's stomach is distended and fluid-filled. Duodenum appears relatively decompressed. Correlation with any history of gastroparesis or gastric outlet obstruction is suggested. No suspicious hepatic lesions are seen. Gallbladder is normal. Calcified granulomata are seen within the bleeding. Adrenal glands are within normal limits. Pancreas is markedly atrophic. There are dense vascular calcifications. There is an 8 mm nonobstructing stone identified within the right kidney. Full assessment of structures within the pelvis is degraded by streak artifact from bilateral hip arthroplasties. The patient does have distention of the urinary bladder. Correlation with any symptoms of urinary retention is suggested. There is also air noted within the urinary bladder and correlation with any history of instrumentation is suggested. The  patient's rectosigmoid appears diffusely thick-walled. Similar findings were present on prior exam. Appearance may reflect colitis. There is colonic diverticulosis. There is no diverticulitis. There is no bowel obstruction. Appendix is not clearly seen, although I see no evidence of appendicitis. There is extensive calcification of the aorta. There is body wall edema. Patient is osteoporotic. Calcifications within the bladder wall are again seen.       1. Thick-walled appearance to the rectosigmoid. Similar findings were present on prior exam. Colitis is not excluded. 2. The stomach is markedly distended and fluid-filled. Duodenum appears relatively decompressed. Appearance may reflect some gastroparesis or gastric outlet obstruction. 2. Distention of the urinary bladder. This may reflect some urinary retention. There is air seen within the bladder, and correlation with recent instrumentation is suggested.  Radiation dose reduction techniques were utilized, including automated exposure control and exposure modulation based on body size.  This report was finalized on 6/3/2021 1:03 AM by Dr. Vero Hansen M.D.        I ordered the above noted radiological studies and viewed the images on the PACS system.         MEDICAL RECORD REVIEW  Medical records reviewed in epic, patient was last admitted to this hospital in February 2021 for acute metabolic encephalopathy, vascular dementia without behavioral disturbance, septicemia, UTI.  Hospital Course  This is a 65-year-old male who is chronically ill with history of CKD, diabetes, hypertension, hyperlipidemia who is bedbound at home and has had prior issues with metabolic encephalopathy secondary to urinary tract infection and cellulitis stemming from bilateral lower extremity leg wounds requiring a course of outpatient IV antibiotics who presented back to the emergency room on 2/4/2021 with recurrence of altered mental status, increasing weakness, further clinical  decline and Proteus UTI.  He also had 2 out of 2 positive blood cultures for staph epidermis.  Repeat blood cultures showed no growth to date.  ID was consulted and the patient remained on Rocephin and vancomycin for a total of 12 days.  He had further imaging work-up including abdominal CT showing abnormal colonic thickening as described above. This was present during previous admission and it looks like he never got an outpatient colonoscopy which was set up at last discharge.  GI followed him for this but due to his continued encephalopathy he was never able to undergo endoscopy.  He also had electrolyte discrepancies and was followed by nephrology.  Despite full treatment with antibiotics his encephalopathy never fully improved.  Neurology was consulted and he had work-up with head CT and brain MRI that were unremarkable.  Lumbar puncture was attempted but unsuccessful in radiology under fluoroscopic guidance.  Cardiology followed for atrial fibrillation/flutter.  Rate is controlled and they have put him on a low-dose of anticoagulant which I think is fine for now as he is tolerating this but can consider discontinuing given his clinical course of the next couple of weeks.  Given his lack of significant clinical improvement, prior bedbound state, poor overall health with further decline over the past several months and poor quality of life his wife ultimately decided to pursue hospice care for him.  During some periods of more lucid thought the patient also confirmed that he would like hospice at this point.  He was transitioned to the palliative care floor and hospice met with the patient and his wife yesterday.  He is not actively dying at this time and looks stable albeit with a very poor prognosis.  He is suitable to go to the nursing home and hospice will follow him there.    PROCEDURES    Procedures        DIFFERENTIAL DIAGNOSIS  Differential diagnosis include but are not limited to the following: GI  bleed, electrolyte abnormality, anemia, urinary tract infection, SILVIA      PROGRESS, DATA ANALYSIS, CONSULTS, AND MEDICAL DECISION MAKING        ED Course as of Jun 03 0311 Wed Jun 02, 2021 2324 Discussed pertinent information from history and physical exam with patient.  Discussed differential diagnosis and plan for ED evaluation/work-up and treatment including labs, CT abdomen and pelvis, pain control and IV fluids.  All questions answered.  Patient is agreeable with this plan.        [MS]   2325 Hemoglobin(!): 9.7 [MS]   2356 BUN(!): 68 [MS]   2356 Creatinine(!): 3.25 [MS]   2356 CO2(!): 15.2 [MS]   2356 Alkaline Phosphatase(!): 181 [MS]   Thu Jun 03, 2021   0035 3 months ago patient's BUN and creatinine were 18 and 1.99.  Patient has urinary tract infection currently, urine was sent for culture in 1 g of IV Rocephin ordered.    [MS]   0135 Consult Note    Discussed care with MARSHA Bernardo with A  Reviewed patient's history, exam, results and need for admission secondary to urinary tract infection, SILVIA, GI bleed, leg sores  Dr. Smith accepts the patient to be admitted to telemetry inpatient bed.        [MS]   0137 Reviewed pt's history and workup with Dr. Gasca.  After a bedside evaluation, they agree with the plan of care.          [MS]      ED Course User Index  [MS] Alicia Harris, APRN     ADMISSION    Discussed treatment plan and reason for admission with pt/family and admitting physician.  Pt/family voiced understanding of the plan for admission for further testing/treatment as needed.      DIAGNOSIS  Final diagnoses:   Gastrointestinal hemorrhage, unspecified gastrointestinal hemorrhage type   Acute UTI   SILVIA (acute kidney injury) (CMS/[facility])   Sore on leg           MEDICATIONS GIVEN IN ED    Medications   sodium chloride 0.9 % bolus 1,000 mL (0 mL Intravenous Stopped 6/3/21 0134)   morphine injection 4 mg (4 mg Intravenous Given 6/2/21 2357)   ondansetron (ZOFRAN) injection 4 mg (4 mg  Intravenous Given 6/2/21 1567)   cefTRIAXone (ROCEPHIN) IVPB 1 g (0 g Intravenous Stopped 6/3/21 9804)           COURSE & MEDICAL DECISION MAKING  Any/All labs and Any/All Imaging studies that were ordered were reviewed and are noted above.  Results were reviewed/discussed with the patient and they were also made aware of online access.    Pt also made aware that some labs, such as cultures, will not be resulted during ER visit and follow up with PMD is necessary.        Alicia Harris APRN  06/03/21 0313      Electronically signed by Kolby Gasca MD at 06/03/21 0659     Kolby Gasca MD at 06/03/21 0206        The MOUSTAPHA and I have discussed this patients history, physical exam, and treatment plan. I have reviewed the documentation and personally had a face to face interaction with the patient. I affirm the documentation and agree with the treatment and plan.  The following note describes my personal findings    This patient is a 65-year-old male presents to the ED from home complaining of generalized abdominal discomfort, nausea/vomiting, as well as dark stools that has been present for the past 2 days.    Exam: This patient is resting comfortably in mild distress.  He is without gross neurological deficit.  He is afebrile with stable vital signs and nontoxic in appearance.  Abdomen is soft with mild diffuse tenderness to palpation.  There is no rebound or guarding present.    Plan: We will do a Hemoccult in the emergency room.  We will obtain labs as well as a CT scan of the abdomen and pelvis.  We will reassess following.      The patient was wearing a facemask upon entrance into the room and remained in such throughout their visit.  I was wearing PPE including a facemask, eye protection, as well as gloves at any point entering the room and throughout the visit     Kolby Gasca MD  06/03/21 0659      Electronically signed by Kolby Gasca MD at 06/03/21 0659     Karis Elkins  "OSIRIS CALERO at 06/03/21 0206        Attempted to call report. RN unavailable at this time. She will call back in about 10 minutes.     Karis Elkins RN  06/03/21 0207      Electronically signed by Karis Elkins RN at 06/03/21 0207     Carolyn Villanueva RN at 06/03/21 0225          ..  Nursing report ED to floor  Gregorio Alejandro  65 y.o.  male    HPI (triage note):   Chief Complaint   Patient presents with   • Black or Bloody Stool   • Abdominal Pain       Admitting doctor:   Parker Smith MD    Admitting diagnosis:   The primary encounter diagnosis was Gastrointestinal hemorrhage, unspecified gastrointestinal hemorrhage type. Diagnoses of Acute UTI, SILVIA (acute kidney injury) (CMS/HCC), and Sore on leg were also pertinent to this visit.    Code status:   Current Code Status     Date Active Code Status Order ID Comments User Context       6/3/2021 0152 CPR [id number]  Kaylynn Bennett APRN ED     Advance Care Planning Activity      Questions for Current Code Status     Question Answer Comment    Code Status CPR     Medical Interventions (Level of Support Prior to Arrest) Full           Allergies:   Patient has no known allergies.    Weight:   There were no vitals filed for this visit.    Most recent vitals:   Vitals:    06/02/21 2133 06/03/21 0107 06/03/21 0130 06/03/21 0200   BP: 134/82 132/78 157/96 134/82   Pulse: 84 77 78 73   Resp: 18 17 16 16   Temp: 96.7 °F (35.9 °C)      TempSrc: Tympanic      SpO2: 99% 98% 99% 98%   Height: 190.5 cm (75\")          Active LDAs/IV Access:   Lines, Drains & Airways    Active LDAs     Name:   Placement date:   Placement time:   Site:   Days:    Peripheral IV 06/02/21 2243 Right Antecubital   06/02/21 2243    Antecubital   less than 1    Urethral Catheter Silicone 16 Fr.   02/16/21    1750     106                Labs (abnormal labs have a star):   Labs Reviewed   COMPREHENSIVE METABOLIC PANEL - Abnormal; Notable for the following components:       Result Value    Glucose 233 (*)  "    BUN 68 (*)     Creatinine 3.25 (*)     Chloride 111 (*)     CO2 15.2 (*)     Albumin 3.10 (*)     Alkaline Phosphatase 181 (*)     eGFR   Amer 23 (*)     All other components within normal limits    Narrative:     GFR Normal >60  Chronic Kidney Disease <60  Kidney Failure <15     URINALYSIS W/ MICROSCOPIC IF INDICATED (NO CULTURE) - Abnormal; Notable for the following components:    Appearance, UA Turbid (*)     Blood, UA Small (1+) (*)     Protein,  mg/dL (2+) (*)     Leuk Esterase, UA Large (3+) (*)     All other components within normal limits   CBC WITH AUTO DIFFERENTIAL - Abnormal; Notable for the following components:    Hemoglobin 9.7 (*)     Hematocrit 31.2 (*)     MCV 75.0 (*)     MCH 23.3 (*)     MCHC 31.1 (*)     RDW 21.8 (*)     RDW-SD 55.4 (*)     Lymphocyte % 18.7 (*)     All other components within normal limits   URINALYSIS, MICROSCOPIC ONLY - Abnormal; Notable for the following components:    WBC, UA Too Numerous to Count (*)     Bacteria, UA 4+ (*)     All other components within normal limits   POCT OCCULT BLOOD STOOL (ED ONLY) - Abnormal; Notable for the following components:    Fecal Occult Blood Positive (*)     All other components within normal limits   LACTIC ACID, PLASMA - Normal   COVID PRE-OP / PRE-PROCEDURE SCREENING ORDER (NO ISOLATION)    Narrative:     The following orders were created for panel order COVID PRE-OP / PRE-PROCEDURE SCREENING ORDER (NO ISOLATION) - Swab, Nasopharynx.  Procedure                               Abnormality         Status                     ---------                               -----------         ------                     COVID-19,APTIMA PANTHER,...[[id number]]                      In process                   Please view results for these tests on the individual orders.   COVID-19,APTIMA PANTHER,[facility],NP/OP SWAB IN UTM/VTM/[facility] TRANSPORT MEDIA,24 HR TAT   URINE CULTURE   RAINBOW DRAW    Narrative:     The following orders were  created for panel order [place] Draw.  Procedure                               Abnormality         Status                     ---------                               -----------         ------                     Light Blue Top[[id number]]                                   Final result               Green Top (Gel)[[id number]]                                  Final result               Lavender Top[[id number]]                                     Final result               Gold Top - SST[[id number]]                                   Final result                 Please view results for these tests on the individual orders.   BASIC METABOLIC PANEL   HEMOGLOBIN A1C   HEMOGLOBIN AND HEMATOCRIT, BLOOD   POCT GLUCOSE FINGERSTICK   POCT GLUCOSE FINGERSTICK   POCT GLUCOSE FINGERSTICK   POCT GLUCOSE FINGERSTICK   TYPE AND SCREEN   LIGHT BLUE TOP   GREEN TOP   LAVENDER TOP   GOLD TOP - SST   CBC AND DIFFERENTIAL    Narrative:     The following orders were created for panel order CBC & Differential.  Procedure                               Abnormality         Status                     ---------                               -----------         ------                     CBC Auto Differential[[id number]]        Abnormal            Final result                 Please view results for these tests on the individual orders.       EKG:   No orders to display       Meds given in ED:   Medications   sodium chloride 0.9 % flush 10 mL (has no administration in time range)   sodium chloride 0.9 % flush 10 mL (has no administration in time range)   sodium chloride 0.9 % flush 10 mL (has no administration in time range)   nitroglycerin (NITROSTAT) SL tablet 0.4 mg (has no administration in time range)   sodium chloride 0.9 % infusion (has no administration in time range)   acetaminophen (TYLENOL) tablet 650 mg (has no administration in time range)     Or   acetaminophen (TYLENOL) 160 MG/5ML solution 650 mg (has no administration in time range)      Or   acetaminophen (TYLENOL) suppository 650 mg (has no administration in time range)   ondansetron (ZOFRAN) tablet 4 mg (has no administration in time range)     Or   ondansetron (ZOFRAN) injection 4 mg (has no administration in time range)   calcium carbonate (TUMS) chewable tablet 500 mg (200 mg elemental) (has no administration in time range)   dextrose (GLUTOSE) oral gel 15 g (has no administration in time range)   dextrose (D50W) 25 g/ 50mL Intravenous Solution 25 g (has no administration in time range)   glucagon (human recombinant) (GLUCAGEN DIAGNOSTIC) injection 1 mg (has no administration in time range)   insulin lispro (ADMELOG) injection 0-9 Units (has no administration in time range)   sodium chloride 0.9 % bolus 1,000 mL (0 mL Intravenous Stopped 6/3/21 0134)   morphine injection 4 mg (4 mg Intravenous Given 6/2/21 2357)   ondansetron (ZOFRAN) injection 4 mg (4 mg Intravenous Given 6/2/21 2355)   cefTRIAXone (ROCEPHIN) IVPB 1 g (0 g Intravenous Stopped 6/3/21 0134)       Imaging results:  CT Abdomen Pelvis Without Contrast    Result Date: 6/3/2021   1. Thick-walled appearance to the rectosigmoid. Similar findings were present on prior exam. Colitis is not excluded. 2. The stomach is markedly distended and fluid-filled. Duodenum appears relatively decompressed. Appearance may reflect some gastroparesis or gastric outlet obstruction. 2. Distention of the urinary bladder. This may reflect some urinary retention. There is air seen within the bladder, and correlation with recent instrumentation is suggested.  Radiation dose reduction techniques were utilized, including automated exposure control and exposure modulation based on body size.  This report was finalized on 6/3/2021 1:03 AM by Dr. Vero Hansen M.D.        Ambulatory status:   -     Social issues:   Social History     Socioeconomic History   • Marital status:      Spouse name: Not on file   • Number of children: Not on file   •  Years of education: Not on file   • Highest education level: Not on file   Tobacco Use   • Smoking status: Never Smoker   • Smokeless tobacco: Never Used   Vaping Use   • Vaping Use: Never used   Substance and Sexual Activity   • Alcohol use: Yes   • Drug use: Never   • Sexual activity: Defer    Nursing report ED to floor       Carolyn Villanueva, RN  06/03/21 0225      Electronically signed by Carolyn Villanueva RN at 06/03/21 0225

## 2021-06-07 NOTE — PROGRESS NOTES
"[facility] PROGRESS NOTE     LOS: 3 days      Reason for visit:  Follow-up on SILVIA on CKD stage III    Interval History:   Patient feeling better overall  Renal function significantly improved and stabilizing    Review of Systems:   14 points review of systems is otherwise negative except for mentioned above in HPI    Objective     Vital Signs  Temp:  [97.3 °F (36.3 °C)-98.1 °F (36.7 °C)] 97.5 °F (36.4 °C)  Heart Rate:  [67-89] 73  Resp:  [16-20] 16  BP: (113-164)/(75-94) 149/94    Flowsheet Rows      First Filed Value   Admission Height  190.5 cm (75\") Documented at 06/02/2021 2133   Admission Weight  103 kg (227 lb 12.8 oz) Documented at 06/03/2021 0253          I/O this shift:  In: -   Out: 700 [Urine:700]  I/O last 3 completed shifts:  In: 1360 [P.O.:1360]  Out: 2450 [Urine:2450]    Intake/Output Summary (Last 24 hours) at 6/7/2021 1202  Last data filed at 6/7/2021 0900  Gross per 24 hour   Intake 480 ml   Output 2150 ml   Net -1670 ml       Physical Exam:  General Appearance: alert no acute distress, ill-appearing  Skin: warm and dry  HEENT: oral mucosa normal, nonicteric sclera  Neck: supple, no JVD  Lungs: Decreased breath sounds bilaterally  Heart: RRR, normal S1 and S2  Abdomen: soft, nontender, nondistended  : no palpable bladder  Extremities: no edema, cyanosis or clubbing     Results Review:    Results from last 7 days   Lab Units 06/07/21  1106 06/06/21  1516 06/05/21  0800 06/02/21  2238   SODIUM mmol/L 140 138 137 138   POTASSIUM mmol/L 3.3* 3.7 4.7 3.9   CHLORIDE mmol/L 114* 112* 114* 111*   CO2 mmol/L 16.0* 16.2* 13.5* 15.2*   BUN mg/dL 40* 48* 54* 68*   CREATININE mg/dL 2.60* 2.37* 2.54* 3.25*   CALCIUM mg/dL 8.0* 8.2* 8.3* 8.6   BILIRUBIN mg/dL  --   --   --  0.3   ALK PHOS U/L  --   --   --  181*   ALT (SGPT) U/L  --   --   --  7   AST (SGOT) U/L  --   --   --  7   GLUCOSE mg/dL 161* 152* 98 233*     Results from last 7 days   Lab Units 06/07/21  1106 06/06/21  1516 06/05/21  0800 "   PHOSPHORUS mg/dL 2.9 3.1 3.6             Medication Review:   Medication list reviewed in detail    Assessment/Plan     ASSESSMENT:  1.  Nonoliguric SILVIA on CKD stage III, likely related to prerenal azotemia in the setting of volume depletion/diarrhea with possibly an element of bladder outlet obstruction/urinary retention.  2.  Non-anion gap metabolic acidosis  3.  Hypertension  4.  UTI    PLAN:  1.  Renal function finally stabilizing, continue same antihypertensive regimen for now.  2.  Okay to DC from renal standpoint when they read by other consultants.  3.  Upon DC, continue Flomax and amlodipine 2.5 mg daily.  Sodium bicarbonate may be decreased to 650 mg twice daily upon discharge.    Thank you for involving us in the care of Mr. Alejandro.  We will continue to follow along.      Des Joy MD  06/07/21  12:02 EDT    [facility]  [phone]    Much of this encounter note is an electronic transcription/translation of spoken language to printed text. The electronic translation of spoken language may permit erroneous, or at times, nonsensical words or phrases to be inadvertently transcribed; Although I have reviewed the note for such errors, some may still exist.

## 2021-06-07 NOTE — PLAN OF CARE
Goal Outcome Evaluation:  Plan of Care Reviewed With: patient  Progress: no change  Outcome Summary: A/Ox4, pt does repeated requests for repositioning, BMx1, lopez care done, IV anitbx, Afib controlled, will monitor

## 2021-06-07 NOTE — PROGRESS NOTES
Continued Stay Note  [facility]     Patient Name: Gregorio Alejandro  MRN: [id number]  Today's Date: 6/7/2021    Admit Date: 6/2/2021    Discharge Plan     Row Name 06/07/21 1552       Plan    Plan  [facility]    Plan Comments  [facility] will accept for LTC, bed avail Tue 6/8; CCP will arrange ambulance transport. Spouse/ Patricia notified and is agreeable. CCP f/u in AM for potential dc orders.        Discharge Codes    No documentation.             Sujatha Escobar RN

## 2021-06-07 NOTE — PROGRESS NOTES
Gastroenterology   Inpatient Progress Note    Reason for Follow Up:  Melena, anemia    Subjective  Interval History:   EGD flex sig June 5, 2021.  Patient denies abdominal pain, nausea or vomiting.  He is feeling well and requesting discharge.    Current Facility-Administered Medications:   •  acetaminophen (TYLENOL) tablet 650 mg, 650 mg, Oral, Q4H PRN, 650 mg at 06/06/21 0320 **OR** acetaminophen (TYLENOL) 160 MG/5ML solution 650 mg, 650 mg, Oral, Q4H PRN **OR** acetaminophen (TYLENOL) suppository 650 mg, 650 mg, Rectal, Q4H PRN, Kaylynn Bennett APRN  •  amLODIPine (NORVASC) tablet 2.5 mg, 2.5 mg, Oral, Q24H, Dane Reeves MD, 2.5 mg at 06/07/21 0831  •  ammonium lactate (AMLACTIN) cream, , Topical, Q12H PRN, Rashad Ayala MD  •  atorvastatin (LIPITOR) tablet 20 mg, 20 mg, Oral, Daily, Rashad Ayala MD, 20 mg at 06/07/21 0832  •  brimonidine (ALPHAGAN P) 0.1 % ophthalmic solution 1 drop, 1 drop, Both Eyes, BID, Rashad Ayala MD, 1 drop at 06/07/21 0829  •  calcium carbonate (TUMS) chewable tablet 500 mg (200 mg elemental), 2 tablet, Oral, BID PRN, Kaylynn Bennett APRN  •  cefTRIAXone (ROCEPHIN) IVPB 1 g, 1 g, Intravenous, Q24H, Tonja Azevedo MD  •  dextrose (D50W) 25 g/ 50mL Intravenous Solution 25 g, 25 g, Intravenous, Q15 Min PRN, Kaylynn Bennett APRN  •  dextrose (GLUTOSE) oral gel 15 g, 15 g, Oral, Q15 Min PRN, Kaylynn Bennett APRN  •  dorzolamide (TRUSOPT) 2 % ophthalmic solution 1 drop, 1 drop, Right Eye, TID, Rashad Ayala MD, 1 drop at 06/06/21 2112  •  glucagon (human recombinant) (GLUCAGEN DIAGNOSTIC) injection 1 mg, 1 mg, Subcutaneous, PRN, Kaylynn Bennett APRN  •  insulin lispro (ADMELOG) injection 0-9 Units, 0-9 Units, Subcutaneous, 4x Daily With Meals & Nightly, Kaylynn Bennett APRN, 2 Units at 06/05/21 1738  •  metroNIDAZOLE (FLAGYL) 500 mg/100mL IVPB, 500 mg, Intravenous, Q8H, Tonja Azevedo MD, Stopped at 06/07/21 0510  •   nitroglycerin (NITROSTAT) SL tablet 0.4 mg, 0.4 mg, Sublingual, Q5 Min PRN, Kaylynn Bennett APRN  •  ondansetron (ZOFRAN) tablet 4 mg, 4 mg, Oral, Q6H PRN **OR** ondansetron (ZOFRAN) injection 4 mg, 4 mg, Intravenous, Q6H PRN, Kaylynn Bennett APRN  •  sennosides-docusate (PERICOLACE) 8.6-50 MG per tablet 1 tablet, 1 tablet, Oral, Nightly, Chrissy Gordon MD, 1 tablet at 06/04/21 2029  •  sodium bicarbonate tablet 1,300 mg, 1,300 mg, Oral, TID, Dane Reeves MD, 1,300 mg at 06/07/21 0832  •  [COMPLETED] Insert peripheral IV, , , Once **AND** sodium chloride 0.9 % flush 10 mL, 10 mL, Intravenous, PRN, Alicia Harris APRN  •  sodium chloride 0.9 % flush 10 mL, 10 mL, Intravenous, Q12H, Kaylynn Bennett APRN, 10 mL at 06/06/21 2112  •  sodium chloride 0.9 % flush 10 mL, 10 mL, Intravenous, PRN, Kaylynn Bennett APRN  •  tamsulosin (FLOMAX) 24 hr capsule 0.4 mg, 0.4 mg, Oral, Daily, Dane Reeves MD, 0.4 mg at 06/07/21 0832  Review of Systems:               The following systems were reviewed and negative;  constitution and gastrointestinal    Objective     Vital Signs  Temp:  [97.3 °F (36.3 °C)-98.1 °F (36.7 °C)] 97.5 °F (36.4 °C)  Heart Rate:  [67-89] 73  Resp:  [16-20] 16  BP: (113-164)/(75-94) 149/94  Body mass index is 28.47 kg/m².                  Physical Exam:              General: patient awake, alert and cooperative, appears older than stated age              Eyes: no scleral icterus              Skin: warm and dry, not jaundiced, bilateral lower extremities wrapped and in protective boots              Abdomen: soft, nontender, nondistended; normal bowel sounds              Psychiatric: Appropriate affect and behavior                Results Review:                I reviewed the patient's new clinical results.    Results from last 7 days   Lab Units 06/06/21  2359 06/06/21  1516 06/05/21  0010 06/04/21  0928 06/02/21  2238   WBC 10*3/mm3  --  7.90  --   7.29 5.95   HEMOGLOBIN g/dL 8.2* 8.4* 9.5* 8.6*  8.6* 9.7*   HEMATOCRIT % 26.0* 25.9* 30.5* 27.2*  27.2* 31.2*   PLATELETS 10*3/mm3  --  170  --  161 201     Results from last 7 days   Lab Units 06/06/21  1516 06/05/21  0800 06/04/21  0928 06/02/21  2238   SODIUM mmol/L 138 137 136 138   POTASSIUM mmol/L 3.7 4.7 4.1 3.9   CHLORIDE mmol/L 112* 114* 113* 111*   CO2 mmol/L 16.2* 13.5* 12.0* 15.2*   BUN mg/dL 48* 54* 56* 68*   CREATININE mg/dL 2.37* 2.54* 2.81* 3.25*   CALCIUM mg/dL 8.2* 8.3* 8.0* 8.6   BILIRUBIN mg/dL  --   --   --  0.3   ALK PHOS U/L  --   --   --  181*   ALT (SGPT) U/L  --   --   --  7   AST (SGOT) U/L  --   --   --  7   GLUCOSE mg/dL 152* 98 188* 233*           Radiology:  XR Abdomen KUB   Final Result      CT Abdomen Pelvis Without Contrast   Final Result       1. Thick-walled appearance to the rectosigmoid. Similar findings were   present on prior exam. Colitis is not excluded.   2. The stomach is markedly distended and fluid-filled. Duodenum appears   relatively decompressed. Appearance may reflect some gastroparesis or   gastric outlet obstruction.   2. Distention of the urinary bladder. This may reflect some urinary   retention. There is air seen within the bladder, and correlation with   recent instrumentation is suggested.       Radiation dose reduction techniques were utilized, including automated   exposure control and exposure modulation based on body size.       This report was finalized on 6/3/2021 1:03 AM by Dr. Vero Hansen M.D.              Assessment/Plan     Patient Active Problem List   Diagnosis   • Acute UTI (urinary tract infection)   • Macrocytosis   • Sepsis secondary to UTI (CMS/HCC)   • Metabolic encephalopathy   • Hyperlipidemia   • Hypertension   • Monoclonal gammopathy   • Stage 4 chronic kidney disease (CMS/HCC)   • DM2 (diabetes mellitus, type 2) (CMS/HCC)   • Abnormal CT of the abdomen   • Normal anion gap metabolic acidosis   • Anemia, chronic disease   •  Ventricular tachycardia (paroxysmal) (CMS/HCC)   • Acute metabolic encephalopathy   • UTI (urinary tract infection), bacterial   • Elevated troponin   • Hypokalemia   • SILVIA (acute kidney injury) (CMS/HCC)   • Uncontrolled hypertension   • Septicemia (CMS/HCC)   • Vascular dementia without behavioral disturbance (CMS/HCC)   • Gastrointestinal hemorrhage   • Immobility   • Right upper lobe pneumonia   • CKD (chronic kidney disease) stage 3, GFR 30-59 ml/min (CMS/[facility])       Assessment:  1. Diabetic gastroparesis  2. Heme positive stool  3. Abnormal CT scan of rectosigmoid colon, no acute findings on recent flexible sigmoidoscopy  4. Melena      Plan:  · Biopsies from recent EGD pending  · Given gastritis on recent EGD, will add Pepcid 20 mg twice daily    Patient is stable from GI standpoint, okay for discharge from GI standpoint as discussed with patient however he is aware that we are not the only provider following him during this hospitalization     I discussed the patients findings and my recommendations with patient and nursing staff.           Irma MONCADA  [facility] [address] [address]  Office: [phone]

## 2021-06-07 NOTE — DISCHARGE PLACEMENT REQUEST
"Mor Alejandro (65 y.o. Male)     Date of Birth Social Security Number Address Home Phone MRN    1955  [address] [facility]  [address] [place] [address] [phone] [id number]    Buddhism Marital Status          Catholic        Admission Date Admission Type Admitting Provider Attending Provider Department, Room/Bed    6/2/21 Emergency Parker Smith MD Beard, Lyle E, MD [address], [address]1    Discharge Date Discharge Disposition Discharge Destination                       Attending Provider: Jono Montes MD    Allergies: No Known Allergies    Isolation: None   Infection: None   Code Status: No CPR    Ht: 190.5 cm (75\")   Wt: 103 kg (227 lb 12.8 oz)    Admission Cmt: None   Principal Problem: Gastrointestinal hemorrhage [K92.2]                 Active Insurance as of 6/2/2021     Primary Coverage     Payor Plan Insurance Group Employer/Plan Group    WELLCARE [facility] MEDICARE REPLACEMENT WELLCARE MEDICARE REPLACEMENT [id number]     Payor Plan Address Payor Plan Phone Number Payor Plan Fax Number Effective Dates    [address] [phone]  8/13/2020 - None Entered    [facility] [id number]       Subscriber Name Subscriber Birth Date Member ID       Mor Alejandro 1955 [id number]           Secondary Coverage     Payor Plan Insurance Group Employer/Plan Group    [id number]      Payor Plan Address Payor Plan Phone Number Payor Plan Fax Number Effective Dates    [address]   2/1/2021 - None Entered    [facility] [id number]       Subscriber Name Subscriber Birth Date Member ID       MOR ALEJANDRO 1955 [id number]                 Emergency Contacts      (Rel.) Home Phone Work Phone Mobile Phone    Patricia Alonzo (Spouse) [phone] -- [phone]    Celia Eastman (Daughter) [phone] -- --    Zeenat Wang (Daughter) -- -- [phone]              "

## 2021-06-07 NOTE — PROGRESS NOTES
Gregorio Alejandro   65 y.o.  male    LOS: 3 days   Patient Care Team:  Maya Ribeiro APRN as PCP - General (Family Medicine)      Subjective     Review of Systems:   no complaints. No SOA     Medication Review:   Current Facility-Administered Medications:   •  acetaminophen (TYLENOL) tablet 650 mg, 650 mg, Oral, Q4H PRN, 650 mg at 06/06/21 0320 **OR** acetaminophen (TYLENOL) 160 MG/5ML solution 650 mg, 650 mg, Oral, Q4H PRN **OR** acetaminophen (TYLENOL) suppository 650 mg, 650 mg, Rectal, Q4H PRN, Kaylynn Bennett APRN  •  amLODIPine (NORVASC) tablet 2.5 mg, 2.5 mg, Oral, Q24H, Dane Reeves MD, 2.5 mg at 06/07/21 0831  •  ammonium lactate (AMLACTIN) cream, , Topical, Q12H PRN, Rashad Ayala MD  •  atorvastatin (LIPITOR) tablet 20 mg, 20 mg, Oral, Daily, Rashad Ayala MD, 20 mg at 06/07/21 0832  •  brimonidine (ALPHAGAN P) 0.1 % ophthalmic solution 1 drop, 1 drop, Both Eyes, BID, Rashad Ayala MD, 1 drop at 06/07/21 0829  •  calcium carbonate (TUMS) chewable tablet 500 mg (200 mg elemental), 2 tablet, Oral, BID PRN, Kaylynn Bennett APRN  •  cefTRIAXone (ROCEPHIN) IVPB 1 g, 1 g, Intravenous, Q24H, Tonja Azevedo MD  •  dextrose (D50W) 25 g/ 50mL Intravenous Solution 25 g, 25 g, Intravenous, Q15 Min PRN, Kaylynn Bennett APRN  •  dextrose (GLUTOSE) oral gel 15 g, 15 g, Oral, Q15 Min PRN, Kaylynn Bennett APRN  •  dorzolamide (TRUSOPT) 2 % ophthalmic solution 1 drop, 1 drop, Right Eye, TID, Rashad Ayala MD, 1 drop at 06/06/21 2112  •  famotidine (PEPCID) tablet 20 mg, 20 mg, Oral, BID AC, Irma Shaffer, APRN  •  glucagon (human recombinant) (GLUCAGEN DIAGNOSTIC) injection 1 mg, 1 mg, Subcutaneous, PRN, Kaylynn Bennett APRN  •  insulin lispro (ADMELOG) injection 0-9 Units, 0-9 Units, Subcutaneous, 4x Daily With Meals & Nightly, Kaylynn Bennett APRN, 2 Units at 06/05/21 2858  •  metroNIDAZOLE (FLAGYL) 500 mg/100mL IVPB, 500 mg, Intravenous, Q8H,  Tonja Azevedo MD, Stopped at 06/07/21 0510  •  nitroglycerin (NITROSTAT) SL tablet 0.4 mg, 0.4 mg, Sublingual, Q5 Min PRN, Kaylynn Bennett APRN  •  ondansetron (ZOFRAN) tablet 4 mg, 4 mg, Oral, Q6H PRN **OR** ondansetron (ZOFRAN) injection 4 mg, 4 mg, Intravenous, Q6H PRN, Kaylynn Bennett APRN  •  sennosides-docusate (PERICOLACE) 8.6-50 MG per tablet 1 tablet, 1 tablet, Oral, Nightly, Chrissy Gordon MD, 1 tablet at 06/04/21 2029  •  sodium bicarbonate tablet 1,300 mg, 1,300 mg, Oral, TID, Dane Reeves MD, 1,300 mg at 06/07/21 0832  •  [COMPLETED] Insert peripheral IV, , , Once **AND** sodium chloride 0.9 % flush 10 mL, 10 mL, Intravenous, PRN, Alicia Harris APRN  •  sodium chloride 0.9 % flush 10 mL, 10 mL, Intravenous, Q12H, Kaylynn Bennett APRN, 10 mL at 06/06/21 2112  •  sodium chloride 0.9 % flush 10 mL, 10 mL, Intravenous, PRN, Kaylynn Bennett APRN  •  tamsulosin (FLOMAX) 24 hr capsule 0.4 mg, 0.4 mg, Oral, Daily, Dane Reeves MD, 0.4 mg at 06/07/21 0832      Objective     Vital Sign Min/Max for last 24 hours  Temp  Min: 97.3 °F (36.3 °C)  Max: 98.1 °F (36.7 °C)   BP  Min: 113/75  Max: 164/90    Pulse  Min: 67  Max: 89         06/03/21  0253   Weight: 103 kg (227 lb 12.8 oz)        Intake/Output Summary (Last 24 hours) at 6/7/2021 1108  Last data filed at 6/7/2021 0900  Gross per 24 hour   Intake 480 ml   Output 2150 ml   Net -1670 ml     Physical Exam:      General Appearance:   chronically ill-appearing male resting in bed in no acute distress   Neck:    no JVD   Lungs:    BLL atelectasis. No wheezes, rhonchi or rales. No accessory muscle use.     Heart:    Irregular rate and rhythm.  Normal S1 and S2. No murmur, no gallop, rub or lift.    Abdomen:     Normal bowel sounds, soft non-tender, non-distended   Extremities:  Difficult to assess bilateral lower extremity edema is bilateral lower extremities wrapped in Ace no edema seen in  thigh area    Skin:   Warm and dry   Neurologic:   awake alert and oriented x3, speech clear and approp      Monitor: STANISLAW hines with CVR  Results Review:     I reviewed the patient's new clinical results.    Sodium Sodium   Date Value Ref Range Status   06/06/2021 138 136 - 145 mmol/L Final   06/05/2021 137 136 - 145 mmol/L Final      Potassium Potassium   Date Value Ref Range Status   06/06/2021 3.7 3.5 - 5.2 mmol/L Final   06/05/2021 4.7 3.5 - 5.2 mmol/L Final     Comment:     Slight hemolysis detected by analyzer. Results may be affected.      Chloride Chloride   Date Value Ref Range Status   06/06/2021 112 (H) 98 - 107 mmol/L Final   06/05/2021 114 (H) 98 - 107 mmol/L Final      Bicarbonate No results found for: PLASMABICARB   BUN BUN   Date Value Ref Range Status   06/06/2021 48 (H) 8 - 23 mg/dL Final   06/05/2021 54 (H) 8 - 23 mg/dL Final      Creatinine Creatinine   Date Value Ref Range Status   06/06/2021 2.37 (H) 0.76 - 1.27 mg/dL Final   06/05/2021 2.54 (H) 0.76 - 1.27 mg/dL Final      Calcium Calcium   Date Value Ref Range Status   06/06/2021 8.2 (L) 8.6 - 10.5 mg/dL Final   06/05/2021 8.3 (L) 8.6 - 10.5 mg/dL Final      Magnesium No results found for: MG     Results from last 7 days   Lab Units 06/06/21  2359 06/06/21  1516   WBC 10*3/mm3  --  7.90   HEMOGLOBIN g/dL 8.2* 8.4*   HEMATOCRIT % 26.0* 25.9*   PLATELETS 10*3/mm3  --  170         Lab Results   Component Value Date    CHOL 108 12/22/2020     Lab Results   Component Value Date    HDL 49 12/22/2020     Lab Results   Component Value Date    LDL 42 12/22/2020     Lab Results   Component Value Date    TRIG 86 12/22/2020     Assessment/Plan   Assessment/ Plan  1.  Persistent atrial fibrillation         -[id number] score 3, Eliquis was started on last admit 2/2021  2. GIB/ acute on chronic anemia        -EGD 6/5/21: Esophageal plaques were found, consistent with candidiasis. Cells for cytology obtained. Gastritis. Biopsied. Duodenitis. Biopsied. The examination was  otherwise normal.        -Unable to perform colonoscopy due to poor preparation  3. HTN  4. H/o NSVT   5.  History of pauses and marked first-degree AV block        -status post LINQ loop recorder 4/23/2019        -NO BETA BLOCKER  6. ESRD   7.  Chronic bilateral lower extremity edema  8.  UTI urine culture positive for Klebsiella   9. OFELIA, previously insurance had denied CPAP  10.  Urinary retention  11.  Right upper lobe pneumonia    Plan:  Hemoglobin continues to be low, may not be a candidate for long-term anticoagulation, chart review shows that Eliquis was started in February of last admission.  Patient had previously not been on anticoagulation due to anemia.    Blood pressure running slightly high, may need to re-start hydralazine previously on hold due to low blood pressures.  Also unable to use ACE or ARB given ESRD (nephrology following) and Norvasc decreased due to to chronic bilateral lower extremity edema. As documented unable to tolerate beta-blockers due to history of cardiac pauses.  Reviewed telemetry no pauses noted.  Patient does have loop recorder which was placed in April 2019.     Encourage incentive spirometer, order placed.  Discussed with RN      ANTWAN Bess  06/07/21  11:08 EDT      Time: 18 mins    Dictated portions using Dragon dictation software.     During the entire encounter, I was wearing recommended PPE including face mask and eye protection. Hand sanitization was performed prior to entering room and upon exit.    Paddy Low M.D.   Reviewed our cardiology group Nurse Practitioner's note.    Pt interviewed and examined.   Findings verified.   Reviewed and agree with corrections or modifications of history, physical, and plans as indicated:     Stable cardiac status.  Blood pressures running as low as 113/75, up to 149/94.    EMR Dragon/Transcription disclaimer:   Much of this encounter note is an electronic transcription/translation of spoken language to printed text.  The electronic translation of spoken language may permit erroneous, or at times, nonsensical words or phrases to be inadvertently transcribed.  Although I have reviewed the note for such errors, some may still exist. Please contact me if needed for clarification or correction.  Paddy Low M.D.

## 2021-06-08 NOTE — PROGRESS NOTES
[facility] Progress Note  6/8/2021  06:17 EDT    Dr. Don provided full consultation yesterday.  If any new issues arise, today, please call Dr. Tam Rubio, [facility]      Tam Rubio MD  [facility]  General & Reconstructive Urology  Office: [phone]  Fax: [phone]

## 2021-06-08 NOTE — PROGRESS NOTES
Gregorio Alejandro   65 y.o.  male    LOS: 3 days   Patient Care Team:  Maya Ribeiro APRN as PCP - General (Family Medicine)      Subjective   No acute complaints  Refusing labs    Review of Systems:   Lungs: no cough, no soa  CV: no CP, no palpitations  GI: no nausea, vomiting, diarrhea     Medication Review:   Current Facility-Administered Medications:   •  acetaminophen (TYLENOL) tablet 650 mg, 650 mg, Oral, Q4H PRN, 650 mg at 06/07/21 2145 **OR** acetaminophen (TYLENOL) 160 MG/5ML solution 650 mg, 650 mg, Oral, Q4H PRN **OR** acetaminophen (TYLENOL) suppository 650 mg, 650 mg, Rectal, Q4H PRN, Kaylynn Bennett APRN  •  amLODIPine (NORVASC) tablet 2.5 mg, 2.5 mg, Oral, Q24H, Dane Reeves MD, 2.5 mg at 06/08/21 0815  •  ammonium lactate (AMLACTIN) cream, , Topical, Q12H PRN, Rashad Ayala MD  •  atorvastatin (LIPITOR) tablet 20 mg, 20 mg, Oral, Daily, Rashad Ayala MD, 20 mg at 06/08/21 0815  •  brimonidine (ALPHAGAN P) 0.1 % ophthalmic solution 1 drop, 1 drop, Both Eyes, BID, Rashad Ayala MD, 1 drop at 06/08/21 0833  •  calcium carbonate (TUMS) chewable tablet 500 mg (200 mg elemental), 2 tablet, Oral, BID PRN, Kaylynn Bennett APRN  •  cefTRIAXone (ROCEPHIN) IVPB 1 g, 1 g, Intravenous, Q24H, Tonja Azevedo MD, Last Rate: 100 mL/hr at 06/08/21 0816, 1 g at 06/08/21 0816  •  dextrose (D50W) 25 g/ 50mL Intravenous Solution 25 g, 25 g, Intravenous, Q15 Min PRN, Kaylynn Bennett APRN  •  dextrose (GLUTOSE) oral gel 15 g, 15 g, Oral, Q15 Min PRN, Kaylynn Bennett APRN  •  dorzolamide (TRUSOPT) 2 % ophthalmic solution 1 drop, 1 drop, Right Eye, TID, Rashad Ayala MD, 1 drop at 06/08/21 0833  •  famotidine (PEPCID) tablet 20 mg, 20 mg, Oral, BID AC, Irma Shaffer, APRN, 20 mg at 06/08/21 0600  •  glucagon (human recombinant) (GLUCAGEN DIAGNOSTIC) injection 1 mg, 1 mg, Subcutaneous, PRN, Kaylynn Bennett APRN  •  hydrALAZINE (APRESOLINE) tablet 25 mg, 25 mg,  Oral, Q8H, Lyndsey Escoto MD  •  insulin lispro (ADMELOG) injection 0-9 Units, 0-9 Units, Subcutaneous, 4x Daily With Meals & Nightly, Kaylynn Bennett APRN, 2 Units at 06/07/21 1821  •  metroNIDAZOLE (FLAGYL) 500 mg/100mL IVPB, 500 mg, Intravenous, Q8H, Tonja Azevedo MD, Stopped at 06/08/21 0825  •  nitroglycerin (NITROSTAT) SL tablet 0.4 mg, 0.4 mg, Sublingual, Q5 Min PRN, Kaylynn Bennett APRN  •  ondansetron (ZOFRAN) tablet 4 mg, 4 mg, Oral, Q6H PRN **OR** ondansetron (ZOFRAN) injection 4 mg, 4 mg, Intravenous, Q6H PRN, Kaylynn Bennett APRN  •  potassium chloride (K-DUR,KLOR-CON) ER tablet 40 mEq, 40 mEq, Oral, Once, Lyndsey Esctoo MD  •  sennosides-docusate (PERICOLACE) 8.6-50 MG per tablet 1 tablet, 1 tablet, Oral, Nightly, Chrissy Gordon MD, 1 tablet at 06/07/21 2145  •  sodium bicarbonate tablet 1,300 mg, 1,300 mg, Oral, TID, Dane Reeves MD, 1,300 mg at 06/08/21 0815  •  [COMPLETED] Insert peripheral IV, , , Once **AND** sodium chloride 0.9 % flush 10 mL, 10 mL, Intravenous, PRN, Alicia Harris APRN  •  sodium chloride 0.9 % flush 10 mL, 10 mL, Intravenous, Q12H, Kaylynn Bennett APRN, 10 mL at 06/07/21 2145  •  sodium chloride 0.9 % flush 10 mL, 10 mL, Intravenous, PRN, Kaylynn Bennett APRN  •  tamsulosin (FLOMAX) 24 hr capsule 0.4 mg, 0.4 mg, Oral, Daily, Dane Reeves MD, 0.4 mg at 06/08/21 0816      Objective     Vital Sign Min/Max for last 24 hours  Temp  Min: 97.6 °F (36.4 °C)  Max: 98.1 °F (36.7 °C)   BP  Min: 125/85  Max: 145/91    Pulse  Min: 78  Max: 85         06/03/21  0253   Weight: 103 kg (227 lb 12.8 oz)        Intake/Output Summary (Last 24 hours) at 6/8/2021 1017  Last data filed at 6/8/2021 0542  Gross per 24 hour   Intake --   Output 1300 ml   Net -1300 ml       Physical Exam:    General Appearance:     no acute distress   Lungs:     Slight diminished bilaterally.  No wheezes, rhonchi or rales.     Heart:     irregular rate and rhythm.  Normal S1 and S2. No murmur, no gallop, rub or lift. , no JVD   Abdomen:     Normal bowel sounds, soft, non-tender, non-distended,   Extremities:   No clubbing, cyanosis. BLE in ace wrap   Skin:   Warm, dry   Neurologic:   awake alert and oriented x3      Monitor: afib cvr  Results Review:     I reviewed the patient's new clinical results.    Sodium   Date Value Ref Range Status   06/08/2021 141 136 - 145 mmol/L Final   06/07/2021 140 136 - 145 mmol/L Final   06/06/2021 138 136 - 145 mmol/L Final     Potassium   Date Value Ref Range Status   06/08/2021 3.3 (L) 3.5 - 5.2 mmol/L Final   06/07/2021 3.3 (L) 3.5 - 5.2 mmol/L Final   06/06/2021 3.7 3.5 - 5.2 mmol/L Final     Chloride   Date Value Ref Range Status   06/08/2021 115 (H) 98 - 107 mmol/L Final   06/07/2021 114 (H) 98 - 107 mmol/L Final   06/06/2021 112 (H) 98 - 107 mmol/L Final     No results found for: PLASMABICARB  BUN   Date Value Ref Range Status   06/08/2021 36 (H) 8 - 23 mg/dL Final   06/07/2021 40 (H) 8 - 23 mg/dL Final   06/06/2021 48 (H) 8 - 23 mg/dL Final     Creatinine   Date Value Ref Range Status   06/08/2021 2.20 (H) 0.76 - 1.27 mg/dL Final   06/07/2021 2.60 (H) 0.76 - 1.27 mg/dL Final   06/06/2021 2.37 (H) 0.76 - 1.27 mg/dL Final     Calcium   Date Value Ref Range Status   06/08/2021 8.2 (L) 8.6 - 10.5 mg/dL Final   06/07/2021 8.0 (L) 8.6 - 10.5 mg/dL Final   06/06/2021 8.2 (L) 8.6 - 10.5 mg/dL Final     Magnesium   Date Value Ref Range Status   06/08/2021 1.7 1.6 - 2.4 mg/dL Final       Results from last 7 days   Lab Units 06/07/21  1106   WBC 10*3/mm3 6.80   HEMOGLOBIN g/dL 8.6*   HEMATOCRIT % 27.5*   PLATELETS 10*3/mm3 168     Lab Results   Component Value Date    CHOL 108 12/22/2020     Lab Results   Component Value Date    HDL 49 12/22/2020     Lab Results   Component Value Date    LDL 42 12/22/2020     Lab Results   Component Value Date    TRIG 86 12/22/2020                     Results from last 7 days   Lab  Units 06/03/21  0922   HEMOGLOBIN A1C % 7.18*         Assessment/ Plan    1.  Persistent atrial fibrillation         -[id number] score 3, Eliquis was started on last admit 2/2021  2. GIB/ acute on chronic anemia        -EGD 6/5/21: Esophageal plaques were found, consistent with candidiasis. Cells for cytology obtained. Gastritis. Biopsied. Duodenitis. Biopsied. The examination was otherwise normal.        -Unable to perform colonoscopy due to poor preparation  3. HTN  4. H/o NSVT   5.  History of pauses and marked first-degree AV block        -status post LINQ loop recorder 4/23/2019        -NO BETA BLOCKER  6. ESRD   7.  Chronic bilateral lower extremity edema  8.  UTI urine culture positive for Klebsiella   9. OFELIA, previously insurance had denied CPAP  10.  Urinary retention  11.  Right upper lobe pneumonia    Currently on norvasc, lipitor and hydralazine No ACE/ ARB due to ESRD. He has a history of chronic anemia. Eliquis has been on hold due to GI work up.  Defer to MD on when or if to resume Elquis.    During the entire encounter, I was wearing recommended PPE including face mask and eye protection.  Hand sanitization was performed prior to entering room and upon exit.      Veronica Hayes, ANTWAN  06/08/21  10:17 EDT      Time: 25 min  Paddy Low M.D.   Reviewed our cardiology group Nurse Practitioner's note.    Pt interviewed and examined.   Findings verified.   Reviewed and agree with corrections or modifications of history, physical, and plans as indicated: Good blood pressure control, but patient refuses recheck of potassium.  Empirically gave 1 dose of 40 equivalents potassium.  Hemoglobin is stable.  If hemoglobin stays stable (8.6-9.3), would restart Eliquis.  Likely the GI bleed was in part related to the candidiasis.    EMR Dragon/Transcription disclaimer:   Much of this encounter note is an electronic transcription/translation of spoken language to printed text. The electronic translation of spoken  language may permit erroneous, or at times, nonsensical words or phrases to be inadvertently transcribed.  Although I have reviewed the note for such errors, some may still exist. Please contact me if needed for clarification or correction.  Paddy Low M.D.

## 2021-06-08 NOTE — PROGRESS NOTES
"DAILY PROGRESS NOTE  [facility]    Patient Identification:  Name: Gregorio Alejandro  Age: 65 y.o.  Sex: male  :  1955  MRN: [id number]         Primary Care Physician: Maya Ribeiro APRN    Subjective:  Interval History:No new complaints.    Objective:    Scheduled Meds:amLODIPine, 2.5 mg, Oral, Q24H  atorvastatin, 20 mg, Oral, Daily  brimonidine, 1 drop, Both Eyes, BID  cefTRIAXone, 1 g, Intravenous, Q24H  dorzolamide, 1 drop, Right Eye, TID  famotidine, 20 mg, Oral, BID AC  hydrALAZINE, 25 mg, Oral, Q8H  insulin lispro, 0-9 Units, Subcutaneous, 4x Daily With Meals & Nightly  metroNIDAZOLE, 500 mg, Intravenous, Q8H  senna-docusate sodium, 1 tablet, Oral, Nightly  sodium bicarbonate, 1,300 mg, Oral, TID  sodium chloride, 10 mL, Intravenous, Q12H  tamsulosin, 0.4 mg, Oral, Daily      Continuous Infusions:     Vital signs in last 24 hours:  Temp:  [97.6 °F (36.4 °C)-98.1 °F (36.7 °C)] 98 °F (36.7 °C)  Heart Rate:  [78-85] 78  Resp:  [16] 16  BP: (125-145)/(85-95) 142/95    Intake/Output:    Intake/Output Summary (Last 24 hours) at 2021 1111  Last data filed at 2021 0542  Gross per 24 hour   Intake --   Output 1300 ml   Net -1300 ml       Exam:  /95 (BP Location: Left arm, Patient Position: Lying)   Pulse 78   Temp 98 °F (36.7 °C) (Oral)   Resp 16   Ht 190.5 cm (75\")   Wt 103 kg (227 lb 12.8 oz)   SpO2 95%   BMI 28.47 kg/m²     General Appearance:    Alert, cooperative, no distress   Head:    Normocephalic, without obvious abnormality, atraumatic   Eyes:       Throat:   Lips, tongue, gums normal   Neck:   Supple, symmetrical, trachea midline, no JVD   Lungs:     Clear to auscultation bilaterally, respirations unlabored   Chest Wall:    No tenderness or deformity    Heart:    Regular rate and rhythm, S1 and S2 normal, no murmur,no  Rub or gallop   Abdomen:     Soft, nontender, bowel sounds active, no masses, no organomegaly    Extremities:   Extremities normal, atraumatic, " "no cyanosis or edema   Pulses:      Skin:   Skin is warm and dry,  no rashes or palpable lesions   Neurologic:   no focal deficits noted      Lab Results (last 72 hours)     Procedure Component Value Units Date/Time    Fungus Smear - Brushing, Esophagus [[id number]] Collected: 06/05/21 0933    Specimen: Brushing from Esophagus Updated: 06/05/21 1136     Fungal Stain No yeast or hyphal elements seen    POC Glucose Once [[id number]]  (Normal) Collected: 06/05/21 1131    Specimen: Blood Updated: 06/05/21 1133     Glucose 99 mg/dL     Procalcitonin [[id number]]  (Abnormal) Collected: 06/05/21 0800    Specimen: Blood Updated: 06/05/21 1122     Procalcitonin 0.66 ng/mL     Narrative:      As a Marker for Sepsis (Non-Neonates):     1. <0.5 ng/mL represents a low risk of severe sepsis and/or septic shock.  2. >2 ng/mL represents a high risk of severe sepsis and/or septic shock.    As a Marker for Lower Respiratory Tract Infections that require antibiotic therapy:  PCT on Admission     Antibiotic Therapy             6-12 Hrs later  >0.5                          Strongly Recommended            >0.25 - <0.5             Recommended  0.1 - 0.25                  Discouraged                       Remeasure/reassess PCT  <0.1                         Strongly Discouraged         Remeasure/reassess PCT      As 28 day mortality risk marker: \"Change in Procalcitonin Result\" (>80% or <=80%) if Day 0 (or Day 1) and Day 4 values are available. Refer to http://www.[employer]-pct-calculator.com/    Change in PCT <=80 %   A decrease of PCT levels below or equal to 80% defines a positive change in PCT test result representing a higher risk for 28-day all-cause mortality of patients diagnosed with severe sepsis or septic shock.    Change in PCT >80 %   A decrease of PCT levels of more than 80% defines a negative change in PCT result representing a lower risk for 28-day all-cause mortality of patients diagnosed with severe sepsis or septic " shock.              Results may be falsely decreased if patient taking Biotin.     CBC & Differential [[id number]] Updated: 06/05/21 0943    Specimen: Blood     Narrative:      The following orders were created for panel order CBC & Differential.  Procedure                               Abnormality         Status                     ---------                               -----------         ------                     CBC Auto Differential[[id number]]                                                         Please view results for these tests on the individual orders.    CBC Auto Differential [[id number]] Updated: 06/05/21 0943    Specimen: Blood     Fungus Culture - Brushing, Esophagus [[id number]] Collected: 06/05/21 0933    Specimen: Brushing from Esophagus Updated: 06/05/21 0941    Renal Function Panel [[id number]]  (Abnormal) Collected: 06/05/21 0800    Specimen: Blood Updated: 06/05/21 0908     Glucose 98 mg/dL      BUN 54 mg/dL      Creatinine 2.54 mg/dL      Sodium 137 mmol/L      Potassium 4.7 mmol/L      Comment: Slight hemolysis detected by analyzer. Results may be affected.        Chloride 114 mmol/L      CO2 13.5 mmol/L      Calcium 8.3 mg/dL      Albumin 2.20 g/dL      Phosphorus 3.6 mg/dL      Anion Gap 9.5 mmol/L      BUN/Creatinine Ratio 21.3     eGFR  African Amer 31 mL/min/1.73     Narrative:      GFR Normal >60  Chronic Kidney Disease <60  Kidney Failure <15      POC Glucose Once [[id number]]  (Normal) Collected: 06/05/21 0613    Specimen: Blood Updated: 06/05/21 0614     Glucose 110 mg/dL     Hemoglobin & Hematocrit, Blood [[id number]]  (Abnormal) Collected: 06/05/21 0010    Specimen: Blood Updated: 06/05/21 0056     Hemoglobin 9.5 g/dL      Hematocrit 30.5 %     Urine Culture - Urine, Urine, Catheter [[id number]]  (Abnormal)  (Susceptibility) Collected: 06/02/21 2314    Specimen: Urine, Catheter Updated: 06/05/21 0029     Urine Culture >100,000 CFU/mL Klebsiella pneumoniae ssp pneumoniae     Susceptibility      Klebsiella pneumoniae ssp pneumoniae      ISAC      Ampicillin Resistant      Ampicillin + Sulbactam Susceptible      Cefazolin Susceptible      Cefepime Susceptible      Ceftazidime Susceptible      Ceftriaxone Susceptible      Gentamicin Susceptible      Levofloxacin Susceptible      Nitrofurantoin Susceptible      Piperacillin + Tazobactam Susceptible      Tetracycline Susceptible      Trimethoprim + Sulfamethoxazole Susceptible               Linear View                   Gastrointestinal Panel, PCR - Stool, Per Rectum [[id number]]  (Normal) Collected: 06/04/21 2223    Specimen: Stool from Per Rectum Updated: 06/05/21 0017     Campylobacter Not Detected     Plesiomonas shigelloides Not Detected     Salmonella Not Detected     Vibrio Not Detected     Vibrio cholerae Not Detected     Yersinia enterocolitica Not Detected     Enteroaggregative E. coli (EAEC) Not Detected     Enteropathogenic E. coli (EPEC) Not Detected     Enterotoxigenic E. coli (ETEC) lt/st Not Detected     Shiga-like toxin-producing E. coli (STEC) stx1/stx2 Not Detected     Shigella/Enteroinvasive E. coli (EIEC) Not Detected     Cryptosporidium Not Detected     Cyclospora cayetanensis Not Detected     Entamoeba histolytica Not Detected     Giardia lamblia Not Detected     Adenovirus F40/41 Not Detected     Astrovirus Not Detected     Norovirus GI/GII Not Detected     Rotavirus A Not Detected     Sapovirus (I, II, IV or V) Not Detected    Narrative:      If Aeromonas, Staphylococcus aureus or Bacillus cereus are suspected, please order [id number]: Stool Culture, Aeromonas, S aureus, B Cereus.    POC Glucose Once [[id number]]  (Normal) Collected: 06/04/21 2035    Specimen: Blood Updated: 06/04/21 2037     Glucose 120 mg/dL     POC Glucose Once [[id number]]  (Normal) Collected: 06/04/21 1612    Specimen: Blood Updated: 06/04/21 1613     Glucose 96 mg/dL     POC Glucose Once [[id number]]  (Abnormal) Collected: 06/04/21 1118    Specimen:  Blood Updated: 06/04/21 1120     Glucose 183 mg/dL     Renal Function Panel [[id number]]  (Abnormal) Collected: 06/04/21 0928    Specimen: Blood Updated: 06/04/21 1041     Glucose 188 mg/dL      BUN 56 mg/dL      Creatinine 2.81 mg/dL      Sodium 136 mmol/L      Potassium 4.1 mmol/L      Chloride 113 mmol/L      CO2 12.0 mmol/L      Calcium 8.0 mg/dL      Albumin 2.00 g/dL      Phosphorus 3.4 mg/dL      Anion Gap 11.0 mmol/L      BUN/Creatinine Ratio 19.9     eGFR  African Amer 27 mL/min/1.73     Narrative:      GFR Normal >60  Chronic Kidney Disease <60  Kidney Failure <15      Hemoglobin & Hematocrit, Blood [[id number]]  (Abnormal) Collected: 06/04/21 0928    Specimen: Blood Updated: 06/04/21 1019     Hemoglobin 8.6 g/dL      Hematocrit 27.2 %     CBC & Differential [[id number]]  (Abnormal) Collected: 06/04/21 0928    Specimen: Blood Updated: 06/04/21 1019    Narrative:      The following orders were created for panel order CBC & Differential.  Procedure                               Abnormality         Status                     ---------                               -----------         ------                     CBC Auto Differential[[id number]]        Abnormal            Final result                 Please view results for these tests on the individual orders.    CBC Auto Differential [[id number]]  (Abnormal) Collected: 06/04/21 0928    Specimen: Blood Updated: 06/04/21 1019     WBC 7.29 10*3/mm3      RBC 3.71 10*6/mm3      Hemoglobin 8.6 g/dL      Hematocrit 27.2 %      MCV 73.3 fL      MCH 23.2 pg      MCHC 31.6 g/dL      RDW 21.1 %      RDW-SD 53.8 fl      Platelets 161 10*3/mm3      Neutrophil % 79.9 %      Lymphocyte % 14.4 %      Monocyte % 4.1 %      Eosinophil % 0.8 %      Basophil % 0.4 %      Neutrophils, Absolute 5.82 10*3/mm3      Lymphocytes, Absolute 1.05 10*3/mm3      Monocytes, Absolute 0.30 10*3/mm3      Eosinophils, Absolute 0.06 10*3/mm3      Basophils, Absolute 0.03 10*3/mm3     POC Glucose Once  [[id number]]  (Abnormal) Collected: 06/04/21 0558    Specimen: Blood Updated: 06/04/21 0600     Glucose 141 mg/dL     Hemoglobin & Hematocrit, Blood [[id number]]  (Abnormal) Collected: 06/04/21 0006    Specimen: Blood Updated: 06/04/21 0034     Hemoglobin 9.6 g/dL      Hematocrit 30.8 %     POC Glucose Once [[id number]]  (Normal) Collected: 06/03/21 2008    Specimen: Blood Updated: 06/03/21 2011     Glucose 102 mg/dL     Basic Metabolic Panel [[id number]]  (Abnormal) Collected: 06/03/21 1756    Specimen: Blood Updated: 06/03/21 1823     Glucose 98 mg/dL      BUN 63 mg/dL      Creatinine 2.64 mg/dL      Sodium 134 mmol/L      Potassium 4.1 mmol/L      Chloride 114 mmol/L      CO2 11.3 mmol/L      Calcium 8.0 mg/dL      eGFR  African Amer 29 mL/min/1.73      BUN/Creatinine Ratio 23.9     Anion Gap 8.7 mmol/L     Narrative:      GFR Normal >60  Chronic Kidney Disease <60  Kidney Failure <15      Hemoglobin & Hematocrit, Blood [[id number]]  (Abnormal) Collected: 06/03/21 1756    Specimen: Blood Updated: 06/03/21 1809     Hemoglobin 9.4 g/dL      Hematocrit 29.3 %     POC Glucose Once [[id number]]  (Normal) Collected: 06/03/21 1648    Specimen: Blood Updated: 06/03/21 1649     Glucose 116 mg/dL     POC Glucose Once [[id number]]  (Abnormal) Collected: 06/03/21 1131    Specimen: Blood Updated: 06/03/21 1134     Glucose 237 mg/dL     Hemoglobin A1c [[id number]]  (Abnormal) Collected: 06/03/21 0922    Specimen: Blood Updated: 06/03/21 1034     Hemoglobin A1C 7.18 %     Narrative:      Hemoglobin A1C Ranges:    Increased Risk for Diabetes  5.7% to 6.4%  Diabetes                     >= 6.5%  Diabetic Goal                < 7.0%    Hemoglobin & Hematocrit, Blood [[id number]]  (Abnormal) Collected: 06/03/21 0922    Specimen: Blood from Arm, Left Updated: 06/03/21 0955     Hemoglobin 9.3 g/dL      Hematocrit 31.0 %     POC Glucose Once [[id number]]  (Abnormal) Collected: 06/03/21 0609    Specimen: Blood Updated: 06/03/21 0611     Glucose  133 mg/dL     POC Glucose Once [[id number]]  (Abnormal) Collected: 06/03/21 0331    Specimen: Blood Updated: 06/03/21 0332     Glucose 147 mg/dL     COVID PRE-OP / PRE-PROCEDURE SCREENING ORDER (NO ISOLATION) - Swab, Nasopharynx [[id number]]  (Normal) Collected: 06/02/21 2318    Specimen: Swab from Nasopharynx Updated: 06/03/21 0330    Narrative:      The following orders were created for panel order COVID PRE-OP / PRE-PROCEDURE SCREENING ORDER (NO ISOLATION) - Swab, Nasopharynx.  Procedure                               Abnormality         Status                     ---------                               -----------         ------                     COVID-19,APTIMA PANTHER,...[[id number]]  Normal              Final result                 Please view results for these tests on the individual orders.    COVID-19,APTIMA PANTHER,[facility], NP/OP SWAB IN UTM/VTM/[facility]SPORT MEDIA,24 HR TAT - Swab, Nasopharynx [[id number]]  (Normal) Collected: 06/02/21 2318    Specimen: Swab from Nasopharynx Updated: 06/03/21 0330     COVID19 Not Detected    Narrative:      Fact sheet for providers: https://www.fda.gov/media/974416/download     Fact sheet for patients: https://www.fda.gov/media/683720/download    Test performed by RT PCR.    Urinalysis, Microscopic Only - Urine, Catheter [[id number]]  (Abnormal) Collected: 06/02/21 2314    Specimen: Urine, Catheter Updated: 06/03/21 0003     RBC, UA 0-2 /HPF      WBC, UA Too Numerous to Count /HPF      Bacteria, UA 4+ /HPF      Squamous Epithelial Cells, UA 0-2 /HPF      Hyaline Casts, UA 3-6 /LPF      Methodology Automated Microscopy    Urinalysis With Microscopic If Indicated (No Culture) - Urine, Catheter [[id number]]  (Abnormal) Collected: 06/02/21 2314    Specimen: Urine, Catheter Updated: 06/03/21 0003     Color, UA Yellow     Appearance, UA Turbid     pH, UA 6.5     Specific Gravity, UA 1.011     Glucose, UA Negative     Ketones, UA Negative     Bilirubin, UA Negative      Blood, UA Small (1+)     Protein,  mg/dL (2+)     Leuk Esterase, UA Large (3+)     Nitrite, UA Negative     Urobilinogen, UA 0.2 E.U./dL    POCT Occult Blood Stool [[id number]]  (Abnormal) Collected: 06/02/21 2358    Specimen: Stool from Per Rectum Updated: 06/02/21 2358     Fecal Occult Blood Positive     Lot Number [id number]     Expiration Date 11/30/2021     Positive Control Positive     Negative Control Negative    Lactic Acid, Plasma [[id number]]  (Normal) Collected: 06/02/21 2315    Specimen: Blood Updated: 06/02/21 2358     Lactate 1.5 mmol/L     Comprehensive Metabolic Panel [[id number]]  (Abnormal) Collected: 06/02/21 2238    Specimen: Blood Updated: 06/02/21 2345     Glucose 233 mg/dL      BUN 68 mg/dL      Creatinine 3.25 mg/dL      Sodium 138 mmol/L      Potassium 3.9 mmol/L      Chloride 111 mmol/L      CO2 15.2 mmol/L      Calcium 8.6 mg/dL      Total Protein 6.6 g/dL      Albumin 3.10 g/dL      ALT (SGPT) 7 U/L      AST (SGOT) 7 U/L      Alkaline Phosphatase 181 U/L      Total Bilirubin 0.3 mg/dL      eGFR  African Amer 23 mL/min/1.73      Globulin 3.5 gm/dL      A/G Ratio 0.9 g/dL      BUN/Creatinine Ratio 20.9     Anion Gap 11.8 mmol/L     Narrative:      GFR Normal >60  Chronic Kidney Disease <60  Kidney Failure <15      [place] Draw [[id number]] Collected: 06/02/21 2238    Specimen: Blood Updated: 06/02/21 2345    Narrative:      The following orders were created for panel order [place] Draw.  Procedure                               Abnormality         Status                     ---------                               -----------         ------                     Light Blue Top[[id number]]                                   Final result               Green Top (Gel)[[id number]]                                  Final result               Lavender Top[[id number]]                                     Final result               Gold Top - SST[[id number]]                                   Final result                  Please view results for these tests on the individual orders.    Light Blue Top [[id number]] Collected: 06/02/21 2238    Specimen: Blood Updated: 06/02/21 2345     Extra Tube hold for add-on     Comment: Auto resulted       Green Top (Gel) [[id number]] Collected: 06/02/21 2238    Specimen: Blood Updated: 06/02/21 2345     Extra Tube Hold for add-ons.     Comment: Auto resulted.       Lavender Top [[id number]] Collected: 06/02/21 2238    Specimen: Blood Updated: 06/02/21 2345     Extra Tube hold for add-on     Comment: Auto resulted       Gold Top - SST [[id number]] Collected: 06/02/21 2238    Specimen: Blood Updated: 06/02/21 2345     Extra Tube Hold for add-ons.     Comment: Auto resulted.       CBC & Differential [[id number]]  (Abnormal) Collected: 06/02/21 2238    Specimen: Blood Updated: 06/02/21 2318    Narrative:      The following orders were created for panel order CBC & Differential.  Procedure                               Abnormality         Status                     ---------                               -----------         ------                     CBC Auto Differential[[id number]]        Abnormal            Final result                 Please view results for these tests on the individual orders.    CBC Auto Differential [[id number]]  (Abnormal) Collected: 06/02/21 2238    Specimen: Blood Updated: 06/02/21 2318     WBC 5.95 10*3/mm3      RBC 4.16 10*6/mm3      Hemoglobin 9.7 g/dL      Hematocrit 31.2 %      MCV 75.0 fL      MCH 23.3 pg      MCHC 31.1 g/dL      RDW 21.8 %      RDW-SD 55.4 fl      Platelets 201 10*3/mm3      Neutrophil % 71.4 %      Lymphocyte % 18.7 %      Monocyte % 6.4 %      Eosinophil % 2.7 %      Basophil % 0.5 %      Immature Grans % 0.3 %      Neutrophils, Absolute 4.25 10*3/mm3      Lymphocytes, Absolute 1.11 10*3/mm3      Monocytes, Absolute 0.38 10*3/mm3      Eosinophils, Absolute 0.16 10*3/mm3      Basophils, Absolute 0.03 10*3/mm3      Immature Grans, Absolute 0.02  10*3/mm3      nRBC 0.2 /100 WBC         Data Review:  Results from last 7 days   Lab Units 06/08/21  0921 06/07/21  1106 06/06/21  1516   SODIUM mmol/L 141 140 138   POTASSIUM mmol/L 3.3* 3.3* 3.7   CHLORIDE mmol/L 115* 114* 112*   CO2 mmol/L 17.3* 16.0* 16.2*   BUN mg/dL 36* 40* 48*   CREATININE mg/dL 2.20* 2.60* 2.37*   GLUCOSE mg/dL 109* 161* 152*   CALCIUM mg/dL 8.2* 8.0* 8.2*     Results from last 7 days   Lab Units 06/08/21  0921 06/07/21  1106 06/06/21  2359 06/06/21  1516   WBC 10*3/mm3 6.29 6.80  --  7.90   HEMOGLOBIN g/dL 9.3* 8.6* 8.2* 8.4*   HEMATOCRIT % 29.5* 27.5* 26.0* 25.9*   PLATELETS 10*3/mm3 184 168  --  170         Results from last 7 days   Lab Units 06/03/21  0922   HEMOGLOBIN A1C % 7.18*     Lab Results   Lab Value Date/Time    TROPONINT 0.110 (C) 02/04/2021 1551    TROPONINT 0.171 (C) 12/23/2020 0357    TROPONINT 0.141 (C) 12/22/2020 0549    TROPONINT 0.147 (C) 12/21/2020 1450    TROPONINT 0.128 (C) 12/21/2020 1223         Results from last 7 days   Lab Units 06/02/21  2238   ALK PHOS U/L 181*   BILIRUBIN mg/dL 0.3   ALT (SGPT) U/L 7   AST (SGOT) U/L 7         Results from last 7 days   Lab Units 06/03/21  0922   HEMOGLOBIN A1C % 7.18*     Glucose   Date/Time Value Ref Range Status   06/08/2021 0544 113 70 - 130 mg/dL Final   06/07/2021 2034 126 70 - 130 mg/dL Final   06/07/2021 1637 161 (H) 70 - 130 mg/dL Final   06/07/2021 1132 169 (H) 70 - 130 mg/dL Final   06/07/2021 0539 133 (H) 70 - 130 mg/dL Final   06/06/2021 1955 141 (H) 70 - 130 mg/dL Final   06/06/2021 1600 145 (H) 70 - 130 mg/dL Final   06/06/2021 1111 133 (H) 70 - 130 mg/dL Final           Past Medical History:   Diagnosis Date   • Back pain    • CKD (chronic kidney disease)    • DM type 2 (diabetes mellitus, type 2) (CMS/HCC)    • ED (erectile dysfunction)    • Hyperlipidemia    • Hypertension    • SCOTTY (iron deficiency anemia)    • Monoclonal gammopathy    • Osteoarthritis        Assessment:  Active Hospital Problems     Diagnosis  POA   • **Gastrointestinal hemorrhage [K92.2]  Yes   • Urine retention [R33.9]  Unknown   • Right upper lobe pneumonia [J18.9]  Yes   • CKD (chronic kidney disease) stage 3, GFR 30-59 ml/min (CMS/[facility]) [N18.30]  Yes   • Immobility [Z74.09]  Yes   • SILVIA (acute kidney injury) (CMS/[facility]) [N17.9]  Yes   • Hypertension [I10]  Yes   • DM2 (diabetes mellitus, type 2) (CMS/[facility]) [E11.9]  Yes   • Abnormal CT of the abdomen [R93.5]  Yes   • Acute UTI (urinary tract infection) [N39.0]  Yes      Resolved Hospital Problems   No resolved problems to display.       Plan:  Continue current RX. Follow lab. Antibiotics per ID. As per multiple consults.  Will need long term care. Maybe tomorrow if stable.  Urology consult for retention.    Jono Montes MD  6/8/2021  11:11 EDT

## 2021-06-08 NOTE — PLAN OF CARE
Goal Outcome Evaluation:  Plan of Care Reviewed With: patient  O2 down to 2 L FROM 4. Refuses I/S was able to do [id number]. Give up with little effort. Complete turn and frequent repositioning. Assist with feeding due to very poor eye sight. Remains alert and oriented at all times.

## 2021-06-08 NOTE — PROGRESS NOTES
"   LOS: 3 days    Patient Care Team:  Maya Ribeiro APRN as PCP - General (Family Medicine)    Chief Complaint:    Chief Complaint   Patient presents with   • Black or Bloody Stool   • Abdominal Pain     Follow UP Chacho on CKD 3  Subjective     Interval History:   Feels ok.  Eating fine, but does not like the food.  Bowels moving. Not soa. Lopez in place.     Review of Systems:   As noted above    Objective     Vital Signs  Temp:  [97.6 °F (36.4 °C)-98.1 °F (36.7 °C)] 98 °F (36.7 °C)  Heart Rate:  [78-85] 78  Resp:  [16] 16  BP: (125-145)/(85-95) 142/95    Flowsheet Rows      First Filed Value   Admission Height  190.5 cm (75\") Documented at 06/02/2021 2133   Admission Weight  103 kg (227 lb 12.8 oz) Documented at 06/03/2021 0253          No intake/output data recorded.  I/O last 3 completed shifts:  In: -   Out: 2600 [Urine:2600]    Intake/Output Summary (Last 24 hours) at 6/8/2021 0841  Last data filed at 6/8/2021 0542  Gross per 24 hour   Intake --   Output 2000 ml   Net -2000 ml       Physical Exam:  General Appearance: alert,  no acute distress, lying in bed watching TV.    Skin: warm and dry  HEENT: pupils round and reactive to light, oral mucosa normal, nonicteric sclera. Mask applied .  Neck: supple, no JVD, trachea midline  Lungs: Clear to auscultation. No wheezing .  Heart: RRR,  no S3, no rub  Abdomen: soft, nontender, + bs  :lopez  Extremities: 1+ lower ext edema.   Neuro: normal speech and mental status       Results Review:    Results from last 7 days   Lab Units 06/07/21  1106 06/06/21  1516 06/05/21  0800 06/02/21  2238   SODIUM mmol/L 140 138 137 138   POTASSIUM mmol/L 3.3* 3.7 4.7 3.9   CHLORIDE mmol/L 114* 112* 114* 111*   CO2 mmol/L 16.0* 16.2* 13.5* 15.2*   BUN mg/dL 40* 48* 54* 68*   CREATININE mg/dL 2.60* 2.37* 2.54* 3.25*   CALCIUM mg/dL 8.0* 8.2* 8.3* 8.6   BILIRUBIN mg/dL  --   --   --  0.3   ALK PHOS U/L  --   --   --  181*   ALT (SGPT) U/L  --   --   --  7   AST (SGOT) U/L  --   --   " --  7   GLUCOSE mg/dL 161* 152* 98 233*       Estimated Creatinine Clearance: 35.8 mL/min (A) (by C-G formula based on SCr of 2.6 mg/dL (H)).    Results from last 7 days   Lab Units 06/07/21  1106 06/06/21  1516 06/05/21  0800   PHOSPHORUS mg/dL 2.9 3.1 3.6             Results from last 7 days   Lab Units 06/07/21  1106 06/06/21  2359 06/06/21  1516 06/05/21  0010 06/04/21  0928 06/02/21  2238   WBC 10*3/mm3 6.80  --  7.90  --  7.29 5.95   HEMOGLOBIN g/dL 8.6* 8.2* 8.4* 9.5* 8.6*  8.6* 9.7*   PLATELETS 10*3/mm3 168  --  170  --  161 201               Imaging Results (Last 24 Hours)     ** No results found for the last 24 hours. **        amLODIPine, 2.5 mg, Oral, Q24H  atorvastatin, 20 mg, Oral, Daily  brimonidine, 1 drop, Both Eyes, BID  cefTRIAXone, 1 g, Intravenous, Q24H  dorzolamide, 1 drop, Right Eye, TID  famotidine, 20 mg, Oral, BID AC  insulin lispro, 0-9 Units, Subcutaneous, 4x Daily With Meals & Nightly  metroNIDAZOLE, 500 mg, Intravenous, Q8H  senna-docusate sodium, 1 tablet, Oral, Nightly  sodium bicarbonate, 1,300 mg, Oral, TID  sodium chloride, 10 mL, Intravenous, Q12H  tamsulosin, 0.4 mg, Oral, Daily           Medication Review:   Current Facility-Administered Medications   Medication Dose Route Frequency Provider Last Rate Last Admin   • acetaminophen (TYLENOL) tablet 650 mg  650 mg Oral Q4H PRN Kaylynn Bennett APRN   650 mg at 06/07/21 2145    Or   • acetaminophen (TYLENOL) 160 MG/5ML solution 650 mg  650 mg Oral Q4H PRN Kaylynn Bennett APRN        Or   • acetaminophen (TYLENOL) suppository 650 mg  650 mg Rectal Q4H PRN Kaylynn Bennett APRN       • amLODIPine (NORVASC) tablet 2.5 mg  2.5 mg Oral Q24H Dane Reeves MD   2.5 mg at 06/08/21 0815   • ammonium lactate (AMLACTIN) cream   Topical Q12H PRN Rashad Ayala MD       • atorvastatin (LIPITOR) tablet 20 mg  20 mg Oral Daily Rashad Ayala MD   20 mg at 06/08/21 0815   • brimonidine (ALPHAGAN P) 0.1 %  ophthalmic solution 1 drop  1 drop Both Eyes BID Rashad Ayala MD   1 drop at 06/08/21 0833   • calcium carbonate (TUMS) chewable tablet 500 mg (200 mg elemental)  2 tablet Oral BID PRN Kalyynn Bennett APRN       • cefTRIAXone (ROCEPHIN) IVPB 1 g  1 g Intravenous Q24H Tonja Azevedo  mL/hr at 06/08/21 0816 1 g at 06/08/21 0816   • dextrose (D50W) 25 g/ 50mL Intravenous Solution 25 g  25 g Intravenous Q15 Min PRN Kaylynn Bennett APRN       • dextrose (GLUTOSE) oral gel 15 g  15 g Oral Q15 Min PRN Kaylynn Bennett APRN       • dorzolamide (TRUSOPT) 2 % ophthalmic solution 1 drop  1 drop Right Eye TID Rashad Ayala MD   1 drop at 06/08/21 0833   • famotidine (PEPCID) tablet 20 mg  20 mg Oral BID AC Irma Shaffer APRN   20 mg at 06/08/21 0600   • glucagon (human recombinant) (GLUCAGEN DIAGNOSTIC) injection 1 mg  1 mg Subcutaneous PRN Kaylynn Bennett APRN       • insulin lispro (ADMELOG) injection 0-9 Units  0-9 Units Subcutaneous 4x Daily With Meals & Nightly Kaylynn Bennett APRN   2 Units at 06/07/21 1821   • metroNIDAZOLE (FLAGYL) 500 mg/100mL IVPB  500 mg Intravenous Q8H Tonja Azevedo MD   Stopped at 06/08/21 0825   • nitroglycerin (NITROSTAT) SL tablet 0.4 mg  0.4 mg Sublingual Q5 Min PRN Kaylynn Bennett APRN       • ondansetron (ZOFRAN) tablet 4 mg  4 mg Oral Q6H PRN Kaylynn Bennett APRN        Or   • ondansetron (ZOFRAN) injection 4 mg  4 mg Intravenous Q6H PRN Kaylynn Bennett APRN       • sennosides-docusate (PERICOLACE) 8.6-50 MG per tablet 1 tablet  1 tablet Oral Nightly Chrissy Gordon MD   1 tablet at 06/07/21 2145   • sodium bicarbonate tablet 1,300 mg  1,300 mg Oral TID Dane Reeves MD   1,300 mg at 06/08/21 0815   • sodium chloride 0.9 % flush 10 mL  10 mL Intravenous PRN Alicia Harris APRN       • sodium chloride 0.9 % flush 10 mL  10 mL Intravenous Q12H Kaylynn Bennett APRN   10 mL at 06/07/21 2145   •  sodium chloride 0.9 % flush 10 mL  10 mL Intravenous PRN Kaylynn Bennett APRN       • tamsulosin (FLOMAX) 24 hr capsule 0.4 mg  0.4 mg Oral Daily Dane Reeves MD   0.4 mg at 06/08/21 0816       Assessment/Plan   1. Chacho on CKD 3, non-oliguric .  Diabetic nephropathy, biopsy proven.  Renal function stable. Prior baseline 2.3-2.6.    Replace K .  Check Mg.   2. NAGMA, chronic . AG 10.  On po bicarb .  Would continue current dose of 1300 tid .   3. HTN not optimally controlled . Would agree with cardiology to start hydralazine back.  No ACE or ARB with his advance renal failure, prior pauses on Beta blocker and lower ext edema with CCB so dose of norvasc limited.   4. UTI Klebsiella.  Rocephin Day 2.   5, Urinary retention. Wright placed and to continue until  sees in follow up in office . Flomax .  6. Chronic immobility  7. GI bleed.  Esophageal plaques CW candida, Gastritis duodenitis on EGD 6/5.  Biopsies path pending. Pepcid per GI.  Flex sig poor prep.   Will ask Dr. Azevedo if patient needs anti-fungal .            Lyndsey Escoto MD  06/08/21  08:41 EDT     negative...

## 2021-06-09 NOTE — PROGRESS NOTES
"   LOS: 0 days    Patient Care Team:  Maya Ribeiro APRN as PCP - General (Family Medicine)    Chief Complaint:    Chief Complaint   Patient presents with   • Black or Bloody Stool   • Abdominal Pain     Follow UP Chacho on CKD 3  Subjective     Interval History:   Going to  [place] [facility] today. Refused labs x 2 this am. Now allowing.      Review of Systems:   As noted above    Objective     Vital Signs  Temp:  [97.5 °F (36.4 °C)-98.4 °F (36.9 °C)] 98.1 °F (36.7 °C)  Heart Rate:  [] 80  Resp:  [16] 16  BP: (132-143)/(73-94) 143/93    Flowsheet Rows      First Filed Value   Admission Height  190.5 cm (75\") Documented at 06/02/2021 2133   Admission Weight  103 kg (227 lb 12.8 oz) Documented at 06/03/2021 0253          No intake/output data recorded.  I/O last 3 completed shifts:  In: -   Out: 1050 [Urine:1050]    Intake/Output Summary (Last 24 hours) at 6/9/2021 1218  Last data filed at 6/9/2021 0445  Gross per 24 hour   Intake --   Output 400 ml   Net -400 ml       Physical Exam:  General Appearance: alert,  no acute distress, lying in bed watching TV.    Skin: warm and dry  HEENT: pupils round and reactive to light, oral mucosa normal, nonicteric sclera. Mask applied .  Neck: supple, no JVD, trachea midline  Lungs: Clear to auscultation. No wheezing .  Heart: RRR,  no S3, no rub  Abdomen: soft, nontender, + bs  :lopez  Extremities: 1+ lower ext edema.   Neuro: normal speech and mental status       Results Review:    Results from last 7 days   Lab Units 06/08/21  0921 06/07/21  1106 06/06/21  1516 06/02/21  2238   SODIUM mmol/L 141 140 138 138   POTASSIUM mmol/L 3.3* 3.3* 3.7 3.9   CHLORIDE mmol/L 115* 114* 112* 111*   CO2 mmol/L 17.3* 16.0* 16.2* 15.2*   BUN mg/dL 36* 40* 48* 68*   CREATININE mg/dL 2.20* 2.60* 2.37* 3.25*   CALCIUM mg/dL 8.2* 8.0* 8.2* 8.6   BILIRUBIN mg/dL  --   --   --  0.3   ALK PHOS U/L  --   --   --  181*   ALT (SGPT) U/L  --   --   --  7   AST (SGOT) U/L  --   --   --  7   GLUCOSE " mg/dL 109* 161* 152* 233*       Estimated Creatinine Clearance: 42.4 mL/min (A) (by C-G formula based on SCr of 2.2 mg/dL (H)).    Results from last 7 days   Lab Units 06/08/21  0921 06/07/21  1106 06/06/21  1516   MAGNESIUM mg/dL 1.7  --   --    PHOSPHORUS mg/dL 2.7 2.9 3.1             Results from last 7 days   Lab Units 06/08/21  0921 06/07/21  1106 06/06/21  2359 06/06/21  1516 06/05/21  0010 06/04/21  0928 06/02/21  2238   WBC 10*3/mm3 6.29 6.80  --  7.90  --  7.29 5.95   HEMOGLOBIN g/dL 9.3* 8.6* 8.2* 8.4* 9.5* 8.6*  8.6* 9.7*   PLATELETS 10*3/mm3 184 168  --  170  --  161 201               Imaging Results (Last 24 Hours)     ** No results found for the last 24 hours. **        amLODIPine, 2.5 mg, Oral, Q24H  atorvastatin, 20 mg, Oral, Daily  brimonidine, 1 drop, Both Eyes, BID  cefTRIAXone, 1 g, Intravenous, Q24H  dorzolamide, 1 drop, Right Eye, TID  [START ON 6/10/2021] famotidine, 20 mg, Oral, Daily  fluconazole, 100 mg, Oral, Q24H  hydrALAZINE, 25 mg, Oral, Q8H  insulin lispro, 0-9 Units, Subcutaneous, 4x Daily With Meals & Nightly  metroNIDAZOLE, 500 mg, Intravenous, Q8H  senna-docusate sodium, 1 tablet, Oral, Nightly  sodium bicarbonate, 1,300 mg, Oral, TID  sodium chloride, 10 mL, Intravenous, Q12H  tamsulosin, 0.4 mg, Oral, Daily           Medication Review:   Current Facility-Administered Medications   Medication Dose Route Frequency Provider Last Rate Last Admin   • acetaminophen (TYLENOL) tablet 650 mg  650 mg Oral Q4H PRN Kaylynn Bennett APRN   650 mg at 06/07/21 2145    Or   • acetaminophen (TYLENOL) 160 MG/5ML solution 650 mg  650 mg Oral Q4H PRN Kaylynn Bennett APRN        Or   • acetaminophen (TYLENOL) suppository 650 mg  650 mg Rectal Q4H PRN Kaylynn Bennett APRN       • amLODIPine (NORVASC) tablet 2.5 mg  2.5 mg Oral Q24H Dane Reeves MD   2.5 mg at 06/09/21 0816   • ammonium lactate (AMLACTIN) cream   Topical Q12H PRN Rashad Ayala MD       • atorvastatin  (LIPITOR) tablet 20 mg  20 mg Oral Daily Rashad Ayala MD   20 mg at 06/09/21 0815   • brimonidine (ALPHAGAN P) 0.1 % ophthalmic solution 1 drop  1 drop Both Eyes BID Rashad Ayala MD   1 drop at 06/09/21 0816   • calcium carbonate (TUMS) chewable tablet 500 mg (200 mg elemental)  2 tablet Oral BID PRN Kaylynn Bennett APRN       • cefTRIAXone (ROCEPHIN) IVPB 1 g  1 g Intravenous Q24H Tonja Azevedo MD   Stopped at 06/09/21 1113   • dextrose (D50W) 25 g/ 50mL Intravenous Solution 25 g  25 g Intravenous Q15 Min PRN Kaylynn Bennett APRN       • dextrose (GLUTOSE) oral gel 15 g  15 g Oral Q15 Min PRN Kaylynn Bennett APRN       • dorzolamide (TRUSOPT) 2 % ophthalmic solution 1 drop  1 drop Right Eye TID Rashad Ayala MD   1 drop at 06/09/21 0816   • [START ON 6/10/2021] famotidine (PEPCID) tablet 20 mg  20 mg Oral Daily Irma Shaffer, APRN       • fluconazole (DIFLUCAN) tablet 100 mg  100 mg Oral Q24H Lyndsey Escoto MD   100 mg at 06/08/21 1416   • glucagon (human recombinant) (GLUCAGEN DIAGNOSTIC) injection 1 mg  1 mg Subcutaneous PRN Kaylynn Bennett APRN       • hydrALAZINE (APRESOLINE) tablet 25 mg  25 mg Oral Q8H Lyndsey Escoto MD   25 mg at 06/09/21 0526   • insulin lispro (ADMELOG) injection 0-9 Units  0-9 Units Subcutaneous 4x Daily With Meals & Nightly Kaylynn Bennett APRN   2 Units at 06/07/21 1821   • metroNIDAZOLE (FLAGYL) 500 mg/100mL IVPB  500 mg Intravenous Q8H Tonja Azevedo MD   Stopped at 06/09/21 0733   • nitroglycerin (NITROSTAT) SL tablet 0.4 mg  0.4 mg Sublingual Q5 Min PRN Kaylynn Bennett APRN       • ondansetron (ZOFRAN) tablet 4 mg  4 mg Oral Q6H PRN Kaylynn Bennett APRN        Or   • ondansetron (ZOFRAN) injection 4 mg  4 mg Intravenous Q6H PRN Kaylynn Bennett APRN       • sennosides-docusate (PERICOLACE) 8.6-50 MG per tablet 1 tablet  1 tablet Oral Nightly Chrissy Gordon MD   1 tablet at 06/08/21 2053   • sodium  bicarbonate tablet 1,300 mg  1,300 mg Oral TID Dane Reeves MD   1,300 mg at 06/09/21 0816   • sodium chloride 0.9 % flush 10 mL  10 mL Intravenous PRN Alicia Harris APRN       • sodium chloride 0.9 % flush 10 mL  10 mL Intravenous Q12H Kaylynn Bennett APRN   10 mL at 06/09/21 0821   • sodium chloride 0.9 % flush 10 mL  10 mL Intravenous PRN Kaylynn Bennett APRN       • tamsulosin (FLOMAX) 24 hr capsule 0.4 mg  0.4 mg Oral Daily Dane Reeves MD   0.4 mg at 06/09/21 0815       Assessment/Plan   1. Chacho on CKD 3, non-oliguric .  Diabetic nephropathy, biopsy proven.  Renal function improved yesterday.  Labs being drawn now .  2. NAGMA, chronic . AG 10.  On po bicarb .  Would continue current dose of 1300 tid .   3. HTN not optimally controlled . Back on hydralazine ..  No ACE or ARB with his advance renal failure, prior pauses on Beta blocker and lower ext edema with CCB so dose of norvasc limited.   4. UTI Klebsiella.   Completed therapy .  5, Urinary retention. Wright placed and to continue until  sees in follow up in office . Flomax .  6. Chronic immobility  7. GI bleed.  Esophageal plaques CW candida,  Gastritis duodenitis on EGD 6/5.  Biopsies path negative .Pepcid per GI. On diflucan for esophageal plaques .    Rn Amanda to call me labs when available. Barring any surprises ok for dc. Has follow up with [facility] in our office July 1 at 11:40 am .      Lyndsey Escoto MD  06/09/21  12:18 EDT

## 2021-06-09 NOTE — PLAN OF CARE
Problem: Fall Injury Risk  Goal: Absence of Fall and Fall-Related Injury  Outcome: Met  Intervention: Promote Injury-Free Environment  Recent Flowsheet Documentation  Taken 6/9/2021 1417 by Amanda Gaffney RN  Safety Promotion/Fall Prevention:   nonskid shoes/slippers when out of bed   fall prevention program maintained  Taken 6/9/2021 1216 by Amanda Gaffney RN  Safety Promotion/Fall Prevention:   nonskid shoes/slippers when out of bed   fall prevention program maintained  Taken 6/9/2021 1027 by Amanda Gaffney RN  Safety Promotion/Fall Prevention:   muscle strengthening facilitated   fall prevention program maintained  Taken 6/9/2021 0824 by Amanda Gaffney RN  Safety Promotion/Fall Prevention:   nonskid shoes/slippers when out of bed   fall prevention program maintained     Problem: Adult Inpatient Plan of Care  Goal: Plan of Care Review  Outcome: Met  Goal: Patient-Specific Goal (Individualized)  Outcome: Met  Goal: Absence of Hospital-Acquired Illness or Injury  Outcome: Met  Intervention: Identify and Manage Fall Risk  Recent Flowsheet Documentation  Taken 6/9/2021 1417 by Amanda Gaffney RN  Safety Promotion/Fall Prevention:   nonskid shoes/slippers when out of bed   fall prevention program maintained  Taken 6/9/2021 1216 by Amanda Gaffney RN  Safety Promotion/Fall Prevention:   nonskid shoes/slippers when out of bed   fall prevention program maintained  Taken 6/9/2021 1027 by Amanda Gaffney RN  Safety Promotion/Fall Prevention:   muscle strengthening facilitated   fall prevention program maintained  Taken 6/9/2021 0824 by Amanda Gaffney RN  Safety Promotion/Fall Prevention:   nonskid shoes/slippers when out of bed   fall prevention program maintained  Goal: Optimal Comfort and Wellbeing  Outcome: Met  Goal: Readiness for Transition of Care  Outcome: Met     Problem: Skin Injury Risk Increased  Goal: Skin Health and Integrity  Outcome: Met   Goal Outcome Evaluation:

## 2021-06-09 NOTE — PROGRESS NOTES
"  Infectious Diseases Progress Note    Tonja Azevedo MD     [facility]  [place]: 3 days  Patient Identification:  Name: Gregorio Alejandro  Age: 65 y.o.  Sex: male  :  1955  MRN: [id number]         Primary Care Physician: Maya Ribeiro, ANTWAN            Subjective: Continues to feel well and denies any fever and chills.  Interval History: See consultation note.    Objective:    Scheduled Meds:amLODIPine, 2.5 mg, Oral, Q24H  atorvastatin, 20 mg, Oral, Daily  brimonidine, 1 drop, Both Eyes, BID  cefTRIAXone, 1 g, Intravenous, Q24H  dorzolamide, 1 drop, Right Eye, TID  [START ON 6/10/2021] famotidine, 20 mg, Oral, Daily  fluconazole, 100 mg, Oral, Q24H  hydrALAZINE, 25 mg, Oral, Q8H  insulin lispro, 0-9 Units, Subcutaneous, 4x Daily With Meals & Nightly  metroNIDAZOLE, 500 mg, Intravenous, Q8H  senna-docusate sodium, 1 tablet, Oral, Nightly  sodium bicarbonate, 1,300 mg, Oral, TID  sodium chloride, 10 mL, Intravenous, Q12H  tamsulosin, 0.4 mg, Oral, Daily      Continuous Infusions:     Vital signs in last 24 hours:  Temp:  [97.5 °F (36.4 °C)-98.4 °F (36.9 °C)] 98.1 °F (36.7 °C)  Heart Rate:  [] 80  Resp:  [16] 16  BP: (132-143)/(73-94) 143/93    Intake/Output:    Intake/Output Summary (Last 24 hours) at 2021 1131  Last data filed at 2021 0445  Gross per 24 hour   Intake --   Output 400 ml   Net -400 ml       Exam:  /93 (BP Location: Left arm, Patient Position: Lying)   Pulse 80   Temp 98.1 °F (36.7 °C) (Oral)   Resp 16   Ht 190.5 cm (75\")   Wt 103 kg (227 lb 12.8 oz)   SpO2 97%   BMI 28.47 kg/m²   Patient is examined using the personal protective equipment as per guidelines from infection control for this particular patient as enacted.  Hand washing was performed before and after patient interaction.  General Appearance:    More interactive and feeling better.   Head:    Normocephalic, without obvious abnormality, atraumatic   Eyes:    PERRL, conjunctivae/corneas clear, EOM's " intact, both eyes   Ears:    Normal external ear canals, both ears   Nose:   Nares normal, septum midline, mucosa normal, no drainage    or sinus tenderness   Throat:   Lips, tongue, gums normal; oral mucosa pink and moist   Neck:   Supple, symmetrical, trachea midline, no adenopathy;     thyroid:  no enlargement/tenderness/nodules; no carotid    bruit or JVD   Back:     Symmetric, no curvature, ROM normal, no CVA tenderness   Lungs:     Clear to auscultation bilaterally, respirations unlabored   Chest Wall:    No tenderness or deformity    Heart:   Irregularly irregular   Abdomen:    Soft mild generalized tenderness,distended   Extremities:  Contractures and diffuse edema of the lower extremities noted lower extremities are dressed.   Pulses:   Pulses palpable in all extremities; symmetric all extremities   Skin:  Superficial wounds noted   Neurologic:   Not as lethargic and somnolent as well last night..            Data Review:    I reviewed the patient's new clinical results.  Results from last 7 days   Lab Units 06/08/21  0921 06/07/21  1106 06/06/21  2359 06/06/21  1516 06/05/21  0010 06/04/21  0928 06/04/21  0006 06/02/21  2238   WBC 10*3/mm3 6.29 6.80  --  7.90  --  7.29  --  5.95   HEMOGLOBIN g/dL 9.3* 8.6* 8.2* 8.4* 9.5* 8.6*  8.6* 9.6* 9.7*   PLATELETS 10*3/mm3 184 168  --  170  --  161  --  201     Results from last 7 days   Lab Units 06/08/21  0921 06/07/21  1106 06/06/21  1516 06/05/21  0800 06/04/21  0928 06/03/21  1756 06/02/21  2238   SODIUM mmol/L 141 140 138 137 136 134* 138   POTASSIUM mmol/L 3.3* 3.3* 3.7 4.7 4.1 4.1 3.9   CHLORIDE mmol/L 115* 114* 112* 114* 113* 114* 111*   CO2 mmol/L 17.3* 16.0* 16.2* 13.5* 12.0* 11.3* 15.2*   BUN mg/dL 36* 40* 48* 54* 56* 63* 68*   CREATININE mg/dL 2.20* 2.60* 2.37* 2.54* 2.81* 2.64* 3.25*   CALCIUM mg/dL 8.2* 8.0* 8.2* 8.3* 8.0* 8.0* 8.6   GLUCOSE mg/dL 109* 161* 152* 98 188* 98 233*     Microbiology Results (last 10 days)     Procedure Component Value -  Date/Time    Fungus Smear - Brushing, Esophagus [[id number]] Collected: 06/05/21 0933    Lab Status: Final result Specimen: Brushing from Esophagus Updated: 06/05/21 1136     Fungal Stain No yeast or hyphal elements seen    Gastrointestinal Panel, PCR - Stool, Per Rectum [[id number]]  (Normal) Collected: 06/04/21 2223    Lab Status: Final result Specimen: Stool from Per Rectum Updated: 06/05/21 0017     Campylobacter Not Detected     Plesiomonas shigelloides Not Detected     Salmonella Not Detected     Vibrio Not Detected     Vibrio cholerae Not Detected     Yersinia enterocolitica Not Detected     Enteroaggregative E. coli (EAEC) Not Detected     Enteropathogenic E. coli (EPEC) Not Detected     Enterotoxigenic E. coli (ETEC) lt/st Not Detected     Shiga-like toxin-producing E. coli (STEC) stx1/stx2 Not Detected     Shigella/Enteroinvasive E. coli (EIEC) Not Detected     Cryptosporidium Not Detected     Cyclospora cayetanensis Not Detected     Entamoeba histolytica Not Detected     Giardia lamblia Not Detected     Adenovirus F40/41 Not Detected     Astrovirus Not Detected     Norovirus GI/GII Not Detected     Rotavirus A Not Detected     Sapovirus (I, II, IV or V) Not Detected    Narrative:      If Aeromonas, Staphylococcus aureus or Bacillus cereus are suspected, please order [id number]: Stool Culture, Aeromonas, S aureus, B Cereus.    COVID PRE-OP / PRE-PROCEDURE SCREENING ORDER (NO ISOLATION) - Swab, Nasopharynx [[id number]]  (Normal) Collected: 06/02/21 2318    Lab Status: Final result Specimen: Swab from Nasopharynx Updated: 06/03/21 0330    Narrative:      The following orders were created for panel order COVID PRE-OP / PRE-PROCEDURE SCREENING ORDER (NO ISOLATION) - Swab, Nasopharynx.  Procedure                               Abnormality         Status                     ---------                               -----------         ------                     COVID-19,APTIMA PANTHER,...[[id number]]  Normal               Final result                 Please view results for these tests on the individual orders.    COVID-19,APTIMA PANTHER,[facility], NP/OP SWAB IN UTM/VTM/[facility] TRANSPORT MEDIA,24 HR TAT - Swab, Nasopharynx [[id number]]  (Normal) Collected: 06/02/21 2318    Lab Status: Final result Specimen: Swab from Nasopharynx Updated: 06/03/21 0330     COVID19 Not Detected    Narrative:      Fact sheet for providers: https://www.fda.gov/media/219444/download     Fact sheet for patients: https://www.fda.gov/media/857737/download    Test performed by RT PCR.    Urine Culture - Urine, Urine, Catheter [[id number]]  (Abnormal)  (Susceptibility) Collected: 06/02/21 2314    Lab Status: Final result Specimen: Urine, Catheter Updated: 06/05/21 0029     Urine Culture >100,000 CFU/mL Klebsiella pneumoniae ssp pneumoniae    Susceptibility      Klebsiella pneumoniae ssp pneumoniae      ISAC      Ampicillin Resistant      Ampicillin + Sulbactam Susceptible      Cefazolin Susceptible      Cefepime Susceptible      Ceftazidime Susceptible      Ceftriaxone Susceptible      Gentamicin Susceptible      Levofloxacin Susceptible      Nitrofurantoin Susceptible      Piperacillin + Tazobactam Susceptible      Tetracycline Susceptible      Trimethoprim + Sulfamethoxazole Susceptible               Linear View                           Assessment:    Gastrointestinal hemorrhage    Acute UTI (urinary tract infection)    Hypertension    DM2 (diabetes mellitus, type 2) (CMS/[facility])    Abnormal CT of the abdomen    SILVIA (acute kidney injury) (CMS/[facility])    Immobility    Right upper lobe pneumonia    CKD (chronic kidney disease) stage 3, GFR 30-59 ml/min (CMS/[facility])    Urine retention  1-GI bleed unclear whether it is upper GI bleed or due to colitis involving the rectosigmoid junction.  Rule out infectious causes such as E. coli Shigella infection versus C. difficile infection.  2-probable UTI with urinary retention likely chronic recurrent cystitis  3-diabetes  mellitus with neuropathy and possible gastroparesis  4-immobilization syndrome  5-vascular dementia  6-other diagnosis per primary team.     Recommendations/Discussions:  · See my recommendations on 6/8/2021.  · He has completed antibiotic treatment and can be discharged on no antibiotic therapy with close follow-up.    Tonja Azevedo MD  6/9/2021  11:31 EDT    Much of this encounter note is an electronic transcription/translation of spoken language to printed text. The electronic translation of spoken language may permit erroneous, or at times, nonsensical words or phrases to be inadvertently transcribed; Although I have reviewed the note for such errors, some may still exist

## 2021-06-09 NOTE — DISCHARGE SUMMARY
PHYSICIAN DISCHARGE SUMMARY                                                                        [facility]    Patient Identification:  Name: Gregorio Alejandro  Age: 65 y.o.  Sex: male  :  1955  MRN: [id number]  Primary Care Physician: Maya Ribeiro APRN    Admit date: 2021  Discharge date and time:2021  Discharged Condition: good    Discharge Diagnoses:  Active Hospital Problems    Diagnosis  POA   • **Gastrointestinal hemorrhage [K92.2]  Yes   • Urine retention [R33.9]  Unknown   • Right upper lobe pneumonia [J18.9]  Yes   • CKD (chronic kidney disease) stage 3, GFR 30-59 ml/min (CMS/[facility]) [N18.30]  Yes   • Immobility [Z74.09]  Yes   • SILVIA (acute kidney injury) (CMS/[facility]) [N17.9]  Yes   • Hypertension [I10]  Yes   • DM2 (diabetes mellitus, type 2) (CMS/[facility]) [E11.9]  Yes   • Abnormal CT of the abdomen [R93.5]  Yes   • Acute UTI (urinary tract infection) [N39.0]  Yes      Resolved Hospital Problems   No resolved problems to display.          PMHX:   Past Medical History:   Diagnosis Date   • Back pain    • CKD (chronic kidney disease)    • DM type 2 (diabetes mellitus, type 2) (CMS/[facility])    • ED (erectile dysfunction)    • Hyperlipidemia    • Hypertension    • SCOTTY (iron deficiency anemia)    • Monoclonal gammopathy    • Osteoarthritis      PSHX:   Past Surgical History:   Procedure Laterality Date   • ABDOMINAL SURGERY     • ENDOSCOPY N/A 2021    Procedure: ESOPHAGOGASTRODUODENOSCOPY with biopsies and brushing;  Surgeon: Pérez Gonzalez MD;  Location: [facility] ENDOSCOPY;  Service: Gastroenterology;  Laterality: N/A;  pre-abnormal CT scan stomach  post-gastritis, possible candida, duodenitis   • EYE SURGERY     • JOINT REPLACEMENT     • SIGMOIDOSCOPY N/A 2021    Procedure: FLEXIBLE SIGMOIDOSCOPY to 40cm;  Surgeon: Pérez Gonzalez MD;  Location: [facility] ENDOSCOPY;  Service: Gastroenterology;  Laterality: N/A;   pre-abnormal ct scan of sigmoid rectum  post-poor prep   • TONSILLECTOMY         Hospital Course: Gregorio Alejandro  is a 65 y.o. male with a history of multiple medical issues including CKD, DM, HTN, HLD, SCOTTY, monoclonal gammopathy, vascular dementia, chronic immobility, documented afib/flutter that presents to [facility] complaining of black tarry stools. Pt is sleeping on exam, unable to stay awake. He is chronically ill appearing. According to ED records and prior hospitalization notes, pt is bedbound at baseline. He was hospitalized in February where he was transferred to  palliative unit where he was evaluated by Hospice. He was discharged to NH following that hospitalization with plan for Hospice to continue to follow. He presented to ED with several episodes of black tarry stools since Monday. He has a visiting nurse who witnessed the stools and advised pt to be seen in ED as he takes eliquis. It is documented that he was also c/o n/v, chills, fever, abd pain. He was Heme positive on exam.       Patient was admitted to the hospital and seen by urology, infectious disease, nephrology, cardiology and GI medicine.  Patient had adjustment of medications and treated with antibiotics for urinary tract infection.  Patient had endoscopy which showed some gastritis and possible Candida and duodenitis.  Sigmoidoscopy showed poor prep.  The patient was doing better after being in the hospital for several days and finished course of antibiotics.  He had urinary catheter placed for urine retention urology felt he should keep Wright catheter in and they will plan to do voiding trial as outpatient.  Cardiology would like for patient to restart on Eliquis and no bleeding probably wait about a week and could resume at 2.5 mg twice daily if no bleeding and hemoglobin stable.  Nephrology recommends avoiding ACE or ARB in the future with his chronic renal failure.  He will need some follow-up lab at the skilled  nursing facility with a CBC and a BMP in the next week.  Looks like he is going for long-term care at the nursing facility and will continue with his care at the facility.      Consults:     Consults     Date and Time Order Name Status Description    6/7/2021 10:47 AM Inpatient Urology Consult Completed     6/3/2021  9:29 AM Inpatient Infectious Diseases Consult Completed     6/3/2021  9:29 AM Inpatient Nephrology Consult Completed     6/3/2021  9:29 AM Inpatient Cardiology Consult      6/3/2021  1:52 AM Inpatient Nephrology Consult      6/3/2021  1:52 AM Inpatient Gastroenterology Consult Completed     6/3/2021  1:14 AM [facility] (on-call MD unless specified) Details Completed         Results from last 7 days   Lab Units 06/08/21  0921   WBC 10*3/mm3 6.29   HEMOGLOBIN g/dL 9.3*   HEMATOCRIT % 29.5*   PLATELETS 10*3/mm3 184     Results from last 7 days   Lab Units 06/08/21  0921   SODIUM mmol/L 141   POTASSIUM mmol/L 3.3*   CHLORIDE mmol/L 115*   CO2 mmol/L 17.3*   BUN mg/dL 36*   CREATININE mg/dL 2.20*   GLUCOSE mg/dL 109*   CALCIUM mg/dL 8.2*     Significant Diagnostic Studies:   WBC   Date Value Ref Range Status   06/08/2021 6.29 3.40 - 10.80 10*3/mm3 Final     Hemoglobin   Date Value Ref Range Status   06/08/2021 9.3 (L) 13.0 - 17.7 g/dL Final     Hematocrit   Date Value Ref Range Status   06/08/2021 29.5 (L) 37.5 - 51.0 % Final     Platelets   Date Value Ref Range Status   06/08/2021 184 140 - 450 10*3/mm3 Final     Sodium   Date Value Ref Range Status   06/08/2021 141 136 - 145 mmol/L Final     Potassium   Date Value Ref Range Status   06/08/2021 3.3 (L) 3.5 - 5.2 mmol/L Final     Chloride   Date Value Ref Range Status   06/08/2021 115 (H) 98 - 107 mmol/L Final     CO2   Date Value Ref Range Status   06/08/2021 17.3 (L) 22.0 - 29.0 mmol/L Final     BUN   Date Value Ref Range Status   06/08/2021 36 (H) 8 - 23 mg/dL Final     Creatinine   Date Value Ref Range Status   06/08/2021 2.20 (H) 0.76 - 1.27 mg/dL Final      Glucose   Date Value Ref Range Status   06/08/2021 109 (H) 65 - 99 mg/dL Final     Calcium   Date Value Ref Range Status   06/08/2021 8.2 (L) 8.6 - 10.5 mg/dL Final     Magnesium   Date Value Ref Range Status   06/08/2021 1.7 1.6 - 2.4 mg/dL Final     Phosphorus   Date Value Ref Range Status   06/08/2021 2.7 2.5 - 4.5 mg/dL Final     No results found for: AST, ALT, ALKPHOS  No results found for: APTT, INR  No results found for: COLORU, CLARITYU, SPECGRAV, PHUR, PROTEINUR, GLUCOSEU, KETONESU, BLOODU, NITRITE, LEUKOCYTESUR, BILIRUBINUR, UROBILINOGEN, RBCUA, WBCUA, BACTERIA, UACOMMENT  No results found for: TROPONINT, TROPONINI, BNP  No components found for: HGBA1C;2  No components found for: TSH;2  Imaging Results (All)     Procedure Component Value Units Date/Time    XR Abdomen KUB [[id number]] Collected: 06/03/21 1649     Updated: 06/03/21 1655    Narrative:      ABDOMEN KUB     CLINICAL HISTORY: Abdominal distention     2 supine views were obtained.     Compared to the CT scan of the abdomen and pelvis performed earlier  today at 1230 hours.     There is mild gaseous distention of the stomach that appears essentially  unchanged. The bowel gas pattern is otherwise unremarkable. There is no  evidence of obstruction. A small amount of formed stool is noted within  the colon. There is no obvious free air on these supine views. The  inferior half of the lungs were also visualized. There is dense  consolidation in the right upper lobe consistent with pneumonia.     IMPRESSIONS: Right upper lobe pneumonia. Mild gaseous distention of the  stomach that is unchanged since the CT performed earlier today. There is  no definite evidence of obstruction.     This report was finalized on 6/3/2021 4:52 PM by Dr. Gregorio Deshpande M.D.       CT Abdomen Pelvis Without Contrast [[id number]] Collected: 06/03/21 0053     Updated: 06/03/21 0106    Narrative:      CT OF THE ABDOMEN AND PELVIS WITHOUT CONTRAST     HISTORY: Diffuse abdominal  pain. 11 stool.     COMPARISON: 02/05/2021     TECHNIQUE: Axial CT imaging was obtained through the abdomen and pelvis.  IV contrast was administered.     FINDINGS:  Images are degraded by motion artifact. There is bibasilar scarring.  Similar findings were present on prior study. The patient's stomach is  distended and fluid-filled. Duodenum appears relatively decompressed.  Correlation with any history of gastroparesis or gastric outlet  obstruction is suggested. No suspicious hepatic lesions are seen.  Gallbladder is normal. Calcified granulomata are seen within the  bleeding. Adrenal glands are within normal limits. Pancreas is markedly  atrophic. There are dense vascular calcifications. There is an 8 mm  nonobstructing stone identified within the right kidney. Full assessment  of structures within the pelvis is degraded by streak artifact from  bilateral hip arthroplasties. The patient does have distention of the  urinary bladder. Correlation with any symptoms of urinary retention is  suggested. There is also air noted within the urinary bladder and  correlation with any history of instrumentation is suggested. The  patient's rectosigmoid appears diffusely thick-walled. Similar findings  were present on prior exam. Appearance may reflect colitis. There is  colonic diverticulosis. There is no diverticulitis. There is no bowel  obstruction. Appendix is not clearly seen, although I see no evidence of  appendicitis. There is extensive calcification of the aorta. There is  body wall edema. Patient is osteoporotic. Calcifications within the  bladder wall are again seen.       Impression:         1. Thick-walled appearance to the rectosigmoid. Similar findings were  present on prior exam. Colitis is not excluded.  2. The stomach is markedly distended and fluid-filled. Duodenum appears  relatively decompressed. Appearance may reflect some gastroparesis or  gastric outlet obstruction.  2. Distention of the urinary  bladder. This may reflect some urinary  retention. There is air seen within the bladder, and correlation with  recent instrumentation is suggested.     Radiation dose reduction techniques were utilized, including automated  exposure control and exposure modulation based on body size.     This report was finalized on 6/3/2021 1:03 AM by Dr. Vero Hansen M.D.           Lab Results (last 7 days)     Procedure Component Value Units Date/Time    POC Glucose Once [[id number]]  (Normal) Collected: 06/09/21 0527    Specimen: Blood Updated: 06/09/21 0528     Glucose 126 mg/dL     POC Glucose Once [[id number]]  (Abnormal) Collected: 06/08/21 2051    Specimen: Blood Updated: 06/08/21 2052     Glucose 135 mg/dL     POC Glucose Once [[id number]]  (Abnormal) Collected: 06/08/21 1608    Specimen: Blood Updated: 06/08/21 1610     Glucose 136 mg/dL     POC Glucose Once [[id number]]  (Normal) Collected: 06/08/21 1120    Specimen: Blood Updated: 06/08/21 1122     Glucose 112 mg/dL     Tissue Pathology Exam [[id number]] Collected: 06/05/21 0932    Specimen: Tissue from Small Intestine; Tissue from Gastric, Antrum; Tissue from Gastric, Fundus Updated: 06/08/21 1117     Case Report --     Surgical Pathology Report                         Case: [id number]                                  Authorizing Provider:  Pérez Gonzalez MD      Collected:           06/05/2021 09:32 AM          Ordering Location:     [facility]  Received:            06/07/2021 07:56 AM                                 ENDO SUITES                                                                  Pathologist:           Neha Flanagan MD                                                          Specimens:   1) - Small Intestine                                                                                2) - Gastric, Antrum                                                                                3) - Gastric, Fundus, bx                                                                     Final Diagnosis --     1. Small Intestine, Biopsy:   A. Peptic duodenitis with reactive epithelial changes.    2. Stomach, Antrum, Biopsy:   A. Gastric antral mucosa with chronic inactive gastritis, vascular congestion and reactive epithelial changes.   B. Negative for intestinal metaplasia.   C. Negative for H. pylori-type organisms in routine stain.    3. Stomach, Fundus, Biopsy:   A. Gastric oxyntic mucosa with chronic inactive gastritis.   B. Negative for intestinal metaplasia.   C. Negative for H. pylori-type organisms on routine stain.    jab/helen        Gross Description --     1. The specimen is received in formalin labeled with the patient's name and further designated 'small intestine biopsy' is a small fragment of gray-tan tissue. The specimen is submitted for embedding as received.    2. The specimen is received in formalin labeled with the patient's name and further designated 'gastric antrum biopsy' is a small fragment of gray-tan tissue. The specimen is submitted for embedding as received.    3. The specimen is received in formalin labeled with the patient's name and further designated 'gastric fundus biopsy' is a small fragment of gray-tan tissue. The specimen is submitted for embedding as received.         Microscopic Description --     Performed, incorporated in diagnosis.       Manual Differential [[id number]]  (Abnormal) Collected: 06/08/21 0921    Specimen: Blood Updated: 06/08/21 1033     Neutrophil % 84.5 %      Lymphocyte % 10.3 %      Monocyte % 2.1 %      Eosinophil % 3.1 %      Neutrophils Absolute 5.32 10*3/mm3      Lymphocytes Absolute 0.65 10*3/mm3      Monocytes Absolute 0.13 10*3/mm3      Eosinophils Absolute 0.19 10*3/mm3      Anisocytosis Mod/2+     Liz Cells Slight/1+     Microcytes Mod/2+     Ovalocytes Slight/1+     Poikilocytes Large/3+     Schistocytes Mod/2+     Smudge Cells Mod/2+     Platelet Morphology Normal    CBC & Differential  [[id number]]  (Abnormal) Collected: 06/08/21 0921    Specimen: Blood Updated: 06/08/21 1033    Narrative:      The following orders were created for panel order CBC & Differential.  Procedure                               Abnormality         Status                     ---------                               -----------         ------                     CBC Auto Differential[[id number]]        Abnormal            Final result                 Please view results for these tests on the individual orders.    CBC Auto Differential [[id number]]  (Abnormal) Collected: 06/08/21 0921    Specimen: Blood Updated: 06/08/21 1033     WBC 6.29 10*3/mm3      RBC 4.01 10*6/mm3      Hemoglobin 9.3 g/dL      Hematocrit 29.5 %      MCV 73.6 fL      MCH 23.2 pg      MCHC 31.5 g/dL      RDW 21.7 %      RDW-SD 55.6 fl      Platelets 184 10*3/mm3     Renal Function Panel [[id number]]  (Abnormal) Collected: 06/08/21 0921    Specimen: Blood from Arm, Left Updated: 06/08/21 1000     Glucose 109 mg/dL      BUN 36 mg/dL      Creatinine 2.20 mg/dL      Sodium 141 mmol/L      Potassium 3.3 mmol/L      Chloride 115 mmol/L      CO2 17.3 mmol/L      Calcium 8.2 mg/dL      Albumin 2.20 g/dL      Phosphorus 2.7 mg/dL      Anion Gap 8.7 mmol/L      BUN/Creatinine Ratio 16.4     eGFR  African Amer 36 mL/min/1.73     Narrative:      GFR Normal >60  Chronic Kidney Disease <60  Kidney Failure <15      Magnesium [[id number]]  (Normal) Collected: 06/08/21 0921    Specimen: Blood from Arm, Left Updated: 06/08/21 1000     Magnesium 1.7 mg/dL     POC Glucose Once [[id number]]  (Normal) Collected: 06/08/21 0544    Specimen: Blood Updated: 06/08/21 0546     Glucose 113 mg/dL     POC Glucose Once [[id number]]  (Normal) Collected: 06/07/21 2034    Specimen: Blood Updated: 06/07/21 2036     Glucose 126 mg/dL     POC Glucose Once [[id number]]  (Abnormal) Collected: 06/07/21 1637    Specimen: Blood Updated: 06/07/21 1639     Glucose 161 mg/dL     Manual Differential  [[id number]]  (Abnormal) Collected: 06/07/21 1106    Specimen: Blood Updated: 06/07/21 1209     Neutrophil % 88.4 %      Lymphocyte % 4.2 %      Monocyte % 4.2 %      Eosinophil % 3.2 %      Neutrophils Absolute 6.01 10*3/mm3      Lymphocytes Absolute 0.29 10*3/mm3      Monocytes Absolute 0.29 10*3/mm3      Eosinophils Absolute 0.22 10*3/mm3      Liz Cells Mod/2+     Ovalocytes Mod/2+     Poikilocytes Large/3+     Schistocytes Slight/1+     Smudge Cells Slight/1+     Platelet Morphology Normal    CBC & Differential [[id number]]  (Abnormal) Collected: 06/07/21 1106    Specimen: Blood Updated: 06/07/21 1209    Narrative:      The following orders were created for panel order CBC & Differential.  Procedure                               Abnormality         Status                     ---------                               -----------         ------                     CBC Auto Differential[[id number]]        Abnormal            Final result                 Please view results for these tests on the individual orders.    CBC Auto Differential [[id number]]  (Abnormal) Collected: 06/07/21 1106    Specimen: Blood Updated: 06/07/21 1209     WBC 6.80 10*3/mm3      RBC 3.73 10*6/mm3      Hemoglobin 8.6 g/dL      Hematocrit 27.5 %      MCV 73.7 fL      MCH 23.1 pg      MCHC 31.3 g/dL      RDW 20.6 %      RDW-SD 52.1 fl      MPV 8.5 fL      Platelets 168 10*3/mm3     Renal Function Panel [[id number]]  (Abnormal) Collected: 06/07/21 1106    Specimen: Blood from Arm, Left Updated: 06/07/21 1152     Glucose 161 mg/dL      BUN 40 mg/dL      Creatinine 2.60 mg/dL      Sodium 140 mmol/L      Potassium 3.3 mmol/L      Chloride 114 mmol/L      CO2 16.0 mmol/L      Calcium 8.0 mg/dL      Albumin 1.90 g/dL      Phosphorus 2.9 mg/dL      Anion Gap 10.0 mmol/L      BUN/Creatinine Ratio 15.4     eGFR  African Amer 30 mL/min/1.73     Narrative:      GFR Normal >60  Chronic Kidney Disease <60  Kidney Failure <15      POC Glucose Once [[id number]]   (Abnormal) Collected: 06/07/21 1132    Specimen: Blood Updated: 06/07/21 1134     Glucose 169 mg/dL     POC Glucose Once [[id number]]  (Abnormal) Collected: 06/07/21 0539    Specimen: Blood Updated: 06/07/21 0540     Glucose 133 mg/dL     Hemoglobin & Hematocrit, Blood [[id number]]  (Abnormal) Collected: 06/06/21 2359    Specimen: Blood Updated: 06/07/21 0009     Hemoglobin 8.2 g/dL      Hematocrit 26.0 %     POC Glucose Once [[id number]]  (Abnormal) Collected: 06/06/21 1955    Specimen: Blood Updated: 06/06/21 2006     Glucose 141 mg/dL     Manual Differential [[id number]]  (Abnormal) Collected: 06/06/21 1516    Specimen: Blood Updated: 06/06/21 1700     Neutrophil % 79.0 %      Lymphocyte % 15.0 %      Monocyte % 2.0 %      Eosinophil % 3.0 %      Basophil % 1.0 %      Neutrophils Absolute 6.24 10*3/mm3      Lymphocytes Absolute 1.19 10*3/mm3      Monocytes Absolute 0.16 10*3/mm3      Eosinophils Absolute 0.24 10*3/mm3      Basophils Absolute 0.08 10*3/mm3      Microcytes Slight/1+     WBC Morphology Normal     Platelet Morphology Normal    Renal Function Panel [[id number]]  (Abnormal) Collected: 06/06/21 1516    Specimen: Blood Updated: 06/06/21 1649     Glucose 152 mg/dL      BUN 48 mg/dL      Creatinine 2.37 mg/dL      Sodium 138 mmol/L      Potassium 3.7 mmol/L      Chloride 112 mmol/L      CO2 16.2 mmol/L      Calcium 8.2 mg/dL      Albumin 2.10 g/dL      Phosphorus 3.1 mg/dL      Anion Gap 9.8 mmol/L      BUN/Creatinine Ratio 20.3     eGFR  African Amer 33 mL/min/1.73     Narrative:      GFR Normal >60  Chronic Kidney Disease <60  Kidney Failure <15      CBC & Differential [[id number]]  (Abnormal) Collected: 06/06/21 1516    Specimen: Blood Updated: 06/06/21 1638    Narrative:      The following orders were created for panel order CBC & Differential.  Procedure                               Abnormality         Status                     ---------                               -----------         ------                      CBC Auto Differential[[id number]]        Abnormal            Final result                 Please view results for these tests on the individual orders.    CBC Auto Differential [[id number]]  (Abnormal) Collected: 06/06/21 1516    Specimen: Blood Updated: 06/06/21 1638     WBC 7.90 10*3/mm3      RBC 3.71 10*6/mm3      Hemoglobin 8.4 g/dL      Hematocrit 25.9 %      MCV 69.8 fL      MCH 22.6 pg      MCHC 32.4 g/dL      RDW 20.7 %      RDW-SD 50.6 fl      Platelets 170 10*3/mm3     POC Glucose Once [[id number]]  (Abnormal) Collected: 06/06/21 1600    Specimen: Blood Updated: 06/06/21 1601     Glucose 145 mg/dL     POC Glucose Once [[id number]]  (Abnormal) Collected: 06/06/21 1111    Specimen: Blood Updated: 06/06/21 1113     Glucose 133 mg/dL     POC Glucose Once [[id number]]  (Normal) Collected: 06/06/21 0531    Specimen: Blood Updated: 06/06/21 0535     Glucose 125 mg/dL     POC Glucose Once [[id number]]  (Abnormal) Collected: 06/1955    Specimen: Blood Updated: 06/05/21 1955     Glucose 141 mg/dL     POC Glucose Once [[id number]]  (Abnormal) Collected: 06/05/21 1650    Specimen: Blood Updated: 06/05/21 1650     Glucose 161 mg/dL     Fungus Smear - Brushing, Esophagus [[id number]] Collected: 06/05/21 0933    Specimen: Brushing from Esophagus Updated: 06/05/21 1136     Fungal Stain No yeast or hyphal elements seen    POC Glucose Once [[id number]]  (Normal) Collected: 06/05/21 1131    Specimen: Blood Updated: 06/05/21 1133     Glucose 99 mg/dL     Procalcitonin [[id number]]  (Abnormal) Collected: 06/05/21 0800    Specimen: Blood Updated: 06/05/21 1122     Procalcitonin 0.66 ng/mL     Narrative:      As a Marker for Sepsis (Non-Neonates):     1. <0.5 ng/mL represents a low risk of severe sepsis and/or septic shock.  2. >2 ng/mL represents a high risk of severe sepsis and/or septic shock.    As a Marker for Lower Respiratory Tract Infections that require antibiotic therapy:  PCT on Admission     Antibiotic Therapy     "         6-12 Hrs later  >0.5                          Strongly Recommended            >0.25 - <0.5             Recommended  0.1 - 0.25                  Discouraged                       Remeasure/reassess PCT  <0.1                         Strongly Discouraged         Remeasure/reassess PCT      As 28 day mortality risk marker: \"Change in Procalcitonin Result\" (>80% or <=80%) if Day 0 (or Day 1) and Day 4 values are available. Refer to [email or web address][email or web address]/    Change in PCT <=80 %   A decrease of PCT levels below or equal to 80% defines a positive change in PCT test result representing a higher risk for 28-day all-cause mortality of patients diagnosed with severe sepsis or septic shock.    Change in PCT >80 %   A decrease of PCT levels of more than 80% defines a negative change in PCT result representing a lower risk for 28-day all-cause mortality of patients diagnosed with severe sepsis or septic shock.              Results may be falsely decreased if patient taking Biotin.     Fungus Culture - Brushing, Esophagus [[id number]] Collected: 06/05/21 0933    Specimen: Brushing from Esophagus Updated: 06/05/21 0941    Renal Function Panel [[id number]]  (Abnormal) Collected: 06/05/21 0800    Specimen: Blood Updated: 06/05/21 0908     Glucose 98 mg/dL      BUN 54 mg/dL      Creatinine 2.54 mg/dL      Sodium 137 mmol/L      Potassium 4.7 mmol/L      Comment: Slight hemolysis detected by analyzer. Results may be affected.        Chloride 114 mmol/L      CO2 13.5 mmol/L      Calcium 8.3 mg/dL      Albumin 2.20 g/dL      Phosphorus 3.6 mg/dL      Anion Gap 9.5 mmol/L      BUN/Creatinine Ratio 21.3     eGFR  African Amer 31 mL/min/1.73     Narrative:      GFR Normal >60  Chronic Kidney Disease <60  Kidney Failure <15      POC Glucose Once [[id number]]  (Normal) Collected: 06/05/21 0613    Specimen: Blood Updated: 06/05/21 0614     Glucose 110 mg/dL     Hemoglobin & Hematocrit, Blood [[id number]]  (Abnormal) " Collected: 06/05/21 0010    Specimen: Blood Updated: 06/05/21 0056     Hemoglobin 9.5 g/dL      Hematocrit 30.5 %     Urine Culture - Urine, Urine, Catheter [[id number]]  (Abnormal)  (Susceptibility) Collected: 06/02/21 2314    Specimen: Urine, Catheter Updated: 06/05/21 0029     Urine Culture >100,000 CFU/mL Klebsiella pneumoniae ssp pneumoniae    Susceptibility      Klebsiella pneumoniae ssp pneumoniae      ISAC      Ampicillin Resistant      Ampicillin + Sulbactam Susceptible      Cefazolin Susceptible      Cefepime Susceptible      Ceftazidime Susceptible      Ceftriaxone Susceptible      Gentamicin Susceptible      Levofloxacin Susceptible      Nitrofurantoin Susceptible      Piperacillin + Tazobactam Susceptible      Tetracycline Susceptible      Trimethoprim + Sulfamethoxazole Susceptible               Linear View                   Gastrointestinal Panel, PCR - Stool, Per Rectum [[id number]]  (Normal) Collected: 06/04/21 2223    Specimen: Stool from Per Rectum Updated: 06/05/21 0017     Campylobacter Not Detected     Plesiomonas shigelloides Not Detected     Salmonella Not Detected     Vibrio Not Detected     Vibrio cholerae Not Detected     Yersinia enterocolitica Not Detected     Enteroaggregative E. coli (EAEC) Not Detected     Enteropathogenic E. coli (EPEC) Not Detected     Enterotoxigenic E. coli (ETEC) lt/st Not Detected     Shiga-like toxin-producing E. coli (STEC) stx1/stx2 Not Detected     Shigella/Enteroinvasive E. coli (EIEC) Not Detected     Cryptosporidium Not Detected     Cyclospora cayetanensis Not Detected     Entamoeba histolytica Not Detected     Giardia lamblia Not Detected     Adenovirus F40/41 Not Detected     Astrovirus Not Detected     Norovirus GI/GII Not Detected     Rotavirus A Not Detected     Sapovirus (I, II, IV or V) Not Detected    Narrative:      If Aeromonas, Staphylococcus aureus or Bacillus cereus are suspected, please order [id number]: Stool Culture, Aeromonas, S aureus, B  Cereus.    POC Glucose Once [[id number]]  (Normal) Collected: 06/04/21 2035    Specimen: Blood Updated: 06/04/21 2037     Glucose 120 mg/dL     POC Glucose Once [[id number]]  (Normal) Collected: 06/04/21 1612    Specimen: Blood Updated: 06/04/21 1613     Glucose 96 mg/dL     POC Glucose Once [[id number]]  (Abnormal) Collected: 06/04/21 1118    Specimen: Blood Updated: 06/04/21 1120     Glucose 183 mg/dL     Renal Function Panel [[id number]]  (Abnormal) Collected: 06/04/21 0928    Specimen: Blood Updated: 06/04/21 1041     Glucose 188 mg/dL      BUN 56 mg/dL      Creatinine 2.81 mg/dL      Sodium 136 mmol/L      Potassium 4.1 mmol/L      Chloride 113 mmol/L      CO2 12.0 mmol/L      Calcium 8.0 mg/dL      Albumin 2.00 g/dL      Phosphorus 3.4 mg/dL      Anion Gap 11.0 mmol/L      BUN/Creatinine Ratio 19.9     eGFR  African Amer 27 mL/min/1.73     Narrative:      GFR Normal >60  Chronic Kidney Disease <60  Kidney Failure <15      Hemoglobin & Hematocrit, Blood [[id number]]  (Abnormal) Collected: 06/04/21 0928    Specimen: Blood Updated: 06/04/21 1019     Hemoglobin 8.6 g/dL      Hematocrit 27.2 %     CBC & Differential [[id number]]  (Abnormal) Collected: 06/04/21 0928    Specimen: Blood Updated: 06/04/21 1019    Narrative:      The following orders were created for panel order CBC & Differential.  Procedure                               Abnormality         Status                     ---------                               -----------         ------                     CBC Auto Differential[[id number]]        Abnormal            Final result                 Please view results for these tests on the individual orders.    CBC Auto Differential [[id number]]  (Abnormal) Collected: 06/04/21 0928    Specimen: Blood Updated: 06/04/21 1019     WBC 7.29 10*3/mm3      RBC 3.71 10*6/mm3      Hemoglobin 8.6 g/dL      Hematocrit 27.2 %      MCV 73.3 fL      MCH 23.2 pg      MCHC 31.6 g/dL      RDW 21.1 %      RDW-SD 53.8 fl      Platelets  161 10*3/mm3      Neutrophil % 79.9 %      Lymphocyte % 14.4 %      Monocyte % 4.1 %      Eosinophil % 0.8 %      Basophil % 0.4 %      Neutrophils, Absolute 5.82 10*3/mm3      Lymphocytes, Absolute 1.05 10*3/mm3      Monocytes, Absolute 0.30 10*3/mm3      Eosinophils, Absolute 0.06 10*3/mm3      Basophils, Absolute 0.03 10*3/mm3     POC Glucose Once [[id number]]  (Abnormal) Collected: 06/04/21 0558    Specimen: Blood Updated: 06/04/21 0600     Glucose 141 mg/dL     Hemoglobin & Hematocrit, Blood [[id number]]  (Abnormal) Collected: 06/04/21 0006    Specimen: Blood Updated: 06/04/21 0034     Hemoglobin 9.6 g/dL      Hematocrit 30.8 %     POC Glucose Once [[id number]]  (Normal) Collected: 06/03/21 2008    Specimen: Blood Updated: 06/03/21 2011     Glucose 102 mg/dL     Basic Metabolic Panel [[id number]]  (Abnormal) Collected: 06/03/21 1756    Specimen: Blood Updated: 06/03/21 1823     Glucose 98 mg/dL      BUN 63 mg/dL      Creatinine 2.64 mg/dL      Sodium 134 mmol/L      Potassium 4.1 mmol/L      Chloride 114 mmol/L      CO2 11.3 mmol/L      Calcium 8.0 mg/dL      eGFR  African Amer 29 mL/min/1.73      BUN/Creatinine Ratio 23.9     Anion Gap 8.7 mmol/L     Narrative:      GFR Normal >60  Chronic Kidney Disease <60  Kidney Failure <15      Hemoglobin & Hematocrit, Blood [[id number]]  (Abnormal) Collected: 06/03/21 1756    Specimen: Blood Updated: 06/03/21 1809     Hemoglobin 9.4 g/dL      Hematocrit 29.3 %     POC Glucose Once [[id number]]  (Normal) Collected: 06/03/21 1648    Specimen: Blood Updated: 06/03/21 1649     Glucose 116 mg/dL     POC Glucose Once [[id number]]  (Abnormal) Collected: 06/03/21 1131    Specimen: Blood Updated: 06/03/21 1134     Glucose 237 mg/dL     Hemoglobin A1c [[id number]]  (Abnormal) Collected: 06/03/21 0922    Specimen: Blood Updated: 06/03/21 1034     Hemoglobin A1C 7.18 %     Narrative:      Hemoglobin A1C Ranges:    Increased Risk for Diabetes  5.7% to 6.4%  Diabetes                     >=  6.5%  Diabetic Goal                < 7.0%    Hemoglobin & Hematocrit, Blood [[id number]]  (Abnormal) Collected: 06/03/21 0922    Specimen: Blood from Arm, Left Updated: 06/03/21 0955     Hemoglobin 9.3 g/dL      Hematocrit 31.0 %     POC Glucose Once [[id number]]  (Abnormal) Collected: 06/03/21 0609    Specimen: Blood Updated: 06/03/21 0611     Glucose 133 mg/dL     POC Glucose Once [[id number]]  (Abnormal) Collected: 06/03/21 0331    Specimen: Blood Updated: 06/03/21 0332     Glucose 147 mg/dL     COVID PRE-OP / PRE-PROCEDURE SCREENING ORDER (NO ISOLATION) - Swab, Nasopharynx [[id number]]  (Normal) Collected: 06/02/21 2318    Specimen: Swab from Nasopharynx Updated: 06/03/21 0330    Narrative:      The following orders were created for panel order COVID PRE-OP / PRE-PROCEDURE SCREENING ORDER (NO ISOLATION) - Swab, Nasopharynx.  Procedure                               Abnormality         Status                     ---------                               -----------         ------                     COVID-19,APTIMA PANTHER,...[[id number]]  Normal              Final result                 Please view results for these tests on the individual orders.    COVID-19,APTIMA PANTHER,[facility], NP/OP SWAB IN UTM/VTM/[facility] TRANSPORT MEDIA,24 HR TAT - Swab, Nasopharynx [[id number]]  (Normal) Collected: 06/02/21 2318    Specimen: Swab from Nasopharynx Updated: 06/03/21 0330     COVID19 Not Detected    Narrative:      Fact sheet for providers: https://www.fda.gov/media/188283/download     Fact sheet for patients: https://www.fda.gov/media/987079/download    Test performed by RT PCR.    Urinalysis, Microscopic Only - Urine, Catheter [[id number]]  (Abnormal) Collected: 06/02/21 2314    Specimen: Urine, Catheter Updated: 06/03/21 0003     RBC, UA 0-2 /HPF      WBC, UA Too Numerous to Count /HPF      Bacteria, UA 4+ /HPF      Squamous Epithelial Cells, UA 0-2 /HPF      Hyaline Casts, UA 3-6 /LPF      Methodology Automated Microscopy     Urinalysis With Microscopic If Indicated (No Culture) - Urine, Catheter [[id number]]  (Abnormal) Collected: 06/02/21 2314    Specimen: Urine, Catheter Updated: 06/03/21 0003     Color, UA Yellow     Appearance, UA Turbid     pH, UA 6.5     Specific Gravity, UA 1.011     Glucose, UA Negative     Ketones, UA Negative     Bilirubin, UA Negative     Blood, UA Small (1+)     Protein,  mg/dL (2+)     Leuk Esterase, UA Large (3+)     Nitrite, UA Negative     Urobilinogen, UA 0.2 E.U./dL    POCT Occult Blood Stool [[id number]]  (Abnormal) Collected: 06/02/21 2358    Specimen: Stool from Per Rectum Updated: 06/02/21 2358     Fecal Occult Blood Positive     Lot Number [id number]     Expiration Date 11/30/2021     Positive Control Positive     Negative Control Negative    Lactic Acid, Plasma [[id number]]  (Normal) Collected: 06/02/21 2315    Specimen: Blood Updated: 06/02/21 2358     Lactate 1.5 mmol/L     Comprehensive Metabolic Panel [[id number]]  (Abnormal) Collected: 06/02/21 2238    Specimen: Blood Updated: 06/02/21 2345     Glucose 233 mg/dL      BUN 68 mg/dL      Creatinine 3.25 mg/dL      Sodium 138 mmol/L      Potassium 3.9 mmol/L      Chloride 111 mmol/L      CO2 15.2 mmol/L      Calcium 8.6 mg/dL      Total Protein 6.6 g/dL      Albumin 3.10 g/dL      ALT (SGPT) 7 U/L      AST (SGOT) 7 U/L      Alkaline Phosphatase 181 U/L      Total Bilirubin 0.3 mg/dL      eGFR  African Amer 23 mL/min/1.73      Globulin 3.5 gm/dL      A/G Ratio 0.9 g/dL      BUN/Creatinine Ratio 20.9     Anion Gap 11.8 mmol/L     Narrative:      GFR Normal >60  Chronic Kidney Disease <60  Kidney Failure <15      [place] Draw [[id number]] Collected: 06/02/21 2238    Specimen: Blood Updated: 06/02/21 2345    Narrative:      The following orders were created for panel order [place] Draw.  Procedure                               Abnormality         Status                     ---------                               -----------         ------                      Light Blue Top[[id number]]                                   Final result               Green Top (Gel)[[id number]]                                  Final result               Lavender Top[[id number]]                                     Final result               Gold Top - SST[[id number]]                                   Final result                 Please view results for these tests on the individual orders.    Light Blue Top [[id number]] Collected: 06/02/21 2238    Specimen: Blood Updated: 06/02/21 2345     Extra Tube hold for add-on     Comment: Auto resulted       Green Top (Gel) [[id number]] Collected: 06/02/21 2238    Specimen: Blood Updated: 06/02/21 2345     Extra Tube Hold for add-ons.     Comment: Auto resulted.       Lavender Top [[id number]] Collected: 06/02/21 2238    Specimen: Blood Updated: 06/02/21 2345     Extra Tube hold for add-on     Comment: Auto resulted       Gold Top - SST [[id number]] Collected: 06/02/21 2238    Specimen: Blood Updated: 06/02/21 2345     Extra Tube Hold for add-ons.     Comment: Auto resulted.       CBC & Differential [[id number]]  (Abnormal) Collected: 06/02/21 2238    Specimen: Blood Updated: 06/02/21 2318    Narrative:      The following orders were created for panel order CBC & Differential.  Procedure                               Abnormality         Status                     ---------                               -----------         ------                     CBC Auto Differential[[id number]]        Abnormal            Final result                 Please view results for these tests on the individual orders.    CBC Auto Differential [[id number]]  (Abnormal) Collected: 06/02/21 2238    Specimen: Blood Updated: 06/02/21 2318     WBC 5.95 10*3/mm3      RBC 4.16 10*6/mm3      Hemoglobin 9.7 g/dL      Hematocrit 31.2 %      MCV 75.0 fL      MCH 23.3 pg      MCHC 31.1 g/dL      RDW 21.8 %      RDW-SD 55.4 fl      Platelets 201 10*3/mm3      Neutrophil % 71.4 %      Lymphocyte %  "18.7 %      Monocyte % 6.4 %      Eosinophil % 2.7 %      Basophil % 0.5 %      Immature Grans % 0.3 %      Neutrophils, Absolute 4.25 10*3/mm3      Lymphocytes, Absolute 1.11 10*3/mm3      Monocytes, Absolute 0.38 10*3/mm3      Eosinophils, Absolute 0.16 10*3/mm3      Basophils, Absolute 0.03 10*3/mm3      Immature Grans, Absolute 0.02 10*3/mm3      nRBC 0.2 /100 WBC         /93 (BP Location: Left arm, Patient Position: Lying)   Pulse 80   Temp 98.1 °F (36.7 °C) (Oral)   Resp 16   Ht 190.5 cm (75\")   Wt 103 kg (227 lb 12.8 oz)   SpO2 97%   BMI 28.47 kg/m²     Discharge Exam:  General Appearance:    Alert, cooperative, no distress                          Head:    Normocephalic, without obvious abnormality, atraumatic                          Eyes:                            Throat:   Lips, tongue, gums normal                          Neck:   Supple, symmetrical, trachea midline, no JVD                        Lungs:     Clear to auscultation bilaterally, respirations unlabored                Chest Wall:    No tenderness or deformity                        Heart:    Regular rate and rhythm, S1 and S2 normal, no murmur,no  Rub or gallop                  Abdomen:     Soft, non-tender, bowel sounds active, no masses, no organomegaly                  Extremities:   Extremities normal, atraumatic, no cyanosis or edema                             Skin:   Skin is warm and dry,  no rashes or palpable lesions                  Neurologic:   no focal deficits noted     Disposition:  Skilled nursing facility    Patient Instructions:      Discharge Medications      New Medications      Instructions Start Date   famotidine 20 MG tablet  Commonly known as: PEPCID   20 mg, Oral, Daily   Start Date: Inocencia 10, 2021     fluconazole 100 MG tablet  Commonly known as: DIFLUCAN   100 mg, Oral, Every 24 Hours      sennosides-docusate 8.6-50 MG per tablet  Commonly known as: PERICOLACE   1 tablet, Oral, Nightly      sodium " bicarbonate 650 MG tablet   1,300 mg, Oral, 3 Times Daily         Changes to Medications      Instructions Start Date   amLODIPine 2.5 MG tablet  Commonly known as: NORVASC  What changed:   · medication strength  · how much to take   2.5 mg, Oral, Every 24 Hours Scheduled   Start Date: Inocencia 10, 2021     brimonidine 0.1 % solution ophthalmic solution  Commonly known as: ALPHAGAN P  What changed: how to take this   1 drop, Both Eyes, 2 times daily      hydrALAZINE 25 MG tablet  Commonly known as: APRESOLINE  What changed:   · medication strength  · how much to take  · when to take this   25 mg, Oral, Every 8 Hours Scheduled         Continue These Medications      Instructions Start Date   acetaminophen 325 MG tablet  Commonly known as: TYLENOL   325 mg, Oral, Every 6 Hours PRN      ammonium lactate 12 % cream  Commonly known as: AMLACTIN   Topical, 2 Times Daily      atorvastatin 20 MG tablet  Commonly known as: LIPITOR   20 mg, Oral, Daily      cyclobenzaprine 10 MG tablet  Commonly known as: FLEXERIL   10 mg, Oral, Every 8 Hours PRN      dorzolamide 2 % ophthalmic solution  Commonly known as: TRUSOPT   1 drop, Right Eye, 2 times daily      loperamide 2 MG capsule  Commonly known as: IMODIUM   2 mg, Oral, As Needed      olopatadine 0.1 % ophthalmic solution  Commonly known as: PATANOL   1 drop, Both Eyes, 2 Times Daily      Rocklatan 0.02-0.005 % solution  Generic drug: Netarsudil-Latanoprost   1 drop, Both Eyes, Nightly      tamsulosin 0.4 MG capsule 24 hr capsule  Commonly known as: FLOMAX   1 capsule, Oral, Daily         Stop These Medications    aspirin 81 MG EC tablet     gabapentin 300 MG capsule  Commonly known as: NEURONTIN     HYDROcodone-acetaminophen  MG per tablet  Commonly known as: NORCO     losartan 25 MG tablet  Commonly known as: COZAAR     potassium chloride 10 MEQ CR tablet          No future appointments.   Contact information for follow-up providers     Maya Ribeiro APRN Follow up.     Specialty: Family Medicine  Contact information:  [address] [facility] [address]  [phone]                   Contact information for after-discharge care     Destination     [facility] .    Service: Skilled Nursing  Contact information:  [address] [facility] [address] [phone]                           Discharge Order (From admission, onward)     Start     Ordered    06/09/21 1142  Discharge patient  Once     Expected Discharge Date: 06/09/21    Discharge Disposition: Skilled Nursing Facility (DC - External)    Physician of Record for Attribution - Please select from Treatment Team: JONO MONTES [[id number]]    Review needed by CMO to determine Physician of Record: No       Question Answer Comment   Physician of Record for Attribution - Please select from Treatment Team JONO MONTES    Review needed by CMO to determine Physician of Record No        06/09/21 1146                Total time spent discharging patient including evaluation,post hospitalization follow up,  medication and post hospitalization instructions and education total time exceeds 30 minutes.    Signed:  Jono Montes MD  6/9/2021  11:47 EDT

## 2021-06-09 NOTE — PROGRESS NOTES
Gregorio Alejandro   65 y.o.  male    LOS: 3 days   Patient Care Team:  Maya Ribeiro APRN as PCP - General (Family Medicine)      Subjective     Review of Systems:   No SOA states he is ready to be discharged    Medication Review:   Current Facility-Administered Medications:   •  acetaminophen (TYLENOL) tablet 650 mg, 650 mg, Oral, Q4H PRN, 650 mg at 06/07/21 2145 **OR** acetaminophen (TYLENOL) 160 MG/5ML solution 650 mg, 650 mg, Oral, Q4H PRN **OR** acetaminophen (TYLENOL) suppository 650 mg, 650 mg, Rectal, Q4H PRN, Kaylynn Bennett APRN  •  amLODIPine (NORVASC) tablet 2.5 mg, 2.5 mg, Oral, Q24H, Dane Reeves MD, 2.5 mg at 06/09/21 0816  •  ammonium lactate (AMLACTIN) cream, , Topical, Q12H PRN, Rashad Ayala MD  •  atorvastatin (LIPITOR) tablet 20 mg, 20 mg, Oral, Daily, Rashad Ayala MD, 20 mg at 06/09/21 0815  •  brimonidine (ALPHAGAN P) 0.1 % ophthalmic solution 1 drop, 1 drop, Both Eyes, BID, Rashad Ayala MD, 1 drop at 06/09/21 0816  •  calcium carbonate (TUMS) chewable tablet 500 mg (200 mg elemental), 2 tablet, Oral, BID PRN, Kaylynn Bennett APRN  •  cefTRIAXone (ROCEPHIN) IVPB 1 g, 1 g, Intravenous, Q24H, Tonja Azevedo MD, Last Rate: 100 mL/hr at 06/09/21 0816, 1 g at 06/09/21 0816  •  dextrose (D50W) 25 g/ 50mL Intravenous Solution 25 g, 25 g, Intravenous, Q15 Min PRN, Kaylynn Bennett APRN  •  dextrose (GLUTOSE) oral gel 15 g, 15 g, Oral, Q15 Min PRN, Kaylynn Bennett APRN  •  dorzolamide (TRUSOPT) 2 % ophthalmic solution 1 drop, 1 drop, Right Eye, TID, Rashad Ayala MD, 1 drop at 06/09/21 0816  •  famotidine (PEPCID) tablet 20 mg, 20 mg, Oral, BID AC, Irma Shaffer, APRN, 20 mg at 06/09/21 0815  •  fluconazole (DIFLUCAN) tablet 100 mg, 100 mg, Oral, Q24H, Lyndsey Escoto MD, 100 mg at 06/08/21 1416  •  glucagon (human recombinant) (GLUCAGEN DIAGNOSTIC) injection 1 mg, 1 mg, Subcutaneous, PRN, Kaylynn Bennett APRN  •  hydrALAZINE  (APRESOLINE) tablet 25 mg, 25 mg, Oral, Q8H, Lyndsey Escoto MD, 25 mg at 06/09/21 0526  •  insulin lispro (ADMELOG) injection 0-9 Units, 0-9 Units, Subcutaneous, 4x Daily With Meals & Nightly, Kaylynn Bennett APRN, 2 Units at 06/07/21 1821  •  metroNIDAZOLE (FLAGYL) 500 mg/100mL IVPB, 500 mg, Intravenous, Q8H, Tonja Azevedo MD, Stopped at 06/09/21 0733  •  nitroglycerin (NITROSTAT) SL tablet 0.4 mg, 0.4 mg, Sublingual, Q5 Min PRN, Kaylynn Bennett APRN  •  ondansetron (ZOFRAN) tablet 4 mg, 4 mg, Oral, Q6H PRN **OR** ondansetron (ZOFRAN) injection 4 mg, 4 mg, Intravenous, Q6H PRN, Kaylynn Bennett APRN  •  sennosides-docusate (PERICOLACE) 8.6-50 MG per tablet 1 tablet, 1 tablet, Oral, Nightly, Chrissy Gordon MD, 1 tablet at 06/08/21 2053  •  sodium bicarbonate tablet 1,300 mg, 1,300 mg, Oral, TID, Dane Reeves MD, 1,300 mg at 06/09/21 0816  •  [COMPLETED] Insert peripheral IV, , , Once **AND** sodium chloride 0.9 % flush 10 mL, 10 mL, Intravenous, PRN, Alicia Harris APRN  •  sodium chloride 0.9 % flush 10 mL, 10 mL, Intravenous, Q12H, Kaylynn Bennett APRN, 10 mL at 06/09/21 0821  •  sodium chloride 0.9 % flush 10 mL, 10 mL, Intravenous, PRN, Kaylynn Bennett APRN  •  tamsulosin (FLOMAX) 24 hr capsule 0.4 mg, 0.4 mg, Oral, Daily, Dane Reeves MD, 0.4 mg at 06/09/21 0815      Objective     Vital Sign Min/Max for last 24 hours  Temp  Min: 97.5 °F (36.4 °C)  Max: 98.4 °F (36.9 °C)   BP  Min: 132/73  Max: 143/93    Pulse  Min: 76  Max: 103         06/03/21  0253   Weight: 103 kg (227 lb 12.8 oz)        Intake/Output Summary (Last 24 hours) at 6/9/2021 0912  Last data filed at 6/9/2021 0445  Gross per 24 hour   Intake --   Output 400 ml   Net -400 ml     Physical Exam:      General Appearance:   Middle-age male resting in bed in no acute distress   Neck:    no JVD   Lungs:     Clear to auscultation bilaterally. No wheezes, rhonchi or rales. No  accessory muscle use.     Heart:    Irregular rate and rhythm.  Normal S1 and S2. No murmur, no gallop, rub or lift.    Abdomen:     Normal bowel sounds, soft non-tender, non-distended   Extremities:  BLE wrapped in Ace and with compression hose no edema noted   Skin:   Warm and dry   Neurologic:   awake alert and oriented x3, speech clear and approp      Monitor: STANISLAW hines with CVR  Results Review:     I reviewed the patient's new clinical results.    Sodium Sodium   Date Value Ref Range Status   06/08/2021 141 136 - 145 mmol/L Final   06/07/2021 140 136 - 145 mmol/L Final   06/06/2021 138 136 - 145 mmol/L Final      Potassium Potassium   Date Value Ref Range Status   06/08/2021 3.3 (L) 3.5 - 5.2 mmol/L Final   06/07/2021 3.3 (L) 3.5 - 5.2 mmol/L Final   06/06/2021 3.7 3.5 - 5.2 mmol/L Final      Chloride Chloride   Date Value Ref Range Status   06/08/2021 115 (H) 98 - 107 mmol/L Final   06/07/2021 114 (H) 98 - 107 mmol/L Final   06/06/2021 112 (H) 98 - 107 mmol/L Final      Bicarbonate No results found for: PLASMABICARB   BUN BUN   Date Value Ref Range Status   06/08/2021 36 (H) 8 - 23 mg/dL Final   06/07/2021 40 (H) 8 - 23 mg/dL Final   06/06/2021 48 (H) 8 - 23 mg/dL Final      Creatinine Creatinine   Date Value Ref Range Status   06/08/2021 2.20 (H) 0.76 - 1.27 mg/dL Final   06/07/2021 2.60 (H) 0.76 - 1.27 mg/dL Final   06/06/2021 2.37 (H) 0.76 - 1.27 mg/dL Final      Calcium Calcium   Date Value Ref Range Status   06/08/2021 8.2 (L) 8.6 - 10.5 mg/dL Final   06/07/2021 8.0 (L) 8.6 - 10.5 mg/dL Final   06/06/2021 8.2 (L) 8.6 - 10.5 mg/dL Final      Magnesium Magnesium   Date Value Ref Range Status   06/08/2021 1.7 1.6 - 2.4 mg/dL Final        Results from last 7 days   Lab Units 06/08/21  0921   WBC 10*3/mm3 6.29   HEMOGLOBIN g/dL 9.3*   HEMATOCRIT % 29.5*   PLATELETS 10*3/mm3 184         Lab Results   Component Value Date    CHOL 108 12/22/2020     Lab Results   Component Value Date    HDL 49 12/22/2020     Lab  Results   Component Value Date    LDL 42 12/22/2020     Lab Results   Component Value Date    TRIG 86 12/22/2020     Assessment/Plan   Assessment/ Plan  1.  Persistent atrial fibrillation         -[id number] score 3, Eliquis was started on last admit 2/2021  2. GIB/ acute on chronic anemia        -EGD 6/5/21: Esophageal plaques were found, consistent with candidiasis. Cells for cytology obtained. Gastritis. Biopsied. Duodenitis. Biopsied. The examination was otherwise normal.        -Unable to perform colonoscopy due to poor preparation  3. HTN  4. H/o NSVT   5.  History of pauses and marked first-degree AV block        -status post LINQ loop recorder 4/23/2019        -NO BETA BLOCKER  6. SILVIA on CKD   7.  Chronic bilateral lower extremity edema  8.  UTI urine culture positive for Klebsiella   9. OFELIA, previously insurance had denied CPAP  10.  Urinary retention  11.  Right upper lobe pneumonia    Plan:  On Norvasc and hydralazine. NO ACE or ARB given CKD (nephrology following), Norvasc was decreased due to to chronic bilateral lower extremity edema. As documented unable to tolerate beta-blockers due to history of cardiac pauses.  Reviewed telemetry no significant pauses noted. Patient does have loop recorder which was placed in April 2019.     No labs been completed today.  Yesterday hemoglobin was low but stable.  May not be a candidate for long-term anticoagulation as noted in previous notes Eliquis was started in February last admission he had previously not been on anticoagulation due to history of anemia.      Had refused morning labs, spoke with nurse she states she will try to get patient to have labs drawn      ANTWAN Bess  06/09/21  09:12 EDT  Patient seen and examined.  Agree with note above by ANTWAN Dupont.  Patient denies any chest pain or shortness of air.  Says he feels much better and wants to go back to rehab.  Agree with the physical exam as detailed above.  Remains in atrial  fibrillation.  No beta-blockers or calcium blockers like diltiazem due to history of significant pauses.  Rate looks okay.  Patient does not look like a good candidate for chronic anticoagulation for the atrial fibrillation.  He refused morning labs as noted above.  Continue current amlodipine and hydralazine for hypertension..  Okay for discharge from cardiac standpoint.  Terrance Muhammad MD  Time: 18 minutes    Dictated portions using Dragon dictation software.     During the entire encounter, I was wearing recommended PPE including face mask and eye protection. Hand sanitization was performed prior to entering room and upon exit.

## 2021-06-09 NOTE — PROGRESS NOTES
Continued Stay Note  [facility]     Patient Name: Gregorio Alejandro  MRN: [id number]  Today's Date: 6/9/2021    Admit Date: 6/2/2021    Discharge Plan     Row Name 06/09/21 1305       Plan    Plan  [facility] today via [facility] @ 1500    Plan Comments  S/w Eduardo, bed is ready today. [facility] Duyu/Abi scheduled to transport to [place] @ 1500. Pt and spouse notified, both are agreeable.    Final Discharge Disposition Code  03 - skilled nursing facility (SNF)    Final Note  [facility] [place] [facility]        Discharge Codes    No documentation.       Expected Discharge Date and Time     Expected Discharge Date Expected Discharge Time    Jun 9, 2021             Sujatha Escobar RN

## 2021-08-31 PROBLEM — R41.82 ALTERED MENTAL STATUS: Status: ACTIVE | Noted: 2021-01-01

## 2021-09-01 PROBLEM — E43 SEVERE MALNUTRITION (HCC): Status: ACTIVE | Noted: 2021-01-01

## 2021-09-01 NOTE — CONSULTS
Referring Provider: Dr. Espino  Reason for Consultation: Respiratory failure    Patient Care Team:  Maya Ribeiro APRN as PCP - General (Family Medicine)    Chief complaint:   Altered mental status    History of present illness:    Subjective   This is a 65-year-old male patient, resident of nursing home.  He was admitted yesterday for altered mental status and was found to have extensive right-sided pneumonia.  He is currently on antibiotic therapy.  Throughout the day, he had low blood pressure and he has received 2 boluses of 500 mL and 1000 mL on 8/31 at 7, 9 a.m. his blood pressure improved but then dropped again overnight and he received another 500 mL of IV fluid bolus 1915.  Due to drop in blood pressure, he was transferred to the intensive care unit.    On my evaluation, patient is significantly altered.  He would open his eyes when I call his name but then falls back asleep.  He could not really cooperate with exam/history.  Most of the information was obtained from the chart.    He is currently on Zosyn and vancomycin.  No family at bedside.    Labs:  Sodium 146; lactic acid 3.5; hemoglobin 10; WBC 13; platelets 246; urinalysis TNTC WBC; creatinine 3.7    Review of Systems  Cannot obtain due to altered mental status.    History  Past Medical History:   Diagnosis Date   • Back pain    • CKD (chronic kidney disease)    • DM type 2 (diabetes mellitus, type 2) (CMS/[facility])    • ED (erectile dysfunction)    • Hyperlipidemia    • Hypertension    • SCOTTY (iron deficiency anemia)    • Monoclonal gammopathy    • Osteoarthritis    ,   Past Surgical History:   Procedure Laterality Date   • ABDOMINAL SURGERY     • ENDOSCOPY N/A 6/5/2021    Procedure: ESOPHAGOGASTRODUODENOSCOPY with biopsies and brushing;  Surgeon: Pérez Gonzalez MD;  [facility] ENDOSCOPY;  Service: Gastroenterology;  Laterality: N/A;  pre-abnormal CT scan stomach  post-gastritis, possible candida, duodenitis   • EYE SURGERY      • JOINT REPLACEMENT     • SIGMOIDOSCOPY N/A 6/5/2021    Procedure: FLEXIBLE SIGMOIDOSCOPY to 40cm;  Surgeon: Pérez Gonzalez MD;  Location: [facility] ENDOSCOPY;  Service: Gastroenterology;  Laterality: N/A;  pre-abnormal ct scan of sigmoid rectum  post-poor prep   • TONSILLECTOMY     ,   Family History   Family history unknown: Yes   ,   Social History     Socioeconomic History   • Marital status:      Spouse name: Not on file   • Number of children: Not on file   • Years of education: Not on file   • Highest education level: Not on file   Tobacco Use   • Smoking status: Never Smoker   • Smokeless tobacco: Never Used   Vaping Use   • Vaping Use: Never used   Substance and Sexual Activity   • Alcohol use: Yes     Comment: 1 pint   • Drug use: Never   • Sexual activity: Defer     E-cigarette/Vaping   • E-cigarette/Vaping Use Never User    • Passive Exposure No    • Counseling Given No      E-cigarette/Vaping Substances   • Nicotine No    • THC No    • CBD No    • Flavoring No      E-cigarette/Vaping Devices   • Disposable No    • Pre-filled or Refillable Cartridge No    • Refillable Tank No    • Pre-filled Pod No        ,   Medications Prior to Admission   Medication Sig Dispense Refill Last Dose   • acetaminophen (TYLENOL) 325 MG tablet Take 325 mg by mouth Every 6 (Six) Hours As Needed for Mild Pain .      • amLODIPine (NORVASC) 2.5 MG tablet Take 1 tablet by mouth Daily.      • apixaban (ELIQUIS) 2.5 MG tablet tablet Take 2.5 mg by mouth 2 (Two) Times a Day.      • atorvastatin (LIPITOR) 20 MG tablet Take 20 mg by mouth Daily.      • brimonidine (ALPHAGAN P) 0.1 % solution ophthalmic solution Administer 1 drop to both eyes 2 (two) times a day.  12    • busPIRone (BUSPAR) 15 MG tablet Take 7.5 mg by mouth 2 (two) times a day.      • cyclobenzaprine (FLEXERIL) 10 MG tablet Take 10 mg by mouth Every 8 (Eight) Hours As Needed for Muscle Spasms.      • dorzolamide (TRUSOPT) 2 % ophthalmic solution Administer 1  drop to the right eye 2 (two) times a day.      • famotidine (PEPCID) 20 MG tablet Take 1 tablet by mouth Daily.      • ferrous sulfate 325 (65 FE) MG tablet Take 325 mg by mouth Daily With Breakfast.      • loperamide (IMODIUM) 2 MG capsule Take 2 mg by mouth As Needed for Diarrhea.      • mirtazapine (REMERON) 15 MG tablet Take 7.5 mg by mouth Every Night.      • multivitamin with minerals (MULTIVITAMIN ADULT PO) Take 1 tablet by mouth Daily.      • Netarsudil-Latanoprost (Rocklatan) 0.02-0.005 % solution Administer 1 drop to both eyes Every Night.      • olopatadine (PATANOL) 0.1 % ophthalmic solution Administer 1 drop to both eyes 2 (Two) Times a Day.      • potassium chloride (K-DUR,KLOR-CON) 20 MEQ CR tablet Take 1 tablet by mouth Daily.      • sennosides-docusate (PERICOLACE) 8.6-50 MG per tablet Take 1 tablet by mouth Every Night.      • sertraline (ZOLOFT) 50 MG tablet Take 50 mg by mouth Daily.      • sodium bicarbonate 650 MG tablet Take 2 tablets by mouth 3 (Three) Times a Day.      • tamsulosin (FLOMAX) 0.4 MG capsule 24 hr capsule Take 1 capsule by mouth Daily.      • vitamin C (ASCORBIC ACID) 250 MG tablet Take 500 mg by mouth 2 (two) times a day.      , Scheduled Meds:  enoxaparin, 30 mg, Subcutaneous, Q24H  famotidine, 20 mg, Intravenous, Q12H  insulin lispro, 0-7 Units, Subcutaneous, TID AC  metoprolol tartrate, 5 mg, Intravenous, Q8H  piperacillin-tazobactam, 3.375 g, Intravenous, Q12H  Vancomycin Pharmacy Intermittent Dosing, , Does not apply, Daily     and Allergies:  Patient has no known allergies.    Objective     Vital Signs   Temp:  [97.3 °F (36.3 °C)-99.3 °F (37.4 °C)] 97.3 °F (36.3 °C)  Heart Rate:  [] 89  Resp:  [18-26] 20  BP: ()/(36-96) 108/84    PPE used per hospital policy    Physical Exam:  Constitutional: Elderly -American male patient, not in acute distress.  Eyes: Injected conjunctivae, EOMI. pupils equal reactive to light.  Face: Mild temporal wasting  ENMT:  Dry tongue.  External ears normal.  Neck:  Trachea midline. No thyromegaly  Heart: RRR, no murmur  Lungs: Coarse breath sounds on the right side.  Diminished air entry overall but equal bilaterally.  No wheezing.  Nonlabored breathing.   Abdomen: Soft. No tenderness or dullness. No HSM.  Extremities: No cyanosis, clubbing or pitting edema.  Warm extremities and well-perfused.  Neuro: Lethargic.  Does not follow commands.  Moves all extremities and withdraws to pain.  Psych: Appropriate mood and affect.    Integumentary: No rash.  Normal skin turgor  Lymphatic: No palpable cervical or supraclavicular lymph nodes.      Diagnostic imaging:  I personally and independently reviewed the following images:   CXR 8/31/21: Right pulmonary infiltrates        Assessment   1. Right pneumonia, likely aspiration  2. Severe sepsis, secondary #1  3. Lactic acidosis  4. SILVIA on CKD 3  5. Hypotension, transient  6. Hypernatremia  7. Moderate to severe protein calorie malnutrition  8. DM type II    Recommendations:    · Check serial lactic acid  · We will administer bolus of albumin 500 ml and continue maintenance IV fluid.  His map is currently >65.  Additional boluses and pressors might be required if maps drops less than 65  · Broad-spectrum antibiotic with Zosyn awaiting cultures.  DC vancomycin since MRSA screen negative  · Aspiration precaution  · Insulin PRN for hyperglycemia  · Insert core track and start nutrition.  Consult dietitian  · DVT prophylaxis with heparin subcu    Discussed with staff        Phan Amos MD  09/01/21  06:31 EDT    Time: Critical care 38 min

## 2021-09-01 NOTE — PAYOR COMM NOTE
"Mor Alejandro (65 y.o. Male)                           ATTENTION;   INPATIENT AUTHORIZATION REQUEST,   REPLY TO UR DEPT ,                         YEE CASILLAS LPN  [phone] OR CALL          Date of Birth Social Security Number Address Home Phone MRN    1955  [address] [address] [phone] [id number]    [employer] Marital Status          Yarsani        Admission Date Admission Type Admitting Provider Attending Provider Department, Room/Bed    8/31/21 Emergency Campos Espino MD [place], Marlon R, MD [facility] CORONARY CARE, N325/1    Discharge Date Discharge Disposition Discharge Destination                       Attending Provider: Campos Espino MD    Allergies: No Known Allergies    Isolation: None   Infection: None   Code Status: CPR    Ht: 190.5 cm (75\")   Wt: 74.5 kg (164 lb 3.9 oz)    Admission Cmt: None   Principal Problem: None     J18.9 - N17.9              Active Insurance as of 8/31/2021     Primary Coverage     Payor Plan Insurance Group Employer/Plan Group    [facility] WELL[employer]ARE MEDICARE REPLACEMENT [id number]     Payor Plan Address Payor Plan Phone Number Payor Plan Fax Number Effective Dates    [address] [address] [phone]  8/13/2020 - None Entered    [facility] [id number]       Subscriber Name Subscriber Birth Date Member ID       JavonMor 1955 [id number]           Secondary Coverage     Payor Plan Insurance Group Employer/Plan Group    [id number]      Payor Plan Address Payor Plan Phone Number Payor Plan Fax Number Effective Dates    [address]   2/1/2021 - None Entered    [facility] [id number]       Subscriber Name Subscriber Birth Date Member ID       MOR ALEJANDRO 1955 [id number]                 Emergency Contacts      (Rel.) Home Phone Work Phone Mobile Phone    Patricia Alonzo (Spouse) [phone] -- " [phone]    Celia Eastman (Daughter) [phone] -- --    Zeenat Wang (Daughter) -- -- [phone]               History & Physical      Campos Espino MD at 21 1807          HISTORY AND PHYSICAL   [facility], [facility]      2021    Patient Identification:    Name: Gregorio Alejandro  Age: 65 y.o.  Sex: male  :  1955  MRN: [id number]                       Primary Care Physician: Maya Ribeiro APRN    Chief Complaint:      Chief Complaint   Patient presents with   • Altered Mental Status         History of Present Illness:     Patient is a 65-year-old gentleman, resident of the nursing home.  Patient has history of diabetes mellitus, hyperlipidemia, hypertension, monoclonal gammopathy, chronic kidney disease.  Patient was noted to have altered mental status during the last 2-3 days prior to admission, he progressively became more confused until he became lethargic and almost unresponsive.  Patient was sent to the emergency room, in the ER, patient was found to have: Hemoglobin 11.4, WBC 10.26, creatinine 4.11, potassium 5.0, CO2 16.5, BNP 15,560, troponin 0.202, procalcitonin 3.96.  Respiratory panel including COVID-19, negative.  Lactate was 2.4, urinalysis shows closed, WBC, unable to determine RBC.  CT of the head showed no acute intracranial abnormality with no change when compared to prior head CT on February of this year.  Chest ray showed extensive pneumonia or aspiration pneumonitis in the right lung.  Patient was admitted for further management.    Past Medical History:  Past Medical History:   Diagnosis Date   • Back pain    • CKD (chronic kidney disease)    • DM type 2 (diabetes mellitus, type 2) (CMS/[facility])    • ED (erectile dysfunction)    • Hyperlipidemia    • Hypertension    • SCOTTY (iron deficiency anemia)    • Monoclonal gammopathy    • Osteoarthritis      Past Surgical History:  Past Surgical History:   Procedure Laterality Date   • ABDOMINAL SURGERY     •  ENDOSCOPY N/A 6/5/2021    Procedure: ESOPHAGOGASTRODUODENOSCOPY with biopsies and brushing;  Surgeon: Pérez Gonzalez MD;  Location: [facility] ENDOSCOPY;  Service: Gastroenterology;  Laterality: N/A;  pre-abnormal CT scan stomach  post-gastritis, possible candida, duodenitis   • EYE SURGERY     • JOINT REPLACEMENT     • SIGMOIDOSCOPY N/A 6/5/2021    Procedure: FLEXIBLE SIGMOIDOSCOPY to 40cm;  Surgeon: Pérez Gonzalez MD;  Location: [facility] ENDOSCOPY;  Service: Gastroenterology;  Laterality: N/A;  pre-abnormal ct scan of sigmoid rectum  post-poor prep   • TONSILLECTOMY        Home Meds:  Medications Prior to Admission   Medication Sig Dispense Refill Last Dose   • acetaminophen (TYLENOL) 325 MG tablet Take 325 mg by mouth Every 6 (Six) Hours As Needed for Mild Pain .      • amLODIPine (NORVASC) 2.5 MG tablet Take 1 tablet by mouth Daily.      • apixaban (ELIQUIS) 2.5 MG tablet tablet Take 2.5 mg by mouth 2 (Two) Times a Day.      • atorvastatin (LIPITOR) 20 MG tablet Take 20 mg by mouth Daily.      • brimonidine (ALPHAGAN P) 0.1 % solution ophthalmic solution Administer 1 drop to both eyes 2 (two) times a day.  12    • busPIRone (BUSPAR) 15 MG tablet Take 7.5 mg by mouth 2 (two) times a day.      • cyclobenzaprine (FLEXERIL) 10 MG tablet Take 10 mg by mouth Every 8 (Eight) Hours As Needed for Muscle Spasms.      • dorzolamide (TRUSOPT) 2 % ophthalmic solution Administer 1 drop to the right eye 2 (two) times a day.      • famotidine (PEPCID) 20 MG tablet Take 1 tablet by mouth Daily.      • ferrous sulfate 325 (65 FE) MG tablet Take 325 mg by mouth Daily With Breakfast.      • loperamide (IMODIUM) 2 MG capsule Take 2 mg by mouth As Needed for Diarrhea.      • mirtazapine (REMERON) 15 MG tablet Take 7.5 mg by mouth Every Night.      • multivitamin with minerals (MULTIVITAMIN ADULT PO) Take 1 tablet by mouth Daily.      • Netarsudil-Latanoprost (Rocklatan) 0.02-0.005 % solution Administer 1 drop to both eyes Every  "Night.      • olopatadine (PATANOL) 0.1 % ophthalmic solution Administer 1 drop to both eyes 2 (Two) Times a Day.      • potassium chloride (K-DUR,KLOR-CON) 20 MEQ CR tablet Take 1 tablet by mouth Daily.      • sennosides-docusate (PERICOLACE) 8.6-50 MG per tablet Take 1 tablet by mouth Every Night.      • sertraline (ZOLOFT) 50 MG tablet Take 50 mg by mouth Daily.      • sodium bicarbonate 650 MG tablet Take 2 tablets by mouth 3 (Three) Times a Day.      • tamsulosin (FLOMAX) 0.4 MG capsule 24 hr capsule Take 1 capsule by mouth Daily.      • vitamin C (ASCORBIC ACID) 250 MG tablet Take 500 mg by mouth 2 (two) times a day.          Allergies:  No Known Allergies  Immunizations:  Immunization History   Administered Date(s) Administered   • COVID-19 (MODERNA) 2021   • PPD Test 2021, 2021   • Pneumococcal Conjugate 13-Valent (PCV13) 2018   • Tdap 2018     Social History:   Social History     Social History Narrative   • Not on file     Social History     Tobacco Use   • Smoking status: Never Smoker   • Smokeless tobacco: Never Used   Substance Use Topics   • Alcohol use: Yes     Comment: 1 pint     Family History:  Family History   Family history unknown: Yes        Review of Systems  See history of present illness and past medical history.    Unable to perform ROS: Patient nonverbal        Objective:    Exam:    T MAX 24 hrs: Temp (24hrs), Av.5 °F (36.9 °C), Min:97.6 °F (36.4 °C), Max:99.3 °F (37.4 °C)    Vitals Ranges:   Temp:  [97.6 °F (36.4 °C)-99.3 °F (37.4 °C)] 98.9 °F (37.2 °C)  Heart Rate:  [] 132  Resp:  [20-26] 20  BP: (108-119)/(73-96) 119/84    /84 (BP Location: Left arm, Patient Position: Sitting)   Pulse (!) 132   Temp 98.9 °F (37.2 °C) (Oral)   Resp 20   Ht 190.5 cm (75\")   SpO2 97%   BMI 28.47 kg/m²     General: Alert,  lethargic, confused when arouses.  Saturation in room air is 97%.  Chronically ill, acutely ill as well.  HEENT:    Head: " Normocephalic, without obvious abnormality, atraumatic  Eyes: EOM are normal. Pupils are equal  Oropharynx: Mucosa and tongue dry  Neck: Supple, symmetrical, trachea midline, no adenopathy;              thyroid:  no enlargement/tenderness/nodules;              no carotid bruit or JVD  Cardiovascular: Tachycardic, sinus tach, intact distal pulses.              Exam reveals no gallop and no friction rub. No murmur heard  Chest wall: No tenderness or deformity  Pulmonary: Diminished breath sounds bilaterally, rhonchi both bases, no wheezing, no rales.  Abdominal: Soft, nontender, bowel sounds active all four quadrants,     no masses, no hepatomegaly, no splenomegaly.   Extremities: Normal, atraumatic, no cyanosis or edema  Pulses: 2 + symmetric all extremities  Neurological: Patient is lethargic, confused when arouses.  Oriented at this time.  No focal sensorimotor deficit.  Skin: Skin color, texture, normal. Turgor is decreased. No rashes or lesions      Data Review:    Results from last 7 days   Lab Units 08/31/21  0740   WBC 10*3/mm3 10.26   HEMOGLOBIN g/dL 11.4*   HEMATOCRIT % 34.6*   PLATELETS 10*3/mm3 245       Results from last 7 days   Lab Units 08/31/21  0741   SODIUM mmol/L 145   POTASSIUM mmol/L 5.0   CHLORIDE mmol/L 108*   CO2 mmol/L 16.5*   BUN mg/dL 71*   CREATININE mg/dL 4.11*   CALCIUM mg/dL 9.3   BILIRUBIN mg/dL 1.2   ALK PHOS U/L 127*   ALT (SGPT) U/L 11   AST (SGOT) U/L 18   GLUCOSE mg/dL 169*                 Lab Results   Lab Value Date/Time    TROPONINT 0.202 (C) 08/31/2021 0741    TROPONINT 0.110 (C) 02/04/2021 1551    TROPONINT 0.171 (C) 12/23/2020 0357    TROPONINT 0.141 (C) 12/22/2020 0549    TROPONINT 0.147 (C) 12/21/2020 1450    TROPONINT 0.128 (C) 12/21/2020 1223       Brief Urine Lab Results  (Last result in the past 365 days)      Color   Clarity   Blood   Leuk Est   Nitrite   Protein   CREAT   Urine HCG        08/31/21 0946 Dark Yellow Turbid Moderate (2+) Large (3+) Negative >=300 mg/dL  (3+)                Imaging Results (All)     Procedure Component Value Units Date/Time    CT Head Without Contrast [[id number]] Collected: 08/31/21 0816     Updated: 08/31/21 1026    Narrative:      EMERGENCY NONCONTRAST HEAD CT 08/31/2021     CLINICAL HISTORY: Confusion and altered mental status.     TECHNIQUE: Spiral CT images were obtained from the base of the skull to  the vertex without intravenous contrast. Images were reformatted and  submitted in 3 mm thick axial CT sections with brain algorithm and 2 mm  thick sagittal and coronal reconstructions were performed and submitted  in brain algorithm.     COMPARISON: This is correlated to prior MRI of the head from [facility] on 02/11/2021, as well as prior noncontrast head CT  02/05/2021.     FINDINGS: There is some patchy low-density in the periventricular white  matter, consistent with mild-to-moderate small vessel disease. There is  mild diffuse cerebral atrophy. Lateral and third ventricles are  minimally prominent in size, felt to be due to central volume loss or  atrophy. I see no focal mass effect. There is no midline shift. No  extra-axial fluid collections are identified. There is no evidence of  acute intracranial hemorrhage. There are extensive calcified plaques in  the intracranial segment distal right vertebral artery, cavernous  segments of the internal carotid arteries, and within multiple arterial  branches within the scalp. Calvarium and skull base are normal in  appearance. There is deformity of the inferior left orbital wall with  inferior bowing of the inferior left orbital wall into the superior  aspect of the left maxillary sinus, felt to be secondary to an old  healed fracture deformity, unchanged.       Impression:      1. No acute intracranial abnormality seen with no change when compared  to prior head CT from [facility] on 02/05/2021.   2. There is mild-to-moderate small vessel disease in the cerebral  white  matter, prominent calcified plaques in the intracranial segment distal  right vertebral artery, cavernous and supracavernous segments of the  internal carotid arteries, as well as within marked multiple arterial  branches within the scalp. There is mild diffuse cerebral atrophy.  3. There is deformity of the inferior left orbital wall with inferior  bowing of the left inferior orbital wall into the superior aspect of the  left maxillary sinus, compatible with an old healed fracture deformity,  unchanged. The remainder of the head CT is within normal limits. The  etiology of the confusion and altered mental status is not established  on this exam.     Radiation dose reduction techniques were utilized, including automated  exposure control and exposure modulation based on body size.     This report was finalized on 8/31/2021 10:23 AM by Dr. Alexander Chong M.D.       XR Chest 1 View [[id number]] Collected: 08/31/21 0733     Updated: 08/31/21 0737    Narrative:      PORTABLE CHEST X-RAY     HISTORY: Altered metal status.     TECHNIQUE: Portable chest x-rays correlated with chest x-ray 02/04/2021.     FINDINGS: The cardiomediastinal silhouette is normal. The left lung is  clear. There is diffuse infiltrate throughout the right lung. No  pneumothorax or pleural effusion is present.       Impression:      Extensive pneumonia or aspiration pneumonitis in the right  lung.     This report was finalized on 8/31/2021 7:34 AM by Dr. Pal Eldridge M.D.             Assessment:      Altered mental status  Aspiration pneumonia  Metabolic encephalopathy  Acute kidney injury  Dehydration  Elevated troponin  Sinus tachycardia.  Diabetes mellitus      Plan:    Inpatient admission  IV fluids, patient is dehydrated,  IV antibiotics, to treat aspiration pneumonia.  Monitor renal function, patient is on acute kidney injury.  Monitor and correct electrolytes  Accu-Chek and SSI  Monitor mental status, encephalopathic, not at  baseline  Home medications  DVT/stress ulcer prophylaxis  ST consult  Labs in am        Campos Espino MD  8/31/2021  18:07 EDT       I wore protective equipment throughout this patient encounter including a face mask, gloves, and protective eyewear.  Hand hygiene was performed before donning protective equipment and after removal when leaving the room.    Electronically signed by Campos Espino MD at 08/31/21 2316          Emergency Department Notes      Paloma Coyne RN at 08/31/21 0659        [facility] (incident # [id number]) with c/o ams.    Per NH staff, pt has becoming increasingly more altered throughout shift. NH staff states pt is normally aox3-4. NH staff states pt has become increasingly more lethargic.    Pt has hx vascular dementia.     Pt following some commands but is not presently verbally responding.        Pt masked in triage, staff in appropriate ppe.      Electronically signed by Paloma Coyne RN at 08/31/21 0702     Sandra Harris MD at 08/31/21 0705           EMERGENCY DEPARTMENT ENCOUNTER    CHIEF COMPLAINT  Chief Complaint: Altered mental status  History given by: EMS  History limited by: Patient nonverbal  Room Number: S423/1  PMD: Maya Ribeiro APRN      HPI:  Pt is a 65 y.o. male presents brought by EMS from [facility] with report of decreased interaction, activity over the past 24 hours, dark urine. EMS reports staff at the facility state that the patient is very active at his baseline, verbally interactive and has had decreased responsiveness and not verbally responding today.      Duration: 1 day  Associated Symptoms: Decreased activity, dark urine  Aggravating Factors: Unknown  Alleviating Factors: Unknown  Treatment before arrival: EMS transport    Upon review the patient's chart it is noted:  Patient was admitted 6/2 through 6/9/2021 with GI bleed, urinary retention, pneumonia it was noted during this  admission that he has kidney insufficiency and an acute urinary tract infection. Patient had an echo done 12/21/2020 with an EF of 59%    PAST MEDICAL HISTORY  Active Ambulatory Problems     Diagnosis Date Noted   • Acute UTI (urinary tract infection) 12/18/2020   • Macrocytosis 12/19/2020   • Sepsis secondary to UTI (CMS/HCC) 12/19/2020   • Metabolic encephalopathy 12/19/2020   • Hyperlipidemia    • Hypertension    • Monoclonal gammopathy    • Stage 4 chronic kidney disease (CMS/HCC)    • DM2 (diabetes mellitus, type 2) (CMS/HCC)    • Abnormal CT of the abdomen    • Normal anion gap metabolic acidosis    • Anemia, chronic disease 12/19/2020   • Ventricular tachycardia (paroxysmal) (CMS/HCC) 12/21/2020   • Acute metabolic encephalopathy 02/04/2021   • UTI (urinary tract infection), bacterial 02/04/2021   • Elevated troponin 02/04/2021   • Hypokalemia 02/04/2021   • SILVIA (acute kidney injury) (CMS/HCC) 02/04/2021   • Uncontrolled hypertension 02/04/2021   • Septicemia (CMS/HCC) 02/06/2021   • Vascular dementia without behavioral disturbance (CMS/HCC) 02/09/2021   • Gastrointestinal hemorrhage 06/03/2021   • Immobility 06/03/2021   • Right upper lobe pneumonia 06/04/2021   • CKD (chronic kidney disease) stage 3, GFR 30-59 ml/min (CMS/HCC) 06/04/2021   • Urine retention 06/07/2021     Resolved Ambulatory Problems     Diagnosis Date Noted   • No Resolved Ambulatory Problems     Past Medical History:   Diagnosis Date   • Back pain    • CKD (chronic kidney disease)    • DM type 2 (diabetes mellitus, type 2) (CMS/[facility])    • ED (erectile dysfunction)    • SCOTTY (iron deficiency anemia)    • Osteoarthritis        PAST SURGICAL HISTORY  Past Surgical History:   Procedure Laterality Date   • ABDOMINAL SURGERY     • ENDOSCOPY N/A 6/5/2021    Procedure: ESOPHAGOGASTRODUODENOSCOPY with biopsies and brushing;  Surgeon: Pérez Gonzalez MD;  Location: [facility] ENDOSCOPY;  Service: Gastroenterology;  Laterality: N/A;  pre-abnormal CT  scan stomach  post-gastritis, possible candida, duodenitis   • EYE SURGERY     • JOINT REPLACEMENT     • SIGMOIDOSCOPY N/A 6/5/2021    Procedure: FLEXIBLE SIGMOIDOSCOPY to 40cm;  Surgeon: Pérez Gonzalez MD;  Location: [facility] ENDOSCOPY;  Service: Gastroenterology;  Laterality: N/A;  pre-abnormal ct scan of sigmoid rectum  post-poor prep   • TONSILLECTOMY         FAMILY HISTORY  Family History   Family history unknown: Yes       SOCIAL HISTORY  Social History     Socioeconomic History   • Marital status:      Spouse name: Not on file   • Number of children: Not on file   • Years of education: Not on file   • Highest education level: Not on file   Tobacco Use   • Smoking status: Never Smoker   • Smokeless tobacco: Never Used   Vaping Use   • Vaping Use: Never used   Substance and Sexual Activity   • Alcohol use: Yes     Comment: 1 pint   • Drug use: Never   • Sexual activity: Defer       ALLERGIES  Patient has no known allergies.    REVIEW OF SYSTEMS  Review of Systems   Unable to perform ROS: Patient nonverbal       PHYSICAL EXAM  ED Triage Vitals [08/31/21 0658]   Temp Heart Rate Resp BP SpO2   98.3 °F (36.8 °C) 102 26 113/82 97 %      Temp src Heart Rate Source Patient Position BP Location FiO2 (%)   -- -- -- -- --       Physical Exam  Vitals and nursing note reviewed.   Constitutional:       General: He is in acute distress.      Appearance: He is ill-appearing.   HENT:      Head: Normocephalic and atraumatic.   Eyes:      Pupils: Pupils are equal, round, and reactive to light.   Cardiovascular:      Rate and Rhythm: Tachycardia present. Rhythm irregular.      Pulses:           Posterior tibial pulses are 2+ on the right side and 2+ on the left side.      Heart sounds: Normal heart sounds. No murmur heard.     Pulmonary:      Effort: Tachypnea, accessory muscle usage and respiratory distress present.      Breath sounds: Examination of the right-middle field reveals decreased breath sounds. Examination of  the right-lower field reveals decreased breath sounds. Examination of the left-lower field reveals decreased breath sounds. Decreased breath sounds present.   Abdominal:      General: Bowel sounds are normal.      Palpations: Abdomen is soft.      Tenderness: There is no abdominal tenderness. There is no guarding or rebound.   Musculoskeletal:         General: Normal range of motion.      Cervical back: Normal range of motion.   Skin:     General: Skin is warm and dry.   Neurological:      Mental Status: He is lethargic.   Psychiatric:         Mood and Affect: Affect normal.         LAB RESULTS  Lab Results (last 24 hours)     Procedure Component Value Units Date/Time    CBC & Differential [[id number]]  (Abnormal) Collected: 08/31/21 0740    Specimen: Blood Updated: 08/31/21 0835    Narrative:      The following orders were created for panel order CBC & Differential.  Procedure                               Abnormality         Status                     ---------                               -----------         ------                     CBC Auto Differential[[id number]]        Abnormal            Final result                 Please view results for these tests on the individual orders.    CBC Auto Differential [[id number]]  (Abnormal) Collected: 08/31/21 0740    Specimen: Blood Updated: 08/31/21 0835     WBC 10.26 10*3/mm3      RBC 4.99 10*6/mm3      Hemoglobin 11.4 g/dL      Hematocrit 34.6 %      MCV 69.3 fL      MCH 22.8 pg      MCHC 32.9 g/dL      RDW 22.8 %      RDW-SD 52.3 fl      Platelets 245 10*3/mm3     Ammonia [[id number]]  (Normal) Collected: 08/31/21 0740    Specimen: Blood Updated: 08/31/21 0823     Ammonia 22 umol/L     Manual Differential [[id number]]  (Abnormal) Collected: 08/31/21 0740    Specimen: Blood Updated: 08/31/21 0835     Neutrophil % 94.0 %      Lymphocyte % 6.0 %      Neutrophils Absolute 9.64 10*3/mm3      Lymphocytes Absolute 0.62 10*3/mm3      Anisocytosis Mod/2+     Basophilic Stippling  Mod/2+     Elliptocytes Mod/2+     Macrocytes Mod/2+     Microcytes Slight/1+     Poikilocytes Large/3+     Schistocytes Slight/1+     WBC Morphology Normal     Platelet Morphology Normal    Comprehensive Metabolic Panel [[id number]]  (Abnormal) Collected: 08/31/21 0741    Specimen: Blood Updated: 08/31/21 0819     Glucose 169 mg/dL      BUN 71 mg/dL      Creatinine 4.11 mg/dL      Sodium 145 mmol/L      Potassium 5.0 mmol/L      Comment: Slight hemolysis detected by analyzer. Results may be affected.        Chloride 108 mmol/L      CO2 16.5 mmol/L      Calcium 9.3 mg/dL      Total Protein 6.4 g/dL      Albumin 2.50 g/dL      ALT (SGPT) 11 U/L      AST (SGOT) 18 U/L      Comment: Slight hemolysis detected by analyzer. Results may be affected.        Alkaline Phosphatase 127 U/L      Total Bilirubin 1.2 mg/dL      eGFR  African Amer 18 mL/min/1.73      Globulin 3.9 gm/dL      A/G Ratio 0.6 g/dL      BUN/Creatinine Ratio 17.3     Anion Gap 20.5 mmol/L     Narrative:      GFR Normal >60  Chronic Kidney Disease <60  Kidney Failure <15      BNP [[id number]]  (Abnormal) Collected: 08/31/21 0741    Specimen: Blood Updated: 08/31/21 0817     proBNP 15,568.0 pg/mL     Narrative:      Among patients with dyspnea, NT-proBNP is highly sensitive for the detection of acute congestive heart failure. In addition NT-proBNP of <300 pg/ml effectively rules out acute congestive heart failure with 99% negative predictive value.    Results may be falsely decreased if patient taking Biotin.      Troponin [[id number]]  (Abnormal) Collected: 08/31/21 0741    Specimen: Blood Updated: 08/31/21 0819     Troponin T 0.202 ng/mL     Narrative:      Troponin T Reference Range:  <= 0.03 ng/mL-   Negative for AMI  >0.03 ng/mL-     Abnormal for myocardial necrosis.  Clinicians would have to utilize clinical acumen, EKG, Troponin and serial changes to determine if it is an Acute Myocardial Infarction or myocardial injury due to an underlying chronic  "condition.       Results may be falsely decreased if patient taking Biotin.      Magnesium [[id number]]  (Normal) Collected: 08/31/21 0741    Specimen: Blood Updated: 08/31/21 0814     Magnesium 1.7 mg/dL     Procalcitonin [[id number]]  (Abnormal) Collected: 08/31/21 0741    Specimen: Blood Updated: 08/31/21 0910     Procalcitonin 3.96 ng/mL     Narrative:      As a Marker for Sepsis (Non-Neonates):     1. <0.5 ng/mL represents a low risk of severe sepsis and/or septic shock.  2. >2 ng/mL represents a high risk of severe sepsis and/or septic shock.    As a Marker for Lower Respiratory Tract Infections that require antibiotic therapy:  PCT on Admission     Antibiotic Therapy             6-12 Hrs later  >0.5                          Strongly Recommended            >0.25 - <0.5             Recommended  0.1 - 0.25                  Discouraged                       Remeasure/reassess PCT  <0.1                         Strongly Discouraged         Remeasure/reassess PCT      As 28 day mortality risk marker: \"Change in Procalcitonin Result\" (>80% or <=80%) if Day 0 (or Day 1) and Day 4 values are available. Refer to http://www.[employer]-pct-calculator.com/    Change in PCT <=80 %   A decrease of PCT levels below or equal to 80% defines a positive change in PCT test result representing a higher risk for 28-day all-cause mortality of patients diagnosed with severe sepsis or septic shock.    Change in PCT >80 %   A decrease of PCT levels of more than 80% defines a negative change in PCT result representing a lower risk for 28-day all-cause mortality of patients diagnosed with severe sepsis or septic shock.              Results may be falsely decreased if patient taking Biotin.     Respiratory Panel PCR w/COVID-19(SARS-CoV-2) [facility]/RACHAEL/YAAKOV/PAD/COR/MAD/KANU In-House, NP Swab in UTM/VTM, 3-4 HR TAT - Swab, Nasopharynx [[id number]]  (Normal) Collected: 08/31/21 0852    Specimen: Swab from Nasopharynx Updated: 08/31/21 1007     ADENOVIRUS, " PCR Not Detected     Coronavirus 229E Not Detected     Coronavirus HKU1 Not Detected     Coronavirus NL63 Not Detected     Coronavirus OC43 Not Detected     COVID19 Not Detected     Human Metapneumovirus Not Detected     Human Rhinovirus/Enterovirus Not Detected     Influenza A PCR Not Detected     Influenza B PCR Not Detected     Parainfluenza Virus 1 Not Detected     Parainfluenza Virus 2 Not Detected     Parainfluenza Virus 3 Not Detected     Parainfluenza Virus 4 Not Detected     RSV, PCR Not Detected     Bordetella pertussis pcr Not Detected     Bordetella parapertussis PCR Not Detected     Chlamydophila pneumoniae PCR Not Detected     Mycoplasma pneumo by PCR Not Detected    Narrative:      In the setting of a positive respiratory panel with a viral infection PLUS a negative procalcitonin without other underlying concern for bacterial infection, consider observing off antibiotics or discontinuation of antibiotics and continue supportive care. If the respiratory panel is positive for atypical bacterial infection (Bordetella pertussis, Chlamydophila pneumoniae, or Mycoplasma pneumoniae), consider antibiotic de-escalation to target atypical bacterial infection.    Lactic Acid, Plasma [[id number]]  (Abnormal) Collected: 08/31/21 0944    Specimen: Blood Updated: 08/31/21 1021     Lactate 2.4 mmol/L     Blood Culture - Blood, Arm, Right [[id number]] Collected: 08/31/21 0944    Specimen: Blood from Arm, Right Updated: 08/31/21 0959    Blood Culture - Blood, Arm, Left [[id number]] Collected: 08/31/21 0944    Specimen: Blood from Arm, Left Updated: 08/31/21 0959    Urinalysis With Microscopic If Indicated (No Culture) - Urine, Catheter [[id number]]  (Abnormal) Collected: 08/31/21 0946    Specimen: Urine, Catheter Updated: 08/31/21 1028     Color, UA Dark Yellow     Appearance, UA Turbid     pH, UA 8.0     Specific Gravity, UA 1.019     Glucose, UA Negative     Ketones, UA Trace     Bilirubin, UA Negative     Blood, UA  Moderate (2+)     Protein, UA >=300 mg/dL (3+)     Leuk Esterase, UA Large (3+)     Nitrite, UA Negative     Urobilinogen, UA 1.0 E.U./dL    Urinalysis, Microscopic Only - Urine, Catheter [[id number]]  (Abnormal) Collected: 08/31/21 0946    Specimen: Urine, Catheter Updated: 08/31/21 1028     RBC, UA       Unable to determine due to loaded field     /HPF     WBC, UA Too Numerous to Count /HPF      Bacteria, UA       Unable to determine due to loaded field     /HPF     Squamous Epithelial Cells, UA       Unable to determine due to loaded field     /HPF     Methodology Manual Light Microscopy    POC Glucose Once [[id number]]  (Abnormal) Collected: 08/31/21 1258    Specimen: Blood Updated: 08/31/21 1259     Glucose 149 mg/dL      Comment: Meter: [id number] : [id number] Basilio MADDEN I ordered the above labs and reviewed the results    RADIOLOGY  CT Head Without Contrast   Final Result   1. No acute intracranial abnormality seen with no change when compared   to prior head CT from [facility] on 02/05/2021.    2. There is mild-to-moderate small vessel disease in the cerebral white   matter, prominent calcified plaques in the intracranial segment distal   right vertebral artery, cavernous and supracavernous segments of the   internal carotid arteries, as well as within marked multiple arterial   branches within the scalp. There is mild diffuse cerebral atrophy.   3. There is deformity of the inferior left orbital wall with inferior   bowing of the left inferior orbital wall into the superior aspect of the   left maxillary sinus, compatible with an old healed fracture deformity,   unchanged. The remainder of the head CT is within normal limits. The   etiology of the confusion and altered mental status is not established   on this exam.       Radiation dose reduction techniques were utilized, including automated   exposure control and exposure modulation based on body size.       This  report was finalized on 8/31/2021 10:23 AM by Dr. Alexander Chong M.D.          XR Chest 1 View   Final Result   Extensive pneumonia or aspiration pneumonitis in the right   lung.       This report was finalized on 8/31/2021 7:34 AM by Dr. Pal Eldridge M.D.               I ordered the above noted radiological studies. Interpreted by radiologist. Viewed by me in PACS.       PROCEDURES  Procedures      PROGRESS AND CONSULTS  ED Course as of Aug 31 1747   Tue Aug 31, 2021   0801 Discussed with Dr. Jasson Denney, radiology patient CT head negative for acute disease, no significant change compared to February 2021    [TO]   0808 Discussed with OSIRIS Chaidez at [facility] and rehab who reports patient usually feeds himself and is verbally interactive although bedridden at his baseline.  He reports patient has had increasing weakness, decreased interaction, family reports he is not recognizing them, and worsening over the past 3 to 4 days.  Staff, Kaylynn, at the facility reports patient had a cough for a week but denies any recent falls, complains of chest pain, abdominal pain, shortness of air, fevers.    [TO]   0957 Discussed with Dr. Campos Johnson who is familiar with the patient, aware presentation, acute renal insufficiency, aspiration pneumonia, vital signs stable and agrees to admit for further testing, treatment as needed.    [TO]      ED Course User Index  [TO] Sandra Harris MD     EKG          EKG time: 1012  Rhythm/Rate: Atrial flutter, rate in the 100s, frequent PVCs  P waves and WI: Not well appreciated,  QRS, axis: Poor R wave progression,  ST and T waves: Nonspecific ST/T wave findings, long QT    Interpreted Contemporaneously by me, independently viewed  changed compared to prior 6/3/2021, increased rate, increased ectopy    Patient did not receive the recommended 30ml/kg fluid bolus for sepsis because it would be harmful or detrimental to the patient.    The patient has  Heart Failure with symptoms at  rest or minimal exertion.   The patient was ordered 500 cc of fluids.    MEDICAL DECISION MAKING  Results were reviewed/discussed with the patient and they were also made aware of online access. Pt also made aware that some labs, such as cultures, will not be resulted during ER visit and follow up with PMD is necessary.       MDM       DIAGNOSIS  Final diagnoses:   Altered mental status, unspecified altered mental status type   Pneumonia of right lower lobe due to infectious organism   Acute renal insufficiency   Elevated troponin   Urinary tract infection, acute       DISPOSITION  ADMISSION    Discussed treatment plan and reason for admission with pt/family and admitting physician.  Pt/family voiced understanding of the plan for admission for further testing/treatment as needed.         Latest Documented Vital Signs:  As of 17:47 EDT  BP- 119/84 HR-107 temp- 98.9 °F (37.2 °C) (Oral) O2 sat- 97%    --  Patient was wearing facemask when I entered the room and throughout our encounter. Full protective equipment was worn throughout this patient encounter including a face mask, eye protection and gloves. Hand hygiene was performed before donning protective equipment and after removal when leaving the room.      Sandra Harris MD  08/31/21 5281      Electronically signed by Sandra Harris MD at 08/31/21 2717     Morgan Young RN at 08/31/21 8835        Patient wearing mask, nurse wearing mask and protective eyewear during care and assessment.  Hand hygiene performed prior to and post care.      Morgan Young, OSIRIS  08/31/21 7669      Electronically signed by Morgan Young RN at 08/31/21 2017

## 2021-09-01 NOTE — PLAN OF CARE
"Goal Outcome Evaluation:              Outcome Summary: SLP discussed patient care with RN, swallow eval is not benjamín this date d/t patient lethargy. Clinical swallow evaluation completed 2/16/21 with recs for nectar. Pt refused diet recommendation and was comfort measures at that time, therefore thins were continued. Unsure what occurred following discharge as interventions are \"Full\" this admit. Will check back for PO readiness next date.   "

## 2021-09-01 NOTE — PROGRESS NOTES
"DAILY PROGRESS NOTE  [facility], [facility]    2021    Patient Identification:  Name: Gregorio Alejandro  Age: 65 y.o.  Sex: male  :  1955  MRN: [id number]           Primary Care Physician: Maya Ribeiro, ANTWAN    Subjective:    Interval History:    Events noted, patient was transferred to the ICU due to low blood pressure.  Blood pressure much better at this time after fluid and albumin.  Is more awake.  However not to his baseline.  Appreciate ICU team  Laboratory data reviewed  This morning creatinine was 3.75, improving, potassium 4.0, hemoglobin 10.3, WBC 13.64.      ROS:    Nausea, vomiting, constipation, chest pain, shortness of breath.    Objective:    Scheduled Meds:  albumin human, 500 mL, Intravenous, Once  chlorhexidine, 15 mL, Mouth/Throat, Q12H  famotidine, 20 mg, Intravenous, Q12H  heparin (porcine), 5,000 Units, Subcutaneous, Q8H  insulin lispro, 0-7 Units, Subcutaneous, TID AC  metoprolol tartrate, 5 mg, Intravenous, Q8H  piperacillin-tazobactam, 3.375 g, Intravenous, Q12H        Continuous Infusions:  lactated ringers, 125 mL/hr, Last Rate: 125 mL/hr (21 0749)  Pharmacy to dose vancomycin,   Pharmacy to Dose Zosyn,         PRN Meds:  dextrose  •  dextrose  •  glucagon (human recombinant)  •  Pharmacy to dose vancomycin  •  Pharmacy to Dose Zosyn  •  sodium chloride    Intake/Output:    Intake/Output Summary (Last 24 hours) at 2021 0803  Last data filed at 2021 0630  Gross per 24 hour   Intake 1189.17 ml   Output 25 ml   Net 1164.17 ml         Exam:    T MAX 24 hrs: Temp (24hrs), Av.8 °F (36.6 °C), Min:96.6 °F (35.9 °C), Max:99.3 °F (37.4 °C)    Vitals Ranges:   Temp:  [96.6 °F (35.9 °C)-99.3 °F (37.4 °C)] 96.6 °F (35.9 °C)  Heart Rate:  [] 89  Resp:  [18-24] 20  BP: ()/(36-96) 86/62    BP (!) 86/62   Pulse 89   Temp 96.6 °F (35.9 °C) (Rectal)   Resp 20   Ht 190.5 cm (75\")   Wt 74.5 kg (164 lb 3.9 oz)   SpO2 98%  "  BMI 20.53 kg/m²     General: Drowsy, arousable.  Answer simple questions.  Saturation 98% on room air.  Neck: Supple, symmetrical, trachea midline, no adenopathy;              thyroid:  no enlargement/tenderness/nodules;              no carotid bruit or JVD  Cardiovascular: Has been tachycardic on and off.  No murmur gallops or rubs.    Pulmonary: Diminished breath sounds bilaterally. No rhonchi, wheezing or rales.   Abdominal: Soft. Soft, nontender, bowel sounds active all four quadrants,     no masses, no hepatomegaly, no splenomegaly.   Extremities: Normal, atraumatic, no cyanosis or edema  Neurological: He is drowsy, arousable.  Oriented x1 at least.   Skin: Skin color, texture, turgor normal, no rashes or lesions      Data Review:    Results from last 7 days   Lab Units 09/01/21  0518 08/31/21  0740   WBC 10*3/mm3 13.64* 10.26   HEMOGLOBIN g/dL 10.3* 11.4*   HEMATOCRIT % 31.2* 34.6*   PLATELETS 10*3/mm3 246 245       Results from last 7 days   Lab Units 09/01/21  0518 08/31/21  0741   SODIUM mmol/L 146* 145   POTASSIUM mmol/L 4.0 5.0   CHLORIDE mmol/L 113* 108*   CO2 mmol/L 12.9* 16.5*   BUN mg/dL 73* 71*   CREATININE mg/dL 3.75* 4.11*   CALCIUM mg/dL 9.0 9.3   BILIRUBIN mg/dL  --  1.2   ALK PHOS U/L  --  127*   ALT (SGPT) U/L  --  11   AST (SGOT) U/L  --  18   GLUCOSE mg/dL 167* 169*                 Lab Results   Lab Value Date/Time    TROPONINT 0.202 (C) 08/31/2021 0741    TROPONINT 0.110 (C) 02/04/2021 1551    TROPONINT 0.171 (C) 12/23/2020 0357    TROPONINT 0.141 (C) 12/22/2020 0549    TROPONINT 0.147 (C) 12/21/2020 1450    TROPONINT 0.128 (C) 12/21/2020 1223       Microbiology Results (last 10 days)     Procedure Component Value - Date/Time    MRSA Screen, PCR (Inpatient) - Swab, Nares [[id number]]  (Normal) Collected: 09/01/21 0152    Lab Status: Final result Specimen: Swab from Nares Updated: 09/01/21 0307     MRSA PCR No MRSA Detected    Respiratory Panel PCR w/COVID-19(SARS-CoV-2)  [facility]/RACHAEL/YAAKOV/PAD/COR/MAD/KANU In-House, NP Swab in UTM/VTM, 3-4 HR TAT - Swab, Nasopharynx [[id number]]  (Normal) Collected: 08/31/21 0852    Lab Status: Final result Specimen: Swab from Nasopharynx Updated: 08/31/21 1007     ADENOVIRUS, PCR Not Detected     Coronavirus 229E Not Detected     Coronavirus HKU1 Not Detected     Coronavirus NL63 Not Detected     Coronavirus OC43 Not Detected     COVID19 Not Detected     Human Metapneumovirus Not Detected     Human Rhinovirus/Enterovirus Not Detected     Influenza A PCR Not Detected     Influenza B PCR Not Detected     Parainfluenza Virus 1 Not Detected     Parainfluenza Virus 2 Not Detected     Parainfluenza Virus 3 Not Detected     Parainfluenza Virus 4 Not Detected     RSV, PCR Not Detected     Bordetella pertussis pcr Not Detected     Bordetella parapertussis PCR Not Detected     Chlamydophila pneumoniae PCR Not Detected     Mycoplasma pneumo by PCR Not Detected    Narrative:      In the setting of a positive respiratory panel with a viral infection PLUS a negative procalcitonin without other underlying concern for bacterial infection, consider observing off antibiotics or discontinuation of antibiotics and continue supportive care. If the respiratory panel is positive for atypical bacterial infection (Bordetella pertussis, Chlamydophila pneumoniae, or Mycoplasma pneumoniae), consider antibiotic de-escalation to target atypical bacterial infection.           Imaging Results (Last 7 Days)     Procedure Component Value Units Date/Time    CT Head Without Contrast [[id number]] Collected: 08/31/21 0816     Updated: 08/31/21 1026    Narrative:      EMERGENCY NONCONTRAST HEAD CT 08/31/2021     CLINICAL HISTORY: Confusion and altered mental status.     TECHNIQUE: Spiral CT images were obtained from the base of the skull to  the vertex without intravenous contrast. Images were reformatted and  submitted in 3 mm thick axial CT sections with brain algorithm and 2 mm  thick sagittal  and coronal reconstructions were performed and submitted  in brain algorithm.     COMPARISON: This is correlated to prior MRI of the head from [facility] on 02/11/2021, as well as prior noncontrast head CT  02/05/2021.     FINDINGS: There is some patchy low-density in the periventricular white  matter, consistent with mild-to-moderate small vessel disease. There is  mild diffuse cerebral atrophy. Lateral and third ventricles are  minimally prominent in size, felt to be due to central volume loss or  atrophy. I see no focal mass effect. There is no midline shift. No  extra-axial fluid collections are identified. There is no evidence of  acute intracranial hemorrhage. There are extensive calcified plaques in  the intracranial segment distal right vertebral artery, cavernous  segments of the internal carotid arteries, and within multiple arterial  branches within the scalp. Calvarium and skull base are normal in  appearance. There is deformity of the inferior left orbital wall with  inferior bowing of the inferior left orbital wall into the superior  aspect of the left maxillary sinus, felt to be secondary to an old  healed fracture deformity, unchanged.       Impression:      1. No acute intracranial abnormality seen with no change when compared  to prior head CT from [facility] on 02/05/2021.   2. There is mild-to-moderate small vessel disease in the cerebral white  matter, prominent calcified plaques in the intracranial segment distal  right vertebral artery, cavernous and supracavernous segments of the  internal carotid arteries, as well as within marked multiple arterial  branches within the scalp. There is mild diffuse cerebral atrophy.  3. There is deformity of the inferior left orbital wall with inferior  bowing of the left inferior orbital wall into the superior aspect of the  left maxillary sinus, compatible with an old healed fracture deformity,  unchanged. The remainder of the  head CT is within normal limits. The  etiology of the confusion and altered mental status is not established  on this exam.     Radiation dose reduction techniques were utilized, including automated  exposure control and exposure modulation based on body size.     This report was finalized on 8/31/2021 10:23 AM by Dr. Alexander Chong M.D.       XR Chest 1 View [[id number]] Collected: 08/31/21 0733     Updated: 08/31/21 0737    Narrative:      PORTABLE CHEST X-RAY     HISTORY: Altered metal status.     TECHNIQUE: Portable chest x-rays correlated with chest x-ray 02/04/2021.     FINDINGS: The cardiomediastinal silhouette is normal. The left lung is  clear. There is diffuse infiltrate throughout the right lung. No  pneumothorax or pleural effusion is present.       Impression:      Extensive pneumonia or aspiration pneumonitis in the right  lung.     This report was finalized on 8/31/2021 7:34 AM by Dr. Pal Eldridge M.D.               Assessment:        Altered mental status  Aspiration pneumonia  Metabolic encephalopathy  Acute kidney injury  Dehydration  Elevated troponin  Sinus tachycardia.  Diabetes mellitus           Plan:    Continue IV fluids, patient still needs free water.  Still dehydrated.  Continue IV antibiotics, for sepsis, likely from UTI.  Will follow culture.  Culture obtained yesterday.  Monitor and correct electrolytes, replace potassium, it is 3.4.  Adjust insulin as needed, blood sugar fairly controlled, will continue current regimen.  Monitor mental status  Continue home medications  Continue DVT/stress ulcer prophylaxis  Labs in       Campos Espino MD  9/1/2021  08:03 EDT         I wore protective equipment throughout this patient encounter including a face mask, gloves, and protective eyewear.  Hand hygiene was performed before donning protective equipment and after removal when leaving the room.

## 2021-09-01 NOTE — CONSULTS
"Assessed patient at bedside. Patient on BiPap and consistently pulling mask off.     Met with spouse (Patricia) at bedside. Explanation of services provided. Answered all questions, discussed medications, symptom management needs, and goals of care. Patricia states she met with MD this morning and \"he said it didn't look good.\" Patricia states patient was transitioned to DNR status this morning after her conversation with MD. Discussed patient's disease progression and previous hospice services. Spouse states she would like to give patient some time to see how he does.    [facility] information provided and encouraged to call with any needs.    [facility] will follow up Friday for update on plan. Updated patient's nurse (Marva) and palliative RN (Chelsea). Please call with any questions, concerns, or change in patient status. Thank you for allowing us the opportunity to participate in the care of this patient/family.    Radha Foley RN  Referral and Admission Coordinator  [facility]  [phone]      "

## 2021-09-01 NOTE — PLAN OF CARE
Goal Outcome Eval      Attempted to place pton bipap became very agitated and precedex started. Bolus given for low BP. Cortrak placed.Tube feeds started.  F/C changed . Bipap D/C'd pt not tolerating and Precedex stopped. 2nd bolus given this afternoon for dropping BP.

## 2021-09-01 NOTE — NURSING NOTE
Patient was a rapid response from the floor for low BP. BP was 110/86 upon arrival to unit. Temp 96. RA. No c/p pain

## 2021-09-01 NOTE — CODE DOCUMENTATION
Pt with BP76/51, 500cc bolus given, BP up to 116/80, A fib on monitor.  Several calls placed to Dr. Archibald no answer and mailbox full. Call placed to wife, full code.  BP remains in 100s. Cont to monitor.

## 2021-09-01 NOTE — CONSULTS
"Adult Nutrition  Assessment/PES    Patient Name:  Gregorio Alejandro  YOB: 1955  MRN: [id number]  Admit Date:  8/31/2021    Assessment Date:  9/1/2021    Comments:  Nutrition consult for cortrak placement and TF assessment. Pt admitted with AMS, CKD, DM, malnutrition, dementia, HTN. Malnutrition severity assessment completed for reported wt loss and poor po intake.  Labs na 146, glu, bun, cr, alb, wbc, H/H.  Will start TF's with Diabetisource AC and adv slowly to goal at 65ml/hdr and water 25/hr.Will continue to monitor.     Reason for Assessment     Row Name 09/01/21 0907          Reason for Assessment    Reason For Assessment  physician consult;TF/PN     Diagnosis  -- AMS, CKD, DM, malnutrition, dementia, HTN,         Nutrition/Diet History     Row Name 09/01/21 0908          Nutrition/Diet History    Typical Food/Fluid Intake  TF's to start         Anthropometrics     Row Name 09/01/21 0908 09/01/21 0619       Anthropometrics    Height  190.5 cm (75\")  --    Weight  74.5 kg (164 lb 3.9 oz) not weighed by RD  74.5 kg (164 lb 3.9 oz)       Ideal Body Weight (IBW)    Ideal Body Weight (IBW) (kg)  90.45  --    % Ideal Body Weight  82.37  --       Usual Body Weight (UBW)    Usual Body Weight  113 kg (250 lb)  --    % Usual Body Weight  65.7  --    Weight Loss  unintentional  --       Body Mass Index (BMI)    BMI (kg/m2)  20.57  --    BMI Assessment  BMI 18.5-24.9: normal  --        Labs/Tests/Procedures/Meds     Row Name 09/01/21 0909          Labs/Procedures/Meds    Lab Results Reviewed  reviewed     Lab Results Comments  na,glu,bun,cr, alb, wbc, h/h        Diagnostic Tests/Procedures    Diagnostic Test/Procedure Reviewed  reviewed        Medications    Pertinent Medications Reviewed  reviewed     Pertinent Medications Comments  pepcid, heparin, insulin, abx, IVF's at 125         Physical Findings     Row Name 09/01/21 0911          Physical Findings    Overall Physical Appearance  underweight     " "Gastrointestinal  feeding tube     Skin  poor skin integrity/turgor;pressure injury;non-healing wound(s) PI noted coccyx/sacral  stage 2, ankle unstageable, black         Estimated/Assessed Needs     Row Name 09/01/21 0911 09/01/21 0908       Calculation Measurements    Weight Used For Calculations  74.5 kg (164 lb 3.9 oz)  --    Height  --  190.5 cm (75\")       Estimated/Assessed Needs    Additional Documentation  KCAL/KG (Group);Fluid Requirements (Group);Protein Requirements (Group)  --       KCAL/KG    KCAL/KG  30 Kcal/Kg (kcal);25 Kcal/Kg (kcal)  --    25 Kcal/Kg (kcal)  1862.5  --    30 Kcal/Kg (kcal)  2235  --       Protein Requirements    Weight Used For Protein Calculations  74.5 kg (164 lb 3.9 oz)  --    Est Protein Requirement Amount (gms/kg)  1.2 gm protein  --    Estimated Protein Requirements (gms/day)  89.4  --       Fluid Requirements    Fluid Requirements (mL/day)  1862  --    RDA Method (mL)  1862  --        Nutrition Prescription Ordered     Row Name 09/01/21 0912          Nutrition Prescription PO    Current PO Diet  NPO             Malnutrition Severity Assessment     Row Name 09/01/21 0918          Malnutrition Severity Assessment    Malnutrition Type  Chronic Disease - Related Malnutrition        Insufficient Energy Intake     Insufficient Energy Intake   < or equal to 50% of est. energy requirement for > or equal to 5d)        Unintentional Weight Loss     Unintentional Weight Loss Findings  Severe        Muscle Loss    Acromion Bone Region  Moderate - acromion may slightly protrude     Scapular Bone Region  Moderate - mild depression, bones may show slightly     Patellar Region  Moderate - patella more prominent, less muscle definition around patella        Fat Loss    Upper Arm Region  Moderate - some fat tissue, not ample     Thoracic & Lumbar Region  Severe - ribs visible with prominent depressions, iliac crest very prominent        Criteria Met (Must meet criteria for severity in at " least 2 of these categories: M Wasting, Fat Loss, Fluid, Secondary Signs, Wt. Status, Intake)    Patient meets criteria for   Severe Malnutrition           Problem/Interventions:  Problem 1     Row Name 09/01/21 0912          Nutrition Diagnoses Problem 1    Problem 1  Needs Alternate Route     Etiology (related to)  Medical Diagnosis     Signs/Symptoms (evidenced by)  NPO               Intervention Goal     Row Name 09/01/21 0912          Intervention Goal    General  Maintain nutrition;Nutrition support treatment;Improved nutrition related lab(s);Reduce/improve symptoms;Meet nutritional needs for age/condition;Disease management/therapy     TF/PN  Inititiate TF/PN;Tolerate TF at goal     Weight  Maintain weight         Nutrition Intervention     Row Name 09/01/21 0912          Nutrition Intervention    RD/Tech Action  Follow Tx progress;Care plan reviewd         Nutrition Prescription     Row Name 09/01/21 0912          Nutrition Prescription EN    Enteral Prescription  Enteral begin/change     Enteral Route  NG     Product  Diabetisource     TF Delivery Method  Continuous     Continuous TF Goal Rate (mL/hr)  65 mL/hr     Water flush (mL)   25 mL     Water Flush Frequency  Per hour         Education/Evaluation     Row Name 09/01/21 0912          Education    Education  Education not appropriate at this time     Please explain  Defer until post ICU        Monitor/Evaluation    Monitor  Per protocol           Electronically signed by:  Lakeshia Patel RD  09/01/21 09:26 EDT

## 2021-09-01 NOTE — PROGRESS NOTES
"Patient seen and examined.  Discussed with nurse.  Chart reviewed.  Patient was seen earlier this morning by my colleague, Dr. Amos.  I obtained ABG due to patient's poor mental status but it showed normal CO2 and oxygen.  Unclear why the patient's mental status is decreased.  He is obviously bedbound.  Has become decubitus ulcers.  He was not mobile at the nursing home despite of what the records say about him being \"up and around to the nursing home.  We will continue to follow.  "

## 2021-09-01 NOTE — CODE DOCUMENTATION
Patient Name:  Gregorio Alejandro  YOB: 1955  MRN:  [id number]  Admit Date:  8/31/2021    Visit Diagnoses:     ICD-10-CM ICD-9-CM   1. Altered mental status, unspecified altered mental status type  R41.82 780.97   2. Pneumonia of right lower lobe due to infectious organism  J18.9 486   3. Acute renal insufficiency  N28.9 593.9   4. Elevated troponin  R77.8 790.6   5. Urinary tract infection, acute  N39.0 599.0       Reason For Rapid:   Hypotension    RN Communicated With:  Primary RN    Rapid Outcome:  Remained On Same Unit    Communication From Rapid Team: Rapid called for low BP.  AM labs drawn and attempted to call MD.  500 cc bolus given and BP improved.  Primary RN updated and will continue to try to reach the MD.    Most Recent Vital Signs  Temp:  [97.3 °F (36.3 °C)-99.3 °F (37.4 °C)] 97.3 °F (36.3 °C)  Heart Rate:  [] 88  Resp:  [18-26] 20  BP: ()/(36-96) 103/75  SpO2:  [95 %-100 %] 100 %  on  Flow (L/min):  [2] 2;   Device (Oxygen Therapy): nasal cannula    Labs:  Results from last 7 days   Lab Units 08/31/21  0852   COVID19  Not Detected     Glucose   Date/Time Value Ref Range Status   09/01/2021 0441 166 (H) 70 - 130 mg/dL Final     Comment:     Meter: [id number] : [id number] Rodolfo Ledbettera NA   08/31/2021 2028 161 (H) 70 - 130 mg/dL Final     Comment:     Meter: [id number] : [id number] Rodolfo Ledbettera NA   08/31/2021 1258 149 (H) 70 - 130 mg/dL Final     Comment:     Meter: [id number] : [id number] Basilio MADDEN     No results found for: SITE, ALLENTEST, PHART, HUX9EBL, PO2ART, BHI5IBU, BASEEXCESS, U2FFXHUD, HGBBG, HCTABG, OXYHEMOGLOBI, METHHGBN, CARBOXYHGB, CO2CT, BAROMETRIC, MODALITY, FIO2  Results from last 7 days   Lab Units 08/31/21  0740   WBC 10*3/mm3 10.26   HEMOGLOBIN g/dL 11.4*   PLATELETS 10*3/mm3 245     Results from last 7 days   Lab Units 08/31/21  0741   SODIUM mmol/L 145   POTASSIUM mmol/L 5.0   CHLORIDE mmol/L 108*   CO2 mmol/L 16.5*   BUN mg/dL 71*    CREATININE mg/dL 4.11*   GLUCOSE mg/dL 169*   ALBUMIN g/dL 2.50*   BILIRUBIN mg/dL 1.2   ALK PHOS U/L 127*   AST (SGOT) U/L 18   ALT (SGPT) U/L 11   Estimated Creatinine Clearance: 18.9 mL/min (A) (by C-G formula based on SCr of 4.11 mg/dL (H)).  Results from last 7 days   Lab Units 08/31/21  0741   TROPONIN T ng/mL 0.202*   PROBNP pg/mL 15,568.0*     Results from last 7 days   Lab Units 08/31/21  0944 08/31/21  0741   PROCALCITONIN ng/mL  --  3.96*   LACTATE mmol/L 2.4*  --      No results found for: STREPPNEUAG, LEGANTIGENUR    Results from last 7 days   Lab Units 08/31/21  0852   ADENOVIRUS DETECTION BY PCR  Not Detected   CORONAVIRUS 229E  Not Detected   CORONAVIRUS HKU1  Not Detected   CORONAVIRUS NL63  Not Detected   CORONAVIRUS OC43  Not Detected   HUMAN METAPNEUMOVIRUS  Not Detected   HUMAN RHINOVIRUS/ENTEROVIRUS  Not Detected   INFLUENZA B PCR  Not Detected   PARAINFLUENZA 1  Not Detected   PARAINFLUENZA VIRUS 2  Not Detected   PARAINFLUENZA VIRUS 3  Not Detected   PARAINFLUENZA VIRUS 4  Not Detected   BORDETELLA PERTUSSIS PCR  Not Detected   CHLAMYDOPHILA PNEUMONIAE PCR  Not Detected   MYCOPLAMA PNEUMO PCR  Not Detected   INFLUENZA A PCR  Not Detected   RSV, PCR  Not Detected       NIH Stroke Scale:                                                         Please refer to full rapid documentation on summary page under Index / Code Timeline

## 2021-09-01 NOTE — CONSULTS
Referring Provider: Jerrell Hatch RN   Reason for Consultation: SILVIA    Subjective     Chief complaint   Chief Complaint   Patient presents with   • Altered Mental Status       History of present illness:      65 years old black male nursing home resident was sent for altered mental status.  Patient was found to be hypotensive at time of admission chest x-ray showed a possible right lower lobe pneumonia.  Showed acute kidney injury with creatinine 4.2 mg/dL.  We were consulted to help with management of his acute kidney injury and metabolic acidosis.  Patient is not able to provide history and history was taken from the chart and medical staff.  He is currently on BiPAP.  He was transferred from the floor earlier due today due to hypotension.  He remains oliguric.        Past Medical History:   Diagnosis Date   • Back pain    • CKD (chronic kidney disease)    • DM type 2 (diabetes mellitus, type 2) (CMS/HCC)    • ED (erectile dysfunction)    • Hyperlipidemia    • Hypertension    • SCOTTY (iron deficiency anemia)    • Monoclonal gammopathy    • Osteoarthritis      Past Surgical History:   Procedure Laterality Date   • ABDOMINAL SURGERY     • ENDOSCOPY N/A 6/5/2021    Procedure: ESOPHAGOGASTRODUODENOSCOPY with biopsies and brushing;  Surgeon: Pérez Gonzalez MD;  Location: [facility] ENDOSCOPY;  Service: Gastroenterology;  Laterality: N/A;  pre-abnormal CT scan stomach  post-gastritis, possible candida, duodenitis   • EYE SURGERY     • JOINT REPLACEMENT     • SIGMOIDOSCOPY N/A 6/5/2021    Procedure: FLEXIBLE SIGMOIDOSCOPY to 40cm;  Surgeon: Pérez Gonzalez MD;  Location: [facility] ENDOSCOPY;  Service: Gastroenterology;  Laterality: N/A;  pre-abnormal ct scan of sigmoid rectum  post-poor prep   • TONSILLECTOMY       Family History   Family history unknown: Yes     Social History     Tobacco Use   • Smoking status: Never Smoker   • Smokeless tobacco: Never Used   Vaping Use   • Vaping Use: Never used   Substance Use  Topics   • Alcohol use: Yes     Comment: 1 pint   • Drug use: Never     Medications Prior to Admission   Medication Sig Dispense Refill Last Dose   • acetaminophen (TYLENOL) 325 MG tablet Take 650 mg by mouth Every 6 (Six) Hours As Needed for Mild Pain .      • amLODIPine (NORVASC) 2.5 MG tablet Take 1 tablet by mouth Daily.      • apixaban (ELIQUIS) 2.5 MG tablet tablet Take 2.5 mg by mouth 2 (Two) Times a Day.      • atorvastatin (LIPITOR) 20 MG tablet Take 20 mg by mouth Daily.      • brimonidine (ALPHAGAN) 0.2 % ophthalmic solution Administer 1 drop to both eyes Every 12 (Twelve) Hours.      • busPIRone (BUSPAR) 15 MG tablet Take 7.5 mg by mouth 2 (two) times a day.      • collagenase 250 UNIT/GM ointment Apply 1 application topically to the appropriate area as directed Daily. To left inner ankle and right outer knee after cleaning      • cyclobenzaprine (FLEXERIL) 10 MG tablet Take 10 mg by mouth Every 8 (Eight) Hours As Needed for Muscle Spasms.      • dorzolamide (TRUSOPT) 2 % ophthalmic solution Administer 1 drop to the right eye 2 (two) times a day.      • Emollient (AQUAPHOR EX) Apply 1 application topically Every Night. To bilateral legs and gently wrap with kerlix      • famotidine (PEPCID) 20 MG tablet Take 1 tablet by mouth Daily.      • ferrous sulfate 325 (65 FE) MG tablet Take 325 mg by mouth Daily With Breakfast.      • latanoprost (XALATAN) 0.005 % ophthalmic solution Administer 1 drop to both eyes Every Night.      • loperamide (IMODIUM) 2 MG capsule Take 2 mg by mouth 2 (Two) Times a Day As Needed for Diarrhea.      • mirtazapine (REMERON) 7.5 MG tablet Take 7.5 mg by mouth Every Night.      • multivitamin with minerals (MULTIVITAMIN ADULT PO) Take 1 tablet by mouth Daily.      • olopatadine (PATADAY) 0.2 % solution ophthalmic solution Administer 1 drop to both eyes Daily.      • potassium chloride (K-DUR,KLOR-CON) 20 MEQ CR tablet Take 1 tablet by mouth Daily.      • sennosides-docusate  "(PERICOLACE) 8.6-50 MG per tablet Take 1 tablet by mouth Every Night.      • sertraline (ZOLOFT) 50 MG tablet Take 50 mg by mouth Daily.      • sodium bicarbonate 650 MG tablet Take 2 tablets by mouth 3 (Three) Times a Day.      • tamsulosin (FLOMAX) 0.4 MG capsule 24 hr capsule Take 1 capsule by mouth Daily.      • vitamin C (ASCORBIC ACID) 250 MG tablet Take 500 mg by mouth 2 (two) times a day.        Allergies:  Patient has no known allergies.    Review of Systems  Review of systems not obtained due to patient factors.    Objective     Vital Signs  Temp:  [95.9 °F (35.5 °C)-98.9 °F (37.2 °C)] 96.4 °F (35.8 °C)  Heart Rate:  [] 67  Resp:  [6-40] 32  BP: ()/(36-89) 73/50    Flowsheet Rows      First Filed Value   Admission Height  190.5 cm (75\") Documented at 08/31/2021 1136   Admission Weight  76.6 kg (168 lb 14 oz) Documented at 08/31/2021 2015           No intake/output data recorded.  I/O last 3 completed shifts:  In: 1189.2 [I.V.:689.2; IV Piggyback:500]  Out: 25 [Urine:25]    Intake/Output Summary (Last 24 hours) at 9/1/2021 1639  Last data filed at 9/1/2021 0630  Gross per 24 hour   Intake 1189.17 ml   Output 25 ml   Net 1164.17 ml       Physical Exam:     General Appearance:   Elderly male lethargic in no acute distress   Head:    Normocephalic, without obvious abnormality, atraumatic   Eyes:            Lids and lashes normal, conjunctivae and sclerae normal, no   icterus, no pallor, corneas clear, PERRLA   Ears:    Ears appear intact with no abnormalities noted   Throat:   No oral lesions, no thrush, oral mucosa moist   Neck:   No adenopathy, supple, trachea midline, no thyromegaly, no   carotid bruit, no JVD   Back:     No kyphosis present, no scoliosis present, no skin lesions,      erythema or scars   Lungs:     Clear to auscultation,respirations regular, even and                  unlabored    Heart:    Regular rhythm and normal rate, normal S1 and S2, no            murmur, no gallop, no " rub, no click   Chest Wall:    No abnormalities observed   Abdomen:     Normal bowel sounds, no masses, no organomegaly, soft          Rectal:     Deferred   Extremities:   no edema, no cyanosis, no             redness   Pulses:   Pulses palpable and equal bilaterally   Skin:   No bleeding, bruising or rash               Results Review:  Results from last 7 days   Lab Units 09/01/21  0518 08/31/21  0741   SODIUM mmol/L 146* 145   POTASSIUM mmol/L 4.0 5.0   CHLORIDE mmol/L 113* 108*   CO2 mmol/L 12.9* 16.5*   BUN mg/dL 73* 71*   CREATININE mg/dL 3.75* 4.11*   CALCIUM mg/dL 9.0 9.3   BILIRUBIN mg/dL  --  1.2   ALK PHOS U/L  --  127*   ALT (SGPT) U/L  --  11   AST (SGOT) U/L  --  18   GLUCOSE mg/dL 167* 169*       Estimated Creatinine Clearance: 20.1 mL/min (A) (by C-G formula based on SCr of 3.75 mg/dL (H)).    Results from last 7 days   Lab Units 08/31/21  0741   MAGNESIUM mg/dL 1.7       Results from last 7 days   Lab Units 09/01/21  0518 08/31/21  0740   WBC 10*3/mm3 13.64* 10.26   HEMOGLOBIN g/dL 10.3* 11.4*   PLATELETS 10*3/mm3 246 245             Active Medications  chlorhexidine, 15 mL, Mouth/Throat, Q12H  heparin (porcine), 5,000 Units, Subcutaneous, Q8H  insulin lispro, 0-7 Units, Subcutaneous, TID AC  piperacillin-tazobactam, 3.375 g, Intravenous, Q12H      dexmedetomidine, 0.2-1.5 mcg/kg/hr, Last Rate: Stopped (09/01/21 1600)  Pharmacy to Dose Zosyn,   sodium chloride, 125 mL/hr        Assessment/Plan   Assessment    -Acute kidney injury on chronic kidney disease stage III/IV :Baseline creatinine 2.1 mg/dL .  Creatinine on admission was 4.1 mg/dL.  Urology is likely due to acute tubular necrosis due to hypotension and urosepsis.  Patient does have indwelling Wright catheter with pus coming out will go ahead and exchange Wright catheter.  His blood pressure is marginal so we will go ahead and switch IV fluid to half-normal saline and discontinue LR to avoid hyperkalemia  Please maintain map above 65.  Continue  IV antibiotic per primary as well.  Will adjust on medications for creatinine clearance less than 10 mL/min/m².  Will repeat lactic acid    -Mixed metabolic acidosis due to anion gap and anion gap as well as respiratory alkalosis: Repeat lactic acid  -Acute respiratory failure due to right lower lobe pneumonia likely aspiration  -Hypernatremia due to decreased free water intake  -Urosepsis: Currently on IV antibiotic on Zosyn.  Will obtain urine culture and exchange Wright catheter  -Metabolic encephalopathy      Leighton Linder MD  09/01/21  16:39 EDT

## 2021-09-02 NOTE — PROGRESS NOTES
"  Daily Progress Note.   [facility] CORONARY CARE  9/2/2021    Patient:  Name:  Gregorio Alejandro  MRN:  [id number]  1955  65 y.o.  male         CC: severe sepsis, pna, tme  Interval History:  Patient does seem to raise his eyebrows yes or no to questions however is difficult to ascertain whether these are purposeful or not.  Unreliable historian.  Seems to shake head no to any chest pain or abdominal pain.  Ill-appearing     Physical Exam:  BP (!) 84/61   Pulse 72   Temp 97.3 °F (36.3 °C) (Axillary)   Resp 22   Ht 190.5 cm (75\")   Wt 74.5 kg (164 lb 3.9 oz) Comment: not weighed by RD  SpO2 96%   BMI 20.53 kg/m²   Body mass index is 20.53 kg/m².    Intake/Output Summary (Last 24 hours) at 9/2/2021 0757  Last data filed at 9/2/2021 0630  Gross per 24 hour   Intake 4915 ml   Output 30 ml   Net 4885 ml     General appearance: Ill-appearing not conversant   Eyes: anicteric sclerae, moist conjunctivae; positive bilateral lid lag  HENT: Atraumatic; oropharynx dry her mucous membranes   Neck: Trachea midline;  Lungs: Coarse air entry bilaterally rhonchi diffuse seems to be worse on right than left, with unlabored respiratory effort and no intercostal retractions  CV: RRR rub  Abdomen: Thin nonrigid  Extremities: No peripheral edema   Skin: Warm well perfused multiple scars and some abrasions on lower extremities  Psych: Patient is calm however he does not appear alert or oriented  Neuro patient is nonverbal and will not follow commands.    Data Review:  Notable Labs:  Results from last 7 days   Lab Units 09/02/21  0334 09/01/21  0518 08/31/21  0740   WBC 10*3/mm3 12.09* 13.64* 10.26   HEMOGLOBIN g/dL 9.9* 10.3* 11.4*   PLATELETS 10*3/mm3 206 246 245     Results from last 7 days   Lab Units 09/02/21  0334 09/01/21  1738 09/01/21  0518 08/31/21  0741   SODIUM mmol/L 147* 143 146* 145   POTASSIUM mmol/L 3.2* 3.4* 4.0 5.0   CHLORIDE mmol/L 116* 112* 113* 108*   CO2 mmol/L 14.0* 17.1* 12.9* 16.5*   BUN " mg/dL 70* 72* 73* 71*   CREATININE mg/dL 3.81* 4.22* 3.75* 4.11*   GLUCOSE mg/dL 112* 113* 167* 169*   CALCIUM mg/dL 8.2* 8.7 9.0 9.3   MAGNESIUM mg/dL  --   --   --  1.7   PHOSPHORUS mg/dL  --  3.4  --   --    Estimated Creatinine Clearance: 19.8 mL/min (A) (by C-G formula based on SCr of 3.81 mg/dL (H)).    Results from last 7 days   Lab Units 09/02/21  0334 09/01/21  1738 09/01/21  0518 08/31/21  0944 08/31/21  0741 08/31/21  0740   AST (SGOT) U/L  --   --   --   --  18  --    ALT (SGPT) U/L  --   --   --   --  11  --    PROCALCITONIN ng/mL  --   --   --   --  3.96*  --    LACTATE mmol/L  --  2.8* 3.5* 2.4*  --   --    PLATELETS 10*3/mm3 206  --  246  --   --  245       Results from last 7 days   Lab Units 09/01/21  0837   PH, ARTERIAL pH units 7.451*   PCO2, ARTERIAL mm Hg 20.3*   PO2 ART mm Hg 76.9*   HCO3 ART mmol/L 14.1*       Imaging:  Reviewed chest images personally from past 3 days    Scheduled meds:    chlorhexidine, 15 mL, Mouth/Throat, Q12H  heparin (porcine), 5,000 Units, Subcutaneous, Q8H  insulin lispro, 0-7 Units, Subcutaneous, TID AC  piperacillin-tazobactam, 3.375 g, Intravenous, Q12H        ASSESSMENT  /  PLAN:  1. Right pneumonia, likely aspiration  2. Severe sepsis, secondary #1  3. Lactic acidosis - improving  4. SILVIA on CKD 3  5. Hypotension, transient  6. Hypernatremia - mild  7. Hypokalemia  8. Moderate to severe protein calorie malnutrition  9. DM type II  10. AGMA  11. TME      Cont zosyn  Hydration prn bolus pending bp  Serial labs  Patient's blood pressure has been with maps above 65 at this point.  We will need to monitor these closely.  Renal function concerning  Overall physiological reserve seems low here.  Potassium replete gingerly with his renal function.  Follow cultures continue on Zosyn  Concerning prognosis  All issues new to me today.  Prior hospital course, labs and imaging reviewed.      Noted limitations in chart for goals of care include DNR DNI with additional  limitations.      James Desouza MD  [facility]  09/02/21  847 EDT

## 2021-09-02 NOTE — PROGRESS NOTES
NEPHROLOGY PROGRESS NOTE    PATIENT IDENTIFICATION:   Name:  Gregorio Alejandro      MRN:  [id number]     65 y.o.  male             Reason for visit: SILVIA    SUBJECTIVE:   And examined.  Laying down in bed.  Lethargic.  Not on pressors yet.  Wright catheter in place with minimal urine output.  OBJECTIVE:  Vitals:    09/02/21 0550 09/02/21 0600 09/02/21 0635 09/02/21 0700   BP: 92/58 (!) 88/56 97/73 (!) 84/61   Pulse: 73 75 (!) 129 72   Resp:       Temp:       TempSrc:       SpO2:  94% 95% 96%   Weight:       Height:               Body mass index is 20.53 kg/m².    Intake/Output Summary (Last 24 hours) at 9/2/2021 0728  Last data filed at 9/2/2021 0630  Gross per 24 hour   Intake 4915 ml   Output 30 ml   Net 4885 ml         Exam:  GEN:  Lethargic  EYES:   Anicteric sclera  ENT:    External ears/nose normal, MM are dry  NECK:  No adenopathy, JVP none  LUNGS: Normal chest on inspection; not labored  CV:  Normal S1S2, without murmur  ABD:  Non-tender, non-distended, no hepatosplenomegaly, +BS  EXT:  No edema; no cyanosis; clubbing    Scheduled meds:  chlorhexidine, 15 mL, Mouth/Throat, Q12H  heparin (porcine), 5,000 Units, Subcutaneous, Q8H  insulin lispro, 0-7 Units, Subcutaneous, TID AC  piperacillin-tazobactam, 3.375 g, Intravenous, Q12H      IV meds:                      dexmedetomidine, 0.2-1.5 mcg/kg/hr, Last Rate: Stopped (09/01/21 1600)  norepinephrine, 0.02-0.3 mcg/kg/min  Pharmacy to Dose Zosyn,   sodium chloride, 125 mL/hr, Last Rate: 125 mL/hr (09/02/21 0643)        Data Review:    Results from last 7 days   Lab Units 09/02/21  0334 09/01/21  1738 09/01/21  0518 08/31/21  0741 08/31/21  0741   SODIUM mmol/L 147* 143 146*   < > 145   POTASSIUM mmol/L 3.2* 3.4* 4.0   < > 5.0   CHLORIDE mmol/L 116* 112* 113*   < > 108*   CO2 mmol/L 14.0* 17.1* 12.9*   < > 16.5*   BUN mg/dL 70* 72* 73*   < > 71*   CREATININE mg/dL 3.81* 4.22* 3.75*   < > 4.11*   CALCIUM mg/dL 8.2* 8.7 9.0   < > 9.3   BILIRUBIN mg/dL  --   --   --    --  1.2   ALK PHOS U/L  --   --   --   --  127*   ALT (SGPT) U/L  --   --   --   --  11   AST (SGOT) U/L  --   --   --   --  18   GLUCOSE mg/dL 112* 113* 167*   < > 169*    < > = values in this interval not displayed.       Estimated Creatinine Clearance: 19.8 mL/min (A) (by C-G formula based on SCr of 3.81 mg/dL (H)).    Results from last 7 days   Lab Units 09/01/21  1738 08/31/21  0741   MAGNESIUM mg/dL  --  1.7   PHOSPHORUS mg/dL 3.4  --        Results from last 7 days   Lab Units 09/02/21  0334 09/01/21  0518 08/31/21  0740   WBC 10*3/mm3 12.09* 13.64* 10.26   HEMOGLOBIN g/dL 9.9* 10.3* 11.4*   PLATELETS 10*3/mm3 206 246 245                   ASSESSMENT:     Altered mental status    Severe malnutrition (CMS/HCC)       -Acute kidney injury on chronic kidney disease stage III/IV :Baseline creatinine 2.1 mg/dL .  Creatinine on admission was 4.1 mg/dL.  Etiology is likely due to acute tubular necrosis due to hypotension and urosepsis.     -Mixed metabolic acidosis due to anion gap and anion gap as well as respiratory alkalosis  -Acute respiratory failure due to right lower lobe pneumonia likely aspiration  -Hypernatremia due to decreased free water intake  -Urosepsis: Currently on IV antibiotic on Zosyn.    -Metabolic encephalopathy    PLAN:    Creatinine down to 3.8 mg/dL but patient remains oliguric.  Improvement of creatinine is likely dilutional.  Will continue half normal saline and start patient on water flushes  Patient was made DNR/DNI with no invasive measures such as dialysis which is very appropriate  We will hold on replacing potassium  Maintain map above 65  Adjust all medications for creatinine clearance less than 10 mL/min/m²  Surveillance labs      Leighton Linder MD  9/2/2021    07:28 EDT

## 2021-09-02 NOTE — PLAN OF CARE
Problem: Adult Inpatient Plan of Care  Goal: Plan of Care Review  Outcome: Ongoing, Progressing  Flowsheets (Taken 9/2/2021 0646)  Progress: no change  Plan of Care Reviewed With: patient  Outcome Summary: Patient still in ICU. Afib. RA. SBP- 80's-90's, MAP greater than 65, levophed ordered if needed. Oriented to self, lethargic. Low K called to nephrology, no new orders for replacement.

## 2021-09-02 NOTE — PROGRESS NOTES
Pharmacy consult to dose Zosyn-- Follow-up    Gregorio Alejandro is a 65 y.o. male 74.5 kg (164 lb 3.9 oz).  Pharmacy consulted to dose for PNA per Dr Amos's request.    Anti-Infectives (From admission, onward)      Ordered     Dose/Rate Route Frequency Start Stop    08/31/21 1852  piperacillin-tazobactam (ZOSYN) 3.375 g in iso-osmotic dextrose 50 ml (premix)     Neha De La Cruz, PharmD reviewed the order on 09/01/21 0808.   Ordering Provider: Campos Espino MD    3.375 g  over 4 Hours Intravenous Every 12 Hours 08/31/21 2230 09/05/21 2229 08/31/21 1818  Pharmacy to Dose Zosyn     Neha De La Cruz, PharmD reviewed the order on 09/01/21 0809.   Ordering Provider: Campos Espino MD     Does not apply Continuous PRN 08/31/21 1811 09/05/21 1810 08/31/21 0910  piperacillin-tazobactam (ZOSYN) 3.375 g in iso-osmotic dextrose 50 ml (premix)     Ordering Provider: Sandra Harris MD    3.375 g  over 30 Minutes Intravenous Once 08/31/21 0912 08/31/21 1119    08/31/21 0910  vancomycin 1750 mg/500 mL 0.9% NS IVPB ([facility])     Ordering Provider: Sandra Harris MD    1,750 mg Intravenous Once 08/31/21 0912 08/31/21 1355             Estimated Creatinine Clearance: 19.8 mL/min (A) (by C-G formula based on SCr of 3.81 mg/dL (H)).  Creatinine   Date Value Ref Range Status   09/02/2021 3.81 (H) 0.76 - 1.27 mg/dL Final   09/01/2021 4.22 (H) 0.76 - 1.27 mg/dL Final   09/01/2021 3.75 (H) 0.76 - 1.27 mg/dL Final   08/31/2021 4.11 (H) 0.76 - 1.27 mg/dL Final     WBC   Date Value Ref Range Status   09/02/2021 12.09 (H) 3.40 - 10.80 10*3/mm3 Final   09/01/2021 13.64 (H) 3.40 - 10.80 10*3/mm3 Final   08/31/2021 10.26 3.40 - 10.80 10*3/mm3 Final     Temp Readings from Last 2 Encounters:   09/02/21 97.4 °F (36.3 °C)   06/09/21 98.1 °F (36.7 °C) (Oral)       Baseline cultures:    PLAN:  Per nephrology, meds should be dosed for Crcl<10ml/min. Only minimal UOP overnight. Continue Zosyn at 3.375 gm IV Q 12 hr infused over 4  hours.  Will monitor and adjust as needed.      Neha De La Cruz, PharmD, BCPS  09/02/21 09:22 EDT

## 2021-09-02 NOTE — PLAN OF CARE
Goal Outcome Evaluation:  Plan of Care Reviewed With: patient           Outcome Summary: SLP discussed with RN. PO is not appropriate this date. Will check back as indicated.

## 2021-09-03 NOTE — PROGRESS NOTES
"  Daily Progress Note.   [facility] CORONARY CARE  9/3/2021    Patient:  Name:  Gregorio Alejandro  MRN:  [id number]  1955  65 y.o.  male         CC: severe sepsis, pna, tme  Interval History:  More alert today.  Tachycardia  No pressors  No fever  emart chart reviewed  Maintains on 2lnc 97%  Nods head no to chest pain or abd pain       Physical Exam:  /69   Pulse 89   Temp 97.8 °F (36.6 °C) (Oral)   Resp 24   Ht 190.5 cm (75\")   Wt 74.5 kg (164 lb 3.9 oz) Comment: not weighed by RD  SpO2 97%   BMI 20.53 kg/m²   Body mass index is 20.53 kg/m².    Intake/Output Summary (Last 24 hours) at 9/3/2021 0817  Last data filed at 9/3/2021 0600  Gross per 24 hour   Intake 3583 ml   Output 30 ml   Net 3553 ml     General appearance: Ill-appearing not conversant   Eyes: anicteric sclerae, moist conjunctivae;   HENT: Atraumatic; oropharynx dry  mucous membranes +cortrak  Neck: Trachea midline;  Lungs:  air entry bilaterally int rhonchi with unlabored respiratory effort and no intercostal retractions  CV: RRR rub  Abdomen: Thin nonrigid non tender  Extremities: No peripheral edema   Skin: Warm well perfused multiple scars and some abrasions on lower extremities  Psych/neuro: Patient is calm however he does appear alert, patient is nonverbal and will not follow commands but does shake head yes or no seemingly coherently    Data Review:  Notable Labs:  Results from last 7 days   Lab Units 09/03/21  0640 09/03/21  0407 09/02/21  0334 09/01/21  0518 08/31/21  0740   WBC 10*3/mm3 8.50 8.44 12.09* 13.64* 10.26   HEMOGLOBIN g/dL 10.8* 9.9* 9.9* 10.3* 11.4*   PLATELETS 10*3/mm3 195 185 206 246 245     Results from last 7 days   Lab Units 09/03/21  0638 09/02/21  0334 09/01/21  1738 09/01/21  0518 08/31/21  0741   SODIUM mmol/L 143 147* 143 146* 145   POTASSIUM mmol/L 3.5 3.2* 3.4* 4.0 5.0   CHLORIDE mmol/L 110* 116* 112* 113* 108*   CO2 mmol/L 13.6* 14.0* 17.1* 12.9* 16.5*   BUN mg/dL 75* 70* 72* 73* 71* "   CREATININE mg/dL 4.13* 3.81* 4.22* 3.75* 4.11*   GLUCOSE mg/dL 117* 112* 113* 167* 169*   CALCIUM mg/dL 8.6 8.2* 8.7 9.0 9.3   MAGNESIUM mg/dL  --   --   --   --  1.7   PHOSPHORUS mg/dL 3.0  --  3.4  --   --    Estimated Creatinine Clearance: 18.3 mL/min (A) (by C-G formula based on SCr of 4.13 mg/dL (H)).    Results from last 7 days   Lab Units 09/03/21  0640 09/03/21  0407 09/02/21  0334 09/01/21  1738 09/01/21  0518 09/01/21  0518 08/31/21  0944 08/31/21  0741 08/31/21  0740   AST (SGOT) U/L  --   --   --   --   --   --   --  18  --    ALT (SGPT) U/L  --   --   --   --   --   --   --  11  --    PROCALCITONIN ng/mL  --   --   --   --   --   --   --  3.96*  --    LACTATE mmol/L  --   --   --  2.8*  --  3.5* 2.4*  --   --    PLATELETS 10*3/mm3 195 185 206  --    < > 246  --   --    < >    < > = values in this interval not displayed.       Results from last 7 days   Lab Units 09/01/21  0837   PH, ARTERIAL pH units 7.451*   PCO2, ARTERIAL mm Hg 20.3*   PO2 ART mm Hg 76.9*   HCO3 ART mmol/L 14.1*       Imaging:  Reviewed chest images personally from past 3 days    Scheduled meds:    chlorhexidine, 15 mL, Mouth/Throat, Q12H  heparin (porcine), 5,000 Units, Subcutaneous, Q8H  insulin lispro, 0-7 Units, Subcutaneous, Q6H  piperacillin-tazobactam, 3.375 g, Intravenous, Q12H        ASSESSMENT  /  PLAN:  1. Right pneumonia, likely aspiration  2. Severe sepsis, secondary #1  3. Lactic acidosis - improving  4. SILVIA on CKD 3  5. Hypotension, transient improved  6. Hypernatremia - resolved  7. Hypokalemia improved  8. Moderate to severe protein calorie malnutrition  9. DM type II  10. AGMA  11. TME      Cont zosyn follow micro - suspect in next day or so transitioning to po augmentin through cortrak if continued clinical progress     Serial labs following met acidosis and renal function concerning - nephrology following, note limitations in care.       Potassium replete gingerly with his renal function.  Follow cultures  continue on Zosyn     Noted limitations in chart for goals of care include DNR DNI with additional limitations.    Updated daughter at bedside. All questions answered.    HD stable over past 24 hours, 2lnc  Will transfer to floor for ongoing care.      James Desouza MD  [facility]  09/03/21  826 EDT

## 2021-09-03 NOTE — PLAN OF CARE
Problem: Adult Inpatient Plan of Care  Goal: Plan of Care Review  Outcome: Ongoing, Progressing  Flowsheets (Taken 9/3/2021 0172)  Plan of Care Reviewed With: patient  Outcome Summary: Oriented to self, able to shake his head yes or no to questions. Afib w/ PVC, heart rate increased to 130's when agitated. 2L NC. Cortrak replaced and tube feeds restarted. No c/o pain. BM last night.

## 2021-09-03 NOTE — PROGRESS NOTES
"DAILY PROGRESS NOTE  [facility], [facility]    2021    Patient Identification:  Name: Gregorio Alejandro  Age: 65 y.o.  Sex: male  :  1955  MRN: [id number]           Primary Care Physician: Maya Ribeiro APRN    Subjective:    Interval History:    Patient blood pressure is up and down, no pressors at this time.  Saturation room air is 99%.  On IV fluids as well.  Mental status not improving.  Potassium 3.2, sodium 147, creatinine 3.81, WBC 12.09, hemoglobin 9.9.  Blood sugar okay.  Appreciate all consultants recommendations  No much urine output    ROS:    Nausea, vomiting, constipation, chest pain, shortness of breath.    Objective:    Scheduled Meds:  chlorhexidine, 15 mL, Mouth/Throat, Q12H  heparin (porcine), 5,000 Units, Subcutaneous, Q8H  insulin lispro, 0-7 Units, Subcutaneous, TID AC  piperacillin-tazobactam, 3.375 g, Intravenous, Q12H        Continuous Infusions:  Pharmacy to Dose Zosyn,   phenylephrine, 0.5-3 mcg/kg/min, Last Rate: Stopped (21)  sodium chloride, 125 mL/hr, Last Rate: 125 mL/hr (21 160)        PRN Meds:  dextrose  •  dextrose  •  glucagon (human recombinant)  •  Morphine  •  Pharmacy to Dose Zosyn  •  sodium chloride    Intake/Output:    Intake/Output Summary (Last 24 hours) at 2021 2209  Last data filed at 2021 1742  Gross per 24 hour   Intake 4135 ml   Output 20 ml   Net 4115 ml         Exam:    T MAX 24 hrs: Temp (24hrs), Av.4 °F (36.3 °C), Min:96.8 °F (36 °C), Max:97.9 °F (36.6 °C)    Vitals Ranges:   Temp:  [96.8 °F (36 °C)-97.9 °F (36.6 °C)] 97.5 °F (36.4 °C)  Heart Rate:  [] 83  Resp:  [16-32] 32  BP: ()/(50-83) 109/79    /79   Pulse 83   Temp 97.5 °F (36.4 °C) (Axillary)   Resp (!) 32   Ht 190.5 cm (75\")   Wt 74.5 kg (164 lb 3.9 oz) Comment: not weighed by RD  SpO2 97%   BMI 20.53 kg/m²     General: awake, not oriented, tries to communicate. Saturation 97% on room air.  Neck: " Supple, symmetrical, trachea midline, no adenopathy;              thyroid:  no enlargement/tenderness/nodules;              no carotid bruit or JVD  Cardiovascular: Has been tachycardic on and off.  No murmur gallops or rubs.    Pulmonary: Diminished breath sounds bilaterally. No rhonchi, wheezing or rales.   Abdominal: Soft. Soft, nontender, bowel sounds active all four quadrants,     no masses, no hepatomegaly, no splenomegaly.   Extremities: Normal, atraumatic, no cyanosis or edema  Neurological: He is awake, however, confused.   Skin: Skin color, texture, turgor normal, no rashes or lesions      Data Review:    Results from last 7 days   Lab Units 09/02/21  0334 09/01/21  0518 08/31/21  0740   WBC 10*3/mm3 12.09* 13.64* 10.26   HEMOGLOBIN g/dL 9.9* 10.3* 11.4*   HEMATOCRIT % 28.6* 31.2* 34.6*   PLATELETS 10*3/mm3 206 246 245       Results from last 7 days   Lab Units 09/02/21  0334 09/01/21  1738 09/01/21  0518 08/31/21  0741 08/31/21  0741   SODIUM mmol/L 147* 143 146*   < > 145   POTASSIUM mmol/L 3.2* 3.4* 4.0   < > 5.0   CHLORIDE mmol/L 116* 112* 113*   < > 108*   CO2 mmol/L 14.0* 17.1* 12.9*   < > 16.5*   BUN mg/dL 70* 72* 73*   < > 71*   CREATININE mg/dL 3.81* 4.22* 3.75*   < > 4.11*   CALCIUM mg/dL 8.2* 8.7 9.0   < > 9.3   BILIRUBIN mg/dL  --   --   --   --  1.2   ALK PHOS U/L  --   --   --   --  127*   ALT (SGPT) U/L  --   --   --   --  11   AST (SGOT) U/L  --   --   --   --  18   GLUCOSE mg/dL 112* 113* 167*   < > 169*    < > = values in this interval not displayed.                 Lab Results   Lab Value Date/Time    TROPONINT 0.202 (C) 08/31/2021 0741    TROPONINT 0.110 (C) 02/04/2021 1551    TROPONINT 0.171 (C) 12/23/2020 0357    TROPONINT 0.141 (C) 12/22/2020 0549    TROPONINT 0.147 (C) 12/21/2020 1450    TROPONINT 0.128 (C) 12/21/2020 1223       Microbiology Results (last 10 days)     Procedure Component Value - Date/Time    MRSA Screen, PCR (Inpatient) - Swab, Nares [[id number]]  (Normal) Collected:  09/01/21 0152    Lab Status: Final result Specimen: Swab from Nares Updated: 09/01/21 0307     MRSA PCR No MRSA Detected    Urine Culture - Urine, Urine, Catheter [[id number]]  (Abnormal) Collected: 08/31/21 0946    Lab Status: Final result Specimen: Urine, Catheter Updated: 09/02/21 1029     Urine Culture >100,000 CFU/mL Mixed Gram Negative Jamil    Narrative:      Specimen contains mixed organisms of questionable pathogenicity which indicates contamination with commensal jamil.  Further identification is unlikely to provide clinically useful information.  Suggest recollection.    Blood Culture - Blood, Arm, Right [[id number]] Collected: 08/31/21 0944    Lab Status: Preliminary result Specimen: Blood from Arm, Right Updated: 09/02/21 1000     Blood Culture No growth at 2 days    Blood Culture - Blood, Arm, Left [[id number]] Collected: 08/31/21 0944    Lab Status: Preliminary result Specimen: Blood from Arm, Left Updated: 09/02/21 1000     Blood Culture No growth at 2 days    Respiratory Panel PCR w/COVID-19(SARS-CoV-2) CRISTÓBAL/RACHAEL/YAAKOV/PAD/COR/MAD/KANU In-House, NP Swab in UTM/VTM, 3-4 HR TAT - Swab, Nasopharynx [[id number]]  (Normal) Collected: 08/31/21 0852    Lab Status: Final result Specimen: Swab from Nasopharynx Updated: 08/31/21 1007     ADENOVIRUS, PCR Not Detected     Coronavirus 229E Not Detected     Coronavirus HKU1 Not Detected     Coronavirus NL63 Not Detected     Coronavirus OC43 Not Detected     COVID19 Not Detected     Human Metapneumovirus Not Detected     Human Rhinovirus/Enterovirus Not Detected     Influenza A PCR Not Detected     Influenza B PCR Not Detected     Parainfluenza Virus 1 Not Detected     Parainfluenza Virus 2 Not Detected     Parainfluenza Virus 3 Not Detected     Parainfluenza Virus 4 Not Detected     RSV, PCR Not Detected     Bordetella pertussis pcr Not Detected     Bordetella parapertussis PCR Not Detected     Chlamydophila pneumoniae PCR Not Detected     Mycoplasma pneumo by PCR Not  Detected    Narrative:      In the setting of a positive respiratory panel with a viral infection PLUS a negative procalcitonin without other underlying concern for bacterial infection, consider observing off antibiotics or discontinuation of antibiotics and continue supportive care. If the respiratory panel is positive for atypical bacterial infection (Bordetella pertussis, Chlamydophila pneumoniae, or Mycoplasma pneumoniae), consider antibiotic de-escalation to target atypical bacterial infection.           Imaging Results (Last 7 Days)     Procedure Component Value Units Date/Time    XR Chest 1 View [[id number]] Collected: 09/02/21 1246     Updated: 09/02/21 1305    Narrative:      CHEST SINGLE VIEW     HISTORY: Shortness of air.     COMPARISON: AP chest 08/31/2021, 02/04/2021.     FINDINGS: There is abnormal airspace disease within the right midlung  and right base that is consistent with infiltrate and this appears more  consolidated than on the previous exam. There appears to mild left  basilar atelectasis. Patient is rotated to the right. A feeding tube tip  extends in to the stomach. No pneumothorax is evident.       Impression:      Airspace disease within the right midlung and right base  appears denser and more consolidated than on the previous exam  08/31/2021. There has been no other change. There is no evidence for  perihilar edema.     This report was finalized on 9/2/2021 1:02 PM by Dr. Juice Burgess M.D.       CT Head Without Contrast [[id number]] Collected: 08/31/21 0816     Updated: 08/31/21 1026    Narrative:      EMERGENCY NONCONTRAST HEAD CT 08/31/2021     CLINICAL HISTORY: Confusion and altered mental status.     TECHNIQUE: Spiral CT images were obtained from the base of the skull to  the vertex without intravenous contrast. Images were reformatted and  submitted in 3 mm thick axial CT sections with brain algorithm and 2 mm  thick sagittal and coronal reconstructions were performed and  submitted  in brain algorithm.     COMPARISON: This is correlated to prior MRI of the head from [facility] on 02/11/2021, as well as prior noncontrast head CT  02/05/2021.     FINDINGS: There is some patchy low-density in the periventricular white  matter, consistent with mild-to-moderate small vessel disease. There is  mild diffuse cerebral atrophy. Lateral and third ventricles are  minimally prominent in size, felt to be due to central volume loss or  atrophy. I see no focal mass effect. There is no midline shift. No  extra-axial fluid collections are identified. There is no evidence of  acute intracranial hemorrhage. There are extensive calcified plaques in  the intracranial segment distal right vertebral artery, cavernous  segments of the internal carotid arteries, and within multiple arterial  branches within the scalp. Calvarium and skull base are normal in  appearance. There is deformity of the inferior left orbital wall with  inferior bowing of the inferior left orbital wall into the superior  aspect of the left maxillary sinus, felt to be secondary to an old  healed fracture deformity, unchanged.       Impression:      1. No acute intracranial abnormality seen with no change when compared  to prior head CT from [facility] on 02/05/2021.   2. There is mild-to-moderate small vessel disease in the cerebral white  matter, prominent calcified plaques in the intracranial segment distal  right vertebral artery, cavernous and supracavernous segments of the  internal carotid arteries, as well as within marked multiple arterial  branches within the scalp. There is mild diffuse cerebral atrophy.  3. There is deformity of the inferior left orbital wall with inferior  bowing of the left inferior orbital wall into the superior aspect of the  left maxillary sinus, compatible with an old healed fracture deformity,  unchanged. The remainder of the head CT is within normal limits. The  etiology  of the confusion and altered mental status is not established  on this exam.     Radiation dose reduction techniques were utilized, including automated  exposure control and exposure modulation based on body size.     This report was finalized on 8/31/2021 10:23 AM by Dr. Alexander Chong M.D.       XR Chest 1 View [[id number]] Collected: 08/31/21 0733     Updated: 08/31/21 0737    Narrative:      PORTABLE CHEST X-RAY     HISTORY: Altered metal status.     TECHNIQUE: Portable chest x-rays correlated with chest x-ray 02/04/2021.     FINDINGS: The cardiomediastinal silhouette is normal. The left lung is  clear. There is diffuse infiltrate throughout the right lung. No  pneumothorax or pleural effusion is present.       Impression:      Extensive pneumonia or aspiration pneumonitis in the right  lung.     This report was finalized on 8/31/2021 7:34 AM by Dr. Pal Eldridge M.D.               Assessment:        Altered mental status    Severe malnutrition (CMS/HCC)  Aspiration pneumonia  Metabolic encephalopathy  Acute kidney injury with oliguria  Dehydration  Elevated troponin  Sinus tachycardia.  Diabetes mellitus           Plan:    Continue IV fluids, as per renal.    Continue IV antibiotics  Appreciate all consultants recommendations  Renal function improving.  No candidate for dialysis.  Patient was made DNR/DNI.  Adjust insulin as needed, blood sugar fairly controlled, will continue current regimen.  Monitor mental status  Continue DVT/stress ulcer prophylaxis  Labs in am      Campos Espino MD  9/2/2021           I wore protective equipment throughout this patient encounter including a face mask, gloves, and protective eyewear.  Hand hygiene was performed before donning protective equipment and after removal when leaving the room.

## 2021-09-03 NOTE — PROGRESS NOTES
Continued Stay Note  [facility]     Patient Name: Gregorio Alejandro  MRN: [id number]  Today's Date: 9/3/2021    Admit Date: 8/31/2021    Discharge Plan     Row Name 09/03/21 1204       Plan    Plan  Pt can return to [facility] under Hospice    Row Name 09/03/21 1155       Plan    Plan  [facility]  Pt is to be evaluated by Hospice    Plan Comments  IMM noted.   Reviewed face sheet and medical record. CCP spoke to patient’s wife Patricia, [phone] at bedside.  CCP role explained and discharge planning discussed. Face sheet verified   She is his  emergency contact. Pt is from [facility] in a skilled LTC bed.  Plan is to return patient to the facility vs Hospice.  [facility] is following pt.  Wife is not sure where he will go, hospital or at [facility]   Pt will need ambulance for transport.  [facility] will follow  for [facility]   He uses a walker to ambulate .  He is dependent on activities of daily life.  CCP following.        Discharge Codes    No documentation.             Sharri Mcmanus RN

## 2021-09-03 NOTE — PROGRESS NOTES
NEPHROLOGY PROGRESS NOTE    PATIENT IDENTIFICATION:   Name:  Gregorio Alejandro      MRN:  [id number]     65 y.o.  male             Reason for visit: SILVIA    SUBJECTIVE:     Seen and examined.  More awake today.  Wright catheter is anchored with very little urine output.  Off pressors.      OBJECTIVE:  Vitals:    09/03/21 0300 09/03/21 0400 09/03/21 0500 09/03/21 0600   BP: 102/67 99/72 108/64 97/68   BP Location:       Patient Position:       Pulse: 84 83 (!) 129 (!) 135   Resp:       Temp:  98.7 °F (37.1 °C)     TempSrc:  Axillary     SpO2: 97% 97% 95% 95%   Weight:       Height:               Body mass index is 20.53 kg/m².    Intake/Output Summary (Last 24 hours) at 9/3/2021 0631  Last data filed at 9/2/2021 1742  Gross per 24 hour   Intake 1928 ml   Output --   Net 1928 ml         Exam:  GEN:  Awake in no acute distress  EYES:   Anicteric sclera  ENT:    External ears/nose normal, MM are dry  NECK:  No adenopathy, JVP none  LUNGS: Normal chest on inspection; not labored  CV:  Normal S1S2, without murmur  ABD:  Non-tender, non-distended, no hepatosplenomegaly, +BS  EXT:  No edema; no cyanosis; clubbing    Scheduled meds:  chlorhexidine, 15 mL, Mouth/Throat, Q12H  heparin (porcine), 5,000 Units, Subcutaneous, Q8H  insulin lispro, 0-7 Units, Subcutaneous, TID AC  piperacillin-tazobactam, 3.375 g, Intravenous, Q12H      IV meds:                      Pharmacy to Dose Zosyn,   phenylephrine, 0.5-3 mcg/kg/min, Last Rate: Stopped (09/02/21 2132)  sodium chloride, 125 mL/hr, Last Rate: 125 mL/hr (09/03/21 0512)        Data Review:    Results from last 7 days   Lab Units 09/02/21  0334 09/01/21  1738 09/01/21  0518 08/31/21  0741 08/31/21  0741   SODIUM mmol/L 147* 143 146*   < > 145   POTASSIUM mmol/L 3.2* 3.4* 4.0   < > 5.0   CHLORIDE mmol/L 116* 112* 113*   < > 108*   CO2 mmol/L 14.0* 17.1* 12.9*   < > 16.5*   BUN mg/dL 70* 72* 73*   < > 71*   CREATININE mg/dL 3.81* 4.22* 3.75*   < > 4.11*   CALCIUM mg/dL 8.2* 8.7 9.0    < > 9.3   BILIRUBIN mg/dL  --   --   --   --  1.2   ALK PHOS U/L  --   --   --   --  127*   ALT (SGPT) U/L  --   --   --   --  11   AST (SGOT) U/L  --   --   --   --  18   GLUCOSE mg/dL 112* 113* 167*   < > 169*    < > = values in this interval not displayed.       Estimated Creatinine Clearance: 19.8 mL/min (A) (by C-G formula based on SCr of 3.81 mg/dL (H)).    Results from last 7 days   Lab Units 09/01/21  1738 08/31/21  0741   MAGNESIUM mg/dL  --  1.7   PHOSPHORUS mg/dL 3.4  --        Results from last 7 days   Lab Units 09/02/21  0334 09/01/21  0518 08/31/21  0740   WBC 10*3/mm3 12.09* 13.64* 10.26   HEMOGLOBIN g/dL 9.9* 10.3* 11.4*   PLATELETS 10*3/mm3 206 246 245                   ASSESSMENT:     Altered mental status    Severe malnutrition (CMS/HCC)       -Acute kidney injury on chronic kidney disease stage III/IV :Baseline creatinine 2.1 mg/dL .  Creatinine on admission was 4.1 mg/dL.  Etiology is likely due to acute tubular necrosis due to hypotension and urosepsis.     -Mixed metabolic acidosis due to anion gap and anion gap as well as respiratory alkalosis  -Acute respiratory failure due to right lower lobe pneumonia likely aspiration  -Hypernatremia due to decreased free water intake  -Urosepsis: Currently on IV antibiotic on Zosyn.    -Metabolic encephalopathy    PLAN:    Labs pending this morning so we will continue current management waiting for the result.  Patient was made DNR/DNI with no invasive measures such as dialysis which is very appropriate  Maintain map above 65  Adjust all medications for creatinine clearance less than 10 mL/min/m²  Surveillance labs      Leighton Linder MD  9/3/2021    06:31 EDT

## 2021-09-03 NOTE — PROGRESS NOTES
Adult Nutrition  Assessment/PES    Patient Name:  Gregorio Alejandro  YOB: 1955  MRN: [id number]  Admit Date:  8/31/2021    Assessment Date:  9/3/2021    Comments:  Follow up note. Pt receiving Diabetisource AC currently at 40ml/hr goal at 65ml/hr. Water flushes 30cc/hr. Last BM 9/3. Discuss care in rounds. Will continue to follow.     Reason for Assessment     Row Name 09/03/21 0954          Reason for Assessment    Reason For Assessment  follow-up protocol         Nutrition/Diet History     Row Name 09/03/21 0954          Nutrition/Diet History    Typical Food/Fluid Intake  Tolerating TF's           Labs/Tests/Procedures/Meds     Row Name 09/03/21 0955          Labs/Procedures/Meds    Lab Results Reviewed  reviewed     Lab Results Comments  glu, bun, cr        Diagnostic Tests/Procedures    Diagnostic Test/Procedure Reviewed  reviewed        Medications    Pertinent Medications Reviewed  reviewed         Physical Findings     Row Name 09/03/21 0955          Physical Findings    Overall Physical Appearance  underweight     Gastrointestinal  feeding tube     Tubes  nasogastric tube     Skin  poor skin integrity/turgor;pressure injury;non-healing wound(s) PI noted coccyx/sacral  stage 2, ankle unstageable, black           Nutrition Prescription Ordered     Row Name 09/03/21 0956          Nutrition Prescription PO    Current PO Diet  NPO        Nutrition Prescription EN    Enteral Route  NG     Product  Diabetisource AC (Glucerna 1.2)     TF Delivery Method  Continuous     Continuous TF Goal Rate (mL/hr)  65 mL/hr     Continuous TF Current Rate (mL/hr)  40 mL/hr     Water flush (mL)   30 mL     Water Flush Frequency  Per hour         Evaluation of Received Nutrient/Fluid Intake     Row Name 09/03/21 1002          Calories Evaluation    Enteral Calories (kcal)  1872 at goal     % of Kcal Needs  100        Protein Evaluation    Enteral Protein (gm)  93     % of Protein Needs  100        Fluid Intake  Evaluation    Enteral (Free Water) Fluid (mL)  1279     Free Water Flush Fluid (mL)  720     Total Free Water Intake (mL)  1999 mL        EN Evaluation    HOB  Greater than or equal to 30 degress               Problem/Interventions:          Intervention Goal     Row Name 09/03/21 1004          Intervention Goal    General  Maintain nutrition;Nutrition support treatment;Improved nutrition related lab(s);Reduce/improve symptoms;Meet nutritional needs for age/condition;Disease management/therapy     TF/PN  Tolerate TF at goal     Weight  Maintain weight         Nutrition Intervention     Row Name 09/03/21 1004          Nutrition Intervention    RD/Tech Action  Follow Tx progress;Care plan reviewd         Nutrition Prescription     Row Name 09/03/21 1005          Nutrition Prescription EN    Enteral Prescription  Continue same protocol         Education/Evaluation     Row Name 09/03/21 1005          Monitor/Evaluation    Monitor  Per protocol;I&O;Pertinent labs;TF delivery/tolerance;Weight;Skin status           Electronically signed by:  Lakeshia Patel RD  09/03/21 10:05 EDT

## 2021-09-03 NOTE — PLAN OF CARE
"Goal Outcome Evaluation: no change; dnr/dni but family still wants pt to \"live\"; pt intermittently agitated/restless resulting in hr elevated to 130's and decreased sats; required going from 2 liter nasal cannula to 15 l hi flow; zoloft restarted; tube feeds adjusted                 "

## 2021-09-03 NOTE — PLAN OF CARE
Goal Outcome Evaluation:  Plan of Care Reviewed With: patient           Outcome Summary: SLP discussed patient care with RN. Swallow eval is still not appropriate. SLP to sign off, please re consult when indicated.

## 2021-09-03 NOTE — DISCHARGE PLACEMENT REQUEST
"Mor Alejandro (65 y.o. Male)     Date of Birth Social Security Number Address Home Phone MRN    1955  [address]  [address] [place] [address] [phone] [id number]    [place] Marital Status          Church        Admission Date Admission Type Admitting Provider Attending Provider Department, Room/Bed    8/31/21 Emergency Campos Espino MD [place], Marlon R, MD [facility], [address]    Discharge Date Discharge Disposition Discharge Destination                       Attending Provider: Campos Espino MD    Allergies: No Known Allergies    Isolation: None   Infection: None   Code Status: No CPR    Ht: 190.5 cm (75\")   Wt: 74.5 kg (164 lb 3.9 oz)    Admission Cmt: None   Principal Problem: None                Active Insurance as of 8/31/2021     Primary Coverage     Payor Plan Insurance Group Employer/Plan Group    [employer] [place] MED[place]ARE REPLACEMENT WELL[employer] MEDICARE REPLACEMENT [id number]     Payor Plan Address Payor Plan Phone Number Payor Plan Fax Number Effective Dates    [address] [address] [phone]  8/13/2020 - None Entered    [facility] [address]       Subscriber Name Subscriber Birth Date Member ID       Mor Alejandro 1955 [id number]           Secondary Coverage     Payor Plan Insurance Group Employer/Plan Group    [id number]      Payor Plan Address Payor Plan Phone Number Payor Plan Fax Number Effective Dates    [address]   2/1/2021 - None Entered    [facility] [id number]       Subscriber Name Subscriber Birth Date Member ID       MOR ALEJANDRO 1955 [id number]                 Emergency Contacts      (Rel.) Home Phone Work Phone Mobile Phone    Patricia Alonzo (Spouse) [phone] -- [phone]    Celia Eastman (Daughter) [phone] -- --    Zeenat Wang (Daughter) -- -- [phone]              "

## 2021-09-03 NOTE — PROGRESS NOTES
Discharge Planning Assessment  [facility]     Patient Name: Gregorio Alejandro  MRN: [id number]  Today's Date: 9/3/2021    Admit Date: 8/31/2021    Discharge Needs Assessment     Row Name 09/03/21 1153       Living Environment    Lives With  facility resident    Current Living Arrangements  extended care facility    Potentially Unsafe Housing Conditions  unable to assess    Provides Primary Care For  no one    Family Caregiver if Needed  other (see comments)    Quality of Family Relationships  other (see comments)    Able to Return to Prior Arrangements  yes    Living Arrangement Comments  from [facility]       Resource/Environmental Concerns    Resource/Environmental Concerns  none    Transportation Concerns  other (see comments)       Transition Planning    Patient/Family Anticipates Transition to  long-term care facility    Patient/Family Anticipated Services at Transition  skilled nursing    Transportation Anticipated  health plan transportation       Discharge Needs Assessment    Equipment Currently Used at Home  other (see comments);sadi peña    Anticipated Changes Related to Illness  inability to care for self    Equipment Needed After Discharge  other (see comments)    Outpatient/Agency/Support Group Needs  skilled nursing facility;hospice facility    Discharge Facility/Level of Care Needs  nursing facility, skilled;hospice facility    Provided Post Acute Provider List?  N/A    Provided Post Acute Provider Quality & Resource List?  N/A        Discharge Plan     Row Name 09/03/21 1155       Plan    Plan  [facility]  Pt is to be evaluated by Hospice    Plan Comments  IMM noted.   Reviewed face sheet and medical record. CCP spoke to patient’s wife Patricia, [phone] at bedside.  CCP role explained and discharge planning discussed. Face sheet verified   She is his  emergency contact. Pt is from [facility] in a skilled LTC bed.  Plan is to return patient to the facility vs Hospice.  [facility] is following pt.   Wife is not sure where he will go, hospital or at [facility]   Pt will need ambulance for transport.  [facility] will follow  for [facility]   He uses a walker to ambulate .  He is dependent on activities of daily life.  CCP following.        Continued Care and Services - Admitted Since 8/31/2021    Coordination has not been started for this encounter.     Selected Continued Care - Prior Encounters Includes selections from prior encounters from 6/2/2021 to 9/3/2021    Discharged on 6/9/2021 Admission date: 6/2/2021 - Discharge disposition: Skilled Nursing Facility (DC - External)    Destination     Service Provider Selected Services Address Phone Fax Patient Preferred    [facility] [address] [facility] [address] [phone] --                      Demographic Summary    No documentation.       Functional Status    No documentation.       Psychosocial    No documentation.       Abuse/Neglect    No documentation.       Legal    No documentation.       Substance Abuse    No documentation.       Patient Forms    No documentation.           Sharri Mcmanus RN

## 2021-09-04 NOTE — PLAN OF CARE
Goal Outcome Evaluation:  Plan of Care Reviewed With: patient, spouse      Follows commands, oriented to self, no urine output, rectal temp read 94.2 F so warming blanket and rectal probe (current temp 98.2), beginning of shift patient O2 sats were low 80's on 15L hi-flow and . Suctioning didn't raise sats.Prn morphine given, nonrebreather added in addition to hi-flow. HR and O2 sats better and nonrebreather taken off after 1hr. Pt maintained sats with 15L hi-flow since. See AM labs

## 2021-09-04 NOTE — PROGRESS NOTES
LOS: 4 days   Patient Care Team:  Maya Ribeiro APRN as PCP - General (Family Medicine)    Subjective     Patient is new to me today I am picking up from Dr. James Desouza I reviewed his notes and multiple other records discussed with attending.  Patient with aspiration pneumonia and severe sepsis and lactic acidosis acute kidney injury on chronic kidney disease, transient hypotension protein calorie malnutrition type 2 diabetes and metabolic encephalopathy    Review of Systems:          Objective     Vital Signs  Vital Sign Min/Max for last 24 hours  Temp  Min: 94.4 °F (34.7 °C)  Max: 98.4 °F (36.9 °C)   BP  Min: 81/62  Max: 141/101   Pulse  Min: 75  Max: 133   Resp  Min: 22  Max: 31   SpO2  Min: 83 %  Max: 100 %   Flow (L/min)  Min: 10  Max: 15   No data recorded        Ventilator/Non-Invasive Ventilation Settings (From admission, onward)     Start     Ordered    09/01/21 1716  NIPPV (CPAP or BIPAP)  Until Discontinued     Question Answer Comment   Type: AVAPS/PC/PS    NIPPV Mask Interface: Per Patient Preference    Backup Rate 16    Target VT (mL) 500    EPAP/PEEP (cm H2O) 5    Min Pressure (cm H2O) 10    Max Pressure (cm H2O) 20        09/01/21 1716                             Body mass index is 20.53 kg/m².  I/O last 3 completed shifts:  In: 5235 [I.V.:3046; Other:1111; NG/GT:1078]  Out: 40 [Urine:40]  I/O this shift:  In: 1760 [I.V.:617; Other:660; NG/GT:483]  Out: 30 [Urine:30]        Physical Exam:  General Appearance: Elderly black male lying in bed he looks at me and he closes restraints but he is not talking nurse notes every now and then he will say a few words not necessarily appropriate  Eyes: Conjunctiva are clear and anicteric  ENT: His membranes are dry he has a left nare nasoenteric tube  Neck: Trachea midline I do not appreciate jugular venous distention  Lungs: He has a few crackles in the right base is currently on 2 L high flow nasal cannula oxygen saturation 95% he does not look  to be in distress  Cardiac: Irregularly irregular on the monitor is atrial fibrillation heart rate is right around 100  Abdomen: Soft no palpable hepatosplenomegaly or masses, tolerating tube feeds they are at goal  : Not examined  Musculoskeletal: He does not have much muscle mass  Skin: Chronic changes in the skin in both lower extremities  Neuro: He is not really following commands or talking for me he is moving around in bed mostly moving his left lower extremities he will withdraw his lower extremities to nailbed pressure  Extremities/P Vascular: Palpable radial pulses and weak thready dorsalis pedis pulses bilaterally  MSE: Unable to assess       Labs:  Results from last 7 days   Lab Units 09/04/21  0319 09/03/21  0638 09/02/21  0334 09/01/21  1738 09/01/21  0518 08/31/21  0741   GLUCOSE mg/dL 74 113*  117* 112* 113* 167* 169*   SODIUM mmol/L 139 142  143 147* 143 146* 145   POTASSIUM mmol/L 3.7 3.6  3.5 3.2* 3.4* 4.0 5.0   MAGNESIUM mg/dL  --   --   --   --   --  1.7   CO2 mmol/L 14.5* 12.4*  13.6* 14.0* 17.1* 12.9* 16.5*   CHLORIDE mmol/L 106 109*  110* 116* 112* 113* 108*   ANION GAP mmol/L 18.5* 20.6*  19.4* 17.0* 13.9 20.1* 20.5*   CREATININE mg/dL 4.12* 3.97*  4.13* 3.81* 4.22* 3.75* 4.11*   BUN mg/dL 78* 76*  75* 70* 72* 73* 71*   BUN / CREAT RATIO  18.9 19.1  18.2 18.4 17.1 19.5 17.3   CALCIUM mg/dL 8.2* 8.7  8.6 8.2* 8.7 9.0 9.3   ALK PHOS U/L  --  103  --   --   --  127*   TOTAL PROTEIN g/dL  --  5.8*  --   --   --  6.4   ALT (SGPT) U/L  --  13  --   --   --  11   AST (SGOT) U/L  --  21  --   --   --  18   BILIRUBIN mg/dL  --  0.6  --   --   --  1.2   ALBUMIN g/dL 1.90* 2.00*  1.90*  --  2.00*  --  2.50*   GLOBULIN gm/dL  --  3.8  --   --   --  3.9     Estimated Creatinine Clearance: 18.3 mL/min (A) (by C-G formula based on SCr of 4.12 mg/dL (H)).      Results from last 7 days   Lab Units 09/04/21  0319 09/03/21  0640 09/03/21  0407 09/02/21  0334 09/01/21  0518 08/31/21  0740   WBC  10*3/mm3 6.81 8.50 8.44 12.09* 13.64* 10.26   RBC 10*6/mm3 4.37 4.61 4.33 4.26 4.52 4.99   HEMOGLOBIN g/dL 10.1* 10.8* 9.9* 9.9* 10.3* 11.4*   HEMATOCRIT % 29.7* 31.1* 29.6* 28.6* 31.2* 34.6*   MCV fL 68.0* 67.5* 68.4* 67.1* 69.0* 69.3*   MCH pg 23.1* 23.4* 22.9* 23.2* 22.8* 22.8*   MCHC g/dL 34.0 34.7 33.4 34.6 33.0 32.9   RDW % 23.5* 21.9* 22.4* 21.8* 21.8* 22.8*   RDW-SD fl 54.0 49.8 51.5 49.8 50.7 52.3   PLATELETS 10*3/mm3 182 195 185 206 246 245   NEUTROS ABS 10*3/mm3 6.39  --  7.34*  --   --  9.64*   NRBC /100 WBC 5.1*  --   --   --   --   --      Results from last 7 days   Lab Units 09/01/21  0837   PH, ARTERIAL pH units 7.451*   PO2 ART mm Hg 76.9*   PCO2, ARTERIAL mm Hg 20.3*   HCO3 ART mmol/L 14.1*     Results from last 7 days   Lab Units 08/31/21  0741   TROPONIN T ng/mL 0.202*     Results from last 7 days   Lab Units 08/31/21  0741   PROBNP pg/mL 15,568.0*         Results from last 7 days   Lab Units 09/01/21  1738 09/01/21  0518 08/31/21  0944 08/31/21  0741   LACTATE mmol/L 2.8* 3.5* 2.4*  --    PROCALCITONIN ng/mL  --   --   --  3.96*         Microbiology Results (last 10 days)     Procedure Component Value - Date/Time    MRSA Screen, PCR (Inpatient) - Swab, Nares [[id number]]  (Normal) Collected: 09/01/21 0152    Lab Status: Final result Specimen: Swab from Nares Updated: 09/01/21 0307     MRSA PCR No MRSA Detected    Urine Culture - Urine, Urine, Catheter [[id number]]  (Abnormal) Collected: 08/31/21 0946    Lab Status: Final result Specimen: Urine, Catheter Updated: 09/02/21 1029     Urine Culture >100,000 CFU/mL Mixed Gram Negative Jamil    Narrative:      Specimen contains mixed organisms of questionable pathogenicity which indicates contamination with commensal jamil.  Further identification is unlikely to provide clinically useful information.  Suggest recollection.    Blood Culture - Blood, Arm, Right [[id number]] Collected: 08/31/21 0944    Lab Status: Preliminary result Specimen: Blood from Arm,  Right Updated: 09/04/21 1001     Blood Culture No growth at 4 days    Blood Culture - Blood, Arm, Left [[id number]] Collected: 08/31/21 0944    Lab Status: Preliminary result Specimen: Blood from Arm, Left Updated: 09/04/21 1001     Blood Culture No growth at 4 days    Respiratory Panel PCR w/COVID-19(SARS-CoV-2) [facility]/RACHAEL/YAAKOV/PAD/COR/MAD/KANU In-House, NP Swab in UTM/VTM, 3-4 HR TAT - Swab, Nasopharynx [[id number]]  (Normal) Collected: 08/31/21 0852    Lab Status: Final result Specimen: Swab from Nasopharynx Updated: 08/31/21 1007     ADENOVIRUS, PCR Not Detected     Coronavirus 229E Not Detected     Coronavirus HKU1 Not Detected     Coronavirus NL63 Not Detected     Coronavirus OC43 Not Detected     COVID19 Not Detected     Human Metapneumovirus Not Detected     Human Rhinovirus/Enterovirus Not Detected     Influenza A PCR Not Detected     Influenza B PCR Not Detected     Parainfluenza Virus 1 Not Detected     Parainfluenza Virus 2 Not Detected     Parainfluenza Virus 3 Not Detected     Parainfluenza Virus 4 Not Detected     RSV, PCR Not Detected     Bordetella pertussis pcr Not Detected     Bordetella parapertussis PCR Not Detected     Chlamydophila pneumoniae PCR Not Detected     Mycoplasma pneumo by PCR Not Detected    Narrative:      In the setting of a positive respiratory panel with a viral infection PLUS a negative procalcitonin without other underlying concern for bacterial infection, consider observing off antibiotics or discontinuation of antibiotics and continue supportive care. If the respiratory panel is positive for atypical bacterial infection (Bordetella pertussis, Chlamydophila pneumoniae, or Mycoplasma pneumoniae), consider antibiotic de-escalation to target atypical bacterial infection.              apixaban, 2.5 mg, Nasogastric, Q12H  chlorhexidine, 15 mL, Mouth/Throat, Q12H  insulin lispro, 0-7 Units, Subcutaneous, Q6H  piperacillin-tazobactam, 3.375 g, Intravenous, Q12H  sertraline, 50 mg, Oral,  Daily      sodium chloride, 125 mL/hr, Last Rate: 125 mL/hr (09/03/21 1710)        Diagnostics:  CT Head Without Contrast    Result Date: 8/31/2021  EMERGENCY NONCONTRAST HEAD CT 08/31/2021  CLINICAL HISTORY: Confusion and altered mental status.  TECHNIQUE: Spiral CT images were obtained from the base of the skull to the vertex without intravenous contrast. Images were reformatted and submitted in 3 mm thick axial CT sections with brain algorithm and 2 mm thick sagittal and coronal reconstructions were performed and submitted in brain algorithm.  COMPARISON: This is correlated to prior MRI of the head from [facility] on 02/11/2021, as well as prior noncontrast head CT 02/05/2021.  FINDINGS: There is some patchy low-density in the periventricular white matter, consistent with mild-to-moderate small vessel disease. There is mild diffuse cerebral atrophy. Lateral and third ventricles are minimally prominent in size, felt to be due to central volume loss or atrophy. I see no focal mass effect. There is no midline shift. No extra-axial fluid collections are identified. There is no evidence of acute intracranial hemorrhage. There are extensive calcified plaques in the intracranial segment distal right vertebral artery, cavernous segments of the internal carotid arteries, and within multiple arterial branches within the scalp. Calvarium and skull base are normal in appearance. There is deformity of the inferior left orbital wall with inferior bowing of the inferior left orbital wall into the superior aspect of the left maxillary sinus, felt to be secondary to an old healed fracture deformity, unchanged.      1. No acute intracranial abnormality seen with no change when compared to prior head CT from [facility] on 02/05/2021. 2. There is mild-to-moderate small vessel disease in the cerebral white matter, prominent calcified plaques in the intracranial segment distal right vertebral artery,  cavernous and supracavernous segments of the internal carotid arteries, as well as within marked multiple arterial branches within the scalp. There is mild diffuse cerebral atrophy. 3. There is deformity of the inferior left orbital wall with inferior bowing of the left inferior orbital wall into the superior aspect of the left maxillary sinus, compatible with an old healed fracture deformity, unchanged. The remainder of the head CT is within normal limits. The etiology of the confusion and altered mental status is not established on this exam.  Radiation dose reduction techniques were utilized, including automated exposure control and exposure modulation based on body size.  This report was finalized on 8/31/2021 10:23 AM by Dr. Alexander Chong M.D.      XR Chest 1 View    Result Date: 9/2/2021  CHEST SINGLE VIEW  HISTORY: Shortness of air.  COMPARISON: AP chest 08/31/2021, 02/04/2021.  FINDINGS: There is abnormal airspace disease within the right midlung and right base that is consistent with infiltrate and this appears more consolidated than on the previous exam. There appears to mild left basilar atelectasis. Patient is rotated to the right. A feeding tube tip extends in to the stomach. No pneumothorax is evident.      Airspace disease within the right midlung and right base appears denser and more consolidated than on the previous exam 08/31/2021. There has been no other change. There is no evidence for perihilar edema.  This report was finalized on 9/2/2021 1:02 PM by Dr. Juice Burgess M.D.      XR Chest 1 View    Result Date: 8/31/2021  PORTABLE CHEST X-RAY  HISTORY: Altered metal status.  TECHNIQUE: Portable chest x-rays correlated with chest x-ray 02/04/2021.  FINDINGS: The cardiomediastinal silhouette is normal. The left lung is clear. There is diffuse infiltrate throughout the right lung. No pneumothorax or pleural effusion is present.      Extensive pneumonia or aspiration pneumonitis in the right lung.   This report was finalized on 8/31/2021 7:34 AM by Dr. Pal Eldridge M.D.      XR Abdomen KUB    Result Date: 9/2/2021  Patient: MOR EGRMAN  Time Out: 23:36 Exam(s): FILM ABDOMEN EXAM:   XR Abdomen, 2 Views CLINICAL HISTORY:    Reason for exam: cortrak placement, pt pulled out. TECHNIQUE:   Frontal view of the abdomen pelvis with upright view of the abdomen. COMPARISON:   Earlier study of 9 2021. FINDINGS:   Intraperitoneal space:  No free air.   Gastrointestinal tract:  Nonspecific bowel gas pattern.  No dilation.   Bones joints:  Osteopenia.   Tubes, lines and devices:  Feeding tube remains in place with its tip below the diaphragm, in good position. IMPRESSION:     1.  Feeding tube remains in place, in good position, not significant change. 2.  Nonspecific bowel gas pattern.     Electronically signed by Bill Valadez MD on 09-02-21 at 2336    XR Abdomen KUB    Result Date: 9/2/2021  Patient: MOR GERMAN  Time Out: 22:27 Exam(s): FILM ABDOMEN EXAM:   XR Abdomen, 2 Views CLINICAL HISTORY:    Reason for exam: cortrak placement. TECHNIQUE:   Frontal view of the abdomen pelvis with upright view of the abdomen. COMPARISON:   6 3 2021. FINDINGS:   Lower thorax:  Minimal patchy airspace disease of the right lung base, partially visualized, possibly atelectasis.   Intraperitoneal space:  No free air.   Gastrointestinal tract:  Nonspecific bowel gas pattern.  No dilation.   Organs:  1 cm nonobstructing right renal calculus is suggested.   Bones joints:  Osteopenia.   Tubes, lines and devices:  Feeding tube is noted in place with its tip below diaphragm within the stomach. IMPRESSION:       Feeding tube is noted in place with its tip below the diaphragm, in good position.     Electronically signed by Bill Valadez MD on 09-02-21 at 2226    Results for orders placed during the hospital encounter of 12/18/20    Adult Transthoracic Echo Complete W/ Cont if Necessary Per Protocol    Interpretation Summary  ·  Calculated left ventricular EF = 59% Estimated left ventricular EF was in agreement with the calculated left ventricular EF.  · Left ventricular diastolic function is consistent with (grade I) impaired relaxation.    Biatrial enlargement, with asymmetric LVH.  Speckled type pattern.  Consider amyloid.  No significant valve disease.          Active Hospital Problems    Diagnosis  POA   • Severe malnutrition (CMS/HCC) [E43]  Yes   • Altered mental status [R41.82]  Yes      Resolved Hospital Problems   No resolved problems to display.         Assessment/Plan     1. Aspiration pneumonia on Zosyn he will complete 5 days I think we will plan on 7 days of therapy.  2. Severe sepsis plus minus septic shock  3. Urinary tract infection urine culture grew 100,000 gram-negative rods Zosyn should more than cover.  4. Acute hypoxic respiratory failure oxygenation gets worse every time he goes into A. fib with rapid rate and wean back from his rates.  And have to go back up.  5. Acute kidney injury on chronic kidney disease stage III/IV nephrology following suspect ATN as cause of acute decline  6. Lactic acidosis  7. Metabolic encephalopathy sounds like he has got some chronic cognitive issues as well  8. Moderate to severe protein calorie malnutrition anemia mild hemoglobin stable  9. Elevated troponin on admission likely non-ST elevation MI type II demand ischemia situation  10. Atrial fibrillation flutter anticoagulated with Eliquis some question of possible cardiac amyloid on prior echocardiogram.  Nursing notes that patient has episodes where he gets his rate up into the 130s and he desaturates quickly has had 2 or 3 of these episodes over the past 24 hours.  Pressure is too low for beta-blockers or calcium blockers question to retry amiodarone in this gentleman will ask cardiology sees Dr Muhammad group  11. DNR/DNI    Plan for disposition:this patient has multiple acute issues on some severe chronic issues I do not think he  is going to do well.    Ivan Fang MD  09/04/21  16:48 EDT    Time: Critical care time 37 minutes

## 2021-09-04 NOTE — PROGRESS NOTES
"DAILY PROGRESS NOTE  [facility], [facility]    9/3/2021    Patient Identification:  Name: Gregorio Alejandro  Age: 65 y.o.  Sex: male  :  1955  MRN: [id number]           Primary Care Physician: Maya Ribeiro, ANTWAN    Subjective:    Interval History:    Patient is more awake this morning, however, confused.  Off pressors.  At times tachycardic especially when she becomes restless.  Blood sugar okay, potassium 3.6, creatinine 3.87, hemoglobin 10.9.    ROS:    Nausea, vomiting, constipation, chest pain, shortness of breath.    Objective:    Scheduled Meds:  apixaban, 2.5 mg, Nasogastric, Q12H  chlorhexidine, 15 mL, Mouth/Throat, Q12H  insulin lispro, 0-7 Units, Subcutaneous, Q6H  piperacillin-tazobactam, 3.375 g, Intravenous, Q12H  sertraline, 50 mg, Oral, Daily        Continuous Infusions:  Pharmacy to Dose Zosyn,   sodium chloride, 125 mL/hr, Last Rate: 125 mL/hr (21 1710)        PRN Meds:  dextrose  •  dextrose  •  glucagon (human recombinant)  •  Morphine  •  Pharmacy to Dose Zosyn  •  sodium chloride    Intake/Output:    Intake/Output Summary (Last 24 hours) at 9/3/2021 2318  Last data filed at 9/3/2021 1700  Gross per 24 hour   Intake 3320 ml   Output 40 ml   Net 3280 ml         Exam:    T MAX 24 hrs: Temp (24hrs), Av.3 °F (36.3 °C), Min:94.4 °F (34.7 °C), Max:98.7 °F (37.1 °C)    Vitals Ranges:   Temp:  [94.4 °F (34.7 °C)-98.7 °F (37.1 °C)] 94.4 °F (34.7 °C)  Heart Rate:  [] 126  Resp:  [24] 24  BP: ()/() 87/77    BP (!) 87/77   Pulse (!) 126   Temp 94.4 °F (34.7 °C) (Rectal)   Resp 24   Ht 190.5 cm (75\")   Wt 74.5 kg (164 lb 3.9 oz) Comment: not weighed by RD  SpO2 94%   BMI 20.53 kg/m²     General: awake, not oriented, tries to communicate. Saturation 94% on room air.  Neck: Supple, symmetrical, trachea midline, no adenopathy;              thyroid:  no enlargement/tenderness/nodules;              no carotid bruit or " JVD  Cardiovascular: Has been tachycardic on and off.  No murmur gallops or rubs.    Pulmonary: Diminished breath sounds bilaterally. No rhonchi, wheezing or rales.   Abdominal: Soft. Soft, nontender, bowel sounds active all four quadrants,     no masses, no hepatomegaly, no splenomegaly.   Extremities: Normal, atraumatic, no cyanosis or edema  Neurological: He is awake, however, confused.   Skin: Skin color, texture, turgor normal, no rashes or lesions      Data Review:    Results from last 7 days   Lab Units 09/03/21  0640 09/03/21  0407 09/02/21  0334   WBC 10*3/mm3 8.50 8.44 12.09*   HEMOGLOBIN g/dL 10.8* 9.9* 9.9*   HEMATOCRIT % 31.1* 29.6* 28.6*   PLATELETS 10*3/mm3 195 185 206       Results from last 7 days   Lab Units 09/03/21  0638 09/02/21  0334 09/01/21  1738 09/01/21  0518 08/31/21  0741   SODIUM mmol/L 142  143 147* 143   < > 145   POTASSIUM mmol/L 3.6  3.5 3.2* 3.4*   < > 5.0   CHLORIDE mmol/L 109*  110* 116* 112*   < > 108*   CO2 mmol/L 12.4*  13.6* 14.0* 17.1*   < > 16.5*   BUN mg/dL 76*  75* 70* 72*   < > 71*   CREATININE mg/dL 3.97*  4.13* 3.81* 4.22*   < > 4.11*   CALCIUM mg/dL 8.7  8.6 8.2* 8.7   < > 9.3   BILIRUBIN mg/dL 0.6  --   --   --  1.2   ALK PHOS U/L 103  --   --   --  127*   ALT (SGPT) U/L 13  --   --   --  11   AST (SGOT) U/L 21  --   --   --  18   GLUCOSE mg/dL 113*  117* 112* 113*   < > 169*    < > = values in this interval not displayed.                 Lab Results   Lab Value Date/Time    TROPONINT 0.202 (C) 08/31/2021 0741    TROPONINT 0.110 (C) 02/04/2021 1551    TROPONINT 0.171 (C) 12/23/2020 0357    TROPONINT 0.141 (C) 12/22/2020 0549    TROPONINT 0.147 (C) 12/21/2020 1450    TROPONINT 0.128 (C) 12/21/2020 1223       Microbiology Results (last 10 days)     Procedure Component Value - Date/Time    MRSA Screen, PCR (Inpatient) - Swab, Nares [[id number]]  (Normal) Collected: 09/01/21 0152    Lab Status: Final result Specimen: Swab from Nares Updated: 09/01/21 0307     MRSA  PCR No MRSA Detected    Urine Culture - Urine, Urine, Catheter [[id number]]  (Abnormal) Collected: 08/31/21 0946    Lab Status: Final result Specimen: Urine, Catheter Updated: 09/02/21 1029     Urine Culture >100,000 CFU/mL Mixed Gram Negative Jamil    Narrative:      Specimen contains mixed organisms of questionable pathogenicity which indicates contamination with commensal jamil.  Further identification is unlikely to provide clinically useful information.  Suggest recollection.    Blood Culture - Blood, Arm, Right [[id number]] Collected: 08/31/21 0944    Lab Status: Preliminary result Specimen: Blood from Arm, Right Updated: 09/03/21 1000     Blood Culture No growth at 3 days    Blood Culture - Blood, Arm, Left [[id number]] Collected: 08/31/21 0944    Lab Status: Preliminary result Specimen: Blood from Arm, Left Updated: 09/03/21 1000     Blood Culture No growth at 3 days    Respiratory Panel PCR w/COVID-19(SARS-CoV-2) CRISTÓBAL/RACHAEL/YAAKOV/PAD/COR/MAD/KANU In-House, NP Swab in UTM/VTM, 3-4 HR TAT - Swab, Nasopharynx [[id number]]  (Normal) Collected: 08/31/21 0852    Lab Status: Final result Specimen: Swab from Nasopharynx Updated: 08/31/21 1007     ADENOVIRUS, PCR Not Detected     Coronavirus 229E Not Detected     Coronavirus HKU1 Not Detected     Coronavirus NL63 Not Detected     Coronavirus OC43 Not Detected     COVID19 Not Detected     Human Metapneumovirus Not Detected     Human Rhinovirus/Enterovirus Not Detected     Influenza A PCR Not Detected     Influenza B PCR Not Detected     Parainfluenza Virus 1 Not Detected     Parainfluenza Virus 2 Not Detected     Parainfluenza Virus 3 Not Detected     Parainfluenza Virus 4 Not Detected     RSV, PCR Not Detected     Bordetella pertussis pcr Not Detected     Bordetella parapertussis PCR Not Detected     Chlamydophila pneumoniae PCR Not Detected     Mycoplasma pneumo by PCR Not Detected    Narrative:      In the setting of a positive respiratory panel with a viral infection PLUS  a negative procalcitonin without other underlying concern for bacterial infection, consider observing off antibiotics or discontinuation of antibiotics and continue supportive care. If the respiratory panel is positive for atypical bacterial infection (Bordetella pertussis, Chlamydophila pneumoniae, or Mycoplasma pneumoniae), consider antibiotic de-escalation to target atypical bacterial infection.           Imaging Results (Last 7 Days)     Procedure Component Value Units Date/Time    XR Abdomen KUB [[id number]] Collected: 09/02/21 2337     Updated: 09/02/21 2337    Narrative:        Patient: MOR GERMAN  Time Out: 23:36  Exam(s): FILM ABDOMEN     EXAM:    XR Abdomen, 2 Views    CLINICAL HISTORY:     Reason for exam: cortrak placement, pt pulled out.    TECHNIQUE:    Frontal view of the abdomen pelvis with upright view of the abdomen.    COMPARISON:    Earlier study of 9 2021.    FINDINGS:    Intraperitoneal space:  No free air.    Gastrointestinal tract:  Nonspecific bowel gas pattern.  No dilation.    Bones joints:  Osteopenia.    Tubes, lines and devices:  Feeding tube remains in place with its tip   below the diaphragm, in good position.    IMPRESSION:       1.  Feeding tube remains in place, in good position, not significant   change.  2.  Nonspecific bowel gas pattern.      Impression:          Electronically signed by Bill Valadez MD on 09-02-21 at 2336    XR Abdomen KUB [[id number]] Collected: 09/02/21 2228     Updated: 09/02/21 2228    Narrative:        Patient: MOR GERMAN  Time Out: 22:27  Exam(s): FILM ABDOMEN     EXAM:    XR Abdomen, 2 Views    CLINICAL HISTORY:     Reason for exam: cortrak placement.    TECHNIQUE:    Frontal view of the abdomen pelvis with upright view of the abdomen.    COMPARISON:    6 3 2021.    FINDINGS:    Lower thorax:  Minimal patchy airspace disease of the right lung base,   partially visualized, possibly atelectasis.    Intraperitoneal space:  No free air.     Gastrointestinal tract:  Nonspecific bowel gas pattern.  No dilation.    Organs:  1 cm nonobstructing right renal calculus is suggested.    Bones joints:  Osteopenia.    Tubes, lines and devices:  Feeding tube is noted in place with its tip   below diaphragm within the stomach.    IMPRESSION:         Feeding tube is noted in place with its tip below the diaphragm, in   good position.      Impression:          Electronically signed by Bill Valadez MD on 09-02-21 at 2227    XR Chest 1 View [[id number]] Collected: 09/02/21 1246     Updated: 09/02/21 1305    Narrative:      CHEST SINGLE VIEW     HISTORY: Shortness of air.     COMPARISON: AP chest 08/31/2021, 02/04/2021.     FINDINGS: There is abnormal airspace disease within the right midlung  and right base that is consistent with infiltrate and this appears more  consolidated than on the previous exam. There appears to mild left  basilar atelectasis. Patient is rotated to the right. A feeding tube tip  extends in to the stomach. No pneumothorax is evident.       Impression:      Airspace disease within the right midlung and right base  appears denser and more consolidated than on the previous exam  08/31/2021. There has been no other change. There is no evidence for  perihilar edema.     This report was finalized on 9/2/2021 1:02 PM by Dr. Juice Burgess M.D.       CT Head Without Contrast [[id number]] Collected: 08/31/21 0816     Updated: 08/31/21 1026    Narrative:      EMERGENCY NONCONTRAST HEAD CT 08/31/2021     CLINICAL HISTORY: Confusion and altered mental status.     TECHNIQUE: Spiral CT images were obtained from the base of the skull to  the vertex without intravenous contrast. Images were reformatted and  submitted in 3 mm thick axial CT sections with brain algorithm and 2 mm  thick sagittal and coronal reconstructions were performed and submitted  in brain algorithm.     COMPARISON: This is correlated to prior MRI of the head from [facility] on 02/11/2021, as well as prior noncontrast head CT  02/05/2021.     FINDINGS: There is some patchy low-density in the periventricular white  matter, consistent with mild-to-moderate small vessel disease. There is  mild diffuse cerebral atrophy. Lateral and third ventricles are  minimally prominent in size, felt to be due to central volume loss or  atrophy. I see no focal mass effect. There is no midline shift. No  extra-axial fluid collections are identified. There is no evidence of  acute intracranial hemorrhage. There are extensive calcified plaques in  the intracranial segment distal right vertebral artery, cavernous  segments of the internal carotid arteries, and within multiple arterial  branches within the scalp. Calvarium and skull base are normal in  appearance. There is deformity of the inferior left orbital wall with  inferior bowing of the inferior left orbital wall into the superior  aspect of the left maxillary sinus, felt to be secondary to an old  healed fracture deformity, unchanged.       Impression:      1. No acute intracranial abnormality seen with no change when compared  to prior head CT from [facility] on 02/05/2021.   2. There is mild-to-moderate small vessel disease in the cerebral white  matter, prominent calcified plaques in the intracranial segment distal  right vertebral artery, cavernous and supracavernous segments of the  internal carotid arteries, as well as within marked multiple arterial  branches within the scalp. There is mild diffuse cerebral atrophy.  3. There is deformity of the inferior left orbital wall with inferior  bowing of the left inferior orbital wall into the superior aspect of the  left maxillary sinus, compatible with an old healed fracture deformity,  unchanged. The remainder of the head CT is within normal limits. The  etiology of the confusion and altered mental status is not established  on this exam.     Radiation dose reduction  techniques were utilized, including automated  exposure control and exposure modulation based on body size.     This report was finalized on 8/31/2021 10:23 AM by Dr. Alexander Chong M.D.       XR Chest 1 View [[id number]] Collected: 08/31/21 0733     Updated: 08/31/21 0737    Narrative:      PORTABLE CHEST X-RAY     HISTORY: Altered metal status.     TECHNIQUE: Portable chest x-rays correlated with chest x-ray 02/04/2021.     FINDINGS: The cardiomediastinal silhouette is normal. The left lung is  clear. There is diffuse infiltrate throughout the right lung. No  pneumothorax or pleural effusion is present.       Impression:      Extensive pneumonia or aspiration pneumonitis in the right  lung.     This report was finalized on 8/31/2021 7:34 AM by Dr. Pal Eldridge M.D.               Assessment:        Altered mental status    Severe malnutrition (CMS/HCC)  Aspiration pneumonia  Metabolic encephalopathy  Acute kidney injury with oliguria  Dehydration  Elevated troponin  Sinus tachycardia.  Diabetes mellitus           Plan:    Continue current management.  Patient was made DNR/DNI, no dialysis.  Appreciate consult recommendations  No need for pressors, no need for oxygen.  Okay to move out of the ICU.  Continue monitoring mental status  Adjust insulin as needed, blood sugar fairly controlled, will continue current regimen.  Continue DVT/stress ulcer prophylaxis  Labs in am      Campos Espino MD  9/3/2021           I wore protective equipment throughout this patient encounter including a face mask, gloves, and protective eyewear.  Hand hygiene was performed before donning protective equipment and after removal when leaving the room.

## 2021-09-04 NOTE — PROGRESS NOTES
NEPHROLOGY PROGRESS NOTE    PATIENT IDENTIFICATION:   Name:  Gregorio Alejandro      MRN:  [id number]     65 y.o.  male             Reason for visit: SILVIA    SUBJECTIVE:     Seen and examined.  Wright catheter is anchored with very little urine output.  Off pressors.      OBJECTIVE:  Vitals:    09/04/21 0631 09/04/21 0700 09/04/21 0701 09/04/21 0820   BP: 109/71  110/68    Pulse: 90 82 84 82   Resp:       Temp:    98.4 °F (36.9 °C)   TempSrc:    Rectal   SpO2:  100% 100% 100%   Weight:       Height:               Body mass index is 20.53 kg/m².    Intake/Output Summary (Last 24 hours) at 9/4/2021 0826  Last data filed at 9/4/2021 0500  Gross per 24 hour   Intake 3520 ml   Output 10 ml   Net 3510 ml         Exam:  GEN:  Awake in no acute distress  EYES:   Anicteric sclera  ENT:    External ears/nose normal, MM are dry  NECK:  No adenopathy, JVP none  LUNGS: Normal chest on inspection; not labored  CV:  Normal S1S2, without murmur  ABD:  Non-tender, non-distended, no hepatosplenomegaly, +BS  EXT:  No edema; no cyanosis; clubbing    Scheduled meds:  apixaban, 2.5 mg, Nasogastric, Q12H  chlorhexidine, 15 mL, Mouth/Throat, Q12H  insulin lispro, 0-7 Units, Subcutaneous, Q6H  piperacillin-tazobactam, 3.375 g, Intravenous, Q12H  sertraline, 50 mg, Oral, Daily      IV meds:                      sodium chloride, 125 mL/hr, Last Rate: 125 mL/hr (09/03/21 1710)        Data Review:    Results from last 7 days   Lab Units 09/04/21  0319 09/03/21  0638 09/02/21  0334 09/01/21  0518 08/31/21  0741   SODIUM mmol/L 139 142  143 147*   < > 145   POTASSIUM mmol/L 3.7 3.6  3.5 3.2*   < > 5.0   CHLORIDE mmol/L 106 109*  110* 116*   < > 108*   CO2 mmol/L 14.5* 12.4*  13.6* 14.0*   < > 16.5*   BUN mg/dL 78* 76*  75* 70*   < > 71*   CREATININE mg/dL 4.12* 3.97*  4.13* 3.81*   < > 4.11*   CALCIUM mg/dL 8.2* 8.7  8.6 8.2*   < > 9.3   BILIRUBIN mg/dL  --  0.6  --   --  1.2   ALK PHOS U/L  --  103  --   --  127*   ALT (SGPT) U/L  --  13   --   --  11   AST (SGOT) U/L  --  21  --   --  18   GLUCOSE mg/dL 74 113*  117* 112*   < > 169*    < > = values in this interval not displayed.       Estimated Creatinine Clearance: 18.3 mL/min (A) (by C-G formula based on SCr of 4.12 mg/dL (H)).    Results from last 7 days   Lab Units 09/04/21  0319 09/03/21  0638 09/01/21  1738 08/31/21  0741   MAGNESIUM mg/dL  --   --   --  1.7   PHOSPHORUS mg/dL 3.4 3.0 3.4  --        Results from last 7 days   Lab Units 09/04/21  0319 09/03/21  0640 09/03/21  0407 09/02/21  0334 09/01/21  0518   WBC 10*3/mm3 6.81 8.50 8.44 12.09* 13.64*   HEMOGLOBIN g/dL 10.1* 10.8* 9.9* 9.9* 10.3*   PLATELETS 10*3/mm3 182 195 185 206 246                   ASSESSMENT:     Altered mental status    Severe malnutrition (CMS/HCC)       -Acute kidney injury on chronic kidney disease stage III/IV :Baseline creatinine 2.1 mg/dL .  Creatinine on admission was 4.1 mg/dL.  Etiology is likely due to acute tubular necrosis due to hypotension and urosepsis.   -Mixed metabolic acidosis due to anion gap and anion gap and respiratory alkalosis  -Acute respiratory failure due to right lower lobe pneumonia likely aspiration  -Hypernatremia due to decreased free water intake  -Urosepsis: Currently on IV antibiotic on Zosyn.    -Metabolic encephalopathy    PLAN:    DC IV fluids, continue with oral tube feeds and free water.  Patient was made DNR/DNI with no invasive measures such as dialysis which is very appropriate  Maintain map above 65  Adjust all medications for creatinine clearance less than 10 mL/min/m²  Surveillance labs      Rickey Lara MD  9/4/2021    08:26 EDT

## 2021-09-04 NOTE — PROGRESS NOTES
"DAILY PROGRESS NOTE  [facility]    Patient Identification:  Name: Gregorio Alejandro  Age: 65 y.o.  Sex: male  :  1955  MRN: [id number]         Primary Care Physician: Maya Ribeiro APRN    Subjective: patient is not responsive; no family is present  Interval History: follow up for aspiration pneumonia, sepsis, uti, respiratory failure, a fib    Objective:    Scheduled Meds:apixaban, 2.5 mg, Nasogastric, Q12H  chlorhexidine, 15 mL, Mouth/Throat, Q12H  insulin lispro, 0-7 Units, Subcutaneous, Q6H  piperacillin-tazobactam, 3.375 g, Intravenous, Q12H  sertraline, 50 mg, Oral, Daily      Continuous Infusions:sodium chloride, 125 mL/hr, Last Rate: 125 mL/hr (21 1710)        Vital signs in last 24 hours:  Temp:  [94.4 °F (34.7 °C)-98.4 °F (36.9 °C)] 96.2 °F (35.7 °C)  Heart Rate:  [] 93  Resp:  [22-31] 30  BP: ()/() 97/64    Intake/Output:    Intake/Output Summary (Last 24 hours) at 2021 1920  Last data filed at 2021 1600  Gross per 24 hour   Intake 3675 ml   Output 30 ml   Net 3645 ml       Exam:  BP 97/64   Pulse 93   Temp 96.2 °F (35.7 °C) (Axillary)   Resp (!) 30   Ht 190.5 cm (75\")   Wt 74.5 kg (164 lb 3.9 oz) Comment: not weighed by RD  SpO2 96%   BMI 20.53 kg/m²     General Appearance:    Somnolent, chronically ill appearing; respirations shallow                          Head:    Normocephalic, without obvious abnormality, atraumatic; dht in place                           Eyes:    PERRL, conjunctivae/corneas clear, EOM's intact, both eyes                         Throat:   Lips, tongue, gums normal; oral mucosa pink and moist                           Neck:   Supple, symmetrical, trachea midline, no JVD                         Lungs:    Crackles, rhonchi bilaterally, respirations unlabored                 Chest Wall:    No tenderness or deformity                          Heart:    Regular rate and rhythm, S1 and S2 normal, no murmur,no  rub or gallop    "               Abdomen:     Soft, nontender, bowel sounds active, no masses, no organomegaly                  Extremities:   Extremities normal, atraumatic, no cyanosis or edema                        Pulses:   Pulses palpable in all extremities                            Skin:   Skin is warm and dry,  no rashes or palpable lesions                  Neurologic:   CNII-XII intact, motor strength grossly intact, sensation grossly intact to light touch, no focal deficits noted       Data Review:  Labs in chart were reviewed.  WBC   Date Value Ref Range Status   09/04/2021 6.81 3.40 - 10.80 10*3/mm3 Final     Hemoglobin   Date Value Ref Range Status   09/04/2021 10.1 (L) 13.0 - 17.7 g/dL Final     Hematocrit   Date Value Ref Range Status   09/04/2021 29.7 (L) 37.5 - 51.0 % Final     Platelets   Date Value Ref Range Status   09/04/2021 182 140 - 450 10*3/mm3 Final     Sodium   Date Value Ref Range Status   09/04/2021 139 136 - 145 mmol/L Final     Potassium   Date Value Ref Range Status   09/04/2021 3.7 3.5 - 5.2 mmol/L Final     Comment:     Slight hemolysis detected by analyzer. Results may be affected.     Chloride   Date Value Ref Range Status   09/04/2021 106 98 - 107 mmol/L Final     CO2   Date Value Ref Range Status   09/04/2021 14.5 (L) 22.0 - 29.0 mmol/L Final     BUN   Date Value Ref Range Status   09/04/2021 78 (H) 8 - 23 mg/dL Final     Creatinine   Date Value Ref Range Status   09/04/2021 4.12 (H) 0.76 - 1.27 mg/dL Final     Glucose   Date Value Ref Range Status   09/04/2021 74 65 - 99 mg/dL Final     Calcium   Date Value Ref Range Status   09/04/2021 8.2 (L) 8.6 - 10.5 mg/dL Final     Phosphorus   Date Value Ref Range Status   09/04/2021 3.4 2.5 - 4.5 mg/dL Final     AST (SGOT)   Date Value Ref Range Status   09/03/2021 21 1 - 40 U/L Final     ALT (SGPT)   Date Value Ref Range Status   09/03/2021 13 1 - 41 U/L Final     Alkaline Phosphatase   Date Value Ref Range Status   09/03/2021 103 39 - 117 U/L Final      Patient Active Problem List   Diagnosis Code   • Acute UTI (urinary tract infection) N39.0   • Macrocytosis D75.89   • Sepsis secondary to UTI (CMS/[facility]) A41.9, N39.0   • Metabolic encephalopathy G93.41   • Hyperlipidemia E78.5   • Hypertension I10   • Monoclonal gammopathy D47.2   • Stage 4 chronic kidney disease (CMS/[facility]) N18.4   • DM2 (diabetes mellitus, type 2) (CMS/HCC) E11.9   • Abnormal CT of the abdomen R93.5   • Normal anion gap metabolic acidosis E87.2   • Anemia, chronic disease D63.8   • Ventricular tachycardia (paroxysmal) (CMS/HCC) I47.2   • Acute metabolic encephalopathy G93.41   • UTI (urinary tract infection), bacterial N39.0, A49.9   • Elevated troponin R77.8   • Hypokalemia E87.6   • SILVIA (acute kidney injury) (CMS/HCC) N17.9   • Uncontrolled hypertension I10   • Septicemia (CMS/HCC) A41.9   • Vascular dementia without behavioral disturbance (CMS/HCC) F01.50   • Gastrointestinal hemorrhage K92.2   • Immobility Z74.09   • Right upper lobe pneumonia J18.9   • CKD (chronic kidney disease) stage 3, GFR 30-59 ml/min (CMS/HCC) N18.30   • Urine retention R33.9   • Altered mental status R41.82   • Severe malnutrition (CMS/[facility]) E43       Assessment:    Altered mental status    Severe malnutrition (CMS/HCC)  dementia  Sepsis   ckd3  Atrial fibrillation    Plan:  Continue antibiotics  Agree with dr granger that prognosis is poor  Cardiology to see to aid with rate control  Trend labs  Monitor in ccu  High risk  Patient is dnr    Carol Ann Cheung MD  9/4/2021  19:20 EDT

## 2021-09-04 NOTE — PLAN OF CARE
Goal Outcome Evaluation: no change; intermittent episodes of elevated hr and decreased sats; stooling; unable to clear secretions requiring suctioning; cardiology consulted for assistance with hr control

## 2021-09-05 NOTE — CONSULTS
Patient transferred to the palliative care unit following goals of care and treatment preferences discussion with Dr. Fang.  Patient and/or family state goal of care to be comfort and treatment preferences to be geared towards symptom management.  The palliative care team will continue to follow for any further needs.

## 2021-09-05 NOTE — PROGRESS NOTES
NEPHROLOGY PROGRESS NOTE    PATIENT IDENTIFICATION:   Name:  Gregorio Alejandro      MRN:  [id number]     65 y.o.  male             Reason for visit: SILVIA    SUBJECTIVE:     Seen and examined.  Wright catheter is anchored with very little urine output.  Off pressors.  Gazing up to the ceiling.  Not interactive      OBJECTIVE:  Vitals:    09/05/21 0400 09/05/21 0500 09/05/21 0600 09/05/21 0700   BP: (!) 125/104 110/84 132/91 123/87   BP Location: Right arm      Patient Position: Lying      Pulse: 87 103 90 82   Resp: 20  20    Temp:       TempSrc:       SpO2: 94% 92% 96% 97%   Weight:       Height:               Body mass index is 20.53 kg/m².    Intake/Output Summary (Last 24 hours) at 9/5/2021 0839  Last data filed at 9/5/2021 0430  Gross per 24 hour   Intake 2855 ml   Output 60 ml   Net 2795 ml         Exam:  GEN:  Awake in no acute distress  EYES:   Anicteric sclera  ENT:    External ears/nose normal, MM are dry  NECK:  No adenopathy, JVP none  LUNGS: Normal chest on inspection; not labored  CV:  Normal S1S2, without murmur  ABD:  Non-tender, non-distended, no hepatosplenomegaly, +BS  EXT:  No edema; no cyanosis; clubbing    Scheduled meds:  apixaban, 2.5 mg, Nasogastric, Q12H  chlorhexidine, 15 mL, Mouth/Throat, Q12H  digoxin, 125 mcg, Intravenous, Q4H  insulin lispro, 0-7 Units, Subcutaneous, Q6H  piperacillin-tazobactam, 3.375 g, Intravenous, Q12H  sertraline, 50 mg, Oral, Daily      IV meds:                           Data Review:    Results from last 7 days   Lab Units 09/04/21  0319 09/03/21  0638 09/02/21  0334 09/01/21  0518 08/31/21  0741   SODIUM mmol/L 139 142  143 147*   < > 145   POTASSIUM mmol/L 3.7 3.6  3.5 3.2*   < > 5.0   CHLORIDE mmol/L 106 109*  110* 116*   < > 108*   CO2 mmol/L 14.5* 12.4*  13.6* 14.0*   < > 16.5*   BUN mg/dL 78* 76*  75* 70*   < > 71*   CREATININE mg/dL 4.12* 3.97*  4.13* 3.81*   < > 4.11*   CALCIUM mg/dL 8.2* 8.7  8.6 8.2*   < > 9.3   BILIRUBIN mg/dL  --  0.6  --   --   1.2   ALK PHOS U/L  --  103  --   --  127*   ALT (SGPT) U/L  --  13  --   --  11   AST (SGOT) U/L  --  21  --   --  18   GLUCOSE mg/dL 74 113*  117* 112*   < > 169*    < > = values in this interval not displayed.       Estimated Creatinine Clearance: 18.3 mL/min (A) (by C-G formula based on SCr of 4.12 mg/dL (H)).    Results from last 7 days   Lab Units 09/04/21  0319 09/03/21  0638 09/01/21  1738 08/31/21  0741   MAGNESIUM mg/dL  --   --   --  1.7   PHOSPHORUS mg/dL 3.4 3.0 3.4  --        Results from last 7 days   Lab Units 09/05/21  0510 09/04/21  0319 09/03/21  0640 09/03/21  0407 09/02/21  0334   WBC 10*3/mm3 8.52 6.81 8.50 8.44 12.09*   HEMOGLOBIN g/dL 11.3* 10.1* 10.8* 9.9* 9.9*   PLATELETS 10*3/mm3 247 182 195 185 206                   ASSESSMENT:     Altered mental status    Severe malnutrition (CMS/HCC)       -Acute kidney injury on chronic kidney disease stage III/IV :Baseline creatinine 2.1 mg/dL .  Creatinine on admission was 4.1 mg/dL.  Etiology is likely due to acute tubular necrosis due to hypotension and urosepsis.   -Mixed metabolic acidosis due to anion gap and anion gap and respiratory alkalosis  -Acute respiratory failure due to right lower lobe pneumonia likely aspiration  -Hypernatremia due to decreased free water intake  -Urosepsis: Currently on IV antibiotic on Zosyn.    -Metabolic encephalopathy    PLAN:    Labs are pending from this morning, will monitor once available  Patient was made DNR/DNI with no invasive measures such as dialysis which is very appropriate  Maintain map above 65  Adjust all medications for creatinine clearance less than 10 mL/min/m²  Surveillance labs      Rickey Lara MD  9/5/2021    08:39 EDT

## 2021-09-05 NOTE — CONSULTS
Gregorio Alejandro   65 y.o.  male    LOS: 5 days   Patient Care Team:  Maya Ribeiro APRN as PCP - General (Family Medicine)      Subjective     Patient Complaints: Request from primary service regarding atrial fibrillation rapid response, hypotension.    History of Present Illness:   Patient with ESRD not wanting dialysis, with atrial fibrillation, RVR, and hypotension.  Admitted with confusion.    History AF, NS-VT, possible junctional rhythm in the past.  History hypertrophic physiology with small ventricle, severe concentric LVH, EF 55 to 60%, normal valves (echo, 2/28/2020).    Review of Systems:   Review of systems could not be obtained due to  patient nonverbal.    Medication Review: done      Family History   Family history unknown: Yes     Social History     Socioeconomic History   • Marital status:      Spouse name: Not on file   • Number of children: Not on file   • Years of education: Not on file   • Highest education level: Not on file   Tobacco Use   • Smoking status: Never Smoker   • Smokeless tobacco: Never Used   Vaping Use   • Vaping Use: Never used   Substance and Sexual Activity   • Alcohol use: Yes     Comment: 1 pint   • Drug use: Never   • Sexual activity: Defer     Objective   Past Surgical History:   Procedure Laterality Date   • ABDOMINAL SURGERY     • ENDOSCOPY N/A 6/5/2021    Procedure: ESOPHAGOGASTRODUODENOSCOPY with biopsies and brushing;  Surgeon: Pérez Gonzalez MD;  Location: [facility] ENDOSCOPY;  Service: Gastroenterology;  Laterality: N/A;  pre-abnormal CT scan stomach  post-gastritis, possible candida, duodenitis   • EYE SURGERY     • JOINT REPLACEMENT     • SIGMOIDOSCOPY N/A 6/5/2021    Procedure: FLEXIBLE SIGMOIDOSCOPY to 40cm;  Surgeon: Pérez Gonzalez MD;  Location: [facility] ENDOSCOPY;  Service: Gastroenterology;  Laterality: N/A;  pre-abnormal ct scan of sigmoid rectum  post-poor prep   • TONSILLECTOMY       Past Medical History:   Diagnosis Date   • Back pain    •  CKD (chronic kidney disease)    • DM type 2 (diabetes mellitus, type 2) (CMS/[facility])    • ED (erectile dysfunction)    • Hyperlipidemia    • Hypertension    • SCOTTY (iron deficiency anemia)    • Monoclonal gammopathy    • Osteoarthritis        Vital Sign Min/Max for last 24 hours  Temp  Min: 96.2 °F (35.7 °C)  Max: 98.4 °F (36.9 °C)   BP  Min: 81/62  Max: 136/103    Pulse  Min: 82  Max: 131     Wt Readings from Last 3 Encounters:   09/01/21 74.5 kg (164 lb 3.9 oz)   06/03/21 103 kg (227 lb 12.8 oz)   02/16/21 106 kg (233 lb)        Physical Exam:      General Appearance:   Eyes open   Head:    Normocephalic, without obvious abnormality, atraumatic   Eyes:            Conjunctivae normal, no   icterus       Lungs:    Upper airway sounds present.  To auscultation,respirations regular, even and                  unlabored    Heart:    irregular rhythm and normal rate, normal S1 and S2,            1/6 murmur, no gallop, no rub, no click   Chest Wall:    No abnormalities observed   Abdomen:     Normal bowel sounds, no masses, no organomegaly, soft     nontender, nondistended, no guarding, no rebound                tenderness   Rectal:     Deferred   Extremities: .  No edema.   Pulses:   Pulses palpable and equal bilaterally   Skin:   No bleeding, bruising.  Variegated skin color with no clear rash            Results Review:     I reviewed the patient's new clinical results.  Potassium   Date Value Ref Range Status   09/04/2021 3.7 3.5 - 5.2 mmol/L Final     Comment:     Slight hemolysis detected by analyzer. Results may be affected.     Creatinine   Date Value Ref Range Status   09/04/2021 4.12 (H) 0.76 - 1.27 mg/dL Final        Echo EF Estimated  No results found for: ECHOEFEST      Assessment/Plan   1.  A. fib, RVR with hypotension  2.  End-stage renal disease/probable hypertensive nephropathy, not wanting dialysis.  3.  Metabolic encephalopathy, on and off hospice.  4.  Bradycardia with syncope status post loop recorder  4/23/2019.  5.  History of hypertrophic physiology, EF 55 to 60% (2/28/2020), with no ischemia on prior DSE.  6.  OFELIA (insurance denied CPAP)/immobility/monoclonal gammopathy/debility    Complicated situation medically with the hypotension, alternate tachycardia.  Will use very low-dose digoxin 0.125 mg every 4 hours x2 doses and see the results.  If we get some results, but not ideal, may use another dose.  Schedule level check in the morning.  Socially, wife is planning to come to hospital today, hopefully to make final decision regarding hospice.      Paddy Low MD  09/05/21  08:09 EDT      Time: 35    Dictated portions using Dragon dictation software.     During the entire encounter, I was wearing recommended PPE including face mask and eye protection. Hand sanitization was performed prior to entering room and upon exit.

## 2021-09-05 NOTE — PROGRESS NOTES
LOS: 5 days   Patient Care Team:  Maya Ribeiro APRN as PCP - General (Family Medicine)    Subjective     Nursing reports patient had a fairly rough night he is remained on high flow O2.  He is not real responsive.  His wife is at bedside and I was actually called to come meet her today nurses been talking with her and she would like to make him palliative care she says he has had enough.  Apparently he had been palliative care before and he made little rally and they had rescinded it but she now wants to reinstitute full comfort measures she understands he is not likely to survive told her we would let him eat if he wanted to but I did not think it is condition he would eat much and she agreed.  She is fine with that.    Review of Systems:          Objective     Vital Signs  Vital Sign Min/Max for last 24 hours  Temp  Min: 94.1 °F (34.5 °C)  Max: 98.1 °F (36.7 °C)   BP  Min: 88/63  Max: 132/91   Pulse  Min: 68  Max: 129   Resp  Min: 20  Max: 22   SpO2  Min: 88 %  Max: 98 %   Flow (L/min)  Min: 12  Max: 15   No data recorded        Ventilator/Non-Invasive Ventilation Settings (From admission, onward)     Start     Ordered    09/01/21 1716  NIPPV (CPAP or BIPAP)  Until Discontinued     Question Answer Comment   Type: AVAPS/PC/PS    NIPPV Mask Interface: Per Patient Preference    Backup Rate 16    Target VT (mL) 500    EPAP/PEEP (cm H2O) 5    Min Pressure (cm H2O) 10    Max Pressure (cm H2O) 20        09/01/21 1716                             Body mass index is 20.53 kg/m².  I/O last 3 completed shifts:  In: 4890 [I.V.:2198; Other:1350; NG/GT:1292; IV Piggyback:50]  Out: 60 [Urine:60]  I/O this shift:  In: 180 [Other:180]  Out: -         Physical Exam:  General Appearance: Elderly black male lying in bed he opens his eyes some not really tracking real well today not following not moving as much as yesterday  Eyes: Conjunctiva are clear and anicteric  ENT: His membranes are dry he has a left nare  nasoenteric tube  Neck: Trachea midline I do not appreciate jugular venous distention  Lungs: He has a few crackles in the right base is currently on 13 L high flow nasal cannula oxygen saturation 93% he does not look to be in distress  Cardiac: Irregularly irregular on the monitor atrial fibrillation heart rate is right around 75  Abdomen: Soft no palpable hepatosplenomegaly or masses, tolerating tube feeds they are at goal  : Not examined  Musculoskeletal: He does not have much muscle mass  Skin: Chronic changes in the skin in both lower extremities  Neuro: He is not really following commands or talking for me he is not moving around much either  Extremities/P Vascular: Palpable radial pulses and weak thready dorsalis pedis pulses bilaterally  MSE: Unable to assess       Labs:  Results from last 7 days   Lab Units 09/05/21  0930 09/04/21  0319 09/03/21  0638 09/02/21  0334 09/01/21  1738 09/01/21  0518 08/31/21  0741   GLUCOSE mg/dL 101* 74 113*  117* 112* 113* 167* 169*   SODIUM mmol/L 137 139 142  143 147* 143 146* 145   POTASSIUM mmol/L 3.4* 3.7 3.6  3.5 3.2* 3.4* 4.0 5.0   MAGNESIUM mg/dL  --   --   --   --   --   --  1.7   CO2 mmol/L 13.4* 14.5* 12.4*  13.6* 14.0* 17.1* 12.9* 16.5*   CHLORIDE mmol/L 106 106 109*  110* 116* 112* 113* 108*   ANION GAP mmol/L 17.6* 18.5* 20.6*  19.4* 17.0* 13.9 20.1* 20.5*   CREATININE mg/dL 4.36* 4.12* 3.97*  4.13* 3.81* 4.22* 3.75* 4.11*   BUN mg/dL 85* 78* 76*  75* 70* 72* 73* 71*   BUN / CREAT RATIO  19.5 18.9 19.1  18.2 18.4 17.1 19.5 17.3   CALCIUM mg/dL 9.0 8.2* 8.7  8.6 8.2* 8.7 9.0 9.3   ALK PHOS U/L  --   --  103  --   --   --  127*   TOTAL PROTEIN g/dL  --   --  5.8*  --   --   --  6.4   ALT (SGPT) U/L  --   --  13  --   --   --  11   AST (SGOT) U/L  --   --  21  --   --   --  18   BILIRUBIN mg/dL  --   --  0.6  --   --   --  1.2   ALBUMIN g/dL 1.90* 1.90* 2.00*  1.90*  --  2.00*  --  2.50*   GLOBULIN gm/dL  --   --  3.8  --   --   --  3.9     Estimated  Creatinine Clearance: 17.3 mL/min (A) (by C-G formula based on SCr of 4.36 mg/dL (H)).      Results from last 7 days   Lab Units 09/05/21  0510 09/04/21  0319 09/03/21  0640 09/03/21  0407 09/02/21  0334 09/01/21  0518 08/31/21  0740   WBC 10*3/mm3 8.52 6.81 8.50 8.44 12.09* 13.64* 10.26   RBC 10*6/mm3 4.91 4.37 4.61 4.33 4.26 4.52 4.99   HEMOGLOBIN g/dL 11.3* 10.1* 10.8* 9.9* 9.9* 10.3* 11.4*   HEMATOCRIT % 33.9* 29.7* 31.1* 29.6* 28.6* 31.2* 34.6*   MCV fL 69.0* 68.0* 67.5* 68.4* 67.1* 69.0* 69.3*   MCH pg 23.0* 23.1* 23.4* 22.9* 23.2* 22.8* 22.8*   MCHC g/dL 33.3 34.0 34.7 33.4 34.6 33.0 32.9   RDW % 23.7* 23.5* 21.9* 22.4* 21.8* 21.8* 22.8*   RDW-SD fl 54.9* 54.0 49.8 51.5 49.8 50.7 52.3   PLATELETS 10*3/mm3 247 182 195 185 206 246 245   NEUTROS ABS 10*3/mm3 6.71 6.39  --  7.34*  --   --  9.64*   NRBC /100 WBC 2.1*  0.6* 5.1*  --   --   --   --   --      Results from last 7 days   Lab Units 09/01/21  0837   PH, ARTERIAL pH units 7.451*   PO2 ART mm Hg 76.9*   PCO2, ARTERIAL mm Hg 20.3*   HCO3 ART mmol/L 14.1*     Results from last 7 days   Lab Units 08/31/21  0741   TROPONIN T ng/mL 0.202*     Results from last 7 days   Lab Units 08/31/21  0741   PROBNP pg/mL 15,568.0*         Results from last 7 days   Lab Units 09/01/21  1738 09/01/21  0518 08/31/21  0944 08/31/21  0741   LACTATE mmol/L 2.8* 3.5* 2.4*  --    PROCALCITONIN ng/mL  --   --   --  3.96*         Microbiology Results (last 10 days)     Procedure Component Value - Date/Time    MRSA Screen, PCR (Inpatient) - Swab, Nares [[id number]]  (Normal) Collected: 09/01/21 0152    Lab Status: Final result Specimen: Swab from Nares Updated: 09/01/21 0307     MRSA PCR No MRSA Detected    Urine Culture - Urine, Urine, Catheter [[id number]]  (Abnormal) Collected: 08/31/21 0946    Lab Status: Final result Specimen: Urine, Catheter Updated: 09/02/21 1029     Urine Culture >100,000 CFU/mL Mixed Gram Negative Abbi    Narrative:      Specimen contains mixed organisms of  questionable pathogenicity which indicates contamination with commensal jamil.  Further identification is unlikely to provide clinically useful information.  Suggest recollection.    Blood Culture - Blood, Arm, Right [[id number]] Collected: 08/31/21 0944    Lab Status: Final result Specimen: Blood from Arm, Right Updated: 09/05/21 1001     Blood Culture No growth at 5 days    Blood Culture - Blood, Arm, Left [[id number]] Collected: 08/31/21 0944    Lab Status: Final result Specimen: Blood from Arm, Left Updated: 09/05/21 1001     Blood Culture No growth at 5 days    Respiratory Panel PCR w/COVID-19(SARS-CoV-2) CRISTÓBAL/RACHAEL/YAAKOV/PAD/COR/MAD/KANU In-House, NP Swab in UTM/VTM, 3-4 HR TAT - Swab, Nasopharynx [[id number]]  (Normal) Collected: 08/31/21 0852    Lab Status: Final result Specimen: Swab from Nasopharynx Updated: 08/31/21 1007     ADENOVIRUS, PCR Not Detected     Coronavirus 229E Not Detected     Coronavirus HKU1 Not Detected     Coronavirus NL63 Not Detected     Coronavirus OC43 Not Detected     COVID19 Not Detected     Human Metapneumovirus Not Detected     Human Rhinovirus/Enterovirus Not Detected     Influenza A PCR Not Detected     Influenza B PCR Not Detected     Parainfluenza Virus 1 Not Detected     Parainfluenza Virus 2 Not Detected     Parainfluenza Virus 3 Not Detected     Parainfluenza Virus 4 Not Detected     RSV, PCR Not Detected     Bordetella pertussis pcr Not Detected     Bordetella parapertussis PCR Not Detected     Chlamydophila pneumoniae PCR Not Detected     Mycoplasma pneumo by PCR Not Detected    Narrative:      In the setting of a positive respiratory panel with a viral infection PLUS a negative procalcitonin without other underlying concern for bacterial infection, consider observing off antibiotics or discontinuation of antibiotics and continue supportive care. If the respiratory panel is positive for atypical bacterial infection (Bordetella pertussis, Chlamydophila pneumoniae, or Mycoplasma  pneumoniae), consider antibiotic de-escalation to target atypical bacterial infection.              apixaban, 2.5 mg, Nasogastric, Q12H  chlorhexidine, 15 mL, Mouth/Throat, Q12H  insulin lispro, 0-7 Units, Subcutaneous, Q6H  piperacillin-tazobactam, 3.375 g, Intravenous, Q12H  sertraline, 50 mg, Oral, Daily           Diagnostics:  CT Head Without Contrast    Result Date: 8/31/2021  EMERGENCY NONCONTRAST HEAD CT 08/31/2021  CLINICAL HISTORY: Confusion and altered mental status.  TECHNIQUE: Spiral CT images were obtained from the base of the skull to the vertex without intravenous contrast. Images were reformatted and submitted in 3 mm thick axial CT sections with brain algorithm and 2 mm thick sagittal and coronal reconstructions were performed and submitted in brain algorithm.  COMPARISON: This is correlated to prior MRI of the head from [facility] on 02/11/2021, as well as prior noncontrast head CT 02/05/2021.  FINDINGS: There is some patchy low-density in the periventricular white matter, consistent with mild-to-moderate small vessel disease. There is mild diffuse cerebral atrophy. Lateral and third ventricles are minimally prominent in size, felt to be due to central volume loss or atrophy. I see no focal mass effect. There is no midline shift. No extra-axial fluid collections are identified. There is no evidence of acute intracranial hemorrhage. There are extensive calcified plaques in the intracranial segment distal right vertebral artery, cavernous segments of the internal carotid arteries, and within multiple arterial branches within the scalp. Calvarium and skull base are normal in appearance. There is deformity of the inferior left orbital wall with inferior bowing of the inferior left orbital wall into the superior aspect of the left maxillary sinus, felt to be secondary to an old healed fracture deformity, unchanged.      1. No acute intracranial abnormality seen with no change when  compared to prior head CT from [facility] on 02/05/2021. 2. There is mild-to-moderate small vessel disease in the cerebral white matter, prominent calcified plaques in the intracranial segment distal right vertebral artery, cavernous and supracavernous segments of the internal carotid arteries, as well as within marked multiple arterial branches within the scalp. There is mild diffuse cerebral atrophy. 3. There is deformity of the inferior left orbital wall with inferior bowing of the left inferior orbital wall into the superior aspect of the left maxillary sinus, compatible with an old healed fracture deformity, unchanged. The remainder of the head CT is within normal limits. The etiology of the confusion and altered mental status is not established on this exam.  Radiation dose reduction techniques were utilized, including automated exposure control and exposure modulation based on body size.  This report was finalized on 8/31/2021 10:23 AM by Dr. Alexander Chong M.D.      XR Chest 1 View    Result Date: 9/2/2021  CHEST SINGLE VIEW  HISTORY: Shortness of air.  COMPARISON: AP chest 08/31/2021, 02/04/2021.  FINDINGS: There is abnormal airspace disease within the right midlung and right base that is consistent with infiltrate and this appears more consolidated than on the previous exam. There appears to mild left basilar atelectasis. Patient is rotated to the right. A feeding tube tip extends in to the stomach. No pneumothorax is evident.      Airspace disease within the right midlung and right base appears denser and more consolidated than on the previous exam 08/31/2021. There has been no other change. There is no evidence for perihilar edema.  This report was finalized on 9/2/2021 1:02 PM by Dr. Juice Burgess M.D.      XR Chest 1 View    Result Date: 8/31/2021  PORTABLE CHEST X-RAY  HISTORY: Altered metal status.  TECHNIQUE: Portable chest x-rays correlated with chest x-ray 02/04/2021.  FINDINGS:  The cardiomediastinal silhouette is normal. The left lung is clear. There is diffuse infiltrate throughout the right lung. No pneumothorax or pleural effusion is present.      Extensive pneumonia or aspiration pneumonitis in the right lung.  This report was finalized on 8/31/2021 7:34 AM by Dr. Pal Eldridge M.D.      XR Abdomen KUB    Result Date: 9/2/2021  Patient: MOR GERMAN  Time Out: 23:36 Exam(s): FILM ABDOMEN EXAM:   XR Abdomen, 2 Views CLINICAL HISTORY:    Reason for exam: cortrak placement, pt pulled out. TECHNIQUE:   Frontal view of the abdomen pelvis with upright view of the abdomen. COMPARISON:   Earlier study of 9 2021. FINDINGS:   Intraperitoneal space:  No free air.   Gastrointestinal tract:  Nonspecific bowel gas pattern.  No dilation.   Bones joints:  Osteopenia.   Tubes, lines and devices:  Feeding tube remains in place with its tip below the diaphragm, in good position. IMPRESSION:     1.  Feeding tube remains in place, in good position, not significant change. 2.  Nonspecific bowel gas pattern.     Electronically signed by Bill Valadez MD on 09-02-21 at 2336    XR Abdomen KUB    Result Date: 9/2/2021  Patient: MOR GERMAN  Time Out: 22:27 Exam(s): FILM ABDOMEN EXAM:   XR Abdomen, 2 Views CLINICAL HISTORY:    Reason for exam: cortrak placement. TECHNIQUE:   Frontal view of the abdomen pelvis with upright view of the abdomen. COMPARISON:   6 3 2021. FINDINGS:   Lower thorax:  Minimal patchy airspace disease of the right lung base, partially visualized, possibly atelectasis.   Intraperitoneal space:  No free air.   Gastrointestinal tract:  Nonspecific bowel gas pattern.  No dilation.   Organs:  1 cm nonobstructing right renal calculus is suggested.   Bones joints:  Osteopenia.   Tubes, lines and devices:  Feeding tube is noted in place with its tip below diaphragm within the stomach. IMPRESSION:       Feeding tube is noted in place with its tip below the diaphragm, in good position.      Electronically signed by Bill Valadez MD on 09-02-21 at 2227    Results for orders placed during the hospital encounter of 12/18/20    Adult Transthoracic Echo Complete W/ Cont if Necessary Per Protocol    Interpretation Summary  · Calculated left ventricular EF = 59% Estimated left ventricular EF was in agreement with the calculated left ventricular EF.  · Left ventricular diastolic function is consistent with (grade I) impaired relaxation.    Biatrial enlargement, with asymmetric LVH.  Speckled type pattern.  Consider amyloid.  No significant valve disease.          Active Hospital Problems    Diagnosis  POA   • Severe malnutrition (CMS/HCC) [E43]  Yes   • Altered mental status [R41.82]  Yes      Resolved Hospital Problems   No resolved problems to display.         Assessment/Plan     1. Aspiration pneumonia   2. Severe sepsis plus minus septic shock  3. Urinary tract infection urine culture grew 100,000 gram-negative rods Zosyn should more than cover.  4. Acute hypoxic respiratory failure  5. Acute kidney injury on chronic kidney disease stage III/IV   6. Lactic acidosis  7. Metabolic encephalopathy sounds like he has got some chronic cognitive issues as well  8. Moderate to severe protein calorie malnutrition anemia mild hemoglobin stable  9. Elevated troponin on admission likely non-ST elevation MI type II demand ischemia situation  10. Atrial fibrillation/ flutter with rapid ventricular response  11. pressure sores  12. DNR/DNI    Plan for disposition: Transition to full palliative care see discussions above    Ivan Fang MD  09/05/21  14:37 EDT    Time: Critical care time 39 minutes

## 2021-09-05 NOTE — PROGRESS NOTES
"DAILY PROGRESS NOTE  [facility]    Patient Identification:  Name: Gregorio Alejandro  Age: 65 y.o.  Sex: male  :  1955  MRN: [id number]         Primary Care Physician: Maya Ribeiro APRN    Subjective: patient is resting; no distress evident; no visitors present  Interval History: follow up for respiratory failure, christopher, sepsis, uti    Objective:    Scheduled Meds:senna-docusate sodium, 2 tablet, Oral, BID      Continuous Infusions:     Vital signs in last 24 hours:  Temp:  [94.1 °F (34.5 °C)-98.1 °F (36.7 °C)] 97.4 °F (36.3 °C)  Heart Rate:  [] 90  Resp:  [20-22] 20  BP: ()/() 92/53    Intake/Output:    Intake/Output Summary (Last 24 hours) at 2021 1751  Last data filed at 2021 1630  Gross per 24 hour   Intake 1395 ml   Output 30 ml   Net 1365 ml       Exam:  BP 92/53   Pulse 90   Temp 97.4 °F (36.3 °C) (Oral)   Resp 20   Ht 190.5 cm (75\")   Wt 74.5 kg (164 lb 3.9 oz) Comment: not weighed by RD  SpO2 90%   BMI 20.53 kg/m²     General Appearance:    Somnolent, ill appearing; agonal respirations                          Head:    Normocephalic, without obvious abnormality, atraumatic                           Eyes:     both eyes closed                         Throat:   Lips, tongue, gums normal; oral mucosa pink and moist                           Neck:   Supple, symmetrical, trachea midline, no JVD                         Lungs:    Clear to auscultation bilaterally, respirations unlabored                 Chest Wall:    No tenderness or deformity                          Heart:    Regular rate and rhythm, S1 and S2 normal, no murmur,no  rub or gallop                  Abdomen:     Soft, nontender, bowel sounds active, no masses, no organomegaly                  Extremities:   Extremities normal, atraumatic, no cyanosis or edema                        Pulses:   Pulses palpable in all extremities                            Skin:   Skin is warm and dry,  no rashes or " palpable lesions                  Neurologic:   CNII-XII intact, motor strength grossly intact, sensation grossly intact to light touch, no focal deficits noted       Data Review:  Labs in chart were reviewed.  WBC   Date Value Ref Range Status   09/05/2021 8.52 3.40 - 10.80 10*3/mm3 Final     Hemoglobin   Date Value Ref Range Status   09/05/2021 11.3 (L) 13.0 - 17.7 g/dL Final     Hematocrit   Date Value Ref Range Status   09/05/2021 33.9 (L) 37.5 - 51.0 % Final     Platelets   Date Value Ref Range Status   09/05/2021 247 140 - 450 10*3/mm3 Final     Sodium   Date Value Ref Range Status   09/05/2021 137 136 - 145 mmol/L Final     Potassium   Date Value Ref Range Status   09/05/2021 3.4 (L) 3.5 - 5.2 mmol/L Final     Chloride   Date Value Ref Range Status   09/05/2021 106 98 - 107 mmol/L Final     CO2   Date Value Ref Range Status   09/05/2021 13.4 (L) 22.0 - 29.0 mmol/L Final     BUN   Date Value Ref Range Status   09/05/2021 85 (H) 8 - 23 mg/dL Final     Creatinine   Date Value Ref Range Status   09/05/2021 4.36 (H) 0.76 - 1.27 mg/dL Final     Glucose   Date Value Ref Range Status   09/05/2021 101 (H) 65 - 99 mg/dL Final     Calcium   Date Value Ref Range Status   09/05/2021 9.0 8.6 - 10.5 mg/dL Final     Phosphorus   Date Value Ref Range Status   09/05/2021 3.6 2.5 - 4.5 mg/dL Final     AST (SGOT)   Date Value Ref Range Status   09/03/2021 21 1 - 40 U/L Final     ALT (SGPT)   Date Value Ref Range Status   09/03/2021 13 1 - 41 U/L Final     Alkaline Phosphatase   Date Value Ref Range Status   09/03/2021 103 39 - 117 U/L Final     Patient Active Problem List   Diagnosis Code   • Acute UTI (urinary tract infection) N39.0   • Macrocytosis D75.89   • Sepsis secondary to UTI (CMS/HCC) A41.9, N39.0   • Metabolic encephalopathy G93.41   • Hyperlipidemia E78.5   • Hypertension I10   • Monoclonal gammopathy D47.2   • Stage 4 chronic kidney disease (CMS/HCC) N18.4   • DM2 (diabetes mellitus, type 2) (CMS/HCC) E11.9   •  Abnormal CT of the abdomen R93.5   • Normal anion gap metabolic acidosis E87.2   • Anemia, chronic disease D63.8   • Ventricular tachycardia (paroxysmal) (CMS/[facility]) I47.2   • Acute metabolic encephalopathy G93.41   • UTI (urinary tract infection), bacterial N39.0, A49.9   • Elevated troponin R77.8   • Hypokalemia E87.6   • SILVIA (acute kidney injury) (CMS/[facility]) N17.9   • Uncontrolled hypertension I10   • Septicemia (CMS/[facility]) A41.9   • Vascular dementia without behavioral disturbance (CMS/[facility]) F01.50   • Gastrointestinal hemorrhage K92.2   • Immobility Z74.09   • Right upper lobe pneumonia J18.9   • CKD (chronic kidney disease) stage 3, GFR 30-59 ml/min (CMS/[facility]) N18.30   • Urine retention R33.9   • Altered mental status R41.82   • Severe malnutrition (CMS/[facility]) E43       Assessment:    Altered mental status    Severe malnutrition (CMS/[facility])  sepsis  uti  Aspiration pneumonia  Atrial fibrillation    Plan:  Now on palliative care unit  Transitioning to end of life care  Appears comfortable presently  Will adjust meds as needed  Notes and labs reviewed  Appreciate help from all      Carol Ann Cheung MD  9/5/2021  17:51 EDT

## 2021-09-06 NOTE — PLAN OF CARE
"Goal Outcome Evaluation:  Plan of Care Reviewed With: spouse        Progress: no change  Outcome Summary: Pt transferred to  for palliative care this evening. Pt is minimally verbal, with respond with shaking head \"yes\" or \"no\". Pt denies pain at this time. Pt has primarily been sleeping since his arrival to this unit. Pt's wife updated by phone and will be coming to visit tomorrow.  "

## 2021-09-07 NOTE — CASE MANAGEMENT/SOCIAL WORK
Continued Stay Note  [facility]     Patient Name: Gregorio Alejandro  MRN: [id number]  Today's Date: 2021    Admit Date: 2021    Discharge Plan     Row Name 21 1136       Plan    Final Discharge Disposition Code  20 -     Final Note   2021        Discharge Codes    No documentation.       Expected Discharge Date and Time     Expected Discharge Date Expected Discharge Time    Sep 6, 2021             Jana Ortega RN

## 2021-09-07 NOTE — CASE MANAGEMENT/SOCIAL WORK
Continued Stay Note  [facility]     Patient Name: Gregorio Alejandro  MRN: [id number]  Today's Date: 2021    Admit Date: 2021    Discharge Plan     Row Name 21 1332       Plan    Plan Comments  CCP received inbound call from APS worker/Lyndsey Tafoya [phone]; she was requesting if any concerns were noted regarding patient's wounds.CCP reviewed chart and advised APS worker to request medical records for any information needed. Roselyn CABAN    Row Name 21 1136       Plan    Final Discharge Disposition Code  20 -     Final Note   2021        Discharge Codes    No documentation.       Expected Discharge Date and Time     Expected Discharge Date Expected Discharge Time    Sep 6, 2021             MEE Finch

## 2021-09-08 NOTE — PAYOR COMM NOTE
"DISCHARGED    REF #[id number]    NEED ONE MORE DAY      Mor Alejandro (Dcsd. Male)     Date of Birth Social Security Number Address Home Phone MRN    1955  [address] [facility]  [address] [place] [address] [phone] [id number]    Zoroastrian Marital Status          Jainism        Admission Date Admission Type Admitting Provider Attending Provider Department, Room/Bed    21 Emergency Campos Espino MD  [address], P487/1    Discharge Date Discharge Disposition Discharge Destination        2021               Attending Provider: (none)   Allergies: No Known Allergies    Isolation: None   Infection: None   Code Status: Prior    Ht: 190.5 cm (75\")   Wt: 74.5 kg (164 lb 3.9 oz)    Admission Cmt: None   Principal Problem: None                Active Insurance as of 2021     Primary Coverage     Payor Plan Insurance Group Employer/Plan Group    WELLCARE [facility] MEDICARE REPLACEMENT WELL[employer] MEDICARE REPLACEMENT [id number]     Payor Plan Address Payor Plan Phone Number Payor Plan Fax Number Effective Dates    [address] [address] [phone]  2020 - None Entered    [facility] [id number]       Subscriber Name Subscriber Birth Date Member ID       Mor Alejandro 1955 [id number]           Secondary Coverage     Payor Plan Insurance Group Employer/Plan Group    [id number]      Payor Plan Address Payor Plan Phone Number Payor Plan Fax Number Effective Dates    [address]   2021 - None Entered    [facility] [id number]       Subscriber Name Subscriber Birth Date Member ID       MOR ALEJANDRO 1955 [id number]                 Emergency Contacts      (Rel.) Home Phone Work Phone Mobile Phone    Patricia Alonzo (Spouse) [phone] -- [phone]    Celia Eastman (Daughter) [phone] -- --    Zeenat Wang (Daughter) -- -- [phone]            Discharge Order (From admission, onward)     Start     " Ordered    21 0358  Discharge patient  Once     Expected Discharge Date: 21    Discharge Disposition:     Physician of Record for Attribution - Please select from Treatment Team: EDIS SYED [[id number]]    Review needed by CMO to determine Physician of Record: No       Question Answer Comment   Physician of Record for Attribution - Please select from Treatment Team EDIS SYED    Review needed by CMO to determine Physician of Record No        21 0358

## 2021-09-20 NOTE — DISCHARGE SUMMARY
PHYSICIAN DISCHARGE SUMMARY  [facility], [facility]      Patient Identification:    Name: Gregorio Alejandro  Age: 65 y.o.  Sex: male  :  1955  MRN: [id number]    Primary Care Physician: Maya Ribeiro APRN    Admit date: 2021    Discharge date and time:2021    Discharged Condition:     Discharge Diagnoses:    Altered mental status    Severe malnutrition (CMS/HCC)  Aspiration pneumonia  Metabolic encephalopathy  Acute kidney injury with oliguria  Dehydration  Elevated troponin  Sinus tachycardia.  Diabetes mellitus        Hospital Course: Gregorio Alejandro  is a 65-year-old gentleman, resident of the nursing home.  Patient has history of diabetes mellitus, hyperlipidemia, hypertension, monoclonal gammopathy, chronic kidney disease.  Patient was noted to have altered mental status during the last 2-3 days prior to admission, he progressively became more confused until he became lethargic and almost unresponsive.  Patient was sent to the emergency room, in the ER, patient was found to have: Hemoglobin 11.4, WBC 10.26, creatinine 4.11, potassium 5.0, CO2 16.5, BNP 15,560, troponin 0.202, procalcitonin 3.96.  Respiratory panel including COVID-19, negative.  Lactate was 2.4, urinalysis shows closed, WBC, unable to determine RBC.  CT of the head showed no acute intracranial abnormality with no change when compared to prior head CT on February of this year.  Chest ray showed extensive pneumonia or aspiration pneumonitis in the right lung.  Patient was admitted for further management.    Upon admission patient was given IV fluids since she was dehydrated, antibiotics to treat aspiration pneumonia.  Renal function was monitored as well.  Mental status was also followed-up.  Unfortunately patient's respiratory status became worse, patient was transferred to the ICU due to low blood pressure which initially responded to IV fluids as well as pressor support.  Patient's  blood pressure was up and down, during the time in the ICU patient's electrolytes were corrected.  Renal service was consulted due to acute kidney injury, creatinine was 3.91 by .  Patient with significant improvement, patient family made him DNR/DNI as well as no dialysis. Since patient's prognosis was poor, patient's care structure was switched to palliative care/comfort measures per family. Patient was transferred to our palliative care unit and placed on palliative care orders.  Patient continued to deteriorate and eventually  on 2021 at 03:23 hours.        Signed:  Campos Espino MD           I wore protective equipment throughout this patient encounter including a face mask, gloves, and protective eyewear.  Hand hygiene was performed before donning protective equipment and after removal when leaving the room.

## 2022-07-14 NOTE — NURSING NOTE
Dr. Low in room, aware EKG results from this am.No new orders rec'd at this time.   Nasal Root Units: 6

## 2022-08-31 NOTE — PROGRESS NOTES
Cardiology/Electrophysiology Progress Note      Patient Name: Gregorio Alejandro  Age/Sex: 65 y.o. male  : 1955  MRN: [id number]      Day of Service: 21       Chief Complaint/Follow-up:     Interval History:     S: Patient seen and examined. Tele Reviewed. AFIB - no significant events. Somewhat confused.    Temp:  [97.8 °F (36.6 °C)-98.9 °F (37.2 °C)] 98.3 °F (36.8 °C)  Heart Rate:  [75-92] 77  Resp:  [18-20] 18  BP: (119-132)/(73-86) 127/77     NAD  Eyes PERRL  Neck supple. No JVD  CTA  Irregular  BS intact. No r/g/peritoneal signs.  No edema  Skin warm and dry        ECG/TELE: Reviewed.    Results from last 7 days   Lab Units 21  0800 21  0928 21  1756   SODIUM mmol/L 137 136 134*   POTASSIUM mmol/L 4.7 4.1 4.1   CHLORIDE mmol/L 114* 113* 114*   CO2 mmol/L 13.5* 12.0* 11.3*   BUN mg/dL 54* 56* 63*   CREATININE mg/dL 2.54* 2.81* 2.64*   GLUCOSE mg/dL 98 188* 98   CALCIUM mg/dL 8.3* 8.0* 8.0*     Results from last 7 days   Lab Units 21  0010 21  0928 21  0006 21  2238   WBC 10*3/mm3  --  7.29  --  5.95   HEMOGLOBIN g/dL 9.5* 8.6*  8.6* 9.6* 9.7*   HEMATOCRIT % 30.5* 27.2*  27.2* 30.8* 31.2*   PLATELETS 10*3/mm3  --  161  --  201           Current Medications:   Scheduled Meds:amLODIPine, 2.5 mg, Oral, Q24H  atorvastatin, 20 mg, Oral, Daily  brimonidine, 1 drop, Both Eyes, BID  cefTRIAXone, 1 g, Intravenous, Q24H  dorzolamide, 1 drop, Right Eye, TID  insulin lispro, 0-9 Units, Subcutaneous, 4x Daily With Meals & Nightly  metroNIDAZOLE, 500 mg, Intravenous, Q8H  senna-docusate sodium, 1 tablet, Oral, Nightly  sodium bicarbonate, 1,300 mg, Oral, TID  sodium chloride, 10 mL, Intravenous, Q12H  tamsulosin, 0.4 mg, Oral, Daily      Continuous Infusions:         Gastrointestinal hemorrhage    Acute UTI (urinary tract infection)    Hypertension    DM2 (diabetes mellitus, type 2) (CMS/HCC)    Abnormal CT of the abdomen    SILVIA (acute kidney injury) (CMS/HCC)     Patient is requesting a new referral for Opthalmology. Patient states called provider they state they do not specialize in cataract surgery. Immobility    Right upper lobe pneumonia    CKD (chronic kidney disease) stage 3, GFR 30-59 ml/min (CMS/[facility])       A/P: 64 yo male with h/o Atrial Fibrillation with GI Bleed.     1. Atrial Fibrillation - permanent, asymptomatic - elevated [id number] score of at least 3 - was on Eliquis 2.5 mg bid - unclear why on that does as not 80 and not less than 60 kg - inappropriate dosing for patient profile - regardless being held due to GI w/u. Rates controlled - off of any AVNodal agents - ?h/o Syncope - hard to get history - if in fact this is true - would consider Loop Recorder for longer term monitoring.     2. HTN - controlled.     3. H/o NSVT - no events this hospitalization.    Dax Byrd DO  06/06/21  10:58 EDT

## 2022-11-19 NOTE — PROGRESS NOTES
"  Infectious Diseases Progress Note    Tonja Azevedo MD     [facility]  [place]: 3 days  Patient Identification:  Name: Gregorio Alejandro  Age: 65 y.o.  Sex: male  :  1955  MRN: [id number]         Primary Care Physician: Maya Ribeiro APRN            Subjective: Overall feeling better and wants to know when can he go home.  Interval History: See consultation note.    Objective:    Scheduled Meds:amLODIPine, 2.5 mg, Oral, Q24H  atorvastatin, 20 mg, Oral, Daily  brimonidine, 1 drop, Both Eyes, BID  cefTRIAXone, 1 g, Intravenous, Q24H  dorzolamide, 1 drop, Right Eye, TID  famotidine, 20 mg, Oral, BID AC  hydrALAZINE, 25 mg, Oral, Q8H  insulin lispro, 0-9 Units, Subcutaneous, 4x Daily With Meals & Nightly  metroNIDAZOLE, 500 mg, Intravenous, Q8H  senna-docusate sodium, 1 tablet, Oral, Nightly  sodium bicarbonate, 1,300 mg, Oral, TID  sodium chloride, 10 mL, Intravenous, Q12H  tamsulosin, 0.4 mg, Oral, Daily      Continuous Infusions:     Vital signs in last 24 hours:  Temp:  [97.6 °F (36.4 °C)-98.1 °F (36.7 °C)] 98 °F (36.7 °C)  Heart Rate:  [78-85] 78  Resp:  [16] 16  BP: (125-145)/(85-95) 142/95    Intake/Output:    Intake/Output Summary (Last 24 hours) at 2021 1103  Last data filed at 2021 0542  Gross per 24 hour   Intake --   Output 1300 ml   Net -1300 ml       Exam:  /95 (BP Location: Left arm, Patient Position: Lying)   Pulse 78   Temp 98 °F (36.7 °C) (Oral)   Resp 16   Ht 190.5 cm (75\")   Wt 103 kg (227 lb 12.8 oz)   SpO2 95%   BMI 28.47 kg/m²   Patient is examined using the personal protective equipment as per guidelines from infection control for this particular patient as enacted.  Hand washing was performed before and after patient interaction.  General Appearance:    More interactive and feeling better.   Head:    Normocephalic, without obvious abnormality, atraumatic   Eyes:    PERRL, conjunctivae/corneas clear, EOM's intact, both eyes   Ears:    Normal external ear " canals, both ears   Nose:   Nares normal, septum midline, mucosa normal, no drainage    or sinus tenderness   Throat:   Lips, tongue, gums normal; oral mucosa pink and moist   Neck:   Supple, symmetrical, trachea midline, no adenopathy;     thyroid:  no enlargement/tenderness/nodules; no carotid    bruit or JVD   Back:     Symmetric, no curvature, ROM normal, no CVA tenderness   Lungs:     Clear to auscultation bilaterally, respirations unlabored   Chest Wall:    No tenderness or deformity    Heart:   Irregularly irregular   Abdomen:    Soft mild generalized tenderness,distended   Extremities:  Contractures and diffuse edema of the lower extremities noted lower extremities are dressed.   Pulses:   Pulses palpable in all extremities; symmetric all extremities   Skin:  Superficial wounds noted   Neurologic:   Not as lethargic and somnolent as well last night..            Data Review:    I reviewed the patient's new clinical results.  Results from last 7 days   Lab Units 06/08/21  0921 06/07/21  1106 06/06/21  2359 06/06/21  1516 06/05/21  0010 06/04/21  0928 06/04/21  0006 06/02/21  2238   WBC 10*3/mm3 6.29 6.80  --  7.90  --  7.29  --  5.95   HEMOGLOBIN g/dL 9.3* 8.6* 8.2* 8.4* 9.5* 8.6*  8.6* 9.6* 9.7*   PLATELETS 10*3/mm3 184 168  --  170  --  161  --  201     Results from last 7 days   Lab Units 06/08/21  0921 06/07/21  1106 06/06/21  1516 06/05/21  0800 06/04/21  0928 06/03/21  1756 06/02/21  2238   SODIUM mmol/L 141 140 138 137 136 134* 138   POTASSIUM mmol/L 3.3* 3.3* 3.7 4.7 4.1 4.1 3.9   CHLORIDE mmol/L 115* 114* 112* 114* 113* 114* 111*   CO2 mmol/L 17.3* 16.0* 16.2* 13.5* 12.0* 11.3* 15.2*   BUN mg/dL 36* 40* 48* 54* 56* 63* 68*   CREATININE mg/dL 2.20* 2.60* 2.37* 2.54* 2.81* 2.64* 3.25*   CALCIUM mg/dL 8.2* 8.0* 8.2* 8.3* 8.0* 8.0* 8.6   GLUCOSE mg/dL 109* 161* 152* 98 188* 98 233*     Microbiology Results (last 10 days)     Procedure Component Value - Date/Time    Fungus Smear - Brushing, Esophagus  [[id number]] Collected: 06/05/21 0933    Lab Status: Final result Specimen: Brushing from Esophagus Updated: 06/05/21 1136     Fungal Stain No yeast or hyphal elements seen    Gastrointestinal Panel, PCR - Stool, Per Rectum [[id number]]  (Normal) Collected: 06/04/21 2223    Lab Status: Final result Specimen: Stool from Per Rectum Updated: 06/05/21 0017     Campylobacter Not Detected     Plesiomonas shigelloides Not Detected     Salmonella Not Detected     Vibrio Not Detected     Vibrio cholerae Not Detected     Yersinia enterocolitica Not Detected     Enteroaggregative E. coli (EAEC) Not Detected     Enteropathogenic E. coli (EPEC) Not Detected     Enterotoxigenic E. coli (ETEC) lt/st Not Detected     Shiga-like toxin-producing E. coli (STEC) stx1/stx2 Not Detected     Shigella/Enteroinvasive E. coli (EIEC) Not Detected     Cryptosporidium Not Detected     Cyclospora cayetanensis Not Detected     Entamoeba histolytica Not Detected     Giardia lamblia Not Detected     Adenovirus F40/41 Not Detected     Astrovirus Not Detected     Norovirus GI/GII Not Detected     Rotavirus A Not Detected     Sapovirus (I, II, IV or V) Not Detected    Narrative:      If Aeromonas, Staphylococcus aureus or Bacillus cereus are suspected, please order [id number]: Stool Culture, Aeromonas, S aureus, B Cereus.    COVID PRE-OP / PRE-PROCEDURE SCREENING ORDER (NO ISOLATION) - Swab, Nasopharynx [[id number]]  (Normal) Collected: 06/02/21 2318    Lab Status: Final result Specimen: Swab from Nasopharynx Updated: 06/03/21 0330    Narrative:      The following orders were created for panel order COVID PRE-OP / PRE-PROCEDURE SCREENING ORDER (NO ISOLATION) - Swab, Nasopharynx.  Procedure                               Abnormality         Status                     ---------                               -----------         ------                     COVID-19,APTIMA PANTHER,...[[id number]]  Normal              Final result                 Please view results  for these tests on the individual orders.    COVID-19,APTIMA PANTHER,[facility], NP/OP SWAB IN UTM/VTM/[facility] TRANSPORT MEDIA,24 HR TAT - Swab, Nasopharynx [[id number]]  (Normal) Collected: 06/02/21 2318    Lab Status: Final result Specimen: Swab from Nasopharynx Updated: 06/03/21 0330     COVID19 Not Detected    Narrative:      Fact sheet for providers: https://www.fda.gov/media/296564/download     Fact sheet for patients: https://www.fda.gov/media/898850/download    Test performed by RT PCR.    Urine Culture - Urine, Urine, Catheter [[id number]]  (Abnormal)  (Susceptibility) Collected: 06/02/21 2314    Lab Status: Final result Specimen: Urine, Catheter Updated: 06/05/21 0029     Urine Culture >100,000 CFU/mL Klebsiella pneumoniae ssp pneumoniae    Susceptibility      Klebsiella pneumoniae ssp pneumoniae      ISAC      Ampicillin Resistant      Ampicillin + Sulbactam Susceptible      Cefazolin Susceptible      Cefepime Susceptible      Ceftazidime Susceptible      Ceftriaxone Susceptible      Gentamicin Susceptible      Levofloxacin Susceptible      Nitrofurantoin Susceptible      Piperacillin + Tazobactam Susceptible      Tetracycline Susceptible      Trimethoprim + Sulfamethoxazole Susceptible               Linear View                           Assessment:    Gastrointestinal hemorrhage    Acute UTI (urinary tract infection)    Hypertension    DM2 (diabetes mellitus, type 2) (CMS/HCC)    Abnormal CT of the abdomen    SILVIA (acute kidney injury) (CMS/HCC)    Immobility    Right upper lobe pneumonia    CKD (chronic kidney disease) stage 3, GFR 30-59 ml/min (CMS/[facility])    Urine retention  1-GI bleed unclear whether it is upper GI bleed or due to colitis involving the rectosigmoid junction.  Rule out infectious causes such as E. coli Shigella infection versus C. difficile infection.  2-probable UTI with urinary retention likely chronic recurrent cystitis  3-diabetes mellitus with neuropathy and possible  gastroparesis  4-immobilization syndrome  5-vascular dementia  6-other diagnosis per primary team.     Recommendations/Discussions:  · Continue IV Flagyl and IV Rocephin to address UTI due to Klebsiella pneumonia and intra-abdominal process with focal colitis involving the rectosigmoid junction-5 to 7 days of treatment should be plenty.  · Would recommend short course of antibiotic treatment for urinary tract infection and low threshold to check for C. difficile infection and search for enteric pathogens causing gastrointestinal infection.  · Defer it to gastro enterology service for the treatment of probable esophageal candidiasis.  Oral Diflucan adjusted to renal function for couple weeks should be plenty.    Tonja Azevedo MD  6/8/2021  11:03 EDT    Much of this encounter note is an electronic transcription/translation of spoken language to printed text. The electronic translation of spoken language may permit erroneous, or at times, nonsensical words or phrases to be inadvertently transcribed; Although I have reviewed the note for such errors, some may still exist     Gera Kelley  GASTROENTEROLOGY  [address] [place] [address], [address]  [place] [address]  Phone: [phone]  Fax: [phone]  Follow Up Time:

## (undated) DEVICE — ADAPT CLN BIOGUARD AIR/H2O DISP

## (undated) DEVICE — BITEBLOCK OMNI BLOC

## (undated) DEVICE — BRUSH CYTO COLONOSCOPE 7F3MM 240CM RED

## (undated) DEVICE — CANN O2 ETCO2 FITS ALL CONN CO2 SMPL A/ 7IN DISP LF

## (undated) DEVICE — THE TORRENT IRRIGATION SCOPE CONNECTOR IS USED WITH THE TORRENT IRRIGATION TUBING TO PROVIDE IRRIGATION FLUIDS SUCH AS STERILE WATER DURING GASTROINTESTINAL ENDOSCOPIC PROCEDURES WHEN USED IN CONJUNCTION WITH AN IRRIGATION PUMP (OR ELECTROSURGICAL UNIT).: Brand: TORRENT

## (undated) DEVICE — LN SMPL CO2 SHTRM SD STREAM W/M LUER

## (undated) DEVICE — KT ORCA ORCAPOD DISP STRL

## (undated) DEVICE — TUBING, SUCTION, 1/4" X 10', STRAIGHT: Brand: MEDLINE

## (undated) DEVICE — SENSR O2 OXIMAX FNGR A/ 18IN NONSTR